# Patient Record
Sex: FEMALE | Race: WHITE | NOT HISPANIC OR LATINO | Employment: UNEMPLOYED | URBAN - NONMETROPOLITAN AREA
[De-identification: names, ages, dates, MRNs, and addresses within clinical notes are randomized per-mention and may not be internally consistent; named-entity substitution may affect disease eponyms.]

---

## 2017-10-01 ENCOUNTER — APPOINTMENT (OUTPATIENT)
Dept: GENERAL RADIOLOGY | Facility: HOSPITAL | Age: 72
End: 2017-10-01

## 2017-10-01 ENCOUNTER — HOSPITAL ENCOUNTER (INPATIENT)
Facility: HOSPITAL | Age: 72
LOS: 3 days | Discharge: HOME | End: 2017-10-04
Attending: EMERGENCY MEDICINE | Admitting: INTERNAL MEDICINE

## 2017-10-01 DIAGNOSIS — J18.9 PNEUMONIA OF LOWER LOBE DUE TO INFECTIOUS ORGANISM, UNSPECIFIED LATERALITY: Primary | ICD-10-CM

## 2017-10-01 DIAGNOSIS — I10 UNCONTROLLED HYPERTENSION: ICD-10-CM

## 2017-10-01 DIAGNOSIS — J44.1 COPD WITH EXACERBATION (HCC): ICD-10-CM

## 2017-10-01 PROBLEM — A41.9 SEPSIS (HCC): Status: ACTIVE | Noted: 2017-10-01

## 2017-10-01 PROBLEM — F17.219 CIGARETTE NICOTINE DEPENDENCE WITH NICOTINE-INDUCED DISORDER: Status: ACTIVE | Noted: 2017-10-01

## 2017-10-01 LAB
ALBUMIN SERPL-MCNC: 4 G/DL (ref 3.5–5)
ALBUMIN/GLOB SERPL: 1 G/DL (ref 1–2)
ALP SERPL-CCNC: 122 U/L (ref 38–126)
ALT SERPL W P-5'-P-CCNC: 28 U/L (ref 13–69)
ANION GAP SERPL CALCULATED.3IONS-SCNC: 18.7 MMOL/L
ARTERIAL PATENCY WRIST A: POSITIVE
AST SERPL-CCNC: 29 U/L (ref 15–46)
BACTERIA UR QL AUTO: ABNORMAL /HPF
BASE EXCESS BLDA CALC-SCNC: 4.7 MMOL/L
BASOPHILS # BLD AUTO: 0.17 10*3/MM3 (ref 0–0.2)
BASOPHILS NFR BLD AUTO: 0.8 % (ref 0–2.5)
BDY SITE: ABNORMAL
BILIRUB SERPL-MCNC: 0.5 MG/DL (ref 0.2–1.3)
BILIRUB UR QL STRIP: ABNORMAL
BUN BLD-MCNC: 10 MG/DL (ref 7–20)
BUN/CREAT SERPL: 20 (ref 7.1–23.5)
CALCIUM SPEC-SCNC: 9.8 MG/DL (ref 8.4–10.2)
CHLORIDE SERPL-SCNC: 102 MMOL/L (ref 98–107)
CLARITY UR: CLEAR
CO2 SERPL-SCNC: 25 MMOL/L (ref 26–30)
COD CRY URNS QL: ABNORMAL /HPF
COHGB MFR BLD: 0.7 %
COLOR UR: YELLOW
CREAT BLD-MCNC: 0.5 MG/DL (ref 0.6–1.3)
D-LACTATE SERPL-SCNC: 1.6 MMOL/L (ref 0.5–2)
DEPRECATED RDW RBC AUTO: 41.9 FL (ref 37–54)
EOSINOPHIL # BLD AUTO: 0.03 10*3/MM3 (ref 0–0.7)
EOSINOPHIL NFR BLD AUTO: 0.1 % (ref 0–7)
ERYTHROCYTE [DISTWIDTH] IN BLOOD BY AUTOMATED COUNT: 12.8 % (ref 11.5–14.5)
GFR SERPL CREATININE-BSD FRML MDRD: 122 ML/MIN/1.73
GLOBULIN UR ELPH-MCNC: 3.9 GM/DL
GLUCOSE BLD-MCNC: 139 MG/DL (ref 74–98)
GLUCOSE UR STRIP-MCNC: NEGATIVE MG/DL
HCO3 BLDA-SCNC: 28.9 MMOL/L (ref 22–28)
HCT VFR BLD AUTO: 44.4 % (ref 37–47)
HGB BLD-MCNC: 14.7 G/DL (ref 12–16)
HGB BLDA-MCNC: 14.7 G/DL (ref 12–18)
HGB UR QL STRIP.AUTO: ABNORMAL
HOROWITZ INDEX BLD+IHG-RTO: 28 %
HYALINE CASTS UR QL AUTO: ABNORMAL /LPF
IMM GRANULOCYTES # BLD: 0.1 10*3/MM3 (ref 0–0.06)
IMM GRANULOCYTES NFR BLD: 0.5 % (ref 0–0.6)
KETONES UR QL STRIP: ABNORMAL
LEUKOCYTE ESTERASE UR QL STRIP.AUTO: NEGATIVE
LYMPHOCYTES # BLD AUTO: 2.19 10*3/MM3 (ref 0.6–3.4)
LYMPHOCYTES NFR BLD AUTO: 10.3 % (ref 10–50)
MAGNESIUM SERPL-MCNC: 2.1 MG/DL (ref 1.6–2.3)
MCH RBC QN AUTO: 29.6 PG (ref 27–31)
MCHC RBC AUTO-ENTMCNC: 33.1 G/DL (ref 30–37)
MCV RBC AUTO: 89.3 FL (ref 81–99)
METHGB BLD QL: 0.4 %
MODALITY: ABNORMAL
MONOCYTES # BLD AUTO: 1.83 10*3/MM3 (ref 0–0.9)
MONOCYTES NFR BLD AUTO: 8.6 % (ref 0–12)
NEUTROPHILS # BLD AUTO: 16.95 10*3/MM3 (ref 2–6.9)
NEUTROPHILS NFR BLD AUTO: 79.7 % (ref 37–80)
NITRITE UR QL STRIP: NEGATIVE
NRBC BLD MANUAL-RTO: 0 /100 WBC (ref 0–0)
OXYHGB MFR BLDV: 98.2 % (ref 94–99)
PCO2 BLDA: 43.3 MM HG (ref 35–45)
PH BLDA: 7.43 PH UNITS (ref 7.3–7.5)
PH UR STRIP.AUTO: 6 [PH] (ref 5–8)
PLATELET # BLD AUTO: 278 10*3/MM3 (ref 130–400)
PMV BLD AUTO: 11.1 FL (ref 6–12)
PO2 BLDA: 136 MM HG (ref 75–100)
POTASSIUM BLD-SCNC: 3.7 MMOL/L (ref 3.5–5.1)
PROT SERPL-MCNC: 7.9 G/DL (ref 6.3–8.2)
PROT UR QL STRIP: ABNORMAL
RBC # BLD AUTO: 4.97 10*6/MM3 (ref 4.2–5.4)
RBC # UR: ABNORMAL /HPF
REF LAB TEST METHOD: ABNORMAL
SAO2 % BLDCOA: 99.3 %
SODIUM BLD-SCNC: 142 MMOL/L (ref 137–145)
SP GR UR STRIP: 1.02 (ref 1–1.03)
SQUAMOUS #/AREA URNS HPF: ABNORMAL /HPF
TROPONIN I SERPL-MCNC: <0.012 NG/ML (ref 0–0.03)
UROBILINOGEN UR QL STRIP: ABNORMAL
WBC NRBC COR # BLD: 21.27 10*3/MM3 (ref 4.8–10.8)
WBC UR QL AUTO: ABNORMAL /HPF

## 2017-10-01 PROCEDURE — 87040 BLOOD CULTURE FOR BACTERIA: CPT | Performed by: EMERGENCY MEDICINE

## 2017-10-01 PROCEDURE — 83605 ASSAY OF LACTIC ACID: CPT | Performed by: EMERGENCY MEDICINE

## 2017-10-01 PROCEDURE — 25010000002 METHYLPREDNISOLONE PER 40 MG: Performed by: INTERNAL MEDICINE

## 2017-10-01 PROCEDURE — 94640 AIRWAY INHALATION TREATMENT: CPT

## 2017-10-01 PROCEDURE — 71010 HC CHEST PA OR AP: CPT

## 2017-10-01 PROCEDURE — 82805 BLOOD GASES W/O2 SATURATION: CPT

## 2017-10-01 PROCEDURE — 85025 COMPLETE CBC W/AUTO DIFF WBC: CPT | Performed by: EMERGENCY MEDICINE

## 2017-10-01 PROCEDURE — 94799 UNLISTED PULMONARY SVC/PX: CPT

## 2017-10-01 PROCEDURE — 83735 ASSAY OF MAGNESIUM: CPT | Performed by: EMERGENCY MEDICINE

## 2017-10-01 PROCEDURE — 94644 CONT INHLJ TX 1ST HOUR: CPT

## 2017-10-01 PROCEDURE — 80053 COMPREHEN METABOLIC PANEL: CPT | Performed by: EMERGENCY MEDICINE

## 2017-10-01 PROCEDURE — 84484 ASSAY OF TROPONIN QUANT: CPT | Performed by: EMERGENCY MEDICINE

## 2017-10-01 PROCEDURE — 83050 HGB METHEMOGLOBIN QUAN: CPT

## 2017-10-01 PROCEDURE — 99223 1ST HOSP IP/OBS HIGH 75: CPT | Performed by: INTERNAL MEDICINE

## 2017-10-01 PROCEDURE — 82375 ASSAY CARBOXYHB QUANT: CPT

## 2017-10-01 PROCEDURE — 25010000002 MEROPENEM IN SODIUM CHLORIDE 0.9% 50 ML 1 GM/50ML RECONSTITUTED SOLUTION: Performed by: INTERNAL MEDICINE

## 2017-10-01 PROCEDURE — 99285 EMERGENCY DEPT VISIT HI MDM: CPT

## 2017-10-01 PROCEDURE — 25010000002 MEROPENEM IN SODIUM CHLORIDE 0.9% 50 ML 1 GM/50ML RECONSTITUTED SOLUTION: Performed by: EMERGENCY MEDICINE

## 2017-10-01 PROCEDURE — 36600 WITHDRAWAL OF ARTERIAL BLOOD: CPT

## 2017-10-01 PROCEDURE — 25010000002 METHYLPREDNISOLONE PER 125 MG: Performed by: EMERGENCY MEDICINE

## 2017-10-01 PROCEDURE — 93005 ELECTROCARDIOGRAM TRACING: CPT

## 2017-10-01 PROCEDURE — 25010000002 AZITHROMYCIN: Performed by: EMERGENCY MEDICINE

## 2017-10-01 PROCEDURE — 25010000002 ENOXAPARIN PER 10 MG: Performed by: INTERNAL MEDICINE

## 2017-10-01 PROCEDURE — 81001 URINALYSIS AUTO W/SCOPE: CPT | Performed by: EMERGENCY MEDICINE

## 2017-10-01 RX ORDER — ACETAMINOPHEN 500 MG
1000 TABLET ORAL ONCE
Status: COMPLETED | OUTPATIENT
Start: 2017-10-01 | End: 2017-10-01

## 2017-10-01 RX ORDER — ONDANSETRON 4 MG/1
4 TABLET, ORALLY DISINTEGRATING ORAL EVERY 6 HOURS PRN
Status: DISCONTINUED | OUTPATIENT
Start: 2017-10-01 | End: 2017-10-04 | Stop reason: HOSPADM

## 2017-10-01 RX ORDER — CARVEDILOL 6.25 MG/1
6.25 TABLET ORAL 2 TIMES DAILY WITH MEALS
Status: DISCONTINUED | OUTPATIENT
Start: 2017-10-01 | End: 2017-10-02

## 2017-10-01 RX ORDER — MEROPENEM AND SODIUM CHLORIDE 1 G/50ML
1 INJECTION, SOLUTION INTRAVENOUS ONCE
Status: DISCONTINUED | OUTPATIENT
Start: 2017-10-01 | End: 2017-10-01 | Stop reason: SDUPTHER

## 2017-10-01 RX ORDER — METHYLPREDNISOLONE SODIUM SUCCINATE 40 MG/ML
40 INJECTION, POWDER, LYOPHILIZED, FOR SOLUTION INTRAMUSCULAR; INTRAVENOUS EVERY 12 HOURS
Status: DISCONTINUED | OUTPATIENT
Start: 2017-10-01 | End: 2017-10-02

## 2017-10-01 RX ORDER — IPRATROPIUM BROMIDE AND ALBUTEROL SULFATE 2.5; .5 MG/3ML; MG/3ML
6 SOLUTION RESPIRATORY (INHALATION) ONCE
Status: COMPLETED | OUTPATIENT
Start: 2017-10-01 | End: 2017-10-01

## 2017-10-01 RX ORDER — IPRATROPIUM BROMIDE AND ALBUTEROL SULFATE 2.5; .5 MG/3ML; MG/3ML
3 SOLUTION RESPIRATORY (INHALATION) EVERY 4 HOURS PRN
Status: DISCONTINUED | OUTPATIENT
Start: 2017-10-01 | End: 2017-10-04 | Stop reason: HOSPADM

## 2017-10-01 RX ORDER — ONDANSETRON 2 MG/ML
4 INJECTION INTRAMUSCULAR; INTRAVENOUS EVERY 6 HOURS PRN
Status: DISCONTINUED | OUTPATIENT
Start: 2017-10-01 | End: 2017-10-04 | Stop reason: HOSPADM

## 2017-10-01 RX ORDER — IPRATROPIUM BROMIDE AND ALBUTEROL SULFATE 2.5; .5 MG/3ML; MG/3ML
3 SOLUTION RESPIRATORY (INHALATION)
Status: DISCONTINUED | OUTPATIENT
Start: 2017-10-01 | End: 2017-10-04 | Stop reason: HOSPADM

## 2017-10-01 RX ORDER — GUAIFENESIN 600 MG/1
600 TABLET, EXTENDED RELEASE ORAL 2 TIMES DAILY
Status: DISCONTINUED | OUTPATIENT
Start: 2017-10-01 | End: 2017-10-04 | Stop reason: HOSPADM

## 2017-10-01 RX ORDER — SODIUM CHLORIDE 0.9 % (FLUSH) 0.9 %
1-10 SYRINGE (ML) INJECTION AS NEEDED
Status: DISCONTINUED | OUTPATIENT
Start: 2017-10-01 | End: 2017-10-04 | Stop reason: HOSPADM

## 2017-10-01 RX ORDER — BUDESONIDE 0.5 MG/2ML
0.5 INHALANT ORAL
Status: DISCONTINUED | OUTPATIENT
Start: 2017-10-01 | End: 2017-10-04 | Stop reason: HOSPADM

## 2017-10-01 RX ORDER — MEROPENEM AND SODIUM CHLORIDE 1 G/50ML
1 INJECTION, SOLUTION INTRAVENOUS ONCE
Status: COMPLETED | OUTPATIENT
Start: 2017-10-01 | End: 2017-10-01

## 2017-10-01 RX ORDER — SODIUM CHLORIDE 9 MG/ML
125 INJECTION, SOLUTION INTRAVENOUS CONTINUOUS
Status: DISCONTINUED | OUTPATIENT
Start: 2017-10-01 | End: 2017-10-02

## 2017-10-01 RX ORDER — ACETAMINOPHEN 325 MG/1
650 TABLET ORAL EVERY 4 HOURS PRN
Status: DISCONTINUED | OUTPATIENT
Start: 2017-10-01 | End: 2017-10-04 | Stop reason: HOSPADM

## 2017-10-01 RX ORDER — ONDANSETRON 4 MG/1
4 TABLET, FILM COATED ORAL EVERY 6 HOURS PRN
Status: DISCONTINUED | OUTPATIENT
Start: 2017-10-01 | End: 2017-10-04 | Stop reason: HOSPADM

## 2017-10-01 RX ORDER — SACCHAROMYCES BOULARDII 250 MG
250 CAPSULE ORAL 2 TIMES DAILY
Status: DISCONTINUED | OUTPATIENT
Start: 2017-10-01 | End: 2017-10-04 | Stop reason: HOSPADM

## 2017-10-01 RX ORDER — MEROPENEM AND SODIUM CHLORIDE 1 G/50ML
1 INJECTION, SOLUTION INTRAVENOUS EVERY 8 HOURS
Status: DISCONTINUED | OUTPATIENT
Start: 2017-10-01 | End: 2017-10-04 | Stop reason: HOSPADM

## 2017-10-01 RX ORDER — SODIUM CHLORIDE 0.9 % (FLUSH) 0.9 %
10 SYRINGE (ML) INJECTION AS NEEDED
Status: DISCONTINUED | OUTPATIENT
Start: 2017-10-01 | End: 2017-10-01

## 2017-10-01 RX ORDER — METHYLPREDNISOLONE SODIUM SUCCINATE 125 MG/2ML
125 INJECTION, POWDER, LYOPHILIZED, FOR SOLUTION INTRAMUSCULAR; INTRAVENOUS ONCE
Status: COMPLETED | OUTPATIENT
Start: 2017-10-01 | End: 2017-10-01

## 2017-10-01 RX ADMIN — ENOXAPARIN SODIUM 40 MG: 40 INJECTION SUBCUTANEOUS at 18:29

## 2017-10-01 RX ADMIN — RDII 250 MG CAPSULE 250 MG: at 20:15

## 2017-10-01 RX ADMIN — SODIUM CHLORIDE 1000 ML: 9 INJECTION, SOLUTION INTRAVENOUS at 13:54

## 2017-10-01 RX ADMIN — AZITHROMYCIN 500 MG: 500 INJECTION, POWDER, LYOPHILIZED, FOR SOLUTION INTRAVENOUS at 15:49

## 2017-10-01 RX ADMIN — SODIUM CHLORIDE 125 ML/HR: 9 INJECTION, SOLUTION INTRAVENOUS at 18:29

## 2017-10-01 RX ADMIN — METHYLPREDNISOLONE SODIUM SUCCINATE 40 MG: 40 INJECTION, POWDER, FOR SOLUTION INTRAMUSCULAR; INTRAVENOUS at 23:11

## 2017-10-01 RX ADMIN — BUDESONIDE 0.5 MG: 0.5 INHALANT RESPIRATORY (INHALATION) at 19:09

## 2017-10-01 RX ADMIN — CARVEDILOL 6.25 MG: 6.25 TABLET, FILM COATED ORAL at 18:29

## 2017-10-01 RX ADMIN — MEROPENEM AND SODIUM CHLORIDE 1 G: 1 INJECTION, SOLUTION INTRAVENOUS at 20:16

## 2017-10-01 RX ADMIN — METHYLPREDNISOLONE SODIUM SUCCINATE 125 MG: 125 INJECTION, POWDER, FOR SOLUTION INTRAMUSCULAR; INTRAVENOUS at 11:53

## 2017-10-01 RX ADMIN — IPRATROPIUM BROMIDE AND ALBUTEROL SULFATE 3 ML: .5; 3 SOLUTION RESPIRATORY (INHALATION) at 19:09

## 2017-10-01 RX ADMIN — ACETAMINOPHEN 1000 MG: 500 TABLET, FILM COATED ORAL at 16:27

## 2017-10-01 RX ADMIN — SODIUM CHLORIDE 1000 ML: 9 INJECTION, SOLUTION INTRAVENOUS at 12:15

## 2017-10-01 RX ADMIN — IPRATROPIUM BROMIDE AND ALBUTEROL SULFATE 6 ML: .5; 3 SOLUTION RESPIRATORY (INHALATION) at 11:59

## 2017-10-01 RX ADMIN — GUAIFENESIN 600 MG: 600 TABLET, EXTENDED RELEASE ORAL at 20:15

## 2017-10-01 RX ADMIN — MEROPENEM AND SODIUM CHLORIDE 1 G: 1 INJECTION, SOLUTION INTRAVENOUS at 14:53

## 2017-10-02 ENCOUNTER — APPOINTMENT (OUTPATIENT)
Dept: CARDIOLOGY | Facility: HOSPITAL | Age: 72
End: 2017-10-02
Attending: INTERNAL MEDICINE

## 2017-10-02 LAB
ANION GAP SERPL CALCULATED.3IONS-SCNC: 14.7 MMOL/L
BASOPHILS # BLD AUTO: 0.05 10*3/MM3 (ref 0–0.2)
BASOPHILS NFR BLD AUTO: 0.3 % (ref 0–2.5)
BUN BLD-MCNC: 10 MG/DL (ref 7–20)
BUN/CREAT SERPL: 25 (ref 7.1–23.5)
CALCIUM SPEC-SCNC: 9 MG/DL (ref 8.4–10.2)
CHLORIDE SERPL-SCNC: 112 MMOL/L (ref 98–107)
CO2 SERPL-SCNC: 26 MMOL/L (ref 26–30)
CREAT BLD-MCNC: 0.4 MG/DL (ref 0.6–1.3)
DEPRECATED RDW RBC AUTO: 43.9 FL (ref 37–54)
EOSINOPHIL # BLD AUTO: 0 10*3/MM3 (ref 0–0.7)
EOSINOPHIL NFR BLD AUTO: 0 % (ref 0–7)
ERYTHROCYTE [DISTWIDTH] IN BLOOD BY AUTOMATED COUNT: 13 % (ref 11.5–14.5)
GFR SERPL CREATININE-BSD FRML MDRD: >150 ML/MIN/1.73
GLUCOSE BLD-MCNC: 204 MG/DL (ref 74–98)
HCT VFR BLD AUTO: 41.8 % (ref 37–47)
HGB BLD-MCNC: 13.6 G/DL (ref 12–16)
IMM GRANULOCYTES # BLD: 0.16 10*3/MM3 (ref 0–0.06)
IMM GRANULOCYTES NFR BLD: 0.9 % (ref 0–0.6)
LYMPHOCYTES # BLD AUTO: 1.3 10*3/MM3 (ref 0.6–3.4)
LYMPHOCYTES NFR BLD AUTO: 7.2 % (ref 10–50)
MCH RBC QN AUTO: 29.9 PG (ref 27–31)
MCHC RBC AUTO-ENTMCNC: 32.5 G/DL (ref 30–37)
MCV RBC AUTO: 91.9 FL (ref 81–99)
MONOCYTES # BLD AUTO: 0.55 10*3/MM3 (ref 0–0.9)
MONOCYTES NFR BLD AUTO: 3.1 % (ref 0–12)
NEUTROPHILS # BLD AUTO: 15.92 10*3/MM3 (ref 2–6.9)
NEUTROPHILS NFR BLD AUTO: 88.5 % (ref 37–80)
NRBC BLD MANUAL-RTO: 0 /100 WBC (ref 0–0)
NT-PROBNP SERPL-MCNC: 315 PG/ML (ref 0–125)
PLATELET # BLD AUTO: 271 10*3/MM3 (ref 130–400)
PMV BLD AUTO: 11 FL (ref 6–12)
POTASSIUM BLD-SCNC: 3.7 MMOL/L (ref 3.5–5.1)
RBC # BLD AUTO: 4.55 10*6/MM3 (ref 4.2–5.4)
SODIUM BLD-SCNC: 149 MMOL/L (ref 137–145)
WBC NRBC COR # BLD: 17.98 10*3/MM3 (ref 4.8–10.8)

## 2017-10-02 PROCEDURE — 80048 BASIC METABOLIC PNL TOTAL CA: CPT | Performed by: INTERNAL MEDICINE

## 2017-10-02 PROCEDURE — 83880 ASSAY OF NATRIURETIC PEPTIDE: CPT | Performed by: INTERNAL MEDICINE

## 2017-10-02 PROCEDURE — 25010000002 ENOXAPARIN PER 10 MG: Performed by: INTERNAL MEDICINE

## 2017-10-02 PROCEDURE — 25010000002 HYDRALAZINE PER 20 MG: Performed by: INTERNAL MEDICINE

## 2017-10-02 PROCEDURE — 25010000002 METHYLPREDNISOLONE PER 40 MG: Performed by: NURSE PRACTITIONER

## 2017-10-02 PROCEDURE — 25010000002 METHYLPREDNISOLONE PER 40 MG: Performed by: INTERNAL MEDICINE

## 2017-10-02 PROCEDURE — 87070 CULTURE OTHR SPECIMN AEROBIC: CPT | Performed by: INTERNAL MEDICINE

## 2017-10-02 PROCEDURE — 99233 SBSQ HOSP IP/OBS HIGH 50: CPT | Performed by: INTERNAL MEDICINE

## 2017-10-02 PROCEDURE — 94799 UNLISTED PULMONARY SVC/PX: CPT

## 2017-10-02 PROCEDURE — 99223 1ST HOSP IP/OBS HIGH 75: CPT | Performed by: INTERNAL MEDICINE

## 2017-10-02 PROCEDURE — 85025 COMPLETE CBC W/AUTO DIFF WBC: CPT | Performed by: INTERNAL MEDICINE

## 2017-10-02 PROCEDURE — 87205 SMEAR GRAM STAIN: CPT | Performed by: INTERNAL MEDICINE

## 2017-10-02 PROCEDURE — 25010000002 MEROPENEM IN SODIUM CHLORIDE 0.9% 50 ML 1 GM/50ML RECONSTITUTED SOLUTION: Performed by: INTERNAL MEDICINE

## 2017-10-02 PROCEDURE — 93306 TTE W/DOPPLER COMPLETE: CPT

## 2017-10-02 RX ORDER — HYDRALAZINE HYDROCHLORIDE 20 MG/ML
10 INJECTION INTRAMUSCULAR; INTRAVENOUS EVERY 6 HOURS PRN
Status: DISCONTINUED | OUTPATIENT
Start: 2017-10-02 | End: 2017-10-04 | Stop reason: HOSPADM

## 2017-10-02 RX ORDER — DIPHENHYDRAMINE HYDROCHLORIDE AND LIDOCAINE HYDROCHLORIDE AND ALUMINUM HYDROXIDE AND MAGNESIUM HYDRO
KIT EVERY 6 HOURS
Status: DISCONTINUED | OUTPATIENT
Start: 2017-10-02 | End: 2017-10-04 | Stop reason: HOSPADM

## 2017-10-02 RX ORDER — AZITHROMYCIN 250 MG/1
500 TABLET, FILM COATED ORAL
Status: COMPLETED | OUTPATIENT
Start: 2017-10-02 | End: 2017-10-04

## 2017-10-02 RX ORDER — ARFORMOTEROL TARTRATE 15 UG/2ML
15 SOLUTION RESPIRATORY (INHALATION)
Status: DISCONTINUED | OUTPATIENT
Start: 2017-10-02 | End: 2017-10-04 | Stop reason: HOSPADM

## 2017-10-02 RX ORDER — METHYLPREDNISOLONE SODIUM SUCCINATE 40 MG/ML
40 INJECTION, POWDER, LYOPHILIZED, FOR SOLUTION INTRAMUSCULAR; INTRAVENOUS EVERY 6 HOURS
Status: DISCONTINUED | OUTPATIENT
Start: 2017-10-02 | End: 2017-10-03

## 2017-10-02 RX ORDER — CARVEDILOL 12.5 MG/1
12.5 TABLET ORAL 2 TIMES DAILY WITH MEALS
Status: DISCONTINUED | OUTPATIENT
Start: 2017-10-02 | End: 2017-10-03

## 2017-10-02 RX ADMIN — HYDRALAZINE HYDROCHLORIDE 10 MG: 20 INJECTION INTRAMUSCULAR; INTRAVENOUS at 13:04

## 2017-10-02 RX ADMIN — BUDESONIDE 0.5 MG: 0.5 INHALANT RESPIRATORY (INHALATION) at 07:18

## 2017-10-02 RX ADMIN — MEROPENEM AND SODIUM CHLORIDE 1 G: 1 INJECTION, SOLUTION INTRAVENOUS at 05:57

## 2017-10-02 RX ADMIN — GUAIFENESIN 600 MG: 600 TABLET, EXTENDED RELEASE ORAL at 09:50

## 2017-10-02 RX ADMIN — RDII 250 MG CAPSULE 250 MG: at 09:50

## 2017-10-02 RX ADMIN — AZITHROMYCIN MONOHYDRATE 500 MG: 250 TABLET ORAL at 14:00

## 2017-10-02 RX ADMIN — RDII 250 MG CAPSULE 250 MG: at 21:01

## 2017-10-02 RX ADMIN — IPRATROPIUM BROMIDE AND ALBUTEROL SULFATE 3 ML: .5; 3 SOLUTION RESPIRATORY (INHALATION) at 07:18

## 2017-10-02 RX ADMIN — HYDRALAZINE HYDROCHLORIDE 10 MG: 20 INJECTION INTRAMUSCULAR; INTRAVENOUS at 21:24

## 2017-10-02 RX ADMIN — ACETAMINOPHEN 650 MG: 325 TABLET, FILM COATED ORAL at 21:11

## 2017-10-02 RX ADMIN — IPRATROPIUM BROMIDE AND ALBUTEROL SULFATE 3 ML: .5; 3 SOLUTION RESPIRATORY (INHALATION) at 12:50

## 2017-10-02 RX ADMIN — MEROPENEM AND SODIUM CHLORIDE 1 G: 1 INJECTION, SOLUTION INTRAVENOUS at 12:49

## 2017-10-02 RX ADMIN — DIPHENHYDRAMINE HYDROCHLORIDE AND LIDOCAINE HYDROCHLORIDE AND ALUMINUM HYDROXIDE AND MAGNESIUM HYDRO: KIT at 14:00

## 2017-10-02 RX ADMIN — ARFORMOTEROL TARTRATE 15 MCG: 15 SOLUTION RESPIRATORY (INHALATION) at 13:09

## 2017-10-02 RX ADMIN — HYDRALAZINE HYDROCHLORIDE 10 MG: 20 INJECTION INTRAMUSCULAR; INTRAVENOUS at 03:15

## 2017-10-02 RX ADMIN — METHYLPREDNISOLONE SODIUM SUCCINATE 40 MG: 40 INJECTION, POWDER, FOR SOLUTION INTRAMUSCULAR; INTRAVENOUS at 12:00

## 2017-10-02 RX ADMIN — DIPHENHYDRAMINE HYDROCHLORIDE AND LIDOCAINE HYDROCHLORIDE AND ALUMINUM HYDROXIDE AND MAGNESIUM HYDRO: KIT at 21:01

## 2017-10-02 RX ADMIN — CARVEDILOL 6.25 MG: 6.25 TABLET, FILM COATED ORAL at 09:00

## 2017-10-02 RX ADMIN — BUDESONIDE 0.5 MG: 0.5 INHALANT RESPIRATORY (INHALATION) at 19:22

## 2017-10-02 RX ADMIN — HYDROCODONE POLISTIREX AND CHLORPHENIRAMINE POLISTIREX 2.5 ML: 10; 8 SUSPENSION, EXTENDED RELEASE ORAL at 09:50

## 2017-10-02 RX ADMIN — ENOXAPARIN SODIUM 40 MG: 40 INJECTION SUBCUTANEOUS at 17:56

## 2017-10-02 RX ADMIN — METHYLPREDNISOLONE SODIUM SUCCINATE 40 MG: 40 INJECTION, POWDER, FOR SOLUTION INTRAMUSCULAR; INTRAVENOUS at 17:56

## 2017-10-02 RX ADMIN — GUAIFENESIN 600 MG: 600 TABLET, EXTENDED RELEASE ORAL at 21:01

## 2017-10-02 RX ADMIN — MEROPENEM AND SODIUM CHLORIDE 1 G: 1 INJECTION, SOLUTION INTRAVENOUS at 21:01

## 2017-10-02 RX ADMIN — ARFORMOTEROL TARTRATE 15 MCG: 15 SOLUTION RESPIRATORY (INHALATION) at 22:32

## 2017-10-02 RX ADMIN — IPRATROPIUM BROMIDE AND ALBUTEROL SULFATE 3 ML: .5; 3 SOLUTION RESPIRATORY (INHALATION) at 19:22

## 2017-10-02 RX ADMIN — CARVEDILOL 12.5 MG: 12.5 TABLET, FILM COATED ORAL at 17:56

## 2017-10-03 LAB — GLUCOSE BLDC GLUCOMTR-MCNC: 174 MG/DL (ref 70–130)

## 2017-10-03 PROCEDURE — 82962 GLUCOSE BLOOD TEST: CPT

## 2017-10-03 PROCEDURE — 99232 SBSQ HOSP IP/OBS MODERATE 35: CPT | Performed by: INTERNAL MEDICINE

## 2017-10-03 PROCEDURE — 25010000002 HYDRALAZINE PER 20 MG: Performed by: INTERNAL MEDICINE

## 2017-10-03 PROCEDURE — 25010000002 METHYLPREDNISOLONE PER 40 MG: Performed by: NURSE PRACTITIONER

## 2017-10-03 PROCEDURE — 94799 UNLISTED PULMONARY SVC/PX: CPT

## 2017-10-03 PROCEDURE — 25010000002 MEROPENEM IN SODIUM CHLORIDE 0.9% 50 ML 1 GM/50ML RECONSTITUTED SOLUTION: Performed by: INTERNAL MEDICINE

## 2017-10-03 PROCEDURE — 25010000002 METHYLPREDNISOLONE PER 40 MG: Performed by: INTERNAL MEDICINE

## 2017-10-03 PROCEDURE — 25010000002 ENOXAPARIN PER 10 MG: Performed by: INTERNAL MEDICINE

## 2017-10-03 RX ORDER — METHYLPREDNISOLONE SODIUM SUCCINATE 40 MG/ML
30 INJECTION, POWDER, LYOPHILIZED, FOR SOLUTION INTRAMUSCULAR; INTRAVENOUS EVERY 8 HOURS
Status: DISCONTINUED | OUTPATIENT
Start: 2017-10-03 | End: 2017-10-04

## 2017-10-03 RX ORDER — AMLODIPINE BESYLATE 5 MG/1
10 TABLET ORAL
Status: DISCONTINUED | OUTPATIENT
Start: 2017-10-04 | End: 2017-10-03

## 2017-10-03 RX ORDER — AMLODIPINE BESYLATE 5 MG/1
5 TABLET ORAL
Status: DISCONTINUED | OUTPATIENT
Start: 2017-10-03 | End: 2017-10-03

## 2017-10-03 RX ORDER — AMLODIPINE BESYLATE 5 MG/1
5 TABLET ORAL
Status: DISCONTINUED | OUTPATIENT
Start: 2017-10-04 | End: 2017-10-04 | Stop reason: HOSPADM

## 2017-10-03 RX ORDER — ATENOLOL 50 MG/1
50 TABLET ORAL
Status: DISCONTINUED | OUTPATIENT
Start: 2017-10-03 | End: 2017-10-04 | Stop reason: HOSPADM

## 2017-10-03 RX ADMIN — ACETAMINOPHEN 650 MG: 325 TABLET, FILM COATED ORAL at 18:28

## 2017-10-03 RX ADMIN — ENOXAPARIN SODIUM 40 MG: 40 INJECTION SUBCUTANEOUS at 18:28

## 2017-10-03 RX ADMIN — MEROPENEM AND SODIUM CHLORIDE 1 G: 1 INJECTION, SOLUTION INTRAVENOUS at 20:57

## 2017-10-03 RX ADMIN — HYDROCODONE POLISTIREX AND CHLORPHENIRAMINE POLISTIREX 2.5 ML: 10; 8 SUSPENSION, EXTENDED RELEASE ORAL at 18:28

## 2017-10-03 RX ADMIN — DIPHENHYDRAMINE HYDROCHLORIDE AND LIDOCAINE HYDROCHLORIDE AND ALUMINUM HYDROXIDE AND MAGNESIUM HYDRO: KIT at 19:51

## 2017-10-03 RX ADMIN — CARVEDILOL 12.5 MG: 12.5 TABLET, FILM COATED ORAL at 08:26

## 2017-10-03 RX ADMIN — METHYLPREDNISOLONE SODIUM SUCCINATE 40 MG: 40 INJECTION, POWDER, FOR SOLUTION INTRAMUSCULAR; INTRAVENOUS at 05:52

## 2017-10-03 RX ADMIN — AZITHROMYCIN MONOHYDRATE 500 MG: 250 TABLET ORAL at 09:30

## 2017-10-03 RX ADMIN — GUAIFENESIN 600 MG: 600 TABLET, EXTENDED RELEASE ORAL at 20:55

## 2017-10-03 RX ADMIN — IPRATROPIUM BROMIDE AND ALBUTEROL SULFATE 3 ML: .5; 3 SOLUTION RESPIRATORY (INHALATION) at 07:31

## 2017-10-03 RX ADMIN — DIPHENHYDRAMINE HYDROCHLORIDE AND LIDOCAINE HYDROCHLORIDE AND ALUMINUM HYDROXIDE AND MAGNESIUM HYDRO: KIT at 08:28

## 2017-10-03 RX ADMIN — BUDESONIDE 0.5 MG: 0.5 INHALANT RESPIRATORY (INHALATION) at 20:06

## 2017-10-03 RX ADMIN — MEROPENEM AND SODIUM CHLORIDE 1 G: 1 INJECTION, SOLUTION INTRAVENOUS at 13:34

## 2017-10-03 RX ADMIN — METHYLPREDNISOLONE SODIUM SUCCINATE 30 MG: 40 INJECTION, POWDER, FOR SOLUTION INTRAMUSCULAR; INTRAVENOUS at 13:34

## 2017-10-03 RX ADMIN — ARFORMOTEROL TARTRATE 15 MCG: 15 SOLUTION RESPIRATORY (INHALATION) at 07:31

## 2017-10-03 RX ADMIN — AMLODIPINE BESYLATE 5 MG: 5 TABLET ORAL at 09:32

## 2017-10-03 RX ADMIN — DIPHENHYDRAMINE HYDROCHLORIDE AND LIDOCAINE HYDROCHLORIDE AND ALUMINUM HYDROXIDE AND MAGNESIUM HYDRO: KIT at 13:34

## 2017-10-03 RX ADMIN — HYDRALAZINE HYDROCHLORIDE 10 MG: 20 INJECTION INTRAMUSCULAR; INTRAVENOUS at 05:57

## 2017-10-03 RX ADMIN — IPRATROPIUM BROMIDE AND ALBUTEROL SULFATE 3 ML: .5; 3 SOLUTION RESPIRATORY (INHALATION) at 13:23

## 2017-10-03 RX ADMIN — GUAIFENESIN 600 MG: 600 TABLET, EXTENDED RELEASE ORAL at 09:29

## 2017-10-03 RX ADMIN — ARFORMOTEROL TARTRATE 15 MCG: 15 SOLUTION RESPIRATORY (INHALATION) at 20:06

## 2017-10-03 RX ADMIN — MEROPENEM AND SODIUM CHLORIDE 1 G: 1 INJECTION, SOLUTION INTRAVENOUS at 05:53

## 2017-10-03 RX ADMIN — ATENOLOL 50 MG: 50 TABLET ORAL at 13:49

## 2017-10-03 RX ADMIN — RDII 250 MG CAPSULE 250 MG: at 09:30

## 2017-10-03 RX ADMIN — METHYLPREDNISOLONE SODIUM SUCCINATE 40 MG: 40 INJECTION, POWDER, FOR SOLUTION INTRAMUSCULAR; INTRAVENOUS at 00:07

## 2017-10-03 RX ADMIN — RDII 250 MG CAPSULE 250 MG: at 20:56

## 2017-10-03 RX ADMIN — IPRATROPIUM BROMIDE AND ALBUTEROL SULFATE 3 ML: .5; 3 SOLUTION RESPIRATORY (INHALATION) at 00:36

## 2017-10-03 RX ADMIN — METHYLPREDNISOLONE SODIUM SUCCINATE 30 MG: 40 INJECTION, POWDER, FOR SOLUTION INTRAMUSCULAR; INTRAVENOUS at 21:02

## 2017-10-03 RX ADMIN — HYDRALAZINE HYDROCHLORIDE 10 MG: 20 INJECTION INTRAMUSCULAR; INTRAVENOUS at 11:00

## 2017-10-03 RX ADMIN — BUDESONIDE 0.5 MG: 0.5 INHALANT RESPIRATORY (INHALATION) at 07:31

## 2017-10-03 RX ADMIN — DIPHENHYDRAMINE HYDROCHLORIDE AND LIDOCAINE HYDROCHLORIDE AND ALUMINUM HYDROXIDE AND MAGNESIUM HYDRO: KIT at 01:09

## 2017-10-04 VITALS
DIASTOLIC BLOOD PRESSURE: 78 MMHG | HEIGHT: 64 IN | OXYGEN SATURATION: 93 % | TEMPERATURE: 98.6 F | SYSTOLIC BLOOD PRESSURE: 171 MMHG | HEART RATE: 91 BPM | WEIGHT: 161.5 LBS | BODY MASS INDEX: 27.57 KG/M2 | RESPIRATION RATE: 18 BRPM

## 2017-10-04 PROCEDURE — 94799 UNLISTED PULMONARY SVC/PX: CPT

## 2017-10-04 PROCEDURE — 25010000002 MEROPENEM IN SODIUM CHLORIDE 0.9% 50 ML 1 GM/50ML RECONSTITUTED SOLUTION: Performed by: INTERNAL MEDICINE

## 2017-10-04 PROCEDURE — 99239 HOSP IP/OBS DSCHRG MGMT >30: CPT | Performed by: INTERNAL MEDICINE

## 2017-10-04 PROCEDURE — 99232 SBSQ HOSP IP/OBS MODERATE 35: CPT | Performed by: INTERNAL MEDICINE

## 2017-10-04 PROCEDURE — 25010000002 METHYLPREDNISOLONE PER 40 MG: Performed by: INTERNAL MEDICINE

## 2017-10-04 RX ORDER — PREDNISONE 10 MG/1
10 TABLET ORAL DAILY
Qty: 50 TABLET | Refills: 0 | Status: SHIPPED | OUTPATIENT
Start: 2017-10-04 | End: 2017-10-11

## 2017-10-04 RX ORDER — NICOTINE 21 MG/24HR
1 PATCH, TRANSDERMAL 24 HOURS TRANSDERMAL EVERY 24 HOURS
Qty: 28 PATCH | Refills: 2 | Status: SHIPPED | OUTPATIENT
Start: 2017-10-04 | End: 2017-10-11

## 2017-10-04 RX ORDER — ATENOLOL 50 MG/1
50 TABLET ORAL
Qty: 30 TABLET | Refills: 11 | Status: SHIPPED | OUTPATIENT
Start: 2017-10-05 | End: 2017-10-11 | Stop reason: SDUPTHER

## 2017-10-04 RX ORDER — PREDNISONE 20 MG/1
40 TABLET ORAL
Status: DISCONTINUED | OUTPATIENT
Start: 2017-10-10 | End: 2017-10-04 | Stop reason: HOSPADM

## 2017-10-04 RX ORDER — PREDNISONE 20 MG/1
60 TABLET ORAL
Status: DISCONTINUED | OUTPATIENT
Start: 2017-10-04 | End: 2017-10-04 | Stop reason: HOSPADM

## 2017-10-04 RX ORDER — BUDESONIDE AND FORMOTEROL FUMARATE DIHYDRATE 160; 4.5 UG/1; UG/1
2 AEROSOL RESPIRATORY (INHALATION)
Qty: 10.2 G | Refills: 5 | Status: SHIPPED | OUTPATIENT
Start: 2017-10-04 | End: 2017-12-13

## 2017-10-04 RX ORDER — PREDNISONE 20 MG/1
20 TABLET ORAL
Status: DISCONTINUED | OUTPATIENT
Start: 2017-10-16 | End: 2017-10-04 | Stop reason: HOSPADM

## 2017-10-04 RX ORDER — PREDNISONE 10 MG/1
10 TABLET ORAL
Status: DISCONTINUED | OUTPATIENT
Start: 2017-10-04 | End: 2017-10-04 | Stop reason: SDUPTHER

## 2017-10-04 RX ORDER — AMLODIPINE BESYLATE 5 MG/1
5 TABLET ORAL
Qty: 30 TABLET | Refills: 11 | Status: SHIPPED | OUTPATIENT
Start: 2017-10-05 | End: 2017-11-03 | Stop reason: SDUPTHER

## 2017-10-04 RX ORDER — PREDNISONE 10 MG/1
30 TABLET ORAL
Status: DISCONTINUED | OUTPATIENT
Start: 2017-10-13 | End: 2017-10-04 | Stop reason: HOSPADM

## 2017-10-04 RX ORDER — PREDNISONE 10 MG/1
10 TABLET ORAL
Status: DISCONTINUED | OUTPATIENT
Start: 2017-10-19 | End: 2017-10-04 | Stop reason: HOSPADM

## 2017-10-04 RX ORDER — ALBUTEROL SULFATE 90 UG/1
2 AEROSOL, METERED RESPIRATORY (INHALATION) EVERY 4 HOURS PRN
Qty: 18 G | Refills: 5 | Status: SHIPPED | OUTPATIENT
Start: 2017-10-04 | End: 2018-05-21 | Stop reason: SDUPTHER

## 2017-10-04 RX ORDER — PREDNISONE 50 MG/1
50 TABLET ORAL
Status: DISCONTINUED | OUTPATIENT
Start: 2017-10-07 | End: 2017-10-04 | Stop reason: HOSPADM

## 2017-10-04 RX ADMIN — ATENOLOL 50 MG: 50 TABLET ORAL at 08:49

## 2017-10-04 RX ADMIN — IPRATROPIUM BROMIDE AND ALBUTEROL SULFATE 3 ML: .5; 3 SOLUTION RESPIRATORY (INHALATION) at 01:21

## 2017-10-04 RX ADMIN — DIPHENHYDRAMINE HYDROCHLORIDE AND LIDOCAINE HYDROCHLORIDE AND ALUMINUM HYDROXIDE AND MAGNESIUM HYDRO: KIT at 02:36

## 2017-10-04 RX ADMIN — DIPHENHYDRAMINE HYDROCHLORIDE AND LIDOCAINE HYDROCHLORIDE AND ALUMINUM HYDROXIDE AND MAGNESIUM HYDRO: KIT at 08:00

## 2017-10-04 RX ADMIN — AMLODIPINE BESYLATE 5 MG: 5 TABLET ORAL at 08:48

## 2017-10-04 RX ADMIN — ARFORMOTEROL TARTRATE 15 MCG: 15 SOLUTION RESPIRATORY (INHALATION) at 07:12

## 2017-10-04 RX ADMIN — BUDESONIDE 0.5 MG: 0.5 INHALANT RESPIRATORY (INHALATION) at 07:12

## 2017-10-04 RX ADMIN — MEROPENEM AND SODIUM CHLORIDE 1 G: 1 INJECTION, SOLUTION INTRAVENOUS at 06:21

## 2017-10-04 RX ADMIN — AZITHROMYCIN MONOHYDRATE 500 MG: 250 TABLET ORAL at 08:47

## 2017-10-04 RX ADMIN — IPRATROPIUM BROMIDE AND ALBUTEROL SULFATE 3 ML: .5; 3 SOLUTION RESPIRATORY (INHALATION) at 07:12

## 2017-10-04 RX ADMIN — RDII 250 MG CAPSULE 250 MG: at 08:48

## 2017-10-04 RX ADMIN — GUAIFENESIN 600 MG: 600 TABLET, EXTENDED RELEASE ORAL at 08:47

## 2017-10-04 RX ADMIN — METHYLPREDNISOLONE SODIUM SUCCINATE 30 MG: 40 INJECTION, POWDER, FOR SOLUTION INTRAMUSCULAR; INTRAVENOUS at 06:24

## 2017-10-06 ENCOUNTER — TRANSITIONAL CARE MANAGEMENT TELEPHONE ENCOUNTER (OUTPATIENT)
Dept: INTERNAL MEDICINE | Facility: CLINIC | Age: 72
End: 2017-10-06

## 2017-10-06 LAB
BACTERIA SPEC AEROBE CULT: NORMAL
BACTERIA SPEC AEROBE CULT: NORMAL
BACTERIA SPEC RESP CULT: NORMAL
GRAM STN SPEC: NORMAL

## 2017-10-11 ENCOUNTER — OFFICE VISIT (OUTPATIENT)
Dept: INTERNAL MEDICINE | Facility: CLINIC | Age: 72
End: 2017-10-11

## 2017-10-11 VITALS
HEIGHT: 64 IN | DIASTOLIC BLOOD PRESSURE: 62 MMHG | BODY MASS INDEX: 26.49 KG/M2 | RESPIRATION RATE: 14 BRPM | SYSTOLIC BLOOD PRESSURE: 114 MMHG | OXYGEN SATURATION: 93 % | HEART RATE: 52 BPM | TEMPERATURE: 98.2 F | WEIGHT: 155.19 LBS

## 2017-10-11 DIAGNOSIS — J44.1 ACUTE EXACERBATION OF CHRONIC OBSTRUCTIVE PULMONARY DISEASE (COPD) (HCC): ICD-10-CM

## 2017-10-11 DIAGNOSIS — H57.9 LEFT EYE COMPLAINT: ICD-10-CM

## 2017-10-11 DIAGNOSIS — Z09 HOSPITAL DISCHARGE FOLLOW-UP: Primary | ICD-10-CM

## 2017-10-11 DIAGNOSIS — I10 ESSENTIAL HYPERTENSION: ICD-10-CM

## 2017-10-11 DIAGNOSIS — J18.9 PNEUMONIA OF LEFT LOWER LOBE DUE TO INFECTIOUS ORGANISM: ICD-10-CM

## 2017-10-11 PROBLEM — M79.10 MUSCLE ACHE: Status: ACTIVE | Noted: 2017-10-11

## 2017-10-11 PROBLEM — E78.5 DYSLIPIDEMIA: Status: ACTIVE | Noted: 2017-10-11

## 2017-10-11 PROBLEM — R73.9 BLOOD GLUCOSE ELEVATED: Status: ACTIVE | Noted: 2017-10-11

## 2017-10-11 PROCEDURE — 99495 TRANSJ CARE MGMT MOD F2F 14D: CPT | Performed by: NURSE PRACTITIONER

## 2017-10-11 RX ORDER — YOHIMBE BARK 500 MG
CAPSULE ORAL
COMMUNITY
End: 2019-02-06

## 2017-10-11 RX ORDER — ATENOLOL 50 MG/1
25 TABLET ORAL
Qty: 30 TABLET | Refills: 11 | Status: SHIPPED | OUTPATIENT
Start: 2017-10-11 | End: 2017-11-10

## 2017-10-11 NOTE — PROGRESS NOTES
Chief Complaint / Reason:      Chief Complaint   Patient presents with   • Establish [facility]     hospital f/u, has not started the steroid and inhalers yet. Declines flu vaccine.        Subjective     HPI  Patient presents today to [facility] care and for hospital follow-up.  Patient was seen at [facility] 10/1/17 for increasing cough and shortness of breath.  She was admitted with respiratory distress, pneumonia, and hypoxemia.  There she received O2 via nasal cannula, given IV steroids, neb treatments and empiric antibiotic therapy.  Her hospital course also involved 12-lead EKG, and echocardiogram which did not reveal any disease processes of significance.  Patient has a long-standing history of tobacco abuse in which patient states that she has quit smoking for 15 days September 27, 2017.  She is accompanied by her son-in-law.  Patient denies chest pain shortness of breath or heart palpitations.  She does have and frequent cough.  She does report that she has had some trouble with vision along with pain.  She does not recall when her last eye exam was.  Her past medical history includes arthritis, emphysema/COPD, gallstones, hypertension, kidney stones, migraine.  She has had scarlet fever in the past.  Patient has had a history of bladder tack and does get frequent UTIs.  She denies any urinary or gastrointestinal symptoms at this time.  Patient is scheduled to see Dr. Soriano pulmonologist 11/8/17.  Patient's vital signs are stable except her heart rate is low and oxygen saturation.  She does not recall what her oxygen saturation normally is.  She has not started her steroids or inhalers yet.  She states that she feels like she is doing a lot breath better and she only gets short of breath with exertion.  We discussed the importance of that medication/inhalers and advised her to continue smoking cessation.    History taken from: patient    PMH/FH/Social History were reviewed and updated  appropriately in the electronic medical record.     Review of Systems:   Review of Systems   Constitutional: Negative.  Negative for activity change, appetite change, fatigue and fever.   HENT: Positive for rhinorrhea. Negative for congestion, ear discharge, ear pain and trouble swallowing.    Eyes: Positive for pain, redness and visual disturbance. Negative for photophobia.   Respiratory: Positive for cough and wheezing.    Cardiovascular: Negative for chest pain and palpitations.   Gastrointestinal: Negative for abdominal distention, abdominal pain, constipation, diarrhea, nausea and vomiting.   Endocrine: Negative.    Genitourinary: Negative for dysuria, hematuria and urgency.   Musculoskeletal: Positive for arthralgias. Negative for back pain, joint swelling and myalgias.   Skin: Negative for color change and rash.   Allergic/Immunologic: Negative.    Neurological: Positive for headaches. Negative for dizziness, weakness and light-headedness.   Hematological: Negative for adenopathy. Does not bruise/bleed easily.   Psychiatric/Behavioral: Positive for sleep disturbance. Negative for agitation, confusion and dysphoric mood. The patient is not nervous/anxious.      All other systems were reviewed and are negative.  Exceptions are noted in the subjective or above.      Objective     Vital Signs  Vitals:    10/11/17 1336   BP: 114/62   Pulse: 52   Resp: 14   Temp: 98.2 °F (36.8 °C)   SpO2: 93%       Body mass index is 27.06 kg/(m^2).    Physical Exam   Constitutional: She is oriented to person, place, and time. She appears well-developed and well-nourished. No distress.   HENT:   Head: Normocephalic.   Right Ear: External ear normal. Tympanic membrane is erythematous and bulging.   Left Ear: External ear normal. Tympanic membrane is erythematous and bulging.   Nose: Nose normal.   Mouth/Throat: Oropharynx is clear and moist.   Eyes: Conjunctivae and EOM are normal. No scleral icterus.   Neck: No tracheal deviation  present. No thyromegaly present.   Cardiovascular: Regular rhythm and intact distal pulses.  Bradycardia present.  Exam reveals no friction rub.    No murmur heard.  Pulmonary/Chest: Effort normal. No respiratory distress. She has decreased breath sounds in the right lower field and the left lower field. She has wheezes. She has no rales.   Abdominal: Soft. She exhibits no distension and no mass. There is no tenderness. There is no guarding.   Musculoskeletal: Normal range of motion. She exhibits deformity.   Lymphadenopathy:     She has no cervical adenopathy.   Neurological: She is alert and oriented to person, place, and time. She has normal reflexes. No cranial nerve deficit. Coordination normal.   Skin: Skin is warm and dry. No rash noted. No erythema.   Psychiatric: She has a normal mood and affect. Her behavior is normal. Judgment and thought content normal.   Nursing note and vitals reviewed.       Results Review:    I reviewed the patient's previous clinical results and hospital records  Echo findings as listed below  1) Borderline LVH with normal LV systolic function ( EF is over 55%)  2) Normal left atrial size with mild elevation in LVedp   3) Trace MR   4) Mild TR with normal PA pressures   5) Mild AI   6) Small RV with normal RV function   Medication Review:     Current Outpatient Prescriptions:   •  albuterol (VENTOLIN HFA) 108 (90 Base) MCG/ACT inhaler, Inhale 2 puffs by mouth Every 4 (Four) Hours As Needed for Wheezing or Shortness of Air., Disp: 18 g, Rfl: 5  •  amLODIPine (NORVASC) 5 MG tablet, Take 1 tablet by mouth Daily., Disp: 30 tablet, Rfl: 11  •  Aspirin-Acetaminophen-Caffeine (EXCEDRIN PO), Take  by mouth., Disp: , Rfl:   •  atenolol (TENORMIN) 50 MG tablet, Take 0.5 tablets by mouth Daily., Disp: 30 tablet, Rfl: 11  •  budesonide-formoterol (SYMBICORT) 160-4.5 MCG/ACT inhaler, Inhale 2 puffs by mouth 2 (Two) Times a Day. Rinse mouth with water after use., Disp: 10.2 g, Rfl: 5  •   Lactobacillus (ACIDOPHILUS) 100 MG capsule, Take  by mouth., Disp: , Rfl:   •  magic mouthwash oral suspension, Swish and spit 5 mL (1 teaspoonful) by mouth every 6 hours, Disp: 90 mL, Rfl: 0    Assessment/Plan   Carolin was seen today for establish care.    Diagnoses and all orders for this visit:    Hospital discharge follow-up    Left eye complaint  -     Ambulatory Referral to Ophthalmology  Discussed with patient that her pain may be caused her headache and slight asleep worsen both.  Pneumonia of left lower lobe due to infectious organism  Recommend patient cough and deep breathe.  Use incentive spirometer.  Recommend patient use inhalers as prescribed.  Discussed pneumonia prevention.  Patient refuses flu vaccine at this time  Acute exacerbation of chronic obstructive pulmonary disease (COPD)   Advised patient to follow-up with Dr. Soriano as scheduled.  Patient may benefit from O2 per nasal cannula at nighttime    Essential hypertension  -     atenolol (TENORMIN) 50 MG tablet; Take 0.5 tablets by mouth Daily.  Advised patient to closely monitor her heart rate and gave her hold parameters for atenolol.  Discussed this with patient and family  Discussed with patient the importance of hydration and eating a well balanced diet.  No Follow-up on file.    Lian Kohler, APRN  10/11/2017

## 2017-10-13 ENCOUNTER — TELEPHONE (OUTPATIENT)
Dept: INTERNAL MEDICINE | Facility: CLINIC | Age: 72
End: 2017-10-13

## 2017-10-13 RX ORDER — FLUCONAZOLE 150 MG/1
150 TABLET ORAL ONCE
Qty: 1 TABLET | Refills: 0 | Status: SHIPPED | OUTPATIENT
Start: 2017-10-13 | End: 2017-10-13

## 2017-10-13 NOTE — TELEPHONE ENCOUNTER
Patients daughter called requesting Diflucan for the patient due to having thrush in the mouth because of the antibiotics she was on.

## 2017-10-18 ENCOUNTER — TELEPHONE (OUTPATIENT)
Dept: INTERNAL MEDICINE | Facility: CLINIC | Age: 72
End: 2017-10-18

## 2017-10-18 NOTE — TELEPHONE ENCOUNTER
States that the patient has thrush in her mouth, took Diflucan on Friday, but it is not improving. The home health nurse suggested Nystatin. Please call patient.

## 2017-10-18 NOTE — TELEPHONE ENCOUNTER
I contacted patient and told her I would call in the nystatin swish and swallow. She stated that the Diflucan did help some but did not fully relieve the symptoms.

## 2017-11-03 ENCOUNTER — TELEPHONE (OUTPATIENT)
Dept: INTERNAL MEDICINE | Facility: CLINIC | Age: 72
End: 2017-11-03

## 2017-11-03 RX ORDER — AMLODIPINE BESYLATE 5 MG/1
5 TABLET ORAL
Qty: 30 TABLET | Refills: 3 | Status: SHIPPED | OUTPATIENT
Start: 2017-11-03 | End: 2017-11-10 | Stop reason: SDUPTHER

## 2017-11-03 NOTE — TELEPHONE ENCOUNTER
I contacted patient and advise her to continue taking and also advised her to take her blood pressure for close monitoring.  I sent in Rx to [facility] pharmacy and informed her.  She states she does not take her blood pressure all the time but she will try to start taking it occasionally.

## 2017-11-03 NOTE — TELEPHONE ENCOUNTER
STATES THAT SHE KNOWS SHE NEEDS TO TAKE ATENOLOL BUT WAS PRESCRIBED AMLODIPINE DURING HER INPATIENT STAY. WASN'T SURE IF SHE NEEDED TO CONTINUE AMLODIPINE, SHE IS OUT OF THE MED. PLEASE CALL PATIENT.

## 2017-11-10 ENCOUNTER — OFFICE VISIT (OUTPATIENT)
Dept: INTERNAL MEDICINE | Facility: CLINIC | Age: 72
End: 2017-11-10

## 2017-11-10 VITALS
OXYGEN SATURATION: 97 % | RESPIRATION RATE: 16 BRPM | BODY MASS INDEX: 27.49 KG/M2 | TEMPERATURE: 98.3 F | HEIGHT: 64 IN | WEIGHT: 161 LBS | SYSTOLIC BLOOD PRESSURE: 140 MMHG | HEART RATE: 56 BPM | DIASTOLIC BLOOD PRESSURE: 80 MMHG

## 2017-11-10 DIAGNOSIS — Z09 FOLLOW UP: Primary | ICD-10-CM

## 2017-11-10 DIAGNOSIS — I10 ESSENTIAL HYPERTENSION: ICD-10-CM

## 2017-11-10 DIAGNOSIS — Z72.0 TOBACCO ABUSE: ICD-10-CM

## 2017-11-10 DIAGNOSIS — R39.9 URINARY SYMPTOM OR SIGN: ICD-10-CM

## 2017-11-10 LAB
BILIRUB BLD-MCNC: ABNORMAL MG/DL
CLARITY, POC: CLEAR
COLOR UR: ABNORMAL
GLUCOSE UR STRIP-MCNC: NEGATIVE MG/DL
KETONES UR QL: NEGATIVE
LEUKOCYTE EST, POC: NEGATIVE
NITRITE UR-MCNC: NEGATIVE MG/ML
PH UR: 5 [PH] (ref 5–8)
PROT UR STRIP-MCNC: NEGATIVE MG/DL
RBC # UR STRIP: NEGATIVE /UL
SP GR UR: 1.02 (ref 1–1.03)
UROBILINOGEN UR QL: ABNORMAL

## 2017-11-10 PROCEDURE — 99214 OFFICE O/P EST MOD 30 MIN: CPT | Performed by: NURSE PRACTITIONER

## 2017-11-10 PROCEDURE — 81003 URINALYSIS AUTO W/O SCOPE: CPT | Performed by: NURSE PRACTITIONER

## 2017-11-10 RX ORDER — FLUCONAZOLE 150 MG/1
150 TABLET ORAL ONCE
Qty: 1 TABLET | Refills: 0 | Status: SHIPPED | OUTPATIENT
Start: 2017-11-10 | End: 2017-11-10

## 2017-11-10 RX ORDER — AMLODIPINE BESYLATE 5 MG/1
10 TABLET ORAL
Qty: 30 TABLET | Refills: 3 | Status: SHIPPED | OUTPATIENT
Start: 2017-11-10 | End: 2018-01-22 | Stop reason: SDUPTHER

## 2017-11-10 RX ORDER — ATENOLOL 25 MG/1
12.5 TABLET ORAL DAILY
Qty: 30 TABLET | Refills: 1 | Status: SHIPPED | OUTPATIENT
Start: 2017-11-10 | End: 2017-12-13 | Stop reason: SDUPTHER

## 2017-11-10 NOTE — PROGRESS NOTES
Chief Complaint / Reason:      Chief Complaint   Patient presents with   • Follow-up     pt presents for medication follow up on htn, pt c/o raw mouth        Subjective     HPI  Patient presents today for follow-up on hypertension.  She also complains of her mouth being raw.  She Quit smoking 6 weeks ago.  She states that she cannot really tell a difference with her blood pressure.  She has been taking medications as prescribed.  Vital signs indicate elevated blood pressure and low heart rate.  Patient does not check heart rate before taking beta blocker.  Denies chest pain, shortness of breath or heart palpitations.  History taken from: patient    PMH/FH/Social History were reviewed and updated appropriately in the electronic medical record.     Review of Systems:   Review of Systems   Constitutional: Positive for fatigue. Negative for activity change, appetite change and fever.   HENT: Negative for congestion, ear discharge, ear pain and trouble swallowing.    Eyes: Negative for photophobia and visual disturbance.   Respiratory: Positive for wheezing.    Cardiovascular: Negative for chest pain and palpitations.   Gastrointestinal: Negative for abdominal distention, abdominal pain, constipation, diarrhea, nausea and vomiting.   Endocrine: Negative.    Genitourinary: Negative for dysuria, hematuria and urgency.   Musculoskeletal: Positive for arthralgias. Negative for back pain, joint swelling and myalgias.   Skin: Negative.  Negative for color change and rash.   Allergic/Immunologic: Negative.    Neurological: Negative for dizziness, weakness, light-headedness and headaches.   Hematological: Negative for adenopathy. Does not bruise/bleed easily.   Psychiatric/Behavioral: Negative for agitation, confusion and dysphoric mood. The patient is not nervous/anxious.      All other systems were reviewed and are negative.  Exceptions are noted in the subjective or above.      Objective     Vital Signs  Vitals:    11/10/17  1448   BP: 140/80   Pulse: 56   Resp: 16   Temp: 98.3 °F (36.8 °C)   SpO2: 97%       Body mass index is 28.07 kg/(m^2).    Physical Exam   Constitutional: She is oriented to person, place, and time. She appears well-developed and well-nourished. No distress.   HENT:   Head: Normocephalic.   Right Ear: External ear normal. Tympanic membrane is erythematous and bulging.   Left Ear: External ear normal. Tympanic membrane is erythematous and bulging.   Nose: Nose normal.   Mouth/Throat: Oropharynx is clear and moist.   Eyes: Conjunctivae and EOM are normal. No scleral icterus.   Neck: No tracheal deviation present. No thyromegaly present.   Cardiovascular: Regular rhythm, normal heart sounds and intact distal pulses.  Bradycardia present.  Exam reveals no friction rub.    No murmur heard.  Pulmonary/Chest: Effort normal. No respiratory distress. She has decreased breath sounds in the right lower field and the left lower field. She has no wheezes. She has no rales.   Abdominal: Soft. Bowel sounds are normal. She exhibits no distension and no mass. There is no tenderness. There is no guarding and no CVA tenderness.   Musculoskeletal: Normal range of motion. She exhibits deformity.   Lymphadenopathy:     She has no cervical adenopathy.   Neurological: She is alert and oriented to person, place, and time. She has normal reflexes. No cranial nerve deficit. Coordination normal.   Skin: Skin is warm and dry. No rash noted. No erythema.   Psychiatric: She has a normal mood and affect. Her behavior is normal. Judgment and thought content normal.   Nursing note and vitals reviewed.       Results Review:    I reviewed the patient's new clinical results.   Brief Urine Lab Results  (Last result in the past 365 days)      Color   Clarity   Blood   Leuk Est   Nitrite   Protein   CREAT   Urine HCG        11/10/17 1541 Orange(A) Clear Negative Negative Negative Negative               Medication Review:     Current Outpatient  Prescriptions:   •  amLODIPine (NORVASC) 5 MG tablet, Take 2 tablets by mouth Daily., Disp: 30 tablet, Rfl: 3  •  Aspirin-Acetaminophen-Caffeine (EXCEDRIN PO), Take  by mouth., Disp: , Rfl:   •  Lactobacillus (ACIDOPHILUS) 100 MG capsule, Take  by mouth., Disp: , Rfl:   •  albuterol (VENTOLIN HFA) 108 (90 Base) MCG/ACT inhaler, Inhale 2 puffs by mouth Every 4 (Four) Hours As Needed for Wheezing or Shortness of Air., Disp: 18 g, Rfl: 5  •  atenolol (TENORMIN) 25 MG tablet, Take 0.5 tablets by mouth Daily., Disp: 30 tablet, Rfl: 1  •  budesonide-formoterol (SYMBICORT) 160-4.5 MCG/ACT inhaler, Inhale 2 puffs by mouth 2 (Two) Times a Day. Rinse mouth with water after use., Disp: 10.2 g, Rfl: 5  •  magic mouthwash oral suspension, Swish and spit 5 mL (1 teaspoonful) by mouth every 6 hours, Disp: 90 mL, Rfl: 0  •  nystatin (MYCOSTATIN) 034667 UNIT/ML suspension, Swish and swallow 5 mL 4 (Four) Times a Day., Disp: 473 mL, Rfl: 0    Assessment/Plan   Carolin was seen today for follow-up.    Diagnoses and all orders for this visit:    Follow up    Urinary symptom or sign  -     fluconazole (DIFLUCAN) 150 MG tablet; Take 1 tablet by mouth 1 (One) Time for 1 dose.  -     POCT urinalysis dipstick, automated  Recommend patient increase water intake and void frequently.  Avoid constipation and use of feminine products.  Essential hypertension  -     atenolol (TENORMIN) 25 MG tablet; Take 0.5 tablets by mouth Daily.  -     amLODIPine (NORVASC) 5 MG tablet; Take 2 tablets by mouth Daily.  Recommend patient continue assessing blood pressure and adhering to medication regimen.  Recommend patient closely monitor heart rate before taking atenolol.  Tobacco abuse  Continue smoking cessation      Return in about 4 weeks (around 12/8/2017), or if symptoms worsen or fail to improve.    Lian Kohler, APRN  11/10/2017

## 2017-11-15 ENCOUNTER — TELEPHONE (OUTPATIENT)
Dept: INTERNAL MEDICINE | Facility: CLINIC | Age: 72
End: 2017-11-15

## 2017-12-13 ENCOUNTER — OFFICE VISIT (OUTPATIENT)
Dept: INTERNAL MEDICINE | Facility: CLINIC | Age: 72
End: 2017-12-13

## 2017-12-13 VITALS
HEIGHT: 64 IN | BODY MASS INDEX: 28.2 KG/M2 | OXYGEN SATURATION: 94 % | WEIGHT: 165.19 LBS | DIASTOLIC BLOOD PRESSURE: 62 MMHG | HEART RATE: 72 BPM | TEMPERATURE: 98.9 F | RESPIRATION RATE: 18 BRPM | SYSTOLIC BLOOD PRESSURE: 144 MMHG

## 2017-12-13 DIAGNOSIS — I10 ESSENTIAL HYPERTENSION: ICD-10-CM

## 2017-12-13 DIAGNOSIS — R30.0 DYSURIA: ICD-10-CM

## 2017-12-13 DIAGNOSIS — K21.9 GASTROESOPHAGEAL REFLUX DISEASE, ESOPHAGITIS PRESENCE NOT SPECIFIED: ICD-10-CM

## 2017-12-13 DIAGNOSIS — J02.9 SORE THROAT: ICD-10-CM

## 2017-12-13 DIAGNOSIS — Z09 FOLLOW UP: Primary | ICD-10-CM

## 2017-12-13 LAB
BILIRUB BLD-MCNC: NEGATIVE MG/DL
CLARITY, POC: ABNORMAL
COLOR UR: YELLOW
GLUCOSE UR STRIP-MCNC: NEGATIVE MG/DL
KETONES UR QL: NEGATIVE
LEUKOCYTE EST, POC: NEGATIVE
NITRITE UR-MCNC: NEGATIVE MG/ML
PH UR: 7 [PH] (ref 5–8)
PROT UR STRIP-MCNC: NEGATIVE MG/DL
RBC # UR STRIP: NEGATIVE /UL
SP GR UR: 1.01 (ref 1–1.03)
UROBILINOGEN UR QL: ABNORMAL

## 2017-12-13 PROCEDURE — 81003 URINALYSIS AUTO W/O SCOPE: CPT | Performed by: NURSE PRACTITIONER

## 2017-12-13 PROCEDURE — 99214 OFFICE O/P EST MOD 30 MIN: CPT | Performed by: NURSE PRACTITIONER

## 2017-12-13 RX ORDER — FAMOTIDINE 20 MG/1
20 TABLET, FILM COATED ORAL 2 TIMES DAILY
Qty: 90 TABLET | Refills: 0 | Status: SHIPPED | OUTPATIENT
Start: 2017-12-13 | End: 2018-05-21

## 2017-12-13 RX ORDER — ATENOLOL 25 MG/1
12.5 TABLET ORAL DAILY
Qty: 30 TABLET | Refills: 2 | Status: SHIPPED | OUTPATIENT
Start: 2017-12-13 | End: 2018-05-21 | Stop reason: SDUPTHER

## 2017-12-13 NOTE — PROGRESS NOTES
Chief Complaint / Reason:      Chief Complaint   Patient presents with   • Sore Throat     f/u-1 mo.    • Back Pain     some burning       Subjective     HPI  Patient presents today for one-month follow-up regarding the sore throat and complaints of back pain and burning with urination.  She denies any fever, chest pain, or shortness of breath.  She states that she has not been using her Symbicort inhaler and that she tried using the nystatin swish and swallow and it made her sick.  So she only tried one dosage.  She also stated that she has not been adhering to a cardiac diet which is why her blood pressure is elevated.  She does state that she has been taking her medication as prescribed.  She denies smoking but she does smell like smoke.  She states she is around secondhand smoke.  She appears to be in no acute distress at this time she does have a cough occasionally but does not sound wet.  She states that she has some occasional wheezing which is normal for her.  When discussing hydration patient states that she can't stand to drink water and she has been drinking pop in T she said she'll occasionally have some juice such as apple or cranberry.  History taken from: patient    PMH/FH/Social History were reviewed and updated appropriately in the electronic medical record.     Review of Systems:   Review of Systems   Constitutional: Negative for activity change, appetite change and fever.   HENT: Positive for sore throat and voice change. Negative for congestion, ear discharge, ear pain and trouble swallowing.    Eyes: Negative for photophobia and visual disturbance.   Respiratory: Positive for wheezing.    Cardiovascular: Negative.  Negative for chest pain and palpitations.   Gastrointestinal: Negative.  Negative for abdominal distention, abdominal pain, constipation, diarrhea, nausea and vomiting.   Endocrine: Negative.    Genitourinary: Positive for dysuria. Negative for hematuria and urgency.    Musculoskeletal: Positive for arthralgias. Negative for back pain, joint swelling and myalgias.   Skin: Negative.  Negative for color change and rash.   Allergic/Immunologic: Negative.    Neurological: Negative for dizziness, weakness, light-headedness and headaches.   Hematological: Negative for adenopathy. Does not bruise/bleed easily.   Psychiatric/Behavioral: Negative for agitation, confusion and dysphoric mood. The patient is not nervous/anxious.      All other systems were reviewed and are negative.  Exceptions are noted in the subjective or above.      Objective     Vital Signs  Vitals:    12/13/17 1418   BP: 144/62   Pulse: 72   Resp: 18   Temp: 98.9 °F (37.2 °C)   SpO2: 94%       Body mass index is 28.8 kg/(m^2).    Physical Exam   Constitutional: She is oriented to person, place, and time. She appears well-developed and well-nourished. No distress.   HENT:   Mouth/Throat: Mucous membranes are dry. Posterior oropharyngeal erythema present.   Cardiovascular: Normal rate, regular rhythm, normal heart sounds and intact distal pulses.    Pulmonary/Chest: Effort normal. She has decreased breath sounds in the right lower field and the left lower field. She has wheezes in the right upper field. She exhibits no tenderness.   Abdominal: There is tenderness in the suprapubic area. There is no CVA tenderness.   Musculoskeletal:        Lumbar back: She exhibits pain.   Neurological: She is alert and oriented to person, place, and time.   Skin: Skin is warm and dry. No rash noted. No erythema. No pallor.   Psychiatric: She has a normal mood and affect. Her behavior is normal. Judgment and thought content normal.   Nursing note and vitals reviewed.       Results Review:    I reviewed the patient's new clinical results.   Office Visit on 12/13/2017   Component Date Value Ref Range Status   • Color 12/13/2017 Yellow  Yellow, Straw, Dark Yellow, Nora Final   • Clarity, UA 12/13/2017 Cloudy* Clear Final   • Glucose, UA  12/13/2017 Negative  Negative, 1000 mg/dL (3+) mg/dL Final   • Bilirubin 12/13/2017 Negative  Negative Final   • Ketones, UA 12/13/2017 Negative  Negative Final   • Specific Gravity  12/13/2017 1.015  1.005 - 1.030 Final   • Blood, UA 12/13/2017 Negative  Negative Final   • pH, Urine 12/13/2017 7.0  5.0 - 8.0 Final   • Protein, POC 12/13/2017 Negative  Negative mg/dL Final   • Urobilinogen, UA 12/13/2017 1 E.U./dL * Normal Final   • Leukocytes 12/13/2017 Negative  Negative Final   • Nitrite, UA 12/13/2017 Negative  Negative Final       Medication Review:     Current Outpatient Prescriptions:   •  albuterol (VENTOLIN HFA) 108 (90 Base) MCG/ACT inhaler, Inhale 2 puffs by mouth Every 4 (Four) Hours As Needed for Wheezing or Shortness of Air., Disp: 18 g, Rfl: 5  •  amLODIPine (NORVASC) 5 MG tablet, Take 2 tablets by mouth Daily. (Patient taking differently: Take 10 mg by mouth Daily. Taking 1 tab. Daily.), Disp: 30 tablet, Rfl: 3  •  Aspirin-Acetaminophen-Caffeine (EXCEDRIN PO), Take  by mouth., Disp: , Rfl:   •  atenolol (TENORMIN) 25 MG tablet, Take 0.5 tablets by mouth Daily., Disp: 30 tablet, Rfl: 2  •  Lactobacillus (ACIDOPHILUS) 100 MG capsule, Take  by mouth., Disp: , Rfl:   •  famotidine (PEPCID) 20 MG tablet, Take 1 tablet by mouth 2 (Two) Times a Day., Disp: 90 tablet, Rfl: 0    Assessment/Plan   Carolin was seen today for sore throat and back pain.    Diagnoses and all orders for this visit:    Follow up    Sore throat   Recommend patient do salt water gargles and use Listerine mouthwash.  Recommend good oral hygiene and handwashing.  Essential hypertension  -     atenolol (TENORMIN) 25 MG tablet; Take 0.5 tablets by mouth Daily.  Initiate lifestyle modifications.   DASH Diet and exercise.   Compliance with medication regimen and discussed ways to prevent of long-term complications from high blood pressure.  Discussed when to seek medical attention.  Encouraged patient to take blood pressure daily and keep a  log.    Dysuria  -     POCT urinalysis dipstick, automated  Increase water intake and decrease caffeine.  Gastroesophageal reflux disease, esophagitis presence not specified  -     famotidine (PEPCID) 20 MG tablet; Take 1 tablet by mouth 2 (Two) Times a Day.    When discussing health maintenance patient stated that she did not want any of that stuff done.    Return in about 3 months (around 3/13/2018), or if symptoms worsen or fail to improve.    Lian Kohler, APRN  12/13/2017

## 2018-01-03 ENCOUNTER — TELEPHONE (OUTPATIENT)
Dept: INTERNAL MEDICINE | Facility: CLINIC | Age: 73
End: 2018-01-03

## 2018-01-03 RX ORDER — CLARITHROMYCIN 250 MG/1
250 TABLET, FILM COATED ORAL EVERY 12 HOURS SCHEDULED
Qty: 14 TABLET | Refills: 0 | Status: SHIPPED | OUTPATIENT
Start: 2018-01-03 | End: 2018-01-10

## 2018-01-03 RX ORDER — FLUCONAZOLE 150 MG/1
150 TABLET ORAL ONCE
Qty: 1 TABLET | Refills: 0 | Status: SHIPPED | OUTPATIENT
Start: 2018-01-03 | End: 2018-01-03

## 2018-01-03 NOTE — TELEPHONE ENCOUNTER
Spoke with Nehal and the patient on a 3-way phone call. The patient is not improving; has a cough and chest congestion. Asked if an antibiotic could be called in to [facility] in [place]. Please call Nehal/Carolin at [phone].

## 2018-01-03 NOTE — TELEPHONE ENCOUNTER
I contacted patient's daughter can and discuss patient's symptoms and advised her to treat symptomatically for a few days and symptoms do not improve then she can  the antibiotic and yeast infection medicine.

## 2018-01-22 DIAGNOSIS — I10 ESSENTIAL HYPERTENSION: ICD-10-CM

## 2018-01-22 RX ORDER — AMLODIPINE BESYLATE 5 MG/1
10 TABLET ORAL DAILY
Qty: 60 TABLET | Refills: 3 | Status: SHIPPED | OUTPATIENT
Start: 2018-01-22 | End: 2018-05-21

## 2018-05-21 ENCOUNTER — OFFICE VISIT (OUTPATIENT)
Dept: INTERNAL MEDICINE | Facility: CLINIC | Age: 73
End: 2018-05-21

## 2018-05-21 VITALS
TEMPERATURE: 96.7 F | SYSTOLIC BLOOD PRESSURE: 184 MMHG | RESPIRATION RATE: 18 BRPM | WEIGHT: 172.5 LBS | BODY MASS INDEX: 29.45 KG/M2 | DIASTOLIC BLOOD PRESSURE: 78 MMHG | HEIGHT: 64 IN | OXYGEN SATURATION: 94 % | HEART RATE: 84 BPM

## 2018-05-21 DIAGNOSIS — I10 ESSENTIAL HYPERTENSION: ICD-10-CM

## 2018-05-21 DIAGNOSIS — F17.219 CIGARETTE NICOTINE DEPENDENCE WITH NICOTINE-INDUCED DISORDER: ICD-10-CM

## 2018-05-21 DIAGNOSIS — H92.09 EAR ACHE: ICD-10-CM

## 2018-05-21 DIAGNOSIS — J44.9 CHRONIC OBSTRUCTIVE PULMONARY DISEASE, UNSPECIFIED COPD TYPE (HCC): Primary | ICD-10-CM

## 2018-05-21 DIAGNOSIS — N89.8 VAGINAL ITCHING: ICD-10-CM

## 2018-05-21 DIAGNOSIS — R39.9 URINARY SYMPTOM OR SIGN: ICD-10-CM

## 2018-05-21 DIAGNOSIS — J30.2 ACUTE SEASONAL ALLERGIC RHINITIS, UNSPECIFIED TRIGGER: ICD-10-CM

## 2018-05-21 DIAGNOSIS — R60.0 LOWER EXTREMITY EDEMA: ICD-10-CM

## 2018-05-21 LAB
BILIRUB BLD-MCNC: NEGATIVE MG/DL
CLARITY, POC: CLEAR
COLOR UR: YELLOW
GLUCOSE UR STRIP-MCNC: NEGATIVE MG/DL
KETONES UR QL: NEGATIVE
LEUKOCYTE EST, POC: ABNORMAL
NITRITE UR-MCNC: NEGATIVE MG/ML
PH UR: 5 [PH] (ref 5–8)
PROT UR STRIP-MCNC: NEGATIVE MG/DL
RBC # UR STRIP: NEGATIVE /UL
SP GR UR: 1.02 (ref 1–1.03)
UROBILINOGEN UR QL: NORMAL

## 2018-05-21 PROCEDURE — 81003 URINALYSIS AUTO W/O SCOPE: CPT | Performed by: NURSE PRACTITIONER

## 2018-05-21 PROCEDURE — 99214 OFFICE O/P EST MOD 30 MIN: CPT | Performed by: NURSE PRACTITIONER

## 2018-05-21 RX ORDER — AMLODIPINE BESYLATE 10 MG/1
10 TABLET ORAL DAILY
Qty: 30 TABLET | Refills: 1 | Status: SHIPPED | OUTPATIENT
Start: 2018-05-21 | End: 2018-07-21 | Stop reason: SDUPTHER

## 2018-05-21 RX ORDER — HYDROCHLOROTHIAZIDE 12.5 MG/1
12.5 TABLET ORAL DAILY
Qty: 30 TABLET | Refills: 1 | Status: SHIPPED | OUTPATIENT
Start: 2018-05-21 | End: 2018-06-18 | Stop reason: DRUGHIGH

## 2018-05-21 RX ORDER — FLUTICASONE PROPIONATE 50 MCG
2 SPRAY, SUSPENSION (ML) NASAL DAILY
Qty: 15.8 ML | Refills: 1 | Status: SHIPPED | OUTPATIENT
Start: 2018-05-21 | End: 2018-06-20

## 2018-05-21 RX ORDER — ATENOLOL 25 MG/1
12.5 TABLET ORAL DAILY
Qty: 15 TABLET | Refills: 2 | Status: SHIPPED | OUTPATIENT
Start: 2018-05-21 | End: 2018-09-07 | Stop reason: SDUPTHER

## 2018-05-21 RX ORDER — LEVOCETIRIZINE DIHYDROCHLORIDE 5 MG/1
5 TABLET, FILM COATED ORAL EVERY EVENING
Qty: 30 TABLET | Refills: 1 | Status: SHIPPED | OUTPATIENT
Start: 2018-05-21 | End: 2018-06-18

## 2018-05-21 RX ORDER — FLUCONAZOLE 150 MG/1
150 TABLET ORAL ONCE
Qty: 1 TABLET | Refills: 0 | Status: SHIPPED | OUTPATIENT
Start: 2018-05-21 | End: 2018-05-21

## 2018-05-21 RX ORDER — ALBUTEROL SULFATE 90 UG/1
2 AEROSOL, METERED RESPIRATORY (INHALATION) EVERY 4 HOURS PRN
Qty: 18 G | Refills: 2 | Status: SHIPPED | OUTPATIENT
Start: 2018-05-21 | End: 2018-11-12 | Stop reason: SDUPTHER

## 2018-05-21 NOTE — PROGRESS NOTES
Chief Complaint / Reason:      Chief Complaint   Patient presents with   • Hypertension     f/u, bilateral foot swelling   • Earache     right worse than the left   • Difficulty Urinating     with itching       Subjective     HPI  Presents today with complaints of elevated blood pressure bilateral lower extremity edema,earache and vaginal itching. She states that the leg swelling has been going on for a little while but the blood pressure started worsening over the past 3 day and her family member states that she started back smoking just as much as she was before. She states she is only smoking a pack per day. Denies chest pain, heart palpitations, or shortness of breath. States that she was around people smoking so she thought she would just start back. Ears have been hurting for about 3 days right worse than left. States that she has had some frequency and burning with urination but more vaginal itching. Denies fever and chills. Has not tried any medications for symptom relief.   History taken from: patient    PMH/FH/Social History were reviewed and updated appropriately in the electronic medical record.     Review of Systems:   Review of Systems   HENT: Positive for congestion, ear pain, sinus pain, sinus pressure and sore throat.    Respiratory: Positive for cough.    Cardiovascular: Positive for leg swelling.   Gastrointestinal: Positive for abdominal pain.   Genitourinary: Positive for difficulty urinating, dysuria, frequency and urgency.        Vaginal itching   Musculoskeletal: Positive for arthralgias.   Skin: Negative.    Hematological: Negative for adenopathy.   Psychiatric/Behavioral: Positive for sleep disturbance.     All other systems were reviewed and are negative.  Exceptions are noted in the subjective or above.      Objective     Vital Signs  Vitals:    05/21/18 1719   BP: (!) 184/78   Pulse: 84   Resp: 18   Temp: 96.7 °F (35.9 °C)   SpO2: 94%       Body mass index is 30.08 kg/m².    Physical  Exam   Constitutional: She is oriented to person, place, and time. She appears well-developed and well-nourished. No distress.   HENT:   Head: Normocephalic.   Right Ear: External ear normal. Tympanic membrane is erythematous and bulging.   Left Ear: External ear normal. Tympanic membrane is erythematous and bulging.   Nose: Nose normal.   Mouth/Throat: Oropharynx is clear and moist.   Eyes: Conjunctivae and EOM are normal. No scleral icterus.   Neck: No tracheal deviation present. No thyromegaly present.   Cardiovascular: Normal rate, regular rhythm and intact distal pulses.  Exam reveals no friction rub.    No murmur heard.  Pulmonary/Chest: Effort normal. No respiratory distress. She has decreased breath sounds in the right lower field and the left lower field. She has wheezes. She has no rales.   Abdominal: Soft. She exhibits no distension and no mass. There is tenderness. There is no guarding.   Musculoskeletal: Normal range of motion. She exhibits edema, tenderness and deformity.   Lymphadenopathy:     She has no cervical adenopathy.   Neurological: She is alert and oriented to person, place, and time. She has normal reflexes. No cranial nerve deficit. Coordination normal.   Skin: Skin is warm and dry. Capillary refill takes 2 to 3 seconds. No rash noted. No erythema.   Psychiatric: She has a normal mood and affect. Her behavior is normal. Judgment and thought content normal.   Nursing note and vitals reviewed.       Results Review:    I reviewed the patient's new clinical results.   Office Visit on 05/21/2018   Component Date Value Ref Range Status   • Color 05/21/2018 Yellow  Yellow, Straw, Dark Yellow, Nora Final   • Clarity, UA 05/21/2018 Clear  Clear Final   • Glucose, UA 05/21/2018 Negative  Negative, 1000 mg/dL (3+) mg/dL Final   • Bilirubin 05/21/2018 Negative  Negative Final   • Ketones, UA 05/21/2018 Negative  Negative Final   • Specific Gravity  05/21/2018 1.020  1.005 - 1.030 Final   • Blood, UA  05/21/2018 Negative  Negative Final   • pH, Urine 05/21/2018 5.0  5.0 - 8.0 Final   • Protein, POC 05/21/2018 Negative  Negative mg/dL Final   • Urobilinogen, UA 05/21/2018 Normal  Normal Final   • Leukocytes 05/21/2018 25 Ashley/ul* Negative Final   • Nitrite, UA 05/21/2018 Negative  Negative Final         Medication Review:     Current Outpatient Prescriptions:   •  albuterol (VENTOLIN HFA) 108 (90 Base) MCG/ACT inhaler, Inhale 2 puffs by mouth Every 4 (Four) Hours As Needed for Wheezing or Shortness of Air., Disp: 18 g, Rfl: 2  •  Aspirin-Acetaminophen-Caffeine (EXCEDRIN PO), Take  by mouth., Disp: , Rfl:   •  atenolol (TENORMIN) 25 MG tablet, Take 0.5 tablets by mouth Daily., Disp: 15 tablet, Rfl: 2  •  Lactobacillus (ACIDOPHILUS) 100 MG capsule, Take  by mouth., Disp: , Rfl:   •  amLODIPine (NORVASC) 10 MG tablet, Take 1 tablet by mouth Daily., Disp: 30 tablet, Rfl: 1  •  fluticasone (FLONASE) 50 MCG/ACT nasal spray, 2 sprays into each nostril Daily for 30 days. Administer 2 sprays in each nostril for each dose., Disp: 15.8 mL, Rfl: 1  •  hydrochlorothiazide (HYDRODIURIL) 12.5 MG tablet, Take 1 tablet by mouth Daily., Disp: 30 tablet, Rfl: 1  •  levocetirizine (XYZAL) 5 MG tablet, Take 1 tablet by mouth Every Evening., Disp: 30 tablet, Rfl: 1    Assessment/Plan   Carolin was seen today for hypertension, earache and difficulty urinating.    Diagnoses and all orders for this visit:    Chronic obstructive pulmonary disease, unspecified COPD type  -     albuterol (VENTOLIN HFA) 108 (90 Base) MCG/ACT inhaler; Inhale 2 puffs by mouth Every 4 (Four) Hours As Needed for Wheezing or Shortness of Air.    Essential hypertension  -     atenolol (TENORMIN) 25 MG tablet; Take 0.5 tablets by mouth Daily.  -     amLODIPine (NORVASC) 10 MG tablet; Take 1 tablet by mouth Daily.  -     hydrochlorothiazide (HYDRODIURIL) 12.5 MG tablet; Take 1 tablet by mouth Daily.    Cigarette nicotine dependence with nicotine-induced  disorder  Recommend smoking cessation  Urinary symptom or sign  -     POCT urinalysis dipstick, automated    Ear ache  Treat symptomatically at this time and recommend she quit smoking  Acute seasonal allergic rhinitis, unspecified trigger  -     fluticasone (FLONASE) 50 MCG/ACT nasal spray; 2 sprays into each nostril Daily for 30 days. Administer 2 sprays in each nostril for each dose.  -     levocetirizine (XYZAL) 5 MG tablet; Take 1 tablet by mouth Every Evening.    Vaginal itching  -     fluconazole (DIFLUCAN) 150 MG tablet; Take 1 tablet by mouth 1 (One) Time for 1 dose.    Lower extremity edema  -     hydrochlorothiazide (HYDRODIURIL) 12.5 MG tablet; Take 1 tablet by mouth Daily.  Elevate and avoid sitting and standing for long periods of time  Discussed sodium intake    Refused colonoscopy or mammogram.     Return in about 4 weeks (around 6/18/2018), or if symptoms worsen or fail to improve.    Lian Kohler, APRN  05/21/2018

## 2018-06-18 ENCOUNTER — OFFICE VISIT (OUTPATIENT)
Dept: INTERNAL MEDICINE | Facility: CLINIC | Age: 73
End: 2018-06-18

## 2018-06-18 VITALS
SYSTOLIC BLOOD PRESSURE: 154 MMHG | WEIGHT: 168.13 LBS | HEIGHT: 64 IN | OXYGEN SATURATION: 90 % | RESPIRATION RATE: 18 BRPM | DIASTOLIC BLOOD PRESSURE: 84 MMHG | BODY MASS INDEX: 28.7 KG/M2 | HEART RATE: 80 BPM | TEMPERATURE: 97.4 F

## 2018-06-18 DIAGNOSIS — J01.41 ACUTE RECURRENT PANSINUSITIS: ICD-10-CM

## 2018-06-18 DIAGNOSIS — J44.1 COPD EXACERBATION (HCC): ICD-10-CM

## 2018-06-18 DIAGNOSIS — R60.0 LOWER EXTREMITY EDEMA: ICD-10-CM

## 2018-06-18 DIAGNOSIS — I10 ESSENTIAL HYPERTENSION: ICD-10-CM

## 2018-06-18 DIAGNOSIS — R39.9 URINARY SYMPTOM OR SIGN: Primary | ICD-10-CM

## 2018-06-18 DIAGNOSIS — N89.8 VAGINAL ITCHING: ICD-10-CM

## 2018-06-18 LAB
BILIRUB BLD-MCNC: NEGATIVE MG/DL
CLARITY, POC: NORMAL
COLOR UR: YELLOW
GLUCOSE UR STRIP-MCNC: NEGATIVE MG/DL
KETONES UR QL: NEGATIVE
LEUKOCYTE EST, POC: NEGATIVE
NITRITE UR-MCNC: NEGATIVE MG/ML
PH UR: 5 [PH] (ref 5–8)
PROT UR STRIP-MCNC: NEGATIVE MG/DL
RBC # UR STRIP: NEGATIVE /UL
SP GR UR: 1.02 (ref 1–1.03)
UROBILINOGEN UR QL: NORMAL

## 2018-06-18 PROCEDURE — 81003 URINALYSIS AUTO W/O SCOPE: CPT | Performed by: NURSE PRACTITIONER

## 2018-06-18 PROCEDURE — 99214 OFFICE O/P EST MOD 30 MIN: CPT | Performed by: NURSE PRACTITIONER

## 2018-06-18 RX ORDER — NYSTATIN 100000 U/G
OINTMENT TOPICAL 2 TIMES DAILY
Qty: 30 G | Refills: 0 | Status: SHIPPED | OUTPATIENT
Start: 2018-06-18 | End: 2019-02-06

## 2018-06-18 RX ORDER — CLARITHROMYCIN 250 MG/1
250 TABLET, FILM COATED ORAL EVERY 12 HOURS SCHEDULED
Qty: 14 TABLET | Refills: 0 | Status: SHIPPED | OUTPATIENT
Start: 2018-06-18 | End: 2018-06-20

## 2018-06-18 RX ORDER — ANTIOX #8/OM3/DHA/EPA/LUT/ZEAX 250-2.5 MG
2 CAPSULE ORAL DAILY
COMMUNITY

## 2018-06-18 RX ORDER — MONTELUKAST SODIUM 10 MG/1
10 TABLET ORAL NIGHTLY
Qty: 30 TABLET | Refills: 3 | Status: SHIPPED | OUTPATIENT
Start: 2018-06-18 | End: 2019-02-06

## 2018-06-18 RX ORDER — HYDROCHLOROTHIAZIDE 12.5 MG/1
25 TABLET ORAL DAILY
Qty: 30 TABLET | Refills: 1 | Status: SHIPPED | OUTPATIENT
Start: 2018-06-18 | End: 2018-07-26 | Stop reason: SDUPTHER

## 2018-06-18 RX ORDER — FLUCONAZOLE 150 MG/1
150 TABLET ORAL ONCE
Qty: 2 TABLET | Refills: 0 | Status: SHIPPED | OUTPATIENT
Start: 2018-06-18 | End: 2018-06-18

## 2018-06-18 NOTE — PROGRESS NOTES
Chief Complaint / Reason:      Chief Complaint   Patient presents with   • Hypertension     f/u-1 mo., refill bp meds. at current dosage? change allergy med.? Ear pain-bilateral.        Subjective     HPI  Patient presents today for one-month follow-up regarding hypertension refill blood pressure medicine and wants to discuss changing allergy medication as she has ear pain bilateral.  She has sinus congestion and has been coughing frequently.  Vital signs indicate elevated blood pressure and low oxygen saturation.  She denies fever or chills.  She is accompanied by her son-in-law.  She denies chest pain or heart palpitations she states she does get short of breath at times but she is continuing to smoke.  According to the son-in-law she has increased her smoking frequency but she states she has decreased it.  She also reports vaginal itching and discharge.  History taken from: patient    PMH/FH/Social History were reviewed and updated appropriately in the electronic medical record.     Review of Systems:   Review of Systems   Constitutional: Negative.    HENT: Positive for congestion, sinus pain, sinus pressure and sore throat.    Respiratory: Positive for cough, shortness of breath and wheezing.    Cardiovascular: Negative.    Gastrointestinal: Negative.    Skin: Negative.    Neurological: Positive for headaches.   Hematological: Negative for adenopathy.     All other systems were reviewed and are negative.  Exceptions are noted in the subjective or above.      Objective     Vital Signs  Vitals:    06/18/18 1751   BP: 154/84   Pulse: 80   Resp: 18   Temp: 97.4 °F (36.3 °C)   SpO2: 90%       Body mass index is 29.31 kg/m².    Physical Exam   Constitutional: She is oriented to person, place, and time. She appears well-developed and well-nourished. No distress.   HENT:   Head: Normocephalic.   Right Ear: External ear normal. Tympanic membrane is erythematous and bulging.   Left Ear: External ear normal. Tympanic  membrane is erythematous and bulging.   Nose: Right sinus exhibits maxillary sinus tenderness and frontal sinus tenderness. Left sinus exhibits maxillary sinus tenderness and frontal sinus tenderness.   Mouth/Throat: Mucous membranes are dry. Posterior oropharyngeal erythema present.   Cardiovascular: Normal rate, regular rhythm, normal heart sounds and intact distal pulses.    Pulmonary/Chest: Effort normal. She has wheezes. She exhibits no tenderness.   Abdominal: Soft. Bowel sounds are normal. There is tenderness.   Lymphadenopathy:     She has no cervical adenopathy.   Neurological: She is alert and oriented to person, place, and time.   Skin: Skin is warm and dry. No rash noted. No erythema. No pallor.   Psychiatric: She has a normal mood and affect. Her behavior is normal. Judgment and thought content normal.   Nursing note and vitals reviewed.       Results Review:    I reviewed the patient's new clinical results.   Office Visit on 06/18/2018   Component Date Value Ref Range Status   • Color 06/18/2018 Yellow  Yellow, Straw, Dark Yellow, Nora Final   • Clarity, UA 06/18/2018 Cloudy* Clear Final   • Specific Gravity  06/18/2018 1.020  1.005 - 1.030 Final   • pH, Urine 06/18/2018 5.0  5.0 - 8.0 Final   • Leukocytes 06/18/2018 Negative  Negative Final   • Nitrite, UA 06/18/2018 Negative  Negative Final   • Protein, POC 06/18/2018 Negative  Negative mg/dL Final   • Glucose, UA 06/18/2018 Negative  Negative, 1000 mg/dL (3+) mg/dL Final   • Ketones, UA 06/18/2018 Negative  Negative Final   • Urobilinogen, UA 06/18/2018 Normal  Normal Final   • Bilirubin 06/18/2018 Negative  Negative Final   • Blood, UA 06/18/2018 Negative  Negative Final         Medication Review:     Current Outpatient Prescriptions:   •  albuterol (VENTOLIN HFA) 108 (90 Base) MCG/ACT inhaler, Inhale 2 puffs by mouth Every 4 (Four) Hours As Needed for Wheezing or Shortness of Air., Disp: 18 g, Rfl: 2  •  amLODIPine (NORVASC) 10 MG tablet, Take 1  tablet by mouth Daily., Disp: 30 tablet, Rfl: 1  •  Aspirin-Acetaminophen-Caffeine (EXCEDRIN PO), Take  by mouth., Disp: , Rfl:   •  atenolol (TENORMIN) 25 MG tablet, Take 0.5 tablets by mouth Daily., Disp: 15 tablet, Rfl: 2  •  hydrochlorothiazide (HYDRODIURIL) 12.5 MG tablet, Take 2 tablets by mouth Daily., Disp: 30 tablet, Rfl: 1  •  Lactobacillus (ACIDOPHILUS) 100 MG capsule, Take  by mouth., Disp: , Rfl:   •  multivitamins-minerals (PRESERVISION AREDS 2) capsule capsule, Take 2 capsules by mouth Daily., Disp: , Rfl:   •  azithromycin (ZITHROMAX Z-TANNA) 250 MG tablet, Take 2 tablets the first day, then 1 tablet daily for 4 days., Disp: 6 tablet, Rfl: 0  •  montelukast (SINGULAIR) 10 MG tablet, Take 1 tablet by mouth Every Night., Disp: 30 tablet, Rfl: 3  •  nystatin (MYCOSTATIN) 856797 UNIT/GM ointment, Apply  topically 2 (Two) Times a Day., Disp: 30 g, Rfl: 0    Assessment/Plan   Carolin was seen today for hypertension.    Diagnoses and all orders for this visit:    Urinary symptom or sign  -     POCT urinalysis dipstick, automated    Essential hypertension  -     hydrochlorothiazide (HYDRODIURIL) 12.5 MG tablet; Take 2 tablets by mouth Daily.    Lower extremity edema  -     hydrochlorothiazide (HYDRODIURIL) 12.5 MG tablet; Take 2 tablets by mouth Daily.    COPD exacerbation  -     montelukast (SINGULAIR) 10 MG tablet; Take 1 tablet by mouth Every Night.  azithromycin (ZITHROMAX Z-TANNA) 250 MG tablet, Take 2 tablets the first day, then 1 tablet daily for 4 days., Disp: 6 tablet, Rfl: 0  Vaginal itching  -     fluconazole (DIFLUCAN) 150 MG tablet; Take 1 tablet by mouth 1 (One) Time for 1 dose.    Acute recurrent pansinusitis  -     montelukast (SINGULAIR) 10 MG tablet; Take 1 tablet by mouth Every Night.  azithromycin (ZITHROMAX Z-TANNA) 250 MG tablet, Take 2 tablets the first day, then 1 tablet daily for 4 days., Disp: 6 tablet, Rfl: 0  Other orders  -     Discontinue: clarithromycin (BIAXIN) 250 MG tablet; Take 1  tablet by mouth Every 12 (Twelve) Hours for 7 days.  -     nystatin (MYCOSTATIN) 658150 UNIT/GM ointment; Apply  topically 2 (Two) Times a Day.        Return in about 3 months (around 9/18/2018), or if symptoms worsen or fail to improve.    Lian Kohler, APRN  06/18/2018

## 2018-06-19 ENCOUNTER — TELEPHONE (OUTPATIENT)
Dept: INTERNAL MEDICINE | Facility: CLINIC | Age: 73
End: 2018-06-19

## 2018-06-20 RX ORDER — AZITHROMYCIN 250 MG/1
TABLET, FILM COATED ORAL
Qty: 6 TABLET | Refills: 0 | Status: SHIPPED | OUTPATIENT
Start: 2018-06-20 | End: 2019-02-06

## 2018-07-18 DIAGNOSIS — J30.2 ACUTE SEASONAL ALLERGIC RHINITIS, UNSPECIFIED TRIGGER: ICD-10-CM

## 2018-07-19 RX ORDER — LEVOCETIRIZINE DIHYDROCHLORIDE 5 MG/1
TABLET, FILM COATED ORAL
Qty: 30 TABLET | Refills: 2 | Status: SHIPPED | OUTPATIENT
Start: 2018-07-19 | End: 2018-11-12 | Stop reason: SDUPTHER

## 2018-07-21 DIAGNOSIS — I10 ESSENTIAL HYPERTENSION: ICD-10-CM

## 2018-07-23 RX ORDER — AMLODIPINE BESYLATE 10 MG/1
TABLET ORAL
Qty: 30 TABLET | Refills: 1 | Status: SHIPPED | OUTPATIENT
Start: 2018-07-23 | End: 2018-07-26 | Stop reason: SDUPTHER

## 2018-07-26 DIAGNOSIS — I10 ESSENTIAL HYPERTENSION: ICD-10-CM

## 2018-07-26 DIAGNOSIS — R60.0 LOWER EXTREMITY EDEMA: ICD-10-CM

## 2018-07-26 RX ORDER — HYDROCHLOROTHIAZIDE 12.5 MG/1
25 TABLET ORAL DAILY
Qty: 90 TABLET | Refills: 1 | Status: SHIPPED | OUTPATIENT
Start: 2018-07-26 | End: 2018-07-27

## 2018-07-26 RX ORDER — AMLODIPINE BESYLATE 10 MG/1
10 TABLET ORAL DAILY
Qty: 90 TABLET | Refills: 1 | Status: SHIPPED | OUTPATIENT
Start: 2018-07-26 | End: 2019-02-06 | Stop reason: SDUPTHER

## 2018-07-27 ENCOUNTER — TELEPHONE (OUTPATIENT)
Dept: INTERNAL MEDICINE | Facility: CLINIC | Age: 73
End: 2018-07-27

## 2018-07-27 RX ORDER — HYDROCHLOROTHIAZIDE 25 MG/1
25 TABLET ORAL DAILY
Qty: 90 TABLET | Refills: 1 | Status: SHIPPED | OUTPATIENT
Start: 2018-07-27 | End: 2018-11-14 | Stop reason: SDUPTHER

## 2018-09-07 DIAGNOSIS — I10 ESSENTIAL HYPERTENSION: ICD-10-CM

## 2018-09-07 RX ORDER — ATENOLOL 25 MG/1
12.5 TABLET ORAL DAILY
Qty: 45 TABLET | Refills: 0 | Status: SHIPPED | OUTPATIENT
Start: 2018-09-07 | End: 2018-11-12 | Stop reason: SDUPTHER

## 2018-09-10 ENCOUNTER — TELEPHONE (OUTPATIENT)
Dept: INTERNAL MEDICINE | Facility: CLINIC | Age: 73
End: 2018-09-10

## 2018-09-10 NOTE — TELEPHONE ENCOUNTER
Patient thinks she has a bladder infection. She is wondering if Kyara can call her something in or if she will need to be seen.

## 2018-10-05 ENCOUNTER — APPOINTMENT (OUTPATIENT)
Dept: CT IMAGING | Facility: HOSPITAL | Age: 73
End: 2018-10-05

## 2018-10-05 ENCOUNTER — HOSPITAL ENCOUNTER (EMERGENCY)
Facility: HOSPITAL | Age: 73
Discharge: HOME | End: 2018-10-05
Attending: EMERGENCY MEDICINE | Admitting: EMERGENCY MEDICINE

## 2018-10-05 VITALS
OXYGEN SATURATION: 91 % | BODY MASS INDEX: 29.3 KG/M2 | WEIGHT: 165.4 LBS | RESPIRATION RATE: 18 BRPM | HEIGHT: 63 IN | HEART RATE: 79 BPM | SYSTOLIC BLOOD PRESSURE: 138 MMHG | DIASTOLIC BLOOD PRESSURE: 77 MMHG | TEMPERATURE: 98.4 F

## 2018-10-05 DIAGNOSIS — N20.1 RIGHT URETERAL STONE: Primary | ICD-10-CM

## 2018-10-05 LAB
ALBUMIN SERPL-MCNC: 4.3 G/DL (ref 3.5–5)
ALBUMIN/GLOB SERPL: 1.4 G/DL (ref 1–2)
ALP SERPL-CCNC: 108 U/L (ref 38–126)
ALT SERPL W P-5'-P-CCNC: 50 U/L (ref 13–69)
ANION GAP SERPL CALCULATED.3IONS-SCNC: 11.3 MMOL/L (ref 10–20)
AST SERPL-CCNC: 50 U/L (ref 15–46)
BACTERIA UR QL AUTO: ABNORMAL /HPF
BASOPHILS # BLD AUTO: 0.04 10*3/MM3 (ref 0–0.2)
BASOPHILS NFR BLD AUTO: 0.3 % (ref 0–2.5)
BILIRUB SERPL-MCNC: 0.4 MG/DL (ref 0.2–1.3)
BILIRUB UR QL STRIP: NEGATIVE
BUN BLD-MCNC: 13 MG/DL (ref 7–20)
BUN/CREAT SERPL: 21.7 (ref 7.1–23.5)
CALCIUM SPEC-SCNC: 9.6 MG/DL (ref 8.4–10.2)
CHLORIDE SERPL-SCNC: 101 MMOL/L (ref 98–107)
CLARITY UR: ABNORMAL
CO2 SERPL-SCNC: 32 MMOL/L (ref 26–30)
COLOR UR: ABNORMAL
CREAT BLD-MCNC: 0.6 MG/DL (ref 0.6–1.3)
DEPRECATED RDW RBC AUTO: 46.5 FL (ref 37–54)
EOSINOPHIL # BLD AUTO: 0.01 10*3/MM3 (ref 0–0.7)
EOSINOPHIL NFR BLD AUTO: 0.1 % (ref 0–7)
ERYTHROCYTE [DISTWIDTH] IN BLOOD BY AUTOMATED COUNT: 13.7 % (ref 11.5–14.5)
GFR SERPL CREATININE-BSD FRML MDRD: 99 ML/MIN/1.73
GLOBULIN UR ELPH-MCNC: 3.1 GM/DL
GLUCOSE BLD-MCNC: 136 MG/DL (ref 74–98)
GLUCOSE UR STRIP-MCNC: ABNORMAL MG/DL
HCT VFR BLD AUTO: 46.7 % (ref 37–47)
HGB BLD-MCNC: 15.2 G/DL (ref 12–16)
HGB UR QL STRIP.AUTO: ABNORMAL
HOLD SPECIMEN: NORMAL
HOLD SPECIMEN: NORMAL
HYALINE CASTS UR QL AUTO: ABNORMAL /LPF
IMM GRANULOCYTES # BLD: 0.06 10*3/MM3 (ref 0–0.06)
IMM GRANULOCYTES NFR BLD: 0.4 % (ref 0–0.6)
KETONES UR QL STRIP: NEGATIVE
LEUKOCYTE ESTERASE UR QL STRIP.AUTO: NEGATIVE
LIPASE SERPL-CCNC: 70 U/L (ref 23–300)
LYMPHOCYTES # BLD AUTO: 1.29 10*3/MM3 (ref 0.6–3.4)
LYMPHOCYTES NFR BLD AUTO: 8.3 % (ref 10–50)
MCH RBC QN AUTO: 30.2 PG (ref 27–31)
MCHC RBC AUTO-ENTMCNC: 32.5 G/DL (ref 30–37)
MCV RBC AUTO: 92.7 FL (ref 81–99)
MONOCYTES # BLD AUTO: 0.83 10*3/MM3 (ref 0–0.9)
MONOCYTES NFR BLD AUTO: 5.3 % (ref 0–12)
NEUTROPHILS # BLD AUTO: 13.4 10*3/MM3 (ref 2–6.9)
NEUTROPHILS NFR BLD AUTO: 85.6 % (ref 37–80)
NITRITE UR QL STRIP: POSITIVE
NRBC BLD MANUAL-RTO: 0 /100 WBC (ref 0–0)
PH UR STRIP.AUTO: 7 [PH] (ref 5–8)
PLATELET # BLD AUTO: 211 10*3/MM3 (ref 130–400)
PMV BLD AUTO: 11.6 FL (ref 6–12)
POTASSIUM BLD-SCNC: 3.3 MMOL/L (ref 3.5–5.1)
PROT SERPL-MCNC: 7.4 G/DL (ref 6.3–8.2)
PROT UR QL STRIP: ABNORMAL
RBC # BLD AUTO: 5.04 10*6/MM3 (ref 4.2–5.4)
RBC # UR: ABNORMAL /HPF
REF LAB TEST METHOD: ABNORMAL
SODIUM BLD-SCNC: 141 MMOL/L (ref 137–145)
SP GR UR STRIP: 1.02 (ref 1–1.03)
SQUAMOUS #/AREA URNS HPF: ABNORMAL /HPF
UROBILINOGEN UR QL STRIP: ABNORMAL
WBC NRBC COR # BLD: 15.63 10*3/MM3 (ref 4.8–10.8)
WBC UR QL AUTO: ABNORMAL /HPF
WHOLE BLOOD HOLD SPECIMEN: NORMAL
WHOLE BLOOD HOLD SPECIMEN: NORMAL

## 2018-10-05 PROCEDURE — 87077 CULTURE AEROBIC IDENTIFY: CPT | Performed by: PHYSICIAN ASSISTANT

## 2018-10-05 PROCEDURE — 85025 COMPLETE CBC W/AUTO DIFF WBC: CPT | Performed by: EMERGENCY MEDICINE

## 2018-10-05 PROCEDURE — 96375 TX/PRO/DX INJ NEW DRUG ADDON: CPT

## 2018-10-05 PROCEDURE — 87186 SC STD MICRODIL/AGAR DIL: CPT | Performed by: PHYSICIAN ASSISTANT

## 2018-10-05 PROCEDURE — 87086 URINE CULTURE/COLONY COUNT: CPT | Performed by: PHYSICIAN ASSISTANT

## 2018-10-05 PROCEDURE — 81001 URINALYSIS AUTO W/SCOPE: CPT | Performed by: EMERGENCY MEDICINE

## 2018-10-05 PROCEDURE — 80053 COMPREHEN METABOLIC PANEL: CPT | Performed by: EMERGENCY MEDICINE

## 2018-10-05 PROCEDURE — 25010000002 KETOROLAC TROMETHAMINE PER 15 MG: Performed by: PHYSICIAN ASSISTANT

## 2018-10-05 PROCEDURE — 83690 ASSAY OF LIPASE: CPT | Performed by: EMERGENCY MEDICINE

## 2018-10-05 PROCEDURE — 96361 HYDRATE IV INFUSION ADD-ON: CPT

## 2018-10-05 PROCEDURE — 96374 THER/PROPH/DIAG INJ IV PUSH: CPT

## 2018-10-05 PROCEDURE — 99284 EMERGENCY DEPT VISIT MOD MDM: CPT

## 2018-10-05 PROCEDURE — 74176 CT ABD & PELVIS W/O CONTRAST: CPT

## 2018-10-05 RX ORDER — ONDANSETRON 2 MG/ML
4 INJECTION INTRAMUSCULAR; INTRAVENOUS ONCE
Status: DISCONTINUED | OUTPATIENT
Start: 2018-10-05 | End: 2018-10-05 | Stop reason: HOSPADM

## 2018-10-05 RX ORDER — TRAMADOL HYDROCHLORIDE 50 MG/1
50 TABLET ORAL EVERY 6 HOURS PRN
Status: DISCONTINUED | OUTPATIENT
Start: 2018-10-05 | End: 2018-10-05

## 2018-10-05 RX ORDER — TRAMADOL HYDROCHLORIDE 50 MG/1
50 TABLET ORAL EVERY 6 HOURS PRN
Qty: 12 TABLET | Refills: 0 | Status: SHIPPED | OUTPATIENT
Start: 2018-10-05 | End: 2019-02-06

## 2018-10-05 RX ORDER — KETOROLAC TROMETHAMINE 30 MG/ML
15 INJECTION, SOLUTION INTRAMUSCULAR; INTRAVENOUS ONCE
Status: COMPLETED | OUTPATIENT
Start: 2018-10-05 | End: 2018-10-05

## 2018-10-05 RX ORDER — ONDANSETRON 4 MG/1
4 TABLET, ORALLY DISINTEGRATING ORAL EVERY 6 HOURS PRN
Qty: 8 TABLET | Refills: 0 | Status: SHIPPED | OUTPATIENT
Start: 2018-10-05 | End: 2019-02-06 | Stop reason: SDUPTHER

## 2018-10-05 RX ORDER — SODIUM CHLORIDE 0.9 % (FLUSH) 0.9 %
10 SYRINGE (ML) INJECTION AS NEEDED
Status: DISCONTINUED | OUTPATIENT
Start: 2018-10-05 | End: 2018-10-05 | Stop reason: HOSPADM

## 2018-10-05 RX ADMIN — SODIUM CHLORIDE 1000 ML: 9 INJECTION, SOLUTION INTRAVENOUS at 15:47

## 2018-10-05 RX ADMIN — KETOROLAC TROMETHAMINE 15 MG: 30 INJECTION, SOLUTION INTRAMUSCULAR at 16:35

## 2018-10-05 RX ADMIN — LIDOCAINE HYDROCHLORIDE 125 MG: 10 INJECTION, SOLUTION INFILTRATION; PERINEURAL at 15:48

## 2018-10-05 NOTE — ED PROVIDER NOTES
Patient only has one of each tablets, will need refill yet today if possible.   Subjective   Patient is a 72-year-old female history of COPD, hypertension, nephrolithiasis, and migraines that presents the ED for evaluation of right-sided flank pain and hematuria.  Patient states that this pain first began yesterday and is intermittent in nature.  She states that when it becomes severe, it is sharp and stabbing in nature with radiation to her groin.  She states this pain is similar to her kidney stones in the past.  She has had gross hematuria with wiping as well as urinary urgency and frequency, denies dysuria.  She states she took over the counter Azo yesterday witouth much relief.  States she had one episode of nonbloody nonbilious emesis yesterday as well as nausea.  Denies any fever, headache, syncope, dizziness, chest pain, dyspnea, cough, diarrhea, vaginal discharge, or any other symptoms.        History provided by:  Patient      Review of Systems   All other systems reviewed and are negative.      Past Medical History:   Diagnosis Date   • Arthritis    • COPD (chronic obstructive pulmonary disease) (CMS/HCC)    • Gall stones    • Hypertension    • Hypertension    • Kidney infection    • Kidney stone    • Migraine headache    • Scarlet fever        Allergies   Allergen Reactions   • Contrast Dye Anaphylaxis   • Ciprofloxacin Other (See Comments)     redness   • Keflex [Cephalexin] Swelling   • Penicillins Other (See Comments)     Numbness   • Sulfa Antibiotics Swelling   • Terramycin [Oxytetracycline] Other (See Comments)     redness   • Percocet [Oxycodone-Acetaminophen]    • Latex Rash       Past Surgical History:   Procedure Laterality Date   • BLADDER SURGERY     • CHOLECYSTECTOMY         Family History   Problem Relation Age of Onset   • Heart disease Mother    • Arthritis Mother    • Diabetes Mother    • Hypertension Mother    • Colon cancer Father    • Arthritis Father    • Diabetes Father    • Hypertension Father    • Migraines Sister        Social History     Social History   •  Marital status:      Social History Main Topics   • Smoking status: Current Every Day Smoker     Packs/day: 2.00     Types: Cigarettes   • Smokeless tobacco: Former User     Quit date: 9/24/2017      Comment: quit one week ago   • Alcohol use No   • Drug use: No   • Sexual activity: Yes     Partners: Male     Other Topics Concern   • Not on file           Objective   Physical Exam   Nursing note and vitals reviewed.    GEN: Patient appears uncomfortable, but is in no acute distress. She is awake, alert, speaking in full sentences. Does not appear toxic.  Head: Normocephalic, atraumatic  Eyes: Pupils equal and round, EOM intact.  ENT: Posterior pharynx normal in appearance, tongue is midline, oral mucosa dry without lesions or bleeding.  Chest: Nontender to palpation  Cardiovascular: Regular rate and rhythm.   Lungs: Clear to auscultation bilaterally without adventitious sounds.  Abdomen: Bowel sounds present. Nondistended. Soft, tender to palpation over RLQ.  Extremities: No edema, normal appearance, full ROM without deficits.  Back: Right side CVA tenderness.  Neuro: GCS 15  Psych: Mood and affect are appropriate    Procedures           ED Course  ED Course as of Oct 05 1732   Fri Oct 05, 2018   1711 Patient with a 3 mm distal ureteral stone.  We will discharge with pain management.  Patient has no WBCs in her urine with significant blood.  We'll not treat as infection at this time.  Return percussions given.  [CG]      ED Course User Index  [CG] Mark Starr, DO                  MDM  Number of Diagnoses or Management Options  Right ureteral stone:   Diagnosis management comments: Differential includes urinary tract infection, pyelonephritis, nephrolithiasis, and other concerns.  CBC revealed a mild leukocytosis.  CMP with mild hypokalemia 3.3.  Lipase unremarkable.  Patient was given IV lidocaine and Toradol and Zofran for symptom control which improved her condition greatly.  CT revealed a 3 mm right  ureteral stone with moderate hydronephrosis.  Urine does not appear to be infected- no white blood cells on urine microscopic.  Patient will be given nausea and pain control and is appropriate for discharge at this time.  She was given follow-up with urology.  She was given strict precautions to return to the ED.  Her vital signs remain within normal limits and she was discharged home in stable condition.       Amount and/or Complexity of Data Reviewed  Clinical lab tests: reviewed and ordered  Tests in the radiology section of CPT®: reviewed and ordered    Risk of Complications, Morbidity, and/or Mortality  Presenting problems: moderate  Diagnostic procedures: moderate  Management options: moderate    Patient Progress  Patient progress: stable        Final diagnoses:   None            Shraddha Snyder PA-C  10/06/18 0126

## 2018-10-05 NOTE — DISCHARGE INSTRUCTIONS
Drink plenty of water to stay hydrated.  Take Ultram as prescribed as needed for severe pain. Follow up with urologist Dr. Garcia at earliest available appointment.  Return to ED for any change, worsening of symptoms, or any additional concerns including but limited to fever >100.4, intractable vomiting, worsening pain, inability to urinate.

## 2018-10-07 LAB — BACTERIA SPEC AEROBE CULT: ABNORMAL

## 2018-11-12 DIAGNOSIS — I10 ESSENTIAL HYPERTENSION: ICD-10-CM

## 2018-11-12 DIAGNOSIS — J30.2 ACUTE SEASONAL ALLERGIC RHINITIS: ICD-10-CM

## 2018-11-12 DIAGNOSIS — J44.9 CHRONIC OBSTRUCTIVE PULMONARY DISEASE, UNSPECIFIED COPD TYPE (HCC): ICD-10-CM

## 2018-11-12 RX ORDER — ATENOLOL 25 MG/1
TABLET ORAL
Qty: 45 TABLET | Refills: 0 | Status: SHIPPED | OUTPATIENT
Start: 2018-11-12 | End: 2019-02-06 | Stop reason: SDUPTHER

## 2018-11-12 RX ORDER — LEVOCETIRIZINE DIHYDROCHLORIDE 5 MG/1
TABLET, FILM COATED ORAL
Qty: 90 TABLET | Refills: 0 | Status: SHIPPED | OUTPATIENT
Start: 2018-11-12 | End: 2019-02-06 | Stop reason: SDUPTHER

## 2018-11-12 RX ORDER — ALBUTEROL SULFATE 90 UG/1
AEROSOL, METERED RESPIRATORY (INHALATION)
Qty: 1 INHALER | Refills: 2 | Status: SHIPPED | OUTPATIENT
Start: 2018-11-12 | End: 2019-02-06 | Stop reason: SDUPTHER

## 2018-11-14 RX ORDER — HYDROCHLOROTHIAZIDE 25 MG/1
TABLET ORAL
Qty: 90 TABLET | Refills: 0 | Status: SHIPPED | OUTPATIENT
Start: 2018-11-14 | End: 2019-02-06 | Stop reason: SDUPTHER

## 2018-11-26 DIAGNOSIS — N89.8 VAGINAL ITCHING: ICD-10-CM

## 2018-11-27 RX ORDER — FLUCONAZOLE 150 MG/1
TABLET ORAL
Qty: 2 TABLET | Refills: 0 | OUTPATIENT
Start: 2018-11-27

## 2019-02-06 ENCOUNTER — OFFICE VISIT (OUTPATIENT)
Dept: INTERNAL MEDICINE | Facility: CLINIC | Age: 74
End: 2019-02-06

## 2019-02-06 ENCOUNTER — RESULTS ENCOUNTER (OUTPATIENT)
Dept: INTERNAL MEDICINE | Facility: CLINIC | Age: 74
End: 2019-02-06

## 2019-02-06 VITALS
DIASTOLIC BLOOD PRESSURE: 75 MMHG | HEIGHT: 63 IN | HEART RATE: 74 BPM | SYSTOLIC BLOOD PRESSURE: 133 MMHG | WEIGHT: 164 LBS | OXYGEN SATURATION: 94 % | TEMPERATURE: 98.5 F | BODY MASS INDEX: 29.06 KG/M2 | RESPIRATION RATE: 16 BRPM

## 2019-02-06 DIAGNOSIS — R73.9 HYPERGLYCEMIA: ICD-10-CM

## 2019-02-06 DIAGNOSIS — R30.0 DYSURIA: ICD-10-CM

## 2019-02-06 DIAGNOSIS — R60.0 BILATERAL LOWER EXTREMITY EDEMA: ICD-10-CM

## 2019-02-06 DIAGNOSIS — J44.9 CHRONIC OBSTRUCTIVE PULMONARY DISEASE, UNSPECIFIED COPD TYPE (HCC): Primary | ICD-10-CM

## 2019-02-06 DIAGNOSIS — R73.03 PREDIABETES: ICD-10-CM

## 2019-02-06 DIAGNOSIS — I10 ESSENTIAL HYPERTENSION: ICD-10-CM

## 2019-02-06 DIAGNOSIS — J30.2 ACUTE SEASONAL ALLERGIC RHINITIS: ICD-10-CM

## 2019-02-06 DIAGNOSIS — N89.8 VAGINAL ITCHING: ICD-10-CM

## 2019-02-06 DIAGNOSIS — J40 BRONCHITIS: ICD-10-CM

## 2019-02-06 LAB
BILIRUB BLD-MCNC: NEGATIVE MG/DL
CLARITY, POC: ABNORMAL
COLOR UR: YELLOW
GLUCOSE UR STRIP-MCNC: NEGATIVE MG/DL
KETONES UR QL: NEGATIVE
LEUKOCYTE EST, POC: ABNORMAL
NITRITE UR-MCNC: NEGATIVE MG/ML
PH UR: 7 [PH] (ref 5–8)
PROT UR STRIP-MCNC: NEGATIVE MG/DL
RBC # UR STRIP: NEGATIVE /UL
SP GR UR: 1 (ref 1–1.03)
UROBILINOGEN UR QL: NORMAL

## 2019-02-06 PROCEDURE — 99214 OFFICE O/P EST MOD 30 MIN: CPT | Performed by: NURSE PRACTITIONER

## 2019-02-06 PROCEDURE — 81003 URINALYSIS AUTO W/O SCOPE: CPT | Performed by: NURSE PRACTITIONER

## 2019-02-06 RX ORDER — DOXYCYCLINE 100 MG/1
100 TABLET ORAL 2 TIMES DAILY
Qty: 20 TABLET | Refills: 0 | Status: SHIPPED | OUTPATIENT
Start: 2019-02-06 | End: 2019-02-16

## 2019-02-06 RX ORDER — FLUCONAZOLE 150 MG/1
150 TABLET ORAL ONCE
Qty: 1 TABLET | Refills: 0 | Status: SHIPPED | OUTPATIENT
Start: 2019-02-06 | End: 2019-06-24 | Stop reason: SDUPTHER

## 2019-02-06 RX ORDER — ALBUTEROL SULFATE 90 UG/1
2 AEROSOL, METERED RESPIRATORY (INHALATION) EVERY 4 HOURS PRN
Qty: 1 INHALER | Refills: 3 | Status: SHIPPED | OUTPATIENT
Start: 2019-02-06 | End: 2019-02-22 | Stop reason: SDUPTHER

## 2019-02-06 RX ORDER — HYDROCHLOROTHIAZIDE 25 MG/1
25 TABLET ORAL DAILY
Qty: 90 TABLET | Refills: 1 | Status: SHIPPED | OUTPATIENT
Start: 2019-02-06 | End: 2020-05-19 | Stop reason: SDUPTHER

## 2019-02-06 RX ORDER — AMLODIPINE BESYLATE 10 MG/1
10 TABLET ORAL DAILY
Qty: 90 TABLET | Refills: 1 | Status: SHIPPED | OUTPATIENT
Start: 2019-02-06 | End: 2019-10-16 | Stop reason: SDUPTHER

## 2019-02-06 RX ORDER — ONDANSETRON 4 MG/1
4 TABLET, ORALLY DISINTEGRATING ORAL EVERY 6 HOURS PRN
Qty: 8 TABLET | Refills: 0 | Status: SHIPPED | OUTPATIENT
Start: 2019-02-06 | End: 2019-05-24

## 2019-02-06 RX ORDER — ONDANSETRON 4 MG/1
4 TABLET, ORALLY DISINTEGRATING ORAL EVERY 6 HOURS PRN
Qty: 8 TABLET | Refills: 0 | Status: SHIPPED | OUTPATIENT
Start: 2019-02-06 | End: 2019-02-06 | Stop reason: SDUPTHER

## 2019-02-06 RX ORDER — LEVOCETIRIZINE DIHYDROCHLORIDE 5 MG/1
5 TABLET, FILM COATED ORAL EVERY EVENING
Qty: 90 TABLET | Refills: 3 | Status: SHIPPED | OUTPATIENT
Start: 2019-02-06 | End: 2020-10-08 | Stop reason: SDUPTHER

## 2019-02-06 RX ORDER — ATENOLOL 25 MG/1
25 TABLET ORAL DAILY
Qty: 45 TABLET | Refills: 0 | Status: SHIPPED | OUTPATIENT
Start: 2019-02-06 | End: 2019-06-24 | Stop reason: SDUPTHER

## 2019-02-06 NOTE — PROGRESS NOTES
Chief Complaint / Reason:      Chief Complaint   Patient presents with   • COPD   • Urinary Tract Infection     and wants something for pain . spotting . finding blood on pad. and has headache       Subjective     HPI  Patient presents today with complaints of COPD and UTI symptoms.  Patient states that she has been finding blood on her pad and has a headache.  She states she has had some vaginal itching She denies fevers chills but states that she has been coughing a lot and does not feel very well.  Patient is a 2 ppd smoker and refuses to quit smoking.  She states that she has tried before but it is so difficult and she has tried getting help but she is not ready to quit at this time.  She denies chest pain, shortness of breath or heart palpitations she states she does get short of breath with exertion and when she takes coughing fits.  Vital signs are stable with exception of slightly elevated blood pressure and lower oxygenation saturation.  History taken from: patient    PMH/FH/Social History were reviewed and updated appropriately in the electronic medical record.     Review of Systems:   Review of Systems   Constitutional: Positive for fatigue.   HENT: Positive for congestion, sinus pressure, sinus pain and sore throat.    Respiratory: Positive for cough, chest tightness and wheezing.    Cardiovascular: Negative.    Gastrointestinal: Negative.    Genitourinary:        Vaginal itching    Skin: Negative.    Neurological: Positive for headaches.   Hematological: Negative for adenopathy.   Psychiatric/Behavioral: Positive for sleep disturbance. The patient is nervous/anxious.      All other systems were reviewed and are negative.  Exceptions are noted in the subjective or above.      Objective     Vital Signs  Vitals:    02/06/19 1625   BP: 133/75   Pulse: 74   Resp: 16   Temp: 98.5 °F (36.9 °C)   SpO2: 94%       Body mass index is 29.05 kg/m².    Physical Exam   Constitutional: She is oriented to person,  place, and time. She appears well-developed and well-nourished.   HENT:   Head: Normocephalic.   Right Ear: External ear normal. Tympanic membrane is erythematous and bulging.   Left Ear: External ear normal. Tympanic membrane is erythematous and bulging.   Nose: Right sinus exhibits maxillary sinus tenderness and frontal sinus tenderness. Left sinus exhibits maxillary sinus tenderness and frontal sinus tenderness.   Mouth/Throat: Mucous membranes are dry. Posterior oropharyngeal erythema present.   Cardiovascular: Normal rate, regular rhythm, normal heart sounds and intact distal pulses.   Pulmonary/Chest: Effort normal and breath sounds normal.   Abdominal: Soft. Bowel sounds are normal.   Lymphadenopathy:     She has no cervical adenopathy.   Neurological: She is alert and oriented to person, place, and time.   Skin: Skin is warm and dry.   Psychiatric: She has a normal mood and affect. Her behavior is normal. Judgment and thought content normal.   Nursing note and vitals reviewed.       Results Review:    I reviewed the patient's new clinical results.   Results Encounter on 02/06/2019   Component Date Value Ref Range Status   • Urine Culture 02/07/2019 Final report   Final   • Result 1 02/07/2019 No growth   Final   Office Visit on 02/06/2019   Component Date Value Ref Range Status   • Color 02/06/2019 Yellow  Yellow, Straw, Dark Yellow, Nora Final   • Clarity, UA 02/06/2019 Cloudy* Clear Final   • Specific Gravity  02/06/2019 1.005  1.005 - 1.030 Final   • pH, Urine 02/06/2019 7.0  5.0 - 8.0 Final   • Leukocytes 02/06/2019 25 Ashley/ul* Negative Final   • Nitrite, UA 02/06/2019 Negative  Negative Final   • Protein, POC 02/06/2019 Negative  Negative mg/dL Final   • Glucose, UA 02/06/2019 Negative  Negative, 1000 mg/dL (3+) mg/dL Final   • Ketones, UA 02/06/2019 Negative  Negative Final   • Urobilinogen, UA 02/06/2019 Normal  Normal Final   • Bilirubin 02/06/2019 Negative  Negative Final   • Blood, UA 02/06/2019  Negative  Negative Final         Medication Review:     Current Outpatient Medications:   •  albuterol sulfate HFA (VENTOLIN HFA) 108 (90 Base) MCG/ACT inhaler, Inhale 2 puffs Every 4 (Four) Hours As Needed for Wheezing or Shortness of Air., Disp: 1 inhaler, Rfl: 3  •  amLODIPine (NORVASC) 10 MG tablet, Take 1 tablet by mouth Daily., Disp: 90 tablet, Rfl: 1  •  atenolol (TENORMIN) 25 MG tablet, Take 1 tablet by mouth Daily., Disp: 45 tablet, Rfl: 0  •  hydrochlorothiazide (HYDRODIURIL) 25 MG tablet, Take 1 tablet by mouth Daily., Disp: 90 tablet, Rfl: 1  •  levocetirizine (XYZAL) 5 MG tablet, Take 1 tablet by mouth Every Evening., Disp: 90 tablet, Rfl: 3  •  multivitamins-minerals (PRESERVISION AREDS 2) capsule capsule, Take 2 capsules by mouth Daily., Disp: , Rfl:   •  ondansetron ODT (ZOFRAN-ODT) 4 MG disintegrating tablet, Take 1 tablet by mouth Every 6 (Six) Hours As Needed for Nausea or Vomiting., Disp: 8 tablet, Rfl: 0  •  Crisaborole (EUCRISA) 2 % ointment, Apply 1 application topically 2 (Two) Times a Day., Disp: 6 g, Rfl: 0  •  Elastic Bandages & Supports (MEDICAL COMPRESSION STOCKINGS) misc, 1 application Daily., Disp: 2 each, Rfl: 0    Assessment/Plan   Carolin was seen today for copd and urinary tract infection.    Diagnoses and all orders for this visit:    Chronic obstructive pulmonary disease, unspecified COPD type (CMS/[facility])  -     albuterol sulfate HFA (VENTOLIN HFA) 108 (90 Base) MCG/ACT inhaler; Inhale 2 puffs Every 4 (Four) Hours As Needed for Wheezing or Shortness of Air.  Recommend smoking cessation.  Essential hypertension  -     amLODIPine (NORVASC) 10 MG tablet; Take 1 tablet by mouth Daily.  -     atenolol (TENORMIN) 25 MG tablet; Take 1 tablet by mouth Daily.    Acute seasonal allergic rhinitis  -     levocetirizine (XYZAL) 5 MG tablet; Take 1 tablet by mouth Every Evening.    Dysuria  -     POCT urinalysis dipstick, automated  -     Urine Culture - Urine, Urine, Clean Catch; Future  recommend patient increase water intake.    Hyperglycemia  -     POCT glycated hemoglobin, total    Bilateral lower extremity edema  -     Elastic Bandages & Supports (MEDICAL COMPRESSION STOCKINGS) misc; 1 application Daily.    Prediabetes  Recommend patient adhere to diabetic diet and decrease carbohydrate intake.  Vaginal itching  -     fluconazole (DIFLUCAN) 150 MG tablet; Take 1 tablet by mouth 1 (One) Time for 1 dose.    Bronchitis  -     doxycycline (ADOXA) 100 MG tablet; Take 1 tablet by mouth 2 (Two) Times a Day for 10 days.    Other orders  -     Discontinue: ondansetron ODT (ZOFRAN-ODT) 4 MG disintegrating tablet; Take 1 tablet by mouth Every 6 (Six) Hours As Needed for Nausea or Vomiting.  -     hydrochlorothiazide (HYDRODIURIL) 25 MG tablet; Take 1 tablet by mouth Daily.  -     ondansetron ODT (ZOFRAN-ODT) 4 MG disintegrating tablet; Take 1 tablet by mouth Every 6 (Six) Hours As Needed for Nausea or Vomiting.  -     Crisaborole (EUCRISA) 2 % ointment; Apply 1 application topically 2 (Two) Times a Day.        Return if symptoms worsen or fail to improve, for Medicare Wellness.    Lian Kohler, APRN  02/06/2019

## 2019-02-09 LAB
BACTERIA UR CULT: NO GROWTH
BACTERIA UR CULT: NORMAL

## 2019-02-22 DIAGNOSIS — J44.9 CHRONIC OBSTRUCTIVE PULMONARY DISEASE, UNSPECIFIED COPD TYPE (HCC): ICD-10-CM

## 2019-02-22 RX ORDER — ALBUTEROL SULFATE 90 UG/1
2 AEROSOL, METERED RESPIRATORY (INHALATION) EVERY 4 HOURS PRN
Qty: 1 INHALER | Refills: 3 | Status: SHIPPED | OUTPATIENT
Start: 2019-02-22 | End: 2019-05-24 | Stop reason: SDUPTHER

## 2019-03-07 DIAGNOSIS — J44.9 CHRONIC OBSTRUCTIVE PULMONARY DISEASE, UNSPECIFIED COPD TYPE (HCC): ICD-10-CM

## 2019-03-15 RX ORDER — ALBUTEROL SULFATE 90 UG/1
2 AEROSOL, METERED RESPIRATORY (INHALATION) EVERY 4 HOURS PRN
Qty: 1 INHALER | Refills: 5 | Status: SHIPPED | OUTPATIENT
Start: 2019-03-15 | End: 2020-04-01

## 2019-04-21 DIAGNOSIS — J40 BRONCHITIS: ICD-10-CM

## 2019-04-22 RX ORDER — DOXYCYCLINE 100 MG/1
TABLET ORAL
Qty: 20 TABLET | Refills: 0 | OUTPATIENT
Start: 2019-04-22

## 2019-05-24 ENCOUNTER — OFFICE VISIT (OUTPATIENT)
Dept: INTERNAL MEDICINE | Facility: CLINIC | Age: 74
End: 2019-05-24

## 2019-05-24 VITALS
BODY MASS INDEX: 28.17 KG/M2 | SYSTOLIC BLOOD PRESSURE: 122 MMHG | HEART RATE: 70 BPM | OXYGEN SATURATION: 94 % | TEMPERATURE: 96.5 F | HEIGHT: 63 IN | WEIGHT: 159 LBS | DIASTOLIC BLOOD PRESSURE: 62 MMHG

## 2019-05-24 DIAGNOSIS — F17.219 CIGARETTE NICOTINE DEPENDENCE WITH NICOTINE-INDUCED DISORDER: ICD-10-CM

## 2019-05-24 DIAGNOSIS — R51.9 GENERALIZED HEADACHE: ICD-10-CM

## 2019-05-24 DIAGNOSIS — J30.2 SEASONAL ALLERGIES: ICD-10-CM

## 2019-05-24 DIAGNOSIS — R73.9 HYPERGLYCEMIA: ICD-10-CM

## 2019-05-24 DIAGNOSIS — G47.00 INSOMNIA, UNSPECIFIED TYPE: ICD-10-CM

## 2019-05-24 DIAGNOSIS — F41.9 ANXIETY: ICD-10-CM

## 2019-05-24 DIAGNOSIS — J44.9 CHRONIC OBSTRUCTIVE PULMONARY DISEASE, UNSPECIFIED COPD TYPE (HCC): Primary | ICD-10-CM

## 2019-05-24 DIAGNOSIS — K59.04 CHRONIC IDIOPATHIC CONSTIPATION: ICD-10-CM

## 2019-05-24 DIAGNOSIS — F43.9 STRESS AT HOME: ICD-10-CM

## 2019-05-24 PROCEDURE — 83036 HEMOGLOBIN GLYCOSYLATED A1C: CPT | Performed by: NURSE PRACTITIONER

## 2019-05-24 PROCEDURE — 99214 OFFICE O/P EST MOD 30 MIN: CPT | Performed by: NURSE PRACTITIONER

## 2019-05-24 RX ORDER — HYDROXYZINE PAMOATE 50 MG/1
50 CAPSULE ORAL NIGHTLY PRN
Qty: 30 CAPSULE | Refills: 1 | Status: SHIPPED | OUTPATIENT
Start: 2019-05-24 | End: 2019-08-26

## 2019-05-24 RX ORDER — THIAMINE HCL 100 MG
1 TABLET ORAL DAILY
Qty: 30 TABLET | Refills: 0 | Status: SHIPPED | OUTPATIENT
Start: 2019-05-24 | End: 2019-08-26

## 2019-05-24 NOTE — PROGRESS NOTES
Chief Complaint / Reason:      Chief Complaint   Patient presents with   • COPD     follow up       Subjective     HPI  Patient presents today for COPD follow-up.  She states that she is doing well overall but has been under a lot of stress at home and she states that she feels like people are wanting her out of the house that she is in.  She states that she packs again and she is not going to let somebody heart her or her family.  She states 1 of her daughters was out by her car and there was some man standing there.  Patient denies SI or HI.  She states that she does have trouble sleeping at night but she has always been that way and only gets a few hours of sleep.  Patient continues to smoke and she states that she has cut down from several packs a day and she is down to 1 1/2 ppd.  She has no desire to quit nor to take anything to help her quit.  She is accompanied by her son-in-law.  She states that she has had a headache that has been going on daily.  She denies any nausea vomiting diarrhea or neurological changes but states that it is very debilitating at times.  History taken from: patient    PMH/FH/Social History were reviewed and updated appropriately in the electronic medical record.   Past Medical History:   Diagnosis Date   • Arthritis    • COPD (chronic obstructive pulmonary disease) (CMS/[facility])    • Gall stones    • Hypertension    • Hypertension    • Kidney infection    • Kidney stone    • Kidney stones    • Migraine headache    • Scarlet fever      Past Surgical History:   Procedure Laterality Date   • BLADDER SURGERY     • CHOLECYSTECTOMY       Social History     Socioeconomic History   • Marital status:      Spouse name: Not on file   • Number of children: Not on file   • Years of education: Not on file   • Highest education level: Not on file   Tobacco Use   • Smoking status: Current Every Day Smoker     Packs/day: 2.00     Types: Cigarettes   • Smokeless tobacco: Former User     Quit  date: 9/24/2017   • Tobacco comment: quit one week ago   Substance and Sexual Activity   • Alcohol use: No   • Drug use: No   • Sexual activity: Yes     Partners: Male     Family History   Problem Relation Age of Onset   • Heart disease Mother    • Arthritis Mother    • Diabetes Mother    • Hypertension Mother    • Colon cancer Father    • Arthritis Father    • Diabetes Father    • Hypertension Father    • Migraines Sister        Review of Systems:   Review of Systems   Constitutional: Positive for fatigue.   Respiratory: Positive for cough and wheezing.    Cardiovascular: Positive for leg swelling.   Gastrointestinal: Negative.    Musculoskeletal: Positive for arthralgias and myalgias.   Skin: Negative.    Neurological: Positive for headache.   Psychiatric/Behavioral: Positive for sleep disturbance and stress.       All other systems were reviewed and are negative.  Exceptions are noted in the subjective or above.      Objective     Vital Signs  Vitals:    05/24/19 1552   BP: 122/62   Pulse: 70   Temp: 96.5 °F (35.8 °C)   SpO2: 94%       Body mass index is 28.17 kg/m².    Physical Exam   Constitutional: She is oriented to person, place, and time. She appears well-developed and well-nourished.   Eyes: Pupils are equal, round, and reactive to light.   Neck: Normal range of motion.   Cardiovascular: Normal rate, regular rhythm, normal heart sounds and intact distal pulses.   Pulmonary/Chest: Effort normal. She has wheezes.   Abdominal: Soft. Bowel sounds are normal.   Musculoskeletal: Normal range of motion.   Neurological: She is alert and oriented to person, place, and time. Coordination normal.   Skin: Skin is warm and dry. Capillary refill takes less than 2 seconds.   Psychiatric: Judgment and thought content normal. Her mood appears anxious. She is agitated (Towards situation at home).   Nursing note and vitals reviewed.         Results Review:    I reviewed the patient's new clinical results.   Office Visit on  05/24/2019   Component Date Value Ref Range Status   • Hemoglobin A1C 05/28/2019 6.0  % Final         Medication Review:     Current Outpatient Medications:   •  albuterol sulfate HFA (VENTOLIN HFA) 108 (90 Base) MCG/ACT inhaler, Inhale 2 puffs Every 4 (Four) Hours As Needed for Wheezing., Disp: 1 inhaler, Rfl: 5  •  amLODIPine (NORVASC) 10 MG tablet, Take 1 tablet by mouth Daily., Disp: 90 tablet, Rfl: 1  •  atenolol (TENORMIN) 25 MG tablet, Take 1 tablet by mouth Daily., Disp: 45 tablet, Rfl: 0  •  Crisaborole (EUCRISA) 2 % ointment, Apply 1 application topically 2 (Two) Times a Day., Disp: 6 g, Rfl: 0  •  Elastic Bandages & Supports (MEDICAL COMPRESSION STOCKINGS) misc, 1 application Daily., Disp: 2 each, Rfl: 0  •  hydrochlorothiazide (HYDRODIURIL) 25 MG tablet, Take 1 tablet by mouth Daily., Disp: 90 tablet, Rfl: 1  •  levocetirizine (XYZAL) 5 MG tablet, Take 1 tablet by mouth Every Evening., Disp: 90 tablet, Rfl: 3  •  multivitamins-minerals (PRESERVISION AREDS 2) capsule capsule, Take 2 capsules by mouth Daily., Disp: , Rfl:   •  hydrOXYzine pamoate (VISTARIL) 50 MG capsule, Take 1 capsule by mouth At Night As Needed for Itching, Allergies or Anxiety., Disp: 30 capsule, Rfl: 1  •  Magnesium 500 MG tablet, Take 1 tablet/day by mouth Daily., Disp: 30 tablet, Rfl: 0  •  umeclidinium-vilanterol (ANORO ELLIPTA) 62.5-25 MCG/INH aerosol powder  inhaler, Inhale 1 puff Daily., Disp: 2 each, Rfl: 0    Assessment/Plan   Carolin was seen today for copd.    Diagnoses and all orders for this visit:    Chronic obstructive pulmonary disease, unspecified COPD type (CMS/HCC)  -     umeclidinium-vilanterol (ANORO ELLIPTA) 62.5-25 MCG/INH aerosol powder  inhaler; Inhale 1 puff Daily.    Hyperglycemia  -     POC Glycosylated Hemoglobin (Hb A1C)  Discussed dietary modifications.  Cigarette nicotine dependence with nicotine-induced disorder  Recommend smoking cessation.  Patient refused.  Generalized headache  -     Magnesium 500  MG tablet; Take 1 tablet/day by mouth Daily.  Recommend patient get adequate hydration nutrition and rest.  Stress at home  Recommend stress management.  Anxiety  -     hydrOXYzine pamoate (VISTARIL) 50 MG capsule; Take 1 capsule by mouth At Night As Needed for Itching, Allergies or Anxiety.    Chronic idiopathic constipation  -     Magnesium 500 MG tablet; Take 1 tablet/day by mouth Daily.  Discussed dietary modifications with patient  Insomnia, unspecified type  -     hydrOXYzine pamoate (VISTARIL) 50 MG capsule; Take 1 capsule by mouth At Night As Needed for Itching, Allergies or Anxiety.    Seasonal allergies  -     hydrOXYzine pamoate (VISTARIL) 50 MG capsule; Take 1 capsule by mouth At Night As Needed for Itching, Allergies or Anxiety.    Patient refused vaccinations and labs.  She did want hemoglobin A1c checked.    Return in about 3 months (around 8/24/2019), or if symptoms worsen or fail to improve.    Lian Kohler, APRN  05/24/2019

## 2019-05-28 LAB — HBA1C MFR BLD: 6 %

## 2019-06-24 DIAGNOSIS — I10 ESSENTIAL HYPERTENSION: ICD-10-CM

## 2019-06-24 DIAGNOSIS — N89.8 VAGINAL ITCHING: ICD-10-CM

## 2019-06-24 RX ORDER — ATENOLOL 25 MG/1
TABLET ORAL
Qty: 45 TABLET | Refills: 5 | Status: SHIPPED | OUTPATIENT
Start: 2019-06-24 | End: 2020-04-01

## 2019-06-24 RX ORDER — FLUCONAZOLE 150 MG/1
TABLET ORAL
Qty: 1 TABLET | Refills: 0 | Status: SHIPPED | OUTPATIENT
Start: 2019-06-24 | End: 2019-08-26

## 2019-06-24 NOTE — TELEPHONE ENCOUNTER
Patient called and is requesting a refill of her atenolol, and fluconazole.  [facility] pharmacy verified.  Phone number verified.  Thank you!

## 2019-08-26 ENCOUNTER — OFFICE VISIT (OUTPATIENT)
Dept: INTERNAL MEDICINE | Facility: CLINIC | Age: 74
End: 2019-08-26

## 2019-08-26 VITALS
BODY MASS INDEX: 27.25 KG/M2 | DIASTOLIC BLOOD PRESSURE: 71 MMHG | WEIGHT: 153.8 LBS | HEART RATE: 79 BPM | TEMPERATURE: 99.1 F | OXYGEN SATURATION: 94 % | HEIGHT: 63 IN | SYSTOLIC BLOOD PRESSURE: 138 MMHG

## 2019-08-26 DIAGNOSIS — E78.5 DYSLIPIDEMIA: ICD-10-CM

## 2019-08-26 DIAGNOSIS — F17.210 CIGARETTE SMOKER: ICD-10-CM

## 2019-08-26 DIAGNOSIS — R73.9 HYPERGLYCEMIA: ICD-10-CM

## 2019-08-26 DIAGNOSIS — J41.0 SIMPLE CHRONIC BRONCHITIS (HCC): Primary | ICD-10-CM

## 2019-08-26 DIAGNOSIS — I10 ESSENTIAL HYPERTENSION: ICD-10-CM

## 2019-08-26 PROCEDURE — 96160 PT-FOCUSED HLTH RISK ASSMT: CPT | Performed by: INTERNAL MEDICINE

## 2019-08-26 PROCEDURE — 99397 PER PM REEVAL EST PAT 65+ YR: CPT | Performed by: INTERNAL MEDICINE

## 2019-08-26 PROCEDURE — G0439 PPPS, SUBSEQ VISIT: HCPCS | Performed by: INTERNAL MEDICINE

## 2019-08-26 NOTE — PATIENT INSTRUCTIONS
Medicare Wellness  Personal Prevention Plan of Service     Date of Office Visit:  2019  Encounter Provider:  Jason Nicholson MD  Place of Service:  [facility] PRIMARY [facility]  Patient Name: Carolin Toscano  :  1945    As part of the Medicare Wellness portion of your visit today, we are providing you with this personalized preventive plan of services (PPPS). This plan is based upon recommendations of the [place] Preventive Services Task Force (USPSTF) and the Advisory Committee on Immunization Practices (ACIP).    This lists the preventive care services that should be considered, and provides dates of when you are due. Items listed as completed are up-to-date and do not require any further intervention.    Health Maintenance   Topic Date Due   • HEPATITIS C SCREENING  10/01/2017   • INFLUENZA VACCINE  2019   • PNEUMOCOCCAL VACCINES (65+ LOW/MEDIUM RISK) (1 of 2 - PCV13) 2020 (Originally 2011)   • TDAP/TD VACCINES (1 - Tdap) 2020 (Originally 1965)   • ZOSTER VACCINE (1 of 2) 2029 (Originally 1996)   • MEDICARE ANNUAL WELLNESS  2020   • MAMMOGRAM  Discontinued   • COLONOSCOPY  Discontinued       Orders Placed This Encounter   Procedures   • CT Chest Low Dose Wo     Standing Status:   Future     Standing Expiration Date:   2020     Order Specific Question:   The patient is age 55-77:     Answer:   73     Order Specific Question:   The patient is a current smoker?     Answer:   Yes     Order Specific Question:   The patient has a smoking history of 30 pack-years or greater:     Answer:   Yes     Order Specific Question:   Actual pack - year smoking history (number):     Answer:   42     Order Specific Question:   Has the Patient had a Chest CT scan within the past 12 months?     Answer:   No     Order Specific Question:   Does the patient have any clinical signs/symptoms of lung cancer?     Answer:   No     Order Specific Question:    The patient was engaged in shared decision-making for this test:     Answer:   Yes       No Follow-up on file.

## 2019-08-26 NOTE — PROGRESS NOTES
The ABCs of the Annual Wellness Visit  Initial Medicare Wellness Visit    Chief Complaint   Patient presents with   • Medicare Wellness-Initial Visit       Subjective   History of Present Illness:  Carolin Toscano is a 73 y.o. female who presents for an Initial Medicare Wellness Visit.    HEALTH RISK ASSESSMENT    Recent Hospitalizations:  No hospitalization(s) within the last year.    Current Medical Providers:  Patient Care Team:  Jason Nicholson MD as PCP - General (Internal Medicine)    Smoking Status:  Social History     Tobacco Use   Smoking Status Current Every Day Smoker   • Packs/day: 2.00   • Types: Cigarettes   Smokeless Tobacco Former User   • Quit date: 9/24/2017   Tobacco Comment    quit one week ago       Alcohol Consumption:  Social History     Substance and Sexual Activity   Alcohol Use No       Depression Screen:   PHQ-2/PHQ-9 Depression Screening 8/26/2019   Little interest or pleasure in doing things 0   Feeling down, depressed, or hopeless 1   Total Score 1       Fall Risk Screen:  UMBERTO Fall Risk Assessment was completed, and patient is at LOW risk for falls.Assessment completed on:8/26/2019    Health Habits and Functional and Cognitive Screening:  Functional & Cognitive Status 8/26/2019   Do you have difficulty preparing food and eating? No   Do you have difficulty bathing yourself, getting dressed or grooming yourself? No   Do you have difficulty using the toilet? No   Do you have difficulty moving around from place to place? No   Do you have trouble with steps or getting out of a bed or a chair? No   Current Diet Well Balanced Diet   Dental Exam Not up to date   Eye Exam Up to date   Exercise (times per week) 0 times per week   Current Exercise Activities Include No Regular Exercise   Do you need help using the phone?  No   Are you deaf or do you have serious difficulty hearing?  No   Do you need help with transportation? No   Do you need help shopping? No   Do you need help preparing  meals?  No   Do you need help with housework?  No   Do you need help with laundry? No   Do you need help taking your medications? No   Do you need help managing money? No   Do you ever drive or ride in a car without wearing a seat belt? No   Have you felt unusual stress, anger or loneliness in the last month? Yes   Who do you live with? Alone   If you need help, do you have trouble finding someone available to you? No   Have you been bothered in the last four weeks by sexual problems? No   Do you have difficulty concentrating, remembering or making decisions? No         Does the patient have evidence of cognitive impairment? No    Asprin use counseling:Does not need ASA but is currently taking (advised patient that ASA is not indicated and patient chooses to stop it)    Age-appropriate Screening Schedule:  Refer to the list below for future screening recommendations based on patient's age, sex and/or medical conditions. Orders for these recommended tests are listed in the plan section. The patient has been provided with a written plan.    Health Maintenance   Topic Date Due   • INFLUENZA VACCINE  08/01/2019   • PNEUMOCOCCAL VACCINES (65+ LOW/MEDIUM RISK) (1 of 2 - PCV13) 08/26/2020 (Originally 11/11/2011)   • TDAP/TD VACCINES (1 - Tdap) 08/26/2020 (Originally 11/11/1964)   • ZOSTER VACCINE (1 of 2) 08/26/2029 (Originally 11/11/1996)   • MAMMOGRAM  Discontinued   • COLONOSCOPY  Discontinued          The following portions of the patient's history were reviewed and updated as appropriate: allergies, current medications, past family history, past medical history, past social history, past surgical history and problem list.    Outpatient Medications Prior to Visit   Medication Sig Dispense Refill   • albuterol sulfate HFA (VENTOLIN HFA) 108 (90 Base) MCG/ACT inhaler Inhale 2 puffs Every 4 (Four) Hours As Needed for Wheezing. 1 inhaler 5   • amLODIPine (NORVASC) 10 MG tablet Take 1 tablet by mouth Daily. 90 tablet 1   •  atenolol (TENORMIN) 25 MG tablet TAKE 1 TABLET BY MOUTH ONCE DAILY 45 tablet 5   • hydrochlorothiazide (HYDRODIURIL) 25 MG tablet Take 1 tablet by mouth Daily. 90 tablet 1   • levocetirizine (XYZAL) 5 MG tablet Take 1 tablet by mouth Every Evening. 90 tablet 3   • multivitamins-minerals (PRESERVISION AREDS 2) capsule capsule Take 2 capsules by mouth Daily.     • Crisaborole (EUCRISA) 2 % ointment Apply 1 application topically 2 (Two) Times a Day. 6 g 0   • Elastic Bandages & Supports (MEDICAL COMPRESSION STOCKINGS) misc 1 application Daily. 2 each 0   • fluconazole (DIFLUCAN) 150 MG tablet TAKE ONE TABLET BY MOUTH AS A ONE-TIME DOSE 1 tablet 0   • hydrOXYzine pamoate (VISTARIL) 50 MG capsule Take 1 capsule by mouth At Night As Needed for Itching, Allergies or Anxiety. 30 capsule 1   • Magnesium 500 MG tablet Take 1 tablet/day by mouth Daily. 30 tablet 0   • umeclidinium-vilanterol (ANORO ELLIPTA) 62.5-25 MCG/INH aerosol powder  inhaler Inhale 1 puff Daily. 2 each 0     No facility-administered medications prior to visit.        Patient Active Problem List   Diagnosis   • Sepsis   • Cigarette nicotine dependence with nicotine-induced disorder   • BP (high blood pressure)   • Dyslipidemia   • Blood glucose elevated   • Muscle ache   • COPD (chronic obstructive pulmonary disease) (CMS/[facility])       Advanced Care Planning:  Patient does not have an advance directive - information provided to the patient today    Review of Systems   Constitutional: Positive for fatigue. Negative for activity change, appetite change and fever.   HENT: Negative for congestion, ear discharge, ear pain and trouble swallowing.    Eyes: Negative for photophobia and visual disturbance.   Respiratory: Positive for cough and shortness of breath.    Cardiovascular: Negative for chest pain and palpitations.   Gastrointestinal: Negative for abdominal distention, abdominal pain, constipation, diarrhea, nausea and vomiting.   Endocrine: Negative.   "  Genitourinary: Negative for dysuria, hematuria and urgency.   Musculoskeletal: Positive for arthralgias. Negative for back pain, joint swelling and myalgias.   Skin: Negative for color change and rash.   Allergic/Immunologic: Negative.    Neurological: Negative for dizziness, weakness, light-headedness and headaches.   Hematological: Negative for adenopathy. Does not bruise/bleed easily.   Psychiatric/Behavioral: Positive for sleep disturbance. Negative for agitation, confusion and dysphoric mood. The patient is not nervous/anxious.        Compared to one year ago, the patient feels her physical health is the same.  Compared to one year ago, the patient feels her mental health is the same.    Reviewed chart for potential of high risk medication in the elderly: yes  Reviewed chart for potential of harmful drug interactions in the elderly:yes    Objective         Vitals:    08/26/19 1615   BP: 138/71  Comment: Automatic   Pulse: 79   Temp: 99.1 °F (37.3 °C)   TempSrc: Temporal   SpO2: 94%   Weight: 69.8 kg (153 lb 12.8 oz)   Height: 160 cm (63\")   PainSc: 0-No pain       Body mass index is 27.24 kg/m².  Discussed the patient's BMI with her. The BMI is in the acceptable range.    Physical Exam   Constitutional: She is oriented to person, place, and time. She appears well-developed and well-nourished. No distress.   HENT:   Nose: Nose normal.   Mouth/Throat: Oropharynx is clear and moist.   Eyes: Conjunctivae and EOM are normal. No scleral icterus.   Neck: No tracheal deviation present. No thyromegaly present.   Cardiovascular: Normal rate and regular rhythm. Exam reveals no friction rub.   No murmur heard.  Pulmonary/Chest: No respiratory distress. She has wheezes. She has no rales.   Abdominal: Soft. She exhibits no distension and no mass. There is no tenderness. There is no guarding.   Musculoskeletal: Normal range of motion. She exhibits deformity.   Lymphadenopathy:     She has no cervical adenopathy. "   Neurological: She is alert and oriented to person, place, and time. She has normal reflexes. No cranial nerve deficit. Coordination normal.   Skin: Skin is warm and dry. No rash noted. No erythema.   Psychiatric: She has a normal mood and affect. Her behavior is normal. Judgment and thought content normal.             Assessment/Plan   Medicare Risks and Personalized Health Plan  CMS Preventative Services Quick Reference  Advance Directive Discussion  Lung Cancer Risk  Polypharmacy  Tobacco Use/Dependance (use dotphrase .tobaccocessation for documentation)  Urinary Incontinence    The above risks/problems have been discussed with the patient.  Pertinent information has been shared with the patient in the After Visit Summary.  Follow up plans and orders are seen below in the Assessment/Plan Section.    Diagnoses and all orders for this visit:    1. Simple chronic bronchitis (CMS/[facility]) (Primary) counseled about smoking cessation at least 5 minutes spent discussing nicotine replacement.  She was to start tapering off her smoking for now will go ahead and set her up for low-dose screening CT.  Continue with albuterol on a as needed basis no recent exacerbation she will benefit from inhaled corticosteroid and long-acting beta agonist.  She wants to hold off on that for now    2. Essential hypertension stable with current meds    3. Dyslipidemia continue the dietary restrictions follow lipid profile      Follow Up:  No Follow-up on file.     An After Visit Summary and PPPS were given to the patient.

## 2019-09-19 ENCOUNTER — APPOINTMENT (OUTPATIENT)
Dept: CT IMAGING | Facility: HOSPITAL | Age: 74
End: 2019-09-19

## 2019-09-26 ENCOUNTER — APPOINTMENT (OUTPATIENT)
Dept: CT IMAGING | Facility: HOSPITAL | Age: 74
End: 2019-09-26

## 2019-10-10 ENCOUNTER — APPOINTMENT (OUTPATIENT)
Dept: CT IMAGING | Facility: HOSPITAL | Age: 74
End: 2019-10-10

## 2019-10-16 DIAGNOSIS — I10 ESSENTIAL HYPERTENSION: ICD-10-CM

## 2019-10-16 RX ORDER — AMLODIPINE BESYLATE 10 MG/1
TABLET ORAL
Qty: 90 TABLET | Refills: 1 | Status: SHIPPED | OUTPATIENT
Start: 2019-10-16 | End: 2020-04-01

## 2019-10-18 ENCOUNTER — HOSPITAL ENCOUNTER (OUTPATIENT)
Dept: CT IMAGING | Facility: HOSPITAL | Age: 74
Discharge: HOME | End: 2019-10-18
Admitting: INTERNAL MEDICINE

## 2019-10-18 ENCOUNTER — APPOINTMENT (OUTPATIENT)
Dept: LAB | Facility: HOSPITAL | Age: 74
End: 2019-10-18

## 2019-10-18 DIAGNOSIS — F17.210 CIGARETTE SMOKER: ICD-10-CM

## 2019-10-18 LAB
ALBUMIN SERPL-MCNC: 4.4 G/DL (ref 3.5–5.2)
ALBUMIN/GLOB SERPL: 1.8 G/DL
ALP SERPL-CCNC: 111 U/L (ref 39–117)
ALT SERPL W P-5'-P-CCNC: 22 U/L (ref 1–33)
ANION GAP SERPL CALCULATED.3IONS-SCNC: 14.6 MMOL/L (ref 5–15)
AST SERPL-CCNC: 19 U/L (ref 1–32)
BILIRUB SERPL-MCNC: <0.2 MG/DL (ref 0.2–1.2)
BUN BLD-MCNC: 18 MG/DL (ref 8–23)
BUN/CREAT SERPL: 35.3 (ref 7–25)
CALCIUM SPEC-SCNC: 9.8 MG/DL (ref 8.6–10.5)
CHLORIDE SERPL-SCNC: 99 MMOL/L (ref 98–107)
CO2 SERPL-SCNC: 30.4 MMOL/L (ref 22–29)
CREAT BLD-MCNC: 0.51 MG/DL (ref 0.57–1)
GFR SERPL CREATININE-BSD FRML MDRD: 119 ML/MIN/1.73
GLOBULIN UR ELPH-MCNC: 2.5 GM/DL
GLUCOSE BLD-MCNC: 132 MG/DL (ref 65–99)
HBA1C MFR BLD: 6 % (ref 4.8–5.6)
POTASSIUM BLD-SCNC: 3.6 MMOL/L (ref 3.5–5.2)
PROT SERPL-MCNC: 6.9 G/DL (ref 6–8.5)
SODIUM BLD-SCNC: 144 MMOL/L (ref 136–145)

## 2019-10-18 PROCEDURE — 80053 COMPREHEN METABOLIC PANEL: CPT | Performed by: INTERNAL MEDICINE

## 2019-10-18 PROCEDURE — 83036 HEMOGLOBIN GLYCOSYLATED A1C: CPT | Performed by: INTERNAL MEDICINE

## 2019-10-18 PROCEDURE — 36415 COLL VENOUS BLD VENIPUNCTURE: CPT | Performed by: INTERNAL MEDICINE

## 2019-10-18 PROCEDURE — G0297 LDCT FOR LUNG CA SCREEN: HCPCS

## 2019-11-20 RX ORDER — HYDROCHLOROTHIAZIDE 25 MG/1
TABLET ORAL
Qty: 90 TABLET | Refills: 1 | Status: SHIPPED | OUTPATIENT
Start: 2019-11-20 | End: 2020-04-01

## 2019-12-04 ENCOUNTER — TELEPHONE (OUTPATIENT)
Dept: INTERNAL MEDICINE | Facility: CLINIC | Age: 74
End: 2019-12-04

## 2019-12-04 NOTE — TELEPHONE ENCOUNTER
Yes it is okay to change to generic Claritin.  I was not the one that saw the patient previously but please send in Claritin.

## 2019-12-04 NOTE — TELEPHONE ENCOUNTER
Patient is vomiting on the Xyzal and she is wondering if she could be switched to the generic Claritin.  Please advise.  Thank you.  Pharmacy and phone number verified.

## 2019-12-05 RX ORDER — LORATADINE 10 MG/1
10 TABLET ORAL DAILY
Qty: 90 TABLET | Refills: 3 | Status: SHIPPED | OUTPATIENT
Start: 2019-12-05 | End: 2020-05-19 | Stop reason: SDUPTHER

## 2020-04-01 DIAGNOSIS — I10 ESSENTIAL HYPERTENSION: ICD-10-CM

## 2020-04-01 RX ORDER — AMLODIPINE BESYLATE 10 MG/1
TABLET ORAL
Qty: 90 TABLET | Refills: 0 | Status: SHIPPED | OUTPATIENT
Start: 2020-04-01 | End: 2020-05-19 | Stop reason: SDUPTHER

## 2020-04-01 RX ORDER — ATENOLOL 25 MG/1
TABLET ORAL
Qty: 90 TABLET | Refills: 0 | Status: SHIPPED | OUTPATIENT
Start: 2020-04-01 | End: 2020-05-19 | Stop reason: SDUPTHER

## 2020-04-01 RX ORDER — ALBUTEROL SULFATE 90 UG/1
AEROSOL, METERED RESPIRATORY (INHALATION)
Qty: 18 G | Refills: 0 | Status: SHIPPED | OUTPATIENT
Start: 2020-04-01 | End: 2020-05-19 | Stop reason: SDUPTHER

## 2020-04-01 RX ORDER — HYDROCHLOROTHIAZIDE 25 MG/1
TABLET ORAL
Qty: 90 TABLET | Refills: 0 | Status: SHIPPED | OUTPATIENT
Start: 2020-04-01 | End: 2020-10-08 | Stop reason: SDUPTHER

## 2020-05-19 ENCOUNTER — TELEPHONE (OUTPATIENT)
Dept: INTERNAL MEDICINE | Facility: CLINIC | Age: 75
End: 2020-05-19

## 2020-05-19 DIAGNOSIS — I10 ESSENTIAL HYPERTENSION: ICD-10-CM

## 2020-05-19 RX ORDER — ATENOLOL 25 MG/1
25 TABLET ORAL DAILY
Qty: 90 TABLET | Refills: 0 | Status: SHIPPED | OUTPATIENT
Start: 2020-05-19 | End: 2020-10-08 | Stop reason: SDUPTHER

## 2020-05-19 RX ORDER — AMLODIPINE BESYLATE 10 MG/1
10 TABLET ORAL DAILY
Qty: 90 TABLET | Refills: 0 | Status: SHIPPED | OUTPATIENT
Start: 2020-05-19 | End: 2020-10-08 | Stop reason: SDUPTHER

## 2020-05-19 RX ORDER — ALBUTEROL SULFATE 90 UG/1
2 AEROSOL, METERED RESPIRATORY (INHALATION) EVERY 4 HOURS PRN
Qty: 18 G | Refills: 0 | Status: SHIPPED | OUTPATIENT
Start: 2020-05-19 | End: 2020-07-02

## 2020-05-19 RX ORDER — FLUCONAZOLE 150 MG/1
150 TABLET ORAL DAILY
Qty: 2 TABLET | Refills: 0 | Status: SHIPPED | OUTPATIENT
Start: 2020-05-19 | End: 2020-05-21

## 2020-05-19 RX ORDER — HYDROCHLOROTHIAZIDE 25 MG/1
25 TABLET ORAL DAILY
Qty: 90 TABLET | Refills: 1 | Status: SHIPPED | OUTPATIENT
Start: 2020-05-19 | End: 2020-10-08 | Stop reason: SDUPTHER

## 2020-05-19 RX ORDER — LORATADINE 10 MG/1
10 TABLET ORAL DAILY
Qty: 90 TABLET | Refills: 3 | Status: SHIPPED | OUTPATIENT
Start: 2020-05-19 | End: 2020-10-08

## 2020-05-19 NOTE — TELEPHONE ENCOUNTER
Patient's daughter, Nehal, states that she needs a prescription for 2 Diflucan for her yeast infection.  She can be reached at [phone]    [facility], [place]

## 2020-07-02 RX ORDER — ALBUTEROL SULFATE 90 UG/1
AEROSOL, METERED RESPIRATORY (INHALATION)
Qty: 18 G | Refills: 0 | Status: SHIPPED | OUTPATIENT
Start: 2020-07-02 | End: 2020-10-15

## 2020-10-08 ENCOUNTER — OFFICE VISIT (OUTPATIENT)
Dept: INTERNAL MEDICINE | Facility: CLINIC | Age: 75
End: 2020-10-08

## 2020-10-08 VITALS
TEMPERATURE: 98.2 F | HEIGHT: 63 IN | WEIGHT: 164.4 LBS | SYSTOLIC BLOOD PRESSURE: 140 MMHG | OXYGEN SATURATION: 95 % | BODY MASS INDEX: 29.13 KG/M2 | DIASTOLIC BLOOD PRESSURE: 80 MMHG | HEART RATE: 71 BPM

## 2020-10-08 DIAGNOSIS — I10 ESSENTIAL HYPERTENSION: ICD-10-CM

## 2020-10-08 DIAGNOSIS — R73.9 HYPERGLYCEMIA: ICD-10-CM

## 2020-10-08 DIAGNOSIS — E78.5 DYSLIPIDEMIA: ICD-10-CM

## 2020-10-08 DIAGNOSIS — J30.2 ACUTE SEASONAL ALLERGIC RHINITIS: ICD-10-CM

## 2020-10-08 DIAGNOSIS — J41.0 SIMPLE CHRONIC BRONCHITIS (HCC): Primary | ICD-10-CM

## 2020-10-08 PROCEDURE — 96160 PT-FOCUSED HLTH RISK ASSMT: CPT | Performed by: INTERNAL MEDICINE

## 2020-10-08 PROCEDURE — G0439 PPPS, SUBSEQ VISIT: HCPCS | Performed by: INTERNAL MEDICINE

## 2020-10-08 PROCEDURE — 99397 PER PM REEVAL EST PAT 65+ YR: CPT | Performed by: INTERNAL MEDICINE

## 2020-10-08 RX ORDER — AMLODIPINE BESYLATE 10 MG/1
10 TABLET ORAL DAILY
Qty: 90 TABLET | Refills: 3 | Status: SHIPPED | OUTPATIENT
Start: 2020-10-08 | End: 2021-09-13

## 2020-10-08 RX ORDER — ATENOLOL 25 MG/1
25 TABLET ORAL DAILY
Qty: 90 TABLET | Refills: 3 | Status: SHIPPED | OUTPATIENT
Start: 2020-10-08 | End: 2021-09-13

## 2020-10-08 RX ORDER — LORATADINE 10 MG/1
10 TABLET ORAL DAILY
Qty: 90 TABLET | Refills: 3 | Status: CANCELLED | OUTPATIENT
Start: 2020-10-08

## 2020-10-08 RX ORDER — LEVOCETIRIZINE DIHYDROCHLORIDE 5 MG/1
5 TABLET, FILM COATED ORAL EVERY EVENING
Qty: 90 TABLET | Refills: 3 | Status: SHIPPED | OUTPATIENT
Start: 2020-10-08 | End: 2022-08-11 | Stop reason: HOSPADM

## 2020-10-08 RX ORDER — PREDNISOLONE ACETATE 10 MG/ML
SUSPENSION/ DROPS OPHTHALMIC
COMMUNITY
Start: 2020-09-29 | End: 2022-08-11 | Stop reason: HOSPADM

## 2020-10-08 RX ORDER — HYDROCHLOROTHIAZIDE 25 MG/1
25 TABLET ORAL DAILY
Qty: 90 TABLET | Refills: 3 | Status: SHIPPED | OUTPATIENT
Start: 2020-10-08 | End: 2021-12-13

## 2020-10-08 NOTE — PROGRESS NOTES
The ABCs of the Annual Wellness Visit  Subsequent Medicare Wellness Visit    Chief Complaint   Patient presents with   • Medicare Wellness-subsequent       Subjective   History of Present Illness:  Carolin Toscano is a 74 y.o. female who presents for a Subsequent Medicare Wellness Visit.    HEALTH RISK ASSESSMENT    Recent Hospitalizations:  No hospitalization(s) within the last year.    Current Medical Providers:  Patient Care Team:  Jason Nicholson MD as PCP - General (Internal Medicine)    Smoking Status:  Social History     Tobacco Use   Smoking Status Current Every Day Smoker   • Packs/day: 1.00   • Types: Cigarettes   • Start date: 1960   Smokeless Tobacco Former User   • Quit date: 9/24/2017       Alcohol Consumption:  Social History     Substance and Sexual Activity   Alcohol Use No       Depression Screen:   PHQ-2/PHQ-9 Depression Screening 10/8/2020   Little interest or pleasure in doing things 0   Feeling down, depressed, or hopeless 0   Total Score 0       Fall Risk Screen:  UMBERTO Fall Risk Assessment was completed, and patient is at MODERATE risk for falls. Assessment completed on:10/8/2020    Health Habits and Functional and Cognitive Screening:  Functional & Cognitive Status 10/8/2020   Do you have difficulty preparing food and eating? No   Do you have difficulty bathing yourself, getting dressed or grooming yourself? No   Do you have difficulty using the toilet? No   Do you have difficulty moving around from place to place? No   Do you have trouble with steps or getting out of a bed or a chair? No   Current Diet Well Balanced Diet   Dental Exam Up to date   Eye Exam Up to date        Eye Exam Comment -   Exercise (times per week) 0 times per week   Current Exercise Activities Include No Regular Exercise   Do you need help using the phone?  No   Are you deaf or do you have serious difficulty hearing?  No   Do you need help with transportation? No   Do you need help shopping? No   Do you need help  preparing meals?  No   Do you need help with housework?  No   Do you need help with laundry? No   Do you need help taking your medications? No   Do you need help managing money? No   Do you ever drive or ride in a car without wearing a seat belt? No   Have you felt unusual stress, anger or loneliness in the last month? Yes   Who do you live with? Alone   If you need help, do you have trouble finding someone available to you? No   Have you been bothered in the last four weeks by sexual problems? No   Do you have difficulty concentrating, remembering or making decisions? No         Does the patient have evidence of cognitive impairment? No    Asprin use counseling:Does not need ASA (and currently is not on it)    Age-appropriate Screening Schedule:  Refer to the list below for future screening recommendations based on patient's age, sex and/or medical conditions. Orders for these recommended tests are listed in the plan section. The patient has been provided with a written plan.    Health Maintenance   Topic Date Due   • TDAP/TD VACCINES (1 - Tdap) 10/08/2020 (Originally 11/11/1964)   • INFLUENZA VACCINE  10/08/2021 (Originally 8/1/2020)   • ZOSTER VACCINE (1 of 2) 08/26/2029 (Originally 11/11/1996)   • MAMMOGRAM  Discontinued   • COLONOSCOPY  Discontinued          The following portions of the patient's history were reviewed and updated as appropriate: allergies, current medications, past family history, past medical history, past social history, past surgical history and problem list.    Outpatient Medications Prior to Visit   Medication Sig Dispense Refill   • multivitamins-minerals (PRESERVISION AREDS 2) capsule capsule Take 2 capsules by mouth Daily.     • prednisoLONE acetate (PRED FORTE) 1 % ophthalmic suspension INSTILL 1 DROP INTO RIGHT EYE 4 TIMES DAILY     • VENTOLIN  (90 Base) MCG/ACT inhaler INHALE 2 PUFFS BY MOUTH EVERY 4 HOURS AS NEEDED FOR WHEEZING 18 g 0   • atenolol (TENORMIN) 25 MG tablet  Take 1 tablet by mouth Daily. 90 tablet 0   • levocetirizine (XYZAL) 5 MG tablet Take 1 tablet by mouth Every Evening. 90 tablet 3   • loratadine (Claritin) 10 MG tablet Take 1 tablet by mouth Daily. 90 tablet 3   • amLODIPine (NORVASC) 10 MG tablet Take 1 tablet by mouth Daily. 90 tablet 0   • hydroCHLOROthiazide (HYDRODIURIL) 25 MG tablet Take 1 tablet by mouth once daily 90 tablet 0   • hydroCHLOROthiazide (HYDRODIURIL) 25 MG tablet Take 1 tablet by mouth Daily. 90 tablet 1     No facility-administered medications prior to visit.        Patient Active Problem List   Diagnosis   • Sepsis   • Cigarette nicotine dependence with nicotine-induced disorder   • BP (high blood pressure)   • Dyslipidemia   • Blood glucose elevated   • Muscle ache   • COPD (chronic obstructive pulmonary disease) (CMS/[facility])       Advanced Care Planning:  ACP discussion was held with the patient during this visit. Patient does not have an advance directive, information provided.    Review of Systems   Constitutional: Negative.  Negative for activity change, appetite change, fatigue and fever.   HENT: Negative for congestion, ear discharge, ear pain and trouble swallowing.    Eyes: Positive for visual disturbance. Negative for photophobia.   Respiratory: Negative for cough and shortness of breath.    Cardiovascular: Negative for chest pain and palpitations.   Gastrointestinal: Negative for abdominal distention, abdominal pain, constipation, diarrhea, nausea and vomiting.   Endocrine: Negative.    Genitourinary: Negative for dysuria, hematuria and urgency.   Musculoskeletal: Positive for arthralgias and back pain. Negative for joint swelling and myalgias.   Skin: Negative for color change and rash.   Allergic/Immunologic: Negative.    Neurological: Negative for dizziness, weakness, light-headedness and headaches.   Hematological: Negative for adenopathy. Does not bruise/bleed easily.   Psychiatric/Behavioral: Negative for agitation, confusion  "and dysphoric mood. The patient is not nervous/anxious.        Compared to one year ago, the patient feels her physical health is the same.  Compared to one year ago, the patient feels her mental health is the same.    Reviewed chart for potential of high risk medication in the elderly: yes  Reviewed chart for potential of harmful drug interactions in the elderly:yes    Objective         Vitals:    10/08/20 1618   BP: 140/80   Pulse: 71   Temp: 98.2 °F (36.8 °C)   TempSrc: Temporal   SpO2: 95%   Weight: 74.6 kg (164 lb 6.4 oz)   Height: 160 cm (63\")   PainSc: 0-No pain       Body mass index is 29.12 kg/m².  Discussed the patient's BMI with her. The BMI is above average; BMI management plan is completed.    Physical Exam  Constitutional:       General: She is not in acute distress.     Appearance: She is well-developed.   HENT:      Nose: Nose normal.   Eyes:      General: No scleral icterus.     Conjunctiva/sclera: Conjunctivae normal.   Neck:      Thyroid: No thyromegaly.      Trachea: No tracheal deviation.   Cardiovascular:      Rate and Rhythm: Normal rate and regular rhythm.      Heart sounds: No murmur. No friction rub.   Pulmonary:      Effort: No respiratory distress.      Breath sounds: No wheezing or rales.   Abdominal:      General: There is no distension.      Palpations: Abdomen is soft. There is no mass.      Tenderness: There is no abdominal tenderness. There is no guarding.   Musculoskeletal: Normal range of motion.         General: Deformity present.   Lymphadenopathy:      Cervical: No cervical adenopathy.   Skin:     General: Skin is warm and dry.      Findings: No erythema or rash.   Neurological:      Mental Status: She is alert and oriented to person, place, and time.      Cranial Nerves: No cranial nerve deficit.      Coordination: Coordination normal.      Deep Tendon Reflexes: Reflexes are normal and symmetric.   Psychiatric:         Behavior: Behavior normal.         Thought Content: " Thought content normal.         Judgment: Judgment normal.               Assessment/Plan   Medicare Risks and Personalized Health Plan  CMS Preventative Services Quick Reference  Advance Directive Discussion  Obesity/Overweight   Tobacco Use/Dependance (use dotphrase .tobaccocessation for documentation)  Urinary Incontinence    The above risks/problems have been discussed with the patient.  Pertinent information has been shared with the patient in the After Visit Summary.  Follow up plans and orders are seen below in the Assessment/Plan Section.    Diagnoses and all orders for this visit:    1. Simple chronic bronchitis (CMS/[facility]) (Primary) counseled about smoking cessation no recent exacerbation    2. Acute seasonal allergic rhinitis  -     levocetirizine (XYZAL) 5 MG tablet; Take 1 tablet by mouth Every Evening.  Dispense: 90 tablet; Refill: 3    3. Essential hypertension stable with current meds and low-salt diet  -     atenolol (TENORMIN) 25 MG tablet; Take 1 tablet by mouth Daily.  Dispense: 90 tablet; Refill: 3  -     amLODIPine (NORVASC) 10 MG tablet; Take 1 tablet by mouth Daily.  Dispense: 90 tablet; Refill: 3    4. Dyslipidemia continue with the dietary restrictions check lipid profile    Other orders  -     Cancel: loratadine (Claritin) 10 MG tablet; Take 1 tablet by mouth Daily.  Dispense: 90 tablet; Refill: 3  -     hydroCHLOROthiazide (HYDRODIURIL) 25 MG tablet; Take 1 tablet by mouth Daily.  Dispense: 90 tablet; Refill: 3      Follow Up:  No follow-ups on file.     An After Visit Summary and PPPS were given to the patient.

## 2020-10-09 LAB
ALBUMIN SERPL-MCNC: 4.4 G/DL (ref 3.5–5.2)
ALBUMIN/GLOB SERPL: 2.3 G/DL
ALP SERPL-CCNC: 110 U/L (ref 39–117)
ALT SERPL-CCNC: 20 U/L (ref 1–33)
AST SERPL-CCNC: 17 U/L (ref 1–32)
BILIRUB SERPL-MCNC: 0.2 MG/DL (ref 0–1.2)
BUN SERPL-MCNC: 19 MG/DL (ref 8–23)
BUN/CREAT SERPL: 32.2 (ref 7–25)
CALCIUM SERPL-MCNC: 8.9 MG/DL (ref 8.6–10.5)
CHLORIDE SERPL-SCNC: 100 MMOL/L (ref 98–107)
CO2 SERPL-SCNC: 33.4 MMOL/L (ref 22–29)
CREAT SERPL-MCNC: 0.59 MG/DL (ref 0.57–1)
GLOBULIN SER CALC-MCNC: 1.9 GM/DL
GLUCOSE SERPL-MCNC: 103 MG/DL (ref 65–99)
HBA1C MFR BLD: 5.9 % (ref 4.8–5.6)
HCV AB S/CO SERPL IA: <0.1 S/CO RATIO (ref 0–0.9)
LDLC SERPL DIRECT ASSAY-MCNC: 70 MG/DL (ref 0–99)
POTASSIUM SERPL-SCNC: 3.5 MMOL/L (ref 3.5–5.2)
PROT SERPL-MCNC: 6.3 G/DL (ref 6–8.5)
SODIUM SERPL-SCNC: 142 MMOL/L (ref 136–145)

## 2020-10-15 RX ORDER — ALBUTEROL SULFATE 90 UG/1
AEROSOL, METERED RESPIRATORY (INHALATION)
Qty: 18 G | Refills: 5 | Status: SHIPPED | OUTPATIENT
Start: 2020-10-15 | End: 2021-10-26

## 2020-12-31 ENCOUNTER — PREP FOR SURGERY (OUTPATIENT)
Dept: OTHER | Facility: HOSPITAL | Age: 75
End: 2020-12-31

## 2020-12-31 DIAGNOSIS — Z11.59 SPECIAL SCREENING EXAMINATION FOR UNSPECIFIED VIRAL DISEASE: Primary | ICD-10-CM

## 2020-12-31 DIAGNOSIS — H25.12 NUCLEAR SCLEROTIC CATARACT OF LEFT EYE: Primary | ICD-10-CM

## 2020-12-31 RX ORDER — SODIUM CHLORIDE 0.9 % (FLUSH) 0.9 %
1-10 SYRINGE (ML) INJECTION AS NEEDED
Status: CANCELLED | OUTPATIENT
Start: 2020-12-31

## 2020-12-31 RX ORDER — SODIUM CHLORIDE 0.9 % (FLUSH) 0.9 %
3 SYRINGE (ML) INJECTION EVERY 12 HOURS SCHEDULED
Status: CANCELLED | OUTPATIENT
Start: 2020-12-31

## 2020-12-31 RX ORDER — PREDNISOLONE ACETATE 10 MG/ML
1 SUSPENSION/ DROPS OPHTHALMIC SEE ADMIN INSTRUCTIONS
Status: CANCELLED | OUTPATIENT
Start: 2020-12-31

## 2020-12-31 RX ORDER — CYCLOPENT/TROPIC/PHEN/KETR/WAT 1%-1%-2.5%
1 DROPS (EA) OPHTHALMIC (EYE)
Status: CANCELLED | OUTPATIENT
Start: 2020-12-31 | End: 2020-12-31

## 2020-12-31 RX ORDER — TETRACAINE HYDROCHLORIDE 5 MG/ML
1 SOLUTION OPHTHALMIC SEE ADMIN INSTRUCTIONS
Status: CANCELLED | OUTPATIENT
Start: 2020-12-31

## 2021-01-11 ENCOUNTER — APPOINTMENT (OUTPATIENT)
Dept: LAB | Facility: HOSPITAL | Age: 76
End: 2021-01-11

## 2021-07-13 NOTE — TELEPHONE ENCOUNTER
Caller: Nehal Reed    Relationship: Emergency Contact    Best call back number: [phone]    What medication are you requesting: LORATADINE 10MG    What are your current symptoms: ALLERGIES    Have you had these symptoms before:    [x] Yes  [] No    Have you been treated for these symptoms before:   [x] Yes  [] No    If a prescription is needed, what is your preferred pharmacy and phone number: [facility] [address] [place] [address] [phone] [facility] [phone]      Additional notes:PATIENT IS UNABLE TO TAKE XYZAL BECAUSE SHE VOMITS REALLY BAD SO SHE WOULD LIKE TO GO BACK ON TO THE LORATADINE.

## 2021-07-15 NOTE — TELEPHONE ENCOUNTER
Pt is not tolerating Xyzal and wants to go back on Loratadine    Last visit:10/08/2020  Next appointment:10/11/2021

## 2021-07-22 NOTE — TELEPHONE ENCOUNTER
Patient informed of denied medication.     She is going to check with the pharmacy and purchase over the counter

## 2021-07-23 ENCOUNTER — PREP FOR SURGERY (OUTPATIENT)
Dept: OTHER | Facility: HOSPITAL | Age: 76
End: 2021-07-23

## 2021-07-23 DIAGNOSIS — H25.11 NUCLEAR SCLEROTIC CATARACT OF RIGHT EYE: Primary | ICD-10-CM

## 2021-07-23 RX ORDER — CYCLOPENT/TROPIC/PHEN/KETR/WAT 1%-1%-2.5%
1 DROPS (EA) OPHTHALMIC (EYE)
Status: CANCELLED | OUTPATIENT
Start: 2021-07-23 | End: 2021-07-23

## 2021-07-23 RX ORDER — TETRACAINE HYDROCHLORIDE 5 MG/ML
1 SOLUTION OPHTHALMIC SEE ADMIN INSTRUCTIONS
Status: CANCELLED | OUTPATIENT
Start: 2021-07-23

## 2021-07-23 RX ORDER — SODIUM CHLORIDE 0.9 % (FLUSH) 0.9 %
10 SYRINGE (ML) INJECTION EVERY 12 HOURS SCHEDULED
Status: CANCELLED | OUTPATIENT
Start: 2021-07-23

## 2021-07-23 RX ORDER — SODIUM CHLORIDE 0.9 % (FLUSH) 0.9 %
1-10 SYRINGE (ML) INJECTION AS NEEDED
Status: CANCELLED | OUTPATIENT
Start: 2021-07-23

## 2021-07-23 RX ORDER — PREDNISOLONE ACETATE 10 MG/ML
1 SUSPENSION/ DROPS OPHTHALMIC SEE ADMIN INSTRUCTIONS
Status: CANCELLED | OUTPATIENT
Start: 2021-07-23

## 2021-08-10 ENCOUNTER — TELEPHONE (OUTPATIENT)
Dept: INTERNAL MEDICINE | Facility: CLINIC | Age: 76
End: 2021-08-10

## 2021-08-10 DIAGNOSIS — J06.9 URI, ACUTE: Primary | ICD-10-CM

## 2021-08-10 NOTE — TELEPHONE ENCOUNTER
I have talked with the daughter. Covid testing tomorrow. In view of the diarrhea stopped HCTZ. Check BP at home if systolic less than 100 stop other 2 antihypertensives.

## 2021-08-11 ENCOUNTER — LAB (OUTPATIENT)
Dept: LAB | Facility: HOSPITAL | Age: 76
End: 2021-08-11

## 2021-08-11 DIAGNOSIS — J06.9 URI, ACUTE: ICD-10-CM

## 2021-08-11 PROCEDURE — C9803 HOPD COVID-19 SPEC COLLECT: HCPCS

## 2021-08-11 PROCEDURE — U0004 COV-19 TEST NON-CDC HGH THRU: HCPCS

## 2021-08-12 ENCOUNTER — TELEPHONE (OUTPATIENT)
Dept: INTERNAL MEDICINE | Facility: CLINIC | Age: 76
End: 2021-08-12

## 2021-08-12 LAB — SARS-COV-2 RNA NOSE QL NAA+PROBE: DETECTED

## 2021-08-12 NOTE — TELEPHONE ENCOUNTER
PATIENT STATES STILL HAVING DIARRHEA WITH SOME BLOOD IN STOOL.  NOT BRIGHT RED BLOOD BUT NOT BLACK EITHER.  WANTING TO KNOW COVID RESULTS AND IF BLOOD IS NORMAL.     PLEASE CALL [phone]

## 2021-08-13 ENCOUNTER — TELEPHONE (OUTPATIENT)
Dept: INTERNAL MEDICINE | Facility: CLINIC | Age: 76
End: 2021-08-13

## 2021-08-13 NOTE — TELEPHONE ENCOUNTER
Caller: Nehal Reed    Relationship: Emergency Contact    Best call back number: [phone]    What test was performed: COVID TEST    When was the test performed: 08-12-21    Where was the test performed:  [facility]    Additional notes: PATIENT WANTED TO KNOW IF WE HAVE THESE RESULTS FROM THE HOSPITAL YET  PLEASE ADVISE

## 2021-09-12 DIAGNOSIS — I10 ESSENTIAL HYPERTENSION: ICD-10-CM

## 2021-09-13 RX ORDER — BENZOYL PEROXIDE
KIT TOPICAL
Qty: 90 TABLET | Refills: 0 | OUTPATIENT
Start: 2021-09-13

## 2021-09-13 RX ORDER — ATENOLOL 25 MG/1
TABLET ORAL
Qty: 90 TABLET | Refills: 0 | Status: SHIPPED | OUTPATIENT
Start: 2021-09-13 | End: 2022-03-17

## 2021-09-13 RX ORDER — AMLODIPINE BESYLATE 10 MG/1
TABLET ORAL
Qty: 90 TABLET | Refills: 0 | Status: SHIPPED | OUTPATIENT
Start: 2021-09-13 | End: 2021-12-13

## 2021-09-13 NOTE — TELEPHONE ENCOUNTER
Rx Refill Note  Requested Prescriptions     Pending Prescriptions Disp Refills   • atenolol (TENORMIN) 25 MG tablet [Pharmacy Med Name: Atenolol 25 MG Oral Tablet] 90 tablet 0     Sig: Take 1 tablet by mouth once daily   • amLODIPine (NORVASC) 10 MG tablet [Pharmacy Med Name: amLODIPine Besylate 10 MG Oral Tablet] 90 tablet 0     Sig: Take 1 tablet by mouth once daily   • EQ Allergy Relief 10 MG tablet [Pharmacy Med Name: EQ Allergy Relief 10 MG Oral Tablet] 90 tablet 0     Sig: Take 1 tablet by mouth once daily      Last office visit with prescribing clinician: 10/8/2020      Next office visit with prescribing clinician: 10/11/2021   {

## 2021-10-11 ENCOUNTER — OFFICE VISIT (OUTPATIENT)
Dept: INTERNAL MEDICINE | Facility: CLINIC | Age: 76
End: 2021-10-11

## 2021-10-11 VITALS
OXYGEN SATURATION: 90 % | SYSTOLIC BLOOD PRESSURE: 130 MMHG | HEART RATE: 81 BPM | BODY MASS INDEX: 30.37 KG/M2 | TEMPERATURE: 98.4 F | DIASTOLIC BLOOD PRESSURE: 70 MMHG | HEIGHT: 63 IN | WEIGHT: 171.4 LBS

## 2021-10-11 DIAGNOSIS — E78.5 DYSLIPIDEMIA: ICD-10-CM

## 2021-10-11 DIAGNOSIS — R73.9 HYPERGLYCEMIA: ICD-10-CM

## 2021-10-11 DIAGNOSIS — I10 PRIMARY HYPERTENSION: ICD-10-CM

## 2021-10-11 DIAGNOSIS — J41.0 SIMPLE CHRONIC BRONCHITIS (HCC): Primary | ICD-10-CM

## 2021-10-11 PROCEDURE — 1125F AMNT PAIN NOTED PAIN PRSNT: CPT | Performed by: INTERNAL MEDICINE

## 2021-10-11 PROCEDURE — 1170F FXNL STATUS ASSESSED: CPT | Performed by: INTERNAL MEDICINE

## 2021-10-11 PROCEDURE — 99397 PER PM REEVAL EST PAT 65+ YR: CPT | Performed by: INTERNAL MEDICINE

## 2021-10-11 PROCEDURE — 96160 PT-FOCUSED HLTH RISK ASSMT: CPT | Performed by: INTERNAL MEDICINE

## 2021-10-11 PROCEDURE — 1159F MED LIST DOCD IN RCRD: CPT | Performed by: INTERNAL MEDICINE

## 2021-10-11 PROCEDURE — G0439 PPPS, SUBSEQ VISIT: HCPCS | Performed by: INTERNAL MEDICINE

## 2021-10-11 NOTE — PROGRESS NOTES
The ABCs of the Annual Wellness Visit  Subsequent Medicare Wellness Visit    Chief Complaint   Patient presents with   • Medicare Wellness-subsequent   • Earache     right >left x 2 weeks       Subjective    History of Present Illness:  Carolin Toscano is a 75 y.o. female who presents for a Subsequent Medicare Wellness Visit.    The following portions of the patient's history were reviewed and   updated as appropriate: allergies, current medications, past family history, past medical history, past social history, past surgical history and problem list.    Compared to one year ago, the patient feels her physical   health is worse.    Compared to one year ago, the patient feels her mental   health is the same.    Recent Hospitalizations:  She was not admitted to the hospital during the last year.       Current Medical Providers:  Patient Care Team:  Jason Nicholson MD as PCP - General (Internal Medicine)    Outpatient Medications Prior to Visit   Medication Sig Dispense Refill   • amLODIPine (NORVASC) 10 MG tablet Take 1 tablet by mouth once daily 90 tablet 0   • atenolol (TENORMIN) 25 MG tablet Take 1 tablet by mouth once daily 90 tablet 0   • hydroCHLOROthiazide (HYDRODIURIL) 25 MG tablet Take 1 tablet by mouth Daily. 90 tablet 3   • levocetirizine (XYZAL) 5 MG tablet Take 1 tablet by mouth Every Evening. 90 tablet 3   • multivitamins-minerals (PRESERVISION AREDS 2) capsule capsule Take 2 capsules by mouth Daily.     • prednisoLONE acetate (PRED FORTE) 1 % ophthalmic suspension INSTILL 1 DROP INTO RIGHT EYE 4 TIMES DAILY     • Ventolin  (90 Base) MCG/ACT inhaler INHALE 2 PUFFS BY MOUTH EVERY 4 HOURS AS NEEDED FOR WHEEZING 18 g 5     No facility-administered medications prior to visit.       No opioid medication identified on active medication list. I have reviewed chart for other potential  high risk medication/s and harmful drug interactions in the elderly.          Aspirin is not on active medication list.   "Aspirin use is not indicated based on review of current medical condition/s. Risk of harm outweighs potential benefits.  .    Patient Active Problem List   Diagnosis   • Sepsis   • Cigarette nicotine dependence with nicotine-induced disorder   • BP (high blood pressure)   • Dyslipidemia   • Blood glucose elevated   • Muscle ache   • COPD (chronic obstructive pulmonary disease) (HCC)   • Nuclear sclerotic cataract of right eye     Advance Care Planning  Advance Directive is not on file.  ACP discussion was held with the patient during this visit. Patient does not have an advance directive, information provided.    Review of Systems   Constitutional: Positive for fatigue. Negative for activity change, appetite change and fever.   HENT: Negative for congestion, ear discharge, ear pain and trouble swallowing.    Eyes: Negative for photophobia and visual disturbance.   Respiratory: Positive for cough and shortness of breath.    Cardiovascular: Negative for chest pain and palpitations.   Gastrointestinal: Negative for abdominal distention, constipation, diarrhea, nausea and vomiting.   Genitourinary: Negative for dysuria, hematuria and urgency.   Musculoskeletal: Positive for arthralgias and gait problem. Negative for back pain, joint swelling and myalgias.   Skin: Negative for color change and rash.   Neurological: Negative for dizziness, weakness, light-headedness and confusion.   Hematological: Negative for adenopathy. Does not bruise/bleed easily.   Psychiatric/Behavioral: Positive for sleep disturbance. Negative for agitation and dysphoric mood. The patient is not nervous/anxious.         Objective    Vitals:    10/11/21 1608   BP: 130/70   Pulse: 81   Temp: 98.4 °F (36.9 °C)   TempSrc: Infrared   SpO2: 90%  Comment: 87% after walking to the room   Weight: 77.7 kg (171 lb 6.4 oz)   Height: 160 cm (63\")   PainSc:   7   PainLoc: Ear     BMI Readings from Last 1 Encounters:   10/11/21 30.36 kg/m²   BMI is above normal " parameters. Recommendations include: nutrition counseling    Does the patient have evidence of cognitive impairment? No    Physical Exam  Constitutional:       General: She is not in acute distress.     Appearance: She is well-developed.   HENT:      Nose: Nose normal.   Eyes:      General: No scleral icterus.     Conjunctiva/sclera: Conjunctivae normal.   Neck:      Thyroid: No thyromegaly.      Trachea: No tracheal deviation.   Cardiovascular:      Rate and Rhythm: Normal rate and regular rhythm.      Heart sounds: No murmur heard.  No friction rub.   Pulmonary:      Effort: No respiratory distress.      Breath sounds: No wheezing or rales.   Abdominal:      General: There is no distension.      Palpations: Abdomen is soft. There is no mass.      Tenderness: There is no abdominal tenderness. There is no guarding.   Musculoskeletal:         General: Deformity present. Normal range of motion.   Lymphadenopathy:      Cervical: No cervical adenopathy.   Skin:     General: Skin is warm and dry.      Findings: No erythema or rash.   Neurological:      Mental Status: She is alert and oriented to person, place, and time.      Cranial Nerves: No cranial nerve deficit.      Coordination: Coordination normal.      Deep Tendon Reflexes: Reflexes are normal and symmetric.   Psychiatric:         Behavior: Behavior normal.         Thought Content: Thought content normal.         Judgment: Judgment normal.                 HEALTH RISK ASSESSMENT    Smoking Status:  Social History     Tobacco Use   Smoking Status Current Every Day Smoker   • Packs/day: 1.00   • Types: Cigarettes   • Start date: 1960   Smokeless Tobacco Former User   • Quit date: 9/24/2017     Alcohol Consumption:  Social History     Substance and Sexual Activity   Alcohol Use No     Fall Risk Screen:    [facility] Fall Risk Assessment was completed, and patient is at LOW risk for falls.Assessment completed on:10/11/2021    Depression Screening:  PHQ-2/PHQ-9 Depression  Screening 10/11/2021   Little interest or pleasure in doing things 0   Feeling down, depressed, or hopeless 0   Total Score 0       Health Habits and Functional and Cognitive Screening:  Functional & Cognitive Status 10/11/2021   Do you have difficulty preparing food and eating? No   Do you have difficulty bathing yourself, getting dressed or grooming yourself? No   Do you have difficulty using the toilet? No   Do you have difficulty moving around from place to place? No   Do you have trouble with steps or getting out of a bed or a chair? No   Current Diet Well Balanced Diet   Dental Exam Up to date   Eye Exam Up to date        Eye Exam Comment -   Exercise (times per week) 0 times per week   Current Exercises Include No Regular Exercise   Current Exercise Activities Include -   Do you need help using the phone?  No   Are you deaf or do you have serious difficulty hearing?  No   Do you need help with transportation? No   Do you need help shopping? No   Do you need help preparing meals?  No   Do you need help with housework?  No   Do you need help with laundry? No   Do you need help taking your medications? No   Do you need help managing money? No   Do you ever drive or ride in a car without wearing a seat belt? No   Have you felt unusual stress, anger or loneliness in the last month? No   Who do you live with? Alone   If you need help, do you have trouble finding someone available to you? No   Have you been bothered in the last four weeks by sexual problems? No   Do you have difficulty concentrating, remembering or making decisions? No       Age-appropriate Screening Schedule:  Refer to the list below for future screening recommendations based on patient's age, sex and/or medical conditions. Orders for these recommended tests are listed in the plan section. The patient has been provided with a written plan.    Health Maintenance   Topic Date Due   • DXA SCAN  Never done   • TDAP/TD VACCINES (1 - Tdap) Never done   •  INFLUENZA VACCINE  03/31/2022 (Originally 8/1/2021)   • ZOSTER VACCINE (1 of 2) 08/26/2029 (Originally 11/11/1996)   • MAMMOGRAM  Discontinued              Assessment/Plan   CMS Preventative Services Quick Reference  Risk Factors Identified During Encounter  Polypharmacy  Tobacco Use/Dependance (use dotphrase .tobaccocessation for documentation)  Urinary Incontinence  The above risks/problems have been discussed with the patient.  Follow up actions/plans if indicated are seen below in the Assessment/Plan Section.  Pertinent information has been shared with the patient in the After Visit Summary.    Diagnoses and all orders for this visit:    1. Simple chronic bronchitis (HCC) (Primary) continues to smoke counseled about smoking cessation  Carolin Toscano  reports that she has been smoking cigarettes. She started smoking about 61 years ago. She has been smoking about 1.00 pack per day. She quit smokeless tobacco use about 4 years ago.. I have educated her on the risk of diseases from using tobacco products such as cancer, COPD and heart disease.     I advised her to quit and she is not willing to quit.    I spent 3  minutes counseling the patient.         2. Primary hypertension stable with current meds and low-salt diet    3. Dyslipidemia continue with the dietary restrictions  Discussed need to get a Covid vaccine.  She had Covid infection about 2 months ago has had slow improvement.      Follow Up:   No follow-ups on file.     An After Visit Summary and PPPS were made available to the patient.

## 2021-10-12 LAB
ALBUMIN SERPL-MCNC: 4.3 G/DL (ref 3.5–5.2)
ALBUMIN/GLOB SERPL: 1.7 G/DL
ALP SERPL-CCNC: 107 U/L (ref 39–117)
ALT SERPL-CCNC: 23 U/L (ref 1–33)
AST SERPL-CCNC: 23 U/L (ref 1–32)
BILIRUB SERPL-MCNC: 0.2 MG/DL (ref 0–1.2)
BUN SERPL-MCNC: 13 MG/DL (ref 8–23)
BUN/CREAT SERPL: 24.1 (ref 7–25)
CALCIUM SERPL-MCNC: 9.7 MG/DL (ref 8.6–10.5)
CHLORIDE SERPL-SCNC: 99 MMOL/L (ref 98–107)
CO2 SERPL-SCNC: 28.5 MMOL/L (ref 22–29)
CREAT SERPL-MCNC: 0.54 MG/DL (ref 0.57–1)
ERYTHROCYTE [DISTWIDTH] IN BLOOD BY AUTOMATED COUNT: 13.7 % (ref 12.3–15.4)
GLOBULIN SER CALC-MCNC: 2.5 GM/DL
GLUCOSE SERPL-MCNC: 121 MG/DL (ref 65–99)
HBA1C MFR BLD: 6.2 % (ref 4.8–5.6)
HCT VFR BLD AUTO: 49.3 % (ref 34–46.6)
HGB BLD-MCNC: 16.5 G/DL (ref 12–15.9)
LDLC SERPL DIRECT ASSAY-MCNC: 64 MG/DL (ref 0–99)
MCH RBC QN AUTO: 30.4 PG (ref 26.6–33)
MCHC RBC AUTO-ENTMCNC: 33.5 G/DL (ref 31.5–35.7)
MCV RBC AUTO: 90.8 FL (ref 79–97)
PLATELET # BLD AUTO: 224 10*3/MM3 (ref 140–450)
POTASSIUM SERPL-SCNC: 3.8 MMOL/L (ref 3.5–5.2)
PROT SERPL-MCNC: 6.8 G/DL (ref 6–8.5)
RBC # BLD AUTO: 5.43 10*6/MM3 (ref 3.77–5.28)
SODIUM SERPL-SCNC: 143 MMOL/L (ref 136–145)
WBC # BLD AUTO: 8.9 10*3/MM3 (ref 3.4–10.8)

## 2021-10-26 RX ORDER — ALBUTEROL SULFATE 90 UG/1
AEROSOL, METERED RESPIRATORY (INHALATION)
Qty: 18 G | Refills: 0 | Status: SHIPPED | OUTPATIENT
Start: 2021-10-26 | End: 2021-12-13

## 2021-12-12 DIAGNOSIS — I10 ESSENTIAL HYPERTENSION: ICD-10-CM

## 2021-12-13 RX ORDER — AMLODIPINE BESYLATE 10 MG/1
TABLET ORAL
Qty: 90 TABLET | Refills: 0 | Status: SHIPPED | OUTPATIENT
Start: 2021-12-13 | End: 2022-03-17

## 2021-12-13 RX ORDER — ALBUTEROL SULFATE 90 UG/1
AEROSOL, METERED RESPIRATORY (INHALATION)
Qty: 18 G | Refills: 0 | Status: SHIPPED | OUTPATIENT
Start: 2021-12-13 | End: 2022-02-25

## 2021-12-13 RX ORDER — HYDROCHLOROTHIAZIDE 25 MG/1
TABLET ORAL
Qty: 90 TABLET | Refills: 0 | Status: SHIPPED | OUTPATIENT
Start: 2021-12-13 | End: 2022-06-08

## 2022-01-11 ENCOUNTER — PREP FOR SURGERY (OUTPATIENT)
Dept: OTHER | Facility: HOSPITAL | Age: 77
End: 2022-01-11

## 2022-01-11 DIAGNOSIS — H25.11 NUCLEAR SCLEROTIC CATARACT OF RIGHT EYE: Primary | ICD-10-CM

## 2022-01-11 RX ORDER — SODIUM CHLORIDE 0.9 % (FLUSH) 0.9 %
10 SYRINGE (ML) INJECTION EVERY 12 HOURS SCHEDULED
Status: CANCELLED | OUTPATIENT
Start: 2022-01-11

## 2022-01-11 RX ORDER — TETRACAINE HYDROCHLORIDE 5 MG/ML
1 SOLUTION OPHTHALMIC SEE ADMIN INSTRUCTIONS
Status: CANCELLED | OUTPATIENT
Start: 2022-01-11

## 2022-01-11 RX ORDER — CYCLOPENT/TROPIC/PHEN/KETR/WAT 1%-1%-2.5%
1 DROPS (EA) OPHTHALMIC (EYE)
Status: CANCELLED | OUTPATIENT
Start: 2022-01-11 | End: 2022-01-11

## 2022-01-11 RX ORDER — SODIUM CHLORIDE 0.9 % (FLUSH) 0.9 %
1-10 SYRINGE (ML) INJECTION AS NEEDED
Status: CANCELLED | OUTPATIENT
Start: 2022-01-11

## 2022-01-11 RX ORDER — PREDNISOLONE ACETATE 10 MG/ML
1 SUSPENSION/ DROPS OPHTHALMIC SEE ADMIN INSTRUCTIONS
Status: CANCELLED | OUTPATIENT
Start: 2022-01-11

## 2022-02-25 RX ORDER — ALBUTEROL SULFATE 90 UG/1
AEROSOL, METERED RESPIRATORY (INHALATION)
Qty: 18 G | Refills: 3 | Status: SHIPPED | OUTPATIENT
Start: 2022-02-25 | End: 2022-07-05

## 2022-02-25 NOTE — TELEPHONE ENCOUNTER
Rx Refill Note  Requested Prescriptions     Pending Prescriptions Disp Refills   • Ventolin  (90 Base) MCG/ACT inhaler [Pharmacy Med Name: Ventolin  (90 Base) MCG/ACT Inhalation Aerosol Solution] 18 g 0     Sig: INHALE 2 PUFFS BY MOUTH EVERY 4 HOURS AS NEEDED FOR WHEEZING      Last office visit with prescribing clinician: 10/11/2021      Next office visit with prescribing clinician: 10/13/2022            Adri Clemente LPN  02/25/22, 14:56 EST

## 2022-03-07 ENCOUNTER — TELEPHONE (OUTPATIENT)
Dept: INTERNAL MEDICINE | Facility: CLINIC | Age: 77
End: 2022-03-07

## 2022-03-07 RX ORDER — POLYMYXIN B SULFATE AND TRIMETHOPRIM 1; 10000 MG/ML; [USP'U]/ML
1 SOLUTION OPHTHALMIC EVERY 6 HOURS
Qty: 10 ML | Refills: 2 | Status: SHIPPED | OUTPATIENT
Start: 2022-03-07 | End: 2022-08-11 | Stop reason: HOSPADM

## 2022-03-07 NOTE — TELEPHONE ENCOUNTER
Caller: DorcasCarolin    Relationship: Self    Best call back number: [phone]    What medication are you requesting: EYE DROPS    What are your current symptoms: RUNNY INFECTED RIGHT EYE, CAUSING EAR PAIN     How long have you been experiencing symptoms: 2 DAYS    Have you had these symptoms before:    [] Yes  [] No    Have you been treated for these symptoms before:   [] Yes  [] No    If a prescription is needed, what is your preferred pharmacy and phone number: [address] [phone] [facility] [phone]      Additional notes: PATIENT IS REQUESTING MEDICATION TO HELP TREAT HER SYMPTOMS. SHE IS ALSO REQUESTING A CALL BACK FROM THE OFFICE

## 2022-03-17 DIAGNOSIS — I10 ESSENTIAL HYPERTENSION: ICD-10-CM

## 2022-03-17 RX ORDER — AMLODIPINE BESYLATE 10 MG/1
TABLET ORAL
Qty: 90 TABLET | Refills: 0 | Status: SHIPPED | OUTPATIENT
Start: 2022-03-17 | End: 2022-07-05

## 2022-03-17 RX ORDER — ATENOLOL 25 MG/1
TABLET ORAL
Qty: 90 TABLET | Refills: 0 | Status: SHIPPED | OUTPATIENT
Start: 2022-03-17 | End: 2022-06-13

## 2022-06-01 ENCOUNTER — TELEPHONE (OUTPATIENT)
Dept: INTERNAL MEDICINE | Facility: CLINIC | Age: 77
End: 2022-06-01

## 2022-06-01 NOTE — TELEPHONE ENCOUNTER
LOAN WITH [facility] WOULD LIKE TO LET THE DOCTOR KNOW THAT THEY ARE RECOMMENDING THAT THE PATIENT HAVE A CORTID ULTRASOUND DONE DUE TO REDNESS IN THE PATIENTS EYE AND OCCULAR PAIN       CALL [phone]

## 2022-06-02 NOTE — TELEPHONE ENCOUNTER
Discussed with Yi.  I have not seen the patient since October of last year.  She was not referred by me to the retina specialist.  Requested their office to order that test.  We are happy to follow-up for management of significant abnormality found on the test if needed.

## 2022-06-08 RX ORDER — HYDROCHLOROTHIAZIDE 25 MG/1
TABLET ORAL
Qty: 90 TABLET | Refills: 0 | Status: SHIPPED | OUTPATIENT
Start: 2022-06-08 | End: 2022-09-15

## 2022-06-10 ENCOUNTER — TRANSCRIBE ORDERS (OUTPATIENT)
Dept: ADMINISTRATIVE | Facility: HOSPITAL | Age: 77
End: 2022-06-10

## 2022-06-10 DIAGNOSIS — H57.11 PAIN IN RIGHT EYE: Primary | ICD-10-CM

## 2022-06-12 DIAGNOSIS — I10 ESSENTIAL HYPERTENSION: ICD-10-CM

## 2022-06-13 RX ORDER — ATENOLOL 25 MG/1
TABLET ORAL
Qty: 90 TABLET | Refills: 3 | Status: SHIPPED | OUTPATIENT
Start: 2022-06-13 | End: 2022-10-20 | Stop reason: SDUPTHER

## 2022-06-13 NOTE — TELEPHONE ENCOUNTER
Rx Refill Note  Requested Prescriptions     Pending Prescriptions Disp Refills   • atenolol (TENORMIN) 25 MG tablet [Pharmacy Med Name: Atenolol 25 MG Oral Tablet] 90 tablet 0     Sig: Take 1 tablet by mouth once daily      Last office visit with prescribing clinician: 10/11/2021      Next office visit with prescribing clinician: 10/13/2022            Adri Clemente LPN  06/13/22, 11:17 EDT

## 2022-07-01 ENCOUNTER — HOSPITAL ENCOUNTER (OUTPATIENT)
Dept: CARDIOLOGY | Facility: HOSPITAL | Age: 77
Discharge: HOME | End: 2022-07-01
Admitting: OPHTHALMOLOGY

## 2022-07-01 VITALS
HEIGHT: 63 IN | BODY MASS INDEX: 28.52 KG/M2 | WEIGHT: 160.94 LBS | SYSTOLIC BLOOD PRESSURE: 155 MMHG | DIASTOLIC BLOOD PRESSURE: 65 MMHG

## 2022-07-01 DIAGNOSIS — H57.11 PAIN IN RIGHT EYE: ICD-10-CM

## 2022-07-01 LAB
BH CV XLRA MEAS LEFT DIST CCA EDV: -12.1 CM/SEC
BH CV XLRA MEAS LEFT DIST CCA PSV: -67.3 CM/SEC
BH CV XLRA MEAS LEFT DIST ICA EDV: -17.6 CM/SEC
BH CV XLRA MEAS LEFT DIST ICA PSV: -94.3 CM/SEC
BH CV XLRA MEAS LEFT ICA/CCA RATIO: 1.2
BH CV XLRA MEAS LEFT MID CCA EDV: 18.2 CM/SEC
BH CV XLRA MEAS LEFT MID CCA PSV: 81.7 CM/SEC
BH CV XLRA MEAS LEFT MID ICA EDV: -21 CM/SEC
BH CV XLRA MEAS LEFT MID ICA PSV: -78.9 CM/SEC
BH CV XLRA MEAS LEFT PROX CCA EDV: 16.8 CM/SEC
BH CV XLRA MEAS LEFT PROX CCA PSV: 85.2 CM/SEC
BH CV XLRA MEAS LEFT PROX ECA EDV: -14.1 CM/SEC
BH CV XLRA MEAS LEFT PROX ECA PSV: -167 CM/SEC
BH CV XLRA MEAS LEFT PROX ICA EDV: -17.6 CM/SEC
BH CV XLRA MEAS LEFT PROX ICA PSV: -73.9 CM/SEC
BH CV XLRA MEAS LEFT PROX SCLA EDV: 0 CM/SEC
BH CV XLRA MEAS LEFT PROX SCLA PSV: -110 CM/SEC
BH CV XLRA MEAS LEFT VERTEBRAL A EDV: 0 CM/SEC
BH CV XLRA MEAS LEFT VERTEBRAL A PSV: -33.5 CM/SEC
BH CV XLRA MEAS RIGHT DIST CCA EDV: -16.6 CM/SEC
BH CV XLRA MEAS RIGHT DIST CCA PSV: -72 CM/SEC
BH CV XLRA MEAS RIGHT DIST ICA EDV: -14.6 CM/SEC
BH CV XLRA MEAS RIGHT DIST ICA PSV: -48.9 CM/SEC
BH CV XLRA MEAS RIGHT ICA/CCA RATIO: 1.2
BH CV XLRA MEAS RIGHT MID CCA EDV: 14.8 CM/SEC
BH CV XLRA MEAS RIGHT MID CCA PSV: 87.7 CM/SEC
BH CV XLRA MEAS RIGHT MID ICA EDV: -21.4 CM/SEC
BH CV XLRA MEAS RIGHT MID ICA PSV: -86.4 CM/SEC
BH CV XLRA MEAS RIGHT PROX CCA EDV: 14.8 CM/SEC
BH CV XLRA MEAS RIGHT PROX CCA PSV: 79 CM/SEC
BH CV XLRA MEAS RIGHT PROX ECA EDV: -14.9 CM/SEC
BH CV XLRA MEAS RIGHT PROX ECA PSV: -126 CM/SEC
BH CV XLRA MEAS RIGHT PROX ICA EDV: -20.1 CM/SEC
BH CV XLRA MEAS RIGHT PROX ICA PSV: -82.1 CM/SEC
BH CV XLRA MEAS RIGHT PROX SCLA EDV: 0 CM/SEC
BH CV XLRA MEAS RIGHT PROX SCLA PSV: 197 CM/SEC
BH CV XLRA MEAS RIGHT VERTEBRAL A EDV: -8.3 CM/SEC
BH CV XLRA MEAS RIGHT VERTEBRAL A PSV: -32.7 CM/SEC
MAXIMAL PREDICTED HEART RATE: 145 BPM
STRESS TARGET HR: 123 BPM

## 2022-07-01 PROCEDURE — 93880 EXTRACRANIAL BILAT STUDY: CPT | Performed by: INTERNAL MEDICINE

## 2022-07-01 PROCEDURE — 93880 EXTRACRANIAL BILAT STUDY: CPT

## 2022-07-05 DIAGNOSIS — I10 ESSENTIAL HYPERTENSION: ICD-10-CM

## 2022-07-05 RX ORDER — ALBUTEROL SULFATE 90 UG/1
AEROSOL, METERED RESPIRATORY (INHALATION)
Qty: 18 G | Refills: 12 | Status: SHIPPED | OUTPATIENT
Start: 2022-07-05 | End: 2022-10-20 | Stop reason: SDUPTHER

## 2022-07-05 RX ORDER — AMLODIPINE BESYLATE 10 MG/1
10 TABLET ORAL DAILY
Qty: 90 TABLET | Refills: 0 | OUTPATIENT
Start: 2022-07-05

## 2022-07-05 RX ORDER — AMLODIPINE BESYLATE 10 MG/1
TABLET ORAL
Qty: 90 TABLET | Refills: 3 | Status: SHIPPED | OUTPATIENT
Start: 2022-07-05 | End: 2022-10-20 | Stop reason: SDUPTHER

## 2022-07-05 RX ORDER — ALBUTEROL SULFATE 90 UG/1
2 AEROSOL, METERED RESPIRATORY (INHALATION) EVERY 4 HOURS PRN
Qty: 18 G | Refills: 3 | OUTPATIENT
Start: 2022-07-05

## 2022-07-05 NOTE — TELEPHONE ENCOUNTER
Rx Refill Note  Requested Prescriptions     Pending Prescriptions Disp Refills   • Ventolin  (90 Base) MCG/ACT inhaler [Pharmacy Med Name: Ventolin  (90 Base) MCG/ACT Inhalation Aerosol Solution] 18 g 12     Sig: INHALE 2 PUFFS BY MOUTH EVERY 4 HOURS AS NEEDED FOR WHEEZING   • amLODIPine (NORVASC) 10 MG tablet [Pharmacy Med Name: amLODIPine Besylate 10 MG Oral Tablet] 90 tablet 3     Sig: Take 1 tablet by mouth once daily      Last office visit with prescribing clinician: 10/11/2021      Next office visit with prescribing clinician: 7/5/2022            Marga Castañeda LPN  07/05/22, 16:54 EDT

## 2022-07-27 ENCOUNTER — PREP FOR SURGERY (OUTPATIENT)
Dept: OTHER | Facility: HOSPITAL | Age: 77
End: 2022-07-27

## 2022-07-27 DIAGNOSIS — H25.12 NUCLEAR SCLEROTIC CATARACT OF LEFT EYE: Primary | ICD-10-CM

## 2022-07-27 RX ORDER — DIFLUPREDNATE OPHTHALMIC 0.5 MG/ML
1 EMULSION OPHTHALMIC SEE ADMIN INSTRUCTIONS
Status: CANCELLED | OUTPATIENT
Start: 2022-07-27

## 2022-07-27 RX ORDER — TETRACAINE HYDROCHLORIDE 5 MG/ML
1 SOLUTION OPHTHALMIC SEE ADMIN INSTRUCTIONS
Status: CANCELLED | OUTPATIENT
Start: 2022-07-27

## 2022-07-27 RX ORDER — CYCLOPENTOLATE HYDROCHLORIDE 20 MG/ML
1 SOLUTION/ DROPS OPHTHALMIC
Status: CANCELLED | OUTPATIENT
Start: 2022-07-27 | End: 2022-07-27

## 2022-07-27 RX ORDER — SODIUM CHLORIDE 0.9 % (FLUSH) 0.9 %
1-10 SYRINGE (ML) INJECTION AS NEEDED
Status: CANCELLED | OUTPATIENT
Start: 2022-07-27

## 2022-07-27 RX ORDER — SODIUM CHLORIDE 0.9 % (FLUSH) 0.9 %
10 SYRINGE (ML) INJECTION EVERY 12 HOURS SCHEDULED
Status: CANCELLED | OUTPATIENT
Start: 2022-07-27

## 2022-07-27 RX ORDER — PHENYLEPHRINE HCL 2.5 %
1 DROPS OPHTHALMIC (EYE)
Status: CANCELLED | OUTPATIENT
Start: 2022-07-27 | End: 2022-07-27

## 2022-08-09 NOTE — PRE-PROCEDURE INSTRUCTIONS

## 2022-08-11 ENCOUNTER — ANESTHESIA EVENT (OUTPATIENT)
Dept: PERIOP | Facility: HOSPITAL | Age: 77
End: 2022-08-11

## 2022-08-11 ENCOUNTER — HOSPITAL ENCOUNTER (OUTPATIENT)
Facility: HOSPITAL | Age: 77
Setting detail: HOSPITAL OUTPATIENT SURGERY
Discharge: HOME | End: 2022-08-11
Attending: OPHTHALMOLOGY | Admitting: OPHTHALMOLOGY

## 2022-08-11 ENCOUNTER — ANESTHESIA (OUTPATIENT)
Dept: PERIOP | Facility: HOSPITAL | Age: 77
End: 2022-08-11

## 2022-08-11 VITALS
TEMPERATURE: 97.7 F | WEIGHT: 150 LBS | HEIGHT: 63 IN | OXYGEN SATURATION: 92 % | RESPIRATION RATE: 16 BRPM | HEART RATE: 67 BPM | BODY MASS INDEX: 26.58 KG/M2 | DIASTOLIC BLOOD PRESSURE: 55 MMHG | SYSTOLIC BLOOD PRESSURE: 113 MMHG

## 2022-08-11 DIAGNOSIS — H25.12 NUCLEAR SCLEROTIC CATARACT OF LEFT EYE: ICD-10-CM

## 2022-08-11 PROCEDURE — V2632 POST CHMBR INTRAOCULAR LENS: HCPCS | Performed by: OPHTHALMOLOGY

## 2022-08-11 PROCEDURE — 25010000002 PROPOFOL 10 MG/ML EMULSION: Performed by: NURSE ANESTHETIST, CERTIFIED REGISTERED

## 2022-08-11 DEVICE — LENS MONOFOCAL IQ CC60WF250: Type: IMPLANTABLE DEVICE | Site: POSTERIOR CHAMBER | Status: FUNCTIONAL

## 2022-08-11 RX ORDER — DIFLUPREDNATE OPHTHALMIC 0.5 MG/ML
1 EMULSION OPHTHALMIC SEE ADMIN INSTRUCTIONS
Status: DISCONTINUED | OUTPATIENT
Start: 2022-08-11 | End: 2022-08-11 | Stop reason: HOSPADM

## 2022-08-11 RX ORDER — NEOMYCIN SULFATE, POLYMYXIN B SULFATE, AND DEXAMETHASONE 3.5; 10000; 1 MG/G; [USP'U]/G; MG/G
OINTMENT OPHTHALMIC AS NEEDED
Status: DISCONTINUED | OUTPATIENT
Start: 2022-08-11 | End: 2022-08-11 | Stop reason: HOSPADM

## 2022-08-11 RX ORDER — KETAMINE HYDROCHLORIDE 50 MG/ML
INJECTION, SOLUTION, CONCENTRATE INTRAMUSCULAR; INTRAVENOUS AS NEEDED
Status: DISCONTINUED | OUTPATIENT
Start: 2022-08-11 | End: 2022-08-11 | Stop reason: SURG

## 2022-08-11 RX ORDER — SODIUM CHLORIDE, SODIUM LACTATE, POTASSIUM CHLORIDE, CALCIUM CHLORIDE 600; 310; 30; 20 MG/100ML; MG/100ML; MG/100ML; MG/100ML
1000 INJECTION, SOLUTION INTRAVENOUS CONTINUOUS
Status: DISCONTINUED | OUTPATIENT
Start: 2022-08-11 | End: 2022-08-11 | Stop reason: HOSPADM

## 2022-08-11 RX ORDER — TETRACAINE HYDROCHLORIDE 5 MG/ML
SOLUTION OPHTHALMIC AS NEEDED
Status: DISCONTINUED | OUTPATIENT
Start: 2022-08-11 | End: 2022-08-11 | Stop reason: HOSPADM

## 2022-08-11 RX ORDER — LIDOCAINE HYDROCHLORIDE 20 MG/ML
INJECTION, SOLUTION EPIDURAL; INFILTRATION; INTRACAUDAL; PERINEURAL AS NEEDED
Status: DISCONTINUED | OUTPATIENT
Start: 2022-08-11 | End: 2022-08-11 | Stop reason: HOSPADM

## 2022-08-11 RX ORDER — DIFLUPREDNATE OPHTHALMIC 0.5 MG/ML
EMULSION OPHTHALMIC AS NEEDED
Status: DISCONTINUED | OUTPATIENT
Start: 2022-08-11 | End: 2022-08-11 | Stop reason: HOSPADM

## 2022-08-11 RX ORDER — PHENYLEPHRINE HCL 2.5 %
1 DROPS OPHTHALMIC (EYE)
Status: COMPLETED | OUTPATIENT
Start: 2022-08-11 | End: 2022-08-11

## 2022-08-11 RX ORDER — BALANCED SALT SOLUTION 6.4; .75; .48; .3; 3.9; 1.7 MG/ML; MG/ML; MG/ML; MG/ML; MG/ML; MG/ML
SOLUTION OPHTHALMIC AS NEEDED
Status: DISCONTINUED | OUTPATIENT
Start: 2022-08-11 | End: 2022-08-11 | Stop reason: HOSPADM

## 2022-08-11 RX ORDER — PROPOFOL 10 MG/ML
VIAL (ML) INTRAVENOUS AS NEEDED
Status: DISCONTINUED | OUTPATIENT
Start: 2022-08-11 | End: 2022-08-11 | Stop reason: SURG

## 2022-08-11 RX ORDER — SODIUM CHLORIDE 0.9 % (FLUSH) 0.9 %
1-10 SYRINGE (ML) INJECTION AS NEEDED
Status: DISCONTINUED | OUTPATIENT
Start: 2022-08-11 | End: 2022-08-11 | Stop reason: HOSPADM

## 2022-08-11 RX ORDER — TETRACAINE HYDROCHLORIDE 5 MG/ML
1 SOLUTION OPHTHALMIC SEE ADMIN INSTRUCTIONS
Status: COMPLETED | OUTPATIENT
Start: 2022-08-11 | End: 2022-08-11

## 2022-08-11 RX ORDER — DIFLUPREDNATE OPHTHALMIC 0.5 MG/ML
1 EMULSION OPHTHALMIC 4 TIMES DAILY
Start: 2022-08-11

## 2022-08-11 RX ORDER — LIDOCAINE HYDROCHLORIDE 20 MG/ML
INJECTION, SOLUTION INTRAVENOUS AS NEEDED
Status: DISCONTINUED | OUTPATIENT
Start: 2022-08-11 | End: 2022-08-11 | Stop reason: SURG

## 2022-08-11 RX ORDER — CYCLOPENTOLATE HYDROCHLORIDE 20 MG/ML
1 SOLUTION/ DROPS OPHTHALMIC
Status: COMPLETED | OUTPATIENT
Start: 2022-08-11 | End: 2022-08-11

## 2022-08-11 RX ORDER — SODIUM CHLORIDE 0.9 % (FLUSH) 0.9 %
10 SYRINGE (ML) INJECTION EVERY 12 HOURS SCHEDULED
Status: DISCONTINUED | OUTPATIENT
Start: 2022-08-11 | End: 2022-08-11 | Stop reason: HOSPADM

## 2022-08-11 RX ADMIN — CYCLOPENTOLATE HYDROCHLORIDE 1 DROP: 20 SOLUTION/ DROPS OPHTHALMIC at 11:55

## 2022-08-11 RX ADMIN — TETRACAINE HYDROCHLORIDE 1 DROP: 5 SOLUTION OPHTHALMIC at 11:43

## 2022-08-11 RX ADMIN — PROPOFOL 10 MG: 10 INJECTION, EMULSION INTRAVENOUS at 12:26

## 2022-08-11 RX ADMIN — LIDOCAINE HYDROCHLORIDE 60 MG: 20 INJECTION, SOLUTION INTRAVENOUS at 12:20

## 2022-08-11 RX ADMIN — PROPOFOL 20 MG: 10 INJECTION, EMULSION INTRAVENOUS at 12:24

## 2022-08-11 RX ADMIN — CYCLOPENTOLATE HYDROCHLORIDE 1 DROP: 20 SOLUTION/ DROPS OPHTHALMIC at 11:50

## 2022-08-11 RX ADMIN — PHENYLEPHRINE HYDROCHLORIDE 1 DROP: 25 SOLUTION/ DROPS OPHTHALMIC at 11:50

## 2022-08-11 RX ADMIN — CYCLOPENTOLATE HYDROCHLORIDE 1 DROP: 20 SOLUTION/ DROPS OPHTHALMIC at 11:45

## 2022-08-11 RX ADMIN — TETRACAINE HYDROCHLORIDE 1 DROP: 5 SOLUTION OPHTHALMIC at 11:44

## 2022-08-11 RX ADMIN — SODIUM CHLORIDE, POTASSIUM CHLORIDE, SODIUM LACTATE AND CALCIUM CHLORIDE 1000 ML: 600; 310; 30; 20 INJECTION, SOLUTION INTRAVENOUS at 11:50

## 2022-08-11 RX ADMIN — PHENYLEPHRINE HYDROCHLORIDE 1 DROP: 25 SOLUTION/ DROPS OPHTHALMIC at 11:55

## 2022-08-11 RX ADMIN — PROPOFOL 30 MG: 10 INJECTION, EMULSION INTRAVENOUS at 12:20

## 2022-08-11 RX ADMIN — KETAMINE HYDROCHLORIDE 12.5 MG: 50 INJECTION, SOLUTION INTRAMUSCULAR; INTRAVENOUS at 12:20

## 2022-08-11 RX ADMIN — PROPOFOL 10 MG: 10 INJECTION, EMULSION INTRAVENOUS at 12:27

## 2022-08-11 RX ADMIN — PHENYLEPHRINE HYDROCHLORIDE 1 DROP: 25 SOLUTION/ DROPS OPHTHALMIC at 11:45

## 2022-08-11 NOTE — OP NOTE
OPERATIVE NOTE    Date of Procedure: 8/11/2022  Patient Name: Carolin Toscano  Patient MRN: [id number]  YOB: 1945     Preoperative Diagnosis: Left nuclear sclerotic cataract.     Postoperative Diagnosis: Left nuclear sclerotic cataract.     Procedure Performed: Phacoemulsification with implantation of a  foldable posterior chamber intraocular lens, Left eye.     Surgeon: Sathish Lopez MD     Anesthesia:  Monitored Anesthesia Care (MAC)      Brief History and Indication: The patient presents with a history of past progressive loss of vision.  Patient was diagnosed with cataract and requests removal for increased ability to read and see.     Operation Description: The patient was taken to the OR and prepped and draped in the usual sterile ophthalmic fashion. A lid speculum was placed in the eye.  A #75 blade was then used to make a stab incision two o’clock hours from the intended temporal clear cornea groove. The anterior chamber was then inflated with a Viscoelastic. A metal microkeratome blade was then used to enter the anterior chamber at the temporal clear cornea site. A three level tunnel incision was made. A curvilinear capsulorrhexis was then performed with a bent cystotome needle and capsulorrhexis forceps.  BSS on a 30 gauge bent cannula was used to hydro-dissect, and hydro-delineate the lens. Good fluid waves and hydro-delineation were noted. Phacoemulsification was then used to remove nuclear material without complications. The residual cortical and lenticular material was then removed with irrigation and aspiration. Viscoelastics were then used to inflate the bag in a soft shell technique. A PCIOL was injected into the bag. Post-implantation, there were no rents or tears in the bag and the lens was noted to be stable and centered. The residual Viscoelastic was then removed with irrigation and aspiration.  The wound was checked and found to be without leaks. Therefore a Polydex  ointment and one drop of Durezol eye drop was placed in the eye.     Implant Information:   Implant Name Type Inv. Item Serial No.  Lot No. LRB No. Used Action   LENS MONOFOCAL IQ [id number] - [id number] - [id number] Implant LENS MONOFOCAL IQ [id number] NOELLE  Left 1 Implanted       Complications: None    Estimated Blood Loss:  Less than 1 cc.       []   Changed to complex procedure due to: []  hypermature, white cataract. Blue dye was used. []   small iris. A Malyugin Ring was used.    Discharge and Condition  The patient was transported to same day surgery in excellent condition and scheduled for follow-up tomorrow morning. The patient was given instructions on use of eye drops for the operative eye and was specifically instructed to call Dr. Lopez at his office or home for any nausea, vomiting, headache, decreased visual acuity, or pain, or if the patient had any general concerns.    Sathish Lopez MD  8/11/2022

## 2022-08-11 NOTE — DISCHARGE INSTRUCTIONS
Post Operative Cataract Instructions     Left Eye  POST OPERATIVE INSTRUCTIONS  You have received anesthesia today. DO NOT drive, drink alcohol, sign legal documents.   After surgery, your eye will not hurt. It may feel scratchy, sticky or uncomfortable. Your eye will be sensitive to light.  Most people see better 1-3 days after the procedure, but it could take 3 weeks to get the full benefits and reach your visual potential. If your vision is blurry for a few days it is normal, and means you may have swelling outside or inside the eye. For some patients, a bubble is placed and there will be blurriness.   You should receive a post-op kit with tape and an eye shield. Wear the shield for the first 3 nights after surgery to keep you from rubbing the eye.  You may wear glasses or sunglasses during the day.  You must keep eye protected at all times.  Most people are able to return to their normal routine 1-3 days after surgery, however, due to the brain adjusting to your new vision you may have trouble judging distances and want to be careful when driving and going up and downstairs.   You can shower and wash your hair the day after surgery. Keep water, shampoo, hair spray and shaving lotion out of the eye, especially for the first week.   If eyes become stuck together after surgery you may soak with warm cloth.  During the first week, you should AVOID:   Rubbing or putting pressure on your eye.  Eye make-up, face cream or lotion, hair coloring or perms  Strenuous activities, such as running or lifting weights, as to avoid sweat from getting in the eye. Avoid swimming, hot tubs, fumes or yanna conditions.   Sleeping on operative side.  Excessive sneezing or coughing.  Hanging the head down for periods of time. Keep your head above your heart.    Some discomfort and blurred vision after surgery are normal, but if you have any unusual pain, swelling, bleeding or sudden decrease in vision, contact our office immediately.      Emergency assistance is available at any time by calling:    Dr.Mark Jessica Lopez  [phone]    If unable to reach call [facility] @ [phone]    You have been prescribed an eye drop to use after surgery, please follow these instructions and bring eye drops and instructions with you to post op appointment:    Difluprednate (DUREZOL) 0.05 %. Shake well before use.    USE 1 DROP 4 (FOUR) TIMES A DAY FOR 1 WEEK THEN STARTING WEEK 2, ONLY USE 2 (TWO) TIMES A DAY UNTIL YOU RUN OUT OF DROPS.     PLACE A BHAVESH IN THE DAY COLUMN EACH TIME YOU USE TO KEEP ON SCHEDULE    WEEK 1-USE 4  (FOUR) TIMES A DAY DAY 1  []   []    []   []     DAY 2  []   []    []   []  DAY 3  []   []    []   []  DAY 4  []   []    []   []  DAY 5  []   []    []   []  DAY 6  []   []    []   []  DAY 7  []   []    []   []      WEEK 2-USE 2 (TWO)  TIMES A DAY DAY 1  []   []  DAY 2  []   []  DAY 3  []   []  DAY 4  []   []  DAY 5  []   []  DAY 6  []   []  DAY 7  []   []      WEEK 3-USE 2 (TWO) TIMES A DAY DAY 1  []   []  DAY 2  []   []  DAY 3  []   []  DAY 4  []   []  DAY 5  []   []  DAY 6  []   []  DAY 7  []   []      WEEK 4-USE 2 (TWO)TIMES A DAY DAY 1  []   []  DAY 2  []   []  DAY 3  []   []  DAY 4  []   []  DAY 5  []   []  DAY 6  []   []  DAY 7  []   []       To assist you in voiding:  Drink plenty of fluids  Listen to running water while attempting to void.    If you are unable to urinate and you have an uncomfortable urge to void or it has been   6 hours since you were discharged, return to the Emergency Room Please follow all post op instructions and follow up appointment time from your physician's office included in your discharge packet.  .  Rest today  No pushing,pulling,tugging,heavy lifting, or strenuous activity   No major decision making,driving,or drinking alcoholic beverages for 24 hours due to the sedation you received  Always use good hand hygiene/washing  technique  No driving on pain medication.

## 2022-08-11 NOTE — H&P
[facility]         History and Physical    Patient Name: Carolin Toscano  MRN: [id number]  : 1945  Gender: female     HPI: Patient complaint of PPLOV Left eye diagnosed with cataract. Patient requests PHACO PCIOL for Increase of VA/ADL.    History:    Past Medical History:   Diagnosis Date   • Arthritis    • COPD (chronic obstructive pulmonary disease) (HCC)    • Gall stones    • Hypertension    • Hypertension    • Kidney infection    • Kidney stone    • Kidney stones    • Migraine headache    • Scarlet fever        Past Surgical History:   Procedure Laterality Date   • BLADDER SURGERY     • CHOLECYSTECTOMY     • HYSTERECTOMY     • TONSILLECTOMY         Social History     Socioeconomic History   • Marital status:    Tobacco Use   • Smoking status: Current Every Day Smoker     Packs/day: 1.00     Types: Cigarettes     Start date:    • Smokeless tobacco: Former User     Quit date: 2017   Vaping Use   • Vaping Use: Never used   Substance and Sexual Activity   • Alcohol use: No   • Drug use: No   • Sexual activity: Yes     Partners: Male       Family History   Problem Relation Age of Onset   • Heart disease Mother    • Arthritis Mother    • Diabetes Mother    • Hypertension Mother    • Colon cancer Father    • Arthritis Father    • Diabetes Father    • Hypertension Father    • Migraines Sister        Prior to Admission Medications:  Medications Prior to Admission   Medication Sig Dispense Refill Last Dose   • amLODIPine (NORVASC) 10 MG tablet Take 1 tablet by mouth once daily (Patient taking differently: 10 mg.) 90 tablet 3 2022 at 0700   • atenolol (TENORMIN) 25 MG tablet Take 1 tablet by mouth once daily (Patient taking differently: 25 mg.) 90 tablet 3 2022 at 0700   • hydroCHLOROthiazide (HYDRODIURIL) 25 MG tablet Take 1 tablet by mouth once daily (Patient taking differently: 25 mg.) 90 tablet 0 Past Week at Unknown time   • multivitamins-minerals  (PRESERVISION AREDS 2) capsule capsule Take 2 capsules by mouth Daily.   Past Week at Unknown time   • Ventolin  (90 Base) MCG/ACT inhaler INHALE 2 PUFFS BY MOUTH EVERY 4 HOURS AS NEEDED FOR WHEEZING (Patient taking differently: 2 puffs Every 4 (Four) Hours As Needed.) 18 g 12 8/11/2022 at 0700   • levocetirizine (XYZAL) 5 MG tablet Take 1 tablet by mouth Every Evening. 90 tablet 3    • prednisoLONE acetate (PRED FORTE) 1 % ophthalmic suspension INSTILL 1 DROP INTO RIGHT EYE 4 TIMES DAILY      • trimethoprim-polymyxin b (Polytrim) 59580-7.1 UNIT/ML-% ophthalmic solution Apply 1 drop to eye(s) as directed by provider Every 6 (Six) Hours. 10 mL 2        Allergies:  Allergies   Allergen Reactions   • Contrast Dye Anaphylaxis   • Ciprofloxacin Other (See Comments)     redness   • Keflex [Cephalexin] Swelling   • Penicillins Other (See Comments)     Numbness   • Sulfa Antibiotics Swelling   • Terramycin [Oxytetracycline] Other (See Comments)     redness   • Percocet [Oxycodone-Acetaminophen]    • Xyzal [Levocetirizine] GI Intolerance   • Latex Rash        Vitals: Temp:  [97.6 °F (36.4 °C)] 97.6 °F (36.4 °C)  Heart Rate:  [79] 79  Resp:  [18] 18  BP: (137)/(89) 137/89    Review of Systems:   Within Normal Limits Abnormal   HEENT [x]    []     Cardiovascular [x]   []     Gastrointestinal [x]   []     Genitourinary [x]   []     Neurologic [x]   []     Pulmonary [x]   []       Physical Exam:   Within Normal Limits Abnormal   HEENT [x]    []     Heart [x]   []     Lungs [x]   []     Abdomen [x]   []     Extremities [x]   []       Impression: Left nuclear sclerotic cataract.     Plan: CATARACT PHACO EXTRACTION WITH INTRAOCULAR LENS IMPLANT LEFT (Left)     Sathish Lopez MD  8/11/2022

## 2022-08-11 NOTE — ANESTHESIA POSTPROCEDURE EVALUATION
Patient: Carolin Toscano    Procedure Summary     Date: 08/11/22 Room / Location: [facility] OR 1 /  [facility]    Anesthesia Start: 1216 Anesthesia Stop: 1234    Procedure: CATARACT PHACO EXTRACTION WITH INTRAOCULAR LENS IMPLANT LEFT (Left Eye) Diagnosis:       Nuclear sclerotic cataract of left eye      (Nuclear sclerotic cataract of left eye [H25.12])    Surgeons: Sathish Lopez MD Provider: August Gonzalez CRNA    Anesthesia Type: MAC ASA Status: 3          Anesthesia Type: MAC    Vitals  No vitals data found for the desired time range.          Post Anesthesia Care and Evaluation    Patient location during evaluation: PHASE II  Patient participation: complete - patient participated  Level of consciousness: awake and alert  Pain score: 0  Pain management: satisfactory to patient    Airway patency: patent  Anesthetic complications: No anesthetic complications  PONV Status: none  Cardiovascular status: acceptable and stable  Respiratory status: acceptable  Hydration status: acceptable    Comments: Vitals signs as noted in nursing documentation as per protocol.

## 2022-08-11 NOTE — ANESTHESIA PREPROCEDURE EVALUATION
Anesthesia Evaluation     Patient summary reviewed and Nursing notes reviewed   NPO Solid Status: > 8 hours  NPO Liquid Status: > 8 hours           Airway   Mallampati: II  TM distance: >3 FB  Neck ROM: full  Possible difficult intubation  Dental - normal exam     Pulmonary - normal exam   (+) a smoker Current Smoked day of surgery, COPD,   Cardiovascular - normal exam    (+) hypertension,       Neuro/Psych  (+) headaches,    GI/Hepatic/Renal/Endo    (+)   renal disease stones,     Musculoskeletal     Abdominal  - normal exam   Substance History - negative use  (-) alcohol use, drug use     OB/GYN          Other   arthritis,                      Anesthesia Plan    ASA 3     MAC     (Risks and benefits discussed including risk of aspiration, recall and dental damage. All patient questions answered.    Will continue with plan of care.)  intravenous induction     Anesthetic plan, risks, benefits, and alternatives have been provided, discussed and informed consent has been obtained with: patient.  Pre-procedure education provided  Plan discussed with CRNA.        CODE STATUS:       
Cellulitis of right lower extremity

## 2022-08-16 ENCOUNTER — PREP FOR SURGERY (OUTPATIENT)
Dept: OTHER | Facility: HOSPITAL | Age: 77
End: 2022-08-16

## 2022-08-16 DIAGNOSIS — H25.11 NUCLEAR SCLEROTIC CATARACT OF RIGHT EYE: Primary | ICD-10-CM

## 2022-08-16 RX ORDER — PHENYLEPHRINE HCL 2.5 %
1 DROPS OPHTHALMIC (EYE)
Status: CANCELLED | OUTPATIENT
Start: 2022-08-16 | End: 2022-08-16

## 2022-08-16 RX ORDER — SODIUM CHLORIDE 0.9 % (FLUSH) 0.9 %
10 SYRINGE (ML) INJECTION EVERY 12 HOURS SCHEDULED
Status: CANCELLED | OUTPATIENT
Start: 2022-08-16

## 2022-08-16 RX ORDER — CYCLOPENTOLATE HYDROCHLORIDE 20 MG/ML
1 SOLUTION/ DROPS OPHTHALMIC
Status: CANCELLED | OUTPATIENT
Start: 2022-08-16 | End: 2022-08-16

## 2022-08-16 RX ORDER — SODIUM CHLORIDE 0.9 % (FLUSH) 0.9 %
1-10 SYRINGE (ML) INJECTION AS NEEDED
Status: CANCELLED | OUTPATIENT
Start: 2022-08-16

## 2022-08-16 RX ORDER — TETRACAINE HYDROCHLORIDE 5 MG/ML
1 SOLUTION OPHTHALMIC SEE ADMIN INSTRUCTIONS
Status: CANCELLED | OUTPATIENT
Start: 2022-08-16

## 2022-08-16 RX ORDER — DIFLUPREDNATE OPHTHALMIC 0.5 MG/ML
1 EMULSION OPHTHALMIC SEE ADMIN INSTRUCTIONS
Status: CANCELLED | OUTPATIENT
Start: 2022-08-16

## 2022-08-24 NOTE — PRE-PROCEDURE INSTRUCTIONS
PAT phone history completed with pt for upcoming procedure on 8/25/22, with Dr. Lopez.    PAT PASS GIVEN/REVIEWED WITH PT.  VERBALIZED UNDERSTANDING OF THE FOLLOWING:  DO NOT EAT, DRINK, SMOKE, USE SMOKELESS TOBACCO OR CHEW GUM AFTER MIDNIGHT THE NIGHT BEFORE SURGERY.  THIS ALSO INCLUDES HARD CANDIES AND MINTS.    DO NOT SHAVE THE AREA TO BE OPERATED ON AT LEAST 48 HOURS PRIOR TO THE PROCEDURE.  DO NOT WEAR MAKE UP OR NAIL POLISH.  DO NOT LEAVE IN ANY PIERCING OR WEAR JEWELRY THE DAY OF SURGERY.      DO NOT USE ADHESIVES IF YOU WEAR DENTURES.    DO NOT WEAR EYE CONTACTS; BRING IN YOUR GLASSES.    ONLY TAKE MEDICATION THE MORNING OF YOUR PROCEDURE IF INSTRUCTED BY YOUR SURGEON WITH ENOUGH WATER TO SWALLOW THE MEDICATION.  IF YOUR SURGEON DID NOT SPECIFY WHICH MEDICATIONS TO TAKE, YOU WILL NEED TO CALL THEIR OFFICE FOR FURTHER INSTRUCTIONS AND DO AS THEY INSTRUCT.    LEAVE ANYTHING YOU CONSIDER VALUABLE AT HOME.    YOU WILL NEED TO ARRANGE FOR SOMEONE TO DRIVE YOU HOME AFTER SURGERY.  IT IS RECOMMENDED THAT YOU DO NOT DRIVE, WORK, DRINK ALCOHOL OR MAKE MAJOR DECISIONS FOR AT LEAST 24 HOURS AFTER YOUR PROCEDURE IS COMPLETE.      THE DAY OF YOUR PROCEDURE, BRING IN THE FOLLOWING IF APPLICABLE:   PICTURE ID AND INSURANCE/MEDICARE OR MEDICAID CARDS/ANY CO-PAY THAT MAY BE DUE   COPY OF ADVANCED DIRECTIVE/LIVING WILL/POWER OR    CPAP/BIPAP/INHALERS   SKIN PREP SHEET   YOUR PREADMISSION TESTING PASS (IF NOT A PHONE HISTORY)           COVID self-quarantine instructions reviewed with the pt.  Verbalized understanding.

## 2022-08-25 ENCOUNTER — HOSPITAL ENCOUNTER (OUTPATIENT)
Facility: HOSPITAL | Age: 77
Setting detail: HOSPITAL OUTPATIENT SURGERY
Discharge: HOME | End: 2022-08-25
Attending: OPHTHALMOLOGY | Admitting: OPHTHALMOLOGY

## 2022-08-25 ENCOUNTER — ANESTHESIA EVENT (OUTPATIENT)
Dept: PERIOP | Facility: HOSPITAL | Age: 77
End: 2022-08-25

## 2022-08-25 ENCOUNTER — ANESTHESIA (OUTPATIENT)
Dept: PERIOP | Facility: HOSPITAL | Age: 77
End: 2022-08-25

## 2022-08-25 VITALS
RESPIRATION RATE: 16 BRPM | BODY MASS INDEX: 26.58 KG/M2 | DIASTOLIC BLOOD PRESSURE: 65 MMHG | TEMPERATURE: 97.2 F | HEART RATE: 81 BPM | WEIGHT: 150 LBS | OXYGEN SATURATION: 90 % | SYSTOLIC BLOOD PRESSURE: 134 MMHG | HEIGHT: 63 IN

## 2022-08-25 DIAGNOSIS — H25.11 NUCLEAR SCLEROTIC CATARACT OF RIGHT EYE: ICD-10-CM

## 2022-08-25 PROCEDURE — V2632 POST CHMBR INTRAOCULAR LENS: HCPCS | Performed by: OPHTHALMOLOGY

## 2022-08-25 PROCEDURE — 25010000002 PROPOFOL 200 MG/20ML EMULSION: Performed by: NURSE ANESTHETIST, CERTIFIED REGISTERED

## 2022-08-25 DEVICE — LENS MONOFOCAL IQ CC60WF240: Type: IMPLANTABLE DEVICE | Site: POSTERIOR CHAMBER | Status: FUNCTIONAL

## 2022-08-25 RX ORDER — SODIUM CHLORIDE 0.9 % (FLUSH) 0.9 %
10 SYRINGE (ML) INJECTION EVERY 12 HOURS SCHEDULED
Status: DISCONTINUED | OUTPATIENT
Start: 2022-08-25 | End: 2022-08-25 | Stop reason: HOSPADM

## 2022-08-25 RX ORDER — LIDOCAINE HYDROCHLORIDE 20 MG/ML
INJECTION, SOLUTION EPIDURAL; INFILTRATION; INTRACAUDAL; PERINEURAL AS NEEDED
Status: DISCONTINUED | OUTPATIENT
Start: 2022-08-25 | End: 2022-08-25 | Stop reason: HOSPADM

## 2022-08-25 RX ORDER — CYCLOPENTOLATE HYDROCHLORIDE 20 MG/ML
1 SOLUTION/ DROPS OPHTHALMIC
Status: COMPLETED | OUTPATIENT
Start: 2022-08-25 | End: 2022-08-25

## 2022-08-25 RX ORDER — PHENYLEPHRINE HCL 2.5 %
1 DROPS OPHTHALMIC (EYE)
Status: COMPLETED | OUTPATIENT
Start: 2022-08-25 | End: 2022-08-25

## 2022-08-25 RX ORDER — DIFLUPREDNATE OPHTHALMIC 0.5 MG/ML
EMULSION OPHTHALMIC AS NEEDED
Status: DISCONTINUED | OUTPATIENT
Start: 2022-08-25 | End: 2022-08-25 | Stop reason: HOSPADM

## 2022-08-25 RX ORDER — TETRACAINE HYDROCHLORIDE 5 MG/ML
1 SOLUTION OPHTHALMIC SEE ADMIN INSTRUCTIONS
Status: COMPLETED | OUTPATIENT
Start: 2022-08-25 | End: 2022-08-25

## 2022-08-25 RX ORDER — SODIUM CHLORIDE 0.9 % (FLUSH) 0.9 %
1-10 SYRINGE (ML) INJECTION AS NEEDED
Status: DISCONTINUED | OUTPATIENT
Start: 2022-08-25 | End: 2022-08-25 | Stop reason: HOSPADM

## 2022-08-25 RX ORDER — KETAMINE HCL IN NACL, ISO-OSM 100MG/10ML
SYRINGE (ML) INJECTION AS NEEDED
Status: DISCONTINUED | OUTPATIENT
Start: 2022-08-25 | End: 2022-08-25 | Stop reason: SURG

## 2022-08-25 RX ORDER — TETRACAINE HYDROCHLORIDE 5 MG/ML
SOLUTION OPHTHALMIC AS NEEDED
Status: DISCONTINUED | OUTPATIENT
Start: 2022-08-25 | End: 2022-08-25 | Stop reason: HOSPADM

## 2022-08-25 RX ORDER — BALANCED SALT SOLUTION 6.4; .75; .48; .3; 3.9; 1.7 MG/ML; MG/ML; MG/ML; MG/ML; MG/ML; MG/ML
SOLUTION OPHTHALMIC AS NEEDED
Status: DISCONTINUED | OUTPATIENT
Start: 2022-08-25 | End: 2022-08-25 | Stop reason: HOSPADM

## 2022-08-25 RX ORDER — DIFLUPREDNATE OPHTHALMIC 0.5 MG/ML
1 EMULSION OPHTHALMIC SEE ADMIN INSTRUCTIONS
Status: DISCONTINUED | OUTPATIENT
Start: 2022-08-25 | End: 2022-08-25 | Stop reason: HOSPADM

## 2022-08-25 RX ORDER — SODIUM CHLORIDE, SODIUM LACTATE, POTASSIUM CHLORIDE, CALCIUM CHLORIDE 600; 310; 30; 20 MG/100ML; MG/100ML; MG/100ML; MG/100ML
1000 INJECTION, SOLUTION INTRAVENOUS CONTINUOUS
Status: DISCONTINUED | OUTPATIENT
Start: 2022-08-25 | End: 2022-08-25 | Stop reason: HOSPADM

## 2022-08-25 RX ORDER — DIFLUPREDNATE OPHTHALMIC 0.5 MG/ML
1 EMULSION OPHTHALMIC 4 TIMES DAILY
Start: 2022-08-25

## 2022-08-25 RX ORDER — PROPOFOL 10 MG/ML
INJECTION, EMULSION INTRAVENOUS AS NEEDED
Status: DISCONTINUED | OUTPATIENT
Start: 2022-08-25 | End: 2022-08-25 | Stop reason: SURG

## 2022-08-25 RX ORDER — NEOMYCIN SULFATE, POLYMYXIN B SULFATE, AND DEXAMETHASONE 3.5; 10000; 1 MG/G; [USP'U]/G; MG/G
OINTMENT OPHTHALMIC AS NEEDED
Status: DISCONTINUED | OUTPATIENT
Start: 2022-08-25 | End: 2022-08-25 | Stop reason: HOSPADM

## 2022-08-25 RX ADMIN — TETRACAINE HYDROCHLORIDE 1 DROP: 5 SOLUTION OPHTHALMIC at 11:44

## 2022-08-25 RX ADMIN — CYCLOPENTOLATE HYDROCHLORIDE 1 DROP: 20 SOLUTION/ DROPS OPHTHALMIC at 11:55

## 2022-08-25 RX ADMIN — CYCLOPENTOLATE HYDROCHLORIDE 1 DROP: 20 SOLUTION/ DROPS OPHTHALMIC at 11:45

## 2022-08-25 RX ADMIN — Medication 12 MG: at 12:36

## 2022-08-25 RX ADMIN — PHENYLEPHRINE HYDROCHLORIDE 1 DROP: 25 SOLUTION/ DROPS OPHTHALMIC at 11:50

## 2022-08-25 RX ADMIN — CYCLOPENTOLATE HYDROCHLORIDE 1 DROP: 20 SOLUTION/ DROPS OPHTHALMIC at 11:50

## 2022-08-25 RX ADMIN — SODIUM CHLORIDE, POTASSIUM CHLORIDE, SODIUM LACTATE AND CALCIUM CHLORIDE 1000 ML: 600; 310; 30; 20 INJECTION, SOLUTION INTRAVENOUS at 11:50

## 2022-08-25 RX ADMIN — TETRACAINE HYDROCHLORIDE 1 DROP: 5 SOLUTION OPHTHALMIC at 11:43

## 2022-08-25 RX ADMIN — PHENYLEPHRINE HYDROCHLORIDE 1 DROP: 25 SOLUTION/ DROPS OPHTHALMIC at 11:55

## 2022-08-25 RX ADMIN — PHENYLEPHRINE HYDROCHLORIDE 1 DROP: 25 SOLUTION/ DROPS OPHTHALMIC at 11:45

## 2022-08-25 RX ADMIN — PROPOFOL 50 MG: 10 INJECTION, EMULSION INTRAVENOUS at 12:36

## 2022-08-25 NOTE — H&P
"      [facility] [place]         History and Physical    Patient Name: Carolin Toscano  MRN: [id number]  : 1945  Gender: female     HPI: Patient complaint of PPLOV Right eye diagnosed with cataract. Patient requests PHACO PCIOL for Increase of VA/ADL.    History:    Past Medical History:   Diagnosis Date   • Arthritis    • COPD (chronic obstructive pulmonary disease) (HCC)    • Gall stones    • Goiter     \"inward\"   • Hypertension    • Hypertension    • Kidney infection    • Kidney stone    • Kidney stones    • Migraine headache    • Scarlet fever        Past Surgical History:   Procedure Laterality Date   • BLADDER SURGERY     • CATARACT EXTRACTION W/ INTRAOCULAR LENS IMPLANT Left 2022    Procedure: CATARACT PHACO EXTRACTION WITH INTRAOCULAR LENS IMPLANT LEFT;  Surgeon: Sathish Lopez MD;  Location: [facility];  Service: Ophthalmology;  Laterality: Left;   • CHOLECYSTECTOMY     • HYSTERECTOMY     • TONSILLECTOMY         Social History     Socioeconomic History   • Marital status:    Tobacco Use   • Smoking status: Current Every Day Smoker     Packs/day: 1.00     Types: Cigarettes     Start date:    • Smokeless tobacco: Former User     Quit date: 2017   Vaping Use   • Vaping Use: Never used   Substance and Sexual Activity   • Alcohol use: No   • Drug use: No   • Sexual activity: Defer       Family History   Problem Relation Age of Onset   • Heart disease Mother    • Arthritis Mother    • Diabetes Mother    • Hypertension Mother    • Colon cancer Father    • Arthritis Father    • Diabetes Father    • Hypertension Father    • Migraines Sister        Prior to Admission Medications:  Medications Prior to Admission   Medication Sig Dispense Refill Last Dose   • amLODIPine (NORVASC) 10 MG tablet Take 1 tablet by mouth once daily (Patient taking differently: 10 mg.) 90 tablet 3 2022 at 0900   • atenolol (TENORMIN) 25 MG tablet Take 1 tablet by mouth once daily " (Patient taking differently: 25 mg.) 90 tablet 3 8/25/2022 at 0900   • difluprednate (DUREZOL) 0.05 % ophthalmic emulsion Administer 1 drop into the left eye 4 (Four) Times a Day. See admin instructions on AVS.   8/24/2022 at Unknown time   • hydroCHLOROthiazide (HYDRODIURIL) 25 MG tablet Take 1 tablet by mouth once daily (Patient taking differently: 25 mg.) 90 tablet 0 8/24/2022 at Unknown time   • multivitamins-minerals (PRESERVISION AREDS 2) capsule capsule Take 2 capsules by mouth Daily.   8/24/2022 at Unknown time   • Ventolin  (90 Base) MCG/ACT inhaler INHALE 2 PUFFS BY MOUTH EVERY 4 HOURS AS NEEDED FOR WHEEZING (Patient taking differently: 2 puffs Every 4 (Four) Hours As Needed.) 18 g 12 8/25/2022 at Unknown time       Allergies:  Allergies   Allergen Reactions   • Contrast Dye Anaphylaxis   • Ciprofloxacin Other (See Comments)     redness   • Keflex [Cephalexin] Swelling   • Penicillins Other (See Comments)     Numbness   • Sulfa Antibiotics Swelling   • Terramycin [Oxytetracycline] Other (See Comments)     redness   • Latex Rash   • Percocet [Oxycodone-Acetaminophen] GI Intolerance   • Xyzal [Levocetirizine] GI Intolerance        Vitals: Temp:  [97.5 °F (36.4 °C)] 97.5 °F (36.4 °C)  Heart Rate:  [73] 73  Resp:  [16] 16  BP: (130)/(82) 130/82    Review of Systems:   Within Normal Limits Abnormal   HEENT [x]    []     Cardiovascular [x]   []     Gastrointestinal [x]   []     Genitourinary [x]   []     Neurologic [x]   []     Pulmonary [x]   []       Physical Exam:   Within Normal Limits Abnormal   HEENT [x]    []     Heart [x]   []     Lungs [x]   []     Abdomen [x]   []     Extremities [x]   []       Impression: Right nuclear sclerotic cataract.     Plan: CATARACT PHACO EXTRACTION WITH INTRAOCULAR LENS IMPLANT RIGHT (Right)     Sathish Lopez MD  8/25/2022

## 2022-08-25 NOTE — OP NOTE
OPERATIVE NOTE    Date of Procedure: 8/25/2022  Patient Name: Carolin Toscano  Patient MRN: [id number]  YOB: 1945     Preoperative Diagnosis: Right nuclear sclerotic cataract.     Postoperative Diagnosis: Right nuclear sclerotic cataract.     Procedure Performed: Phacoemulsification with implantation of a  foldable posterior chamber intraocular lens, Right eye.     Surgeon: Sathish Lopez MD     Anesthesia:  Monitored Anesthesia Care (MAC)      Brief History and Indication: The patient presents with a history of past progressive loss of vision.  Patient was diagnosed with cataract and requests removal for increased ability to read and see.     Operation Description: The patient was taken to the OR and prepped and draped in the usual sterile ophthalmic fashion. A lid speculum was placed in the eye.  A #75 blade was then used to make a stab incision two o’clock hours from the intended temporal clear cornea groove. The anterior chamber was then inflated with a Viscoelastic. A metal microkeratome blade was then used to enter the anterior chamber at the temporal clear cornea site. A three level tunnel incision was made. A curvilinear capsulorrhexis was then performed with a bent cystotome needle and capsulorrhexis forceps.  BSS on a 30 gauge bent cannula was used to hydro-dissect, and hydro-delineate the lens. Good fluid waves and hydro-delineation were noted. Phacoemulsification was then used to remove nuclear material without complications. The residual cortical and lenticular material was then removed with irrigation and aspiration. Viscoelastics were then used to inflate the bag in a soft shell technique. A PCIOL was injected into the bag. Post-implantation, there were no rents or tears in the bag and the lens was noted to be stable and centered. The residual Viscoelastic was then removed with irrigation and aspiration.  The wound was checked and found to be without leaks. Therefore a Polydex  ointment and one drop of Durezol eye drop was placed in the eye.     Implant Information:   Implant Name Type Inv. Item Serial No.  Lot No. LRB No. Used Action   LENS MONOFOCAL IQ [id number] - [id number] - [id number] Implant LENS MONOFOCAL IQ [id number] NOELLE  Right 1 Implanted       Complications: None    Estimated Blood Loss:  Less than 1 cc.       []   Changed to complex procedure due to: []  hypermature, white cataract. Blue dye was used. []   small iris. A Malyugin Ring was used.    Discharge and Condition  The patient was transported to same day surgery in excellent condition and scheduled for follow-up tomorrow morning. The patient was given instructions on use of eye drops for the operative eye and was specifically instructed to call Dr. Lopez at his office or home for any nausea, vomiting, headache, decreased visual acuity, or pain, or if the patient had any general concerns.    Sathish Lopez MD  8/25/2022

## 2022-08-25 NOTE — ANESTHESIA POSTPROCEDURE EVALUATION
Patient: Carolin Toscano    Procedure Summary     Date: 08/25/22 Room / Location: [facility] OR 1 /  [facility]    Anesthesia Start: 1235 Anesthesia Stop: 1251    Procedure: CATARACT PHACO EXTRACTION WITH INTRAOCULAR LENS IMPLANT RIGHT (Right Eye) Diagnosis:       Nuclear sclerotic cataract of right eye      (Nuclear sclerotic cataract of right eye [H25.11])    Surgeons: Sathish Lopez MD Provider: Neno Moscoso CRNA    Anesthesia Type: MAC ASA Status: 3          Anesthesia Type: MAC    Vitals  No vitals data found for the desired time range.          Post Anesthesia Care and Evaluation    Patient location during evaluation: bedside  Patient participation: complete - patient participated  Level of consciousness: awake  Pain score: 0  Pain management: adequate    Airway patency: patent  Anesthetic complications: No anesthetic complications  PONV Status: controlled  Cardiovascular status: acceptable and stable  Respiratory status: acceptable and room air  Hydration status: acceptable    Comments: Vital signs as noted in nursing documentation as per protocol

## 2022-08-25 NOTE — ANESTHESIA PREPROCEDURE EVALUATION
Anesthesia Evaluation     Patient summary reviewed and Nursing notes reviewed   NPO Solid Status: > 8 hours  NPO Liquid Status: > 8 hours           Airway   Mallampati: II  TM distance: >3 FB  Neck ROM: full  Possible difficult intubation  Dental - normal exam     Pulmonary - normal exam   (+) a smoker Current Smoked day of surgery, COPD,   Cardiovascular - normal exam    (+) hypertension,       Neuro/Psych  (+) headaches,    GI/Hepatic/Renal/Endo    (+)   renal disease stones,     Musculoskeletal     Abdominal  - normal exam   Substance History - negative use  (-) alcohol use, drug use     OB/GYN          Other   arthritis,                        Anesthesia Plan    ASA 3     MAC     (Risks and benefits discussed including risk of aspiration, recall and dental damage. All patient questions answered.    Will continue with plan of care.)  intravenous induction     Anesthetic plan, risks, benefits, and alternatives have been provided, discussed and informed consent has been obtained with: patient.  Pre-procedure education provided  Plan discussed with CRNA.        CODE STATUS:

## 2022-08-25 NOTE — DISCHARGE INSTRUCTIONS
Post Operative Cataract Instructions     Right Eye  POST OPERATIVE INSTRUCTIONS  You have received anesthesia today. DO NOT drive, drink alcohol, sign legal documents.   After surgery, your eye will not hurt. It may feel scratchy, sticky or uncomfortable. Your eye will be sensitive to light.  Most people see better 1-3 days after the procedure, but it could take 3 weeks to get the full benefits and reach your visual potential. If your vision is blurry for a few days it is normal, and means you may have swelling outside or inside the eye. For some patients, a bubble is placed and there will be blurriness.   You should receive a post-op kit with tape and an eye shield. Wear the shield for the first 3 nights after surgery to keep you from rubbing the eye.  You may wear glasses or sunglasses during the day.  You must keep eye protected at all times.  Most people are able to return to their normal routine 1-3 days after surgery, however, due to the brain adjusting to your new vision you may have trouble judging distances and want to be careful when driving and going up and downstairs.   You can shower and wash your hair the day after surgery. Keep water, shampoo, hair spray and shaving lotion out of the eye, especially for the first week.   If eyes become stuck together after surgery you may soak with warm cloth.  During the first week, you should AVOID:   Rubbing or putting pressure on your eye.  Eye make-up, face cream or lotion, hair coloring or perms  Strenuous activities, such as running or lifting weights, as to avoid sweat from getting in the eye. Avoid swimming, hot tubs, fumes or yanna conditions.   Sleeping on operative side.  Excessive sneezing or coughing.  Hanging the head down for periods of time. Keep your head above your heart.    Some discomfort and blurred vision after surgery are normal, but if you have any unusual pain, swelling, bleeding or sudden decrease in vision, contact our office immediately.      Emergency assistance is available at any time by calling:    Dr.Mark Jessica Lopez  [phone]    If unable to reach call [facility] @ [phone]    You have been prescribed an eye drop to use after surgery, please follow these instructions and bring eye drops and instructions with you to post op appointment:    Difluprednate (DUREZOL) 0.05 %. Shake well before use.    USE 1 DROP 4 (FOUR) TIMES A DAY FOR 1 WEEK THEN STARTING WEEK 2, ONLY USE 2 (TWO) TIMES A DAY UNTIL YOU RUN OUT OF DROPS.     PLACE A BHAVESH IN THE DAY COLUMN EACH TIME YOU USE TO KEEP ON SCHEDULE    WEEK 1-USE 4  (FOUR) TIMES A DAY DAY 1  []   []    []   []     DAY 2  []   []    []   []  DAY 3  []   []    []   []  DAY 4  []   []    []   []  DAY 5  []   []    []   []  DAY 6  []   []    []   []  DAY 7  []   []    []   []      WEEK 2-USE 2 (TWO)  TIMES A DAY DAY 1  []   []  DAY 2  []   []  DAY 3  []   []  DAY 4  []   []  DAY 5  []   []  DAY 6  []   []  DAY 7  []   []      WEEK 3-USE 2 (TWO) TIMES A DAY DAY 1  []   []  DAY 2  []   []  DAY 3  []   []  DAY 4  []   []  DAY 5  []   []  DAY 6  []   []  DAY 7  []   []      WEEK 4-USE 2 (TWO)TIMES A DAY DAY 1  []   []  DAY 2  []   []  DAY 3  []   []  DAY 4  []   []  DAY 5  []   []  DAY 6  []   []  DAY 7  []   []         Please follow all post op instructions and follow up appointment time from your physician's office included in your discharge packet.  .  No pushing, pulling, tugging,  heavy lifting, or strenuous activity.  No major decision making, driving, or drinking alcoholic beverages for 24 hours. ( due to the medications you have  received)  Always use good hand hygiene/washing techniques.  NO driving while taking pain medications.    * if you have an incision:  Check your incision area every day for signs of infection.   Check for:  * more redness, swelling, or pain  *more fluid or blood  *warmth  *pus or bad smell

## 2022-09-15 RX ORDER — HYDROCHLOROTHIAZIDE 25 MG/1
TABLET ORAL
Qty: 90 TABLET | Refills: 0 | Status: SHIPPED | OUTPATIENT
Start: 2022-09-15 | End: 2022-11-03

## 2022-09-15 NOTE — TELEPHONE ENCOUNTER
Rx Refill Note  Requested Prescriptions     Pending Prescriptions Disp Refills   • hydroCHLOROthiazide (HYDRODIURIL) 25 MG tablet [Pharmacy Med Name: hydroCHLOROthiazide 25 MG Oral Tablet] 90 tablet 0     Sig: Take 1 tablet by mouth once daily      Last office visit with prescribing clinician: 10/11/2021      Next office visit with prescribing clinician: 10/24/2022            Thania Bear MA  09/15/22, 13:54 EDT     Prescription adjusted to reflect 90 days with 0 refills until scheduled appointment and years supply will be given

## 2022-10-20 DIAGNOSIS — I10 ESSENTIAL HYPERTENSION: ICD-10-CM

## 2022-10-21 RX ORDER — AMLODIPINE BESYLATE 10 MG/1
10 TABLET ORAL DAILY
Qty: 90 TABLET | Refills: 3 | Status: SHIPPED | OUTPATIENT
Start: 2022-10-21

## 2022-10-21 RX ORDER — ATENOLOL 25 MG/1
25 TABLET ORAL DAILY
Qty: 90 TABLET | Refills: 3 | Status: SHIPPED | OUTPATIENT
Start: 2022-10-21

## 2022-10-21 RX ORDER — ALBUTEROL SULFATE 90 UG/1
2 AEROSOL, METERED RESPIRATORY (INHALATION) EVERY 4 HOURS PRN
Qty: 18 G | Refills: 12 | Status: SHIPPED | OUTPATIENT
Start: 2022-10-21

## 2022-11-03 RX ORDER — HYDROCHLOROTHIAZIDE 25 MG/1
TABLET ORAL
Qty: 90 TABLET | Refills: 3 | Status: SHIPPED | OUTPATIENT
Start: 2022-11-03

## 2022-11-03 NOTE — TELEPHONE ENCOUNTER
Rx Refill Note  Requested Prescriptions     Pending Prescriptions Disp Refills   • hydroCHLOROthiazide (HYDRODIURIL) 25 MG tablet [Pharmacy Med Name: hydroCHLOROthiazide 25 MG Oral Tablet] 90 tablet 0     Sig: Take 1 tablet by mouth once daily      Last office visit with prescribing clinician: 10/11/2021      Next office visit with prescribing clinician: 11/18/2022            Loreto Ash, JORDANA  11/03/22, 13:12 EDT

## 2023-04-13 ENCOUNTER — HOSPITAL ENCOUNTER (INPATIENT)
Facility: HOSPITAL | Age: 78
LOS: 8 days | Discharge: HOME | DRG: 193 | End: 2023-04-21
Attending: EMERGENCY MEDICINE | Admitting: FAMILY MEDICINE
Payer: MEDICARE

## 2023-04-13 ENCOUNTER — APPOINTMENT (OUTPATIENT)
Dept: CT IMAGING | Facility: HOSPITAL | Age: 78
DRG: 193 | End: 2023-04-13
Payer: MEDICARE

## 2023-04-13 ENCOUNTER — APPOINTMENT (OUTPATIENT)
Dept: GENERAL RADIOLOGY | Facility: HOSPITAL | Age: 78
DRG: 193 | End: 2023-04-13
Payer: MEDICARE

## 2023-04-13 DIAGNOSIS — J96.10 CHRONIC RESPIRATORY FAILURE: ICD-10-CM

## 2023-04-13 DIAGNOSIS — J96.01 ACUTE RESPIRATORY FAILURE WITH HYPOXIA: Primary | ICD-10-CM

## 2023-04-13 DIAGNOSIS — J18.9 COMMUNITY ACQUIRED PNEUMONIA, UNSPECIFIED LATERALITY: ICD-10-CM

## 2023-04-13 DIAGNOSIS — J44.1 COPD EXACERBATION: ICD-10-CM

## 2023-04-13 DIAGNOSIS — J44.9 CHRONIC OBSTRUCTIVE PULMONARY DISEASE (COPD): ICD-10-CM

## 2023-04-13 DIAGNOSIS — J44.1 COPD WITH ACUTE EXACERBATION: ICD-10-CM

## 2023-04-13 DIAGNOSIS — J96.02 ACUTE RESPIRATORY FAILURE WITH HYPOXIA AND HYPERCAPNIA: ICD-10-CM

## 2023-04-13 DIAGNOSIS — J96.01 ACUTE RESPIRATORY FAILURE WITH HYPOXIA AND HYPERCAPNIA: ICD-10-CM

## 2023-04-13 PROBLEM — J96.00 ACUTE RESPIRATORY FAILURE: Status: ACTIVE | Noted: 2023-04-13

## 2023-04-13 LAB
A-A DO2: 43.4 MMHG
ALBUMIN SERPL-MCNC: 3.3 G/DL (ref 3.5–5.2)
ALBUMIN/GLOB SERPL: 0.8 G/DL
ALP SERPL-CCNC: 114 U/L (ref 39–117)
ALT SERPL W P-5'-P-CCNC: 23 U/L (ref 1–33)
ANION GAP SERPL CALCULATED.3IONS-SCNC: 12.3 MMOL/L (ref 5–15)
ARTERIAL PATENCY WRIST A: POSITIVE
AST SERPL-CCNC: 19 U/L (ref 1–32)
ATMOSPHERIC PRESS: 731 MMHG
B PARAPERT DNA SPEC QL NAA+PROBE: NOT DETECTED
B PERT DNA SPEC QL NAA+PROBE: NOT DETECTED
BASE EXCESS BLDA CALC-SCNC: 9.8 MMOL/L (ref 0–2)
BASOPHILS # BLD AUTO: 0.16 10*3/MM3 (ref 0–0.2)
BASOPHILS NFR BLD AUTO: 0.8 % (ref 0–1.5)
BDY SITE: ABNORMAL
BILIRUB SERPL-MCNC: 0.4 MG/DL (ref 0–1.2)
BUN SERPL-MCNC: 12 MG/DL (ref 8–23)
BUN/CREAT SERPL: 18.5 (ref 7–25)
C PNEUM DNA NPH QL NAA+NON-PROBE: NOT DETECTED
CALCIUM SPEC-SCNC: 9.4 MG/DL (ref 8.6–10.5)
CHLORIDE SERPL-SCNC: 92 MMOL/L (ref 98–107)
CO2 SERPL-SCNC: 33.7 MMOL/L (ref 22–29)
COHGB MFR BLD: 1.5 % (ref 0–2)
CREAT SERPL-MCNC: 0.65 MG/DL (ref 0.57–1)
D-LACTATE SERPL-SCNC: 1.2 MMOL/L (ref 0.5–2)
D-LACTATE SERPL-SCNC: 2.7 MMOL/L (ref 0.5–2)
DEPRECATED RDW RBC AUTO: 44.9 FL (ref 37–54)
EGFRCR SERPLBLD CKD-EPI 2021: 91.4 ML/MIN/1.73
EOSINOPHIL # BLD AUTO: 0.21 10*3/MM3 (ref 0–0.4)
EOSINOPHIL NFR BLD AUTO: 1 % (ref 0.3–6.2)
ERYTHROCYTE [DISTWIDTH] IN BLOOD BY AUTOMATED COUNT: 13.2 % (ref 12.3–15.4)
FLUAV SUBTYP SPEC NAA+PROBE: NOT DETECTED
FLUAV SUBTYP SPEC NAA+PROBE: NOT DETECTED
FLUBV RNA ISLT QL NAA+PROBE: NOT DETECTED
FLUBV RNA ISLT QL NAA+PROBE: NOT DETECTED
GAS FLOW AIRWAY: 2 LPM
GLOBULIN UR ELPH-MCNC: 4.4 GM/DL
GLUCOSE SERPL-MCNC: 160 MG/DL (ref 65–99)
HADV DNA SPEC NAA+PROBE: NOT DETECTED
HBA1C MFR BLD: 6.1 % (ref 4.8–5.6)
HCO3 BLDA-SCNC: 36.3 MMOL/L (ref 22–28)
HCOV 229E RNA SPEC QL NAA+PROBE: NOT DETECTED
HCOV HKU1 RNA SPEC QL NAA+PROBE: NOT DETECTED
HCOV NL63 RNA SPEC QL NAA+PROBE: NOT DETECTED
HCOV OC43 RNA SPEC QL NAA+PROBE: NOT DETECTED
HCT VFR BLD AUTO: 47.4 % (ref 34–46.6)
HCT VFR BLD CALC: 44.2 %
HGB BLD-MCNC: 15.3 G/DL (ref 12–15.9)
HMPV RNA NPH QL NAA+NON-PROBE: NOT DETECTED
HOLD SPECIMEN: NORMAL
HOLD SPECIMEN: NORMAL
HPIV1 RNA ISLT QL NAA+PROBE: NOT DETECTED
HPIV2 RNA SPEC QL NAA+PROBE: NOT DETECTED
HPIV3 RNA NPH QL NAA+PROBE: NOT DETECTED
HPIV4 P GENE NPH QL NAA+PROBE: NOT DETECTED
IMM GRANULOCYTES # BLD AUTO: 0.12 10*3/MM3 (ref 0–0.05)
IMM GRANULOCYTES NFR BLD AUTO: 0.6 % (ref 0–0.5)
INHALED O2 CONCENTRATION: 28 %
LYMPHOCYTES # BLD AUTO: 0.9 10*3/MM3 (ref 0.7–3.1)
LYMPHOCYTES NFR BLD AUTO: 4.3 % (ref 19.6–45.3)
Lab: ABNORMAL
M PNEUMO IGG SER IA-ACNC: NOT DETECTED
MAGNESIUM SERPL-MCNC: 1.8 MG/DL (ref 1.6–2.4)
MCH RBC QN AUTO: 29.3 PG (ref 26.6–33)
MCHC RBC AUTO-ENTMCNC: 32.3 G/DL (ref 31.5–35.7)
MCV RBC AUTO: 90.8 FL (ref 79–97)
METHGB BLD QL: 0.2 % (ref 0–1.5)
MODALITY: ABNORMAL
MONOCYTES # BLD AUTO: 1.85 10*3/MM3 (ref 0.1–0.9)
MONOCYTES NFR BLD AUTO: 8.8 % (ref 5–12)
NEUTROPHILS NFR BLD AUTO: 17.74 10*3/MM3 (ref 1.7–7)
NEUTROPHILS NFR BLD AUTO: 84.5 % (ref 42.7–76)
NOTE: ABNORMAL
NRBC BLD AUTO-RTO: 0 /100 WBC (ref 0–0.2)
NT-PROBNP SERPL-MCNC: 272.8 PG/ML (ref 0–1800)
OXYHGB MFR BLDV: 95.7 % (ref 94–99)
PCO2 BLDA: 55.1 MM HG (ref 35–45)
PCO2 TEMP ADJ BLD: ABNORMAL MM[HG]
PH BLDA: 7.43 PH UNITS (ref 7.35–7.45)
PH, TEMP CORRECTED: ABNORMAL
PLATELET # BLD AUTO: 240 10*3/MM3 (ref 140–450)
PMV BLD AUTO: 11 FL (ref 6–12)
PO2 BLDA: 86.5 MM HG (ref 75–100)
PO2 TEMP ADJ BLD: ABNORMAL MM[HG]
POTASSIUM SERPL-SCNC: 2.9 MMOL/L (ref 3.5–5.2)
PROCALCITONIN SERPL-MCNC: 0.43 NG/ML (ref 0–0.25)
PROT SERPL-MCNC: 7.7 G/DL (ref 6–8.5)
RBC # BLD AUTO: 5.22 10*6/MM3 (ref 3.77–5.28)
RHINOVIRUS RNA SPEC NAA+PROBE: NOT DETECTED
RSV RNA NPH QL NAA+NON-PROBE: NOT DETECTED
SAO2 % BLDCOA: 97.3 % (ref 94–100)
SARS-COV-2 RNA NPH QL NAA+NON-PROBE: NOT DETECTED
SARS-COV-2 RNA RESP QL NAA+PROBE: NOT DETECTED
SODIUM SERPL-SCNC: 138 MMOL/L (ref 136–145)
TROPONIN T SERPL HS-MCNC: 15 NG/L
VENTILATOR MODE: ABNORMAL
WBC NRBC COR # BLD: 20.98 10*3/MM3 (ref 3.4–10.8)
WHOLE BLOOD HOLD COAG: NORMAL
WHOLE BLOOD HOLD SPECIMEN: NORMAL

## 2023-04-13 PROCEDURE — 83036 HEMOGLOBIN GLYCOSYLATED A1C: CPT | Performed by: FAMILY MEDICINE

## 2023-04-13 PROCEDURE — 71045 X-RAY EXAM CHEST 1 VIEW: CPT

## 2023-04-13 PROCEDURE — 83735 ASSAY OF MAGNESIUM: CPT | Performed by: EMERGENCY MEDICINE

## 2023-04-13 PROCEDURE — 85025 COMPLETE CBC W/AUTO DIFF WBC: CPT

## 2023-04-13 PROCEDURE — 63710000001 PREDNISONE PER 1 MG: Performed by: FAMILY MEDICINE

## 2023-04-13 PROCEDURE — 94664 DEMO&/EVAL PT USE INHALER: CPT

## 2023-04-13 PROCEDURE — 71250 CT THORAX DX C-: CPT

## 2023-04-13 PROCEDURE — 82805 BLOOD GASES W/O2 SATURATION: CPT

## 2023-04-13 PROCEDURE — 94799 UNLISTED PULMONARY SVC/PX: CPT

## 2023-04-13 PROCEDURE — 84145 PROCALCITONIN (PCT): CPT | Performed by: EMERGENCY MEDICINE

## 2023-04-13 PROCEDURE — 83880 ASSAY OF NATRIURETIC PEPTIDE: CPT

## 2023-04-13 PROCEDURE — 80053 COMPREHEN METABOLIC PANEL: CPT

## 2023-04-13 PROCEDURE — 25010000002 ENOXAPARIN PER 10 MG: Performed by: FAMILY MEDICINE

## 2023-04-13 PROCEDURE — 83050 HGB METHEMOGLOBIN QUAN: CPT

## 2023-04-13 PROCEDURE — 25010000002 METHYLPREDNISOLONE PER 125 MG: Performed by: EMERGENCY MEDICINE

## 2023-04-13 PROCEDURE — 25010000002 MAGNESIUM SULFATE 2 GM/50ML SOLUTION: Performed by: EMERGENCY MEDICINE

## 2023-04-13 PROCEDURE — 0202U NFCT DS 22 TRGT SARS-COV-2: CPT | Performed by: EMERGENCY MEDICINE

## 2023-04-13 PROCEDURE — 99223 1ST HOSP IP/OBS HIGH 75: CPT | Performed by: FAMILY MEDICINE

## 2023-04-13 PROCEDURE — 94761 N-INVAS EAR/PLS OXIMETRY MLT: CPT

## 2023-04-13 PROCEDURE — 82375 ASSAY CARBOXYHB QUANT: CPT

## 2023-04-13 PROCEDURE — 83605 ASSAY OF LACTIC ACID: CPT | Performed by: EMERGENCY MEDICINE

## 2023-04-13 PROCEDURE — 87636 SARSCOV2 & INF A&B AMP PRB: CPT

## 2023-04-13 PROCEDURE — 36415 COLL VENOUS BLD VENIPUNCTURE: CPT

## 2023-04-13 PROCEDURE — 84484 ASSAY OF TROPONIN QUANT: CPT

## 2023-04-13 PROCEDURE — 99285 EMERGENCY DEPT VISIT HI MDM: CPT

## 2023-04-13 PROCEDURE — 93005 ELECTROCARDIOGRAM TRACING: CPT

## 2023-04-13 PROCEDURE — 36600 WITHDRAWAL OF ARTERIAL BLOOD: CPT

## 2023-04-13 RX ORDER — DIFLUPREDNATE OPHTHALMIC 0.5 MG/ML
1 EMULSION OPHTHALMIC 4 TIMES DAILY
Status: DISCONTINUED | OUTPATIENT
Start: 2023-04-13 | End: 2023-04-14

## 2023-04-13 RX ORDER — ATENOLOL 25 MG/1
25 TABLET ORAL DAILY
Status: DISCONTINUED | OUTPATIENT
Start: 2023-04-14 | End: 2023-04-21 | Stop reason: HOSPADM

## 2023-04-13 RX ORDER — METHYLPREDNISOLONE SODIUM SUCCINATE 125 MG/2ML
125 INJECTION, POWDER, LYOPHILIZED, FOR SOLUTION INTRAMUSCULAR; INTRAVENOUS ONCE
Status: COMPLETED | OUTPATIENT
Start: 2023-04-13 | End: 2023-04-13

## 2023-04-13 RX ORDER — AMLODIPINE BESYLATE 5 MG/1
10 TABLET ORAL DAILY
Status: DISCONTINUED | OUTPATIENT
Start: 2023-04-14 | End: 2023-04-21 | Stop reason: HOSPADM

## 2023-04-13 RX ORDER — ACETAMINOPHEN 325 MG/1
650 TABLET ORAL EVERY 4 HOURS PRN
Status: DISCONTINUED | OUTPATIENT
Start: 2023-04-13 | End: 2023-04-21 | Stop reason: HOSPADM

## 2023-04-13 RX ORDER — CARBOXYMETHYLCELLULOSE SODIUM 5 MG/ML
1 SOLUTION/ DROPS OPHTHALMIC 3 TIMES DAILY PRN
Status: DISCONTINUED | OUTPATIENT
Start: 2023-04-13 | End: 2023-04-21 | Stop reason: HOSPADM

## 2023-04-13 RX ORDER — ONDANSETRON 2 MG/ML
4 INJECTION INTRAMUSCULAR; INTRAVENOUS EVERY 6 HOURS PRN
Status: DISCONTINUED | OUTPATIENT
Start: 2023-04-13 | End: 2023-04-21 | Stop reason: HOSPADM

## 2023-04-13 RX ORDER — HYDROCHLOROTHIAZIDE 12.5 MG/1
12.5 TABLET ORAL DAILY
Status: DISCONTINUED | OUTPATIENT
Start: 2023-04-14 | End: 2023-04-13

## 2023-04-13 RX ORDER — CHOLECALCIFEROL (VITAMIN D3) 125 MCG
5 CAPSULE ORAL NIGHTLY PRN
Status: DISCONTINUED | OUTPATIENT
Start: 2023-04-13 | End: 2023-04-21 | Stop reason: HOSPADM

## 2023-04-13 RX ORDER — ACETAMINOPHEN 160 MG/5ML
650 SOLUTION ORAL EVERY 4 HOURS PRN
Status: DISCONTINUED | OUTPATIENT
Start: 2023-04-13 | End: 2023-04-21 | Stop reason: HOSPADM

## 2023-04-13 RX ORDER — IPRATROPIUM BROMIDE AND ALBUTEROL SULFATE 2.5; .5 MG/3ML; MG/3ML
3 SOLUTION RESPIRATORY (INHALATION) ONCE
Status: COMPLETED | OUTPATIENT
Start: 2023-04-13 | End: 2023-04-13

## 2023-04-13 RX ORDER — PREDNISONE 20 MG/1
40 TABLET ORAL
Status: DISCONTINUED | OUTPATIENT
Start: 2023-04-13 | End: 2023-04-15

## 2023-04-13 RX ORDER — MAGNESIUM SULFATE HEPTAHYDRATE 40 MG/ML
2 INJECTION, SOLUTION INTRAVENOUS ONCE
Status: COMPLETED | OUTPATIENT
Start: 2023-04-13 | End: 2023-04-13

## 2023-04-13 RX ORDER — NICOTINE 21 MG/24HR
1 PATCH, TRANSDERMAL 24 HOURS TRANSDERMAL EVERY 24 HOURS
Status: DISCONTINUED | OUTPATIENT
Start: 2023-04-13 | End: 2023-04-21 | Stop reason: HOSPADM

## 2023-04-13 RX ORDER — ACETAMINOPHEN 650 MG/1
650 SUPPOSITORY RECTAL EVERY 4 HOURS PRN
Status: DISCONTINUED | OUTPATIENT
Start: 2023-04-13 | End: 2023-04-21 | Stop reason: HOSPADM

## 2023-04-13 RX ORDER — SODIUM CHLORIDE 0.9 % (FLUSH) 0.9 %
10 SYRINGE (ML) INJECTION AS NEEDED
Status: DISCONTINUED | OUTPATIENT
Start: 2023-04-13 | End: 2023-04-21 | Stop reason: HOSPADM

## 2023-04-13 RX ORDER — SODIUM CHLORIDE 0.9 % (FLUSH) 0.9 %
10 SYRINGE (ML) INJECTION EVERY 12 HOURS SCHEDULED
Status: DISCONTINUED | OUTPATIENT
Start: 2023-04-13 | End: 2023-04-21 | Stop reason: HOSPADM

## 2023-04-13 RX ORDER — BENZONATATE 100 MG/1
200 CAPSULE ORAL 3 TIMES DAILY PRN
Status: DISCONTINUED | OUTPATIENT
Start: 2023-04-13 | End: 2023-04-21 | Stop reason: HOSPADM

## 2023-04-13 RX ORDER — SODIUM CHLORIDE 9 MG/ML
40 INJECTION, SOLUTION INTRAVENOUS AS NEEDED
Status: DISCONTINUED | OUTPATIENT
Start: 2023-04-13 | End: 2023-04-21 | Stop reason: HOSPADM

## 2023-04-13 RX ORDER — IPRATROPIUM BROMIDE AND ALBUTEROL SULFATE 2.5; .5 MG/3ML; MG/3ML
3 SOLUTION RESPIRATORY (INHALATION)
Status: DISCONTINUED | OUTPATIENT
Start: 2023-04-14 | End: 2023-04-21 | Stop reason: HOSPADM

## 2023-04-13 RX ORDER — ONDANSETRON 4 MG/1
4 TABLET, FILM COATED ORAL EVERY 6 HOURS PRN
Status: DISCONTINUED | OUTPATIENT
Start: 2023-04-13 | End: 2023-04-21 | Stop reason: HOSPADM

## 2023-04-13 RX ORDER — ENOXAPARIN SODIUM 100 MG/ML
40 INJECTION SUBCUTANEOUS NIGHTLY
Status: DISCONTINUED | OUTPATIENT
Start: 2023-04-13 | End: 2023-04-21 | Stop reason: HOSPADM

## 2023-04-13 RX ORDER — ALUMINA, MAGNESIA, AND SIMETHICONE 2400; 2400; 240 MG/30ML; MG/30ML; MG/30ML
15 SUSPENSION ORAL EVERY 6 HOURS PRN
Status: DISCONTINUED | OUTPATIENT
Start: 2023-04-13 | End: 2023-04-21 | Stop reason: HOSPADM

## 2023-04-13 RX ADMIN — IPRATROPIUM BROMIDE AND ALBUTEROL SULFATE 3 ML: .5; 3 SOLUTION RESPIRATORY (INHALATION) at 15:33

## 2023-04-13 RX ADMIN — Medication 1 PATCH: at 22:18

## 2023-04-13 RX ADMIN — ENOXAPARIN SODIUM 40 MG: 100 INJECTION SUBCUTANEOUS at 22:18

## 2023-04-13 RX ADMIN — Medication 5 MG: at 22:18

## 2023-04-13 RX ADMIN — PREDNISONE 40 MG: 20 TABLET ORAL at 22:31

## 2023-04-13 RX ADMIN — MAGNESIUM SULFATE HEPTAHYDRATE 2 G: 40 INJECTION, SOLUTION INTRAVENOUS at 15:51

## 2023-04-13 RX ADMIN — METHYLPREDNISOLONE SODIUM SUCCINATE 125 MG: 125 INJECTION, POWDER, FOR SOLUTION INTRAMUSCULAR; INTRAVENOUS at 15:50

## 2023-04-13 RX ADMIN — Medication 10 ML: at 22:31

## 2023-04-13 RX ADMIN — SODIUM CHLORIDE 1000 ML: 9 INJECTION, SOLUTION INTRAVENOUS at 15:50

## 2023-04-13 RX ADMIN — SODIUM CHLORIDE 1 G: 900 INJECTION INTRAVENOUS at 21:11

## 2023-04-13 NOTE — ED PROVIDER NOTES
"Subjective   History of Present Illness  77-year-old female presents to ED with chief complaint of short of breath.  Short of breath for 2 to 3 days.  Increased cough.  Cough is productive of thick green-yellow sputum.  Admits to wheezing.  No prior treatments or limiting factors.  No prior valuations.  No other complaints at this time.  Presents to the ED with a pulse ox of 81% on room air.  Does not wear oxygen at home        Review of Systems   Respiratory: Positive for cough, shortness of breath and wheezing.    All other systems reviewed and are negative.      Past Medical History:   Diagnosis Date   • Arthritis    • COPD (chronic obstructive pulmonary disease)    • Gall stones    • Goiter     \"inward\"   • Hypertension    • Hypertension    • Kidney infection    • Kidney stone    • Kidney stones    • Migraine headache    • Scarlet fever        Allergies   Allergen Reactions   • Contrast Dye (Echo Or Unknown Ct/Mr) Anaphylaxis   • Ciprofloxacin Other (See Comments)     redness   • Keflex [Cephalexin] Swelling   • Penicillins Other (See Comments)     Numbness   • Sulfa Antibiotics Swelling   • Terramycin [Oxytetracycline] Other (See Comments)     redness   • Latex Rash   • Percocet [Oxycodone-Acetaminophen] GI Intolerance   • Xyzal [Levocetirizine] GI Intolerance       Past Surgical History:   Procedure Laterality Date   • BLADDER SURGERY     • CATARACT EXTRACTION W/ INTRAOCULAR LENS IMPLANT Left 8/11/2022    Procedure: CATARACT PHACO EXTRACTION WITH INTRAOCULAR LENS IMPLANT LEFT;  Surgeon: Sathish Lopez MD;  Location: [facility];  Service: Ophthalmology;  Laterality: Left;   • CATARACT EXTRACTION W/ INTRAOCULAR LENS IMPLANT Right 8/25/2022    Procedure: CATARACT PHACO EXTRACTION WITH INTRAOCULAR LENS IMPLANT RIGHT;  Surgeon: Sathish Lopez MD;  Location: [facility] OR;  Service: Ophthalmology;  Laterality: Right;   • CHOLECYSTECTOMY     • HYSTERECTOMY     • TONSILLECTOMY         Family History   Problem " Relation Age of Onset   • Heart disease Mother    • Arthritis Mother    • Diabetes Mother    • Hypertension Mother    • Colon cancer Father    • Arthritis Father    • Diabetes Father    • Hypertension Father    • Migraines Sister        Social History     Socioeconomic History   • Marital status:    Tobacco Use   • Smoking status: Every Day     Packs/day: 1.00     Types: Cigarettes     Start date: 1960   • Smokeless tobacco: Former     Quit date: 9/24/2017   Vaping Use   • Vaping Use: Never used   Substance and Sexual Activity   • Alcohol use: No   • Drug use: No   • Sexual activity: Defer           Objective   Physical Exam  Vitals and nursing note reviewed.   Constitutional:       Appearance: She is well-developed.   Cardiovascular:      Rate and Rhythm: Normal rate and regular rhythm.   Pulmonary:      Effort: Tachypnea present.      Comments: Decreased breath sounds in the bilateral lung fields.  Faint end expiratory wheezing.  Mild tachypnea.  No significant accessory muscle use  Chest:      Chest wall: No mass.   Musculoskeletal:      Right lower leg: No edema.      Left lower leg: No edema.   Neurological:      Mental Status: She is alert.         Procedures           ED Course  ED Course as of 04/14/23 0726   u Apr 13, 2023   1511 EKG interpreted by me.  Sinus rhythm.  Rate of 86.  Right ventricular conduction delay.  Nonspecific ST segment depressions.  Abnormal EKG [CG]      ED Course User Index  [CG] Mark Starr, DO                                           MDM  Chief complaint: Shortness of breath    Initial impression of presenting illness: 77-year-old female with shortness of breath    Comorbidities requiring consideration and/or management: COPD, hypertension, other    Differential diagnosis includes but not limited to: COPD exacerbation, pneumonia, Sirs, sepsis, pneumothorax, STEMI, NSTEMI, ACS,    Patient arrives hemodynamically stable, afebrile, without respiratory distress with  initial vitals interpreted by myself.  Pertinent initial vitals include pulse ox of 81% on room air, heart rate of 89    Pertinent features from physical exam:Physical Exam  Vitals and nursing note reviewed.   Constitutional:       Appearance: She is well-developed.   Cardiovascular:      Rate and Rhythm: Normal rate and regular rhythm.   Pulmonary:      Effort: Tachypnea present.      Comments: Decreased breath sounds in the bilateral lung fields.  Faint end expiratory wheezing.  Mild tachypnea.  No significant accessory muscle use  Chest:      Chest wall: No mass.   Musculoskeletal:      Right lower leg: No edema.      Left lower leg: No edema.   Neurological:      Mental Status: She is alert.         Initial diagnostic plan: CBC, CMP, procalcitonin, lactic acid, BNP, troponin, chest x-ray, EKG, other    Results from initial plan were reviewed and interpreted by myself and include: Respiratory panel negative, lactic acid elevated at 2.7, ABG is reassuring with mild hypercapnia compensated, COVID flu negative, procalcitonin slightly elevated 0.43, nonspecific minimal abnormalities on the CMP including mild hypokalemia, CBC does show a white blood cell count of 20,000.  CT chest shows possible bronchitis      Diagnostic information from other sources includes: Review of previous visits, prior labs, prior imaging, available notes from prior evaluations or visits with specialists, medication list, allergies, past medical history, past surgical history    Interventions in the ED included: IV fluid rehydration, considered antibiotics but suspect most likely viral infection.  Magnesium, DuoNebs, Solu-Medrol, supplemental    Reevaluation: Resting comfortably, still requiring 3 to 4 L nasal cannula to maintain appropriate oxygen saturation.    Results/clinical rationale were discussed with patient    Consultations and discussions of results with other physicians: Dr. Roberts, hospitalist who graciously excepts patient for  admission for further evaluation medical management.  Does not typically require oxygen at home    Discussion of results/management/plan: Please see note above    Disposition plan: Admission secondary to hypoxic respiratory failure likely related to acute bronchitis        Critical Care  Performed by: Mark Starr DO  Authorized by: Mark Starr DO     Critical care provider statement:     Critical care time (minutes): 35    Critical care time was exclusive of:  Separately billable procedures and treating other patients    Critical care was necessary to treat or prevent imminent or life-threatening deterioration of the following conditions: Acute respiratory failure, hypoxia, acute bronchitis, new oxygen requirements, other    Critical care was time spent personally by me on the following activities:  Ordering and performing treatments and interventions, development of treatment plan with patient or surrogate, discussions with consultants, evaluation of patient's response to treatment, examination of patient, ordering and review of laboratory studies, ordering and review of radiographic studies, pulse oximetry, re-evaluation of patient's condition and review of old charts      Final diagnoses:   Acute respiratory failure with hypoxia   COPD with acute exacerbation       ED Disposition  ED Disposition     ED Disposition   Decision to Admit    Condition   --    Comment   Level of Care: Telemetry [5]   Diagnosis: Acute respiratory failure [518.81.ICD-9-CM]   Admitting Physician: ELVIN VALLES [[id number]]   Attending Physician: ELVIN VALLES [[id number]]   Isolate for COVID?: No [0]   Certification: I Certify That Inpatient Hospital Services Are Medically Necessary For Greater Than 2 Midnights               No follow-up provider specified.       Medication List      No changes were made to your prescriptions during this visit.          Mark Starr DO  04/14/23 0726

## 2023-04-13 NOTE — H&P
[facility]   HISTORY AND PHYSICAL      Name:  Carolin Toscano   Age:  77 y.o.  Sex:  female  :  1945  MRN:  [id number]   Visit Number:  [id number]  Admission Date:  2023  Date Of Service:  23  Primary Care Physician:  Jason Nicholson MD    Chief Complaint:     Shortness of breath, cough, chills    History Of Presenting Illness:      Patient is a 77-year-old female with a history of ongoing tobacco abuse, COPD, hypertension, dyslipidemia, prior kidney stones, who presented to the emergency room with complaints of 3 to 4 days of increasing cough, shortness of breath and chills.  Patient states she actually quit smoking about 3 days ago.  She has been having significant productive cough with thick yellowish sputum production.  She has felt some chills but no actual fevers.  She does use some inhaler at home but cannot remember the name.  She has not followed up with PCP or pulmonology in a while.  She does not routinely wear oxygen at home.  She denies any chest pains or palpitations.    In the ER, patient was hypoxic on arrival and was requiring up to 3 L of oxygen.  She was afebrile and heart rate in the 80s with blood pressure in the 140/70 range.  She has a negative respiratory panel.  She had a lactic acid two-point 7 repeat 1.2.  She had a blood gas pH 7.47 PCO2 55.1 PO2 86.5.  Shift creatinine 0.65 potassium 2.9 CO2 33.7.  proBNP of 272 mildly elevated high-sensitivity troponin.  White count of 21 hemoglobin 15.  Negative flu COVID testing.  A1c 6.1 magnesium 1.8 and procalcitonin 0.43.  She had a chest x-ray was showing emphysematous changes but no acute abnormality.  A CT scan of the chest without contrast was showing findings concerning for bronchitis/bronchiolitis with interlobular septal thickening in the right lung base.  There is no focal airspace disease.  Patient was given empiric antibiotics, bronchodilators, steroids emergency room.  Due to acute  respiratory failure with hypoxia we are asked admit    Review Of Systems:    All systems were reviewed and negative except as mentioned in history of presenting illness, assessment and plan.    Past Medical History: Patient  has a past medical history of Arthritis, COPD (chronic obstructive pulmonary disease), Gall stones, Goiter, Hypertension, Hypertension, Kidney infection, Kidney stone, Kidney stones, Migraine headache, and Scarlet fever.    Past Surgical History: Patient  has a past surgical history that includes Cholecystectomy; Bladder surgery; Tonsillectomy; Hysterectomy; Cataract extraction w/ intraocular lens implant (Left, 8/11/2022); and Cataract extraction w/ intraocular lens implant (Right, 8/25/2022).    Social History: Patient  reports that she has been smoking cigarettes. She started smoking about 63 years ago. She has been smoking an average of 1 pack per day. She quit smokeless tobacco use about 5 years ago. She reports that she does not drink alcohol and does not use drugs.    Family History:  Patient's family history has been reviewed and found to be noncontributory.     Allergies:      Contrast dye (echo or unknown ct/mr), Ciprofloxacin, Keflex [cephalexin], Penicillins, Sulfa antibiotics, Terramycin [oxytetracycline], Latex, Percocet [oxycodone-acetaminophen], and Xyzal [levocetirizine]    Home Medications:    Prior to Admission Medications     Prescriptions Last Dose Informant Patient Reported? Taking?    amLODIPine (NORVASC) 10 MG tablet   No No    Take 1 tablet by mouth Daily.    atenolol (TENORMIN) 25 MG tablet   No No    Take 1 tablet by mouth Daily.    difluprednate (DUREZOL) 0.05 % ophthalmic emulsion  Self No No    Administer 1 drop into the left eye 4 (Four) Times a Day. See admin instructions on AVS.    difluprednate (DUREZOL) 0.05 % ophthalmic emulsion   No No    Administer 1 drop to the right eye 4 (Four) Times a Day. See admin instructions on AVS.    hydroCHLOROthiazide  "(HYDRODIURIL) 25 MG tablet   No No    Take 1 tablet by mouth once daily    multivitamins-minerals (PRESERVISION AREDS 2) capsule capsule  Self Yes No    Take 2 capsules by mouth Daily.    Ventolin  (90 Base) MCG/ACT inhaler   No No    Inhale 2 puffs Every 4 (Four) Hours As Needed for Wheezing.        ED Medications:    Medications   sodium chloride 0.9 % flush 10 mL (has no administration in time range)   ipratropium-albuterol (DUO-NEB) nebulizer solution 3 mL (3 mL Nebulization Given 4/13/23 1533)   methylPREDNISolone sodium succinate (SOLU-Medrol) injection 125 mg (125 mg Intravenous Given 4/13/23 1550)   magnesium sulfate 2g/50 mL (PREMIX) infusion (0 g Intravenous Stopped 4/13/23 1700)   sodium chloride 0.9 % bolus 1,000 mL (0 mL Intravenous Stopped 4/13/23 1700)     Vital Signs:  Temp:  [97.8 °F (36.6 °C)] 97.8 °F (36.6 °C)  Heart Rate:  [86-89] 86  Resp:  [15-16] 16  BP: (119-145)/(65-78) 145/78        04/13/23  1450   Weight: 65.8 kg (145 lb)     Body mass index is 25.69 kg/m².    Physical Exam:     Most recent vital Signs: /78   Pulse 86   Temp 97.8 °F (36.6 °C) (Oral)   Resp 16   Ht 160 cm (63\")   Wt 65.8 kg (145 lb)   SpO2 (!) 88%   BMI 25.69 kg/m²     Physical Exam    Laboratory data:    I have reviewed the labs done in the emergency room.    Results from last 7 days   Lab Units 04/13/23  1458   SODIUM mmol/L 138   POTASSIUM mmol/L 2.9*   CHLORIDE mmol/L 92*   CO2 mmol/L 33.7*   BUN mg/dL 12   CREATININE mg/dL 0.65   CALCIUM mg/dL 9.4   BILIRUBIN mg/dL 0.4   ALK PHOS U/L 114   ALT (SGPT) U/L 23   AST (SGOT) U/L 19   GLUCOSE mg/dL 160*     Results from last 7 days   Lab Units 04/13/23  1458   WBC 10*3/mm3 20.98*   HEMOGLOBIN g/dL 15.3   HEMATOCRIT % 47.4*   PLATELETS 10*3/mm3 240         Results from last 7 days   Lab Units 04/13/23  1458   HSTROP T ng/L 15*     Results from last 7 days   Lab Units 04/13/23  1458   PROBNP pg/mL 272.8             Results from last 7 days   Lab Units " 04/13/23  1539   PH, ARTERIAL pH units 7.427   PO2 ART mm Hg 86.5   PCO2, ARTERIAL mm Hg 55.1*   HCO3 ART mmol/L 36.3*           Invalid input(s): USDES,  BLOODU, NITRITITE, BACT, EP    Pain Management Panel          View : No data to display.                      EKG:      EKG appears to be a sinus rhythm with a normal rate, see no acute ST elevations or T wave abnormalities    Radiology:    XR Chest 1 View    Result Date: 4/13/2023  CLINICAL INDICATION:  SOA Triage Protocol shortness of breath.  EXAMINATION TECHNIQUE: XR CHEST 1 VW-  COMPARISON: Radiographs 10/01/2017.  FINDINGS:  Cardiomegaly Aortic atherosclerosis. Vascular engorgement and prominent interstitial markings with peripheral subpleural linear opacities. No dense consolidation. No pneumothorax or large pleural effusion. Large pulmonary arteries, suggestive of pulmonary arterial hypertension. Background emphysema.      Findings are concerning for mild cardiogenic pulmonary edema. Background emphysema.  Images personally reviewed, interpreted and dictated by SHIRA Villa.       This report was signed and finalized on 4/13/2023 3:41 PM by SHIRA Villa.      Assessment:    1. Acute respiratory failure with hypoxia  2. COPD with exacerbation  3. Likely community-acquired pneumonia, present admission, due to unknown organism  4. Hypertension  5. Chronic tobacco abuse  6. Arthritis    Plan:    Admit as inpatient    Respiratory failure/COPD/pneumonia:  Patient with multiple antibiotic allergies, reviewed with pharmacy, will give Azactam.  Treatment with bronchodilators, prednisone, oxygen therapy.  Wean as tolerated.  Send strep pneumo and Legionella antigens.  Patient does need full pulmonary function test at some point.  Tobacco cessation discussed at length.    Hypertension:  Restart home atenolol and amlodipine.    Chronic tobacco abuse:  Complicates all aspects of care    Patient otherwise meets inpatient level care with anticipate stay  greater than 2 midnights.  Further recommendations been upon clinical course.  Plan of care discussed with patient at bedside and family bedside    Risk Assessment: High  DVT Prophylaxis: Lovenox  Code Status: Full  Diet: Regular    Advance Care Planning   ACP discussion was held with the patient during this visit. Patient does not have an advance directive, declines further assistance.           Rajni Roberts,   04/13/23  19:19 EDT    Dictated utilizing Dragon dictation.

## 2023-04-14 ENCOUNTER — APPOINTMENT (OUTPATIENT)
Dept: CARDIOLOGY | Facility: HOSPITAL | Age: 78
DRG: 193 | End: 2023-04-14
Payer: MEDICARE

## 2023-04-14 LAB
BH CV ECHO MEAS - AI P1/2T: 292.9 MSEC
BH CV ECHO MEAS - AO MAX PG: 26.8 MMHG
BH CV ECHO MEAS - AO MEAN PG: 15 MMHG
BH CV ECHO MEAS - AO ROOT DIAM: 3 CM
BH CV ECHO MEAS - AO V2 MAX: 259 CM/SEC
BH CV ECHO MEAS - AO V2 VTI: 49.7 CM
BH CV ECHO MEAS - AVA(I,D): 1.52 CM2
BH CV ECHO MEAS - EDV(CUBED): 76.8 ML
BH CV ECHO MEAS - EDV(MOD-SP2): 66 ML
BH CV ECHO MEAS - EDV(MOD-SP4): 62.9 ML
BH CV ECHO MEAS - EF(MOD-BP): 74.6 %
BH CV ECHO MEAS - EF(MOD-SP2): 73 %
BH CV ECHO MEAS - EF(MOD-SP4): 77.9 %
BH CV ECHO MEAS - ESV(CUBED): 22 ML
BH CV ECHO MEAS - ESV(MOD-SP2): 17.8 ML
BH CV ECHO MEAS - ESV(MOD-SP4): 13.9 ML
BH CV ECHO MEAS - FS: 34.1 %
BH CV ECHO MEAS - IVS/LVPW: 0.94 CM
BH CV ECHO MEAS - IVSD: 0.99 CM
BH CV ECHO MEAS - LA DIMENSION: 3.1 CM
BH CV ECHO MEAS - LAT PEAK E' VEL: 7.3 CM/SEC
BH CV ECHO MEAS - LV DIASTOLIC VOL/BSA (35-75): 36.6 CM2
BH CV ECHO MEAS - LV MASS(C)D: 143.8 GRAMS
BH CV ECHO MEAS - LV MAX PG: 17.1 MMHG
BH CV ECHO MEAS - LV MEAN PG: 7 MMHG
BH CV ECHO MEAS - LV SYSTOLIC VOL/BSA (12-30): 8.1 CM2
BH CV ECHO MEAS - LV V1 MAX: 207 CM/SEC
BH CV ECHO MEAS - LV V1 VTI: 39 CM
BH CV ECHO MEAS - LVIDD: 4.3 CM
BH CV ECHO MEAS - LVIDS: 2.8 CM
BH CV ECHO MEAS - LVOT AREA: 1.94 CM2
BH CV ECHO MEAS - LVOT DIAM: 1.57 CM
BH CV ECHO MEAS - LVPWD: 1.05 CM
BH CV ECHO MEAS - MED PEAK E' VEL: 6.4 CM/SEC
BH CV ECHO MEAS - MV A MAX VEL: 103 CM/SEC
BH CV ECHO MEAS - MV DEC TIME: 0.13 MSEC
BH CV ECHO MEAS - MV E MAX VEL: 94.7 CM/SEC
BH CV ECHO MEAS - MV E/A: 0.92
BH CV ECHO MEAS - MV MAX PG: 6.6 MMHG
BH CV ECHO MEAS - MV MEAN PG: 4 MMHG
BH CV ECHO MEAS - MV V2 VTI: 24 CM
BH CV ECHO MEAS - MVA(VTI): 3.1 CM2
BH CV ECHO MEAS - PA ACC TIME: 0.13 SEC
BH CV ECHO MEAS - PA PR(ACCEL): 20.5 MMHG
BH CV ECHO MEAS - PA V2 MAX: 132 CM/SEC
BH CV ECHO MEAS - RAP SYSTOLE: 3 MMHG
BH CV ECHO MEAS - RVSP: 45.5 MMHG
BH CV ECHO MEAS - SI(MOD-SP2): 28 ML/M2
BH CV ECHO MEAS - SI(MOD-SP4): 28.5 ML/M2
BH CV ECHO MEAS - SV(LVOT): 75.5 ML
BH CV ECHO MEAS - SV(MOD-SP2): 48.2 ML
BH CV ECHO MEAS - SV(MOD-SP4): 49 ML
BH CV ECHO MEAS - TAPSE (>1.6): 3 CM
BH CV ECHO MEAS - TR MAX PG: 42.5 MMHG
BH CV ECHO MEAS - TR MAX VEL: 326 CM/SEC
BH CV ECHO MEASUREMENTS AVERAGE E/E' RATIO: 13.82
BH CV XLRA - RV BASE: 2.5 CM
BH CV XLRA - RV LENGTH: 7.3 CM
BH CV XLRA - RV MID: 1.72 CM
LEFT ATRIUM VOLUME INDEX: 20.5 ML/M2
LV EF 2D ECHO EST: 80 %
MAXIMAL PREDICTED HEART RATE: 144 BPM
STRESS TARGET HR: 122 BPM

## 2023-04-14 PROCEDURE — 25010000002 ENOXAPARIN PER 10 MG: Performed by: FAMILY MEDICINE

## 2023-04-14 PROCEDURE — 99223 1ST HOSP IP/OBS HIGH 75: CPT | Performed by: INTERNAL MEDICINE

## 2023-04-14 PROCEDURE — 63710000001 PREDNISONE PER 1 MG: Performed by: FAMILY MEDICINE

## 2023-04-14 PROCEDURE — 94799 UNLISTED PULMONARY SVC/PX: CPT

## 2023-04-14 PROCEDURE — 94664 DEMO&/EVAL PT USE INHALER: CPT

## 2023-04-14 PROCEDURE — 93306 TTE W/DOPPLER COMPLETE: CPT | Performed by: INTERNAL MEDICINE

## 2023-04-14 PROCEDURE — 93306 TTE W/DOPPLER COMPLETE: CPT

## 2023-04-14 PROCEDURE — 99232 SBSQ HOSP IP/OBS MODERATE 35: CPT | Performed by: NURSE PRACTITIONER

## 2023-04-14 PROCEDURE — 25010000002 METHYLPREDNISOLONE PER 80 MG: Performed by: INTERNAL MEDICINE

## 2023-04-14 PROCEDURE — 94761 N-INVAS EAR/PLS OXIMETRY MLT: CPT

## 2023-04-14 RX ORDER — METHYLPREDNISOLONE ACETATE 80 MG/ML
80 INJECTION, SUSPENSION INTRA-ARTICULAR; INTRALESIONAL; INTRAMUSCULAR; SOFT TISSUE ONCE
Status: COMPLETED | OUTPATIENT
Start: 2023-04-14 | End: 2023-04-14

## 2023-04-14 RX ORDER — ARFORMOTEROL TARTRATE 15 UG/2ML
15 SOLUTION RESPIRATORY (INHALATION)
Status: DISCONTINUED | OUTPATIENT
Start: 2023-04-14 | End: 2023-04-21 | Stop reason: HOSPADM

## 2023-04-14 RX ORDER — BUDESONIDE 0.5 MG/2ML
1 INHALANT ORAL
Status: DISCONTINUED | OUTPATIENT
Start: 2023-04-14 | End: 2023-04-21 | Stop reason: HOSPADM

## 2023-04-14 RX ADMIN — Medication 10 ML: at 21:34

## 2023-04-14 RX ADMIN — IPRATROPIUM BROMIDE 1 MG: 0.5 SOLUTION RESPIRATORY (INHALATION) at 14:54

## 2023-04-14 RX ADMIN — PREDNISONE 40 MG: 20 TABLET ORAL at 09:08

## 2023-04-14 RX ADMIN — ATENOLOL 25 MG: 25 TABLET ORAL at 09:08

## 2023-04-14 RX ADMIN — BUDESONIDE 1 MG: 0.5 INHALANT RESPIRATORY (INHALATION) at 19:29

## 2023-04-14 RX ADMIN — Medication 10 ML: at 09:08

## 2023-04-14 RX ADMIN — ARFORMOTEROL TARTRATE 15 MCG: 15 SOLUTION RESPIRATORY (INHALATION) at 19:29

## 2023-04-14 RX ADMIN — Medication 1 PATCH: at 21:35

## 2023-04-14 RX ADMIN — ENOXAPARIN SODIUM 40 MG: 100 INJECTION SUBCUTANEOUS at 21:34

## 2023-04-14 RX ADMIN — SODIUM CHLORIDE 1 G: 900 INJECTION INTRAVENOUS at 21:44

## 2023-04-14 RX ADMIN — IPRATROPIUM BROMIDE AND ALBUTEROL SULFATE 3 ML: 2.5; .5 SOLUTION RESPIRATORY (INHALATION) at 12:16

## 2023-04-14 RX ADMIN — SODIUM CHLORIDE 1 G: 900 INJECTION INTRAVENOUS at 04:24

## 2023-04-14 RX ADMIN — IPRATROPIUM BROMIDE AND ALBUTEROL SULFATE 3 ML: 2.5; .5 SOLUTION RESPIRATORY (INHALATION) at 07:03

## 2023-04-14 RX ADMIN — METHYLPREDNISOLONE ACETATE 80 MG: 80 INJECTION, SUSPENSION INTRA-ARTICULAR; INTRALESIONAL; INTRAMUSCULAR; SOFT TISSUE at 15:05

## 2023-04-14 RX ADMIN — IPRATROPIUM BROMIDE AND ALBUTEROL SULFATE 3 ML: 2.5; .5 SOLUTION RESPIRATORY (INHALATION) at 19:29

## 2023-04-14 RX ADMIN — SODIUM CHLORIDE 1 G: 900 INJECTION INTRAVENOUS at 11:44

## 2023-04-14 RX ADMIN — AMLODIPINE BESYLATE 10 MG: 5 TABLET ORAL at 09:08

## 2023-04-14 NOTE — PLAN OF CARE
Goal Outcome Evaluation:  Plan of Care Reviewed With: patient        Progress: no change  Continues to require high flow oxygen and still has notable SOA even with talking.  No reports of pain.

## 2023-04-14 NOTE — PAYOR COMM NOTE
"To:  [employer]  From: Lucia Burgos RN  Phone: [phone]  Fax: [phone]  NPI: [id number]  TIN: [id number]  Member ID: [id number]  MRN: [id number]    Carolin Toscano (77 y.o. Female)     Date of Birth   1945    Social Security Number       Address   [address] [facility] [address]    Home Phone   [phone]    MRN   [id number]       Restorationist   [place]    Marital Status                               Admission Date   4/13/23    Admission Type   Emergency    Admitting Provider   Rajni Roberts DO    Attending Provider   Rajni Roberts DO    Department, Room/Bed   [facility] 3, 327/1       Discharge Date       Discharge Disposition       Discharge Destination                               Attending Provider: Rajni Roberts DO    Allergies: Contrast Dye (Echo Or Unknown Ct/mr), Ciprofloxacin, Keflex [Cephalexin], Penicillins, Sulfa Antibiotics, Terramycin [Oxytetracycline], Latex, Percocet [Oxycodone-acetaminophen], Xyzal [Levocetirizine]    Isolation: None   Infection: COVID Screen (preop/placement) (12/31/20)   Code Status: CPR    Ht: 160 cm (62.99\")   Wt: 69.3 kg (152 lb 12.5 oz)    Admission Cmt: None   Principal Problem: Acute respiratory failure [J96.00]                 Active Insurance as of 4/13/2023     Primary Coverage     Payor Plan Insurance Group Employer/Plan Group    HUMANA MEDICARE REPLACEMENT HU[employer]NA MEDICARE REPLACEMENT [id number]     Payor Plan Address Payor Plan Phone Number Payor Plan Fax Number Effective Dates    [address] [address] [phone]  1/1/2018 - None Entered    [facility] [address]       Subscriber Name Subscriber Birth Date Member ID       CAROLIN TOSCANO 1945 [id number]           Secondary Coverage     Payor Plan Insurance Group Employer/Plan Group    [place]      Payor Plan Address Payor Plan Phone Number Payor Plan Fax Number Effective Dates    [address] [phone]  10/11/2021 - None " Entered    [facility] [id number]       Subscriber Name Subscriber Birth Date Member ID       DAMIAN DYE 1945 [id number]                 Emergency Contacts      (Rel.) Home Phone Work Phone Mobile Phone    Nehal Hoffmann (Daughter) [phone] -- --               History & Physical      Rajni Roberts, DO at 23            [facility]   HISTORY AND PHYSICAL      Name:  Damian Dye   Age:  77 y.o.  Sex:  female  :  1945  MRN:  [id number]   Visit Number:  [id number]  Admission Date:  2023  Date Of Service:  23  Primary Care Physician:  Jason Nicholson MD    Chief Complaint:     Shortness of breath, cough, chills    History Of Presenting Illness:      Patient is a 77-year-old female with a history of ongoing tobacco abuse, COPD, hypertension, dyslipidemia, prior kidney stones, who presented to the emergency room with complaints of 3 to 4 days of increasing cough, shortness of breath and chills.  Patient states she actually quit smoking about 3 days ago.  She has been having significant productive cough with thick yellowish sputum production.  She has felt some chills but no actual fevers.  She does use some inhaler at home but cannot remember the name.  She has not followed up with PCP or pulmonology in a while.  She does not routinely wear oxygen at home.  She denies any chest pains or palpitations.    In the ER, patient was hypoxic on arrival and was requiring up to 3 L of oxygen.  She was afebrile and heart rate in the 80s with blood pressure in the 140/70 range.  She has a negative respiratory panel.  She had a lactic acid two-point 7 repeat 1.2.  She had a blood gas pH 7.47 PCO2 55.1 PO2 86.5.  Shift creatinine 0.65 potassium 2.9 CO2 33.7.  proBNP of 272 mildly elevated high-sensitivity troponin.  White count of 21 hemoglobin 15.  Negative flu COVID testing.  A1c 6.1 magnesium 1.8 and procalcitonin 0.43.  She had a chest x-ray  was showing emphysematous changes but no acute abnormality.  A CT scan of the chest without contrast was showing findings concerning for bronchitis/bronchiolitis with interlobular septal thickening in the right lung base.  There is no focal airspace disease.  Patient was given empiric antibiotics, bronchodilators, steroids emergency room.  Due to acute respiratory failure with hypoxia we are asked admit    Review Of Systems:    All systems were reviewed and negative except as mentioned in history of presenting illness, assessment and plan.    Past Medical History: Patient  has a past medical history of Arthritis, COPD (chronic obstructive pulmonary disease), Gall stones, Goiter, Hypertension, Hypertension, Kidney infection, Kidney stone, Kidney stones, Migraine headache, and Scarlet fever.    Past Surgical History: Patient  has a past surgical history that includes Cholecystectomy; Bladder surgery; Tonsillectomy; Hysterectomy; Cataract extraction w/ intraocular lens implant (Left, 8/11/2022); and Cataract extraction w/ intraocular lens implant (Right, 8/25/2022).    Social History: Patient  reports that she has been smoking cigarettes. She started smoking about 63 years ago. She has been smoking an average of 1 pack per day. She quit smokeless tobacco use about 5 years ago. She reports that she does not drink alcohol and does not use drugs.    Family History:  Patient's family history has been reviewed and found to be noncontributory.     Allergies:      Contrast dye (echo or unknown ct/mr), Ciprofloxacin, Keflex [cephalexin], Penicillins, Sulfa antibiotics, Terramycin [oxytetracycline], Latex, Percocet [oxycodone-acetaminophen], and Xyzal [levocetirizine]    Home Medications:    Prior to Admission Medications     Prescriptions Last Dose Informant Patient Reported? Taking?    amLODIPine (NORVASC) 10 MG tablet   No No    Take 1 tablet by mouth Daily.    atenolol (TENORMIN) 25 MG tablet   No No    Take 1 tablet by  "mouth Daily.    difluprednate (DUREZOL) 0.05 % ophthalmic emulsion  Self No No    Administer 1 drop into the left eye 4 (Four) Times a Day. See admin instructions on AVS.    difluprednate (DUREZOL) 0.05 % ophthalmic emulsion   No No    Administer 1 drop to the right eye 4 (Four) Times a Day. See admin instructions on AVS.    hydroCHLOROthiazide (HYDRODIURIL) 25 MG tablet   No No    Take 1 tablet by mouth once daily    multivitamins-minerals (PRESERVISION AREDS 2) capsule capsule  Self Yes No    Take 2 capsules by mouth Daily.    Ventolin  (90 Base) MCG/ACT inhaler   No No    Inhale 2 puffs Every 4 (Four) Hours As Needed for Wheezing.        ED Medications:    Medications   sodium chloride 0.9 % flush 10 mL (has no administration in time range)   ipratropium-albuterol (DUO-NEB) nebulizer solution 3 mL (3 mL Nebulization Given 4/13/23 1533)   methylPREDNISolone sodium succinate (SOLU-Medrol) injection 125 mg (125 mg Intravenous Given 4/13/23 1550)   magnesium sulfate 2g/50 mL (PREMIX) infusion (0 g Intravenous Stopped 4/13/23 1700)   sodium chloride 0.9 % bolus 1,000 mL (0 mL Intravenous Stopped 4/13/23 1700)     Vital Signs:  Temp:  [97.8 °F (36.6 °C)] 97.8 °F (36.6 °C)  Heart Rate:  [86-89] 86  Resp:  [15-16] 16  BP: (119-145)/(65-78) 145/78        04/13/23  1450   Weight: 65.8 kg (145 lb)     Body mass index is 25.69 kg/m².    Physical Exam:     Most recent vital Signs: /78   Pulse 86   Temp 97.8 °F (36.6 °C) (Oral)   Resp 16   Ht 160 cm (63\")   Wt 65.8 kg (145 lb)   SpO2 (!) 88%   BMI 25.69 kg/m²     Physical Exam    Laboratory data:    I have reviewed the labs done in the emergency room.    Results from last 7 days   Lab Units 04/13/23  1458   SODIUM mmol/L 138   POTASSIUM mmol/L 2.9*   CHLORIDE mmol/L 92*   CO2 mmol/L 33.7*   BUN mg/dL 12   CREATININE mg/dL 0.65   CALCIUM mg/dL 9.4   BILIRUBIN mg/dL 0.4   ALK PHOS U/L 114   ALT (SGPT) U/L 23   AST (SGOT) U/L 19   GLUCOSE mg/dL 160* "     Results from last 7 days   Lab Units 04/13/23  1458   WBC 10*3/mm3 20.98*   HEMOGLOBIN g/dL 15.3   HEMATOCRIT % 47.4*   PLATELETS 10*3/mm3 240         Results from last 7 days   Lab Units 04/13/23  1458   HSTROP T ng/L 15*     Results from last 7 days   Lab Units 04/13/23  1458   PROBNP pg/mL 272.8             Results from last 7 days   Lab Units 04/13/23  1539   PH, ARTERIAL pH units 7.427   PO2 ART mm Hg 86.5   PCO2, ARTERIAL mm Hg 55.1*   HCO3 ART mmol/L 36.3*           Invalid input(s): USDES,  BLOODU, NITRITITE, BACT, EP    Pain Management Panel          View : No data to display.                      EKG:      EKG appears to be a sinus rhythm with a normal rate, see no acute ST elevations or T wave abnormalities    Radiology:    XR Chest 1 View    Result Date: 4/13/2023  CLINICAL INDICATION:  SOA Triage Protocol shortness of breath.  EXAMINATION TECHNIQUE: XR CHEST 1 VW-  COMPARISON: Radiographs 10/01/2017.  FINDINGS:  Cardiomegaly Aortic atherosclerosis. Vascular engorgement and prominent interstitial markings with peripheral subpleural linear opacities. No dense consolidation. No pneumothorax or large pleural effusion. Large pulmonary arteries, suggestive of pulmonary arterial hypertension. Background emphysema.      Findings are concerning for mild cardiogenic pulmonary edema. Background emphysema.  Images personally reviewed, interpreted and dictated by SHIRA Villa.       This report was signed and finalized on 4/13/2023 3:41 PM by SHIRA Villa.      Assessment:    1. Acute respiratory failure with hypoxia  2. COPD with exacerbation  3. Likely community-acquired pneumonia, present admission, due to unknown organism  4. Hypertension  5. Chronic tobacco abuse  6. Arthritis    Plan:    Admit as inpatient    Respiratory failure/COPD/pneumonia:  Patient with multiple antibiotic allergies, reviewed with pharmacy, will give Azactam.  Treatment with bronchodilators, prednisone, oxygen  therapy.  Wean as tolerated.  Send strep pneumo and Legionella antigens.  Patient does need full pulmonary function test at some point.  Tobacco cessation discussed at length.    Hypertension:  Restart home atenolol and amlodipine.    Chronic tobacco abuse:  Complicates all aspects of care    Patient otherwise meets inpatient level care with anticipate stay greater than 2 midnights.  Further recommendations been upon clinical course.  Plan of care discussed with patient at bedside and family bedside    Risk Assessment: High  DVT Prophylaxis: Lovenox  Code Status: Full  Diet: Regular    Advance Care Planning   ACP discussion was held with the patient during this visit. Patient does not have an advance directive, declines further assistance.          Rajni Roberts DO  04/13/23  19:19 EDT    Dictated utilizing Dragon dictation.    Electronically signed by Rajni Roberts DO at 04/13/23 2225       Vital Signs (last day)     Date/Time Temp Temp src Pulse Resp BP Patient Position SpO2    04/14/23 0703 -- -- -- 18 -- -- --    04/14/23 0437 -- -- -- -- -- -- 90    04/14/23 0431 -- -- -- -- -- -- 89    04/14/23 0427 97.8 (36.6) Oral 96 22 145/62 Lying 89    04/14/23 0415 -- -- -- -- -- -- 83    04/13/23 2320 97.8 (36.6) Oral -- 18 -- Sitting --    04/13/23 1953 98.8 (37.1) Oral -- 16 142/65 Lying --    04/13/23 1800 -- -- 86 -- 145/78 -- 88    04/13/23 1730 -- -- 86 -- 139/65 -- 90    04/13/23 1533 -- -- -- 16 -- -- --    04/13/23 1450 97.8 (36.6) Oral 89 15 119/70 Sitting 81            Current Facility-Administered Medications   Medication Dose Route Frequency Provider Last Rate Last Admin   • acetaminophen (TYLENOL) tablet 650 mg  650 mg Oral Q4H PRN Rjani Roberts DO        Or   • acetaminophen (TYLENOL) 160 MG/5ML solution 650 mg  650 mg Oral Q4H PRN Karrick, Rajni Gautam, DO        Or   • acetaminophen (TYLENOL) suppository 650 mg  650 mg Rectal Q4H PRN Rajni Roberts,        •  aluminum-magnesium hydroxide-simethicone (MAALOX MAX) 400-400-40 MG/5ML suspension 15 mL  15 mL Oral Q6H PRN Rajni Roberts DO       • amLODIPine (NORVASC) tablet 10 mg  10 mg Oral Daily Rajni Roberts DO       • atenolol (TENORMIN) tablet 25 mg  25 mg Oral Daily Rajni Roberts DO       • aztreonam (AZACTAM) 1 g/100 mL 0.9% NS IVPB (mbp)  1 g Intravenous Q8H Rajni Roberts DO   1 g at 04/14/23 0424   • benzonatate (TESSALON) capsule 200 mg  200 mg Oral TID PRN Rajni Roberts DO       • Calcium Replacement - Follow Nurse / BPA Driven Protocol   Does not apply PRN Rajni Roberts DO       • carboxymethylcellulose (REFRESH PLUS) 0.5 % ophthalmic solution 1 drop  1 drop Right Eye TID PRN Rajni Roberts DO       • difluprednate (DUREZOL) 0.05 % ophthalmic emulsion 1 drop  1 drop Right Eye 4x Daily Rajni Roberts DO       • Enoxaparin Sodium (LOVENOX) syringe 40 mg  40 mg Subcutaneous Nightly Rajni Roberts DO   40 mg at 04/13/23 2218   • ipratropium-albuterol (DUO-NEB) nebulizer solution 3 mL  3 mL Nebulization Q6H While Awake - RT Rajni Roberts DO   3 mL at 04/14/23 0703   • Magnesium Standard Dose Replacement - Follow Nurse / BPA Driven Protocol   Does not apply PRN Rajni Roberts DO       • melatonin tablet 5 mg  5 mg Oral Nightly PRN Rajni Roberts DO   5 mg at 04/13/23 2218   • nicotine (NICODERM CQ) 21 MG/24HR patch 1 patch  1 patch Transdermal Q24H Rajni Roberts DO   1 patch at 04/13/23 2218   • ondansetron (ZOFRAN) tablet 4 mg  4 mg Oral Q6H PRN Rajni Roberts DO        Or   • ondansetron (ZOFRAN) injection 4 mg  4 mg Intravenous Q6H PRN Rajni Roberts,        • Pharmacy to Dose enoxaparin (LOVENOX)   Does not apply Continuous Rajni Sandhu DO       • Phosphorus Replacement - Follow Nurse / BPA Driven Protocol   Does not apply Rajni Sandhu,        •  Potassium Replacement - Follow Nurse / BPA Driven Protocol   Does not apply PRN Rajni Roberts, DO       • predniSONE (DELTASONE) tablet 40 mg  40 mg Oral Daily With Breakfast Rajni Roberts DO   40 mg at 04/13/23 2231   • sodium chloride 0.9 % flush 10 mL  10 mL Intravenous PRN Rajni Roberts, DO       • sodium chloride 0.9 % flush 10 mL  10 mL Intravenous Q12H Rajni Roberts, DO   10 mL at 04/13/23 2231   • sodium chloride 0.9 % flush 10 mL  10 mL Intravenous PRN Rajni Roberts, DO       • sodium chloride 0.9 % infusion 40 mL  40 mL Intravenous PRN Rajni Roberts, DO           Lab Results (last 24 hours)     Procedure Component Value Units Date/Time    Hemoglobin A1c [[id number]]  (Abnormal) Collected: 04/13/23 1458    Specimen: Blood Updated: 04/13/23 2139     Hemoglobin A1C 6.10 %     Narrative:      Hemoglobin A1C Ranges:    Increased Risk for Diabetes  5.7% to 6.4%  Diabetes                     >= 6.5%  Diabetic Goal                < 7.0%    STAT Lactic Acid, Reflex [[id number]]  (Normal) Collected: 04/13/23 1856    Specimen: Blood Updated: 04/13/23 1934     Lactate 1.2 mmol/L     Respiratory Panel PCR w/COVID-19(SARS-CoV-2) SUZY/HORTENSIA/JULIO/PAD/COR/MAD/PETER In-House, NP Swab in UTM/VTM, 3-4 HR TAT - Swab, Nasopharynx [[id number]]  (Normal) Collected: 04/13/23 1555    Specimen: Swab from Nasopharynx Updated: 04/13/23 1703     ADENOVIRUS, PCR Not Detected     Coronavirus 229E Not Detected     Coronavirus HKU1 Not Detected     Coronavirus NL63 Not Detected     Coronavirus OC43 Not Detected     COVID19 Not Detected     Human Metapneumovirus Not Detected     Human Rhinovirus/Enterovirus Not Detected     Influenza A PCR Not Detected     Influenza B PCR Not Detected     Parainfluenza Virus 1 Not Detected     Parainfluenza Virus 2 Not Detected     Parainfluenza Virus 3 Not Detected     Parainfluenza Virus 4 Not Detected     RSV, PCR Not Detected     Bordetella pertussis pcr Not  Detected     Bordetella parapertussis PCR Not Detected     Chlamydophila pneumoniae PCR Not Detected     Mycoplasma pneumo by PCR Not Detected    Narrative:      In the setting of a positive respiratory panel with a viral infection PLUS a negative procalcitonin without other underlying concern for bacterial infection, consider observing off antibiotics or discontinuation of antibiotics and continue supportive care. If the respiratory panel is positive for atypical bacterial infection (Bordetella pertussis, Chlamydophila pneumoniae, or Mycoplasma pneumoniae), consider antibiotic de-escalation to target atypical bacterial infection.    COVID-19 and FLU A/B PCR - Swab, Nasopharynx [[id number]]  (Normal) Collected: 04/13/23 1458    Specimen: Swab from Nasopharynx Updated: 04/13/23 1632     COVID19 Not Detected     Influenza A PCR Not Detected     Influenza B PCR Not Detected    Narrative:      Fact sheet for providers: https://www.fda.gov/media/941024/download    Fact sheet for patients: https://www.fda.gov/media/676796/download    Test performed by PCR.    Lactic Acid, Plasma [[id number]]  (Abnormal) Collected: 04/13/23 1547    Specimen: Blood Updated: 04/13/23 1609     Lactate 2.7 mmol/L     Procalcitonin [[id number]]  (Abnormal) Collected: 04/13/23 1458    Specimen: Blood Updated: 04/13/23 1603     Procalcitonin 0.43 ng/mL     Narrative:      As a Marker for Sepsis (Non-Neonates):    1. <0.5 ng/mL represents a low risk of severe sepsis and/or septic shock.  2. >2 ng/mL represents a high risk of severe sepsis and/or septic shock.    As a Marker for Lower Respiratory Tract Infections that require antibiotic therapy:    PCT on Admission    Antibiotic Therapy       6-12 Hrs later    >0.5                Strongly Recommended  >0.25 - <0.5        Recommended   0.1 - 0.25          Discouraged              Remeasure/reassess PCT  <0.1                Strongly Discouraged     Remeasure/reassess PCT    As 28 day mortality risk  "marker: \"Change in Procalcitonin Result\" (>80% or <=80%) if Day 0 (or Day 1) and Day 4 values are available. Refer to http://www.[facility]-pct-calculator.com    Change in PCT <=80%  A decrease of PCT levels below or equal to 80% defines a positive change in PCT test result representing a higher risk for 28-day all-cause mortality of patients diagnosed with severe sepsis for septic shock.    Change in PCT >80%  A decrease of PCT levels of more than 80% defines a negative change in PCT result representing a lower risk for 28-day all-cause mortality of patients diagnosed with severe sepsis or septic shock.       Magnesium [[id number]]  (Normal) Collected: 04/13/23 1458    Specimen: Blood Updated: 04/13/23 1602     Magnesium 1.8 mg/dL     [place] Draw [[id number]] Collected: 04/13/23 1458    Specimen: Blood Updated: 04/13/23 1602    Narrative:      The following orders were created for panel order [place] Draw.  Procedure                               Abnormality         Status                     ---------                               -----------         ------                     Green Top (Gel)[[id number]]                                  Final result               Lavender Top[[id number]]                                     Final result               Gold Top - SST[[id number]]                                   Final result               Light Blue Top[[id number]]                                   Final result                 Please view results for these tests on the individual orders.    Green Top (Gel) [[id number]] Collected: 04/13/23 1458    Specimen: Blood Updated: 04/13/23 1602     Extra Tube Hold for add-ons.     Comment: Auto resulted.       Gold Top - SST [[id number]] Collected: 04/13/23 1458    Specimen: Blood Updated: 04/13/23 1602     Extra Tube Hold for add-ons.     Comment: Auto resulted.       Light Blue Top [[id number]] Collected: 04/13/23 1458    Specimen: Blood Updated: 04/13/23 1602     Extra Tube Hold for " add-ons.     Comment: Auto resulted       Lavender Top [[id number]] Collected: 04/13/23 1458    Specimen: Blood Updated: 04/13/23 1602     Extra Tube hold for add-on     Comment: Auto resulted       Blood Gas, Arterial With Co-Ox [[id number]]  (Abnormal) Collected: 04/13/23 1539    Specimen: Arterial Blood Updated: 04/13/23 1539     Site Left Radial     Partha's Test Positive     pH, Arterial 7.427 pH units      pCO2, Arterial 55.1 mm Hg      Comment: 83 Value above reference range        pO2, Arterial 86.5 mm Hg      HCO3, Arterial 36.3 mmol/L      Comment: 83 Value above reference range        Base Excess, Arterial 9.8 mmol/L      Comment: 83 Value above reference range        O2 Saturation, Arterial 97.3 %      Hematocrit, Blood Gas 44.2 %      Oxyhemoglobin 95.7 %      Methemoglobin 0.20 %      Carboxyhemoglobin 1.5 %      A-a DO2 43.4 mmHg      Barometric Pressure for Blood Gas 731 mmHg      Modality Nasal Cannula     FIO2 28 %      Flow Rate 2.0 lpm      Ventilator Mode NA     Note --     Collected by cb     Comment: Meter: [id number]     :  [id number]        pH, Temp Corrected --     pCO2, Temperature Corrected --     pO2, Temperature Corrected --    Comprehensive Metabolic Panel [[id number]]  (Abnormal) Collected: 04/13/23 1458    Specimen: Blood Updated: 04/13/23 1539     Glucose 160 mg/dL      BUN 12 mg/dL      Creatinine 0.65 mg/dL      Sodium 138 mmol/L      Potassium 2.9 mmol/L      Chloride 92 mmol/L      CO2 33.7 mmol/L      Calcium 9.4 mg/dL      Total Protein 7.7 g/dL      Albumin 3.3 g/dL      ALT (SGPT) 23 U/L      AST (SGOT) 19 U/L      Alkaline Phosphatase 114 U/L      Total Bilirubin 0.4 mg/dL      Globulin 4.4 gm/dL      A/G Ratio 0.8 g/dL      BUN/Creatinine Ratio 18.5     Anion Gap 12.3 mmol/L      eGFR 91.4 mL/min/1.73     Narrative:      GFR Normal >60  Chronic Kidney Disease <60  Kidney Failure <15    The GFR formula is only valid for adults with stable renal function between ages  18 and 70.    Single High Sensitivity Troponin T [[id number]]  (Abnormal) Collected: 04/13/23 1458    Specimen: Blood Updated: 04/13/23 1539     HS Troponin T 15 ng/L     Narrative:      High Sensitive Troponin T Reference Range:  <10.0 ng/L- Negative Female for AMI  <15.0 ng/L- Negative Male for AMI  >=10 - Abnormal Female indicating possible myocardial injury.  >=15 - Abnormal Male indicating possible myocardial injury.   Clinicians would have to utilize clinical acumen, EKG, Troponin, and serial changes to determine if it is an Acute Myocardial Infarction or myocardial injury due to an underlying chronic condition.         BNP [[id number]]  (Normal) Collected: 04/13/23 1458    Specimen: Blood Updated: 04/13/23 1536     proBNP 272.8 pg/mL     Narrative:      Among patients with dyspnea, NT-proBNP is highly sensitive for the detection of acute congestive heart failure. In addition NT-proBNP of <300 pg/ml effectively rules out acute congestive heart failure with 99% negative predictive value.      CBC & Differential [[id number]]  (Abnormal) Collected: 04/13/23 1458    Specimen: Blood Updated: 04/13/23 1519    Narrative:      The following orders were created for panel order CBC & Differential.  Procedure                               Abnormality         Status                     ---------                               -----------         ------                     CBC Auto Differential[[id number]]        Abnormal            Final result                 Please view results for these tests on the individual orders.    CBC Auto Differential [[id number]]  (Abnormal) Collected: 04/13/23 1458    Specimen: Blood Updated: 04/13/23 1519     WBC 20.98 10*3/mm3      RBC 5.22 10*6/mm3      Hemoglobin 15.3 g/dL      Hematocrit 47.4 %      MCV 90.8 fL      MCH 29.3 pg      MCHC 32.3 g/dL      RDW 13.2 %      RDW-SD 44.9 fl      MPV 11.0 fL      Platelets 240 10*3/mm3      Neutrophil % 84.5 %      Lymphocyte % 4.3 %      Monocyte %  8.8 %      Eosinophil % 1.0 %      Basophil % 0.8 %      Immature Grans % 0.6 %      Neutrophils, Absolute 17.74 10*3/mm3      Lymphocytes, Absolute 0.90 10*3/mm3      Monocytes, Absolute 1.85 10*3/mm3      Eosinophils, Absolute 0.21 10*3/mm3      Basophils, Absolute 0.16 10*3/mm3      Immature Grans, Absolute 0.12 10*3/mm3      nRBC 0.0 /100 WBC         Imaging Results (Last 24 Hours)     Procedure Component Value Units Date/Time    CT Chest Without Contrast Diagnostic [[id number]] Collected: 04/13/23 2010     Updated: 04/13/23 2011    Narrative:      FINAL REPORT    TECHNIQUE:  Routine axial images were obtained from the lung apices to below  the diaphragm without contrast.    CLINICAL HISTORY:  dsypnea    FINDINGS:  There is mild upper lobe emphysema.  Tree-in-bud type opacities  in the lungs bilaterally are consistent with  bronchitis/bronchiolitis.  Interlobular septal thickening in the  right lung base may be due to interstitial pulmonary edema.  There is no confluent airspace consolidation.  The heart and  vasculature are unremarkable. There is no pleural disease,  adenopathy, or significant osseous abnormality.      Impression:      Mild emphysema.  Bronchitis/bronchiolitis.  Possible  interstitial pulmonary edema.    Authenticated and Electronically Signed by Alexis Babin M.D. on  04/13/2023 08:10:51 PM    XR Chest 1 View [[id number]] Collected: 04/13/23 1539     Updated: 04/13/23 1543    Narrative:      CLINICAL INDICATION:    SOA Triage Protocol shortness of breath.      EXAMINATION TECHNIQUE:   XR CHEST 1 VW-     COMPARISON:  Radiographs 10/01/2017.     FINDINGS:     Cardiomegaly Aortic atherosclerosis. Vascular engorgement and prominent  interstitial markings with peripheral subpleural linear opacities. No  dense consolidation. No pneumothorax or large pleural effusion. Large  pulmonary arteries, suggestive of pulmonary arterial hypertension.  Background emphysema.       Impression:      Findings are  concerning for mild cardiogenic pulmonary edema. Background  emphysema.     Images personally reviewed, interpreted and dictated by SHIRA Villa.                This report was signed and finalized on 4/13/2023 3:41 PM by SHIRA Villa.        Orders (last 24 hrs)      Start     Ordered    04/15/23 0600  Document Pulse Oximetry - On Room Air / Home O2 Level  Daily      Comments: Room Air Oxygen Levels Should Only Be Measured if Patient Oxygen Needs are <4L  Home O2 Oxygen Levels Should Only Be Measured Once Patient Within 2L of Home O2 Baseline  Reapply Oxygen if O2 Sat Drops Below 88%    04/13/23 2122 04/14/23 0900  amLODIPine (NORVASC) tablet 10 mg  Daily         04/13/23 2122 04/14/23 0900  atenolol (TENORMIN) tablet 25 mg  Daily         04/13/23 2122 04/14/23 0900  hydroCHLOROthiazide (HYDRODIURIL) oral 12.5 mg  Daily,   Status:  Discontinued         04/13/23 2122 04/14/23 0800  Oral Care  2 Times Daily       04/13/23 2122 04/14/23 0700  ipratropium-albuterol (DUO-NEB) nebulizer solution 3 mL  Every 6 Hours While Awake - RT         04/13/23 2122 04/14/23 0600  Tobacco Cessation Education  Daily      Comments: Document in Education Activity    04/13/23 2122 04/14/23 0600  Respiratory Treatment Education (MDI / Spacer / Nebulizer)  Daily      Comments: Document in Education Activity    04/13/23 2122 04/14/23 0600  COPD Education  Daily      Comments: Document in Education Activity    04/13/23 2122 04/14/23 0430  aztreonam (AZACTAM) 1 g/100 mL 0.9% NS IVPB (mbp)  Every 8 Hours         04/13/23 1929 04/14/23 0000  Vital Signs  Every 4 Hours       04/13/23 2122 04/13/23 2233  OT Consult: Eval & Treat  Once        Comments: Reason Why ot Needed: INCREASED WEAKNESS    04/13/23 2232 04/13/23 2233  PT Consult: Eval & Treat Functional Mobility Below Baseline  Once        Comments: Reason Why PT Needed: INCREASED WEAKNESS    04/13/23 2232 04/13/23 2231   carboxymethylcellulose (REFRESH PLUS) 0.5 % ophthalmic solution 1 drop  3 Times Daily PRN         04/13/23 2231 04/13/23 2215  difluprednate (DUREZOL) 0.05 % ophthalmic emulsion 1 drop  4 Times Daily         04/13/23 2122 04/13/23 2215  sodium chloride 0.9 % flush 10 mL  Every 12 Hours Scheduled         04/13/23 2122 04/13/23 2215  predniSONE (DELTASONE) tablet 40 mg  Daily With Breakfast         04/13/23 2122 04/13/23 2215  nicotine (NICODERM CQ) 21 MG/24HR patch 1 patch  Every 24 Hours         04/13/23 2122 04/13/23 2215  Enoxaparin Sodium (LOVENOX) syringe 40 mg  Nightly         04/13/23 2126 04/13/23 2200  Incentive Spirometry  Every 4 Hours While Awake       04/13/23 2122 04/13/23 2125  Adult Transthoracic Echo Complete W/ Cont if Necessary Per Protocol  Once         04/13/23 2124 04/13/23 2123  Hemoglobin A1c  Once         04/13/23 2123 04/13/23 2122  benzonatate (TESSALON) capsule 200 mg  3 Times Daily PRN         04/13/23 2122 04/13/23 2122  aluminum-magnesium hydroxide-simethicone (MAALOX MAX) 400-400-40 MG/5ML suspension 15 mL  Every 6 Hours PRN         04/13/23 2122 04/13/23 2122  melatonin tablet 5 mg  Nightly PRN         04/13/23 2122 04/13/23 2122  ondansetron (ZOFRAN) tablet 4 mg  Every 6 Hours PRN        See Hyperspace for full Linked Orders Report.    04/13/23 2122 04/13/23 2122  ondansetron (ZOFRAN) injection 4 mg  Every 6 Hours PRN        See Hyperspace for full Linked Orders Report.    04/13/23 2122 04/13/23 2122  Potassium Replacement - Follow Nurse / BPA Driven Protocol  As Needed         04/13/23 2122 04/13/23 2122  Magnesium Standard Dose Replacement - Follow Nurse / BPA Driven Protocol  As Needed         04/13/23 2122 04/13/23 2122  Phosphorus Replacement - Follow Nurse / BPA Driven Protocol  As Needed         04/13/23 2122 04/13/23 2122  Calcium Replacement - Follow Nurse / BPA Driven Protocol  As Needed         04/13/23 2122 04/13/23  2122  acetaminophen (TYLENOL) tablet 650 mg  Every 4 Hours PRN        See Hyperspace for full Linked Orders Report.    04/13/23 2122 04/13/23 2122  acetaminophen (TYLENOL) 160 MG/5ML solution 650 mg  Every 4 Hours PRN        See Hyperspace for full Linked Orders Report.    04/13/23 2122 04/13/23 2122  acetaminophen (TYLENOL) suppository 650 mg  Every 4 Hours PRN        See Hyperspace for full Linked Orders Report.    04/13/23 2122 04/13/23 2122  Reason for COPD Admission: New Oxygen Requirements, Ongoing Smoking  Once         04/13/23 2122 04/13/23 2122  Intake & Output  Every Shift       04/13/23 2122 04/13/23 2122  Weigh Patient  Once         04/13/23 2122 04/13/23 2122  Insert Peripheral IV  Once         04/13/23 2122 04/13/23 2122  Saline Lock & Maintain IV Access  Continuous         04/13/23 2122 04/13/23 2122  Code Status and Medical Interventions:  Continuous         04/13/23 2122 04/13/23 2122  Cardiac Monitoring  Continuous        Comments: Follow Standing Orders As Outlined in Process Instructions (Open Order Report to View Full Instructions)    04/13/23 2122 04/13/23 2122  Diet: Regular/House Diet; Texture: Regular Texture (IDDSI 7); Fluid Consistency: Thin (IDDSI 0)  Diet Effective Now         04/13/23 2122 04/13/23 2121  Pharmacy to Dose enoxaparin (LOVENOX)  Continuous PRN         04/13/23 2122 04/13/23 2121  sodium chloride 0.9 % flush 10 mL  As Needed         04/13/23 2122 04/13/23 2121  sodium chloride 0.9 % infusion 40 mL  As Needed         04/13/23 2122 04/13/23 2030  aztreonam (AZACTAM) 1 g/100 mL 0.9% NS IVPB (mbp)  Once         04/13/23 1929 04/13/23 2000  Inpatient Case Management  Consult  Once        Provider:  (Not yet assigned)    04/13/23 1959    04/13/23 1847  STAT Lactic Acid, Reflex  PROCEDURE ONCE         04/13/23 1608    04/13/23 1827  azithromycin (ZITHROMAX) 500 mg 0.9% NaCl (Add-vantage) 250 mL  Every 24 Hours,   Status:   Discontinued         04/13/23 1825    04/13/23 1827  Inpatient Admission  Once         04/13/23 1828    04/13/23 1551  CT Chest Without Contrast Diagnostic  1 Time Imaging         04/13/23 1550    04/13/23 1551  Magnesium  Once         04/13/23 1550    04/13/23 1540  Blood Gas, Arterial With Co-Ox  PROCEDURE ONCE         04/13/23 1539    04/13/23 1527  ipratropium-albuterol (DUO-NEB) nebulizer solution 3 mL  Once         04/13/23 1525    04/13/23 1527  methylPREDNISolone sodium succinate (SOLU-Medrol) injection 125 mg  Once         04/13/23 1525    04/13/23 1527  magnesium sulfate 2g/50 mL (PREMIX) infusion  Once         04/13/23 1525    04/13/23 1527  sodium chloride 0.9 % bolus 1,000 mL  Once         04/13/23 1525    04/13/23 1526  Lactic Acid, Plasma  STAT         04/13/23 1525    04/13/23 1526  Procalcitonin  STAT         04/13/23 1525    04/13/23 1526  Blood Gas, Arterial -With Co-Ox Panel: Yes  STAT         04/13/23 1525    04/13/23 1526  Respiratory Panel PCR w/COVID-19(SARS-CoV-2) SUZY/HORTENSIA/JULIO/PAD/[facility]/MAD/PETER In-House, NP Swab in UTM/VTM, 3-4 HR TAT - Swab, Nasopharynx  Once         04/13/23 1525    04/13/23 1501  XR Chest 1 View  1 Time Imaging         04/13/23 1457    04/13/23 1458  NPO Diet NPO Type: Strict NPO  Diet Effective Now,   Status:  Canceled         04/13/23 1457    04/13/23 1458  Undress & Gown  Once         04/13/23 1457    04/13/23 1458  Cardiac Monitoring  Continuous,   Status:  Canceled        Comments: Follow Standing Orders As Outlined in Process Instructions (Open Order Report to View Full Instructions)    04/13/23 1457    04/13/23 1458  Continuous Pulse Oximetry  Continuous         04/13/23 1457    04/13/23 1458  Vital Signs  Per Hospital Policy         04/13/23 1457    04/13/23 1458  ECG 12 Lead ED Triage Standing Order; SOA  Once         04/13/23 1457    04/13/23 1458  XR Chest 2 View  1 Time Imaging,   Status:  Canceled         04/13/23 1457    04/13/23 1458  Insert Peripheral IV   Once         04/13/23 1457    04/13/23 1458  [facility]on Draw  Once         04/13/23 1457    04/13/23 1458  CBC & Differential  Once         04/13/23 1457    04/13/23 1458  Comprehensive Metabolic Panel  Once         04/13/23 1457    04/13/23 1458  BNP  Once         04/13/23 1457    04/13/23 1458  Single High Sensitivity Troponin T  Once         04/13/23 1457    04/13/23 1458  COVID-19 and FLU A/B PCR - Swab, Nasopharynx  Once         04/13/23 1457    04/13/23 1458  Green Top (Gel)  PROCEDURE ONCE         04/13/23 1457    04/13/23 1458  Lavender Top  PROCEDURE ONCE         04/13/23 1457    04/13/23 1458  Gold Top - SST  PROCEDURE ONCE         04/13/23 1457    04/13/23 1458  Light Blue Top  PROCEDURE ONCE         04/13/23 1457    04/13/23 1458  CBC Auto Differential  PROCEDURE ONCE         04/13/23 1457    04/13/23 1457  sodium chloride 0.9 % flush 10 mL  As Needed         04/13/23 1457    Unscheduled  Oxygen Therapy- Nasal Cannula; 2 LPM; Titrate for SPO2: equal to or greater than, 92%  Continuous PRN       04/13/23 1457    --  SCANNED - TELEMETRY           04/13/23 0000    --  SCANNED - TELEMETRY           04/13/23 0000                Physician Progress Notes (last 24 hours)  Notes from 04/13/23 0845 through 04/14/23 0845   No notes of this type exist for this encounter.         Consult Notes (last 24 hours)  Notes from 04/13/23 0845 through 04/14/23 0845   No notes of this type exist for this encounter.

## 2023-04-14 NOTE — PLAN OF CARE
Goal Outcome Evaluation:  Plan of Care Reviewed With: patient        Progress: declining  Outcome Evaluation: Pt SAT dropping overnight had to place pt on 12L high flow O2 to maintain SAT 92%; continue nebs and antibiotics

## 2023-04-14 NOTE — CONSULTS
"Date of admission:  4/13/2023    Date of consultation:   April 14, 2023    Requested by:   Hospitalist Service.     PCP: Jason Nicholson MD    Reason:  Acute hypoxic respiratory failure.    History of Present Illness:  77 y.o. female with past medical history significant for hypertension, heavy smoking history and COPD who started complaining of shortness of breath, cough and phlegm production for the past few days. Patient was not complaining of subjective chills.  The patient says that her symptoms started somewhat slowly over the past 2-3 days. she says that the shortness of breath is worse than baseline.  Shortness of breath seems to be worse even at rest.    she denies any sick contacts, although she does mention grandchildren at home who may have had \"runny nose\".     she was using her medications regularly at home.  The patient says that she also started using rescue inhaler more frequently without any significant improvement.    The patient says that she smokes 1 pack/day and has been doing so for the past 55 years or more.    The patient's symptoms continued to worsen and she was admitted to the hospital and pulmonary Consultation was requested for further recommendations.       Review of System: All other review of systems negative except indicated in HPI     Past Medical History: Pertinent history reviewed, as appropriate. Negative, except noted below or in HPI.  If this history could not be obtained due to a reason, the reason is listed in the HPI.  Past Medical History:   Diagnosis Date   • Arthritis    • COPD (chronic obstructive pulmonary disease)    • Gall stones    • Goiter     \"inward\"   • Hypertension    • Hypertension    • Kidney infection    • Kidney stone    • Kidney stones    • Migraine headache    • Scarlet fever          Past Surgical History: Pertinent history reviewed, as appropriate. Negative, except noted below or in HPI. If this history could not be obtained due to a reason, the reason " "is listed in the HPI.  Past Surgical History:   Procedure Laterality Date   • BLADDER SURGERY     • CATARACT EXTRACTION W/ INTRAOCULAR LENS IMPLANT Left 8/11/2022    Procedure: CATARACT PHACO EXTRACTION WITH INTRAOCULAR LENS IMPLANT LEFT;  Surgeon: Sathish Lopez MD;  Location: [facility] [place];  Service: Ophthalmology;  Laterality: Left;   • CATARACT EXTRACTION W/ INTRAOCULAR LENS IMPLANT Right 8/25/2022    Procedure: CATARACT PHACO EXTRACTION WITH INTRAOCULAR LENS IMPLANT RIGHT;  Surgeon: Sathish Lopez MD;  Location: [facility] [place];  Service: Ophthalmology;  Laterality: Right;   • CHOLECYSTECTOMY     • HYSTERECTOMY     • TONSILLECTOMY           Family History: Pertinent history reviewed, as appropriate. Negative, except noted below or in HPI. If this history could not be obtained due to a reason, the reason is listed in the HPI.  Family History   Problem Relation Age of Onset   • Heart disease Mother    • Arthritis Mother    • Diabetes Mother    • Hypertension Mother    • Colon cancer Father    • Arthritis Father    • Diabetes Father    • Hypertension Father    • Migraines Sister          Social History: Pertinent history reviewed, as appropriate. Negative, except noted below or in HPI. If this history could not be obtained due to a reason, the reason is listed in the HPI.  Social History     Socioeconomic History   • Marital status:    Tobacco Use   • Smoking status: Every Day     Packs/day: 1.00     Types: Cigarettes     Start date: 1960   • Smokeless tobacco: Former     Quit date: 9/24/2017   Vaping Use   • Vaping Use: Never used   Substance and Sexual Activity   • Alcohol use: No   • Drug use: No   • Sexual activity: Defer             Physical Exam:  /70 (BP Location: Left arm, Patient Position: Lying)   Pulse 81   Temp 97.8 °F (36.6 °C) (Oral)   Resp 18   Ht 160 cm (62.99\")   Wt 69.3 kg (152 lb 12.5 oz)   SpO2 93%   BMI 27.07 kg/m²     Constitutional:            Vital signs reviewed      "                General: Moderate respiratory distress noted.    Eyes:            Extraocular movement was intact.            Pupils appeared equal            Conjunctiva: Pink    ENT:             Hearing was intact              No nasal erythema noted.              Oropharynx was dry.  No lesions noted.     Neck:             Supple. No JVD noted.              Thyroid gland did not seem to be enlarged    Cardiovascular:              S1 + S2. Regular     Lungs/Respiratory:            Respiratory effort was labored            Hyperresonance to percussion.            Decreased Air Entry Bilaterally with mild to moderate wheezing.    Abdomen/GI:            Soft.  Bowel sounds were positive.  No obvious organomegaly.    Musculoskeletal/Extremities:            No edema noted.            Gait could not be assessed at this time.    Neurologic:             Awake, alert and oriented x 3.             Able to follow simple commands.    Psychiatric:             Affect appeared fair.             Awake, alert and oriented x 3.    Skin:             Warm and dry      Labs: Reviewed. Pertinent labs were noted.     Results from last 7 days   Lab Units 04/13/23  1458   WBC 10*3/mm3 20.98*   HEMOGLOBIN g/dL 15.3   HEMATOCRIT % 47.4*   PLATELETS 10*3/mm3 240   NEUTROPHIL % % 84.5*   NEUTROS ABS 10*3/mm3 17.74*   EOSINOPHIL % % 1.0   EOS ABS 10*3/mm3 0.21   LYMPHOCYTE % % 4.3*   LYMPHS ABS 10*3/mm3 0.90       Lab Results   Component Value Date    PROCALCITO 0.43 (H) 04/13/2023       No results found for: CRP    No results found for: SEDRATE    Lab Results   Component Value Date    PROBNP 272.8 04/13/2023    PROBNP 315.0 (C) 10/02/2017       Results from last 7 days   Lab Units 04/13/23  1458   SODIUM mmol/L 138   POTASSIUM mmol/L 2.9*   CHLORIDE mmol/L 92*   CO2 mmol/L 33.7*   BUN mg/dL 12   CREATININE mg/dL 0.65   CALCIUM mg/dL 9.4   ANION GAP mmol/L 12.3   BILIRUBIN mg/dL 0.4   ALK PHOS U/L 114   ALT (SGPT) U/L 23   AST (SGOT) U/L 19    GLUCOSE mg/dL 160*   TOTAL PROTEIN g/dL 7.7   ALBUMIN g/dL 3.3*       Results from last 7 days   Lab Units 04/13/23  1458   MAGNESIUM mg/dL 1.8       No results found for: TSH    No results found for: FREET4    No results found for: INR    No results found for: CKTOTAL    No components found for: HSTROPT    Lab Results   Component Value Date    TROPONINT 15 (H) 04/13/2023       No results found for: DDIMER    Brief Urine Lab Results     None            Micro: As of April 14, 2023   Lab Results   Component Value Date    RESPCX Light growth (2+) Normal Respiratory Ana 10/02/2017     No results found for: BCIDPCR  Lab Results   Component Value Date    BLOODCX No growth at 5 days 10/01/2017    BLOODCX No growth at 5 days 10/01/2017     Lab Results   Component Value Date    URINECX Final report 02/07/2019    URINECX 20,000-30,000 CFU/mL Escherichia coli (A) 10/05/2018     No results found for: MRSACX  No results found for: MRSAPCR  No results found for: URCX  No components found for: LOWRESPCF  No results found for: THROATCX  No results found for: CULTURES  No components found for: STREPBCX  No results found for: STREPPNEUAG  No results found for: LEGIONELLA  No results found for: MYCOPLASCX  No results found for: GCCX  No results found for: WOUNDCX  No results found for: BODYFLDCX    No results found for: FLU    Lab Results   Component Value Date    ADENOVIRUS Not Detected 04/13/2023     Lab Results   Component Value Date    QP515V Not Detected 04/13/2023     Lab Results   Component Value Date    CVHKU1 Not Detected 04/13/2023     Lab Results   Component Value Date    CVNL63 Not Detected 04/13/2023     Lab Results   Component Value Date    CVOC43 Not Detected 04/13/2023     Lab Results   Component Value Date    HUMETPNEVS Not Detected 04/13/2023     Lab Results   Component Value Date    HURVEV Not Detected 04/13/2023     Lab Results   Component Value Date    FLUBPCR Not Detected 04/13/2023     Lab Results    Component Value Date    PARAINFLUE Not Detected 04/13/2023     Lab Results   Component Value Date    PARAFLUV2 Not Detected 04/13/2023     Lab Results   Component Value Date    PARAFLUV3 Not Detected 04/13/2023     Lab Results   Component Value Date    PARAFLUV4 Not Detected 04/13/2023     Lab Results   Component Value Date    BPERTPCR Not Detected 04/13/2023     No results found for: [id number]  Lab Results   Component Value Date    CPNEUPCR Not Detected 04/13/2023     Lab Results   Component Value Date    MPNEUMO Not Detected 04/13/2023     Lab Results   Component Value Date    FLUAPCR Not Detected 04/13/2023     No results found for: FLUAH3  No results found for: FLUAH1  Lab Results   Component Value Date    RSV Not Detected 04/13/2023     Lab Results   Component Value Date    BPARAPCR Not Detected 04/13/2023       COVID 19:  Lab Results   Component Value Date    COVID19 Not Detected 04/13/2023           No results found for: THCURSCR  No results found for: PCPUR  No results found for: COCAINEUR  No results found for: METAMPSCNUR  No results found for: LABOPIASCN  No results found for: AMPHETSCREEN  No results found for: LABBENZSCN  No results found for: TRICYCLICSCN  No results found for: LABMETHSCN  No results found for: BARBITSCNUR  No results found for: OXYCODONESCN  No results found for: PROPOXSCN  No results found for: BUPRENORSCNU  No results found for: ETHANOLMGDL  No results found for: ETOHPCT      ABG: Reviewed  Recent Labs     04/13/23  1539   PHART 7.427   PUM5KFC 55.1*   PO2ART 86.5   ZOA1OYO 36.3*   BASEEXCESS 9.8*       Lab Results   Component Value Date    LACTATE 1.2 04/13/2023    LACTATE 2.7 (C) 04/13/2023    LACTATE 1.6 10/01/2017         Imaging Study: Latest imaging studies was reviewed personally.   Imaging Results (Last 72 Hours)     Procedure Component Value Units Date/Time    CT Chest Without Contrast Diagnostic [[id number]] Collected: 04/13/23 2010     Updated: 04/13/23 2011     Narrative:      FINAL REPORT    TECHNIQUE:  Routine axial images were obtained from the lung apices to below  the diaphragm without contrast.    CLINICAL HISTORY:  dsypnea    FINDINGS:  There is mild upper lobe emphysema.  Tree-in-bud type opacities  in the lungs bilaterally are consistent with  bronchitis/bronchiolitis.  Interlobular septal thickening in the  right lung base may be due to interstitial pulmonary edema.  There is no confluent airspace consolidation.  The heart and  vasculature are unremarkable. There is no pleural disease,  adenopathy, or significant osseous abnormality.      Impression:      Mild emphysema.  Bronchitis/bronchiolitis.  Possible  interstitial pulmonary edema.    Authenticated and Electronically Signed by Alexis Babin M.D. on  04/13/2023 08:10:51 PM    XR Chest 1 View [[id number]] Collected: 04/13/23 1539     Updated: 04/13/23 1543    Narrative:      CLINICAL INDICATION:    SOA Triage Protocol shortness of breath.      EXAMINATION TECHNIQUE:   XR CHEST 1 VW-     COMPARISON:  Radiographs 10/01/2017.     FINDINGS:     Cardiomegaly Aortic atherosclerosis. Vascular engorgement and prominent  interstitial markings with peripheral subpleural linear opacities. No  dense consolidation. No pneumothorax or large pleural effusion. Large  pulmonary arteries, suggestive of pulmonary arterial hypertension.  Background emphysema.       Impression:      Findings are concerning for mild cardiogenic pulmonary edema. Background  emphysema.     Images personally reviewed, interpreted and dictated by SHIRA Villa.                This report was signed and finalized on 4/13/2023 3:41 PM by SHIRA Villa.            ECHO:  Results for orders placed during the hospital encounter of 04/13/23    Adult Transthoracic Echo Complete W/ Cont if Necessary Per Protocol    Interpretation Summary  •  Left ventricular diastolic function is consistent with (grade I) impaired relaxation.  •  Mild aortic  valve stenosis is present.  •  Estimated right ventricular systolic pressure from tricuspid regurgitation is moderately elevated (45-55 mmHg).  •  Mild to moderate pulmonary hypertension is present.      Results for orders placed during the hospital encounter of 10/01/17    Adult Transthoracic Echo Complete W/ Cont if Necessary Per Protocol    Interpretation Summary  TEchnically difficult study  1) Borderline LVH with normal LV systolic function ( EF is over 55%)  2) Normal left atrial size with mild elevation in LVedp  3) Trace MR  4) Mild TR with normal PA pressures  5) Mild AI  6) Small RV with normal RV function        Pharmacy to Dose enoxaparin (LOVENOX),           Assessment:  1.  Acute respiratory failure   2.  Acute exacerbation of COPD   3.  Chronic respiratory acidosis  4.  Smoking  5.  Hypokalemia  6.  Pulmonary hypertension    Discussion/Recommendations:   The patient acute respiratory failure is likely secondary to exacerbation of underlying, what appears to be severe, COPD.    I have asked the respiratory therapist to decrease FiO2 as tolerated.    The patient appears to be in respiratory distress therefore I have asked the respiratory therapist to provide Atrovent neb treatments x1    I was adjusted the nebulized treatments as appropriate.     I have also adjusted the dose of steroids, as appropriate.    ABG will be repeated, as clinically indicated.    Chest x-ray will be repeated, if clinically indicated.    The patient will definitely need to quit smoking and this was discussed with the patient in great detail.    Her pulmonary hypertension is likely secondary to significant COPD.    We will consider further work-up if necessary, on an outpatient basis.    Patient will need outpatient work-up including PFTs.  This will be discussed before discharge.    The plan was discussed with the patient.  I have also discussed the case with the nursing staff.    Recommendations were also discussed with the  referring provider.     I would like to thank you for the opportunity to participate in the care of this patient.  We will communicate changes and recommendations, if and when necessary.      This document was electronically signed by Arnaldo Soriano MD on 04/14/23 at 13:16 EDT      Dictated utilizing Dragon dictation.

## 2023-04-14 NOTE — CASE MANAGEMENT/SOCIAL WORK
Discharge Planning Assessment   David     Patient Name: Carolin Toscano  MRN: [id number]  Today's Date: 2023    Admit Date: 2023    Plan: Pt plans to discharge home with family transporting. May need oxygen at discharge.   Discharge Needs Assessment     Row Name 23 1136       Living Environment    People in Home alone    Unique Family Situation States she has children and other family member who take turns staying with her when needed    Current Living Arrangements home    Potentially Unsafe Housing Conditions unable to assess    Primary Care Provided by self    Provides Primary Care For no one    Family Caregiver if Needed child(sheldon), adult;other relative(s)    Quality of Family Relationships helpful;involved    Able to Return to Prior Arrangements yes       Food Insecurity    Within the past 12 months, you worried that your food would run out before you got the money to buy more. Never true    Within the past 12 months, the food you bought just didn't last and you didn't have money to get more. Never true       Transition Planning    Patient/Family Anticipates Transition to home    Patient/Family Anticipated Services at Transition none    Transportation Anticipated family or friend will provide       Discharge Needs Assessment    Readmission Within the Last 30 Days no previous admission in last 30 days    Equipment Currently Used at Home none    Concerns to be Addressed no discharge needs identified    Equipment Needed After Discharge oxygen    Provided Post Acute Provider List? N/A    Provided Post Acute Provider Quality & Resource List? N/A               Discharge Plan     Row Name 23 1137       Plan    Plan Pt plans to discharge home with family transporting. May need oxygen at discharge.    Patient/Family in Agreement with Plan yes    Plan Comments Sw met with pt at bedside to initiate discharge planning.  Pt confirms name/address//pcp. States she has not seen her pcp in over a  year. Does not have any DME at home. Will be a new oxygen set up if oxygen is ordered at dc. No preference in DME companies.  Family will transport home at dc. Indpendent with ADL's at baseline.  No barriers to discharge identified during DCP.    Final Discharge Disposition Code 30 - still a patient              Continued Care and Services - Admitted Since 4/13/2023    Coordination has not been started for this encounter.          Demographic Summary     Row Name 04/14/23 1133       General Information    Admission Type inpatient    Arrived From home    Required Notices Provided Important Message from Medicare    Referral Source admission list    Reason for Consult discharge planning    Preferred Language English       Contact Information    Permission Granted to Share Info With family/designee    Contact Information Obtained for                Functional Status     Row Name 04/14/23 1134       Functional Status    Usual Activity Tolerance excellent    Current Activity Tolerance moderate       Functional Status, IADL    Medications independent    Meal Preparation independent    Housekeeping independent    Laundry independent    Shopping independent       Mental Status    General Appearance WDL WDL       Mental Status Summary    Recent Changes in Mental Status/Cognitive Functioning no changes               Psychosocial     Row Name 04/14/23 1134       Values/Beliefs    Spiritual, Cultural Beliefs, Caodaism Practices, Values that Affect Care no       Behavior WDL    Behavior WDL WDL       Emotion Mood WDL    Emotion/Mood/Affect WDL WDL       Speech WDL    Speech WDL WDL       Perceptual State WDL    Perceptual State WDL WDL       Thought Process WDL    Thought Process WDL WDL       Intellectual Performance WDL    Intellectual Performance WDL WDL       Coping/Stress    Major Change/Loss/Stressor none    Patient Personal Strengths positive attitude;strong support system    Sources of Support adult  child(sheldon)    Techniques to [place] with Loss/Stress/Change not applicable    Reaction to Health Status realistic    Understanding of Condition and Treatment adequate understanding of medical condition       Developmental Stage (Eriksson's)    Developmental Stage Stage 8 (66 years-death/Late Adulthood) Integrity vs. Despair       C-SSRS (Recent)    Q1 Wished to be Dead (Past Month) no    Q2 Suicidal Thoughts (Past Month) no    Q6 Suicide Behavior (Lifetime) no       Violence Risk    Feels Like Hurting Others no    Previous Attempt to Harm Others no               Abuse/Neglect    No documentation.                Legal    No documentation.                Substance Abuse    No documentation.                Patient Forms    No documentation.                   Merle Torres

## 2023-04-14 NOTE — PROGRESS NOTES
[facility]    Name:  Carolin Toscano   Age:  77 y.o.  Sex:  female  :  1945  MRN:  [id number]   Visit Number:  [id number]  Admission Date:  2023  Date Of Service:  23  Primary Care Physician:  Jason Nicholson MD     LOS: 1 day :    Chief Complaint:      Shortness of breath    Subjective:    Patient seen and examined at bedside this morning.  There is no family present on exam.  Patient states she does not wear oxygen at home and is currently requiring 12L to maintain saturations in low 90s.  States she is a smoker for 50+ years, and quit on .  States at one point she smoked 4PPD.  States she lives alone and has family close.  States she is feeling worse today.    Hospital Course:    Patient is a 77-year-old female with a history of ongoing tobacco abuse, COPD, hypertension, dyslipidemia, prior kidney stones, who presented to the emergency room with complaints of 3 to 4 days of increasing cough, shortness of breath and chills.  Patient states she actually quit smoking about 3 days ago.  She has been having significant productive cough with thick yellowish sputum production.  She has felt some chills but no actual fevers.  She does use some inhaler at home but cannot remember the name.  She has not followed up with PCP or pulmonology in a while.  She does not routinely wear oxygen at home.  She denies any chest pains or palpitations.     In the ER, patient was hypoxic on arrival and was requiring up to 3 L of oxygen.  She was afebrile and heart rate in the 80s with blood pressure in the 140/70 range.  She has a negative respiratory panel.  She had a lactic acid two-point 7 repeat 1.2.  She had a blood gas pH 7.47 PCO2 55.1 PO2 86.5.  Shift creatinine 0.65 potassium 2.9 CO2 33.7.  proBNP of 272 mildly elevated high-sensitivity troponin.  White count of 21 hemoglobin 15.  Negative flu COVID testing.  A1c 6.1 magnesium 1.8 and procalcitonin 0.43.   She had a chest x-ray was showing emphysematous changes but no acute abnormality.  A CT scan of the chest without contrast was showing findings concerning for bronchitis/bronchiolitis with interlobular septal thickening in the right lung base.  There is no focal airspace disease.  Patient was given empiric antibiotics, bronchodilators, steroids emergency room.  Due to acute respiratory failure with hypoxia we are asked admit    Review of Systems:     All systems were reviewed and negative except as mentioned in subjective, assessment and plan.    Vital Signs:    Temp:  [97.8 °F (36.6 °C)-98.8 °F (37.1 °C)] 97.8 °F (36.6 °C)  Heart Rate:  [86-96] 96  Resp:  [15-22] 18  BP: (119-146)/(62-78) 146/78    Intake and output:    I/O last 3 completed shifts:  In: 1050 [I.V.:50; IV Piggyback:1000]  Out: 50 [Urine:50]  I/O this shift:  In: 120 [P.O.:120]  Out: -     Physical Examination:    General Appearance:  Alert and cooperative, pleasant elderly female, appears thin, frail, and chronically ill   Head:  Atraumatic and normocephalic.   Eyes: Conjunctivae and sclerae normal, no icterus. No pallor.   Throat: No oral lesions, no thrush, oral mucosa moist, hoarse voice   Neck: Supple, trachea midline   Lungs:   Breath sounds heard bilaterally equally.  No wheezing or crackles. Mildly labored on conversation--requiring 12L with saturations around 92%   Heart:  Normal S1 and S2, no murmur, no gallop, no rub. No JVD.   Abdomen:   Normal bowel sounds, no masses, no organomegaly. Soft, nontender, nondistended, no rebound tenderness.   Extremities: Supple, no edema, no cyanosis, no clubbing.   Skin: No bleeding or rash.   Neurologic: Alert and oriented x 3. No facial asymmetry. Moves all four limbs. No tremors.      Laboratory results:    Results from last 7 days   Lab Units 04/13/23  1458   SODIUM mmol/L 138   POTASSIUM mmol/L 2.9*   CHLORIDE mmol/L 92*   CO2 mmol/L 33.7*   BUN mg/dL 12   CREATININE mg/dL 0.65   CALCIUM mg/dL 9.4    BILIRUBIN mg/dL 0.4   ALK PHOS U/L 114   ALT (SGPT) U/L 23   AST (SGOT) U/L 19   GLUCOSE mg/dL 160*     Results from last 7 days   Lab Units 04/13/23  1458   WBC 10*3/mm3 20.98*   HEMOGLOBIN g/dL 15.3   HEMATOCRIT % 47.4*   PLATELETS 10*3/mm3 240         Results from last 7 days   Lab Units 04/13/23  1458   HSTROP T ng/L 15*         Recent Labs     04/13/23  1539   PHART 7.427   TDN4LPE 55.1*   PO2ART 86.5   QYM7ULT 36.3*   BASEEXCESS 9.8*      I have reviewed the patient's laboratory results.    Radiology results:    Adult Transthoracic Echo Complete W/ Cont if Necessary Per Protocol    Result Date: 4/14/2023  •  Left ventricular diastolic function is consistent with (grade I) impaired relaxation. •  Mild aortic valve stenosis is present. •  Estimated right ventricular systolic pressure from tricuspid regurgitation is moderately elevated (45-55 mmHg). •  Mild to moderate pulmonary hypertension is present.     CT Chest Without Contrast Diagnostic    Result Date: 4/13/2023  FINAL REPORT TECHNIQUE: Routine axial images were obtained from the lung apices to below the diaphragm without contrast. CLINICAL HISTORY: dsypnea FINDINGS: There is mild upper lobe emphysema.  Tree-in-bud type opacities in the lungs bilaterally are consistent with bronchitis/bronchiolitis.  Interlobular septal thickening in the right lung base may be due to interstitial pulmonary edema. There is no confluent airspace consolidation.  The heart and vasculature are unremarkable. There is no pleural disease, adenopathy, or significant osseous abnormality.     Impression: Mild emphysema.  Bronchitis/bronchiolitis.  Possible interstitial pulmonary edema. Authenticated and Electronically Signed by Alexis Babin M.D. on 04/13/2023 08:10:51 PM    XR Chest 1 View    Result Date: 4/13/2023  CLINICAL INDICATION:  SOA Triage Protocol shortness of breath.  EXAMINATION TECHNIQUE: XR CHEST 1 VW-  COMPARISON: Radiographs 10/01/2017.  FINDINGS:  Cardiomegaly Aortic  atherosclerosis. Vascular engorgement and prominent interstitial markings with peripheral subpleural linear opacities. No dense consolidation. No pneumothorax or large pleural effusion. Large pulmonary arteries, suggestive of pulmonary arterial hypertension. Background emphysema.      Impression: Findings are concerning for mild cardiogenic pulmonary edema. Background emphysema.  Images personally reviewed, interpreted and dictated by SHIRA Villa.       This report was signed and finalized on 4/13/2023 3:41 PM by SHIRA Villa.    I have reviewed the patient's radiology reports.    Medication Review:     I have reviewed the patient's active and prn medications.     Problem List:      Acute respiratory failure    CAP (community acquired pneumonia)    Essential hypertension    COPD exacerbation      Assessment:    1. Acute respiratory failure with hypoxia  2. COPD with exacerbation  3. Likely community-acquired pneumonia, present admission, due to unknown organism  4. Hypertension  5. Chronic tobacco abuse  6. Arthritis    Plan:    Respiratory failure/COPD/pneumonia:  -Patient with multiple antibiotic allergies, reviewed with pharmacy on admission, will continue with Azactam.    -Continue with bronchodilators, prednisone, oxygen therapy.  Wean as tolerated.    -Strep pneumo and Legionella antigens ordered    -Patient does need full pulmonary function test at some point.  -Tobacco cessation discussed at length on admission and patient states she has quit  -Oxygen demands increased overnight to 12L with no baseline use.  Will consult Pulmonology for recommendations     Hypertension:  -Restart home atenolol and amlodipine.     Chronic tobacco abuse:  -Complicates all aspects of care    DVT Prophylaxis: Lovenox  Code Status: Full Code  Diet: Regular  Discharge Plan: 2-3 days    Mamta Tinajero, CLAUDIA  04/14/23  12:09 EDT    Dictated utilizing Dragon dictation.

## 2023-04-15 LAB
ANION GAP SERPL CALCULATED.3IONS-SCNC: 4 MMOL/L (ref 5–15)
BASOPHILS # BLD AUTO: 0.08 10*3/MM3 (ref 0–0.2)
BASOPHILS NFR BLD AUTO: 0.4 % (ref 0–1.5)
BUN SERPL-MCNC: 18 MG/DL (ref 8–23)
BUN/CREAT SERPL: 34.6 (ref 7–25)
CALCIUM SPEC-SCNC: 9.3 MG/DL (ref 8.6–10.5)
CHLORIDE SERPL-SCNC: 103 MMOL/L (ref 98–107)
CO2 SERPL-SCNC: 38 MMOL/L (ref 22–29)
CREAT SERPL-MCNC: 0.52 MG/DL (ref 0.57–1)
DEPRECATED RDW RBC AUTO: 49.5 FL (ref 37–54)
EGFRCR SERPLBLD CKD-EPI 2021: 96.4 ML/MIN/1.73
EOSINOPHIL # BLD AUTO: 0 10*3/MM3 (ref 0–0.4)
EOSINOPHIL NFR BLD AUTO: 0 % (ref 0.3–6.2)
ERYTHROCYTE [DISTWIDTH] IN BLOOD BY AUTOMATED COUNT: 13.8 % (ref 12.3–15.4)
GLUCOSE SERPL-MCNC: 143 MG/DL (ref 65–99)
HCT VFR BLD AUTO: 47.4 % (ref 34–46.6)
HGB BLD-MCNC: 14.6 G/DL (ref 12–15.9)
IMM GRANULOCYTES # BLD AUTO: 0.44 10*3/MM3 (ref 0–0.05)
IMM GRANULOCYTES NFR BLD AUTO: 2.2 % (ref 0–0.5)
LYMPHOCYTES # BLD AUTO: 1.4 10*3/MM3 (ref 0.7–3.1)
LYMPHOCYTES NFR BLD AUTO: 7.1 % (ref 19.6–45.3)
MCH RBC QN AUTO: 29.9 PG (ref 26.6–33)
MCHC RBC AUTO-ENTMCNC: 30.8 G/DL (ref 31.5–35.7)
MCV RBC AUTO: 96.9 FL (ref 79–97)
MONOCYTES # BLD AUTO: 1.27 10*3/MM3 (ref 0.1–0.9)
MONOCYTES NFR BLD AUTO: 6.5 % (ref 5–12)
MRSA DNA SPEC QL NAA+PROBE: NORMAL
NEUTROPHILS NFR BLD AUTO: 16.4 10*3/MM3 (ref 1.7–7)
NEUTROPHILS NFR BLD AUTO: 83.8 % (ref 42.7–76)
NRBC BLD AUTO-RTO: 0 /100 WBC (ref 0–0.2)
PLATELET # BLD AUTO: 274 10*3/MM3 (ref 140–450)
PMV BLD AUTO: 10.6 FL (ref 6–12)
POTASSIUM SERPL-SCNC: 3.7 MMOL/L (ref 3.5–5.2)
RBC # BLD AUTO: 4.89 10*6/MM3 (ref 3.77–5.28)
SODIUM SERPL-SCNC: 145 MMOL/L (ref 136–145)
WBC NRBC COR # BLD: 19.59 10*3/MM3 (ref 3.4–10.8)

## 2023-04-15 PROCEDURE — 94664 DEMO&/EVAL PT USE INHALER: CPT

## 2023-04-15 PROCEDURE — 94640 AIRWAY INHALATION TREATMENT: CPT

## 2023-04-15 PROCEDURE — 25010000002 METHYLPREDNISOLONE PER 40 MG: Performed by: INTERNAL MEDICINE

## 2023-04-15 PROCEDURE — 87641 MR-STAPH DNA AMP PROBE: CPT | Performed by: NURSE PRACTITIONER

## 2023-04-15 PROCEDURE — 63710000001 PREDNISONE PER 1 MG: Performed by: FAMILY MEDICINE

## 2023-04-15 PROCEDURE — 97162 PT EVAL MOD COMPLEX 30 MIN: CPT

## 2023-04-15 PROCEDURE — 85025 COMPLETE CBC W/AUTO DIFF WBC: CPT | Performed by: NURSE PRACTITIONER

## 2023-04-15 PROCEDURE — 80048 BASIC METABOLIC PNL TOTAL CA: CPT | Performed by: NURSE PRACTITIONER

## 2023-04-15 PROCEDURE — 94761 N-INVAS EAR/PLS OXIMETRY MLT: CPT

## 2023-04-15 PROCEDURE — 94799 UNLISTED PULMONARY SVC/PX: CPT

## 2023-04-15 PROCEDURE — 99233 SBSQ HOSP IP/OBS HIGH 50: CPT | Performed by: INTERNAL MEDICINE

## 2023-04-15 PROCEDURE — 99232 SBSQ HOSP IP/OBS MODERATE 35: CPT | Performed by: NURSE PRACTITIONER

## 2023-04-15 PROCEDURE — 25010000002 ENOXAPARIN PER 10 MG: Performed by: FAMILY MEDICINE

## 2023-04-15 RX ORDER — METHYLPREDNISOLONE SODIUM SUCCINATE 40 MG/ML
40 INJECTION, POWDER, LYOPHILIZED, FOR SOLUTION INTRAMUSCULAR; INTRAVENOUS EVERY 6 HOURS
Status: DISCONTINUED | OUTPATIENT
Start: 2023-04-15 | End: 2023-04-18

## 2023-04-15 RX ADMIN — BUDESONIDE 1 MG: 0.5 INHALANT RESPIRATORY (INHALATION) at 20:03

## 2023-04-15 RX ADMIN — PREDNISONE 40 MG: 20 TABLET ORAL at 08:51

## 2023-04-15 RX ADMIN — TIOTROPIUM BROMIDE INHALATION SPRAY 2 PUFF: 3.12 SPRAY, METERED RESPIRATORY (INHALATION) at 07:59

## 2023-04-15 RX ADMIN — METHYLPREDNISOLONE SODIUM SUCCINATE 40 MG: 40 INJECTION, POWDER, FOR SOLUTION INTRAMUSCULAR; INTRAVENOUS at 20:35

## 2023-04-15 RX ADMIN — IPRATROPIUM BROMIDE AND ALBUTEROL SULFATE 3 ML: 2.5; .5 SOLUTION RESPIRATORY (INHALATION) at 13:22

## 2023-04-15 RX ADMIN — Medication 10 ML: at 08:51

## 2023-04-15 RX ADMIN — METHYLPREDNISOLONE SODIUM SUCCINATE 40 MG: 40 INJECTION, POWDER, FOR SOLUTION INTRAMUSCULAR; INTRAVENOUS at 13:19

## 2023-04-15 RX ADMIN — IPRATROPIUM BROMIDE AND ALBUTEROL SULFATE 3 ML: 2.5; .5 SOLUTION RESPIRATORY (INHALATION) at 20:03

## 2023-04-15 RX ADMIN — BUDESONIDE 1 MG: 0.5 INHALANT RESPIRATORY (INHALATION) at 07:08

## 2023-04-15 RX ADMIN — Medication 1 PATCH: at 22:11

## 2023-04-15 RX ADMIN — AZTREONAM 2 G: 2 INJECTION, POWDER, LYOPHILIZED, FOR SOLUTION INTRAMUSCULAR; INTRAVENOUS at 20:50

## 2023-04-15 RX ADMIN — ARFORMOTEROL TARTRATE 15 MCG: 15 SOLUTION RESPIRATORY (INHALATION) at 20:03

## 2023-04-15 RX ADMIN — ENOXAPARIN SODIUM 40 MG: 100 INJECTION SUBCUTANEOUS at 20:35

## 2023-04-15 RX ADMIN — ATENOLOL 25 MG: 25 TABLET ORAL at 08:50

## 2023-04-15 RX ADMIN — ARFORMOTEROL TARTRATE 15 MCG: 15 SOLUTION RESPIRATORY (INHALATION) at 07:08

## 2023-04-15 RX ADMIN — SODIUM CHLORIDE 1 G: 900 INJECTION INTRAVENOUS at 04:51

## 2023-04-15 RX ADMIN — IPRATROPIUM BROMIDE AND ALBUTEROL SULFATE 3 ML: 2.5; .5 SOLUTION RESPIRATORY (INHALATION) at 07:08

## 2023-04-15 RX ADMIN — SODIUM CHLORIDE 1 G: 900 INJECTION INTRAVENOUS at 13:19

## 2023-04-15 RX ADMIN — AMLODIPINE BESYLATE 10 MG: 5 TABLET ORAL at 08:49

## 2023-04-15 NOTE — THERAPY EVALUATION
"Patient Name: Carolin Toscano  : 1945    MRN: [id number]                              Today's Date: 4/15/2023       Admit Date: 2023    Visit Dx:     ICD-10-CM ICD-9-CM   1. Acute respiratory failure with hypoxia  J96.01 518.81   2. COPD with acute exacerbation  J44.1 491.21     Patient Active Problem List   Diagnosis   • CAP (community acquired pneumonia)   • Sepsis   • Cigarette nicotine dependence with nicotine-induced disorder   • Essential hypertension   • Dyslipidemia   • Blood glucose elevated   • Muscle ache   • COPD (chronic obstructive pulmonary disease)   • Nuclear sclerotic cataract of right eye   • Acute respiratory failure   • COPD exacerbation     Past Medical History:   Diagnosis Date   • Arthritis    • COPD (chronic obstructive pulmonary disease)    • Gall stones    • Goiter     \"inward\"   • Hypertension    • Hypertension    • Kidney infection    • Kidney stone    • Kidney stones    • Migraine headache    • Scarlet fever      Past Surgical History:   Procedure Laterality Date   • BLADDER SURGERY     • CATARACT EXTRACTION W/ INTRAOCULAR LENS IMPLANT Left 2022    Procedure: CATARACT PHACO EXTRACTION WITH INTRAOCULAR LENS IMPLANT LEFT;  Surgeon: Sathish Lopez MD;  Location: [facility];  Service: Ophthalmology;  Laterality: Left;   • CATARACT EXTRACTION W/ INTRAOCULAR LENS IMPLANT Right 2022    Procedure: CATARACT PHACO EXTRACTION WITH INTRAOCULAR LENS IMPLANT RIGHT;  Surgeon: Sathish Lopez MD;  Location: [facility];  Service: Ophthalmology;  Laterality: Right;   • CHOLECYSTECTOMY     • HYSTERECTOMY     • TONSILLECTOMY        General Information     Row Name 04/15/23 1514          Physical Therapy Time and Intention    Document Type evaluation  -TW     Mode of Treatment physical therapy  -TW     Row Name 04/15/23 1514          General Information    Patient Profile Reviewed yes  -TW     Prior Level of Function independent:;all household mobility  per pt she doesn't use " any assistive device for ambulation at home and states she stands for her shower (tub/shower combo).  -TW     Existing Precautions/Restrictions fall;oxygen therapy device and L/min  -TW     Barriers to Rehab cognitive status;medically complex  -TW     Row Name 04/15/23 1514          Living Environment    People in Home alone  -     Row Name 04/15/23 1514          Home Main Entrance    Number of Stairs, Main Entrance three  -TW     Stair Railings, Main Entrance railing on left side (ascending)  -     Row Name 04/15/23 1514          Stairs Within Home, Primary    Number of Stairs, Within Home, Primary none  -TW     Row Name 04/15/23 1514          Cognition    Orientation Status (Cognition) oriented to;person;place;verbal cues/prompts needed for orientation;other (see comments)  pt did know month but note date or day of the week. Pt did not really remember what brought her to the hospital.  -     Row Name 04/15/23 1514          Safety Issues, Functional Mobility    Safety Issues Affecting Function (Mobility) awareness of need for assistance;insight into deficits/self-awareness;safety precaution awareness;safety precautions follow-through/compliance;ability to follow commands;sequencing abilities  -TW     Impairments Affecting Function (Mobility) balance;cognition;endurance/activity tolerance;strength;motor planning  -TW     Cognitive Impairments, Mobility Safety/Performance attention;awareness, need for assistance;insight into deficits/self-awareness;problem-solving/reasoning;safety precaution awareness;safety precaution follow-through;sequencing abilities  -TW           User Key  (r) = Recorded By, (t) = Taken By, (c) = Cosigned By    Initials Name Provider Type    TW Deysi Goodwin, ROB Physical Therapist               Mobility     Row Name 04/15/23 1514          Bed Mobility    Bed Mobility supine-sit;sit-supine  -TW     Supine-Sit [place] (Bed Mobility) nonverbal cues (demo/gesture);verbal cues;minimum  "assist (75% patient effort)  -TW     Sit-Supine [place] (Bed Mobility) verbal cues;nonverbal cues (demo/gesture);contact guard  -TW     Assistive Device (Bed Mobility) head of bed elevated  -TW     Comment, (Bed Mobility) Pt presented with one leg hanging over EOB and her trunk sideways in the bed. When asked if she was uncomfortable pt replied, \"Maybe.\"  -     Row Name 04/15/23 1514          Transfers    Comment, (Transfers) pt's knees did give slightly when transferring to [facility] with min assist provided to assist pt with regaining her balance.  -     Row Name 04/15/23 1514          Bed-Chair Transfer    Bed-Chair [place] (Transfers) minimum assist (75% patient effort);verbal cues;nonverbal cues (demo/gesture)  -     Assistive Device (Bed-Chair Transfers) walker, front-wheeled  -     Row Name 04/15/23 1514          Sit-Stand Transfer    Sit-Stand [place] (Transfers) verbal cues;contact guard  -     Assistive Device (Sit-Stand Transfers) walker, front-wheeled  -     Row Name 04/15/23 1514          Gait/Stairs (Locomotion)    [place] Level (Gait) not tested  -           User Key  (r) = Recorded By, (t) = Taken By, (c) = Cosigned By    Initials Name Provider Type    TW Deysi Goodwin, PT Physical Therapist               Obj/Interventions     Row Name 04/15/23 1514          Range of Motion Comprehensive    Comment, General Range of Motion BLE grossly WFLs  -[facility] [place] Name 04/15/23 1514          Strength Comprehensive (MMT)    Comment, General Manual Muscle Testing (MMT) Assessment BLE grossly 3/5 to 4/5  -[facility] Name 04/15/23 1514          Balance    Balance Assessment sitting static balance;sitting dynamic balance;sit to stand dynamic balance;standing static balance;standing dynamic balance  -TW     Static Sitting Balance contact guard;standby assist  -TW     Dynamic Sitting Balance contact guard  -TW     Position, Sitting Balance supported;unsupported  -TW     Sit to Stand Dynamic " Balance contact guard  -TW     Static Standing Balance contact guard;minimal assist  -TW     Dynamic Standing Balance minimal assist  -TW     Position/Device Used, Standing Balance walker, front-wheeled  -TW     Comment, Balance In sitting pt had increased postural sway when starting to move for transfers that needed CGA to maintain balance.  -TW           User Key  (r) = Recorded By, (t) = Taken By, (c) = Cosigned By    Initials Name Provider Type    TW Buddy, Deysi, PT Physical Therapist               Goals/Plan     Row Name 04/15/23 1514          Bed Mobility Goal 1 (PT)    Activity/Assistive Device (Bed Mobility Goal 1, PT) bed mobility activities, all  -TW     [place] Level/Cues Needed (Bed Mobility Goal 1, PT) supervision required  -TW     Time Frame (Bed Mobility Goal 1, PT) short term goal (STG);5 days  -TW     Strategies/Barriers (Bed Mobility Goal 1, PT) without loss of balance and pt demonstrating good safety awareness  -TW     Progress/Outcomes (Bed Mobility Goal 1, PT) goal ongoing  -TW     Row Name 04/15/23 1514          Transfer Goal 1 (PT)    Activity/Assistive Device (Transfer Goal 1, PT) sit-to-stand/stand-to-sit;bed-to-chair/chair-to-bed;toilet;walker, rolling  -TW     [place] Level/Cues Needed (Transfer Goal 1, PT) standby assist  -TW     Time Frame (Transfer Goal 1, PT) 2 weeks  -TW     Strategies/Barriers (Transfers Goal 1, PT) with O2 sats above 90% throughout  -TW     Progress/Outcome (Transfer Goal 1, PT) goal ongoing  -TW     Row Name 04/15/23 1514          Gait Training Goal 1 (PT)    Activity/Assistive Device (Gait Training Goal 1, PT) gait (walking locomotion);assistive device use;increase energy conservation;increase endurance/gait distance;walker, rolling  -TW     [place] Level (Gait Training Goal 1, PT) standby assist  -TW     Distance (Gait Training Goal 1, PT) 200 ft  -TW     Time Frame (Gait Training Goal 1, PT) 2 weeks  -TW     Strategies/Barriers (Gait Training  Goal 1, PT) with O2 sat above 90% throughout  -TW     Progress/Outcome (Gait Training Goal 1, PT) goal ongoing  -TW     Row Name 04/15/23 1514          Patient Education Goal (PT)    Activity (Patient Education Goal, PT) BLE ther ex 1 x 10  -TW     [place]/Cues/Accuracy (Memory Goal 2, PT) demonstrates adequately  -TW     Time Frame (Patient Education Goal, PT) 2 weeks  -TW     Progress/Outcome (Patient Education Goal, PT) goal ongoing  -TW     Row Name 04/15/23 1514          Therapy Assessment/Plan (PT)    Planned Therapy Interventions (PT) balance training;bed mobility training;patient/family education;transfer training;strengthening;gait training  -TW           User Key  (r) = Recorded By, (t) = Taken By, (c) = Cosigned By    Initials Name Provider Type    TW Deysi Goodwin, PT Physical Therapist               Clinical Impression     Row Name 04/15/23 1514          Pain    Pretreatment Pain Rating 0/10 - no pain  -TW     Posttreatment Pain Rating 0/10 - no pain  -TW     Pre/Posttreatment Pain Comment pt had no c/o pain but did appear uncomfortable with her position in the bed. Once pt assisted to BSC and clothing changed pt expressed increased comfort.  -TW     Row Name 04/15/23 1514          Plan of Care Review    Plan of Care Reviewed With patient  -TW     Outcome Evaluation PT evaluation completed this pm with pt presenting supine in bed with one leg over EOB and trunk sideways. Pt did appear uncomfortable but stated she was not in any pain. Pt was oriented to herself and place and with cues situation. Pt's was raspy and soft (at times difficult to understand). Pt on hiflow O2 throughout session and her O2 sats remained above 90% throughout. Pt needed min assist for coming to sit on EOB and close SBA to CGA for sitting balance. CGA and cues for standing with FWW and min assist to pivot to BSC with pt's knees giving way x 1 and pt needing min assist to keep balance. When pivoting back to bed pt did not have  have any buckling of her knees. Pt needed CGA for getting back to bed and expressed increased comfort once in bed. Pt presents with deficits in strength, balance, and endurance. She is expected to improve her functional mobility with continued PT services prior to d/c.  -TW     Row Name 04/15/23 1514          Therapy Assessment/Plan (PT)    Patient/Family Therapy Goals Statement (PT) none stated  -TW     Rehab Potential (PT) good, to achieve stated therapy goals  -TW     Criteria for Skilled Interventions Met (PT) yes;meets criteria  -TW     Therapy Frequency (PT) 5 times/wk  -TW     Predicted Duration of Therapy Intervention (PT) 2 wks  -TW     Row Name 04/15/23 1514          Vital Signs    Pre SpO2 (%) 93  -TW     O2 Delivery Pre Treatment hi-flow  -TW     Intra SpO2 (%) 90  -TW     O2 Delivery Intra Treatment hi-flow  -TW     Post SpO2 (%) 94  -TW     O2 Delivery Post Treatment hi-flow  -TW     Pre Patient Position Supine  -TW     Intra Patient Position Standing  -TW     Post Patient Position Supine  -TW     Row Name 04/15/23 1514          Positioning and Restraints    Pre-Treatment Position in bed  -TW     Post Treatment Position bed  -TW     In Bed supine;call light within reach;side rails up x3;encouraged to call for assist;exit alarm on;notified nsg  -TW           User Key  (r) = Recorded By, (t) = Taken By, (c) = Cosigned By    Initials Name Provider Type    Deysi Addison, PT Physical Therapist               Outcome Measures     Row Name 04/15/23 1514 04/15/23 0800       How much help from another person do you currently need...    Turning from your back to your side while in flat bed without using bedrails? 4  -TW 4  -MM (r) VS (t) MM (c)    Moving from lying on back to sitting on the side of a flat bed without bedrails? 3  -TW 4  -MM (r) VS (t) MM (c)    Moving to and from a bed to a chair (including a wheelchair)? 3  -TW 3  -MM (r) VS (t) MM (c)    Standing up from a chair using your arms (e.g.,  wheelchair, bedside chair)? 3  -TW 3  -MM (r) VS (t) MM (c)    Climbing 3-5 steps with a railing? 1  -TW 2  -MM (r) VS (t) MM (c)    To walk in hospital room? 2  -TW 2  -MM (r) VS (t) MM (c)    AM-PAC 6 Clicks Score (PT) 16  -TW 18  -MM (r) VS (t)    Highest level of mobility 5 --> Static standing  -TW 6 --> Walked 10 steps or more  -MM (r) VS (t)    Row Name 04/15/23 1514          Functional Assessment    Outcome Measure Options AM-PAC 6 Clicks Basic Mobility (PT)  -TW           User Key  (r) = Recorded By, (t) = Taken By, (c) = Cosigned By    Initials Name Provider Type    MM Shabnam Rodriguez, RN Registered Nurse    TW Deysi Goodwin, PT Physical Therapist    VS Gloria Mckee Nursing Student Nursing Student                             Physical Therapy Education     Title: PT OT SLP Therapies (In Progress)     Topic: Physical Therapy (In Progress)     Point: Mobility training (Done)     Learning Progress Summary           Patient Acceptance, E, VU,NR by TW at 4/15/2023 1514    Comment: pt education on calling for assist prior to trying to get up OOB and for her current PT POC/goals                   Point: Home exercise program (Not Started)     Learner Progress:  Not documented in this visit.          Point: Body mechanics (Not Started)     Learner Progress:  Not documented in this visit.          Point: Precautions (Done)     Learning Progress Summary           Patient Acceptance, E, VU,NR by TW at 4/15/2023 1514    Comment: pt education on calling for assist prior to trying to get up OOB and for her current PT POC/goals                               User Key     Initials Effective Dates Name Provider Type Discipline    TW 06/16/21 -  Deysi Goodwin, PT Physical Therapist PT              PT Recommendation and Plan  Planned Therapy Interventions (PT): balance training, bed mobility training, patient/family education, transfer training, strengthening, gait training  Plan of Care Reviewed With: patient  Outcome  Evaluation: PT evaluation completed this pm with pt presenting supine in bed with one leg over EOB and trunk sideways. Pt did appear uncomfortable but stated she was not in any pain. Pt was oriented to herself and place and with cues situation. Pt's was raspy and soft (at times difficult to understand). Pt on hiflow O2 throughout session and her O2 sats remained above 90% throughout. Pt needed min assist for coming to sit on EOB and close SBA to CGA for sitting balance. CGA and cues for standing with FWW and min assist to pivot to BSC with pt's knees giving way x 1 and pt needing min assist to keep balance. When pivoting back to bed pt did not have have any buckling of her knees. Pt needed CGA for getting back to bed and expressed increased comfort once in bed. Pt presents with deficits in strength, balance, and endurance. She is expected to improve her functional mobility with continued PT services prior to d/c.     Time Calculation:    PT Charges     Row Name 04/15/23 1514             Time Calculation    Stop Time 1514  -TW      PT Received On 04/15/23  -TW      PT Goal Re-Cert Due Date 04/25/23 -TW            User Key  (r) = Recorded By, (t) = Taken By, (c) = Cosigned By    Initials Name Provider Type    Deysi Addison PT Physical Therapist              Therapy Charges for Today     Code Description Service Date Service Provider Modifiers Qty    [id number] HC PT EVAL MOD COMPLEXITY 2 4/15/2023 Deysi Goodwin PT GP 1          PT G-Codes  Outcome Measure Options: AM-PAC 6 Clicks Basic Mobility (PT)  AM-PAC 6 Clicks Score (PT): 16  PT Discharge Summary  Anticipated Discharge Disposition (PT): inpatient rehabilitation facility    Deysi Goodwin PT  4/15/2023

## 2023-04-15 NOTE — PLAN OF CARE
Goal Outcome Evaluation:  Plan of Care Reviewed With: patient           Outcome Evaluation: PT evaluation completed this pm with pt presenting supine in bed with one leg over EOB and trunk sideways. Pt did appear uncomfortable but stated she was not in any pain. Pt was oriented to herself and place and with cues situation. Pt's was raspy and soft (at times difficult to understand). Pt on hiflow O2 throughout session and her O2 sats remained above 90% throughout. Pt needed min assist for coming to sit on EOB and close SBA to CGA for sitting balance. CGA and cues for standing with FWW and min assist to pivot to BSC with pt's knees giving way x 1 and pt needing min assist to keep balance. When pivoting back to bed pt did not have have any buckling of her knees. Pt needed CGA for getting back to bed and expressed increased comfort once in bed. Pt presents with deficits in strength, balance, and endurance. She is expected to improve her functional mobility with continued PT services prior to d/c.

## 2023-04-15 NOTE — PROGRESS NOTES
"  CC: Acute respiratory failure.  COPD exacerbation.    S: Continues to have shortness of breath with any exertion.  Says that she still has trouble speaking in full sentences as she gets \"winded\".  Does have occasional cough although it remains dry.    ROS: Positive for cough, shortness of breath. Denies chest pain, diarrhea or fever.    O:Vital signs reviewed.   /57   Pulse 103   Temp 98 °F (36.7 °C) (Oral)   Resp 20   Ht 160 cm (62.99\")   Wt 69.4 kg (153 lb)   SpO2 90%   BMI 27.11 kg/m²     Temp (24hrs), Av.1 °F (36.7 °C), Min:97.9 °F (36.6 °C), Max:98.4 °F (36.9 °C)        I & Os reviewed.   Intake/Output       23 0700 - 04/15/23 0659 04/15/23 0700 - 23 0659    Intake (ml) 820 240    Output (ml) 850 --    Net (ml) -30 240    Last Weight 69.4 kg (153 lb) --          Net IO Since Admission: 1,210 mL [04/15/23 1116]    General/Constitutional: Mild acute distress noted.  Eyes: PERRL.   Neck: Supple without JVD. No obvious masses noted.   Cardiovascular: S1 + S2.  Tachycardic  Lungs/Respiratory: Mild respiratory distress noted.  Somewhat diffuse wheezing heard.  No obvious crackles noted  GI/Abdomen: Soft. Bowel sounds positive.  No obvious organomegaly  Musculoskeletal/Extremities: No edema noted. Gait could not be assessed at this time, as the patient was laying in bed.   Neurologic:  Was able to follow commands.    Psych: AAOx3.   Skin: Appeared somewhat dry       Labs: Reviewed.   Results from last 7 days   Lab Units 04/15/23  0811 23  1458   WBC 10*3/mm3 19.59* 20.98*   HEMOGLOBIN g/dL 14.6 15.3   HEMATOCRIT % 47.4* 47.4*   PLATELETS 10*3/mm3 274 240   NEUTROPHIL % % 83.8* 84.5*   NEUTROS ABS 10*3/mm3 16.40* 17.74*   EOSINOPHIL % % 0.0* 1.0   EOS ABS 10*3/mm3 0.00 0.21   LYMPHOCYTE % % 7.1* 4.3*   LYMPHS ABS 10*3/mm3 1.40 0.90       Lab Results   Component Value Date    PROCALCITO 0.43 (H) 2023       No results found for: CRP    No results found for: SEDRATE    Lab " Results   Component Value Date    PROBNP 272.8 04/13/2023    PROBNP 315.0 (C) 10/02/2017       Results from last 7 days   Lab Units 04/15/23  0811 04/13/23  1458   SODIUM mmol/L 145 138   POTASSIUM mmol/L 3.7 2.9*   CHLORIDE mmol/L 103 92*   CO2 mmol/L 38.0* 33.7*   BUN mg/dL 18 12   CREATININE mg/dL 0.52* 0.65   CALCIUM mg/dL 9.3 9.4   ANION GAP mmol/L 4.0* 12.3   BILIRUBIN mg/dL  --  0.4   ALK PHOS U/L  --  114   ALT (SGPT) U/L  --  23   AST (SGOT) U/L  --  19   GLUCOSE mg/dL 143* 160*   TOTAL PROTEIN g/dL  --  7.7   ALBUMIN g/dL  --  3.3*       Results from last 7 days   Lab Units 04/13/23  1458   MAGNESIUM mg/dL 1.8             No results found for: CKTOTAL    No components found for: HSTROPT    Lab Results   Component Value Date    TROPONINT 15 (H) 04/13/2023       No results found for: DDIMER    Brief Urine Lab Results     None          No results found for: TSH    No results found for: FREET4      Micro: As of April 15, 2023   Lab Results   Component Value Date    RESPCX Light growth (2+) Normal Respiratory Ana 10/02/2017     No results found for: BCIDPCR  Lab Results   Component Value Date    BLOODCX No growth at 5 days 10/01/2017    BLOODCX No growth at 5 days 10/01/2017     Lab Results   Component Value Date    URINECX Final report 02/07/2019    URINECX 20,000-30,000 CFU/mL Escherichia coli (A) 10/05/2018     No results found for: MRSACX  No results found for: MRSAPCR  No results found for: URCX  No components found for: LOWRESPCF  No results found for: THROATCX  No results found for: CULTURES  No components found for: STREPBCX  No results found for: STREPPNEUAG  No results found for: LEGIONELLA  No results found for: MYCOPLASCX  No results found for: GCCX  No results found for: WOUNDCX  No results found for: BODYFLDCX    No results found for: FLU    Lab Results   Component Value Date    ADENOVIRUS Not Detected 04/13/2023     Lab Results   Component Value Date    NW665W Not Detected 04/13/2023     Lab  Results   Component Value Date    CVHKU1 Not Detected 04/13/2023     Lab Results   Component Value Date    CVNL63 Not Detected 04/13/2023     Lab Results   Component Value Date    CVOC43 Not Detected 04/13/2023     Lab Results   Component Value Date    HUMETPNEVS Not Detected 04/13/2023     Lab Results   Component Value Date    HURVEV Not Detected 04/13/2023     Lab Results   Component Value Date    FLUBPCR Not Detected 04/13/2023     Lab Results   Component Value Date    PARAINFLUE Not Detected 04/13/2023     Lab Results   Component Value Date    PARAFLUV2 Not Detected 04/13/2023     Lab Results   Component Value Date    PARAFLUV3 Not Detected 04/13/2023     Lab Results   Component Value Date    PARAFLUV4 Not Detected 04/13/2023     Lab Results   Component Value Date    BPERTPCR Not Detected 04/13/2023     No results found for: [id number]  Lab Results   Component Value Date    CPNEUPCR Not Detected 04/13/2023     Lab Results   Component Value Date    MPNEUMO Not Detected 04/13/2023     Lab Results   Component Value Date    FLUAPCR Not Detected 04/13/2023     No results found for: FLUAH3  No results found for: FLUAH1  Lab Results   Component Value Date    RSV Not Detected 04/13/2023     Lab Results   Component Value Date    BPARAPCR Not Detected 04/13/2023       COVID 19:  Lab Results   Component Value Date    COVID19 Not Detected 04/13/2023           ABG: Reviewed  Recent Labs     04/13/23  1539   PHART 7.427   DSE7CZE 55.1*   PO2ART 86.5   SMT8AUN 36.3*   BASEEXCESS 9.8*       Lab Results   Component Value Date    LACTATE 1.2 04/13/2023    LACTATE 2.7 (C) 04/13/2023    LACTATE 1.6 10/01/2017         Echo: Results for orders placed during the hospital encounter of 04/13/23    Adult Transthoracic Echo Complete W/ Cont if Necessary Per Protocol    Interpretation Summary  •  Left ventricular diastolic function is consistent with (grade I) impaired relaxation.  •  Mild aortic valve stenosis is present.  •  Estimated  right ventricular systolic pressure from tricuspid regurgitation is moderately elevated (45-55 mmHg).  •  Mild to moderate pulmonary hypertension is present.      Results for orders placed during the hospital encounter of 10/01/17    Adult Transthoracic Echo Complete W/ Cont if Necessary Per Protocol    Interpretation Summary  TEchnically difficult study  1) Borderline LVH with normal LV systolic function ( EF is over 55%)  2) Normal left atrial size with mild elevation in LVedp  3) Trace MR  4) Mild TR with normal PA pressures  5) Mild AI  6) Small RV with normal RV function        Pharmacy to Dose enoxaparin (LOVENOX),         Imaging: Latest imaging study was reviewed personally.   Imaging Results (Last 48 Hours)     Procedure Component Value Units Date/Time    CT Chest Without Contrast Diagnostic [[id number]] Collected: 04/13/23 2010     Updated: 04/13/23 2011    Narrative:      FINAL REPORT    TECHNIQUE:  Routine axial images were obtained from the lung apices to below  the diaphragm without contrast.    CLINICAL HISTORY:  dsypnea    FINDINGS:  There is mild upper lobe emphysema.  Tree-in-bud type opacities  in the lungs bilaterally are consistent with  bronchitis/bronchiolitis.  Interlobular septal thickening in the  right lung base may be due to interstitial pulmonary edema.  There is no confluent airspace consolidation.  The heart and  vasculature are unremarkable. There is no pleural disease,  adenopathy, or significant osseous abnormality.      Impression:      Mild emphysema.  Bronchitis/bronchiolitis.  Possible  interstitial pulmonary edema.    Authenticated and Electronically Signed by Alexis Babin M.D. on  04/13/2023 08:10:51 PM    XR Chest 1 View [[id number]] Collected: 04/13/23 1539     Updated: 04/13/23 1543    Narrative:      CLINICAL INDICATION:    SOA Triage Protocol shortness of breath.      EXAMINATION TECHNIQUE:   XR CHEST 1 VW-     COMPARISON:  Radiographs 10/01/2017.     FINDINGS:      Cardiomegaly Aortic atherosclerosis. Vascular engorgement and prominent  interstitial markings with peripheral subpleural linear opacities. No  dense consolidation. No pneumothorax or large pleural effusion. Large  pulmonary arteries, suggestive of pulmonary arterial hypertension.  Background emphysema.       Impression:      Findings are concerning for mild cardiogenic pulmonary edema. Background  emphysema.     Images personally reviewed, interpreted and dictated by SHIRA Villa.                This report was signed and finalized on 4/13/2023 3:41 PM by SHIRA Villa.            Assessment & Recommendations/Plan:   1.  Acute respiratory failure   Continues require 8-12 L/min high flow nasal cannula.  We will repeat chest x-ray in the morning.  We will also repeat ABG in the morning, given continued respiratory distress and high requirements of oxygen    2.  Acute exacerbation of COPD   Likely the reason for acute respiratory failure.  Due to significant wheezing, respiratory distress and significant hypoxemia, I will discontinue oral prednisone and start the patient on IV Solu-Medrol.    3.  Chronic respiratory acidosis  We will start the patient on NIV device given continued significant respiratory failure    4.  Smoking  Was advised to quit smoking    5.  Hypokalemia  Resolved    6.  Pulmonary hypertension  Most likely secondary to likely severe underlying COPD  We will consider outpatient work-up    7.  Fluid status  Net IO Since Admission: 1,210 mL [04/15/23 1116]  Lab Results   Component Value Date    PROBNP 272.8 04/13/2023    PROBNP 315.0 (C) 10/02/2017     8.  Miscellaneous  She remains at high risk for further deterioration given continued need for significant oxygen supplementation.  We will need to be observed very closely over the next 48 to 72 hours.  If she worsens any further, she may need to be transferred to ICU.    We have reviewed patient's current orders and changes, if  any, have been suggested to primary care team. Plan was also discussed with nursing staff, as necessary.     This document was electronically signed by Arnaldo Soriano MD on 04/15/23 at 11:16 EDT      Dictated utilizing Dragon dictation.

## 2023-04-15 NOTE — PLAN OF CARE
Goal Outcome Evaluation:  Plan of Care Reviewed With: patient        Progress: no change  Outcome Evaluation: VSS; pt titrated down to 8L high flow-pt removed O2 cannula multiple times during night and quickly SAT dropped into 70's-had to increase O2 to 15L to increase SAT but able to titrate back to 8L; pt gets very confused during times of low SAT; continue monitoring O2 level

## 2023-04-15 NOTE — PROGRESS NOTES
[facility] HOSPITALIST    PROGRESS NOTE    Name:  Carolin Toscano   Age:  77 y.o.  Sex:  female  :  1945  MRN:  [id number]   Visit Number:  [id number]  Admission Date:  2023  Date Of Service:  04/15/23  Primary Care Physician:  Jason Nicholson MD     LOS: 2 days :    Chief Complaint:      Shortness of air    Subjective:    Patient seen and examined with no family at bedside.  Respiratory distress noted.  Continues to require 8 L nasal cannula.  Patient does not appear to feel well.  States no longer smoking.  Lives with family.  Requesting some ice.    Hospital Course:    Patient is a 77-year-old female with a history of ongoing tobacco abuse, COPD, hypertension, dyslipidemia, prior kidney stones, who presented to the emergency room with complaints of 3 to 4 days of increasing cough, shortness of breath and chills.  Patient states she actually quit smoking about 3 days ago.  Has noted change in sputum.  She has not followed up with PCP or pulmonology in a while.  She does not routinely wear oxygen at home.       In the ER, patient was hypoxic on arrival and was requiring up to 3 L of oxygen.  Otherwise hemodynamically stable.  She has a negative respiratory panel.  She had a lactic acid of 2.7 repeat 1.2.  She had a blood gas pH 7.47 PCO2 55.1 PO2 86.5, creatinine 0.65 potassium 2.9 CO2 33.7.  proBNP of 272 mildly elevated high-sensitivity troponin.  White count of 21 hemoglobin 15.  Negative flu COVID testing.  A1c 6.1 magnesium 1.8 and procalcitonin 0.43.  She had a chest x-ray was showing emphysematous changes but no acute abnormality.  A CT scan of the chest without contrast was showing findings concerning for bronchitis/bronchiolitis with interlobular septal thickening in the right lung base.  There is no focal airspace disease.  Patient was given empiric antibiotics, bronchodilators, steroids emergency room.  Due to acute respiratory failure with hypoxia, hospitalist consulted  for further medical management.  Pulmonology also consulted.  Patient continued to require higher levels of oxygen.    Review of Systems:     All systems were reviewed and negative except as mentioned in subjective, assessment and plan.    Vital Signs:    Temp:  [97.9 °F (36.6 °C)-99.9 °F (37.7 °C)] 99.9 °F (37.7 °C)  Heart Rate:  [] 89  Resp:  [18-20] 20  BP: (122-141)/(57-64) 141/62    Intake and output:    I/O last 3 completed shifts:  In: 820 [P.O.:720; IV Piggyback:100]  Out: 900 [Urine:900]  I/O this shift:  In: 480 [P.O.:480]  Out: 300 [Urine:300]    Physical Examination:    General Appearance:  Alert and cooperative.  Chronically ill/frail appearing elderly female.  Appears ill.   Head:  Atraumatic and normocephalic.   Eyes: Conjunctivae and sclerae normal, no icterus. No pallor.   Throat: No oral lesions, no thrush, dry mucous membranes.   Neck: Supple, trachea midline, no thyromegaly.   Lungs:   Breath sounds heard bilaterally equally.  Significant wheezing and crackles throughout with mild respiratory distress on 8 L nasal cannula.   Heart:  Normal S1 and S2, no murmur, no gallop, no rub. No JVD.   Abdomen:   Normal bowel sounds, no masses, no organomegaly. Soft, nontender, nondistended, no rebound tenderness.   Extremities: Supple, no edema, no cyanosis, no clubbing.   Skin: No bleeding or rash.   Neurologic: Alert and oriented x 2. No facial asymmetry. Moves all four limbs. No tremors.  Generalized weakness.     Laboratory results:    Results from last 7 days   Lab Units 04/15/23  0811 04/13/23  1458   SODIUM mmol/L 145 138   POTASSIUM mmol/L 3.7 2.9*   CHLORIDE mmol/L 103 92*   CO2 mmol/L 38.0* 33.7*   BUN mg/dL 18 12   CREATININE mg/dL 0.52* 0.65   CALCIUM mg/dL 9.3 9.4   BILIRUBIN mg/dL  --  0.4   ALK PHOS U/L  --  114   ALT (SGPT) U/L  --  23   AST (SGOT) U/L  --  19   GLUCOSE mg/dL 143* 160*     Results from last 7 days   Lab Units 04/15/23  0811 04/13/23  1458   WBC 10*3/mm3 19.59* 20.98*    HEMOGLOBIN g/dL 14.6 15.3   HEMATOCRIT % 47.4* 47.4*   PLATELETS 10*3/mm3 274 240         Results from last 7 days   Lab Units 04/13/23  1458   HSTROP T ng/L 15*         Recent Labs     04/13/23  1539   PHART 7.427   TRB8EWI 55.1*   PO2ART 86.5   BFS6KAK 36.3*   BASEEXCESS 9.8*      I have reviewed the patient's laboratory results.    Radiology results:    Adult Transthoracic Echo Complete W/ Cont if Necessary Per Protocol    Result Date: 4/14/2023  •  Left ventricular diastolic function is consistent with (grade I) impaired relaxation. •  Mild aortic valve stenosis is present. •  Estimated right ventricular systolic pressure from tricuspid regurgitation is moderately elevated (45-55 mmHg). •  Mild to moderate pulmonary hypertension is present.     CT Chest Without Contrast Diagnostic    Result Date: 4/13/2023  FINAL REPORT TECHNIQUE: Routine axial images were obtained from the lung apices to below the diaphragm without contrast. CLINICAL HISTORY: dsypnea FINDINGS: There is mild upper lobe emphysema.  Tree-in-bud type opacities in the lungs bilaterally are consistent with bronchitis/bronchiolitis.  Interlobular septal thickening in the right lung base may be due to interstitial pulmonary edema. There is no confluent airspace consolidation.  The heart and vasculature are unremarkable. There is no pleural disease, adenopathy, or significant osseous abnormality.     Impression: Mild emphysema.  Bronchitis/bronchiolitis.  Possible interstitial pulmonary edema. Authenticated and Electronically Signed by Alexis Babin M.D. on 04/13/2023 08:10:51 PM    XR Chest 1 View    Result Date: 4/13/2023  CLINICAL INDICATION:  SOA Triage Protocol shortness of breath.  EXAMINATION TECHNIQUE: XR CHEST 1 VW-  COMPARISON: Radiographs 10/01/2017.  FINDINGS:  Cardiomegaly Aortic atherosclerosis. Vascular engorgement and prominent interstitial markings with peripheral subpleural linear opacities. No dense consolidation. No pneumothorax or  large pleural effusion. Large pulmonary arteries, suggestive of pulmonary arterial hypertension. Background emphysema.      Impression: Findings are concerning for mild cardiogenic pulmonary edema. Background emphysema.  Images personally reviewed, interpreted and dictated by SHIRA Villa.       This report was signed and finalized on 4/13/2023 3:41 PM by SHIRA Villa.    I have reviewed the patient's radiology reports.    Medication Review:     I have reviewed the patient's active and prn medications.     Problem List:      Acute respiratory failure    CAP (community acquired pneumonia)    Essential hypertension    COPD exacerbation      Assessment:    1. Acute respiratory failure with hypoxia, POA  2. COPD with exacerbation, POA  3. Suspect underlying community-acquired pneumonia, present admission, due to unknown organism  4. Pulmonary hypertension  5. Hypertension  6. Chronic tobacco abuse  7. Arthritis     Plan:     Respiratory failure/COPD/pneumonia:  -Continue Azactam given multiple allergies.  -Repeat chest x-ray/ABG/procalcitonin in AM.  -Continue with bronchodilators, Solu-Medrol, oxygen therapy.    -Patient does need full pulmonary function test at some point.  -Tobacco cessation discussed at length on admission and patient states she has quit  -Oxygen demands currently at 8 L nasal cannula.  Appreciate pulmonology consulting with recommendations.  -Given increased oxygen demands and current respiratory distress patient high risk for  deterioration.  Will monitor closely and consider transferring to ICU if any worsening.  -NIV initiated per pulmonology.     Hypertension:  -Restart home atenolol and amlodipine.     Chronic tobacco abuse:  -Complicates all aspects of care     DVT Prophylaxis: Lovenox  Code Status: Full Code  Diet: Regular  Discharge Plan: 2-3 days    Marga Monroy, APRN  04/15/23  14:12 EDT    Dictated utilizing Dragon dictation.

## 2023-04-16 ENCOUNTER — APPOINTMENT (OUTPATIENT)
Dept: GENERAL RADIOLOGY | Facility: HOSPITAL | Age: 78
DRG: 193 | End: 2023-04-16
Payer: MEDICARE

## 2023-04-16 LAB
A-A DO2: 321.9 MMHG
A-A DO2: 535.9 MMHG
ANION GAP SERPL CALCULATED.3IONS-SCNC: 4 MMOL/L (ref 5–15)
ARTERIAL PATENCY WRIST A: POSITIVE
ARTERIAL PATENCY WRIST A: POSITIVE
ATMOSPHERIC PRESS: 727 MMHG
ATMOSPHERIC PRESS: 727 MMHG
BASE EXCESS BLDA CALC-SCNC: 12.5 MMOL/L (ref 0–2)
BASE EXCESS BLDA CALC-SCNC: 13.1 MMOL/L (ref 0–2)
BASOPHILS # BLD AUTO: 0.1 10*3/MM3 (ref 0–0.2)
BASOPHILS NFR BLD AUTO: 0.8 % (ref 0–1.5)
BDY SITE: ABNORMAL
BDY SITE: ABNORMAL
BUN SERPL-MCNC: 18 MG/DL (ref 8–23)
BUN/CREAT SERPL: 47.4 (ref 7–25)
CALCIUM SPEC-SCNC: 9.3 MG/DL (ref 8.6–10.5)
CHLORIDE SERPL-SCNC: 103 MMOL/L (ref 98–107)
CO2 SERPL-SCNC: 38 MMOL/L (ref 22–29)
COHGB MFR BLD: 0.8 % (ref 0–2)
COHGB MFR BLD: <0.7 % (ref 0–2)
CREAT SERPL-MCNC: 0.38 MG/DL (ref 0.57–1)
DEPRECATED RDW RBC AUTO: 49.6 FL (ref 37–54)
EGFRCR SERPLBLD CKD-EPI 2021: 104 ML/MIN/1.73
EOSINOPHIL # BLD AUTO: 0 10*3/MM3 (ref 0–0.4)
EOSINOPHIL NFR BLD AUTO: 0 % (ref 0.3–6.2)
ERYTHROCYTE [DISTWIDTH] IN BLOOD BY AUTOMATED COUNT: 13.9 % (ref 12.3–15.4)
GLUCOSE SERPL-MCNC: 173 MG/DL (ref 65–99)
HCO3 BLDA-SCNC: 39.7 MMOL/L (ref 22–28)
HCO3 BLDA-SCNC: 41.1 MMOL/L (ref 22–28)
HCT VFR BLD AUTO: 46.1 % (ref 34–46.6)
HCT VFR BLD CALC: 42.7 %
HCT VFR BLD CALC: 43.4 %
HGB BLD-MCNC: 14 G/DL (ref 12–15.9)
IMM GRANULOCYTES # BLD AUTO: 0.35 10*3/MM3 (ref 0–0.05)
IMM GRANULOCYTES NFR BLD AUTO: 2.9 % (ref 0–0.5)
INHALED O2 CONCENTRATION: 100 %
INHALED O2 CONCENTRATION: 70 %
LYMPHOCYTES # BLD AUTO: 1.02 10*3/MM3 (ref 0.7–3.1)
LYMPHOCYTES NFR BLD AUTO: 8.4 % (ref 19.6–45.3)
Lab: ABNORMAL
MCH RBC QN AUTO: 29.1 PG (ref 26.6–33)
MCHC RBC AUTO-ENTMCNC: 30.4 G/DL (ref 31.5–35.7)
MCV RBC AUTO: 95.8 FL (ref 79–97)
METHGB BLD QL: 0.4 % (ref 0–1.5)
METHGB BLD QL: 0.5 % (ref 0–1.5)
MODALITY: ABNORMAL
MODALITY: ABNORMAL
MONOCYTES # BLD AUTO: 0.33 10*3/MM3 (ref 0.1–0.9)
MONOCYTES NFR BLD AUTO: 2.7 % (ref 5–12)
NEUTROPHILS NFR BLD AUTO: 10.29 10*3/MM3 (ref 1.7–7)
NEUTROPHILS NFR BLD AUTO: 85.2 % (ref 42.7–76)
NOTE: ABNORMAL
NOTE: ABNORMAL
NOTIFIED BY: ABNORMAL
NOTIFIED WHO: ABNORMAL
NRBC BLD AUTO-RTO: 0 /100 WBC (ref 0–0.2)
OXYHGB MFR BLDV: 95 % (ref 94–99)
OXYHGB MFR BLDV: 98 % (ref 94–99)
PCO2 BLDA: 57.3 MM HG (ref 35–45)
PCO2 BLDA: 70.5 MM HG (ref 35–45)
PCO2 TEMP ADJ BLD: ABNORMAL MM[HG]
PCO2 TEMP ADJ BLD: ABNORMAL MM[HG]
PH BLDA: 7.37 PH UNITS (ref 7.35–7.45)
PH BLDA: 7.45 PH UNITS (ref 7.35–7.45)
PH, TEMP CORRECTED: ABNORMAL
PH, TEMP CORRECTED: ABNORMAL
PLATELET # BLD AUTO: 282 10*3/MM3 (ref 140–450)
PMV BLD AUTO: 10.5 FL (ref 6–12)
PO2 BLDA: 73.8 MM HG (ref 75–100)
PO2 BLDA: 93.7 MM HG (ref 75–100)
PO2 TEMP ADJ BLD: ABNORMAL MM[HG]
PO2 TEMP ADJ BLD: ABNORMAL MM[HG]
POTASSIUM SERPL-SCNC: 4.2 MMOL/L (ref 3.5–5.2)
PROCALCITONIN SERPL-MCNC: 1.1 NG/ML (ref 0–0.25)
RBC # BLD AUTO: 4.81 10*6/MM3 (ref 3.77–5.28)
SAO2 % BLDCOA: 96.3 % (ref 94–100)
SAO2 % BLDCOA: 99 % (ref 94–100)
SODIUM SERPL-SCNC: 145 MMOL/L (ref 136–145)
VENTILATOR MODE: ABNORMAL
VENTILATOR MODE: ABNORMAL
WBC NRBC COR # BLD: 12.09 10*3/MM3 (ref 3.4–10.8)

## 2023-04-16 PROCEDURE — 36600 WITHDRAWAL OF ARTERIAL BLOOD: CPT

## 2023-04-16 PROCEDURE — 94660 CPAP INITIATION&MGMT: CPT

## 2023-04-16 PROCEDURE — 97110 THERAPEUTIC EXERCISES: CPT

## 2023-04-16 PROCEDURE — 82805 BLOOD GASES W/O2 SATURATION: CPT

## 2023-04-16 PROCEDURE — 85025 COMPLETE CBC W/AUTO DIFF WBC: CPT | Performed by: NURSE PRACTITIONER

## 2023-04-16 PROCEDURE — 94761 N-INVAS EAR/PLS OXIMETRY MLT: CPT

## 2023-04-16 PROCEDURE — 94799 UNLISTED PULMONARY SVC/PX: CPT

## 2023-04-16 PROCEDURE — 99233 SBSQ HOSP IP/OBS HIGH 50: CPT | Performed by: INTERNAL MEDICINE

## 2023-04-16 PROCEDURE — 83050 HGB METHEMOGLOBIN QUAN: CPT

## 2023-04-16 PROCEDURE — 25010000002 CHLOROTHIAZIDE PER 500 MG: Performed by: INTERNAL MEDICINE

## 2023-04-16 PROCEDURE — 25010000002 METHYLPREDNISOLONE PER 40 MG: Performed by: INTERNAL MEDICINE

## 2023-04-16 PROCEDURE — 97530 THERAPEUTIC ACTIVITIES: CPT

## 2023-04-16 PROCEDURE — 84145 PROCALCITONIN (PCT): CPT | Performed by: NURSE PRACTITIONER

## 2023-04-16 PROCEDURE — 71045 X-RAY EXAM CHEST 1 VIEW: CPT

## 2023-04-16 PROCEDURE — 87040 BLOOD CULTURE FOR BACTERIA: CPT | Performed by: NURSE PRACTITIONER

## 2023-04-16 PROCEDURE — 25010000002 ENOXAPARIN PER 10 MG: Performed by: FAMILY MEDICINE

## 2023-04-16 PROCEDURE — 82375 ASSAY CARBOXYHB QUANT: CPT

## 2023-04-16 PROCEDURE — 80048 BASIC METABOLIC PNL TOTAL CA: CPT | Performed by: NURSE PRACTITIONER

## 2023-04-16 PROCEDURE — 94762 N-INVAS EAR/PLS OXIMTRY CONT: CPT

## 2023-04-16 PROCEDURE — 97116 GAIT TRAINING THERAPY: CPT

## 2023-04-16 PROCEDURE — 94760 N-INVAS EAR/PLS OXIMETRY 1: CPT

## 2023-04-16 PROCEDURE — 99232 SBSQ HOSP IP/OBS MODERATE 35: CPT | Performed by: NURSE PRACTITIONER

## 2023-04-16 PROCEDURE — 94664 DEMO&/EVAL PT USE INHALER: CPT

## 2023-04-16 RX ORDER — CHLOROTHIAZIDE SODIUM 500 MG/1
250 INJECTION INTRAVENOUS ONCE
Status: COMPLETED | OUTPATIENT
Start: 2023-04-16 | End: 2023-04-16

## 2023-04-16 RX ADMIN — IPRATROPIUM BROMIDE AND ALBUTEROL SULFATE 3 ML: 2.5; .5 SOLUTION RESPIRATORY (INHALATION) at 19:54

## 2023-04-16 RX ADMIN — METHYLPREDNISOLONE SODIUM SUCCINATE 40 MG: 40 INJECTION, POWDER, FOR SOLUTION INTRAMUSCULAR; INTRAVENOUS at 06:16

## 2023-04-16 RX ADMIN — IPRATROPIUM BROMIDE AND ALBUTEROL SULFATE 3 ML: 2.5; .5 SOLUTION RESPIRATORY (INHALATION) at 07:00

## 2023-04-16 RX ADMIN — IPRATROPIUM BROMIDE AND ALBUTEROL SULFATE 3 ML: 2.5; .5 SOLUTION RESPIRATORY (INHALATION) at 14:00

## 2023-04-16 RX ADMIN — AZTREONAM 2 G: 2 INJECTION, POWDER, LYOPHILIZED, FOR SOLUTION INTRAMUSCULAR; INTRAVENOUS at 04:58

## 2023-04-16 RX ADMIN — BUDESONIDE 1 MG: 0.5 INHALANT RESPIRATORY (INHALATION) at 07:00

## 2023-04-16 RX ADMIN — Medication 10 ML: at 22:21

## 2023-04-16 RX ADMIN — CHLOROTHIAZIDE SODIUM 250 MG: 500 INJECTION, POWDER, LYOPHILIZED, FOR SOLUTION INTRAVENOUS at 13:08

## 2023-04-16 RX ADMIN — BENZONATATE 200 MG: 100 CAPSULE ORAL at 18:40

## 2023-04-16 RX ADMIN — AMLODIPINE BESYLATE 10 MG: 5 TABLET ORAL at 09:36

## 2023-04-16 RX ADMIN — BUDESONIDE 1 MG: 0.5 INHALANT RESPIRATORY (INHALATION) at 19:54

## 2023-04-16 RX ADMIN — TIOTROPIUM BROMIDE INHALATION SPRAY 2 PUFF: 3.12 SPRAY, METERED RESPIRATORY (INHALATION) at 07:04

## 2023-04-16 RX ADMIN — AZTREONAM 2 G: 2 INJECTION, POWDER, LYOPHILIZED, FOR SOLUTION INTRAMUSCULAR; INTRAVENOUS at 13:07

## 2023-04-16 RX ADMIN — ARFORMOTEROL TARTRATE 15 MCG: 15 SOLUTION RESPIRATORY (INHALATION) at 19:54

## 2023-04-16 RX ADMIN — ENOXAPARIN SODIUM 40 MG: 100 INJECTION SUBCUTANEOUS at 22:21

## 2023-04-16 RX ADMIN — ACETAMINOPHEN 650 MG: 325 TABLET, FILM COATED ORAL at 18:40

## 2023-04-16 RX ADMIN — ATENOLOL 25 MG: 25 TABLET ORAL at 09:36

## 2023-04-16 RX ADMIN — METHYLPREDNISOLONE SODIUM SUCCINATE 40 MG: 40 INJECTION, POWDER, FOR SOLUTION INTRAMUSCULAR; INTRAVENOUS at 13:08

## 2023-04-16 RX ADMIN — METHYLPREDNISOLONE SODIUM SUCCINATE 40 MG: 40 INJECTION, POWDER, FOR SOLUTION INTRAMUSCULAR; INTRAVENOUS at 00:15

## 2023-04-16 RX ADMIN — Medication 1 PATCH: at 22:21

## 2023-04-16 RX ADMIN — METHYLPREDNISOLONE SODIUM SUCCINATE 40 MG: 40 INJECTION, POWDER, FOR SOLUTION INTRAMUSCULAR; INTRAVENOUS at 18:41

## 2023-04-16 RX ADMIN — ARFORMOTEROL TARTRATE 15 MCG: 15 SOLUTION RESPIRATORY (INHALATION) at 07:00

## 2023-04-16 NOTE — THERAPY TREATMENT NOTE
"Patient Name: Carolin Toscano  : 1945    MRN: [id number]                              Today's Date: 2023       Admit Date: 2023    Visit Dx:     ICD-10-CM ICD-9-CM   1. Acute respiratory failure with hypoxia  J96.01 518.81   2. COPD with acute exacerbation  J44.1 491.21     Patient Active Problem List   Diagnosis   • CAP (community acquired pneumonia)   • Sepsis   • Cigarette nicotine dependence with nicotine-induced disorder   • Essential hypertension   • Dyslipidemia   • Blood glucose elevated   • Muscle ache   • COPD (chronic obstructive pulmonary disease)   • Nuclear sclerotic cataract of right eye   • Acute respiratory failure   • COPD exacerbation     Past Medical History:   Diagnosis Date   • Arthritis    • COPD (chronic obstructive pulmonary disease)    • Gall stones    • Goiter     \"inward\"   • Hypertension    • Hypertension    • Kidney infection    • Kidney stone    • Kidney stones    • Migraine headache    • Scarlet fever      Past Surgical History:   Procedure Laterality Date   • BLADDER SURGERY     • CATARACT EXTRACTION W/ INTRAOCULAR LENS IMPLANT Left 2022    Procedure: CATARACT PHACO EXTRACTION WITH INTRAOCULAR LENS IMPLANT LEFT;  Surgeon: Sathish Lopez MD;  Location: [facility];  Service: Ophthalmology;  Laterality: Left;   • CATARACT EXTRACTION W/ INTRAOCULAR LENS IMPLANT Right 2022    Procedure: CATARACT PHACO EXTRACTION WITH INTRAOCULAR LENS IMPLANT RIGHT;  Surgeon: Sathish Lopez MD;  Location: [facility];  Service: Ophthalmology;  Laterality: Right;   • CHOLECYSTECTOMY     • HYSTERECTOMY     • TONSILLECTOMY        General Information     Row Name 23          Physical Therapy Time and Intention    Document Type therapy note (daily note)  -CC     Mode of Treatment physical therapy  -CC     Row Name 23 684          General Information    Patient Profile Reviewed yes  -CC     Existing Precautions/Restrictions fall;oxygen therapy device and " L/min  -     Row Name 04/16/23 1657          Safety Issues, Functional Mobility    Safety Issues Affecting Function (Mobility) awareness of need for assistance;insight into deficits/self-awareness;safety precautions follow-through/compliance;sequencing abilities  -     Impairments Affecting Function (Mobility) balance;cognition;endurance/activity tolerance;strength;motor planning  -           User Key  (r) = Recorded By, (t) = Taken By, (c) = Cosigned By    Initials Name Provider Type    Nasra Calixto PTA Physical Therapist Assistant               Mobility     Row Name 04/16/23 1659          Bed Mobility    Bed Mobility supine-sit  -CC     Supine-Sit [place] (Bed Mobility) verbal cues;contact guard  -     Assistive Device (Bed Mobility) head of bed elevated  -     Row Name 04/16/23 1659          Sit-Stand Transfer    Sit-Stand [place] (Transfers) verbal cues;contact guard  -     Assistive Device (Sit-Stand Transfers) walker, front-wheeled  -CC     Row Name 04/16/23 1659          Gait/Stairs (Locomotion)    [place] Level (Gait) contact guard;verbal cues  -     Assistive Device (Gait) walker, front-wheeled  -     Distance in Feet (Gait) 12  -CC     Deviations/Abnormal Patterns (Gait) gait speed decreased;stride length decreased  -           User Key  (r) = Recorded By, (t) = Taken By, (c) = Cosigned By    Initials Name Provider Type    Nasra Calixto PTA Physical Therapist Assistant               Obj/Interventions    No documentation.                Goals/Plan    No documentation.                Clinical Impression     Row Name 04/16/23 1700          Pain    Pretreatment Pain Rating 0/10 - no pain  -CC     Posttreatment Pain Rating 0/10 - no pain  -CC     Additional Documentation Pain Scale: Numbers Pre/Post-Treatment (Group)  -     Row Name 04/16/23 1700          Plan of Care Review    Plan of Care Reviewed With patient  -CC     Progress improving  -     Outcome  Evaluation Pt agreeable to therapy. Performed B LE ex in supine AP, QS, heelslides, hip abd, SAQ, SLR and heelslides 1x10 reps. Performed supine to sit CGA, sit <->stand with CGA and VC for hand placement and increased time to perfom task, amb with RW 12 feet with CGA. Con't with PT POC and progress as tolerated  -CC     Row Name 04/16/23 1700          Positioning and Restraints    Pre-Treatment Position in bed  -CC     Post Treatment Position chair  -CC     In Chair reclined;call light within reach;exit alarm on;encouraged to call for assist;notified nsg  -CC           User Key  (r) = Recorded By, (t) = Taken By, (c) = Cosigned By    Initials Name Provider Type    Nasra Calixto PTA Physical Therapist Assistant               Outcome Measures     Row Name 04/16/23 1705          How much help from another person do you currently need...    Turning from your back to your side while in flat bed without using bedrails? 4  -CC     Moving from lying on back to sitting on the side of a flat bed without bedrails? 3  -CC     Moving to and from a bed to a chair (including a wheelchair)? 3  -CC     Standing up from a chair using your arms (e.g., wheelchair, bedside chair)? 3  -CC     Climbing 3-5 steps with a railing? 2  -CC     To walk in hospital room? 3  -CC     AM-PAC 6 Clicks Score (PT) 18  -CC     Highest level of mobility 6 --> Walked 10 steps or more  -     Row Name 04/16/23 1701          Functional Assessment    Outcome Measure Options AM-PAC 6 Clicks Basic Mobility (PT)  -CC           User Key  (r) = Recorded By, (t) = Taken By, (c) = Cosigned By    Initials Name Provider Type    Nasra Calixto PTA Physical Therapist Assistant                             Physical Therapy Education     Title: PT OT SLP Therapies (In Progress)     Topic: Physical Therapy (In Progress)     Point: Mobility training (Done)     Learning Progress Summary           Patient Acceptance, EHARIKA,NR by TW at 4/15/2023 1316     Comment: pt education on calling for assist prior to trying to get up OOB and for her current PT POC/goals                   Point: Home exercise program (Done)     Learning Progress Summary           Patient Acceptance, E,TB, VU,NR by  at 4/16/2023 1706    Comment: Perfom ex daily and inbtw therapy sessions                   Point: Body mechanics (Not Started)     Learner Progress:  Not documented in this visit.          Point: Precautions (Done)     Learning Progress Summary           Patient Acceptance, E, VU,NR by  at 4/15/2023 1514    Comment: pt education on calling for assist prior to trying to get up OOB and for her current PT POC/goals                               User Key     Initials Effective Dates Name Provider Type Discipline     06/16/21 -  Nasra Quiñones PTA Physical Therapist Assistant PT    TW 06/16/21 -  Deysi Goodwin, ROB Physical Therapist PT              PT Recommendation and Plan     Plan of Care Reviewed With: patient  Progress: improving  Outcome Evaluation: Pt agreeable to therapy. Performed B LE ex in supine AP, QS, heelslides, hip abd, SAQ, SLR and heelslides 1x10 reps. Performed supine to sit CGA, sit <->stand with CGA and VC for hand placement and increased time to perfom task, amb with RW 12 feet with CGA. Con't with PT POC and progress as tolerated     Time Calculation:    PT Charges     Row Name 04/16/23 1706             Time Calculation    Start Time 1426  -CC      PT Received On 04/16/23  -CC      PT Goal Re-Cert Due Date 04/25/23  -CC         Time Calculation- PT    Total Timed Code Minutes- PT 40 minute(s)  -CC         Timed Charges    09209 - PT Therapeutic Exercise Minutes 15  -CC      36790 - Gait Training Minutes  12  -CC      39125 - PT Therapeutic Activity Minutes 13  -CC         Total Minutes    Timed Charges Total Minutes 40  -CC       Total Minutes 40  -CC            User Key  (r) = Recorded By, (t) = Taken By, (c) = Cosigned By    Initials Name Provider Type     CC Nasra Quiñones, ELVIRA Physical Therapist Assistant              Therapy Charges for Today     Code Description Service Date Service Provider Modifiers Qty    87850063150 HC PT THER PROC EA 15 MIN 4/16/2023 Nasra Quiñones, ELVIRA GP 1    64409243763 HC GAIT TRAINING EA 15 MIN 4/16/2023 Nasra Quiñones, ELVIRA GP 1    62036346971 HC PT THERAPEUTIC ACT EA 15 MIN 4/16/2023 Nasra Quiñones, ELVIRA GP 1          PT G-Codes  Outcome Measure Options: AM-PAC 6 Clicks Basic Mobility (PT)  AM-PAC 6 Clicks Score (PT): 18       Nasra Quiñones PTA  4/16/2023

## 2023-04-16 NOTE — PLAN OF CARE
Goal Outcome Evaluation:        Interventions implemented as appropriate.        Outcome Evaluation: (P) Patient educated about plan of care and new interventions with RT

## 2023-04-16 NOTE — PROGRESS NOTES
"  CC: Acute Respiratory Failure.     S: Currently on PAP therapy.  Open eyes to loud verbal stimulus and appeared to follow some commands.    ROS: Could not be reliably obtained as the patient is on PAP therapy.     O:Vital signs reviewed. FiO2: 70%.    /64 (BP Location: Left arm, Patient Position: Lying)   Pulse 77   Temp 97.8 °F (36.6 °C) (Oral)   Resp 20   Ht 160 cm (62.99\")   Wt 72 kg (158 lb 11.7 oz)   SpO2 90%   BMI 28.13 kg/m²     Temp (24hrs), Av.8 °F (37.1 °C), Min:97.6 °F (36.4 °C), Max:99.9 °F (37.7 °C)      I & Os reviewed.   Intake/Output       04/15/23 0700 - 23 0659    Intake (ml) 720    Output (ml) 400    Net (ml) 320    Last Weight 72 kg (158 lb 11.7 oz)          Net IO Since Admission: 1,290 mL [23 1134]    General/Constitutional: On PAP therapy. Appears to be in no distress.  Eyes: PERRL.  Eyes were closed initially.  Neck: Supple without JVD. No obvious masses noted.   Cardiovascular: S1 + S2.  Regular  Lungs/Respiratory: Transmitted Breath sounds noted.  Bilateral wheezing heard.  No significant crackles noted  GI/Abdomen: Soft. Bowel sounds positive   Musculoskeletal/Extremities: No edema noted. Gait could not be assessed at this time, as the patient was laying in bed.   Neurologic: Was able to follow some commands. Detailed exam couldn't be performed due to her being on PAP therapy.   Psych: Could not reliably obtain as the patient was on PAP therapy  Skin: Appeared somewhat dry       Labs: Reviewed.     Results from last 7 days   Lab Units 23  0600 04/15/23  0811 23  1458   WBC 10*3/mm3 12.09* 19.59* 20.98*   HEMOGLOBIN g/dL 14.0 14.6 15.3   HEMATOCRIT % 46.1 47.4* 47.4*   PLATELETS 10*3/mm3 282 274 240   NEUTROPHIL % % 85.2* 83.8* 84.5*   NEUTROS ABS 10*3/mm3 10.29* 16.40* 17.74*   EOSINOPHIL % % 0.0* 0.0* 1.0   EOS ABS 10*3/mm3 0.00 0.00 0.21   LYMPHOCYTE % % 8.4* 7.1* 4.3*   LYMPHS ABS 10*3/mm3 1.02 1.40 0.90       Lab Results   Component Value Date "    PROCALCITO 1.10 (H) 04/16/2023    PROCALCITO 0.43 (H) 04/13/2023       No results found for: CRP    No results found for: SEDRATE    Lab Results   Component Value Date    PROBNP 272.8 04/13/2023    PROBNP 315.0 (C) 10/02/2017       Results from last 7 days   Lab Units 04/16/23  0600 04/15/23  0811 04/13/23  1458   SODIUM mmol/L 145 145 138   POTASSIUM mmol/L 4.2 3.7 2.9*   CHLORIDE mmol/L 103 103 92*   CO2 mmol/L 38.0* 38.0* 33.7*   BUN mg/dL 18 18 12   CREATININE mg/dL 0.38* 0.52* 0.65   CALCIUM mg/dL 9.3 9.3 9.4   ANION GAP mmol/L 4.0* 4.0* 12.3   BILIRUBIN mg/dL  --   --  0.4   ALK PHOS U/L  --   --  114   ALT (SGPT) U/L  --   --  23   AST (SGOT) U/L  --   --  19   GLUCOSE mg/dL 173* 143* 160*   TOTAL PROTEIN g/dL  --   --  7.7   ALBUMIN g/dL  --   --  3.3*       Results from last 7 days   Lab Units 04/13/23  1458   MAGNESIUM mg/dL 1.8             No results found for: CKTOTAL    No components found for: HSTROPT    Lab Results   Component Value Date    TROPONINT 15 (H) 04/13/2023       No results found for: DDIMER    Brief Urine Lab Results     None          No results found for: TSH    No results found for: FREET4      Micro: As of April 16, 2023   Lab Results   Component Value Date    RESPCX Light growth (2+) Normal Respiratory Ana 10/02/2017     No results found for: BCIDPCR  Lab Results   Component Value Date    BLOODCX No growth at 5 days 10/01/2017    BLOODCX No growth at 5 days 10/01/2017     Lab Results   Component Value Date    URINECX Final report 02/07/2019    URINECX 20,000-30,000 CFU/mL Escherichia coli (A) 10/05/2018     No results found for: MRSACX  Lab Results   Component Value Date    MRSAPCR No MRSA Detected 04/15/2023     No results found for: URCX  No components found for: LOWRESPCF  No results found for: THROATCX  No results found for: CULTURES  No components found for: STREPBCX  No results found for: STREPPNEUAG  No results found for: LEGIONELLA  No results found for: MYCOPLASCX  No  results found for: GCCX  No results found for: WOUNDCX  No results found for: BODYFLDCX    No results found for: FLU    Lab Results   Component Value Date    ADENOVIRUS Not Detected 04/13/2023     Lab Results   Component Value Date    TA018Y Not Detected 04/13/2023     Lab Results   Component Value Date    CVHKU1 Not Detected 04/13/2023     Lab Results   Component Value Date    CVNL63 Not Detected 04/13/2023     Lab Results   Component Value Date    CVOC43 Not Detected 04/13/2023     Lab Results   Component Value Date    HUMETPNEVS Not Detected 04/13/2023     Lab Results   Component Value Date    HURVEV Not Detected 04/13/2023     Lab Results   Component Value Date    FLUBPCR Not Detected 04/13/2023     Lab Results   Component Value Date    PARAINFLUE Not Detected 04/13/2023     Lab Results   Component Value Date    PARAFLUV2 Not Detected 04/13/2023     Lab Results   Component Value Date    PARAFLUV3 Not Detected 04/13/2023     Lab Results   Component Value Date    PARAFLUV4 Not Detected 04/13/2023     Lab Results   Component Value Date    BPERTPCR Not Detected 04/13/2023     No results found for: [id number]  Lab Results   Component Value Date    CPNEUPCR Not Detected 04/13/2023     Lab Results   Component Value Date    MPNEUMO Not Detected 04/13/2023     Lab Results   Component Value Date    FLUAPCR Not Detected 04/13/2023     No results found for: FLUAH3  No results found for: FLUAH1  Lab Results   Component Value Date    RSV Not Detected 04/13/2023     Lab Results   Component Value Date    BPARAPCR Not Detected 04/13/2023       COVID 19:  Lab Results   Component Value Date    COVID19 Not Detected 04/13/2023           ABG: Reviewed  Recent Labs     04/13/23  1539 04/16/23  0652 04/16/23  1118   PHART 7.427 7.374 7.449   MQU3UDH 55.1* 70.5* 57.3*   PO2ART 86.5 73.8* 93.7   YEB0AUH 36.3* 41.1* 39.7*   BASEEXCESS 9.8* 12.5* 13.1*       Lab Results   Component Value Date    LACTATE 1.2 04/13/2023    LACTATE 2.7 (C)  04/13/2023    LACTATE 1.6 10/01/2017         Echo: Results for orders placed during the hospital encounter of 04/13/23    Adult Transthoracic Echo Complete W/ Cont if Necessary Per Protocol    Interpretation Summary  •  Left ventricular diastolic function is consistent with (grade I) impaired relaxation.  •  Mild aortic valve stenosis is present.  •  Estimated right ventricular systolic pressure from tricuspid regurgitation is moderately elevated (45-55 mmHg).  •  Mild to moderate pulmonary hypertension is present.      Results for orders placed during the hospital encounter of 10/01/17    Adult Transthoracic Echo Complete W/ Cont if Necessary Per Protocol    Interpretation Summary  TEchnically difficult study  1) Borderline LVH with normal LV systolic function ( EF is over 55%)  2) Normal left atrial size with mild elevation in LVedp  3) Trace MR  4) Mild TR with normal PA pressures  5) Mild AI  6) Small RV with normal RV function        Pharmacy to Dose enoxaparin (LOVENOX),        6  CXRay: Latest imaging study was reviewed personally.   Imaging Results (Last 24 Hours)     Procedure Component Value Units Date/Time    XR Chest 1 View [[id number]] Collected: 04/16/23 0801     Updated: 04/16/23 0905    Narrative:      X-RAY CHEST ONE VIEW     INDICATION:  Respiratory failure.; J96.01-Acute respiratory failure with  hypoxia; J44.1-Chronic obstructive pulmonary disease with (acute)  exacerbation.     FINDINGS:  A portable view of the chest was obtained.  Comparison is  made to a prior exam dated 04/13/2023.   Heart size is normal.  Interstitial opacities are slightly improved. More focal left basilar  opacity is likely atelectasis. There may be a small left pleural  effusion. No pneumothorax.       Impression:      Improved interstitial opacities. Favor improved pulmonary  edema. Left basilar opacity, favor atelectasis.     This report was signed and finalized on 4/16/2023 9:02 AM by Jody Byrd MD.             Assessment & Recommendations/Plan:   1.  Acute respiratory failure   Currently on NIV requiring 70% FiO2  Will ask respiratory therapist to decrease FiO2 as tolerated.  We will try the patient on high flow nasal cannula, when off the NIV device.  Today's chest x-ray shows at least some improvement in pulmonary edema  We will repeat ABG in the morning, given worsened respiratory acidosis on the blood gas in the morning.     2.  Acute exacerbation of COPD   Likely the reason for acute respiratory failure.  Patient was started on IV Solu-Medrol due to continued significant wheezing and respiratory distress.  We will continue the same dose for now.     3.  Chronic respiratory acidosis  Will use NIV device     4.  Smoking  Was advised to quit smoking     5.  Hypokalemia  Resolved     6.  Pulmonary hypertension  Most likely secondary to likely severe underlying COPD  We will consider outpatient work-up    7.  Fluid status  With administer 1 dose of Diuril today  Net IO Since Admission: 1,290 mL [04/16/23 1135]  Lab Results   Component Value Date    PROBNP 272.8 04/13/2023    PROBNP 315.0 (C) 10/02/2017     8.  Miscellaneous  Patient remains somewhat critical given continued hypoxemia requiring up to 70% FiO2 on the NIV device.  She also developed worsening hypercarbia in the morning which also puts her at high risk for further deterioration.  Will need to be monitored very closely and may need to be considered for transfer to ICU if there is any further worsening clinical status.    We have reviewed patient's current orders and changes, if any, have been suggested to primary care team. Plan was also discussed with nursing staff, as necessary.     This document was electronically signed by Arnaldo Soriano MD on 04/16/23 at 11:34 EDT      Dictated utilizing Dragon dictation.

## 2023-04-16 NOTE — PROGRESS NOTES
[facility]    [facility]    Name:  Carolin Toscano   Age:  77 y.o.  Sex:  female  :  1945  MRN:  [id number]   Visit Number:  [id number]  Admission Date:  2023  Date Of Service:  23  Primary Care Physician:  Jsaon Nicholson MD     LOS: 3 days :    Chief Complaint:      Shortness of air    Subjective:    Patient seen and examined with no family at bedside.  Respiratory distress noted.  Currently on 5 liters. Wore Bipap throughout the night with worsening acidosis noted. Unable to answer most of my questions this morning. RN at bedside. Will place patient back on Bipap.    Hospital Course:    Patient is a 77-year-old female with a history of ongoing tobacco abuse, COPD, hypertension, dyslipidemia, prior kidney stones, who presented to the emergency room with complaints of 3 to 4 days of increasing cough, shortness of breath and chills.  Patient states she actually quit smoking about 3 days ago.  Has noted change in sputum.  She has not followed up with PCP or pulmonology in a while.  She does not routinely wear oxygen at home.       In the ER, patient was hypoxic on arrival and was requiring up to 3 L of oxygen.  Otherwise hemodynamically stable.  She has a negative respiratory panel.  She had a lactic acid of 2.7 repeat 1.2.  She had a blood gas pH 7.47 PCO2 55.1 PO2 86.5, creatinine 0.65 potassium 2.9 CO2 33.7.  proBNP of 272 mildly elevated high-sensitivity troponin.  White count of 21 hemoglobin 15.  Negative flu COVID testing.  A1c 6.1 magnesium 1.8 and procalcitonin 0.43.  She had a chest x-ray was showing emphysematous changes but no acute abnormality.  A CT scan of the chest without contrast was showing findings concerning for bronchitis/bronchiolitis with interlobular septal thickening in the right lung base.  There is no focal airspace disease.  Patient was given empiric antibiotics, bronchodilators, steroids emergency room.  Due to acute respiratory  failure with hypoxia, hospitalist consulted for further medical management.  Pulmonology also consulted.  Patient continued to require higher levels of oxygen.  Patient developed worsening respiratory acidosis requiring BiPAP.    Review of Systems:     All systems were reviewed and negative except as mentioned in subjective, assessment and plan.    Vital Signs:    Temp:  [97.6 °F (36.4 °C)-99.9 °F (37.7 °C)] 97.8 °F (36.6 °C)  Heart Rate:  [77-89] 77  Resp:  [18-28] 20  BP: (115-131)/(53-77) 129/64    Intake and output:    I/O last 3 completed shifts:  In: 720 [P.O.:720]  Out: 950 [Urine:950]  No intake/output data recorded.    Physical Examination:    General Appearance:  Alert and cooperative.  Chronically ill/frail appearing elderly female.  Appears ill.   Head:  Atraumatic and normocephalic.   Eyes: Conjunctivae and sclerae normal, no icterus. No pallor.   Throat: No oral lesions, no thrush, dry mucous membranes.   Neck: Supple, trachea midline, no thyromegaly.   Lungs:   Breath sounds heard bilaterally equally.  Significant wheezing  throughout with mild respiratory distress on 5 L nasal cannula.   Heart:  Normal S1 and S2, no murmur, no gallop, no rub. No JVD.   Abdomen:   Normal bowel sounds, no masses, no organomegaly. Soft, nontender, nondistended, no rebound tenderness.   Extremities: Supple, no edema, no cyanosis, no clubbing.   Skin: No bleeding or rash.   Neurologic: Alert and oriented x 1. No facial asymmetry. Moves all four limbs. No tremors.  Generalized weakness.     Laboratory results:    Results from last 7 days   Lab Units 04/16/23  0600 04/15/23  0811 04/13/23  1458   SODIUM mmol/L 145 145 138   POTASSIUM mmol/L 4.2 3.7 2.9*   CHLORIDE mmol/L 103 103 92*   CO2 mmol/L 38.0* 38.0* 33.7*   BUN mg/dL 18 18 12   CREATININE mg/dL 0.38* 0.52* 0.65   CALCIUM mg/dL 9.3 9.3 9.4   BILIRUBIN mg/dL  --   --  0.4   ALK PHOS U/L  --   --  114   ALT (SGPT) U/L  --   --  23   AST (SGOT) U/L  --   --  19    GLUCOSE mg/dL 173* 143* 160*     Results from last 7 days   Lab Units 04/16/23  0600 04/15/23  0811 04/13/23  1458   WBC 10*3/mm3 12.09* 19.59* 20.98*   HEMOGLOBIN g/dL 14.0 14.6 15.3   HEMATOCRIT % 46.1 47.4* 47.4*   PLATELETS 10*3/mm3 282 274 240         Results from last 7 days   Lab Units 04/13/23  1458   HSTROP T ng/L 15*         Recent Labs     04/13/23  1539 04/16/23  0652 04/16/23  1118   PHART 7.427 7.374 7.449   RNT6UEQ 55.1* 70.5* 57.3*   PO2ART 86.5 73.8* 93.7   NKM2OSR 36.3* 41.1* 39.7*   BASEEXCESS 9.8* 12.5* 13.1*      I have reviewed the patient's laboratory results.    Radiology results:    XR Chest 1 View    Result Date: 4/16/2023  X-RAY CHEST ONE VIEW  INDICATION:  Respiratory failure.; J96.01-Acute respiratory failure with hypoxia; J44.1-Chronic obstructive pulmonary disease with (acute) exacerbation.  FINDINGS:  A portable view of the chest was obtained.  Comparison is made to a prior exam dated 04/13/2023.   Heart size is normal. Interstitial opacities are slightly improved. More focal left basilar opacity is likely atelectasis. There may be a small left pleural effusion. No pneumothorax.      Impression: Improved interstitial opacities. Favor improved pulmonary edema. Left basilar opacity, favor atelectasis.  This report was signed and finalized on 4/16/2023 9:02 AM by Jody Byrd MD.    I have reviewed the patient's radiology reports.    Medication Review:     I have reviewed the patient's active and prn medications.     Problem List:      Acute respiratory failure    CAP (community acquired pneumonia)    Essential hypertension    COPD exacerbation      Assessment:    1. Acute respiratory failure with hypoxia and hypercapnia, POA  2. COPD with exacerbation, POA  3. Suspect underlying community-acquired pneumonia, present admission, due to unknown organism  4. Pulmonary hypertension  5. Hypertension  6. Chronic tobacco abuse  7. Arthritis     Plan:     Respiratory  failure/COPD/pneumonia:  -Continue Azactam given multiple allergies.  Procalcitonin increasing with a.m. labs.  -Repeat chest x-ray/ABG  -Continue with bronchodilators, Solu-Medrol, oxygen therapy.    -Oxygen demands currently at 5 L nasal cannula.  Appreciate pulmonology consulting with recommendations.  -Given increased oxygen demands and current respiratory distress patient high risk for  deterioration.  Will monitor closely and consider transferring to ICU if any worsening.  -Given worsening respiratory acidosis patient placed on BiPAP.  ABG improved.  Will monitor closely.     Hypertension:  -Restart home atenolol and amlodipine.     Chronic tobacco abuse:  -Complicates all aspects of care    -Called patient daughter Nehal.  Updated that patient is currently requiring BiPAP.  Nehal stated that mother had always stated that she would want CPR but not intubation.  Currently patient is full code which was ordered upon admission.  Advised to discuss with her other 2 sisters as well.  Patient unable to currently make decisions.  We will continue to monitor closely.     DVT Prophylaxis: Lovenox  Code Status: Full Code  Diet: Regular  Discharge Plan: 2-3 days    Marga Monroy, APRN  04/16/23  12:16 EDT    Dictated utilizing Dragon dictation.

## 2023-04-16 NOTE — PAYOR COMM NOTE
"To:  [employer]  From: Lucia Burgos RN  Phone: [phone]  Fax: [phone]  NPI: [id number]  TIN: [id number]  Member ID: [id number]  MRN: [id number]    Carolin Toscano (77 y.o. Female)     Date of Birth   1945    Social Security Number       Address   [address] [facility] [address]    Home Phone   [phone]    MRN   [id number]       Mu-ism   Nazarene    Marital Status                               Admission Date   4/13/23    Admission Type   Emergency    Admitting Provider   Rajni Roberts DO    Attending Provider   Rajni Roberts DO    Department, Room/Bed   [facility] 3, 327/1       Discharge Date       Discharge Disposition       Discharge Destination                               Attending Provider: Rajni Roberts DO    Allergies: Contrast Dye (Echo Or Unknown Ct/mr), Ciprofloxacin, Keflex [Cephalexin], Penicillins, Sulfa Antibiotics, Terramycin [Oxytetracycline], Latex, Percocet [Oxycodone-acetaminophen], Xyzal [Levocetirizine]    Isolation: None   Infection: COVID Screen (preop/placement) (12/31/20)   Code Status: CPR    Ht: 160 cm (62.99\")   Wt: 72 kg (158 lb 11.7 oz)    Admission Cmt: None   Principal Problem: Acute respiratory failure [J96.00]                 Active Insurance as of 4/13/2023     Primary Coverage     Payor Plan Insurance Group Employer/Plan Group    HUMANA MEDICARE REPLACEMENT HU[employer]NA MEDICARE REPLACEMENT [id number]     Payor Plan Address Payor Plan Phone Number Payor Plan Fax Number Effective Dates    [address] [address] [phone]  1/1/2018 - None Entered    [facility] [id number]       Subscriber Name Subscriber Birth Date Member ID       CAROLIN TOSCANO 1945 [id number]           Secondary Coverage     Payor Plan Insurance Group Employer/Plan Group    [place]      Payor Plan Address Payor Plan Phone Number Payor Plan Fax Number Effective Dates    [address] [phone]  10/11/2021 - None " Entered    [facility] [address]       Subscriber Name Subscriber Birth Date Member ID       DAMIAN DYE 1945 [id number]                 Emergency Contacts      (Rel.) Home Phone Work Phone Mobile Phone    Nehal Hoffmann (Daughter) [phone] -- --            Vital Signs (last day)     Date/Time Temp Temp src Pulse Resp BP Patient Position SpO2    04/16/23 1130 97.8 (36.6) Oral 77 20 129/64 Lying 90    04/16/23 0740 97.6 (36.4) Oral 79 22 127/62 Lying 93    04/16/23 0704 -- -- 89 22 -- -- 96    04/16/23 0400 -- -- -- -- -- -- 93    04/16/23 0335 99.9 (37.7) Axillary 79 28 115/53 Lying 89    04/16/23 0029 99 (37.2) Axillary 82 20 128/57 Lying 89    04/15/23 2032 98.7 (37.1) Oral 87 18 131/58 Lying 88    04/15/23 2003 -- -- 86 18 -- -- 92    04/15/23 1450 98.9 (37.2) Oral 77 20 130/77 Lying 92    04/15/23 1322 -- -- -- 20 -- Lying --    04/15/23 1155 99.9 (37.7) Oral 89 20 141/62 Lying 92    04/15/23 0849 -- -- 103 -- 126/57 -- --    04/15/23 0801 -- -- -- 20 -- -- 90    04/15/23 0708 -- -- -- 20 -- Lying --    04/15/23 0705 98 (36.7) Oral 90 20 134/61 Lying 95    04/15/23 0353 98.1 (36.7) Oral 83 20 127/61 Lying 91          Current Facility-Administered Medications   Medication Dose Route Frequency Provider Last Rate Last Admin   • acetaminophen (TYLENOL) tablet 650 mg  650 mg Oral Q4H PRN Rajni Roberts, DO        Or   • acetaminophen (TYLENOL) 160 MG/5ML solution 650 mg  650 mg Oral Q4H PRN Rajni Roberts, DO        Or   • acetaminophen (TYLENOL) suppository 650 mg  650 mg Rectal Q4H PRN Rajni Roberts DO       • aluminum-magnesium hydroxide-simethicone (MAALOX MAX) 400-400-40 MG/5ML suspension 15 mL  15 mL Oral Q6H PRN Rajni Roberts DO       • amLODIPine (NORVASC) tablet 10 mg  10 mg Oral Daily Rajni Roberts DO   10 mg at 04/16/23 0936   • arformoterol (BROVANA) nebulizer solution 15 mcg  15 mcg Nebulization BID - RT Arnaldo Soriano MD   15 mcg at  04/16/23 0700   • atenolol (TENORMIN) tablet 25 mg  25 mg Oral Daily Rajni Roberts DO   25 mg at 04/16/23 0936   • aztreonam (AZACTAM) 2 g/100 mL 0.9% NS (mbp)  2 g Intravenous Q8H Marga MonroyCLAUDIA   2 g at 04/16/23 0458   • benzonatate (TESSALON) capsule 200 mg  200 mg Oral TID PRN Rajni Roberts DO       • budesonide (PULMICORT) nebulizer solution 1 mg  1 mg Nebulization BID - RT Arnaldo Soriano MD   1 mg at 04/16/23 0700   • Calcium Replacement - Follow Nurse / BPA Driven Protocol   Does not apply PRN Rajni Roberts DO       • carboxymethylcellulose (REFRESH PLUS) 0.5 % ophthalmic solution 1 drop  1 drop Right Eye TID PRN Rajni Roberts DO       • Enoxaparin Sodium (LOVENOX) syringe 40 mg  40 mg Subcutaneous Nightly Rajni Roberts DO   40 mg at 04/15/23 2035   • ipratropium-albuterol (DUO-NEB) nebulizer solution 3 mL  3 mL Nebulization Q6H While Awake - RT Rajni Roberts DO   3 mL at 04/16/23 0700   • Magnesium Standard Dose Replacement - Follow Nurse / BPA Driven Protocol   Does not apply PRN Rajni Roberts DO       • melatonin tablet 5 mg  5 mg Oral Nightly PRN Rajni Roberts DO   5 mg at 04/13/23 2218   • methylPREDNISolone sodium succinate (SOLU-Medrol) injection 40 mg  40 mg Intravenous Q6H Arnaldo Soriano MD   40 mg at 04/16/23 0616   • nicotine (NICODERM CQ) 21 MG/24HR patch 1 patch  1 patch Transdermal Q24H Rajni Roberts DO   1 patch at 04/15/23 2211   • ondansetron (ZOFRAN) tablet 4 mg  4 mg Oral Q6H PRN Rajni Roberts DO        Or   • ondansetron (ZOFRAN) injection 4 mg  4 mg Intravenous Q6H PRN Rajni Roberts DO       • Pharmacy to Dose enoxaparin (LOVENOX)   Does not apply Continuous PRN Rajni Roberts DO       • Phosphorus Replacement - Follow Nurse / BPA Driven Protocol   Does not apply PRRajni Olivera DO       • Potassium Replacement - Follow Nurse / BPA Driven  Protocol   Does not apply PRN Alejandra, Rajni Fuus, DO       • sodium chloride 0.9 % flush 10 mL  10 mL Intravenous PRN Rajni Robertsus, DO       • sodium chloride 0.9 % flush 10 mL  10 mL Intravenous Q12H Rajni Robertsus, DO   10 mL at 04/15/23 0851   • sodium chloride 0.9 % flush 10 mL  10 mL Intravenous PRN Rajni Robertsus, DO       • sodium chloride 0.9 % infusion 40 mL  40 mL Intravenous PRN Rajni Robertsus, DO       • tiotropium (SPIRIVA RESPIMAT) 2.5 mcg/act aerosol solution inhaler  2 puff Inhalation Daily - RT Arnaldo Soriano MD   2 puff at 04/16/23 0704     Lab Results (last 24 hours)     Procedure Component Value Units Date/Time    Blood Gas, Arterial With Co-Ox [[id number]]  (Abnormal) Collected: 04/16/23 1118    Specimen: Arterial Blood Updated: 04/16/23 1118     Site Right Radial     Partha's Test Positive     pH, Arterial 7.449 pH units      pCO2, Arterial 57.3 mm Hg      Comment: 83 Value above reference range        pO2, Arterial 93.7 mm Hg      HCO3, Arterial 39.7 mmol/L      Comment: 83 Value above reference range        Base Excess, Arterial 13.1 mmol/L      Comment: 83 Value above reference range        O2 Saturation, Arterial 99.0 %      Hematocrit, Blood Gas 42.7 %      Oxyhemoglobin 98.0 %      Methemoglobin 0.40 %      Carboxyhemoglobin <0.7 %      Comment: 94 Value below reportable range < 0.7        A-a DO2 321.9 mmHg      Barometric Pressure for Blood Gas 727 mmHg      Modality BiPap     FIO2 70 %      Ventilator Mode NA     Note --     Collected by [id number]     Comment: Meter: [id number]     :  [id number]        pH, Temp Corrected --     pCO2, Temperature Corrected --     pO2, Temperature Corrected --    Blood Culture With BERNARD - Blood, Arm, Right [[id number]] Collected: 04/16/23 1006    Specimen: Blood from Arm, Right Updated: 04/16/23 1011    Blood Culture With BERNARD - Blood, Arm, Left [[id number]] Collected: 04/16/23 1006    Specimen: Blood from  "Arm, Left Updated: 04/16/23 1010    Blood Gas, Arterial With Co-Ox [[id number]]  (Abnormal) Collected: 04/16/23 0652    Specimen: Arterial Blood Updated: 04/16/23 0653     Site Right Radial     Partha's Test Positive     pH, Arterial 7.374 pH units      pCO2, Arterial 70.5 mm Hg      Comment: 86 Value above critical limit        pO2, Arterial 73.8 mm Hg      Comment: 84 Value below reference range        HCO3, Arterial 41.1 mmol/L      Comment: 83 Value above reference range        Base Excess, Arterial 12.5 mmol/L      Comment: 83 Value above reference range        O2 Saturation, Arterial 96.3 %      Hematocrit, Blood Gas 43.4 %      Oxyhemoglobin 95.0 %      Methemoglobin 0.50 %      Carboxyhemoglobin 0.8 %      A-a DO2 535.9 mmHg      Barometric Pressure for Blood Gas 727 mmHg      Modality BiPap     FIO2 100 %      Ventilator Mode NA     Note --     Notified Who NURSE     Notified By [id number]     Notified Time 04/16/2023 06:51     Collected by [id number]     Comment: Meter: [id number]     :  [id number]        pH, Temp Corrected --     pCO2, Temperature Corrected --     pO2, Temperature Corrected --    Procalcitonin [[id number]]  (Abnormal) Collected: 04/16/23 0600    Specimen: Blood Updated: 04/16/23 0649     Procalcitonin 1.10 ng/mL     Narrative:      As a Marker for Sepsis (Non-Neonates):    1. <0.5 ng/mL represents a low risk of severe sepsis and/or septic shock.  2. >2 ng/mL represents a high risk of severe sepsis and/or septic shock.    As a Marker for Lower Respiratory Tract Infections that require antibiotic therapy:    PCT on Admission    Antibiotic Therapy       6-12 Hrs later    >0.5                Strongly Recommended  >0.25 - <0.5        Recommended   0.1 - 0.25          Discouraged              Remeasure/reassess PCT  <0.1                Strongly Discouraged     Remeasure/reassess PCT    As 28 day mortality risk marker: \"Change in Procalcitonin Result\" (>80% or <=80%) if Day 0 (or Day 1) and Day " 4 values are available. Refer to http://[email or web address]-pct-[email or web address]    Change in PCT <=80%  A decrease of PCT levels below or equal to 80% defines a positive change in PCT test result representing a higher risk for 28-day all-cause mortality of patients diagnosed with severe sepsis for septic shock.    Change in PCT >80%  A decrease of PCT levels of more than 80% defines a negative change in PCT result representing a lower risk for 28-day all-cause mortality of patients diagnosed with severe sepsis or septic shock.       CBC & Differential [[id number]]  (Abnormal) Collected: 04/16/23 0600    Specimen: Blood Updated: 04/16/23 0648    Narrative:      The following orders were created for panel order CBC & Differential.  Procedure                               Abnormality         Status                     ---------                               -----------         ------                     CBC Auto Differential[[id number]]        Abnormal            Final result                 Please view results for these tests on the individual orders.    CBC Auto Differential [[id number]]  (Abnormal) Collected: 04/16/23 0600    Specimen: Blood Updated: 04/16/23 0648     WBC 12.09 10*3/mm3      RBC 4.81 10*6/mm3      Hemoglobin 14.0 g/dL      Hematocrit 46.1 %      MCV 95.8 fL      MCH 29.1 pg      MCHC 30.4 g/dL      RDW 13.9 %      RDW-SD 49.6 fl      MPV 10.5 fL      Platelets 282 10*3/mm3      Neutrophil % 85.2 %      Lymphocyte % 8.4 %      Monocyte % 2.7 %      Eosinophil % 0.0 %      Basophil % 0.8 %      Immature Grans % 2.9 %      Neutrophils, Absolute 10.29 10*3/mm3      Lymphocytes, Absolute 1.02 10*3/mm3      Monocytes, Absolute 0.33 10*3/mm3      Eosinophils, Absolute 0.00 10*3/mm3      Basophils, Absolute 0.10 10*3/mm3      Immature Grans, Absolute 0.35 10*3/mm3      nRBC 0.0 /100 WBC     Basic Metabolic Panel [[id number]]  (Abnormal) Collected: 04/16/23 0600    Specimen: Blood Updated: 04/16/23 0641     Glucose 173  mg/dL      BUN 18 mg/dL      Creatinine 0.38 mg/dL      Sodium 145 mmol/L      Potassium 4.2 mmol/L      Chloride 103 mmol/L      CO2 38.0 mmol/L      Calcium 9.3 mg/dL      BUN/Creatinine Ratio 47.4     Anion Gap 4.0 mmol/L      eGFR 104.0 mL/min/1.73     Narrative:      GFR Normal >60  Chronic Kidney Disease <60  Kidney Failure <15    The GFR formula is only valid for adults with stable renal function between ages 18 and 70.    MRSA Screen, PCR (Inpatient) - Swab, Nares [[id number]]  (Normal) Collected: 04/15/23 1548    Specimen: Swab from Nares Updated: 04/15/23 2157     MRSA PCR No MRSA Detected    Narrative:      The negative predictive value of this diagnostic test is high and should only be used to consider de-escalating anti-MRSA therapy. A positive result may indicate colonization with MRSA and must be correlated clinically.        Imaging Results (Last 24 Hours)     Procedure Component Value Units Date/Time    XR Chest 1 View [[id number]] Collected: 04/16/23 0801     Updated: 04/16/23 0905    Narrative:      X-RAY CHEST ONE VIEW     INDICATION:  Respiratory failure.; J96.01-Acute respiratory failure with  hypoxia; J44.1-Chronic obstructive pulmonary disease with (acute)  exacerbation.     FINDINGS:  A portable view of the chest was obtained.  Comparison is  made to a prior exam dated 04/13/2023.   Heart size is normal.  Interstitial opacities are slightly improved. More focal left basilar  opacity is likely atelectasis. There may be a small left pleural  effusion. No pneumothorax.       Impression:      Improved interstitial opacities. Favor improved pulmonary  edema. Left basilar opacity, favor atelectasis.     This report was signed and finalized on 4/16/2023 9:02 AM by Jody Byrd MD.        Orders (last 24 hrs)      Start     Ordered    04/17/23 0730  Blood Gas, Arterial -Obtain on: Current Settings; With Co-Ox Panel: Yes  Morning Draw         04/16/23 1138    04/16/23 1119  Blood Gas, Arterial With  Co-Ox  PROCEDURE ONCE         04/16/23 1118    04/16/23 1100  Blood Gas, Arterial -With Co-Ox Panel: Yes  Once         04/16/23 0950    04/16/23 1034  NIPPV-IPPV / BIPAP / CPAP  At Bedtime & As Needed-RT        Comments: Use 1 hour ON and 3 hours OFF during the daytime and all night please    04/16/23 1034    04/16/23 0951  Blood Culture With BERNARD - Blood, Arm, Right  Once         04/16/23 0950    04/16/23 0951  Blood Culture With BERNARD - Blood, Arm, Left  Once         04/16/23 0950    04/16/23 0730  Blood Gas, Arterial -Obtain on: Current Settings; With Co-Ox Panel: Yes  Morning Draw         04/15/23 1231    04/16/23 0654  Blood Gas, Arterial With Co-Ox  PROCEDURE ONCE         04/16/23 0652    04/16/23 0600  CBC & Differential  Morning Draw,   Status:  Canceled         04/15/23 0752    04/16/23 0600  Basic Metabolic Panel  Morning Draw,   Status:  Canceled         04/15/23 0752    04/16/23 0600  XR Chest 1 View  1 Time Imaging         04/15/23 1231    04/16/23 0600  CBC & Differential  Morning Draw         04/15/23 1429    04/16/23 0600  Basic Metabolic Panel  Morning Draw         04/15/23 1429    04/16/23 0600  Procalcitonin  Morning Draw         04/15/23 1429    04/16/23 0600  CBC Auto Differential  PROCEDURE ONCE         04/15/23 2203    04/15/23 2100  aztreonam (AZACTAM) 2 g/100 mL 0.9% NS (mbp)  Every 8 Hours         04/15/23 1442    04/15/23 1429  MRSA Screen, PCR (Inpatient) - Swab, Nares  Once         04/15/23 1429    04/15/23 1315  methylPREDNISolone sodium succinate (SOLU-Medrol) injection 40 mg  Every 6 Hours         04/15/23 1229    04/15/23 1231  NIPPV-IPPV / BIPAP / CPAP  At Bedtime & As Needed-RT,   Status:  Canceled        Comments: Use 1 hour ON and 3 hours OFF during the daytime and all night please    04/15/23 1230    04/15/23 0600  Document Pulse Oximetry - On Room Air / Home O2 Level  Daily      Comments: Room Air Oxygen Levels Should Only Be Measured if Patient Oxygen Needs are <4L  Home O2  Oxygen Levels Should Only Be Measured Once Patient Within 2L of Home O2 Baseline  Reapply Oxygen if O2 Sat Drops Below 88%    04/13/23 2122 04/14/23 1415  budesonide (PULMICORT) nebulizer solution 1 mg  2 Times Daily - RT         04/14/23 1323    04/14/23 1415  arformoterol (BROVANA) nebulizer solution 15 mcg  2 Times Daily - RT         04/14/23 1324 04/14/23 1415  tiotropium (SPIRIVA RESPIMAT) 2.5 mcg/act aerosol solution inhaler  Daily - RT         04/14/23 1324 04/14/23 0900  amLODIPine (NORVASC) tablet 10 mg  Daily         04/13/23 2122 04/14/23 0900  atenolol (TENORMIN) tablet 25 mg  Daily         04/13/23 2122 04/14/23 0800  Oral Care  2 Times Daily       04/13/23 2122 04/14/23 0700  ipratropium-albuterol (DUO-NEB) nebulizer solution 3 mL  Every 6 Hours While Awake - RT         04/13/23 2122 04/14/23 0600  Tobacco Cessation Education  Daily      Comments: Document in Education Activity    04/13/23 2122 04/14/23 0600  Respiratory Treatment Education (MDI / Spacer / Nebulizer)  Daily      Comments: Document in Education Activity    04/13/23 2122 04/14/23 0600  COPD Education  Daily      Comments: Document in Education Activity    04/13/23 2122 04/14/23 0430  aztreonam (AZACTAM) 1 g/100 mL 0.9% NS IVPB (mbp)  Every 8 Hours         04/13/23 1929 04/14/23 0000  Vital Signs  Every 4 Hours       04/13/23 2122 04/13/23 2231  carboxymethylcellulose (REFRESH PLUS) 0.5 % ophthalmic solution 1 drop  3 Times Daily PRN         04/13/23 2231 04/13/23 2215  sodium chloride 0.9 % flush 10 mL  Every 12 Hours Scheduled         04/13/23 2122 04/13/23 2215  predniSONE (DELTASONE) tablet 40 mg  Daily With Breakfast,   Status:  Discontinued         04/13/23 2122 04/13/23 2215  nicotine (NICODERM CQ) 21 MG/24HR patch 1 patch  Every 24 Hours         04/13/23 2122 04/13/23 2215  Enoxaparin Sodium (LOVENOX) syringe 40 mg  Nightly         04/13/23 2126 04/13/23 2200  Incentive Spirometry   Every 4 Hours While Awake       04/13/23 2122 04/13/23 2122  benzonatate (TESSALON) capsule 200 mg  3 Times Daily PRN         04/13/23 2122 04/13/23 2122  aluminum-magnesium hydroxide-simethicone (MAALOX MAX) 400-400-40 MG/5ML suspension 15 mL  Every 6 Hours PRN         04/13/23 2122 04/13/23 2122  melatonin tablet 5 mg  Nightly PRN         04/13/23 2122 04/13/23 2122  ondansetron (ZOFRAN) tablet 4 mg  Every 6 Hours PRN        See Hyperspace for full Linked Orders Report.    04/13/23 2122 04/13/23 2122  ondansetron (ZOFRAN) injection 4 mg  Every 6 Hours PRN        See Hyperspace for full Linked Orders Report.    04/13/23 2122 04/13/23 2122  Potassium Replacement - Follow Nurse / BPA Driven Protocol  As Needed         04/13/23 2122 04/13/23 2122  Magnesium Standard Dose Replacement - Follow Nurse / BPA Driven Protocol  As Needed         04/13/23 2122 04/13/23 2122  Phosphorus Replacement - Follow Nurse / BPA Driven Protocol  As Needed         04/13/23 2122 04/13/23 2122  Calcium Replacement - Follow Nurse / BPA Driven Protocol  As Needed         04/13/23 2122 04/13/23 2122  acetaminophen (TYLENOL) tablet 650 mg  Every 4 Hours PRN        See Hyperspace for full Linked Orders Report.    04/13/23 2122 04/13/23 2122  acetaminophen (TYLENOL) 160 MG/5ML solution 650 mg  Every 4 Hours PRN        See Hyperspace for full Linked Orders Report.    04/13/23 2122 04/13/23 2122  acetaminophen (TYLENOL) suppository 650 mg  Every 4 Hours PRN        See Hyperspace for full Linked Orders Report.    04/13/23 2122 04/13/23 2122  Intake & Output  Every Shift       04/13/23 2122 04/13/23 2121  Pharmacy to Dose enoxaparin (LOVENOX)  Continuous PRN         04/13/23 2122 04/13/23 2121  sodium chloride 0.9 % flush 10 mL  As Needed         04/13/23 2122 04/13/23 2121  sodium chloride 0.9 % infusion 40 mL  As Needed         04/13/23 2122    04/13/23 1457  sodium chloride 0.9 % flush 10 mL  As  "Needed         23    Unscheduled  Oxygen Therapy- Nasal Cannula; 2 LPM; Titrate for SPO2: equal to or greater than, 92%  Continuous PRN       23 1457    --  SCANNED - TELEMETRY           23 0000    --  SCANNED - TELEMETRY           23 0000    --  SCANNED - TELEMETRY           23 0000    --  SCANNED - TELEMETRY           23 0000                   Physician Progress Notes (last 48 hours)      Arnaldo Soriano MD at 04/15/23 1116            CC: Acute respiratory failure.  COPD exacerbation.    S: Continues to have shortness of breath with any exertion.  Says that she still has trouble speaking in full sentences as she gets \"winded\".  Does have occasional cough although it remains dry.    ROS: Positive for cough, shortness of breath. Denies chest pain, diarrhea or fever.    O:Vital signs reviewed.   /57   Pulse 103   Temp 98 °F (36.7 °C) (Oral)   Resp 20   Ht 160 cm (62.99\")   Wt 69.4 kg (153 lb)   SpO2 90%   BMI 27.11 kg/m²     Temp (24hrs), Av.1 °F (36.7 °C), Min:97.9 °F (36.6 °C), Max:98.4 °F (36.9 °C)        I & Os reviewed.   Intake/Output       23 0700 - 04/15/23 0659 04/15/23 0700 - 23 0659    Intake (ml) 820 240    Output (ml) 850 --    Net (ml) -30 240    Last Weight 69.4 kg (153 lb) --          Net IO Since Admission: 1,210 mL [04/15/23 1116]    General/Constitutional: Mild acute distress noted.  Eyes: PERRL.   Neck: Supple without JVD. No obvious masses noted.   Cardiovascular: S1 + S2.  Tachycardic  Lungs/Respiratory: Mild respiratory distress noted.  Somewhat diffuse wheezing heard.  No obvious crackles noted  GI/Abdomen: Soft. Bowel sounds positive.  No obvious organomegaly  Musculoskeletal/Extremities: No edema noted. Gait could not be assessed at this time, as the patient was laying in bed.   Neurologic:  Was able to follow commands.    Psych: AAOx3.   Skin: Appeared somewhat dry       Labs: Reviewed.   Results from last 7 days   Lab " Units 04/15/23  0811 04/13/23  1458   WBC 10*3/mm3 19.59* 20.98*   HEMOGLOBIN g/dL 14.6 15.3   HEMATOCRIT % 47.4* 47.4*   PLATELETS 10*3/mm3 274 240   NEUTROPHIL % % 83.8* 84.5*   NEUTROS ABS 10*3/mm3 16.40* 17.74*   EOSINOPHIL % % 0.0* 1.0   EOS ABS 10*3/mm3 0.00 0.21   LYMPHOCYTE % % 7.1* 4.3*   LYMPHS ABS 10*3/mm3 1.40 0.90       Lab Results   Component Value Date    PROCALCITO 0.43 (H) 04/13/2023       No results found for: CRP    No results found for: SEDRATE    Lab Results   Component Value Date    PROBNP 272.8 04/13/2023    PROBNP 315.0 (C) 10/02/2017       Results from last 7 days   Lab Units 04/15/23  0811 04/13/23  1458   SODIUM mmol/L 145 138   POTASSIUM mmol/L 3.7 2.9*   CHLORIDE mmol/L 103 92*   CO2 mmol/L 38.0* 33.7*   BUN mg/dL 18 12   CREATININE mg/dL 0.52* 0.65   CALCIUM mg/dL 9.3 9.4   ANION GAP mmol/L 4.0* 12.3   BILIRUBIN mg/dL  --  0.4   ALK PHOS U/L  --  114   ALT (SGPT) U/L  --  23   AST (SGOT) U/L  --  19   GLUCOSE mg/dL 143* 160*   TOTAL PROTEIN g/dL  --  7.7   ALBUMIN g/dL  --  3.3*       Results from last 7 days   Lab Units 04/13/23  1458   MAGNESIUM mg/dL 1.8             No results found for: CKTOTAL    No components found for: HSTROPT    Lab Results   Component Value Date    TROPONINT 15 (H) 04/13/2023       No results found for: DDIMER    Brief Urine Lab Results     None          No results found for: TSH    No results found for: FREET4      Micro: As of April 15, 2023   Lab Results   Component Value Date    RESPCX Light growth (2+) Normal Respiratory Ana 10/02/2017     No results found for: BCIDPCR  Lab Results   Component Value Date    BLOODCX No growth at 5 days 10/01/2017    BLOODCX No growth at 5 days 10/01/2017     Lab Results   Component Value Date    URINECX Final report 02/07/2019    URINECX 20,000-30,000 CFU/mL Escherichia coli (A) 10/05/2018     No results found for: MRSACX  No results found for: MRSAPCR  No results found for: URCX  No components found for: LOWRESPCF  No  results found for: THROATCX  No results found for: CULTURES  No components found for: STREPBCX  No results found for: STREPPNEUAG  No results found for: LEGIONELLA  No results found for: MYCOPLASCX  No results found for: GCCX  No results found for: WOUNDCX  No results found for: BODYFLDCX    No results found for: FLU    Lab Results   Component Value Date    ADENOVIRUS Not Detected 04/13/2023     Lab Results   Component Value Date    OJ823U Not Detected 04/13/2023     Lab Results   Component Value Date    CVHKU1 Not Detected 04/13/2023     Lab Results   Component Value Date    CVNL63 Not Detected 04/13/2023     Lab Results   Component Value Date    CVOC43 Not Detected 04/13/2023     Lab Results   Component Value Date    HUMETPNEVS Not Detected 04/13/2023     Lab Results   Component Value Date    HURVEV Not Detected 04/13/2023     Lab Results   Component Value Date    FLUBPCR Not Detected 04/13/2023     Lab Results   Component Value Date    PARAINFLUE Not Detected 04/13/2023     Lab Results   Component Value Date    PARAFLUV2 Not Detected 04/13/2023     Lab Results   Component Value Date    PARAFLUV3 Not Detected 04/13/2023     Lab Results   Component Value Date    PARAFLUV4 Not Detected 04/13/2023     Lab Results   Component Value Date    BPERTPCR Not Detected 04/13/2023     No results found for: [id number]  Lab Results   Component Value Date    CPNEUPCR Not Detected 04/13/2023     Lab Results   Component Value Date    MPNEUMO Not Detected 04/13/2023     Lab Results   Component Value Date    FLUAPCR Not Detected 04/13/2023     No results found for: FLUAH3  No results found for: FLUAH1  Lab Results   Component Value Date    RSV Not Detected 04/13/2023     Lab Results   Component Value Date    BPARAPCR Not Detected 04/13/2023       COVID 19:  Lab Results   Component Value Date    COVID19 Not Detected 04/13/2023           ABG: Reviewed  Recent Labs     04/13/23  1539   PHART 7.427   BDC5CVP 55.1*   PO2ART 86.5   HFZ0XYN  36.3*   BASEEXCESS 9.8*       Lab Results   Component Value Date    LACTATE 1.2 04/13/2023    LACTATE 2.7 (C) 04/13/2023    LACTATE 1.6 10/01/2017         Echo: Results for orders placed during the hospital encounter of 04/13/23    Adult Transthoracic Echo Complete W/ Cont if Necessary Per Protocol    Interpretation Summary  •  Left ventricular diastolic function is consistent with (grade I) impaired relaxation.  •  Mild aortic valve stenosis is present.  •  Estimated right ventricular systolic pressure from tricuspid regurgitation is moderately elevated (45-55 mmHg).  •  Mild to moderate pulmonary hypertension is present.      Results for orders placed during the hospital encounter of 10/01/17    Adult Transthoracic Echo Complete W/ Cont if Necessary Per Protocol    Interpretation Summary  TEchnically difficult study  1) Borderline LVH with normal LV systolic function ( EF is over 55%)  2) Normal left atrial size with mild elevation in LVedp  3) Trace MR  4) Mild TR with normal PA pressures  5) Mild AI  6) Small RV with normal RV function        Pharmacy to Dose enoxaparin (LOVENOX),         Imaging: Latest imaging study was reviewed personally.   Imaging Results (Last 48 Hours)     Procedure Component Value Units Date/Time    CT Chest Without Contrast Diagnostic [[id number]] Collected: 04/13/23 2010     Updated: 04/13/23 2011    Narrative:      FINAL REPORT    TECHNIQUE:  Routine axial images were obtained from the lung apices to below  the diaphragm without contrast.    CLINICAL HISTORY:  dsypnea    FINDINGS:  There is mild upper lobe emphysema.  Tree-in-bud type opacities  in the lungs bilaterally are consistent with  bronchitis/bronchiolitis.  Interlobular septal thickening in the  right lung base may be due to interstitial pulmonary edema.  There is no confluent airspace consolidation.  The heart and  vasculature are unremarkable. There is no pleural disease,  adenopathy, or significant osseous abnormality.       Impression:      Mild emphysema.  Bronchitis/bronchiolitis.  Possible  interstitial pulmonary edema.    Authenticated and Electronically Signed by Alexis Babin M.D. on  04/13/2023 08:10:51 PM    XR Chest 1 View [[id number]] Collected: 04/13/23 1539     Updated: 04/13/23 1543    Narrative:      CLINICAL INDICATION:    SOA Triage Protocol shortness of breath.      EXAMINATION TECHNIQUE:   XR CHEST 1 VW-     COMPARISON:  Radiographs 10/01/2017.     FINDINGS:     Cardiomegaly Aortic atherosclerosis. Vascular engorgement and prominent  interstitial markings with peripheral subpleural linear opacities. No  dense consolidation. No pneumothorax or large pleural effusion. Large  pulmonary arteries, suggestive of pulmonary arterial hypertension.  Background emphysema.       Impression:      Findings are concerning for mild cardiogenic pulmonary edema. Background  emphysema.     Images personally reviewed, interpreted and dictated by SHIRA Villa.                This report was signed and finalized on 4/13/2023 3:41 PM by SHIRA Villa.            Assessment & Recommendations/Plan:   1.  Acute respiratory failure   Continues require 8-12 L/min high flow nasal cannula.  We will repeat chest x-ray in the morning.  We will also repeat ABG in the morning, given continued respiratory distress and high requirements of oxygen    2.  Acute exacerbation of COPD   Likely the reason for acute respiratory failure.  Due to significant wheezing, respiratory distress and significant hypoxemia, I will discontinue oral prednisone and start the patient on IV Solu-Medrol.    3.  Chronic respiratory acidosis  We will start the patient on NIV device given continued significant respiratory failure    4.  Smoking  Was advised to quit smoking    5.  Hypokalemia  Resolved    6.  Pulmonary hypertension  Most likely secondary to likely severe underlying COPD  We will consider outpatient work-up    7.  Fluid status  Net IO Since  Admission: 1,210 mL [04/15/23 1116]  Lab Results   Component Value Date    PROBNP 272.8 2023    PROBNP 315.0 (C) 10/02/2017     8.  Miscellaneous  She remains at high risk for further deterioration given continued need for significant oxygen supplementation.  We will need to be observed very closely over the next 48 to 72 hours.  If she worsens any further, she may need to be transferred to ICU.    We have reviewed patient's current orders and changes, if any, have been suggested to primary care team. Plan was also discussed with nursing staff, as necessary.     This document was electronically signed by Arnaldo Soriano MD on 04/15/23 at 11:16 EDT      Dictated utilizing Dragon dictation.      Electronically signed by Arnaldo Soriano MD at 04/15/23 1231     ElianaMarga dockery APRN at 04/15/23 1100              [facility]    PROGRESS NOTE    Name:  Carolin Toscano   Age:  77 y.o.  Sex:  female  :  1945  MRN:  [id number]   Visit Number:  [id number]  Admission Date:  2023  Date Of Service:  04/15/23  Primary Care Physician:  Jason Nicholson MD     LOS: 2 days :    Chief Complaint:      Shortness of air    Subjective:    Patient seen and examined with no family at bedside.  Respiratory distress noted.  Continues to require 8 L nasal cannula.  Patient does not appear to feel well.  States no longer smoking.  Lives with family.  Requesting some ice.    Hospital Course:    Patient is a 77-year-old female with a history of ongoing tobacco abuse, COPD, hypertension, dyslipidemia, prior kidney stones, who presented to the emergency room with complaints of 3 to 4 days of increasing cough, shortness of breath and chills.  Patient states she actually quit smoking about 3 days ago.  Has noted change in sputum.  She has not followed up with PCP or pulmonology in a while.  She does not routinely wear oxygen at home.       In the ER, patient was hypoxic on arrival and was requiring  up to 3 L of oxygen.  Otherwise hemodynamically stable.  She has a negative respiratory panel.  She had a lactic acid of 2.7 repeat 1.2.  She had a blood gas pH 7.47 PCO2 55.1 PO2 86.5, creatinine 0.65 potassium 2.9 CO2 33.7.  proBNP of 272 mildly elevated high-sensitivity troponin.  White count of 21 hemoglobin 15.  Negative flu COVID testing.  A1c 6.1 magnesium 1.8 and procalcitonin 0.43.  She had a chest x-ray was showing emphysematous changes but no acute abnormality.  A CT scan of the chest without contrast was showing findings concerning for bronchitis/bronchiolitis with interlobular septal thickening in the right lung base.  There is no focal airspace disease.  Patient was given empiric antibiotics, bronchodilators, steroids emergency room.  Due to acute respiratory failure with hypoxia, hospitalist consulted for further medical management.  Pulmonology also consulted.  Patient continued to require higher levels of oxygen.    Review of Systems:     All systems were reviewed and negative except as mentioned in subjective, assessment and plan.    Vital Signs:    Temp:  [97.9 °F (36.6 °C)-99.9 °F (37.7 °C)] 99.9 °F (37.7 °C)  Heart Rate:  [] 89  Resp:  [18-20] 20  BP: (122-141)/(57-64) 141/62    Intake and output:    I/O last 3 completed shifts:  In: 820 [P.O.:720; IV Piggyback:100]  Out: 900 [Urine:900]  I/O this shift:  In: 480 [P.O.:480]  Out: 300 [Urine:300]    Physical Examination:    General Appearance:  Alert and cooperative.  Chronically ill/frail appearing elderly female.  Appears ill.   Head:  Atraumatic and normocephalic.   Eyes: Conjunctivae and sclerae normal, no icterus. No pallor.   Throat: No oral lesions, no thrush, dry mucous membranes.   Neck: Supple, trachea midline, no thyromegaly.   Lungs:   Breath sounds heard bilaterally equally.  Significant wheezing and crackles throughout with mild respiratory distress on 8 L nasal cannula.   Heart:  Normal S1 and S2, no murmur, no gallop, no  rub. No JVD.   Abdomen:   Normal bowel sounds, no masses, no organomegaly. Soft, nontender, nondistended, no rebound tenderness.   Extremities: Supple, no edema, no cyanosis, no clubbing.   Skin: No bleeding or rash.   Neurologic: Alert and oriented x 2. No facial asymmetry. Moves all four limbs. No tremors.  Generalized weakness.     Laboratory results:    Results from last 7 days   Lab Units 04/15/23  0811 04/13/23  1458   SODIUM mmol/L 145 138   POTASSIUM mmol/L 3.7 2.9*   CHLORIDE mmol/L 103 92*   CO2 mmol/L 38.0* 33.7*   BUN mg/dL 18 12   CREATININE mg/dL 0.52* 0.65   CALCIUM mg/dL 9.3 9.4   BILIRUBIN mg/dL  --  0.4   ALK PHOS U/L  --  114   ALT (SGPT) U/L  --  23   AST (SGOT) U/L  --  19   GLUCOSE mg/dL 143* 160*     Results from last 7 days   Lab Units 04/15/23  0811 04/13/23  1458   WBC 10*3/mm3 19.59* 20.98*   HEMOGLOBIN g/dL 14.6 15.3   HEMATOCRIT % 47.4* 47.4*   PLATELETS 10*3/mm3 274 240         Results from last 7 days   Lab Units 04/13/23  1458   HSTROP T ng/L 15*         Recent Labs     04/13/23  1539   PHART 7.427   TDA0OEQ 55.1*   PO2ART 86.5   ZIL4XGO 36.3*   BASEEXCESS 9.8*      I have reviewed the patient's laboratory results.    Radiology results:    Adult Transthoracic Echo Complete W/ Cont if Necessary Per Protocol    Result Date: 4/14/2023  •  Left ventricular diastolic function is consistent with (grade I) impaired relaxation. •  Mild aortic valve stenosis is present. •  Estimated right ventricular systolic pressure from tricuspid regurgitation is moderately elevated (45-55 mmHg). •  Mild to moderate pulmonary hypertension is present.     CT Chest Without Contrast Diagnostic    Result Date: 4/13/2023  FINAL REPORT TECHNIQUE: Routine axial images were obtained from the lung apices to below the diaphragm without contrast. CLINICAL HISTORY: dsypnea FINDINGS: There is mild upper lobe emphysema.  Tree-in-bud type opacities in the lungs bilaterally are consistent with bronchitis/bronchiolitis.   Interlobular septal thickening in the right lung base may be due to interstitial pulmonary edema. There is no confluent airspace consolidation.  The heart and vasculature are unremarkable. There is no pleural disease, adenopathy, or significant osseous abnormality.     Impression: Mild emphysema.  Bronchitis/bronchiolitis.  Possible interstitial pulmonary edema. Authenticated and Electronically Signed by Alexis Babin M.D. on 04/13/2023 08:10:51 PM    XR Chest 1 View    Result Date: 4/13/2023  CLINICAL INDICATION:  SOA Triage Protocol shortness of breath.  EXAMINATION TECHNIQUE: XR CHEST 1 VW-  COMPARISON: Radiographs 10/01/2017.  FINDINGS:  Cardiomegaly Aortic atherosclerosis. Vascular engorgement and prominent interstitial markings with peripheral subpleural linear opacities. No dense consolidation. No pneumothorax or large pleural effusion. Large pulmonary arteries, suggestive of pulmonary arterial hypertension. Background emphysema.      Impression: Findings are concerning for mild cardiogenic pulmonary edema. Background emphysema.  Images personally reviewed, interpreted and dictated by SHIRA Villa.       This report was signed and finalized on 4/13/2023 3:41 PM by SHIRA Villa.    I have reviewed the patient's radiology reports.    Medication Review:     I have reviewed the patient's active and prn medications.     Problem List:      Acute respiratory failure    CAP (community acquired pneumonia)    Essential hypertension    COPD exacerbation      Assessment:    1. Acute respiratory failure with hypoxia, POA  2. COPD with exacerbation, POA  3. Suspect underlying community-acquired pneumonia, present admission, due to unknown organism  4. Pulmonary hypertension  5. Hypertension  6. Chronic tobacco abuse  7. Arthritis     Plan:     Respiratory failure/COPD/pneumonia:  -Continue Azactam given multiple allergies.  -Repeat chest x-ray/ABG/procalcitonin in AM.  -Continue with bronchodilators,  Solu-Medrol, oxygen therapy.    -Patient does need full pulmonary function test at some point.  -Tobacco cessation discussed at length on admission and patient states she has quit  -Oxygen demands currently at 8 L nasal cannula.  Appreciate pulmonology consulting with recommendations.  -Given increased oxygen demands and current respiratory distress patient high risk for  deterioration.  Will monitor closely and consider transferring to ICU if any worsening.  -NIV initiated per pulmonology.     Hypertension:  -Restart home atenolol and amlodipine.     Chronic tobacco abuse:  -Complicates all aspects of care     DVT Prophylaxis: Lovenox  Code Status: Full Code  Diet: Regular  Discharge Plan: 2-3 days    CLAUDIA Edmonds  04/15/23  14:12 EDT    Dictated utilizing Dragon dictation.      Electronically signed by Marga Monroy APRN at 04/15/23 6106

## 2023-04-16 NOTE — PLAN OF CARE
Goal Outcome Evaluation:  Plan of Care Reviewed With: patient        Progress: improving  Outcome Evaluation: Pt agreeable to therapy. Performed B LE ex in supine AP, QS, heelslides, hip abd, SAQ, SLR and heelslides 1x10 reps. Performed supine to sit CGA, sit <->stand with CGA and VC for hand placement and increased time to perfom task, amb with RW 12 feet with CGA. Con't with PT POC and progress as tolerated

## 2023-04-16 NOTE — PLAN OF CARE
Goal Outcome Evaluation:  Plan of Care Reviewed With: patient        Progress: no change  Outcome Evaluation: VSS; pt wore bipap most of night with SAT 93%; continue steroids, antibiotics and nebs

## 2023-04-17 LAB
A-A DO2: 176.8 MMHG
A-A DO2: 196.5 MMHG
ANION GAP SERPL CALCULATED.3IONS-SCNC: 4.4 MMOL/L (ref 5–15)
ARTERIAL PATENCY WRIST A: ABNORMAL
ARTERIAL PATENCY WRIST A: POSITIVE
ATMOSPHERIC PRESS: 728 MMHG
ATMOSPHERIC PRESS: 728 MMHG
BASE EXCESS BLDA CALC-SCNC: 15.1 MMOL/L (ref 0–2)
BASE EXCESS BLDA CALC-SCNC: 15.5 MMOL/L (ref 0–2)
BASOPHILS # BLD AUTO: 0.09 10*3/MM3 (ref 0–0.2)
BASOPHILS NFR BLD AUTO: 0.6 % (ref 0–1.5)
BDY SITE: ABNORMAL
BDY SITE: ABNORMAL
BUN SERPL-MCNC: 20 MG/DL (ref 8–23)
BUN/CREAT SERPL: 50 (ref 7–25)
CALCIUM SPEC-SCNC: 9.1 MG/DL (ref 8.6–10.5)
CHLORIDE SERPL-SCNC: 100 MMOL/L (ref 98–107)
CO2 SERPL-SCNC: 39.6 MMOL/L (ref 22–29)
COHGB MFR BLD: 0.7 % (ref 0–2)
COHGB MFR BLD: 0.8 % (ref 0–2)
CREAT SERPL-MCNC: 0.4 MG/DL (ref 0.57–1)
DEPRECATED RDW RBC AUTO: 49.6 FL (ref 37–54)
EGFRCR SERPLBLD CKD-EPI 2021: 102.7 ML/MIN/1.73
EOSINOPHIL # BLD AUTO: 0 10*3/MM3 (ref 0–0.4)
EOSINOPHIL NFR BLD AUTO: 0 % (ref 0.3–6.2)
EPAP: 6
EPAP: 6
ERYTHROCYTE [DISTWIDTH] IN BLOOD BY AUTOMATED COUNT: 13.7 % (ref 12.3–15.4)
GLUCOSE SERPL-MCNC: 169 MG/DL (ref 65–99)
HCO3 BLDA-SCNC: 42.9 MMOL/L (ref 22–28)
HCO3 BLDA-SCNC: 42.9 MMOL/L (ref 22–28)
HCT VFR BLD AUTO: 46.2 % (ref 34–46.6)
HCT VFR BLD CALC: 45.4 %
HCT VFR BLD CALC: 46.7 %
HGB BLD-MCNC: 14.4 G/DL (ref 12–15.9)
IMM GRANULOCYTES # BLD AUTO: 0.41 10*3/MM3 (ref 0–0.05)
IMM GRANULOCYTES NFR BLD AUTO: 2.7 % (ref 0–0.5)
INHALED O2 CONCENTRATION: 45 %
INHALED O2 CONCENTRATION: 45 %
LYMPHOCYTES # BLD AUTO: 1.25 10*3/MM3 (ref 0.7–3.1)
LYMPHOCYTES NFR BLD AUTO: 8.3 % (ref 19.6–45.3)
Lab: ABNORMAL
MCH RBC QN AUTO: 30.1 PG (ref 26.6–33)
MCHC RBC AUTO-ENTMCNC: 31.2 G/DL (ref 31.5–35.7)
MCV RBC AUTO: 96.7 FL (ref 79–97)
METHGB BLD QL: 0.1 % (ref 0–1.5)
METHGB BLD QL: 0.5 % (ref 0–1.5)
MODALITY: ABNORMAL
MODALITY: ABNORMAL
MONOCYTES # BLD AUTO: 0.42 10*3/MM3 (ref 0.1–0.9)
MONOCYTES NFR BLD AUTO: 2.8 % (ref 5–12)
NEUTROPHILS NFR BLD AUTO: 12.94 10*3/MM3 (ref 1.7–7)
NEUTROPHILS NFR BLD AUTO: 85.6 % (ref 42.7–76)
NOTE: ABNORMAL
NOTE: ABNORMAL
NOTIFIED BY: ABNORMAL
NOTIFIED WHO: ABNORMAL
NRBC BLD AUTO-RTO: 0 /100 WBC (ref 0–0.2)
OXYHGB MFR BLDV: 82.9 % (ref 94–99)
OXYHGB MFR BLDV: 92.2 % (ref 94–99)
PCO2 BLDA: 61.3 MM HG (ref 35–45)
PCO2 BLDA: 63.8 MM HG (ref 35–45)
PCO2 TEMP ADJ BLD: ABNORMAL MM[HG]
PCO2 TEMP ADJ BLD: ABNORMAL MM[HG]
PEEP RESPIRATORY: 6 CM[H2O]
PH BLDA: 7.43 PH UNITS (ref 7.35–7.45)
PH BLDA: 7.45 PH UNITS (ref 7.35–7.45)
PH, TEMP CORRECTED: ABNORMAL
PH, TEMP CORRECTED: ABNORMAL
PLATELET # BLD AUTO: 303 10*3/MM3 (ref 140–450)
PMV BLD AUTO: 10.3 FL (ref 6–12)
PO2 BLDA: 43.1 MM HG (ref 75–100)
PO2 BLDA: 60.1 MM HG (ref 75–100)
PO2 TEMP ADJ BLD: ABNORMAL MM[HG]
PO2 TEMP ADJ BLD: ABNORMAL MM[HG]
POTASSIUM SERPL-SCNC: 4.5 MMOL/L (ref 3.5–5.2)
RBC # BLD AUTO: 4.78 10*6/MM3 (ref 3.77–5.28)
SAO2 % BLDCOA: 83.7 % (ref 94–100)
SAO2 % BLDCOA: 93.3 % (ref 94–100)
SET MECH RESP RATE: 18
SET MECH RESP RATE: 18
SODIUM SERPL-SCNC: 144 MMOL/L (ref 136–145)
VENTILATOR MODE: ABNORMAL
VENTILATOR MODE: ABNORMAL
VT ON VENT VENT: 450 ML
VT ON VENT VENT: 450 ML
WBC NRBC COR # BLD: 15.11 10*3/MM3 (ref 3.4–10.8)

## 2023-04-17 PROCEDURE — 25010000002 METHYLPREDNISOLONE PER 40 MG: Performed by: INTERNAL MEDICINE

## 2023-04-17 PROCEDURE — 82805 BLOOD GASES W/O2 SATURATION: CPT

## 2023-04-17 PROCEDURE — 94664 DEMO&/EVAL PT USE INHALER: CPT

## 2023-04-17 PROCEDURE — 25010000002 ENOXAPARIN PER 10 MG: Performed by: FAMILY MEDICINE

## 2023-04-17 PROCEDURE — 82375 ASSAY CARBOXYHB QUANT: CPT

## 2023-04-17 PROCEDURE — 99233 SBSQ HOSP IP/OBS HIGH 50: CPT | Performed by: INTERNAL MEDICINE

## 2023-04-17 PROCEDURE — 94799 UNLISTED PULMONARY SVC/PX: CPT

## 2023-04-17 PROCEDURE — 85025 COMPLETE CBC W/AUTO DIFF WBC: CPT | Performed by: NURSE PRACTITIONER

## 2023-04-17 PROCEDURE — 99232 SBSQ HOSP IP/OBS MODERATE 35: CPT | Performed by: NURSE PRACTITIONER

## 2023-04-17 PROCEDURE — 94761 N-INVAS EAR/PLS OXIMETRY MLT: CPT

## 2023-04-17 PROCEDURE — 94660 CPAP INITIATION&MGMT: CPT

## 2023-04-17 PROCEDURE — 83050 HGB METHEMOGLOBIN QUAN: CPT

## 2023-04-17 PROCEDURE — 36600 WITHDRAWAL OF ARTERIAL BLOOD: CPT

## 2023-04-17 PROCEDURE — 80048 BASIC METABOLIC PNL TOTAL CA: CPT | Performed by: NURSE PRACTITIONER

## 2023-04-17 RX ADMIN — METHYLPREDNISOLONE SODIUM SUCCINATE 40 MG: 40 INJECTION, POWDER, FOR SOLUTION INTRAMUSCULAR; INTRAVENOUS at 13:48

## 2023-04-17 RX ADMIN — ARFORMOTEROL TARTRATE 15 MCG: 15 SOLUTION RESPIRATORY (INHALATION) at 07:35

## 2023-04-17 RX ADMIN — METHYLPREDNISOLONE SODIUM SUCCINATE 40 MG: 40 INJECTION, POWDER, FOR SOLUTION INTRAMUSCULAR; INTRAVENOUS at 02:23

## 2023-04-17 RX ADMIN — ARFORMOTEROL TARTRATE 15 MCG: 15 SOLUTION RESPIRATORY (INHALATION) at 19:02

## 2023-04-17 RX ADMIN — Medication 5 MG: at 20:39

## 2023-04-17 RX ADMIN — BUDESONIDE 1 MG: 0.5 INHALANT RESPIRATORY (INHALATION) at 07:35

## 2023-04-17 RX ADMIN — AMLODIPINE BESYLATE 10 MG: 5 TABLET ORAL at 10:14

## 2023-04-17 RX ADMIN — ATENOLOL 25 MG: 25 TABLET ORAL at 10:13

## 2023-04-17 RX ADMIN — BENZONATATE 200 MG: 100 CAPSULE ORAL at 20:39

## 2023-04-17 RX ADMIN — IPRATROPIUM BROMIDE AND ALBUTEROL SULFATE 3 ML: 2.5; .5 SOLUTION RESPIRATORY (INHALATION) at 12:54

## 2023-04-17 RX ADMIN — Medication 10 ML: at 13:49

## 2023-04-17 RX ADMIN — ENOXAPARIN SODIUM 40 MG: 100 INJECTION SUBCUTANEOUS at 20:39

## 2023-04-17 RX ADMIN — TIOTROPIUM BROMIDE INHALATION SPRAY 2 PUFF: 3.12 SPRAY, METERED RESPIRATORY (INHALATION) at 07:36

## 2023-04-17 RX ADMIN — Medication 1 PATCH: at 20:40

## 2023-04-17 RX ADMIN — METHYLPREDNISOLONE SODIUM SUCCINATE 40 MG: 40 INJECTION, POWDER, FOR SOLUTION INTRAMUSCULAR; INTRAVENOUS at 06:26

## 2023-04-17 RX ADMIN — ACETAMINOPHEN 650 MG: 650 SOLUTION ORAL at 20:39

## 2023-04-17 RX ADMIN — Medication 10 ML: at 20:40

## 2023-04-17 RX ADMIN — Medication 10 ML: at 18:30

## 2023-04-17 RX ADMIN — BUDESONIDE 1 MG: 0.5 INHALANT RESPIRATORY (INHALATION) at 19:02

## 2023-04-17 RX ADMIN — Medication 10 ML: at 10:14

## 2023-04-17 RX ADMIN — METHYLPREDNISOLONE SODIUM SUCCINATE 40 MG: 40 INJECTION, POWDER, FOR SOLUTION INTRAMUSCULAR; INTRAVENOUS at 18:30

## 2023-04-17 RX ADMIN — IPRATROPIUM BROMIDE AND ALBUTEROL SULFATE 3 ML: 2.5; .5 SOLUTION RESPIRATORY (INHALATION) at 07:35

## 2023-04-17 RX ADMIN — IPRATROPIUM BROMIDE AND ALBUTEROL SULFATE 3 ML: 2.5; .5 SOLUTION RESPIRATORY (INHALATION) at 19:02

## 2023-04-17 NOTE — THERAPY TREATMENT NOTE
Pt  sleeping soundly and not able to wake her up to participate with therapy. PT to f/u with pt at later date

## 2023-04-17 NOTE — PROGRESS NOTES
"  CC: Acute Respiratory Failure.     S: Currently off PAP therapy.  Appears somewhat confused although was able to tell me that she was in the hospital but felt that she was in \"[place]\".  Was aware of her name and age, however.    ROS: Could not be reliably obtained as the patient is on PAP therapy.     O:Vital signs reviewed. FiO2: 45-50 %.    /86 (BP Location: Left arm, Patient Position: Lying)   Pulse 75   Temp 98.3 °F (36.8 °C) (Oral)   Resp (!) 30   Ht 160 cm (62.99\")   Wt 73 kg (160 lb 15 oz)   SpO2 (!) 88%   BMI 28.52 kg/m²     Temp (24hrs), Av.5 °F (36.9 °C), Min:97.7 °F (36.5 °C), Max:99 °F (37.2 °C)      I & Os reviewed.   Intake/Output       23 0700 - 23 0659    Intake (ml) 480    Output (ml) 1300    Net (ml) -820    Last Weight 73 kg (160 lb 15 oz)          Net IO Since Admission: 470 mL [23 1133]    General/Constitutional: On high flow nasal cannula.  Appears to be in mild distress.  Eyes: PERRL.  Eyes were closed initially.  Neck: Supple without JVD. No obvious masses noted.   Cardiovascular: S1 + S2.  Regular  Lungs/Respiratory: Decreased bilateral air entry noted.  Bilateral, diffuse, wheezing heard.  No significant crackles noted  GI/Abdomen: Soft. Bowel sounds positive.  No obvious organomegaly  Musculoskeletal/Extremities: No edema noted. Gait could not be assessed at this time, as the patient was laying in bed.   Neurologic: Was able to follow some commands.  Did appear to be somewhat confused although answers some questions appropriately?  Psych: Could not reliably obtain as the patient is somewhat confused.  Skin: Appeared somewhat dry       Labs: Reviewed.     Results from last 7 days   Lab Units 23  0642 23  0600 04/15/23  0811 23  1458   WBC 10*3/mm3 15.11* 12.09* 19.59* 20.98*   HEMOGLOBIN g/dL 14.4 14.0 14.6 15.3   HEMATOCRIT % 46.2 46.1 47.4* 47.4*   PLATELETS 10*3/mm3 303 282 274 240   NEUTROPHIL % % 85.6* 85.2* 83.8* 84.5*   NEUTROS " ABS 10*3/mm3 12.94* 10.29* 16.40* 17.74*   EOSINOPHIL % % 0.0* 0.0* 0.0* 1.0   EOS ABS 10*3/mm3 0.00 0.00 0.00 0.21   LYMPHOCYTE % % 8.3* 8.4* 7.1* 4.3*   LYMPHS ABS 10*3/mm3 1.25 1.02 1.40 0.90       Lab Results   Component Value Date    PROCALCITO 1.10 (H) 04/16/2023    PROCALCITO 0.43 (H) 04/13/2023       No results found for: CRP    No results found for: SEDRATE    Lab Results   Component Value Date    PROBNP 272.8 04/13/2023    PROBNP 315.0 (C) 10/02/2017       Results from last 7 days   Lab Units 04/17/23  0642 04/16/23  0600 04/15/23  0811 04/13/23  1458   SODIUM mmol/L 144 145 145 138   POTASSIUM mmol/L 4.5 4.2 3.7 2.9*   CHLORIDE mmol/L 100 103 103 92*   CO2 mmol/L 39.6* 38.0* 38.0* 33.7*   BUN mg/dL 20 18 18 12   CREATININE mg/dL 0.40* 0.38* 0.52* 0.65   CALCIUM mg/dL 9.1 9.3 9.3 9.4   ANION GAP mmol/L 4.4* 4.0* 4.0* 12.3   BILIRUBIN mg/dL  --   --   --  0.4   ALK PHOS U/L  --   --   --  114   ALT (SGPT) U/L  --   --   --  23   AST (SGOT) U/L  --   --   --  19   GLUCOSE mg/dL 169* 173* 143* 160*   TOTAL PROTEIN g/dL  --   --   --  7.7   ALBUMIN g/dL  --   --   --  3.3*       Results from last 7 days   Lab Units 04/13/23  1458   MAGNESIUM mg/dL 1.8             No results found for: CKTOTAL    No components found for: HSTROPT    Lab Results   Component Value Date    TROPONINT 15 (H) 04/13/2023       No results found for: DDIMER    Brief Urine Lab Results     None          No results found for: TSH    No results found for: FREET4      Micro: As of April 17, 2023   Lab Results   Component Value Date    RESPCX Light growth (2+) Normal Respiratory Ana 10/02/2017     No results found for: BCIDPCR  Lab Results   Component Value Date    BLOODCX No growth at 24 hours 04/16/2023    BLOODCX No growth at 24 hours 04/16/2023    BLOODCX No growth at 5 days 10/01/2017    BLOODCX No growth at 5 days 10/01/2017     Lab Results   Component Value Date    URINECX Final report 02/07/2019    URINECX 20,000-30,000 CFU/mL  Escherichia coli (A) 10/05/2018     No results found for: MRSACX  Lab Results   Component Value Date    MRSAPCR No MRSA Detected 04/15/2023     No results found for: URCX  No components found for: LOWRESPCF  No results found for: THROATCX  No results found for: CULTURES  No components found for: STREPBCX  No results found for: STREPPNEUAG  No results found for: LEGIONELLA  No results found for: MYCOPLASCX  No results found for: GCCX  No results found for: WOUNDCX  No results found for: BODYFLDCX    No results found for: FLU    Lab Results   Component Value Date    ADENOVIRUS Not Detected 04/13/2023     Lab Results   Component Value Date    KF767H Not Detected 04/13/2023     Lab Results   Component Value Date    CVHKU1 Not Detected 04/13/2023     Lab Results   Component Value Date    CVNL63 Not Detected 04/13/2023     Lab Results   Component Value Date    CVOC43 Not Detected 04/13/2023     Lab Results   Component Value Date    HUMETPNEVS Not Detected 04/13/2023     Lab Results   Component Value Date    HURVEV Not Detected 04/13/2023     Lab Results   Component Value Date    FLUBPCR Not Detected 04/13/2023     Lab Results   Component Value Date    PARAINFLUE Not Detected 04/13/2023     Lab Results   Component Value Date    PARAFLUV2 Not Detected 04/13/2023     Lab Results   Component Value Date    PARAFLUV3 Not Detected 04/13/2023     Lab Results   Component Value Date    PARAFLUV4 Not Detected 04/13/2023     Lab Results   Component Value Date    BPERTPCR Not Detected 04/13/2023     No results found for: [id number]  Lab Results   Component Value Date    CPNEUPCR Not Detected 04/13/2023     Lab Results   Component Value Date    MPNEUMO Not Detected 04/13/2023     Lab Results   Component Value Date    FLUAPCR Not Detected 04/13/2023     No results found for: FLUAH3  No results found for: FLUAH1  Lab Results   Component Value Date    RSV Not Detected 04/13/2023     Lab Results   Component Value Date    BPARAPCR Not  Detected 04/13/2023       COVID 19:  Lab Results   Component Value Date    COVID19 Not Detected 04/13/2023           ABG: Reviewed  Recent Labs     04/16/23  1118 04/17/23  0844 04/17/23  0851   PHART 7.449 7.453* 7.435   YZS7LAS 57.3* 61.3* 63.8*   PO2ART 93.7 43.1* 60.1*   ETD5XMN 39.7* 42.9* 42.9*   BASEEXCESS 13.1* 15.5* 15.1*       Lab Results   Component Value Date    LACTATE 1.2 04/13/2023    LACTATE 2.7 (C) 04/13/2023    LACTATE 1.6 10/01/2017         Echo: Results for orders placed during the hospital encounter of 04/13/23    Adult Transthoracic Echo Complete W/ Cont if Necessary Per Protocol    Interpretation Summary  •  Left ventricular diastolic function is consistent with (grade I) impaired relaxation.  •  Mild aortic valve stenosis is present.  •  Estimated right ventricular systolic pressure from tricuspid regurgitation is moderately elevated (45-55 mmHg).  •  Mild to moderate pulmonary hypertension is present.      Results for orders placed during the hospital encounter of 10/01/17    Adult Transthoracic Echo Complete W/ Cont if Necessary Per Protocol    Interpretation Summary  TEchnically difficult study  1) Borderline LVH with normal LV systolic function ( EF is over 55%)  2) Normal left atrial size with mild elevation in LVedp  3) Trace MR  4) Mild TR with normal PA pressures  5) Mild AI  6) Small RV with normal RV function        Pharmacy to Dose enoxaparin (LOVENOX),         CXRay: Latest imaging study was reviewed personally.   Imaging Results (Last 24 Hours)     ** No results found for the last 24 hours. **            Assessment & Recommendations/Plan:   1.  Acute respiratory failure   Continues to require high flow nasal cannula at up to 10-12 L/min.  Will ask respiratory therapist to decrease FiO2 as tolerated.  I would recommend using NIV device 2 hours on and 2 hours off.  We will repeat chest x-ray in the morning.  Latest ABG shows compensated, albeit significant, respiratory  acidosis.     2.  Acute exacerbation of COPD   Likely the reason for acute respiratory failure.  Continue IV Solu-Medrol at the current dose for now.  Nebulized treatments have been adjusted.     3.  Chronic respiratory acidosis  Will use NIV device for now.     4.  Smoking  Was advised to quit smoking     5.  Pulmonary hypertension  Most likely secondary to likely severe underlying COPD  We will consider outpatient work-up    6.?  Confusion/encephalopathy  Given compensated respiratory acidosis, CO2 retention does not appear to be the cause.  We will defer management to hospitalist.    7.  Fluid status  With administer 1 dose of Diuril today  Net IO Since Admission: 470 mL [04/17/23 1133]  Lab Results   Component Value Date    PROBNP 272.8 04/13/2023    PROBNP 315.0 (C) 10/02/2017     8.  Miscellaneous  Patient remains somewhat critical given continued hypoxemia requiring up to 10-15 L/min high flow nasal cannula.  Will need to be monitored very closely and may need to be considered for transfer to ICU if there is any further worsening clinical status.      We have reviewed patient's current orders and changes, if any, have been suggested to primary care team. Plan was also discussed with nursing staff, as necessary.     This document was electronically signed by Arnaldo Soriano MD on 04/17/23 at 11:33 EDT      Dictated utilizing Dragon dictation.

## 2023-04-17 NOTE — PROGRESS NOTES
[facility]    Name:  Carolin Toscano   Age:  77 y.o.  Sex:  female  :  1945  MRN:  [id number]   Visit Number:  [id number]  Admission Date:  2023  Date Of Service:  23  Primary Care Physician:  Jason Nicholson MD     LOS: 4 days :    Chief Complaint:      Shortness of air    Subjective:    Patient seen and examined with no family at bedside.  Patient appears more alert and able to answer most of my questions this morning.  Updated that she was very sleepy and confused yesterday.  Confirmed full CODE STATUS.  Currently on 5 L nasal cannula.  Remains slightly dyspneic and labored.    Hospital Course:    Patient is a 77-year-old female with a history of ongoing tobacco abuse, COPD, hypertension, dyslipidemia, prior kidney stones, who presented to the emergency room with complaints of 3 to 4 days of increasing cough, shortness of breath and chills.  Patient states she actually quit smoking about 3 days ago.  Has noted change in sputum.  She has not followed up with PCP or pulmonology in a while.  She does not routinely wear oxygen at home.       In the ER, patient was hypoxic on arrival and was requiring up to 3 L of oxygen.  Otherwise hemodynamically stable.  She has a negative respiratory panel.  She had a lactic acid of 2.7 repeat 1.2.  She had a blood gas pH 7.47 PCO2 55.1 PO2 86.5, creatinine 0.65 potassium 2.9 CO2 33.7.  proBNP of 272 mildly elevated high-sensitivity troponin.  White count of 21 hemoglobin 15.  Negative flu COVID testing.  A1c 6.1 magnesium 1.8 and procalcitonin 0.43.  She had a chest x-ray was showing emphysematous changes but no acute abnormality.  A CT scan of the chest without contrast was showing findings concerning for bronchitis/bronchiolitis with interlobular septal thickening in the right lung base.  There is no focal airspace disease.  Patient was given empiric antibiotics, bronchodilators, steroids emergency room.  Due to  acute respiratory failure with hypoxia, hospitalist consulted for further medical management.  Pulmonology also consulted.  Patient continued to require higher levels of oxygen.  Patient developed worsening respiratory acidosis requiring BiPAP.  CODE STATUS confirmed with daughter for which patient to remain full code.  Patient fortunately did improve and currently maintaining appropriate saturations on 5 L nasal cannula.    Review of Systems:     All systems were reviewed and negative except as mentioned in subjective, assessment and plan.    Vital Signs:    Temp:  [97.7 °F (36.5 °C)-99 °F (37.2 °C)] 98.3 °F (36.8 °C)  Heart Rate:  [67-83] 75  Resp:  [18-35] 30  BP: (100-138)/(56-86) 100/86    Intake and output:    I/O last 3 completed shifts:  In: 480 [P.O.:480]  Out: 1400 [Urine:1400]  No intake/output data recorded.    Physical Examination:    General Appearance:  Alert and cooperative.  Chronically ill/frail appearing elderly female.  Appears ill.   Head:  Atraumatic and normocephalic.   Eyes: Conjunctivae and sclerae normal, no icterus. No pallor.   Throat: No oral lesions, no thrush, dry mucous membranes.   Neck: Supple, trachea midline, no thyromegaly.   Lungs:   Breath sounds heard bilaterally equally.  Scattered wheezing throughout and slightly labored on 5 L nasal cannula.   Heart:  Normal S1 and S2, no murmur, no gallop, no rub. No JVD.   Abdomen:   Normal bowel sounds, no masses, no organomegaly. Soft, nontender, nondistended, no rebound tenderness.   Extremities: Supple, no edema, no cyanosis, no clubbing.   Skin: No bleeding or rash.   Neurologic: Alert and oriented x 2. No facial asymmetry. Moves all four limbs. No tremors.  Generalized weakness.     Laboratory results:    Results from last 7 days   Lab Units 04/17/23  0642 04/16/23  0600 04/15/23  0811 04/13/23  1458   SODIUM mmol/L 144 145 145 138   POTASSIUM mmol/L 4.5 4.2 3.7 2.9*   CHLORIDE mmol/L 100 103 103 92*   CO2 mmol/L 39.6* 38.0* 38.0*  33.7*   BUN mg/dL 20 18 18 12   CREATININE mg/dL 0.40* 0.38* 0.52* 0.65   CALCIUM mg/dL 9.1 9.3 9.3 9.4   BILIRUBIN mg/dL  --   --   --  0.4   ALK PHOS U/L  --   --   --  114   ALT (SGPT) U/L  --   --   --  23   AST (SGOT) U/L  --   --   --  19   GLUCOSE mg/dL 169* 173* 143* 160*     Results from last 7 days   Lab Units 04/17/23  0642 04/16/23  0600 04/15/23  0811   WBC 10*3/mm3 15.11* 12.09* 19.59*   HEMOGLOBIN g/dL 14.4 14.0 14.6   HEMATOCRIT % 46.2 46.1 47.4*   PLATELETS 10*3/mm3 303 282 274         Results from last 7 days   Lab Units 04/13/23  1458   HSTROP T ng/L 15*     Results from last 7 days   Lab Units 04/16/23  1006   BLOODCX  No growth at 24 hours  No growth at 24 hours     Recent Labs     04/16/23  1118 04/17/23  0844 04/17/23  0851   PHART 7.449 7.453* 7.435   CGJ2LXF 57.3* 61.3* 63.8*   PO2ART 93.7 43.1* 60.1*   LWP1FAZ 39.7* 42.9* 42.9*   BASEEXCESS 13.1* 15.5* 15.1*      I have reviewed the patient's laboratory results.    Radiology results:    XR Chest 1 View    Result Date: 4/16/2023  X-RAY CHEST ONE VIEW  INDICATION:  Respiratory failure.; J96.01-Acute respiratory failure with hypoxia; J44.1-Chronic obstructive pulmonary disease with (acute) exacerbation.  FINDINGS:  A portable view of the chest was obtained.  Comparison is made to a prior exam dated 04/13/2023.   Heart size is normal. Interstitial opacities are slightly improved. More focal left basilar opacity is likely atelectasis. There may be a small left pleural effusion. No pneumothorax.      Impression: Improved interstitial opacities. Favor improved pulmonary edema. Left basilar opacity, favor atelectasis.  This report was signed and finalized on 4/16/2023 9:02 AM by Jody Byrd MD.    I have reviewed the patient's radiology reports.    Medication Review:     I have reviewed the patient's active and prn medications.     Problem List:      Acute respiratory failure    CAP (community acquired pneumonia)    Essential hypertension     COPD exacerbation      Assessment:    1. Acute respiratory failure with hypoxia and hypercapnia, POA  2. COPD with exacerbation, POA  3. Suspect underlying community-acquired pneumonia, present admission, due to unknown organism  4. Pulmonary hypertension  5. Hypertension  6. Chronic tobacco abuse  7. Arthritis     Plan:     Respiratory failure/COPD/pneumonia:  -Continue Azactam given multiple allergies.  Patient received 3 days.   -Blood cultures x2 pending without growth to date.  -Continue with bronchodilators, Solu-Medrol, oxygen therapy.    -Oxygen demands currently at 5 L nasal cannula.  Appreciate pulmonology consulting with recommendations.  -Given increased oxygen demands and current respiratory distress patient high risk for  deterioration.  Will monitor closely and consider transferring to ICU if any worsening.  -Advised out of chair to recliner and frequent incentive spirometry use.  -Tobacco cessation.     Hypertension:  -Restart home atenolol and amlodipine.     Chronic tobacco abuse:  -Complicates all aspects of care     DVT Prophylaxis: Lovenox  Code Status: Full Code  Diet: Regular  Discharge Plan: 2-3 days    Marga Monroy, APRN  04/17/23  11:39 EDT    Dictated utilizing Dragon dictation.

## 2023-04-17 NOTE — THERAPY EVALUATION
Attempted OT evaluation today, pt sleeping soundly, unable to wake up enough to actively work with therapy.  Will follow up with pt tomorrow.

## 2023-04-18 ENCOUNTER — APPOINTMENT (OUTPATIENT)
Dept: GENERAL RADIOLOGY | Facility: HOSPITAL | Age: 78
DRG: 193 | End: 2023-04-18
Payer: MEDICARE

## 2023-04-18 LAB
AMMONIA BLD-SCNC: 48 UMOL/L (ref 11–51)
ANION GAP SERPL CALCULATED.3IONS-SCNC: 7.9 MMOL/L (ref 5–15)
BILIRUB UR QL STRIP: NEGATIVE
BUN SERPL-MCNC: 24 MG/DL (ref 8–23)
BUN/CREAT SERPL: 53.3 (ref 7–25)
CALCIUM SPEC-SCNC: 8.9 MG/DL (ref 8.6–10.5)
CHLORIDE SERPL-SCNC: 100 MMOL/L (ref 98–107)
CLARITY UR: CLEAR
CO2 SERPL-SCNC: 35.1 MMOL/L (ref 22–29)
COLOR UR: YELLOW
CREAT SERPL-MCNC: 0.45 MG/DL (ref 0.57–1)
EGFRCR SERPLBLD CKD-EPI 2021: 99.8 ML/MIN/1.73
GLUCOSE SERPL-MCNC: 164 MG/DL (ref 65–99)
GLUCOSE UR STRIP-MCNC: NEGATIVE MG/DL
HGB UR QL STRIP.AUTO: NEGATIVE
KETONES UR QL STRIP: NEGATIVE
LEUKOCYTE ESTERASE UR QL STRIP.AUTO: NEGATIVE
NITRITE UR QL STRIP: NEGATIVE
PH UR STRIP.AUTO: 6.5 [PH] (ref 5–8)
POTASSIUM SERPL-SCNC: 4.3 MMOL/L (ref 3.5–5.2)
PROCALCITONIN SERPL-MCNC: 0.24 NG/ML (ref 0–0.25)
PROT UR QL STRIP: NEGATIVE
SODIUM SERPL-SCNC: 143 MMOL/L (ref 136–145)
SP GR UR STRIP: 1.02 (ref 1–1.03)
UROBILINOGEN UR QL STRIP: NORMAL

## 2023-04-18 PROCEDURE — 94799 UNLISTED PULMONARY SVC/PX: CPT

## 2023-04-18 PROCEDURE — 80048 BASIC METABOLIC PNL TOTAL CA: CPT | Performed by: NURSE PRACTITIONER

## 2023-04-18 PROCEDURE — 71045 X-RAY EXAM CHEST 1 VIEW: CPT

## 2023-04-18 PROCEDURE — 25010000002 METHYLPREDNISOLONE PER 40 MG: Performed by: INTERNAL MEDICINE

## 2023-04-18 PROCEDURE — 81003 URINALYSIS AUTO W/O SCOPE: CPT | Performed by: NURSE PRACTITIONER

## 2023-04-18 PROCEDURE — 82140 ASSAY OF AMMONIA: CPT | Performed by: NURSE PRACTITIONER

## 2023-04-18 PROCEDURE — 94761 N-INVAS EAR/PLS OXIMETRY MLT: CPT

## 2023-04-18 PROCEDURE — 99232 SBSQ HOSP IP/OBS MODERATE 35: CPT | Performed by: NURSE PRACTITIONER

## 2023-04-18 PROCEDURE — 25010000002 ENOXAPARIN PER 10 MG: Performed by: FAMILY MEDICINE

## 2023-04-18 PROCEDURE — 63710000001 ONDANSETRON PER 8 MG: Performed by: FAMILY MEDICINE

## 2023-04-18 PROCEDURE — 25010000002 LORAZEPAM PER 2 MG: Performed by: STUDENT IN AN ORGANIZED HEALTH CARE EDUCATION/TRAINING PROGRAM

## 2023-04-18 PROCEDURE — 99233 SBSQ HOSP IP/OBS HIGH 50: CPT | Performed by: INTERNAL MEDICINE

## 2023-04-18 PROCEDURE — 94664 DEMO&/EVAL PT USE INHALER: CPT

## 2023-04-18 PROCEDURE — 94760 N-INVAS EAR/PLS OXIMETRY 1: CPT

## 2023-04-18 PROCEDURE — 84145 PROCALCITONIN (PCT): CPT | Performed by: NURSE PRACTITIONER

## 2023-04-18 PROCEDURE — 94660 CPAP INITIATION&MGMT: CPT

## 2023-04-18 RX ORDER — METHYLPREDNISOLONE SODIUM SUCCINATE 40 MG/ML
40 INJECTION, POWDER, LYOPHILIZED, FOR SOLUTION INTRAMUSCULAR; INTRAVENOUS EVERY 8 HOURS
Status: DISCONTINUED | OUTPATIENT
Start: 2023-04-18 | End: 2023-04-19

## 2023-04-18 RX ORDER — LORAZEPAM 2 MG/ML
1 INJECTION INTRAMUSCULAR ONCE
Status: COMPLETED | OUTPATIENT
Start: 2023-04-18 | End: 2023-04-18

## 2023-04-18 RX ADMIN — METHYLPREDNISOLONE SODIUM SUCCINATE 40 MG: 40 INJECTION, POWDER, FOR SOLUTION INTRAMUSCULAR; INTRAVENOUS at 12:51

## 2023-04-18 RX ADMIN — ENOXAPARIN SODIUM 40 MG: 100 INJECTION SUBCUTANEOUS at 21:19

## 2023-04-18 RX ADMIN — METHYLPREDNISOLONE SODIUM SUCCINATE 40 MG: 40 INJECTION, POWDER, FOR SOLUTION INTRAMUSCULAR; INTRAVENOUS at 06:37

## 2023-04-18 RX ADMIN — Medication 1 PATCH: at 21:19

## 2023-04-18 RX ADMIN — LORAZEPAM 1 MG: 2 INJECTION INTRAMUSCULAR; INTRAVENOUS at 21:00

## 2023-04-18 RX ADMIN — IPRATROPIUM BROMIDE AND ALBUTEROL SULFATE 3 ML: 2.5; .5 SOLUTION RESPIRATORY (INHALATION) at 13:12

## 2023-04-18 RX ADMIN — BUDESONIDE 1 MG: 0.5 INHALANT RESPIRATORY (INHALATION) at 20:20

## 2023-04-18 RX ADMIN — TIOTROPIUM BROMIDE INHALATION SPRAY 2 PUFF: 3.12 SPRAY, METERED RESPIRATORY (INHALATION) at 06:54

## 2023-04-18 RX ADMIN — AMLODIPINE BESYLATE 10 MG: 5 TABLET ORAL at 09:00

## 2023-04-18 RX ADMIN — Medication 10 ML: at 09:00

## 2023-04-18 RX ADMIN — METHYLPREDNISOLONE SODIUM SUCCINATE 40 MG: 40 INJECTION, POWDER, FOR SOLUTION INTRAMUSCULAR; INTRAVENOUS at 03:40

## 2023-04-18 RX ADMIN — ATENOLOL 25 MG: 25 TABLET ORAL at 09:00

## 2023-04-18 RX ADMIN — ARFORMOTEROL TARTRATE 15 MCG: 15 SOLUTION RESPIRATORY (INHALATION) at 06:53

## 2023-04-18 RX ADMIN — IPRATROPIUM BROMIDE AND ALBUTEROL SULFATE 3 ML: 2.5; .5 SOLUTION RESPIRATORY (INHALATION) at 20:21

## 2023-04-18 RX ADMIN — ONDANSETRON 4 MG: 4 TABLET ORAL at 21:19

## 2023-04-18 RX ADMIN — Medication 10 ML: at 21:20

## 2023-04-18 RX ADMIN — BUDESONIDE 1 MG: 0.5 INHALANT RESPIRATORY (INHALATION) at 06:53

## 2023-04-18 RX ADMIN — ARFORMOTEROL TARTRATE 15 MCG: 15 SOLUTION RESPIRATORY (INHALATION) at 20:21

## 2023-04-18 RX ADMIN — Medication 5 MG: at 21:19

## 2023-04-18 RX ADMIN — METHYLPREDNISOLONE SODIUM SUCCINATE 40 MG: 40 INJECTION, POWDER, FOR SOLUTION INTRAMUSCULAR; INTRAVENOUS at 21:19

## 2023-04-18 RX ADMIN — Medication 10 ML: at 12:52

## 2023-04-18 RX ADMIN — IPRATROPIUM BROMIDE AND ALBUTEROL SULFATE 3 ML: 2.5; .5 SOLUTION RESPIRATORY (INHALATION) at 06:53

## 2023-04-18 NOTE — PROGRESS NOTES
"  CC: Acute Respiratory Failure.     S: Currently off PAP therapy.  Appears more awake and alert today.  Appeared to be oriented to person and place.    ROS: Could not be reliably obtained as the patient is on PAP therapy.     O:Vital signs reviewed.   /57 (BP Location: Left arm, Patient Position: Lying)   Pulse 82   Temp 98.1 °F (36.7 °C) (Oral)   Resp 18   Ht 160 cm (62.99\")   Wt 70.9 kg (156 lb 4.9 oz)   SpO2 (!) 89%   BMI 27.70 kg/m²     Temp (24hrs), Av.1 °F (36.7 °C), Min:98 °F (36.7 °C), Max:98.3 °F (36.8 °C)      I & Os reviewed.   Intake/Output       23 0700 - 23 0659    Intake (ml) 170    Output (ml) 700    Net (ml) -530    Last Weight 70.9 kg (156 lb 4.9 oz)          Net IO Since Admission: -60 mL [23 1147]    General/Constitutional: On high flow nasal cannula.  Appears to be in mild distress, when trying to talk in full sentences.  Eyes: PERRL.  Eyes were closed initially.  Neck: Supple without JVD. No obvious masses noted.   Cardiovascular: S1 + S2.  Regular  Lungs/Respiratory: Decreased bilateral air entry noted.  Bilateral wheezing heard.  Bibasilar crackles noted  GI/Abdomen: Soft. Bowel sounds positive.  No obvious organomegaly  Musculoskeletal/Extremities: No edema noted. Gait could not be assessed at this time, as the patient was laying in bed.   Neurologic: Was able to follow some commands.  Did appear to be somewhat confused although answers some questions appropriately?  Psych: Could not reliably obtain as the patient is somewhat confused.  Skin: Appeared somewhat dry       Labs: Reviewed.     Results from last 7 days   Lab Units 23  0642 23  0600 04/15/23  0811 23  1458   WBC 10*3/mm3 15.11* 12.09* 19.59* 20.98*   HEMOGLOBIN g/dL 14.4 14.0 14.6 15.3   HEMATOCRIT % 46.2 46.1 47.4* 47.4*   PLATELETS 10*3/mm3 303 282 274 240   NEUTROPHIL % % 85.6* 85.2* 83.8* 84.5*   NEUTROS ABS 10*3/mm3 12.94* 10.29* 16.40* 17.74*   EOSINOPHIL % % 0.0* 0.0* " 0.0* 1.0   EOS ABS 10*3/mm3 0.00 0.00 0.00 0.21   LYMPHOCYTE % % 8.3* 8.4* 7.1* 4.3*   LYMPHS ABS 10*3/mm3 1.25 1.02 1.40 0.90       Lab Results   Component Value Date    PROCALCITO 0.24 04/18/2023    PROCALCITO 1.10 (H) 04/16/2023    PROCALCITO 0.43 (H) 04/13/2023       No results found for: CRP    No results found for: SEDRATE    Lab Results   Component Value Date    PROBNP 272.8 04/13/2023    PROBNP 315.0 (C) 10/02/2017       Results from last 7 days   Lab Units 04/18/23  0707 04/17/23  0642 04/16/23  0600 04/15/23  0811 04/13/23  1458   SODIUM mmol/L 143 144 145   < > 138   POTASSIUM mmol/L 4.3 4.5 4.2   < > 2.9*   CHLORIDE mmol/L 100 100 103   < > 92*   CO2 mmol/L 35.1* 39.6* 38.0*   < > 33.7*   BUN mg/dL 24* 20 18   < > 12   CREATININE mg/dL 0.45* 0.40* 0.38*   < > 0.65   CALCIUM mg/dL 8.9 9.1 9.3   < > 9.4   ANION GAP mmol/L 7.9 4.4* 4.0*   < > 12.3   BILIRUBIN mg/dL  --   --   --   --  0.4   ALK PHOS U/L  --   --   --   --  114   ALT (SGPT) U/L  --   --   --   --  23   AST (SGOT) U/L  --   --   --   --  19   GLUCOSE mg/dL 164* 169* 173*   < > 160*   TOTAL PROTEIN g/dL  --   --   --   --  7.7   ALBUMIN g/dL  --   --   --   --  3.3*    < > = values in this interval not displayed.       Results from last 7 days   Lab Units 04/13/23  1458   MAGNESIUM mg/dL 1.8             No results found for: CKTOTAL    No components found for: HSTROPT    Lab Results   Component Value Date    TROPONINT 15 (H) 04/13/2023       No results found for: DDIMER    Brief Urine Lab Results  (Last result in the past 365 days)      Color   Clarity   Blood   Leuk Est   Nitrite   Protein   CREAT   Urine HCG        04/18/23 0344 Yellow   Clear   Negative   Negative   Negative   Negative                 No results found for: TSH    No results found for: FREET4      Micro: As of April 18, 2023   Lab Results   Component Value Date    RESPCX Light growth (2+) Normal Respiratory Ana 10/02/2017     No results found for: BCIDPCR  Lab Results    Component Value Date    BLOODCX No growth at 2 days 04/16/2023    BLOODCX No growth at 2 days 04/16/2023    BLOODCX No growth at 5 days 10/01/2017    BLOODCX No growth at 5 days 10/01/2017     Lab Results   Component Value Date    URINECX Final report 02/07/2019    URINECX 20,000-30,000 CFU/mL Escherichia coli (A) 10/05/2018     No results found for: MRSACX  Lab Results   Component Value Date    MRSAPCR No MRSA Detected 04/15/2023     No results found for: URCX  No components found for: LOWRESPCF  No results found for: THROATCX  No results found for: CULTURES  No components found for: STREPBCX  No results found for: STREPPNEUAG  No results found for: LEGIONELLA  No results found for: MYCOPLASCX  No results found for: GCCX  No results found for: WOUNDCX  No results found for: BODYFLDCX    No results found for: FLU    Lab Results   Component Value Date    ADENOVIRUS Not Detected 04/13/2023     Lab Results   Component Value Date    BY947K Not Detected 04/13/2023     Lab Results   Component Value Date    CVHKU1 Not Detected 04/13/2023     Lab Results   Component Value Date    CVNL63 Not Detected 04/13/2023     Lab Results   Component Value Date    CVOC43 Not Detected 04/13/2023     Lab Results   Component Value Date    HUMETPNEVS Not Detected 04/13/2023     Lab Results   Component Value Date    HURVEV Not Detected 04/13/2023     Lab Results   Component Value Date    FLUBPCR Not Detected 04/13/2023     Lab Results   Component Value Date    PARAINFLUE Not Detected 04/13/2023     Lab Results   Component Value Date    PARAFLUV2 Not Detected 04/13/2023     Lab Results   Component Value Date    PARAFLUV3 Not Detected 04/13/2023     Lab Results   Component Value Date    PARAFLUV4 Not Detected 04/13/2023     Lab Results   Component Value Date    BPERTPCR Not Detected 04/13/2023     No results found for: [id number]  Lab Results   Component Value Date    CPNEUPCR Not Detected 04/13/2023     Lab Results   Component Value Date     MPNEUMO Not Detected 04/13/2023     Lab Results   Component Value Date    FLUAPCR Not Detected 04/13/2023     No results found for: FLUAH3  No results found for: FLUAH1  Lab Results   Component Value Date    RSV Not Detected 04/13/2023     Lab Results   Component Value Date    BPARAPCR Not Detected 04/13/2023       COVID 19:  Lab Results   Component Value Date    COVID19 Not Detected 04/13/2023           ABG: Reviewed  Recent Labs     04/16/23  1118 04/17/23  0844 04/17/23  0851   PHART 7.449 7.453* 7.435   MWQ1TTL 57.3* 61.3* 63.8*   PO2ART 93.7 43.1* 60.1*   YGQ6LIT 39.7* 42.9* 42.9*   BASEEXCESS 13.1* 15.5* 15.1*       Lab Results   Component Value Date    LACTATE 1.2 04/13/2023    LACTATE 2.7 (C) 04/13/2023    LACTATE 1.6 10/01/2017         Echo: Results for orders placed during the hospital encounter of 04/13/23    Adult Transthoracic Echo Complete W/ Cont if Necessary Per Protocol    Interpretation Summary  •  Left ventricular diastolic function is consistent with (grade I) impaired relaxation.  •  Mild aortic valve stenosis is present.  •  Estimated right ventricular systolic pressure from tricuspid regurgitation is moderately elevated (45-55 mmHg).  •  Mild to moderate pulmonary hypertension is present.      Results for orders placed during the hospital encounter of 10/01/17    Adult Transthoracic Echo Complete W/ Cont if Necessary Per Protocol    Interpretation Summary  TEchnically difficult study  1) Borderline LVH with normal LV systolic function ( EF is over 55%)  2) Normal left atrial size with mild elevation in LVedp  3) Trace MR  4) Mild TR with normal PA pressures  5) Mild AI  6) Small RV with normal RV function        Pharmacy to Dose enoxaparin (LOVENOX),         CXRay: Latest imaging study was reviewed personally.   Imaging Results (Last 24 Hours)     Procedure Component Value Units Date/Time    XR Chest 1 View [[id number]] Collected: 04/18/23 0812     Updated: 04/18/23 0815    Narrative:       PROCEDURE: XR CHEST 1 VW-     HISTORY: Shortness of breath.     COMPARISON: 04/16/2023..     FINDINGS: The heart is normal in size. Minimal interstitial disease  present bilaterally, similar to prior.. The mediastinum is unremarkable.  There is no pneumothorax.  There are no acute osseous abnormalities.  Apical lordotic positioning noted. Slight blunting of the costophrenic  angles is stable.       Impression:      Stable chest..           This report was signed and finalized on 4/18/2023 8:13 AM by Shraddha Wharton MD.            Assessment & Recommendations/Plan:   1.  Acute respiratory failure   Continues to require high flow nasal cannula.  Will ask respiratory therapist to decrease FiO2 as tolerated.  I would once again recommend using NIV device 2 hours on and 2 hours off.  Chest x-ray suggest the possibility of interstitial changes.  Latest ABG shows compensated, albeit significant, respiratory acidosis.  We will repeat ABG in the morning.     2.  Acute exacerbation of COPD   Continue Pulmicort, Brovana and Spiriva.  Likely the reason for acute respiratory failure.  We will change IV Solu-Medrol to 40 mg every 8 hourly.  Nebulized treatments have been adjusted, as clinically indicated.     3.  Chronic respiratory acidosis  Will use NIV device for now.     4.  Smoking  Was advised to quit smoking     5.  Pulmonary hypertension  Most likely secondary to likely severe underlying COPD  We will consider outpatient work-up.    6.?  Confusion/encephalopathy  Given compensated respiratory acidosis, CO2 retention does not appear to be the cause.  Appears to be somewhat better today.    7.  Fluid status  With administer 1 dose of Diuril today  Net IO Since Admission: -60 mL [04/18/23 1147]  Lab Results   Component Value Date    PROBNP 272.8 04/13/2023    PROBNP 315.0 (C) 10/02/2017     8.  Miscellaneous  Patient remains somewhat critical given continued hypoxemia requiring up to 6 -15 L/min high flow nasal cannula.  Will  need to be monitored very closely and may need to be considered for transfer to ICU if there is any further worsening clinical status.    The patient will need to use the noninvasive ventilator for nocturnal and daytime use. Due to severity of the condition, BiPAP alone would be insufficient to maintain adequate tidal volumes.   Severe COPD seems to be the primary cause of chronic respiratory failure in this patient requiring noninvasive ventilator. There maybe serious detriment to the patient's health, including the possibility of recurrent exacerbations, worsening symptoms/respiratory status etc., if noninvasive ventilator is not used.    ============================  ============================  I would suggest the following noninvasive ventilation settings for now.    Vt: 450 ml; Rate: 14/min; PS min: 6; PS Max: 25; EPAP min: 4; EPAP Max: 14.  AVAPS rate: Auto  Max Pressure: 30.  3LPM oxygen bled in.  Heated humidifier.  Use during sleep.    Please send me compliance in 4-5 weeks  Adjustment can be made for patient comfort.  Minimum target tidal volume is 400 ml.    Further changes may need to be made, based on clinical situation and laboratory data.  ============================  ============================    We have reviewed patient's current orders and changes, if any, have been suggested to primary care team. Plan was also discussed with nursing staff, as necessary.     This document was electronically signed by Arnaldo Soriano MD on 04/18/23 at 11:47 EDT      Dictated utilizing Dragon dictation.

## 2023-04-18 NOTE — THERAPY TREATMENT NOTE
Attempted to see pt x 2 this afternoon. Entered room on both occasions and pt did not acknowledge and continued talking on phone. Will attempt to see pt tomorrow.

## 2023-04-18 NOTE — CASE MANAGEMENT/SOCIAL WORK
Case Management/Social Work    Patient Name:  Carolin Toscano  YOB: 1945  MRN: [id number]  Admit Date:  4/13/2023      Sw continuing to follow pt for discharge needs. Pt talking on phone when Sw went to see her. Will attempt to follow up at a later time.     Electronically signed by:  Merle Torres  04/18/23 14:50 EDT

## 2023-04-18 NOTE — CASE MANAGEMENT/SOCIAL WORK
8269 Cm received consult requesting pt be set up with [facility] at [facility]. Dr Juan's note (dated 4/18/23) routed to [facility]. SW/SAM to call and f/u with Azeb tomorrow to further discuss.

## 2023-04-18 NOTE — THERAPY EVALUATION
Attempted x2 to see pt for OT evaluation today.  Pt on the phone both times and wanted to continue her conversation.  Will follow up with pt tomorrow.

## 2023-04-18 NOTE — PROGRESS NOTES
[facility]    Name:  Carolin Toscano   Age:  77 y.o.  Sex:  female  :  1945  MRN:  [id number]   Visit Number:  [id number]  Admission Date:  2023  Date Of Service:  23  Primary Care Physician:  Jason Nicholson MD     LOS: 5 days :    Chief Complaint:      Shortness of air    Subjective:    Patient seen and examined with no family at bedside.  Patient appears more alert and able to answer most of my questions this morning.  Does not know current year.  Was noted to refuse BiPAP throughout the night.  Overall reassuring labs and vitals noted.  Patient requesting breakfast this morning.  Advised to get up in chair today.    Hospital Course:    Patient is a 77-year-old female with a history of ongoing tobacco abuse, COPD, hypertension, dyslipidemia, prior kidney stones, who presented to the emergency room with complaints of 3 to 4 days of increasing cough, shortness of breath and chills.  Patient states she actually quit smoking about 3 days ago.  Has noted change in sputum.  She has not followed up with PCP or pulmonology in a while.  She does not routinely wear oxygen at home.       In the ER, patient was hypoxic on arrival and was requiring up to 3 L of oxygen.  Otherwise hemodynamically stable.  She has a negative respiratory panel.  She had a lactic acid of 2.7 repeat 1.2.  She had a blood gas pH 7.47 PCO2 55.1 PO2 86.5, creatinine 0.65 potassium 2.9 CO2 33.7.  proBNP of 272 mildly elevated high-sensitivity troponin.  White count of 21 hemoglobin 15.  Negative flu COVID testing.  A1c 6.1 magnesium 1.8 and procalcitonin 0.43.  She had a chest x-ray was showing emphysematous changes but no acute abnormality.  A CT scan of the chest without contrast was showing findings concerning for bronchitis/bronchiolitis with interlobular septal thickening in the right lung base.  There is no focal airspace disease.  Patient was given empiric antibiotics,  bronchodilators, steroids emergency room.  Due to acute respiratory failure with hypoxia, hospitalist consulted for further medical management.  Pulmonology also consulted.  Patient continued to require higher levels of oxygen.  Patient developed worsening respiratory acidosis requiring BiPAP.  CODE STATUS confirmed with daughter for which patient to remain full code.  Patient fortunately did improve and currently maintaining appropriate saturations on 5 L nasal cannula.  Solu-Medrol slowly weaned.  Procalcitonin and chest x-ray with improvement.    Review of Systems:     All systems were reviewed and negative except as mentioned in subjective, assessment and plan.    Vital Signs:    Temp:  [98 °F (36.7 °C)-98.3 °F (36.8 °C)] 98.1 °F (36.7 °C)  Heart Rate:  [67-86] 82  Resp:  [18-30] 18  BP: (100-132)/(50-86) 120/57    Intake and output:    I/O last 3 completed shifts:  In: 170 [P.O.:170]  Out: 1300 [Urine:1300]  No intake/output data recorded.    Physical Examination:    General Appearance:  Alert and cooperative.  Chronically ill/frail appearing elderly female.  Appears ill. Hoarseness noted.   Head:  Atraumatic and normocephalic.   Eyes: Conjunctivae and sclerae normal, no icterus. No pallor.   Throat: No oral lesions, no thrush, dry mucous membranes.   Neck: Supple, trachea midline, no thyromegaly.   Lungs:   Breath sounds heard bilaterally equally.  Scattered wheezing throughout and slightly labored on 5 L nasal cannula.   Heart:  Normal S1 and S2, no murmur, no gallop, no rub. No JVD.   Abdomen:   Normal bowel sounds, no masses, no organomegaly. Soft, nontender, nondistended, no rebound tenderness.   Extremities: Supple, no edema, no cyanosis, no clubbing.   Skin: No bleeding or rash.   Neurologic: Alert and oriented x 2. No facial asymmetry. Moves all four limbs. No tremors.  Generalized weakness.     Laboratory results:    Results from last 7 days   Lab Units 04/18/23  0707 04/17/23  0642 04/16/23  0600  04/15/23  0811 04/13/23  1458   SODIUM mmol/L 143 144 145   < > 138   POTASSIUM mmol/L 4.3 4.5 4.2   < > 2.9*   CHLORIDE mmol/L 100 100 103   < > 92*   CO2 mmol/L 35.1* 39.6* 38.0*   < > 33.7*   BUN mg/dL 24* 20 18   < > 12   CREATININE mg/dL 0.45* 0.40* 0.38*   < > 0.65   CALCIUM mg/dL 8.9 9.1 9.3   < > 9.4   BILIRUBIN mg/dL  --   --   --   --  0.4   ALK PHOS U/L  --   --   --   --  114   ALT (SGPT) U/L  --   --   --   --  23   AST (SGOT) U/L  --   --   --   --  19   GLUCOSE mg/dL 164* 169* 173*   < > 160*    < > = values in this interval not displayed.     Results from last 7 days   Lab Units 04/17/23  0642 04/16/23  0600 04/15/23  0811   WBC 10*3/mm3 15.11* 12.09* 19.59*   HEMOGLOBIN g/dL 14.4 14.0 14.6   HEMATOCRIT % 46.2 46.1 47.4*   PLATELETS 10*3/mm3 303 282 274         Results from last 7 days   Lab Units 04/13/23  1458   HSTROP T ng/L 15*     Results from last 7 days   Lab Units 04/16/23  1006   BLOODCX  No growth at 2 days  No growth at 2 days     Recent Labs     04/16/23  1118 04/17/23  0844 04/17/23  0851   PHART 7.449 7.453* 7.435   APB0VOH 57.3* 61.3* 63.8*   PO2ART 93.7 43.1* 60.1*   HWI5QBQ 39.7* 42.9* 42.9*   BASEEXCESS 13.1* 15.5* 15.1*      I have reviewed the patient's laboratory results.    Radiology results:    XR Chest 1 View    Result Date: 4/18/2023  PROCEDURE: XR CHEST 1 VW-  HISTORY: Shortness of breath.  COMPARISON: 04/16/2023..  FINDINGS: The heart is normal in size. Minimal interstitial disease present bilaterally, similar to prior.. The mediastinum is unremarkable. There is no pneumothorax.  There are no acute osseous abnormalities. Apical lordotic positioning noted. Slight blunting of the costophrenic angles is stable.      Impression: Stable chest..    This report was signed and finalized on 4/18/2023 8:13 AM by Shraddha Wharton MD.    I have reviewed the patient's radiology reports.    Medication Review:     I have reviewed the patient's active and prn medications.     Problem  List:      Acute respiratory failure    CAP (community acquired pneumonia)    Essential hypertension    COPD exacerbation      Assessment:    1. Acute respiratory failure with hypoxia and hypercapnia, POA  2. COPD with exacerbation, POA  3. Suspect underlying community-acquired pneumonia, present admission, due to unknown organism  4. Encephalopathy  5. Pulmonary hypertension  6. Hypertension  7. Chronic tobacco abuse  8. Arthritis     Plan:     Respiratory failure/COPD/pneumonia:  -Continue Azactam given multiple allergies.  Patient received 3 days.  Procalcitonin trending downward.  -Blood cultures x2 pending without growth to date.  -Continue with bronchodilators, Solu-Medrol, oxygen therapy.  Wean Solu-Medrol as patient tolerates.  Continues to require 5 L nasal cannula with intermittent BiPAP use.  Appreciate pulmonology consulting with recommendations.  -Given increased oxygen demands and current respiratory distress patient high risk for  deterioration.  Will monitor closely and consider transferring to ICU if any worsening.  -Advised out of chair to recliner and frequent incentive spirometry use.  -Tobacco cessation.  -Encephalopathy likely multifactorial.  Likely overall related to COPD/pneumonia with worsening respiratory failure.  Urinalysis/ammonia negative.  No focal deficits noted.  Currently alert and oriented x2 which appears to be baseline and much improved compared to 2 days prior.  Continue to monitor closely.     Hypertension:  -Restart home atenolol and amlodipine.     Chronic tobacco abuse:  -Complicates all aspects of care     DVT Prophylaxis: Lovenox  Code Status: Full Code  Diet: Regular  Discharge Plan: 2-3 days    Marga Monory, APRN  04/18/23  10:54 EDT    Dictated utilizing Dragon dictation.

## 2023-04-19 LAB
A-A DO2: 261.3 MMHG
ARTERIAL PATENCY WRIST A: POSITIVE
ATMOSPHERIC PRESS: 734 MMHG
BASE EXCESS BLDA CALC-SCNC: 13.6 MMOL/L (ref 0–2)
BDY SITE: ABNORMAL
COHGB MFR BLD: <0.7 % (ref 0–2)
HCO3 BLDA-SCNC: 41.4 MMOL/L (ref 22–28)
HCT VFR BLD CALC: 44.9 %
INHALED O2 CONCENTRATION: 80 %
Lab: ABNORMAL
METHGB BLD QL: 0.5 % (ref 0–1.5)
MODALITY: ABNORMAL
NOTE: ABNORMAL
OXYHGB MFR BLDV: 99.2 % (ref 94–99)
PCO2 BLDA: 64 MM HG (ref 35–45)
PCO2 TEMP ADJ BLD: ABNORMAL MM[HG]
PH BLDA: 7.42 PH UNITS (ref 7.35–7.45)
PH, TEMP CORRECTED: ABNORMAL
PO2 BLDA: 223 MM HG (ref 75–100)
PO2 TEMP ADJ BLD: ABNORMAL MM[HG]
SAO2 % BLDCOA: 100 % (ref 94–100)
VENTILATOR MODE: ABNORMAL

## 2023-04-19 PROCEDURE — 94761 N-INVAS EAR/PLS OXIMETRY MLT: CPT

## 2023-04-19 PROCEDURE — 97165 OT EVAL LOW COMPLEX 30 MIN: CPT

## 2023-04-19 PROCEDURE — 94799 UNLISTED PULMONARY SVC/PX: CPT

## 2023-04-19 PROCEDURE — 99233 SBSQ HOSP IP/OBS HIGH 50: CPT | Performed by: INTERNAL MEDICINE

## 2023-04-19 PROCEDURE — 25010000002 ENOXAPARIN PER 10 MG: Performed by: FAMILY MEDICINE

## 2023-04-19 PROCEDURE — 97110 THERAPEUTIC EXERCISES: CPT

## 2023-04-19 PROCEDURE — 94664 DEMO&/EVAL PT USE INHALER: CPT

## 2023-04-19 PROCEDURE — 97116 GAIT TRAINING THERAPY: CPT

## 2023-04-19 PROCEDURE — 99232 SBSQ HOSP IP/OBS MODERATE 35: CPT | Performed by: NURSE PRACTITIONER

## 2023-04-19 PROCEDURE — 25010000002 METHYLPREDNISOLONE PER 40 MG: Performed by: INTERNAL MEDICINE

## 2023-04-19 PROCEDURE — 25010000002 FUROSEMIDE PER 20 MG: Performed by: INTERNAL MEDICINE

## 2023-04-19 PROCEDURE — 83050 HGB METHEMOGLOBIN QUAN: CPT

## 2023-04-19 PROCEDURE — 94660 CPAP INITIATION&MGMT: CPT

## 2023-04-19 PROCEDURE — 82805 BLOOD GASES W/O2 SATURATION: CPT

## 2023-04-19 PROCEDURE — 82375 ASSAY CARBOXYHB QUANT: CPT

## 2023-04-19 PROCEDURE — 36600 WITHDRAWAL OF ARTERIAL BLOOD: CPT

## 2023-04-19 RX ORDER — POLYETHYLENE GLYCOL 3350 17 G/17G
17 POWDER, FOR SOLUTION ORAL DAILY PRN
Status: DISCONTINUED | OUTPATIENT
Start: 2023-04-19 | End: 2023-04-21 | Stop reason: HOSPADM

## 2023-04-19 RX ORDER — METHYLPREDNISOLONE SODIUM SUCCINATE 40 MG/ML
40 INJECTION, POWDER, LYOPHILIZED, FOR SOLUTION INTRAMUSCULAR; INTRAVENOUS EVERY 12 HOURS
Status: DISCONTINUED | OUTPATIENT
Start: 2023-04-20 | End: 2023-04-20

## 2023-04-19 RX ORDER — BISACODYL 5 MG/1
5 TABLET, DELAYED RELEASE ORAL DAILY PRN
Status: DISCONTINUED | OUTPATIENT
Start: 2023-04-19 | End: 2023-04-21 | Stop reason: HOSPADM

## 2023-04-19 RX ORDER — FUROSEMIDE 10 MG/ML
40 INJECTION INTRAMUSCULAR; INTRAVENOUS ONCE
Status: COMPLETED | OUTPATIENT
Start: 2023-04-19 | End: 2023-04-19

## 2023-04-19 RX ORDER — BISACODYL 10 MG
10 SUPPOSITORY, RECTAL RECTAL DAILY PRN
Status: DISCONTINUED | OUTPATIENT
Start: 2023-04-19 | End: 2023-04-21 | Stop reason: HOSPADM

## 2023-04-19 RX ORDER — AMOXICILLIN 250 MG
2 CAPSULE ORAL 2 TIMES DAILY
Status: DISCONTINUED | OUTPATIENT
Start: 2023-04-19 | End: 2023-04-21 | Stop reason: HOSPADM

## 2023-04-19 RX ADMIN — SENNOSIDES AND DOCUSATE SODIUM 2 TABLET: 50; 8.6 TABLET ORAL at 21:26

## 2023-04-19 RX ADMIN — IPRATROPIUM BROMIDE AND ALBUTEROL SULFATE 3 ML: 2.5; .5 SOLUTION RESPIRATORY (INHALATION) at 19:34

## 2023-04-19 RX ADMIN — BUDESONIDE 1 MG: 0.5 INHALANT RESPIRATORY (INHALATION) at 19:34

## 2023-04-19 RX ADMIN — AMLODIPINE BESYLATE 10 MG: 5 TABLET ORAL at 08:17

## 2023-04-19 RX ADMIN — TIOTROPIUM BROMIDE INHALATION SPRAY 2 PUFF: 3.12 SPRAY, METERED RESPIRATORY (INHALATION) at 07:00

## 2023-04-19 RX ADMIN — Medication 10 ML: at 08:18

## 2023-04-19 RX ADMIN — ARFORMOTEROL TARTRATE 15 MCG: 15 SOLUTION RESPIRATORY (INHALATION) at 19:34

## 2023-04-19 RX ADMIN — METHYLPREDNISOLONE SODIUM SUCCINATE 40 MG: 40 INJECTION, POWDER, FOR SOLUTION INTRAMUSCULAR; INTRAVENOUS at 05:51

## 2023-04-19 RX ADMIN — METHYLPREDNISOLONE SODIUM SUCCINATE 40 MG: 40 INJECTION, POWDER, FOR SOLUTION INTRAMUSCULAR; INTRAVENOUS at 15:03

## 2023-04-19 RX ADMIN — SENNOSIDES AND DOCUSATE SODIUM 2 TABLET: 50; 8.6 TABLET ORAL at 15:03

## 2023-04-19 RX ADMIN — IPRATROPIUM BROMIDE AND ALBUTEROL SULFATE 3 ML: 2.5; .5 SOLUTION RESPIRATORY (INHALATION) at 12:30

## 2023-04-19 RX ADMIN — BUDESONIDE 1 MG: 0.5 INHALANT RESPIRATORY (INHALATION) at 07:00

## 2023-04-19 RX ADMIN — ARFORMOTEROL TARTRATE 15 MCG: 15 SOLUTION RESPIRATORY (INHALATION) at 07:00

## 2023-04-19 RX ADMIN — FUROSEMIDE 40 MG: 10 INJECTION, SOLUTION INTRAMUSCULAR; INTRAVENOUS at 16:22

## 2023-04-19 RX ADMIN — ATENOLOL 25 MG: 25 TABLET ORAL at 08:17

## 2023-04-19 RX ADMIN — Medication 1 PATCH: at 21:27

## 2023-04-19 RX ADMIN — IPRATROPIUM BROMIDE AND ALBUTEROL SULFATE 3 ML: 2.5; .5 SOLUTION RESPIRATORY (INHALATION) at 07:00

## 2023-04-19 NOTE — PLAN OF CARE
Goal Outcome Evaluation:           Progress: improving           Problem: Adult Inpatient Plan of Care  Goal: Plan of Care Review  Outcome: Ongoing, Progressing  Flowsheets  Taken 4/19/2023 1855 by Lian Reyes RN  Progress: improving  Taken 4/19/2023 1328 by Ronak Cruz PTA  Plan of Care Reviewed With: patient  Goal: Patient-Specific Goal (Individualized)  Outcome: Ongoing, Progressing  Goal: Absence of Hospital-Acquired Illness or Injury  Outcome: Ongoing, Progressing  Intervention: Identify and Manage Fall Risk  Recent Flowsheet Documentation  Taken 4/19/2023 1600 by Lian Reyes RN  Safety Promotion/Fall Prevention:   safety round/check completed   patient off unit   fall prevention program maintained   clutter free environment maintained   assistive device/personal items within reach   activity supervised  Taken 4/19/2023 1400 by Lian Reyes RN  Safety Promotion/Fall Prevention:   safety round/check completed   room organization consistent   fall prevention program maintained   clutter free environment maintained   assistive device/personal items within reach   activity supervised  Taken 4/19/2023 1200 by Lian Reyes RN  Safety Promotion/Fall Prevention:   safety round/check completed   room organization consistent   fall prevention program maintained   clutter free environment maintained   assistive device/personal items within reach   activity supervised  Taken 4/19/2023 1000 by Lian Reyes RN  Safety Promotion/Fall Prevention:   safety round/check completed   room organization consistent   fall prevention program maintained   clutter free environment maintained   assistive device/personal items within reach   activity supervised  Intervention: Prevent Skin Injury  Recent Flowsheet Documentation  Taken 4/19/2023 1600 by Lian Reyes, RN  Body Position: supine, legs elevated  Taken 4/19/2023 1200 by Lian Reyes RN  Body Position: supine, legs  elevated  Taken 4/19/2023 1000 by Lian Reyes RN  Body Position:   tilted   right  Intervention: Prevent and Manage VTE (Venous Thromboembolism) Risk  Recent Flowsheet Documentation  Taken 4/19/2023 1600 by Lian Reyes RN  Activity Management: activity encouraged  Taken 4/19/2023 1400 by Lian Reyes RN  Activity Management: up in chair  Taken 4/19/2023 1200 by Lian Reyes RN  Activity Management: activity encouraged  Taken 4/19/2023 1000 by Lian Reyes RN  Activity Management: activity encouraged  Intervention: Prevent Infection  Recent Flowsheet Documentation  Taken 4/19/2023 1600 by Lian Reyes RN  Infection Prevention:   environmental surveillance performed   equipment surfaces disinfected   hand hygiene promoted   personal protective equipment utilized   rest/sleep promoted   visitors restricted/screened   single patient room provided  Taken 4/19/2023 1400 by Lian Reyes RN  Infection Prevention:   environmental surveillance performed   equipment surfaces disinfected   hand hygiene promoted   personal protective equipment utilized   rest/sleep promoted   single patient room provided   visitors restricted/screened  Taken 4/19/2023 1200 by Lian Reyes RN  Infection Prevention:   equipment surfaces disinfected   environmental surveillance performed   hand hygiene promoted   personal protective equipment utilized   rest/sleep promoted   single patient room provided   visitors restricted/screened  Taken 4/19/2023 1000 by Lian Reyes RN  Infection Prevention:   environmental surveillance performed   equipment surfaces disinfected   hand hygiene promoted   personal protective equipment utilized   rest/sleep promoted   single patient room provided   visitors restricted/screened  Goal: Optimal Comfort and Wellbeing  Outcome: Ongoing, Progressing  Goal: Readiness for Transition of Care  Outcome: Ongoing, Progressing     Problem: Asthma  Comorbidity  Goal: Maintenance of Asthma Control  Outcome: Ongoing, Progressing  Intervention: Maintain Asthma Symptom Control  Recent Flowsheet Documentation  Taken 4/19/2023 1600 by Lian Reyes RN  Medication Review/Management: medications reviewed  Taken 4/19/2023 1400 by Lian Reyes RN  Medication Review/Management: medications reviewed  Taken 4/19/2023 1000 by Lian Reyes RN  Medication Review/Management: medications reviewed     Problem: COPD (Chronic Obstructive Pulmonary Disease) Comorbidity  Goal: Maintenance of COPD Symptom Control  Outcome: Ongoing, Progressing  Intervention: Maintain COPD-Symptom Control  Recent Flowsheet Documentation  Taken 4/19/2023 1600 by Lian Reyes RN  Medication Review/Management: medications reviewed  Taken 4/19/2023 1400 by Lian Reyes RN  Medication Review/Management: medications reviewed  Taken 4/19/2023 1000 by Lian Reyes RN  Medication Review/Management: medications reviewed     Problem: Hypertension Comorbidity  Goal: Blood Pressure in Desired Range  Outcome: Ongoing, Progressing  Intervention: Maintain Blood Pressure Management  Recent Flowsheet Documentation  Taken 4/19/2023 1600 by Lian Reyes RN  Medication Review/Management: medications reviewed  Taken 4/19/2023 1400 by Lian Reyes RN  Medication Review/Management: medications reviewed  Taken 4/19/2023 1000 by Lian Reyes RN  Medication Review/Management: medications reviewed     Problem: Skin Injury Risk Increased  Goal: Skin Health and Integrity  Outcome: Ongoing, Progressing  Intervention: Optimize Skin Protection  Recent Flowsheet Documentation  Taken 4/19/2023 1600 by Lian eRyes RN  Head of Bed (HOB) Positioning: HOB at 30-45 degrees  Taken 4/19/2023 1000 by Lian Reyes RN  Head of Bed (HOB) Positioning: HOB at 20-30 degrees     Problem: Fall Injury Risk  Goal: Absence of Fall and Fall-Related Injury  Outcome:  Ongoing, Progressing  Intervention: Identify and Manage Contributors  Recent Flowsheet Documentation  Taken 4/19/2023 1600 by Lian Reyes RN  Medication Review/Management: medications reviewed  Taken 4/19/2023 1400 by Lian Reyes RN  Medication Review/Management: medications reviewed  Taken 4/19/2023 1000 by Lian Reyes RN  Medication Review/Management: medications reviewed  Intervention: Promote Injury-Free Environment  Recent Flowsheet Documentation  Taken 4/19/2023 1600 by Lian Reyes RN  Safety Promotion/Fall Prevention:   safety round/check completed   patient off unit   fall prevention program maintained   clutter free environment maintained   assistive device/personal items within reach   activity supervised  Taken 4/19/2023 1400 by Lian Reyes RN  Safety Promotion/Fall Prevention:   safety round/check completed   room organization consistent   fall prevention program maintained   clutter free environment maintained   assistive device/personal items within reach   activity supervised  Taken 4/19/2023 1200 by Lian Reyes RN  Safety Promotion/Fall Prevention:   safety round/check completed   room organization consistent   fall prevention program maintained   clutter free environment maintained   assistive device/personal items within reach   activity supervised  Taken 4/19/2023 1000 by Lian Reyes RN  Safety Promotion/Fall Prevention:   safety round/check completed   room organization consistent   fall prevention program maintained   clutter free environment maintained   assistive device/personal items within reach   activity supervised     Problem: Noninvasive Ventilation Acute  Goal: Effective Unassisted Ventilation and Oxygenation  Outcome: Ongoing, Progressing

## 2023-04-19 NOTE — THERAPY EVALUATION
"Patient Name: Carolin Toscano  : 1945    MRN: [id number]                              Today's Date: 2023       Admit Date: 2023    Visit Dx:     ICD-10-CM ICD-9-CM   1. Acute respiratory failure with hypoxia  J96.01 518.81   2. COPD with acute exacerbation  J44.1 491.21     Patient Active Problem List   Diagnosis   • CAP (community acquired pneumonia)   • Sepsis   • Cigarette nicotine dependence with nicotine-induced disorder   • Essential hypertension   • Dyslipidemia   • Blood glucose elevated   • Muscle ache   • COPD (chronic obstructive pulmonary disease)   • Nuclear sclerotic cataract of right eye   • Acute respiratory failure   • COPD exacerbation     Past Medical History:   Diagnosis Date   • Arthritis    • COPD (chronic obstructive pulmonary disease)    • Gall stones    • Goiter     \"inward\"   • Hypertension    • Hypertension    • Kidney infection    • Kidney stone    • Kidney stones    • Migraine headache    • Scarlet fever      Past Surgical History:   Procedure Laterality Date   • BLADDER SURGERY     • CATARACT EXTRACTION W/ INTRAOCULAR LENS IMPLANT Left 2022    Procedure: CATARACT PHACO EXTRACTION WITH INTRAOCULAR LENS IMPLANT LEFT;  Surgeon: Sathish Lopez MD;  Location: [facility];  Service: Ophthalmology;  Laterality: Left;   • CATARACT EXTRACTION W/ INTRAOCULAR LENS IMPLANT Right 2022    Procedure: CATARACT PHACO EXTRACTION WITH INTRAOCULAR LENS IMPLANT RIGHT;  Surgeon: Sathish Lopez MD;  Location: [facility];  Service: Ophthalmology;  Laterality: Right;   • CHOLECYSTECTOMY     • HYSTERECTOMY     • TONSILLECTOMY        General Information     Row Name 23 1208          OT Time and Intention    Document Type evaluation  -AH     Mode of Treatment occupational therapy  -     Row Name 23 1204          General Information    Patient Profile Reviewed yes  -AH     Prior Level of Function independent:;ADL's;all household mobility  -AH     Existing " Precautions/Restrictions fall;oxygen therapy device and L/min  -     Barriers to Rehab medically complex  -[facility] Name 04/19/23 1208          Occupational Profile    Reason for Services/Referral (Occupational Profile) ADL decline  -[facility] Name 04/19/23 1208          Living Environment    People in Home alone  -[facility] Name 04/19/23 1208          Home Main Entrance    Number of Stairs, Main Entrance three  -     Stair Railings, Main Entrance railing on left side (ascending)  -[facility] Name 04/19/23 1208          Stairs Within Home, Primary    Number of Stairs, Within Home, Primary none  -[facility] Name 04/19/23 1208          Cognition    Orientation Status (Cognition) oriented x 4  -[facility] Name 04/19/23 1208          Safety Issues, Functional Mobility    Safety Issues Affecting Function (Mobility) insight into deficits/self-awareness;awareness of need for assistance;safety precaution awareness;safety precautions follow-through/compliance  -     Impairments Affecting Function (Mobility) endurance/activity tolerance;shortness of breath  -           User Key  (r) = Recorded By, (t) = Taken By, (c) = Cosigned By    Initials Name Provider Type     Roselyn Donaldson Occupational Therapist                 Mobility/ADL's     [facility] Name 04/19/23 1209          Bed Mobility    Bed Mobility supine-sit  -     Supine-Sit Piatt (Bed Mobility) standby assist  -     Assistive Device (Bed Mobility) head of bed elevated  -[facility] Name 04/19/23 1209          Transfers    Transfers sit-stand transfer  -AH     Row Name 04/19/23 1209          Sit-Stand Transfer    Sit-Stand Piatt (Transfers) contact guard  -     Assistive Device (Sit-Stand Transfers) walker, front-wheeled  -[facility] Name 04/19/23 1209          Functional Mobility    Functional Mobility- Ind. Level contact guard assist  -     Functional Mobility- Device walker, front-wheeled  -     Functional Mobility-Distance (Feet) 40   -     Functional Mobility- Safety Issues supplemental O2  -[facility] 04/19/23 1209          Activities of Daily Living    BADL Assessment/Intervention bathing;upper body dressing;lower body dressing;grooming;feeding;toileting  -[facility] Name 04/19/23 1209          Bathing Assessment/Intervention    [place] Level (Bathing) minimum assist (75% patient effort)  -[facility] Name 04/19/23 1209          Upper Body Dressing Assessment/Training    [place] Level (Upper Body Dressing) set up  -[facility] 04/19/23 1209          Lower Body Dressing Assessment/Training    [place] Level (Lower Body Dressing) minimum assist (75% patient effort)  [facility] 04/19/23 1209          Grooming Assessment/Training    [place] Level (Grooming) set up  -[facility] 04/19/23 1209          Self-Feeding Assessment/Training    [place] Level (Feeding) set up  -[facility] 04/19/23 1209          Toileting Assessment/Training    [place] Level (Toileting) minimum assist (75% patient effort)  -           User Key  (r) = Recorded By, (t) = Taken By, (c) = Cosigned By    Initials Name Provider Type    Roselyn Pena Occupational Therapist               Obj/Interventions     [facility] Name 04/19/23 1210          Sensory Assessment (Somatosensory)    Sensory Assessment (Somatosensory) sensation intact  -AH     Row Name 04/19/23 1210          Vision Assessment/Intervention    Visual Impairment/Limitations WFL  -[facility] Name 04/19/23 1210          Range of Motion Comprehensive    General Range of Motion bilateral upper extremity ROM L  -AH     Row Name 04/19/23 1210          Strength Comprehensive (MMT)    Comment, General Manual Muscle Testing (MMT) Assessment BUE 4/5  -           User Key  (r) = Recorded By, (t) = Taken By, (c) = Cosigned By    Initials Name Provider Type    Roselyn Pena Occupational Therapist               Goals/Plan     [facility] Name 04/19/23 1215          Transfer Goal  1 (OT)    Activity/Assistive Device (Transfer Goal 1, OT) sit-to-stand/stand-to-sit;walker, rolling  -     [employer] Level/Cues Needed (Transfer Goal 1, OT) modified independence  -     Time Frame (Transfer Goal 1, OT) by discharge  -     Progress/Outcome (Transfer Goal 1, OT) goal ongoing  -     Row Name 04/19/23 1215          Bathing Goal 1 (OT)    Activity/Device (Bathing Goal 1, OT) bathing skills, all  -     [employer] Level/Cues Needed (Bathing Goal 1, OT) set-up required  -     Time Frame (Bathing Goal 1, OT) by discharge  -     Progress/Outcomes (Bathing Goal 1, OT) goal ongoing  -     Row Name 04/19/23 1215          Dressing Goal 1 (OT)    Activity/Device (Dressing Goal 1, OT) lower body dressing  -     [employer]/Cues Needed (Dressing Goal 1, OT) set-up required  -     Time Frame (Dressing Goal 1, OT) long term goal (LTG);5 days  -     Progress/Outcome (Dressing Goal 1, OT) goal ongoing  -     Row Name 04/19/23 1215          Toileting Goal 1 (OT)    Activity/Device (Toileting Goal 1, OT) adjust/manage clothing;perform perineal hygiene;commode  -     [employer] Level/Cues Needed (Toileting Goal 1, OT) supervision required  -     Time Frame (Toileting Goal 1, OT) by discharge  -     Progress/Outcome (Toileting Goal 1, OT) goal ongoing  -     Row Name 04/19/23 1215          Strength Goal 1 (OT)    Strength Goal 1 (OT) Pt will perform UB strengthening ex using theraband for resistance.  -     Time Frame (Strength Goal 1, OT) by discharge  -     Progress/Outcome (Strength Goal 1, OT) goal ongoing  -     Row Name 04/19/23 1215          Therapy Assessment/Plan (OT)    Planned Therapy Interventions (OT) activity tolerance training;BADL retraining;patient/caregiver education/training;transfer/mobility retraining;strengthening exercise  -           User Key  (r) = Recorded By, (t) = Taken By, (c) = Cosigned By    Initials Name Provider Type    Roselyn Pena  Occupational Therapist               Clinical Impression     [facility] Name 04/19/23 1210          Pain Assessment    Pretreatment Pain Rating 0/10 - no pain  -     Posttreatment Pain Rating 0/10 - no pain  -     Pain Intervention(s) Repositioned;Ambulation/increased activity  -[facility] Name 04/19/23 1210          Plan of Care Review    Plan of Care Reviewed With patient  -     Progress no change  -     Outcome Evaluation Pt seen for OT evaluation today.  Pt presents with weakness, decreased endurance and decreased independence with self care and functional mobility tasks.  Pt sba to sit eob, cga to stand and was able to walk 40' with cga using RW.  Pt on 7L initially, increased to 10L for activity sats dropped to 89%, rebounded to 91% once sitting in the chair.  Pt was left on 7L O2 sats at 93%.  Pt min assist for LB dressing tasks.  Pt is expected to benefit from skilled OT to improve her strength, activity tolerance and independence with ADL tasks.  -[facility] Name 04/19/23 1210          Therapy Assessment/Plan (OT)    Patient/Family Therapy Goal Statement (OT) d/c home  -     Rehab Potential (OT) good, to achieve stated therapy goals  -     Criteria for Skilled Therapeutic Interventions Met (OT) yes;skilled treatment is necessary  -     Therapy Frequency (OT) 3 times/wk  -[facility] Name 04/19/23 1210          Therapy Plan Review/Discharge Plan (OT)    Anticipated Discharge Disposition (OT) home with home health;inpatient rehabilitation facility  -[facility] Name 04/19/23 1210          Vital Signs    Pre SpO2 (%) 93  -AH     O2 Delivery Pre Treatment hi-flow  7L  -AH     Intra SpO2 (%) 89  -AH     O2 Delivery Intra Treatment hi-flow  10L  -AH     Post SpO2 (%) 93  -AH     O2 Delivery Post Treatment hi-flow  7L  -AH     [facility] Name 04/19/23 1210          Positioning and Restraints    Pre-Treatment Position in bed  -     Post Treatment Position chair  -     In Chair reclined;call light within  reach;encouraged to call for assist;exit alarm on  -           User Key  (r) = Recorded By, (t) = Taken By, (c) = Cosigned By    Initials Name Provider Type    Roselyn Pena Occupational Therapist               Outcome Measures     Row Name 04/19/23 1216          How much help from another is currently needed...    Putting on and taking off regular lower body clothing? 3  -AH     Bathing (including washing, rinsing, and drying) 3  -AH     Toileting (which includes using toilet bed pan or urinal) 3  -AH     Putting on and taking off regular upper body clothing 3  -AH     Taking care of personal grooming (such as brushing teeth) 3  -AH     Eating meals 3  -AH     AM-PAC 6 Clicks Score (OT) 18  -AH     Row Name 04/19/23 0800          How much help from another person do you currently need...    Turning from your back to your side while in flat bed without using bedrails? 4  -LA     Moving from lying on back to sitting on the side of a flat bed without bedrails? 3  -LA     Moving to and from a bed to a chair (including a wheelchair)? 3  -LA     Standing up from a chair using your arms (e.g., wheelchair, bedside chair)? 3  -LA     Climbing 3-5 steps with a railing? 2  -LA     To walk in hospital room? 2  -LA     AM-PAC 6 Clicks Score (PT) 17  -LA     Highest level of mobility 5 --> Static standing  -LA     Row Name 04/19/23 1216          Functional Assessment    Outcome Measure Options AM-PAC 6 Clicks Daily Activity (OT)  -           User Key  (r) = Recorded By, (t) = Taken By, (c) = Cosigned By    Initials Name Provider Type    Roselyn Pena Occupational Therapist    Marisela Cervantes, RN Registered Nurse                Occupational Therapy Education     Title: PT OT SLP Therapies (In Progress)     Topic: Occupational Therapy (In Progress)     Point: ADL training (Done)     Description:   Instruct learner(s) on proper safety adaptation and remediation techniques during self care or transfers.   Instruct in  proper use of assistive devices.              Learning Progress Summary           Patient Acceptance, E,TB, VU by  at 4/19/2023 2786    Comment: Role of OT/POC                   Point: Home exercise program (Not Started)     Description:   Instruct learner(s) on appropriate technique for monitoring, assisting and/or progressing therapeutic exercises/activities.              Learner Progress:  Not documented in this visit.          Point: Precautions (Not Started)     Description:   Instruct learner(s) on prescribed precautions during self-care and functional transfers.              Learner Progress:  Not documented in this visit.          Point: Body mechanics (Not Started)     Description:   Instruct learner(s) on proper positioning and spine alignment during self-care, functional mobility activities and/or exercises.              Learner Progress:  Not documented in this visit.                      User Key     Initials Effective Dates Name Provider Type Discipline     06/16/21 -  Roselyn Donaldson Occupational Therapist OT              OT Recommendation and Plan  Planned Therapy Interventions (OT): activity tolerance training, BADL retraining, patient/caregiver education/training, transfer/mobility retraining, strengthening exercise  Therapy Frequency (OT): 3 times/wk  Plan of Care Review  Plan of Care Reviewed With: patient  Progress: no change  Outcome Evaluation: Pt seen for OT evaluation today.  Pt presents with weakness, decreased endurance and decreased independence with self care and functional mobility tasks.  Pt sba to sit eob, cga to stand and was able to walk 40' with cga using RW.  Pt on 7L initially, increased to 10L for activity sats dropped to 89%, rebounded to 91% once sitting in the chair.  Pt was left on 7L O2 sats at 93%.  Pt min assist for LB dressing tasks.  Pt is expected to benefit from skilled OT to improve her strength, activity tolerance and independence with ADL tasks.     Time  Calculation:    Time Calculation- OT     Row Name 04/19/23 1217             Time Calculation- OT    OT Start Time 0959  -      OT Received On 04/19/23  -      OT Goal Re-Cert Due Date 04/29/23  -         Untimed Charges    OT Eval/Re-eval Minutes 40  -         Total Minutes    Untimed Charges Total Minutes 40  -       Total Minutes 40  -AH            User Key  (r) = Recorded By, (t) = Taken By, (c) = Cosigned By    Initials Name Provider Type    Roselyn Pena Occupational Therapist              Therapy Charges for Today     Code Description Service Date Service Provider Modifiers Qty    [id number]  OT EVAL LOW COMPLEXITY 3 4/19/2023 Roselyn Donaldson GO 1               Roselyn Donaldson  4/19/2023

## 2023-04-19 NOTE — PROGRESS NOTES
"  CC: Acute Respiratory Failure.     S: Currently off PAP therapy.  Is definitely more responsive and able to answer questions appropriately.  Also inquired about \"when can I go home\".    ROS: Could not be reliably obtained as the patient is on PAP therapy.     O:Vital signs reviewed.   /64 (BP Location: Left arm, Patient Position: Lying)   Pulse 76   Temp 97.5 °F (36.4 °C) (Oral)   Resp 18   Ht 160 cm (62.99\")   Wt 70.5 kg (155 lb 6.8 oz)   SpO2 97%   BMI 27.54 kg/m²     Temp (24hrs), Av °F (36.7 °C), Min:97.2 °F (36.2 °C), Max:98.6 °F (37 °C)      I & Os reviewed.   Intake/Output       23 0700 - 23 0659 23 0700 - 23 0659    Intake (ml) 480 120    Output (ml) 200 --    Net (ml) 280 120    Last Weight 70.5 kg (155 lb 6.8 oz) --          Net IO Since Admission: 340 mL [23 0956]    General/Constitutional: On high flow nasal cannula.  Appears to be in some distress, when trying to talk in full sentences.  Eyes: PERRL.  Eyes were closed initially.  Neck: Supple without JVD. No obvious masses noted.   Cardiovascular: S1 + S2.  Regular  Lungs/Respiratory: Diminished bilateral air entry noted.  Bilateral wheezing heard.  Bibasilar crackles noted  GI/Abdomen: Soft. Bowel sounds positive.  No obvious organomegaly  Musculoskeletal/Extremities: No edema noted. Gait could not be assessed at this time, as the patient was laying in bed.   Neurologic: Was able to follow some commands.  Was able to answer questions more appropriately today.  Psych: Appears to be mildly depressed.  Skin: Appeared somewhat dry.       Labs: Reviewed.  Results from last 7 days   Lab Units 23  0642 23  0600 04/15/23  0811 23  1458   WBC 10*3/mm3 15.11* 12.09* 19.59* 20.98*   HEMOGLOBIN g/dL 14.4 14.0 14.6 15.3   HEMATOCRIT % 46.2 46.1 47.4* 47.4*   PLATELETS 10*3/mm3 303 282 274 240   NEUTROPHIL % % 85.6* 85.2* 83.8* 84.5*   NEUTROS ABS 10*3/mm3 12.94* 10.29* 16.40* 17.74*   EOSINOPHIL % % " 0.0* 0.0* 0.0* 1.0   EOS ABS 10*3/mm3 0.00 0.00 0.00 0.21   LYMPHOCYTE % % 8.3* 8.4* 7.1* 4.3*   LYMPHS ABS 10*3/mm3 1.25 1.02 1.40 0.90       Lab Results   Component Value Date    PROCALCITO 0.24 04/18/2023    PROCALCITO 1.10 (H) 04/16/2023    PROCALCITO 0.43 (H) 04/13/2023       No results found for: CRP    No results found for: SEDRATE    Lab Results   Component Value Date    PROBNP 272.8 04/13/2023    PROBNP 315.0 (C) 10/02/2017       Results from last 7 days   Lab Units 04/18/23  0707 04/17/23  0642 04/16/23  0600 04/15/23  0811 04/13/23  1458   SODIUM mmol/L 143 144 145   < > 138   POTASSIUM mmol/L 4.3 4.5 4.2   < > 2.9*   CHLORIDE mmol/L 100 100 103   < > 92*   CO2 mmol/L 35.1* 39.6* 38.0*   < > 33.7*   BUN mg/dL 24* 20 18   < > 12   CREATININE mg/dL 0.45* 0.40* 0.38*   < > 0.65   CALCIUM mg/dL 8.9 9.1 9.3   < > 9.4   ANION GAP mmol/L 7.9 4.4* 4.0*   < > 12.3   BILIRUBIN mg/dL  --   --   --   --  0.4   ALK PHOS U/L  --   --   --   --  114   ALT (SGPT) U/L  --   --   --   --  23   AST (SGOT) U/L  --   --   --   --  19   GLUCOSE mg/dL 164* 169* 173*   < > 160*   TOTAL PROTEIN g/dL  --   --   --   --  7.7   ALBUMIN g/dL  --   --   --   --  3.3*    < > = values in this interval not displayed.       Results from last 7 days   Lab Units 04/13/23  1458   MAGNESIUM mg/dL 1.8             No results found for: CKTOTAL    No components found for: HSTROPT    Lab Results   Component Value Date    TROPONINT 15 (H) 04/13/2023       No results found for: DDIMER    Brief Urine Lab Results  (Last result in the past 365 days)      Color   Clarity   Blood   Leuk Est   Nitrite   Protein   CREAT   Urine HCG        04/18/23 0344 Yellow   Clear   Negative   Negative   Negative   Negative                 No results found for: TSH    No results found for: FREET4      Micro: As of April 19, 2023   Lab Results   Component Value Date    RESPCX Light growth (2+) Normal Respiratory Ana 10/02/2017     No results found for: BCIDPCR  Lab  Results   Component Value Date    BLOODCX No growth at 2 days 04/16/2023    BLOODCX No growth at 2 days 04/16/2023    BLOODCX No growth at 5 days 10/01/2017    BLOODCX No growth at 5 days 10/01/2017     Lab Results   Component Value Date    URINECX Final report 02/07/2019    URINECX 20,000-30,000 CFU/mL Escherichia coli (A) 10/05/2018     No results found for: MRSACX  Lab Results   Component Value Date    MRSAPCR No MRSA Detected 04/15/2023     No results found for: URCX  No components found for: LOWRESPCF  No results found for: THROATCX  No results found for: CULTURES  No components found for: STREPBCX  No results found for: STREPPNEUAG  No results found for: LEGIONELLA  No results found for: MYCOPLASCX  No results found for: GCCX  No results found for: WOUNDCX  No results found for: BODYFLDCX    No results found for: FLU    Lab Results   Component Value Date    ADENOVIRUS Not Detected 04/13/2023     Lab Results   Component Value Date    XE001K Not Detected 04/13/2023     Lab Results   Component Value Date    CVHKU1 Not Detected 04/13/2023     Lab Results   Component Value Date    CVNL63 Not Detected 04/13/2023     Lab Results   Component Value Date    CVOC43 Not Detected 04/13/2023     Lab Results   Component Value Date    HUMETPNEVS Not Detected 04/13/2023     Lab Results   Component Value Date    HURVEV Not Detected 04/13/2023     Lab Results   Component Value Date    FLUBPCR Not Detected 04/13/2023     Lab Results   Component Value Date    PARAINFLUE Not Detected 04/13/2023     Lab Results   Component Value Date    PARAFLUV2 Not Detected 04/13/2023     Lab Results   Component Value Date    PARAFLUV3 Not Detected 04/13/2023     Lab Results   Component Value Date    PARAFLUV4 Not Detected 04/13/2023     Lab Results   Component Value Date    BPERTPCR Not Detected 04/13/2023     No results found for: [id number]  Lab Results   Component Value Date    CPNEUPCR Not Detected 04/13/2023     Lab Results   Component Value  Date    MPNEUMO Not Detected 04/13/2023     Lab Results   Component Value Date    FLUAPCR Not Detected 04/13/2023     No results found for: FLUAH3  No results found for: FLUAH1  Lab Results   Component Value Date    RSV Not Detected 04/13/2023     Lab Results   Component Value Date    BPARAPCR Not Detected 04/13/2023       COVID 19:  Lab Results   Component Value Date    COVID19 Not Detected 04/13/2023           ABG: Reviewed  Recent Labs     04/17/23  0844 04/17/23  0851 04/19/23  0745   PHART 7.453* 7.435 7.419   LEH8PST 61.3* 63.8* 64.0*   PO2ART 43.1* 60.1* 223.0*   XZE2VTB 42.9* 42.9* 41.4*   BASEEXCESS 15.5* 15.1* 13.6*       Lab Results   Component Value Date    LACTATE 1.2 04/13/2023    LACTATE 2.7 (C) 04/13/2023    LACTATE 1.6 10/01/2017         Echo: Results for orders placed during the hospital encounter of 04/13/23    Adult Transthoracic Echo Complete W/ Cont if Necessary Per Protocol    Interpretation Summary  •  Left ventricular diastolic function is consistent with (grade I) impaired relaxation.  •  Mild aortic valve stenosis is present.  •  Estimated right ventricular systolic pressure from tricuspid regurgitation is moderately elevated (45-55 mmHg).  •  Mild to moderate pulmonary hypertension is present.      Results for orders placed during the hospital encounter of 10/01/17    Adult Transthoracic Echo Complete W/ Cont if Necessary Per Protocol    Interpretation Summary  TEchnically difficult study  1) Borderline LVH with normal LV systolic function ( EF is over 55%)  2) Normal left atrial size with mild elevation in LVedp  3) Trace MR  4) Mild TR with normal PA pressures  5) Mild AI  6) Small RV with normal RV function        Pharmacy to Dose enoxaparin (LOVENOX),         CXRay: Latest imaging study was reviewed personally.   Imaging Results (Last 48 Hours)     Procedure Component Value Units Date/Time    XR Chest 1 View [[id number]] Collected: 04/18/23 0812     Updated: 04/18/23 0815    Narrative:       PROCEDURE: XR CHEST 1 VW-     HISTORY: Shortness of breath.     COMPARISON: 04/16/2023..     FINDINGS: The heart is normal in size. Minimal interstitial disease  present bilaterally, similar to prior.. The mediastinum is unremarkable.  There is no pneumothorax.  There are no acute osseous abnormalities.  Apical lordotic positioning noted. Slight blunting of the costophrenic  angles is stable.       Impression:      Stable chest..           This report was signed and finalized on 4/18/2023 8:13 AM by Shraddha Wharton MD.              Assessment & Recommendations/Plan:   1.  Acute respiratory failure   Continues to require high flow nasal cannula.  I have asked the nursing staff to decrease FiO2 as tolerated.  She is clearly tolerating her NIV device   She will need to be on AVAPS 1 hour on and 3 hours off.  Chest x-ray suggest the possibility of interstitial changes.  Latest ABG shows compensated, albeit significant, respiratory acidosis.  Given continued respiratory failure requiring high flow nasal cannula and chest x-ray from 4/18/2023 suggesting mild interstitial changes, I will administer Lasix 40 mg IV x1.     2.  Acute exacerbation of COPD   Continue Pulmicort, Brovana and Spiriva.  Likely the reason for acute respiratory failure.  We will recommend continuation of IV Solu-Medrol 40 mg, but will change the dose to every 12 hourly for now.  She may be transition to oral prednisone on 4/20/2023, when she has clinically improved.  Nebulized treatments have been adjusted, as clinically indicated.     3.  Chronic respiratory acidosis  Says that she will use NIV device at home.  This has been requested via case management and [facility] is working on getting her NIV device for home.     4.  Smoking  Was advised to quit smoking     5.  Pulmonary hypertension  Most likely secondary to likely severe underlying COPD  We will consider outpatient work-up.    6.?  Confusion/encephalopathy  Was likely due to  significant hypercarbia.  Seems to have slowly improved over the past 2 days.    7.  Fluid status  Net IO Since Admission: 340 mL [04/19/23 0956]  Lab Results   Component Value Date    PROBNP 272.8 04/13/2023    PROBNP 315.0 (C) 10/02/2017     8.  Miscellaneous.   She continues to require 6-10 L/min high flow nasal cannula which remains somewhat concerning.  Will need to be monitored closely    We have reviewed patient's current orders and changes, if any, have been suggested to primary care team. Plan was also discussed with nursing staff, as necessary.     We have updated the admitting attending and nursing staff, as appropriate, on the patient's current status and plan. I will be going off shift tonight and will be unavailable. Please contact oncall pulmonologist, if available.      This document was electronically signed by Arnaldo Soriano MD on 04/19/23 at 09:56 EDT      Dictated utilizing Dragon dictation.

## 2023-04-19 NOTE — PROGRESS NOTES
[facility]    Name:  Carolin Toscano   Age:  77 y.o.  Sex:  female  :  1945  MRN:  [id number]   Visit Number:  [id number]  Admission Date:  2023  Date Of Service:  23  Primary Care Physician:  Jason Nicholson MD     LOS: 6 days :    Chief Complaint:      Shortness of air    Subjective:    Patient seen and examined with no family at bedside.  Patient appears more alert and able to answer most of my questions this morning.  Does not know current year.  Was noted to pull off her oxygen throughout the night with desaturations noted.  Did require increased amount of oxygen at that time in order to recover.  Upon my exam patient was wearing nonrebreather.  Respiratory therapist had attempted to place on high flow nasal cannula with desaturations noted up to 15 L.  During my exam nonrebreather removed and placed high flow 5 L.  Patient appeared to maintain saturations.  Updated that she would need to wear her BiPAP is much as possible between meals.    Hospital Course:    Patient is a 77-year-old female with a history of ongoing tobacco abuse, COPD, hypertension, dyslipidemia, prior kidney stones, who presented to the emergency room with complaints of 3 to 4 days of increasing cough, shortness of breath and chills.  Patient states she actually quit smoking about 3 days ago.  Has noted change in sputum.  She has not followed up with PCP or pulmonology in a while.  She does not routinely wear oxygen at home.       In the ER, patient was hypoxic on arrival and was requiring up to 3 L of oxygen.  Otherwise hemodynamically stable.  She has a negative respiratory panel.  She had a lactic acid of 2.7 repeat 1.2.  She had a blood gas pH 7.47 PCO2 55.1 PO2 86.5, creatinine 0.65 potassium 2.9 CO2 33.7.  proBNP of 272 mildly elevated high-sensitivity troponin.  White count of 21 hemoglobin 15.  Negative flu COVID testing.  A1c 6.1 magnesium 1.8 and procalcitonin 0.43.   She had a chest x-ray was showing emphysematous changes but no acute abnormality.  A CT scan of the chest without contrast was showing findings concerning for bronchitis/bronchiolitis with interlobular septal thickening in the right lung base.  There is no focal airspace disease.  Patient was given empiric antibiotics, bronchodilators, steroids emergency room.  Due to acute respiratory failure with hypoxia, hospitalist consulted for further medical management.  Pulmonology also consulted.  Patient continued to require higher levels of oxygen.  Patient developed worsening respiratory acidosis requiring BiPAP.  CODE STATUS confirmed with daughter for which patient to remain full code.  Patient fortunately did improve and currently maintaining appropriate saturations on 5 L nasal cannula.  Solu-Medrol slowly weaned.  Procalcitonin and chest x-ray with improvement.     Review of Systems:     All systems were reviewed and negative except as mentioned in subjective, assessment and plan.    Vital Signs:    Temp:  [97.2 °F (36.2 °C)-98.6 °F (37 °C)] 97.5 °F (36.4 °C)  Heart Rate:  [60-76] 76  Resp:  [18-31] 18  BP: (116-130)/(50-64) 129/64    Intake and output:    I/O last 3 completed shifts:  In: 530 [P.O.:530]  Out: 900 [Urine:900]  I/O this shift:  In: 360 [P.O.:360]  Out: -     Physical Examination:    General Appearance:  Alert and cooperative.  Chronically ill/frail appearing elderly female.  Appears ill. Hoarseness noted.   Head:  Atraumatic and normocephalic.   Eyes: Conjunctivae and sclerae normal, no icterus. No pallor.   Throat: No oral lesions, no thrush, dry mucous membranes.   Neck: Supple, trachea midline, no thyromegaly.   Lungs:   Breath sounds heard bilaterally equally.  Scattered wheezing throughout and slightly labored on 5 L nasal cannula.  Appears improved today.   Heart:  Normal S1 and S2, no murmur, no gallop, no rub. No JVD.   Abdomen:   Normal bowel sounds, no masses, no organomegaly. Soft,  nontender, nondistended, no rebound tenderness.   Extremities: Supple, no edema, no cyanosis, no clubbing.   Skin: No bleeding or rash.   Neurologic: Alert and oriented x 2. No facial asymmetry. Moves all four limbs. No tremors.  Generalized weakness.     Laboratory results:    Results from last 7 days   Lab Units 04/18/23  0707 04/17/23  0642 04/16/23  0600 04/15/23  0811 04/13/23  1458   SODIUM mmol/L 143 144 145   < > 138   POTASSIUM mmol/L 4.3 4.5 4.2   < > 2.9*   CHLORIDE mmol/L 100 100 103   < > 92*   CO2 mmol/L 35.1* 39.6* 38.0*   < > 33.7*   BUN mg/dL 24* 20 18   < > 12   CREATININE mg/dL 0.45* 0.40* 0.38*   < > 0.65   CALCIUM mg/dL 8.9 9.1 9.3   < > 9.4   BILIRUBIN mg/dL  --   --   --   --  0.4   ALK PHOS U/L  --   --   --   --  114   ALT (SGPT) U/L  --   --   --   --  23   AST (SGOT) U/L  --   --   --   --  19   GLUCOSE mg/dL 164* 169* 173*   < > 160*    < > = values in this interval not displayed.     Results from last 7 days   Lab Units 04/17/23  0642 04/16/23  0600 04/15/23  0811   WBC 10*3/mm3 15.11* 12.09* 19.59*   HEMOGLOBIN g/dL 14.4 14.0 14.6   HEMATOCRIT % 46.2 46.1 47.4*   PLATELETS 10*3/mm3 303 282 274         Results from last 7 days   Lab Units 04/13/23  1458   HSTROP T ng/L 15*     Results from last 7 days   Lab Units 04/16/23  1006   BLOODCX  No growth at 3 days  No growth at 3 days     Recent Labs     04/17/23  0844 04/17/23  0851 04/19/23  0745   PHART 7.453* 7.435 7.419   INN5QKZ 61.3* 63.8* 64.0*   PO2ART 43.1* 60.1* 223.0*   IJD1XOD 42.9* 42.9* 41.4*   BASEEXCESS 15.5* 15.1* 13.6*      I have reviewed the patient's laboratory results.    Radiology results:    XR Chest 1 View    Result Date: 4/18/2023  PROCEDURE: XR CHEST 1 VW-  HISTORY: Shortness of breath.  COMPARISON: 04/16/2023..  FINDINGS: The heart is normal in size. Minimal interstitial disease present bilaterally, similar to prior.. The mediastinum is unremarkable. There is no pneumothorax.  There are no acute osseous  abnormalities. Apical lordotic positioning noted. Slight blunting of the costophrenic angles is stable.      Impression: Stable chest..    This report was signed and finalized on 4/18/2023 8:13 AM by Shraddha Wharton MD.    I have reviewed the patient's radiology reports.    Medication Review:     I have reviewed the patient's active and prn medications.     Problem List:      Acute respiratory failure    CAP (community acquired pneumonia)    Essential hypertension    COPD exacerbation      Assessment:    1. Acute respiratory failure with hypoxia and hypercapnia, POA  2. COPD with exacerbation, POA  3. Suspect underlying community-acquired pneumonia, present admission, due to unknown organism  4. Encephalopathy  5. Pulmonary hypertension  6. Hypertension  7. Chronic tobacco abuse  8. Arthritis     Plan:     Respiratory failure/COPD/pneumonia:  -Azactam x3 days.  Procalcitonin trending downward.  -Blood cultures x2 pending without growth to date.  -Continue with bronchodilators, Solu-Medrol, oxygen therapy.  Wean Solu-Medrol as patient tolerates.  Continues to require 5 L nasal cannula with intermittent BiPAP use.  Appreciate pulmonology consulting with recommendations.  -Given increased oxygen demands and current respiratory distress patient high risk for  deterioration.  Will monitor closely and consider transferring to ICU if any worsening.  -Advised out of chair to recliner and frequent incentive spirometry use.  -Tobacco cessation.  -Encephalopathy likely multifactorial.  Likely overall related to COPD/pneumonia with worsening respiratory failure.  Urinalysis/ammonia negative.  No focal deficits noted.  Currently alert and oriented x2 which appears to be baseline and much improved compared to days prior.  Continue to monitor closely.     Hypertension:  -Restart home atenolol and amlodipine.     Chronic tobacco abuse:  -Complicates all aspects of care     DVT Prophylaxis: Lovenox  Code Status: Full Code  Diet:  Regular  Discharge Plan: 2-3 days    Marga Monroy, APRN  04/19/23  10:39 EDT    Dictated utilizing Dragon dictation.

## 2023-04-19 NOTE — PLAN OF CARE
Goal Outcome Evaluation:  Plan of Care Reviewed With: patient        Progress: improving  Outcome Evaluation: Pt supine in bed nd willing to work with therapy.  Pt was able to transfer to EOB sba.  Pt perofmrmed sit to stand with cga and was able to ambulate 40' cga with rw.  Pt also performed N LE seated exercises.  See flowsheet for details

## 2023-04-19 NOTE — PLAN OF CARE
Goal Outcome Evaluation:  Plan of Care Reviewed With: patient        Progress: no change  Outcome Evaluation: Pt seen for OT evaluation today.  Pt presents with weakness, decreased endurance and decreased independence with self care and functional mobility tasks.  Pt sba to sit eob, cga to stand and was able to walk 40' with cga using RW.  Pt on 7L initially, increased to 10L for activity sats dropped to 89%, rebounded to 91% once sitting in the chair.  Pt was left on 7L O2 sats at 93%.  Pt min assist for LB dressing tasks.  Pt is expected to benefit from skilled OT to improve her strength, activity tolerance and independence with ADL tasks.

## 2023-04-19 NOTE — THERAPY TREATMENT NOTE
"Patient Name: Carolin Toscano  : 1945    MRN: [id number]                              Today's Date: 2023       Admit Date: 2023    Visit Dx:     ICD-10-CM ICD-9-CM   1. Acute respiratory failure with hypoxia  J96.01 518.81   2. COPD with acute exacerbation  J44.1 491.21     Patient Active Problem List   Diagnosis   • CAP (community acquired pneumonia)   • Sepsis   • Cigarette nicotine dependence with nicotine-induced disorder   • Essential hypertension   • Dyslipidemia   • Blood glucose elevated   • Muscle ache   • COPD (chronic obstructive pulmonary disease)   • Nuclear sclerotic cataract of right eye   • Acute respiratory failure   • COPD exacerbation     Past Medical History:   Diagnosis Date   • Arthritis    • COPD (chronic obstructive pulmonary disease)    • Gall stones    • Goiter     \"inward\"   • Hypertension    • Hypertension    • Kidney infection    • Kidney stone    • Kidney stones    • Migraine headache    • Scarlet fever      Past Surgical History:   Procedure Laterality Date   • BLADDER SURGERY     • CATARACT EXTRACTION W/ INTRAOCULAR LENS IMPLANT Left 2022    Procedure: CATARACT PHACO EXTRACTION WITH INTRAOCULAR LENS IMPLANT LEFT;  Surgeon: Sathish Lopez MD;  Location: [facility];  Service: Ophthalmology;  Laterality: Left;   • CATARACT EXTRACTION W/ INTRAOCULAR LENS IMPLANT Right 2022    Procedure: CATARACT PHACO EXTRACTION WITH INTRAOCULAR LENS IMPLANT RIGHT;  Surgeon: Sathish Lopez MD;  Location: [facility];  Service: Ophthalmology;  Laterality: Right;   • CHOLECYSTECTOMY     • HYSTERECTOMY     • TONSILLECTOMY        General Information     Row Name 23 1321          Physical Therapy Time and Intention    Document Type therapy note (daily note)  -RM     Mode of Treatment physical therapy  -RM     Row Name 23 1321          General Information    Patient Profile Reviewed yes  -RM     Existing Precautions/Restrictions fall;oxygen therapy device and " L/min  7-10L  -RM     Row Name 04/19/23 1321          Cognition    Orientation Status (Cognition) oriented x 4  -RM     Row Name 04/19/23 1321          Safety Issues, Functional Mobility    Safety Issues Affecting Function (Mobility) insight into deficits/self-awareness;awareness of need for assistance;positioning of assistive device;safety precaution awareness;safety precautions follow-through/compliance  -RM     Impairments Affecting Function (Mobility) endurance/activity tolerance;shortness of breath  -RM           User Key  (r) = Recorded By, (t) = Taken By, (c) = Cosigned By    Initials Name Provider Type    Ronak Jimenez, ELVIRA Physical Therapist Assistant               Mobility     Row Name 04/19/23 1326          Bed Mobility    Supine-Sit [place] (Bed Mobility) standby assist  -RM     Sit-Supine [place] (Bed Mobility) verbal cues;nonverbal cues (demo/gesture);contact guard  -RM     Assistive Device (Bed Mobility) head of bed elevated  -RM     Row Name 04/19/23 1326          Sit-Stand Transfer    Sit-Stand [place] (Transfers) contact guard  -RM     Assistive Device (Sit-Stand Transfers) walker, front-wheeled  -RM     Row Name 04/19/23 1326          Gait/Stairs (Locomotion)    [place] Level (Gait) contact guard;verbal cues  -RM     Assistive Device (Gait) walker, front-wheeled  -RM     Distance in Feet (Gait) 40'  -RM     Deviations/Abnormal Patterns (Gait) gait speed decreased;stride length decreased;base of support, narrow  -RM           User Key  (r) = Recorded By, (t) = Taken By, (c) = Cosigned By    Initials Name Provider Type    Ronak Jimenez PTA Physical Therapist Assistant               Obj/Interventions     Row Name 04/19/23 1327          Motor Skills    Therapeutic Exercise hip;knee;ankle  -RM     Row Name 04/19/23 1327          Hip (Therapeutic Exercise)    Hip (Therapeutic Exercise) isometric exercises;strengthening exercise  -RM     Hip Isometrics (Therapeutic  Exercise) gluteal sets;10 repetitions;bilateral  -RM     Hip Strengthening (Therapeutic Exercise) bilateral;marching while seated;10 repetitions  -RM     Row Name 04/19/23 1327          Knee (Therapeutic Exercise)    Knee (Therapeutic Exercise) strengthening exercise  -RM     Knee Strengthening (Therapeutic Exercise) bilateral;LAQ (long arc quad);10 repetitions  -RM     Row Name 04/19/23 1327          Ankle (Therapeutic Exercise)    Ankle (Therapeutic Exercise) AROM (active range of motion)  -RM     Ankle AROM (Therapeutic Exercise) bilateral;dorsiflexion;plantarflexion;10 repetitions  -RM           User Key  (r) = Recorded By, (t) = Taken By, (c) = Cosigned By    Initials Name Provider Type    RM Ronak Cruz, PTA Physical Therapist Assistant               Goals/Plan    No documentation.                Clinical Impression     Row Name 04/19/23 1328          Pain    Pretreatment Pain Rating 0/10 - no pain  -RM     Posttreatment Pain Rating 0/10 - no pain  -RM     Row Name 04/19/23 1328          Plan of Care Review    Plan of Care Reviewed With patient  -RM     Progress improving  -RM     Outcome Evaluation Pt supine in bed nd willing to work with therapy.  Pt was able to transfer to [facility].  Pt perofmrmed sit to stand with cga and was able to ambulate 40' cga with rw.  Pt also performed N LE seated exercises.  See flowsheet for details  -RM     Row Name 04/19/23 1328          Vital Signs    Pre SpO2 (%) 93  -RM     O2 Delivery Pre Treatment supplemental O2  7L  -RM     Intra SpO2 (%) 89  -RM     O2 Delivery Intra Treatment supplemental O2  10  -RM     Post SpO2 (%) 93  -RM     O2 Delivery Post Treatment supplemental O2  7  -RM     Pre Patient Position Supine  -RM     Intra Patient Position Standing  -RM     Post Patient Position Sitting  -RM     Row Name 04/19/23 1328          Positioning and Restraints    Pre-Treatment Position in bed  -RM     Post Treatment Position chair  -RM     In Chair reclined;call  light within reach;encouraged to call for assist;exit alarm on;notified nsg  -           User Key  (r) = Recorded By, (t) = Taken By, (c) = Cosigned By    Initials Name Provider Type    Ronak Jimenez, ELVIRA Physical Therapist Assistant               Outcome Measures     Row Name 04/19/23 1330 04/19/23 0800       How much help from another person do you currently need...    Turning from your back to your side while in flat bed without using bedrails? 4  -RM 4  -LA    Moving from lying on back to sitting on the side of a flat bed without bedrails? 4  -RM 3  -LA    Moving to and from a bed to a chair (including a wheelchair)? 3  -RM 3  -LA    Standing up from a chair using your arms (e.g., wheelchair, bedside chair)? 3  -RM 3  -LA    Climbing 3-5 steps with a railing? 2  -RM 2  -LA    To walk in hospital room? 3  -RM 2  -LA    AM-PAC 6 Clicks Score (PT) 19  -RM 17  -LA    Highest level of mobility 6 --> Walked 10 steps or more  -RM 5 --> Static standing  -LA    Row Name 04/19/23 1330 04/19/23 1216       Functional Assessment    Outcome Measure Options AM-PAC 6 Clicks Basic Mobility (PT)  - AM-PAC 6 Clicks Daily Activity (OT)  -          User Key  (r) = Recorded By, (t) = Taken By, (c) = Cosigned By    Initials Name Provider Type     Roselyn Donaldson Occupational Therapist    Ronak Jimenez, ELVIRA Physical Therapist Assistant    Marisela Cervantes RN Registered Nurse                             Physical Therapy Education     Title: PT OT SLP Therapies (In Progress)     Topic: Physical Therapy (In Progress)     Point: Mobility training (Done)     Learning Progress Summary           Patient Acceptance, E,TB,D, VU,NR by  at 4/19/2023 1331    Comment: exercise techniques and safety with mobility.    Acceptance, E, VU,NR by  at 4/15/2023 1514    Comment: pt education on calling for assist prior to trying to get up OOB and for her current PT POC/goals                   Point: Home exercise program (Done)      Learning Progress Summary           Patient Acceptance, E,TB,D, VU,NR by  at 4/19/2023 1331    Comment: exercise techniques and safety with mobility.    Acceptance, E,TB, VU,NR by  at 4/16/2023 1706    Comment: Perfom ex daily and inbtw therapy sessions                   Point: Body mechanics (Not Started)     Learner Progress:  Not documented in this visit.          Point: Precautions (Done)     Learning Progress Summary           Patient Acceptance, E, VU,NR by  at 4/15/2023 1514    Comment: pt education on calling for assist prior to trying to get up OOB and for her current PT POC/goals                               User Key     Initials Effective Dates Name Provider Type Discipline     06/16/21 -  Nasra Quiñones, PTA Physical Therapist Assistant PT     06/16/21 -  Ronak Cruz, PTA Physical Therapist Assistant PT     06/16/21 -  Deysi Goodwin, PT Physical Therapist PT              PT Recommendation and Plan     Plan of Care Reviewed With: patient  Progress: improving  Outcome Evaluation: Pt supine in bed nd willing to work with therapy.  Pt was able to transfer to [facility].  Pt perofmrmed sit to stand with cga and was able to ambulate 40' cga with rw.  Pt also performed N LE seated exercises.  See flowsheet for details     Time Calculation:    PT Charges     Row Name 04/19/23 1332             Time Calculation    Start Time 0957  -RM      Stop Time 1023  -RM      Time Calculation (min) 26 min  -RM      PT Received On 04/19/23  -RM      PT Goal Re-Cert Due Date 04/25/23  -RM         Time Calculation- PT    Total Timed Code Minutes- PT 26 minute(s)  -RM         Timed Charges    86529 - PT Therapeutic Exercise Minutes 10  -RM      48587 - Gait Training Minutes  16  -RM         Total Minutes    Timed Charges Total Minutes 26  -RM       Total Minutes 26  -RM            User Key  (r) = Recorded By, (t) = Taken By, (c) = Cosigned By    Initials Name Provider Type     Ronak Cruz, PTA Physical  Therapist Assistant              Therapy Charges for Today     Code Description Service Date Service Provider Modifiers Qty    60637445493 HC PT THER PROC EA 15 MIN 4/19/2023 Ronak Cruz, PTA GP 1    16917977515 HC GAIT TRAINING EA 15 MIN 4/19/2023 Ronak Cruz, PTA GP 1          PT G-Codes  Outcome Measure Options: AM-PAC 6 Clicks Basic Mobility (PT)  AM-PAC 6 Clicks Score (PT): 19  AM-PAC 6 Clicks Score (OT): 18       Ronak Cruz PTA  4/19/2023

## 2023-04-20 LAB
ANION GAP SERPL CALCULATED.3IONS-SCNC: 1.6 MMOL/L (ref 5–15)
BUN SERPL-MCNC: 24 MG/DL (ref 8–23)
BUN/CREAT SERPL: 54.5 (ref 7–25)
CALCIUM SPEC-SCNC: 8.9 MG/DL (ref 8.6–10.5)
CHLORIDE SERPL-SCNC: 100 MMOL/L (ref 98–107)
CO2 SERPL-SCNC: 40.4 MMOL/L (ref 22–29)
CREAT SERPL-MCNC: 0.44 MG/DL (ref 0.57–1)
DEPRECATED RDW RBC AUTO: 47.6 FL (ref 37–54)
EGFRCR SERPLBLD CKD-EPI 2021: 100.4 ML/MIN/1.73
ERYTHROCYTE [DISTWIDTH] IN BLOOD BY AUTOMATED COUNT: 14.1 % (ref 12.3–15.4)
GLUCOSE SERPL-MCNC: 182 MG/DL (ref 65–99)
HCT VFR BLD AUTO: 45.8 % (ref 34–46.6)
HGB BLD-MCNC: 14.4 G/DL (ref 12–15.9)
MCH RBC QN AUTO: 28.9 PG (ref 26.6–33)
MCHC RBC AUTO-ENTMCNC: 31.4 G/DL (ref 31.5–35.7)
MCV RBC AUTO: 91.8 FL (ref 79–97)
PLATELET # BLD AUTO: 264 10*3/MM3 (ref 140–450)
PMV BLD AUTO: 10.5 FL (ref 6–12)
POTASSIUM SERPL-SCNC: 5.1 MMOL/L (ref 3.5–5.2)
RBC # BLD AUTO: 4.99 10*6/MM3 (ref 3.77–5.28)
SODIUM SERPL-SCNC: 142 MMOL/L (ref 136–145)
WBC NRBC COR # BLD: 20.15 10*3/MM3 (ref 3.4–10.8)

## 2023-04-20 PROCEDURE — 97530 THERAPEUTIC ACTIVITIES: CPT

## 2023-04-20 PROCEDURE — 94799 UNLISTED PULMONARY SVC/PX: CPT

## 2023-04-20 PROCEDURE — 99232 SBSQ HOSP IP/OBS MODERATE 35: CPT | Performed by: INTERNAL MEDICINE

## 2023-04-20 PROCEDURE — 63710000001 PREDNISONE PER 1 MG: Performed by: NURSE PRACTITIONER

## 2023-04-20 PROCEDURE — 97110 THERAPEUTIC EXERCISES: CPT

## 2023-04-20 PROCEDURE — 80048 BASIC METABOLIC PNL TOTAL CA: CPT | Performed by: NURSE PRACTITIONER

## 2023-04-20 PROCEDURE — 99232 SBSQ HOSP IP/OBS MODERATE 35: CPT | Performed by: NURSE PRACTITIONER

## 2023-04-20 PROCEDURE — 94664 DEMO&/EVAL PT USE INHALER: CPT

## 2023-04-20 PROCEDURE — 85027 COMPLETE CBC AUTOMATED: CPT | Performed by: NURSE PRACTITIONER

## 2023-04-20 PROCEDURE — 63710000001 PREDNISONE PER 5 MG: Performed by: NURSE PRACTITIONER

## 2023-04-20 PROCEDURE — 94761 N-INVAS EAR/PLS OXIMETRY MLT: CPT

## 2023-04-20 PROCEDURE — 94660 CPAP INITIATION&MGMT: CPT

## 2023-04-20 PROCEDURE — 25010000002 METHYLPREDNISOLONE PER 40 MG: Performed by: INTERNAL MEDICINE

## 2023-04-20 PROCEDURE — 97116 GAIT TRAINING THERAPY: CPT

## 2023-04-20 RX ADMIN — SENNOSIDES AND DOCUSATE SODIUM 2 TABLET: 50; 8.6 TABLET ORAL at 09:10

## 2023-04-20 RX ADMIN — IPRATROPIUM BROMIDE AND ALBUTEROL SULFATE 3 ML: 2.5; .5 SOLUTION RESPIRATORY (INHALATION) at 08:30

## 2023-04-20 RX ADMIN — BUDESONIDE 1 MG: 0.5 INHALANT RESPIRATORY (INHALATION) at 20:36

## 2023-04-20 RX ADMIN — SENNOSIDES AND DOCUSATE SODIUM 2 TABLET: 50; 8.6 TABLET ORAL at 21:32

## 2023-04-20 RX ADMIN — ATENOLOL 25 MG: 25 TABLET ORAL at 09:10

## 2023-04-20 RX ADMIN — IPRATROPIUM BROMIDE AND ALBUTEROL SULFATE 3 ML: 2.5; .5 SOLUTION RESPIRATORY (INHALATION) at 13:51

## 2023-04-20 RX ADMIN — PREDNISONE 50 MG: 20 TABLET ORAL at 13:15

## 2023-04-20 RX ADMIN — BUDESONIDE 1 MG: 0.5 INHALANT RESPIRATORY (INHALATION) at 08:29

## 2023-04-20 RX ADMIN — TIOTROPIUM BROMIDE INHALATION SPRAY 2 PUFF: 3.12 SPRAY, METERED RESPIRATORY (INHALATION) at 08:30

## 2023-04-20 RX ADMIN — AMLODIPINE BESYLATE 10 MG: 5 TABLET ORAL at 09:10

## 2023-04-20 RX ADMIN — IPRATROPIUM BROMIDE AND ALBUTEROL SULFATE 3 ML: 2.5; .5 SOLUTION RESPIRATORY (INHALATION) at 20:35

## 2023-04-20 RX ADMIN — ARFORMOTEROL TARTRATE 15 MCG: 15 SOLUTION RESPIRATORY (INHALATION) at 20:35

## 2023-04-20 RX ADMIN — Medication 10 ML: at 21:32

## 2023-04-20 RX ADMIN — Medication 10 ML: at 09:10

## 2023-04-20 RX ADMIN — ARFORMOTEROL TARTRATE 15 MCG: 15 SOLUTION RESPIRATORY (INHALATION) at 08:30

## 2023-04-20 RX ADMIN — Medication 1 PATCH: at 21:30

## 2023-04-20 RX ADMIN — METHYLPREDNISOLONE SODIUM SUCCINATE 40 MG: 40 INJECTION, POWDER, FOR SOLUTION INTRAMUSCULAR; INTRAVENOUS at 03:58

## 2023-04-20 NOTE — PROGRESS NOTES
"  CC: Acute Respiratory Failure.     S: Currently off PAP therapy.  Weaned down to 5L NC it is asking when she can go home.  States she is feeling better overall.  Still has some cough, but mostly dry.  Denies fevers or chills.    ROS: Denies fevers, chest pain or diarrhea    O:Vital signs reviewed.   /65 (BP Location: Left arm, Patient Position: Lying)   Pulse 69   Temp 98.7 °F (37.1 °C) (Oral)   Resp 18   Ht 160 cm (62.99\")   Wt 70.5 kg (155 lb 6.8 oz)   SpO2 97%   BMI 27.54 kg/m²     Temp (24hrs), Av.5 °F (36.9 °C), Min:98.3 °F (36.8 °C), Max:98.7 °F (37.1 °C)      I & Os reviewed.   Intake/Output       23 0700 - 23 0659    Intake (ml) 600    Output (ml) 2000    Net (ml) -1400    Last Weight 70.5 kg (155 lb 6.8 oz)          Net IO Since Admission: -1,180 mL [23 0846]    General/Constitutional: On nasal cannula.  Appears chronically ill, but no significant respiratory distress when speaking in complete sentences  Eyes: Pupils equal, no eye discharge   neck: Supple without JVD. No obvious masses noted.   Cardiovascular: S1 + S2.  Regular rate  Lungs/Respiratory: Creased breath sounds throughout with scattered wheezing  GI/Abdomen: Soft. Bowel sounds positive.  No obvious tenderness to palpation   musculoskeletal/Extremities: No edema noted.  No cyanosis.    Neurologic: Follows commands, answers questions appropriately, spontaneously moving all 4 extremities   psych: Does not appear anxious on exam   skin: Appeared somewhat dry.       Labs: Reviewed.  Results from last 7 days   Lab Units 23  0640 23  0642 23  0600 04/15/23  0811 23  1458   WBC 10*3/mm3 20.15* 15.11* 12.09* 19.59* 20.98*   HEMOGLOBIN g/dL 14.4 14.4 14.0 14.6 15.3   HEMATOCRIT % 45.8 46.2 46.1 47.4* 47.4*   PLATELETS 10*3/mm3 264 303 282 274 240   NEUTROPHIL % %  --  85.6* 85.2* 83.8* 84.5*   NEUTROS ABS 10*3/mm3  --  12.94* 10.29* 16.40* 17.74*   EOSINOPHIL % %  --  0.0* 0.0* 0.0* 1.0   EOS " ABS 10*3/mm3  --  0.00 0.00 0.00 0.21   LYMPHOCYTE % %  --  8.3* 8.4* 7.1* 4.3*   LYMPHS ABS 10*3/mm3  --  1.25 1.02 1.40 0.90       Lab Results   Component Value Date    PROCALCITO 0.24 04/18/2023    PROCALCITO 1.10 (H) 04/16/2023    PROCALCITO 0.43 (H) 04/13/2023       No results found for: CRP    No results found for: SEDRATE    Lab Results   Component Value Date    PROBNP 272.8 04/13/2023    PROBNP 315.0 (C) 10/02/2017       Results from last 7 days   Lab Units 04/20/23  0640 04/18/23  0707 04/17/23  0642 04/15/23  0811 04/13/23  1458   SODIUM mmol/L 142 143 144   < > 138   POTASSIUM mmol/L 5.1 4.3 4.5   < > 2.9*   CHLORIDE mmol/L 100 100 100   < > 92*   CO2 mmol/L 40.4* 35.1* 39.6*   < > 33.7*   BUN mg/dL 24* 24* 20   < > 12   CREATININE mg/dL 0.44* 0.45* 0.40*   < > 0.65   CALCIUM mg/dL 8.9 8.9 9.1   < > 9.4   ANION GAP mmol/L 1.6* 7.9 4.4*   < > 12.3   BILIRUBIN mg/dL  --   --   --   --  0.4   ALK PHOS U/L  --   --   --   --  114   ALT (SGPT) U/L  --   --   --   --  23   AST (SGOT) U/L  --   --   --   --  19   GLUCOSE mg/dL 182* 164* 169*   < > 160*   TOTAL PROTEIN g/dL  --   --   --   --  7.7   ALBUMIN g/dL  --   --   --   --  3.3*    < > = values in this interval not displayed.       Results from last 7 days   Lab Units 04/13/23  1458   MAGNESIUM mg/dL 1.8             No results found for: CKTOTAL    No components found for: HSTROPT    Lab Results   Component Value Date    TROPONINT 15 (H) 04/13/2023       No results found for: DDIMER    Brief Urine Lab Results  (Last result in the past 365 days)      Color   Clarity   Blood   Leuk Est   Nitrite   Protein   CREAT   Urine HCG        04/18/23 0344 Yellow   Clear   Negative   Negative   Negative   Negative                 No results found for: TSH    No results found for: FREET4      Micro: As of April 20, 2023   Lab Results   Component Value Date    RESPCX Light growth (2+) Normal Respiratory Ana 10/02/2017     No results found for: BCIDPCR  Lab Results    Component Value Date    BLOODCX No growth at 3 days 04/16/2023    BLOODCX No growth at 3 days 04/16/2023    BLOODCX No growth at 5 days 10/01/2017    BLOODCX No growth at 5 days 10/01/2017     Lab Results   Component Value Date    URINECX Final report 02/07/2019    URINECX 20,000-30,000 CFU/mL Escherichia coli (A) 10/05/2018     No results found for: MRSACX  Lab Results   Component Value Date    MRSAPCR No MRSA Detected 04/15/2023     No results found for: URCX  No components found for: LOWRESPCF  No results found for: THROATCX  No results found for: CULTURES  No components found for: STREPBCX  No results found for: STREPPNEUAG  No results found for: LEGIONELLA  No results found for: MYCOPLASCX  No results found for: GCCX  No results found for: WOUNDCX  No results found for: BODYFLDCX    No results found for: FLU    Lab Results   Component Value Date    ADENOVIRUS Not Detected 04/13/2023     Lab Results   Component Value Date    MF379Q Not Detected 04/13/2023     Lab Results   Component Value Date    CVHKU1 Not Detected 04/13/2023     Lab Results   Component Value Date    CVNL63 Not Detected 04/13/2023     Lab Results   Component Value Date    CVOC43 Not Detected 04/13/2023     Lab Results   Component Value Date    HUMETPNEVS Not Detected 04/13/2023     Lab Results   Component Value Date    HURVEV Not Detected 04/13/2023     Lab Results   Component Value Date    FLUBPCR Not Detected 04/13/2023     Lab Results   Component Value Date    PARAINFLUE Not Detected 04/13/2023     Lab Results   Component Value Date    PARAFLUV2 Not Detected 04/13/2023     Lab Results   Component Value Date    PARAFLUV3 Not Detected 04/13/2023     Lab Results   Component Value Date    PARAFLUV4 Not Detected 04/13/2023     Lab Results   Component Value Date    BPERTPCR Not Detected 04/13/2023     No results found for: [id number]  Lab Results   Component Value Date    CPNEUPCR Not Detected 04/13/2023     Lab Results   Component Value Date     MPNEUMO Not Detected 04/13/2023     Lab Results   Component Value Date    FLUAPCR Not Detected 04/13/2023     No results found for: FLUAH3  No results found for: FLUAH1  Lab Results   Component Value Date    RSV Not Detected 04/13/2023     Lab Results   Component Value Date    BPARAPCR Not Detected 04/13/2023       COVID 19:  Lab Results   Component Value Date    COVID19 Not Detected 04/13/2023           ABG: Reviewed  Recent Labs     04/17/23  0844 04/17/23  0851 04/19/23  0745   PHART 7.453* 7.435 7.419   IHC3PIP 61.3* 63.8* 64.0*   PO2ART 43.1* 60.1* 223.0*   IQQ8FEF 42.9* 42.9* 41.4*   BASEEXCESS 15.5* 15.1* 13.6*       Lab Results   Component Value Date    LACTATE 1.2 04/13/2023    LACTATE 2.7 (C) 04/13/2023    LACTATE 1.6 10/01/2017         Echo: Results for orders placed during the hospital encounter of 04/13/23    Adult Transthoracic Echo Complete W/ Cont if Necessary Per Protocol    Interpretation Summary  •  Left ventricular diastolic function is consistent with (grade I) impaired relaxation.  •  Mild aortic valve stenosis is present.  •  Estimated right ventricular systolic pressure from tricuspid regurgitation is moderately elevated (45-55 mmHg).  •  Mild to moderate pulmonary hypertension is present.      Results for orders placed during the hospital encounter of 10/01/17    Adult Transthoracic Echo Complete W/ Cont if Necessary Per Protocol    Interpretation Summary  TEchnically difficult study  1) Borderline LVH with normal LV systolic function ( EF is over 55%)  2) Normal left atrial size with mild elevation in LVedp  3) Trace MR  4) Mild TR with normal PA pressures  5) Mild AI  6) Small RV with normal RV function        Pharmacy to Dose enoxaparin (LOVENOX),         CXRay: Latest imaging study was reviewed personally.   Imaging Results (Last 48 Hours)     ** No results found for the last 48 hours. **          Assessment & Recommendations/Plan:   1.  Acute respiratory failure   Continues to require  high flow nasal cannula and will continue to wean oxygen as tolerated.  She is clearly tolerating her NIV device and will require this at hospital discharge given her chronic hypercapnic respiratory failure.  Patient tried and failed BiPAP and will need NIV at hospital discharge to decrease exacerbations and frequent hospitalizations secondary to her end-stage COPD.    2.  Acute exacerbation of COPD   Continue Pulmicort, Brovana and Spiriva.  Trelegy has been ordered for after hospital discharge.    Transition from IV to oral steroids today    3.  Chronic respiratory acidosis  Says that she will use NIV device at home.  DME order for after discharge of NIV placed today     4.  Smoking  Was advised to quit smoking     5.  Pulmonary hypertension  Most likely secondary to likely severe underlying COPD  Will need outpatient work-up    6.?  Confusion/encephalopathy  Much improved    7.  Fluid status  Net IO Since Admission: -1,180 mL [04/20/23 0846]  Lab Results   Component Value Date    PROBNP 272.8 04/13/2023    PROBNP 315.0 (C) 10/02/2017       We have reviewed patient's current orders and changes, if any, have been suggested to primary care team. Plan was also discussed with nursing staff, as necessary.     We have updated the admitting attending and nursing staff, as appropriate, on the patient's current status and plan. I will be going off shift tonight and will be unavailable. Please contact oncall pulmonologist, if available.      This document was electronically signed by Miranda Cline MD on 04/20/23 at 08:46 EDT      Dictated utilizing Dragon dictation.

## 2023-04-20 NOTE — PLAN OF CARE
Goal Outcome Evaluation:  Plan of Care Reviewed With: patient        Progress: improving  Outcome Evaluation: OT tx completed. Patient sitting in chair, on 5L O2 satting 93%. Patient able to tatum/doff socks in supported sitting with SBA. Patient performed grooming tasks with S/U followed by UB ther ex using resistance band. Patient maintained O2 89% with activity. Continue OT POC

## 2023-04-20 NOTE — THERAPY TREATMENT NOTE
"Patient Name: Carolin Toscano  : 1945    MRN: [id number]                              Today's Date: 2023       Admit Date: 2023    Visit Dx:     ICD-10-CM ICD-9-CM   1. Acute respiratory failure with hypoxia  J96.01 518.81   2. COPD with acute exacerbation  J44.1 491.21   3. COPD exacerbation  J44.1 491.21   4. Acute respiratory failure with hypoxia and hypercapnia  J96.01 518.81    J96.02      Patient Active Problem List   Diagnosis   • CAP (community acquired pneumonia)   • Sepsis   • Cigarette nicotine dependence with nicotine-induced disorder   • Essential hypertension   • Dyslipidemia   • Blood glucose elevated   • Muscle ache   • COPD (chronic obstructive pulmonary disease)   • Nuclear sclerotic cataract of right eye   • Acute respiratory failure   • COPD exacerbation     Past Medical History:   Diagnosis Date   • Arthritis    • COPD (chronic obstructive pulmonary disease)    • Gall stones    • Goiter     \"inward\"   • Hypertension    • Hypertension    • Kidney infection    • Kidney stone    • Kidney stones    • Migraine headache    • Scarlet fever      Past Surgical History:   Procedure Laterality Date   • BLADDER SURGERY     • CATARACT EXTRACTION W/ INTRAOCULAR LENS IMPLANT Left 2022    Procedure: CATARACT PHACO EXTRACTION WITH INTRAOCULAR LENS IMPLANT LEFT;  Surgeon: Sathish Lopez MD;  Location: [facility];  Service: Ophthalmology;  Laterality: Left;   • CATARACT EXTRACTION W/ INTRAOCULAR LENS IMPLANT Right 2022    Procedure: CATARACT PHACO EXTRACTION WITH INTRAOCULAR LENS IMPLANT RIGHT;  Surgeon: Sathish Lopez MD;  Location: [facility];  Service: Ophthalmology;  Laterality: Right;   • CHOLECYSTECTOMY     • HYSTERECTOMY     • TONSILLECTOMY        General Information     Row Name 23 1251          Physical Therapy Time and Intention    Document Type therapy note (daily note)  -RM     Mode of Treatment physical therapy  -RM     Row Name 23 1251          " General Information    Patient Profile Reviewed yes  -RM     Existing Precautions/Restrictions fall;oxygen therapy device and L/min  6L  -RM     Row Name 04/20/23 1251          Cognition    Orientation Status (Cognition) oriented x 4  -RM     Row Name 04/20/23 1251          Safety Issues, Functional Mobility    Safety Issues Affecting Function (Mobility) insight into deficits/self-awareness;positioning of assistive device;safety precaution awareness;safety precautions follow-through/compliance  -RM     Impairments Affecting Function (Mobility) endurance/activity tolerance;shortness of breath  -RM     Comment, Safety Issues/Impairments (Mobility) Hand placment with transfers and positioning for safe stand to sit.  -RM           User Key  (r) = Recorded By, (t) = Taken By, (c) = Cosigned By    Initials Name Provider Type    Ronak Jimenez, ELVIRA Physical Therapist Assistant               Mobility     Row Name 04/20/23 1253          Bed Mobility    Supine-Sit Ola (Bed Mobility) contact guard;minimum assist (75% patient effort);verbal cues  -RM     Assistive Device (Bed Mobility) head of bed elevated  -RM     Row Name 04/20/23 1253          Sit-Stand Transfer    Sit-Stand Ola (Transfers) contact guard  -RM     Assistive Device (Sit-Stand Transfers) walker, front-wheeled  -RM     Row Name 04/20/23 1253          Gait/Stairs (Locomotion)    Ola Level (Gait) contact guard;verbal cues  -RM     Assistive Device (Gait) walker, front-wheeled  -RM     Distance in Feet (Gait) 34' x 2 with one standing rest  -RM     Deviations/Abnormal Patterns (Gait) gait speed decreased;stride length decreased;base of support, narrow  -RM     Bilateral Gait Deviations forward flexed posture  -RM           User Key  (r) = Recorded By, (t) = Taken By, (c) = Cosigned By    Initials Name Provider Type    Ronak Jimenez, ELVIRA Physical Therapist Assistant               Obj/Interventions    No documentation.                 Goals/Plan    No documentation.                Clinical Impression     Row Name 04/20/23 1255          Pain    Pain Intervention(s) Repositioned;Ambulation/increased activity  -RM     Row Name 04/20/23 1255          Vital Signs    Pre Patient Position Supine  -RM     Intra Patient Position Standing  -RM     Post Patient Position Sitting  -RM           User Key  (r) = Recorded By, (t) = Taken By, (c) = Cosigned By    Initials Name Provider Type     Ronak Cruz, ELVIRA Physical Therapist Assistant               Outcome Measures     Row Name 04/20/23 1300          How much help from another person do you currently need...    Turning from your back to your side while in flat bed without using bedrails? 4  -RM     Moving from lying on back to sitting on the side of a flat bed without bedrails? 3  -RM     Moving to and from a bed to a chair (including a wheelchair)? 3  -RM     Standing up from a chair using your arms (e.g., wheelchair, bedside chair)? 3  -RM     Climbing 3-5 steps with a railing? 2  -RM     To walk in hospital room? 3  -RM     AM-PAC 6 Clicks Score (PT) 18  -RM     Highest level of mobility 6 --> Walked 10 steps or more  -RM     Row Name 04/20/23 1300 04/20/23 1257       Functional Assessment    Outcome Measure Options AM-PAC 6 Clicks Basic Mobility (PT)  -RM AM-PAC 6 Clicks Daily Activity (OT)  -SD          User Key  (r) = Recorded By, (t) = Taken By, (c) = Cosigned By    Initials Name Provider Type    Ronak Jimenez, PTA Physical Therapist Assistant    Cecelia Johnson OT Occupational Therapist                             Physical Therapy Education     Title: PT OT SLP Therapies (In Progress)     Topic: Physical Therapy (In Progress)     Point: Mobility training (Done)     Learning Progress Summary           Patient Acceptance, E,TB,D, VU,NR by  at 4/20/2023 1300    Comment: safety with mobility    Acceptance, E,TB,D, VU,NR by  at 4/19/2023 1331    Comment: exercise techniques  and safety with mobility.    Acceptance, E, VU,NR by  at 4/15/2023 1514    Comment: pt education on calling for assist prior to trying to get up OOB and for her current PT POC/goals                   Point: Home exercise program (Done)     Learning Progress Summary           Patient Acceptance, E,TB,D, VU,NR by  at 4/19/2023 1331    Comment: exercise techniques and safety with mobility.    Acceptance, E,TB, VU,NR by  at 4/16/2023 1706    Comment: Perfom ex daily and inbtw therapy sessions                   Point: Body mechanics (Not Started)     Learner Progress:  Not documented in this visit.          Point: Precautions (Done)     Learning Progress Summary           Patient Acceptance, E, VU,NR by  at 4/15/2023 1514    Comment: pt education on calling for assist prior to trying to get up OOB and for her current PT POC/goals                               User Key     Initials Effective Dates Name Provider Type Discipline    CC 06/16/21 -  Nasra Quiñones, PTA Physical Therapist Assistant PT     06/16/21 -  Ronak Cruz, PTA Physical Therapist Assistant PT     06/16/21 -  Deysi Goodwin, PT Physical Therapist PT              PT Recommendation and Plan     Plan of Care Reviewed With: patient  Progress: improving  Outcome Evaluation: Pt supine in bed nd willing to work with therapy.  Pt was able to transfer to B sba.  Pt perofmrmed sit to stand with cga and was able to ambulate 40' cga with rw.  Pt also performed N LE seated exercises.  See flowsheet for details     Time Calculation:    PT Charges     Row Name 04/20/23 1301             Time Calculation    Start Time 1033  -RM      Stop Time 1115  -RM      Time Calculation (min) 42 min  -RM      PT Received On 04/20/23  -RM      PT Goal Re-Cert Due Date 04/25/23  -RM         Time Calculation- PT    Total Timed Code Minutes- PT 42 minute(s)  -RM         Timed Charges    81706 - Gait Training Minutes  25  -RM      43881 - PT Therapeutic Activity  Minutes 17  -RM         Total Minutes    Timed Charges Total Minutes 42  -RM       Total Minutes 42  -RM            User Key  (r) = Recorded By, (t) = Taken By, (c) = Cosigned By    Initials Name Provider Type    Ronak Jimenez, ELVIRA Physical Therapist Assistant              Therapy Charges for Today     Code Description Service Date Service Provider Modifiers Qty    96944964262 HC PT THER PROC EA 15 MIN 4/19/2023 Ronak Cruz, PTA GP 1    67949889687 HC GAIT TRAINING EA 15 MIN 4/19/2023 Ronak Cruz, PTA GP 1    44975552828 HC GAIT TRAINING EA 15 MIN 4/20/2023 Ronak Cruz, PTA GP 2    76809819978 HC PT THERAPEUTIC ACT EA 15 MIN 4/20/2023 Ronak Cruz, PTA GP 1          PT G-Codes  Outcome Measure Options: AM-PAC 6 Clicks Basic Mobility (PT)  AM-PAC 6 Clicks Score (PT): 18  AM-PAC 6 Clicks Score (OT): 18       Ronak Cruz PTA  4/20/2023

## 2023-04-20 NOTE — CASE MANAGEMENT/SOCIAL WORK
Case Management/Social Work    Patient Name:  Carolin Toscano  YOB: 1945  MRN: [id number]  Admit Date:  4/13/2023      Sw met with pt at bedside. Provided the number for [employer] at pt's request.  Asked pt about HH services and she declined. States she has no needs at discharge. Eager to go home.       Electronically signed by:  Merle Torres  04/20/23 15:21 EDT

## 2023-04-20 NOTE — PLAN OF CARE
Goal Outcome Evaluation:  Plan of Care Reviewed With: patient        Progress: no change  Outcome Evaluation: no acute events during shift. pt not compliant with BIPAP. 5L SFM. no other changes in pt condition. will continue to monitor.

## 2023-04-20 NOTE — PROGRESS NOTES
"Adult Nutrition  Assessment/PES    Patient Name:  Carolin Toscano  YOB: 1945  MRN: [id number]  Admit Date:  4/13/2023    Assessment Date:  4/20/2023    Comments:    LOS x 7: Pt with PMHx significant for COPD, HTN, dyslipidemia presented to [facility] 4/13 with c/o SOB, cough, chills. Pt admitted with pneumonia, respiratory failure.  lbs, normal weight for age per BMI 27.54. No recent weight changes indicated per nutrition screen. Currently receiving a regular diet with PO intakes averaging 75% x 2 days, 4 meals. RD to monitor for PO adequacy, weight, meds, labs and overall nutrition status and will F/U. Available PRN.      Reason for Assessment     Row Name 04/20/23 0942          Reason for Assessment    Reason For Assessment per organizational policy  LOS                  Labs/Tests/Procedures/Meds     Row Name 04/20/23 0943          Labs/Procedures/Meds    Lab Results Reviewed reviewed, pertinent     Lab Results Comments Low: Cr; High: BUN, Glu        Medications    Pertinent Medications Reviewed reviewed, pertinent     Pertinent Medications Comments lovenox, docusate, NaCl                Physical Findings     Row Name 04/20/23 0944          Physical Findings    Overall Physical Appearance normal weight for age, Jose Miguel score 17                Estimated/Assessed Needs - Anthropometrics     Row Name 04/20/23 0944 04/20/23 0411       Anthropometrics    Weight -- 70.5 kg (155 lb 6.8 oz)    Height for Calculation 0.63 m (2' 0.8\") --    Weight for Calculation 71 kg (156 lb 8.4 oz)  CBW --       Estimated/Assessed Needs    Additional Documentation Fluid Requirements (Group);KCAL/KG (Group);Estimated Calorie Needs (Group);Protein Requirements (Group) --       Estimated Calorie Needs    Estimated Calorie Requirement (kcal/day) 1,775-2,130 --    Estimated Calorie Need Method kcal/kg --       KCAL/KG    KCAL/KG 25 Kcal/Kg (kcal);30 Kcal/Kg (kcal) --    25 Kcal/Kg (kcal) 1775 --    30 Kcal/Kg (kcal) 2130 --    "    Protein Requirements    Weight Used For Protein Calculations 71 kg (156 lb 8.4 oz)  CBW --    Est Protein Requirement Amount (gms/kg) 1.2 gm protein --    Estimated Protein Requirements (gms/day) 85.2 --       Fluid Requirements    Fluid Requirements (mL/day) 1775  1 mL/kcal --    RDA Method (mL) 1775 --               Nutrition Prescription Ordered     Row Name 04/20/23 0946          Nutrition Prescription PO    Current PO Diet Regular     Fluid Consistency Thin                Evaluation of Received Nutrient/Fluid Intake     Row Name 04/20/23 0946          Intake Assessment    Energy/Calorie Requirement Assessment meeting needs     Protein Requirement Assessment meeting needs     Fluid Requirement Assessment meeting needs        PO Evaluation    Number of Days PO Intake Evaluated 2 days     Number of Meals 4     % PO Intake 75                   Problem/Interventions:   Problem 1     Row Name 04/20/23 0947          Nutrition Diagnoses Problem 1    Problem 1 Increased Nutrient Needs     Macronutrient Protein     Etiology (related to) Medical Diagnosis     Signs/Symptoms (evidenced by) Other (comment)  COPD dx                      Intervention Goal     Row Name 04/20/23 0947          Intervention Goal    General Maintain nutrition;Meet nutritional needs for age/condition;Disease management/therapy;Improved nutrition related lab(s)     PO Maintain intake;Tolerate PO;Meet estimated needs     Weight Maintain weight                Nutrition Intervention     Row Name 04/20/23 0947          Nutrition Intervention    RD/Tech Action Follow Tx progress;Encourage intake;Care plan reviewd                Nutrition Prescription     Row Name 04/20/23 0947          Other Orders    Obtain Weight Daily     Obtain Weight Ordered? No, recommended     Supplement Vitamin mineral supplement     Supplement Ordered? No, recommended     Labs K+;Phos;Mg++;Na+     Labs Ordered? No, recommended                Education/Evaluation     Row Name  04/20/23 0947          Education    Education Will Instruct as appropriate        Monitor/Evaluation    Monitor Per protocol;PO intake;Weight;Skin status;Pertinent labs;Symptoms                 Electronically signed by:  Niecy Dupont RD  04/20/23 09:48 EDT

## 2023-04-20 NOTE — PROGRESS NOTES
[facility]    Name:  Carolin Toscano   Age:  77 y.o.  Sex:  female  :  1945  MRN:  [id number]   Visit Number:  [id number]  Admission Date:  2023  Date Of Service:  23  Primary Care Physician:  Jason Nicholson MD     LOS: 7 days :    Chief Complaint:      Shortness of air    Subjective:    Patient seen and examined with no family at bedside.  Patient appears more alert and able to answer  All questions appropriately.  Requesting to go home. Was noted to wear NIV device most of the night per RN. Questioned if she would want to go to rehab upon discharge. Denied at this time. Seemed agreeable to home health. Noted to be sating 90% on 5 liters NC.     Hospital Course:    Patient is a 77-year-old female with a history of ongoing tobacco abuse, COPD, hypertension, dyslipidemia, prior kidney stones, who presented to the emergency room with complaints of 3 to 4 days of increasing cough, shortness of breath and chills.  Patient states she actually quit smoking about 3 days ago.  Has noted change in sputum.  She has not followed up with PCP or pulmonology in a while.  She does not routinely wear oxygen at home.       In the ER, patient was hypoxic on arrival and was requiring up to 3 L of oxygen.  Otherwise hemodynamically stable.  She has a negative respiratory panel.  She had a lactic acid of 2.7 repeat 1.2.  She had a blood gas pH 7.47 PCO2 55.1 PO2 86.5, creatinine 0.65 potassium 2.9 CO2 33.7.  proBNP of 272 mildly elevated high-sensitivity troponin.  White count of 21 hemoglobin 15.  Negative flu COVID testing.  A1c 6.1 magnesium 1.8 and procalcitonin 0.43.  She had a chest x-ray was showing emphysematous changes but no acute abnormality.  A CT scan of the chest without contrast was showing findings concerning for bronchitis/bronchiolitis with interlobular septal thickening in the right lung base.  There is no focal airspace disease.  Patient was given  empiric antibiotics, bronchodilators, steroids emergency room.  Due to acute respiratory failure with hypoxia, hospitalist consulted for further medical management.  Pulmonology also consulted.  Patient continued to require higher levels of oxygen.  Patient developed worsening respiratory acidosis requiring BiPAP.  CODE STATUS confirmed with daughter for which patient to remain full code.  Patient fortunately did improve and currently maintaining appropriate saturations on 5 L nasal cannula.  Solu-Medrol slowly weaned.  Procalcitonin and chest x-ray with improvement. Given significant hypercarbia on ABG 4/16/23  (PC02 70.5) and improvement after bipap placed (PCO2 57.3) patient would benefit from NIV device for home.     Review of Systems:     All systems were reviewed and negative except as mentioned in subjective, assessment and plan.    Vital Signs:    Temp:  [97.7 °F (36.5 °C)-98.7 °F (37.1 °C)] 97.7 °F (36.5 °C)  Heart Rate:  [53-79] 59  Resp:  [14-22] 14  BP: (109-136)/(52-65) 122/54    Intake and output:    I/O last 3 completed shifts:  In: 600 [P.O.:600]  Out: 2200 [Urine:2200]  I/O this shift:  In: 620 [P.O.:620]  Out: -     Physical Examination:    General Appearance:  Alert and cooperative.  Chronically ill/frail appearing elderly female.  Hoarseness noted.   Head:  Atraumatic and normocephalic.   Eyes: Conjunctivae and sclerae normal, no icterus. No pallor.   Throat: No oral lesions, no thrush, dry mucous membranes.   Neck: Supple, trachea midline, no thyromegaly.   Lungs:   Breath sounds heard bilaterally equally.  Mostly clear throughout on 5 L nasal cannula.  Appears improved today. Unlabored.   Heart:  Normal S1 and S2, no murmur, no gallop, no rub. No JVD.   Abdomen:   Normal bowel sounds, no masses, no organomegaly. Soft, nontender, nondistended, no rebound tenderness.   Extremities: Supple, no edema, no cyanosis, no clubbing.   Skin: No bleeding or rash.   Neurologic: Alert and oriented x 2. No  facial asymmetry. Moves all four limbs. No tremors.  Generalized weakness.     Laboratory results:    Results from last 7 days   Lab Units 04/20/23  0640 04/18/23  0707 04/17/23  0642 04/15/23  0811 04/13/23  1458   SODIUM mmol/L 142 143 144   < > 138   POTASSIUM mmol/L 5.1 4.3 4.5   < > 2.9*   CHLORIDE mmol/L 100 100 100   < > 92*   CO2 mmol/L 40.4* 35.1* 39.6*   < > 33.7*   BUN mg/dL 24* 24* 20   < > 12   CREATININE mg/dL 0.44* 0.45* 0.40*   < > 0.65   CALCIUM mg/dL 8.9 8.9 9.1   < > 9.4   BILIRUBIN mg/dL  --   --   --   --  0.4   ALK PHOS U/L  --   --   --   --  114   ALT (SGPT) U/L  --   --   --   --  23   AST (SGOT) U/L  --   --   --   --  19   GLUCOSE mg/dL 182* 164* 169*   < > 160*    < > = values in this interval not displayed.     Results from last 7 days   Lab Units 04/20/23  0640 04/17/23  0642 04/16/23  0600   WBC 10*3/mm3 20.15* 15.11* 12.09*   HEMOGLOBIN g/dL 14.4 14.4 14.0   HEMATOCRIT % 45.8 46.2 46.1   PLATELETS 10*3/mm3 264 303 282         Results from last 7 days   Lab Units 04/13/23  1458   HSTROP T ng/L 15*     Results from last 7 days   Lab Units 04/16/23  1006   BLOODCX  No growth at 4 days  No growth at 4 days     Recent Labs     04/17/23  0844 04/17/23  0851 04/19/23  0745   PHART 7.453* 7.435 7.419   XHP6ERR 61.3* 63.8* 64.0*   PO2ART 43.1* 60.1* 223.0*   ATS3OLN 42.9* 42.9* 41.4*   BASEEXCESS 15.5* 15.1* 13.6*      I have reviewed the patient's laboratory results.    Radiology results:    No radiology results from the last 24 hrs  I have reviewed the patient's radiology reports.    Medication Review:     I have reviewed the patient's active and prn medications.     Problem List:      Acute respiratory failure    CAP (community acquired pneumonia)    Essential hypertension    COPD exacerbation      Assessment:    1. Acute respiratory failure with hypoxia and hypercapnia, POA  2. COPD with exacerbation, POA  3. Suspect underlying community-acquired pneumonia, present admission, due to  unknown organism  4. Encephalopathy likely 2/2 above  5. Pulmonary hypertension  6. Hypertension  7. Chronic tobacco abuse  8. Arthritis     Plan:     Respiratory failure/COPD/pneumonia:  -Azactam x3 days.  Procalcitonin trending downward.  -Blood cultures x2 pending without growth to date.  -Continue with bronchodilators, Solu-Medrol, oxygen therapy.  Appreciate pulmonology consulting with recommendations.  - Given clinical improvement will initiate prednisone PO today.  -Given significant hypercarbia- Would benefit from NIV device. Order placed awaiting delivery. On 1 hour- off three hours.  -Tobacco cessation.  -Encephalopathy likely multifactorial- Resolved.     Hypertension:  -Restart home atenolol and amlodipine.     Chronic tobacco abuse:  -Complicates all aspects of care     DVT Prophylaxis: Lovenox  Code Status: Full Code  Diet: Regular  Discharge Plan: 1-2 days- home with home health    Marga Monroy, APRN  04/20/23  12:05 EDT    Dictated utilizing Dragon dictation.

## 2023-04-21 ENCOUNTER — READMISSION MANAGEMENT (OUTPATIENT)
Dept: CALL CENTER | Facility: HOSPITAL | Age: 78
End: 2023-04-21
Payer: MEDICARE

## 2023-04-21 VITALS
WEIGHT: 156.53 LBS | RESPIRATION RATE: 18 BRPM | HEIGHT: 63 IN | BODY MASS INDEX: 27.73 KG/M2 | OXYGEN SATURATION: 91 % | HEART RATE: 72 BPM | DIASTOLIC BLOOD PRESSURE: 60 MMHG | TEMPERATURE: 98.6 F | SYSTOLIC BLOOD PRESSURE: 118 MMHG

## 2023-04-21 LAB
ANION GAP SERPL CALCULATED.3IONS-SCNC: 4 MMOL/L (ref 5–15)
BACTERIA SPEC AEROBE CULT: NORMAL
BACTERIA SPEC AEROBE CULT: NORMAL
BUN SERPL-MCNC: 22 MG/DL (ref 8–23)
BUN/CREAT SERPL: 52.4 (ref 7–25)
CALCIUM SPEC-SCNC: 8.5 MG/DL (ref 8.6–10.5)
CHLORIDE SERPL-SCNC: 100 MMOL/L (ref 98–107)
CO2 SERPL-SCNC: 36 MMOL/L (ref 22–29)
CREAT SERPL-MCNC: 0.42 MG/DL (ref 0.57–1)
EGFRCR SERPLBLD CKD-EPI 2021: 101.5 ML/MIN/1.73
GLUCOSE SERPL-MCNC: 143 MG/DL (ref 65–99)
POTASSIUM SERPL-SCNC: 4.6 MMOL/L (ref 3.5–5.2)
SODIUM SERPL-SCNC: 140 MMOL/L (ref 136–145)

## 2023-04-21 PROCEDURE — 63710000001 PREDNISONE PER 5 MG: Performed by: NURSE PRACTITIONER

## 2023-04-21 PROCEDURE — 94664 DEMO&/EVAL PT USE INHALER: CPT

## 2023-04-21 PROCEDURE — 94761 N-INVAS EAR/PLS OXIMETRY MLT: CPT

## 2023-04-21 PROCEDURE — 80048 BASIC METABOLIC PNL TOTAL CA: CPT | Performed by: NURSE PRACTITIONER

## 2023-04-21 PROCEDURE — 94799 UNLISTED PULMONARY SVC/PX: CPT

## 2023-04-21 PROCEDURE — 63710000001 PREDNISONE PER 1 MG: Performed by: NURSE PRACTITIONER

## 2023-04-21 PROCEDURE — 94660 CPAP INITIATION&MGMT: CPT

## 2023-04-21 PROCEDURE — 94618 PULMONARY STRESS TESTING: CPT

## 2023-04-21 PROCEDURE — 99239 HOSP IP/OBS DSCHRG MGMT >30: CPT | Performed by: NURSE PRACTITIONER

## 2023-04-21 RX ORDER — ALBUTEROL SULFATE 2.5 MG/3ML
SOLUTION RESPIRATORY (INHALATION) EVERY 6 HOURS PRN
Qty: 270 ML | Refills: 0 | Status: SHIPPED | OUTPATIENT
Start: 2023-04-21 | End: 2023-05-21

## 2023-04-21 RX ORDER — PREDNISONE 20 MG/1
40 TABLET ORAL DAILY
Status: DISCONTINUED | OUTPATIENT
Start: 2023-04-22 | End: 2023-04-21 | Stop reason: HOSPADM

## 2023-04-21 RX ORDER — PREDNISONE 10 MG/1
30 TABLET ORAL DAILY
Qty: 20 TABLET | Refills: 0 | Status: SHIPPED | OUTPATIENT
Start: 2023-04-24 | End: 2023-04-29

## 2023-04-21 RX ORDER — NICOTINE 21 MG/24HR
1 PATCH, TRANSDERMAL 24 HOURS TRANSDERMAL EVERY 24 HOURS
Qty: 28 PATCH | Refills: 0 | Status: SHIPPED | OUTPATIENT
Start: 2023-04-21 | End: 2023-05-01

## 2023-04-21 RX ORDER — PREDNISONE 20 MG/1
20 TABLET ORAL DAILY
Qty: 2 TABLET | Refills: 0 | Status: SHIPPED | OUTPATIENT
Start: 2023-04-26 | End: 2023-04-21

## 2023-04-21 RX ORDER — PREDNISONE 10 MG/1
10 TABLET ORAL DAILY
Qty: 2 TABLET | Refills: 0 | Status: SHIPPED | OUTPATIENT
Start: 2023-04-28 | End: 2023-04-21

## 2023-04-21 RX ORDER — PREDNISONE 20 MG/1
20 TABLET ORAL DAILY
Status: DISCONTINUED | OUTPATIENT
Start: 2023-04-26 | End: 2023-04-21 | Stop reason: HOSPADM

## 2023-04-21 RX ORDER — PREDNISONE 20 MG/1
40 TABLET ORAL DAILY
Qty: 4 TABLET | Refills: 0 | Status: SHIPPED | OUTPATIENT
Start: 2023-04-22 | End: 2023-04-21

## 2023-04-21 RX ORDER — PREDNISONE 10 MG/1
10 TABLET ORAL DAILY
Status: DISCONTINUED | OUTPATIENT
Start: 2023-04-28 | End: 2023-04-21 | Stop reason: HOSPADM

## 2023-04-21 RX ORDER — BENZONATATE 200 MG/1
200 CAPSULE ORAL 3 TIMES DAILY PRN
Qty: 21 CAPSULE | Refills: 0 | Status: SHIPPED | OUTPATIENT
Start: 2023-04-21 | End: 2023-04-28

## 2023-04-21 RX ADMIN — IPRATROPIUM BROMIDE AND ALBUTEROL SULFATE 3 ML: 2.5; .5 SOLUTION RESPIRATORY (INHALATION) at 07:27

## 2023-04-21 RX ADMIN — BUDESONIDE 1 MG: 0.5 INHALANT RESPIRATORY (INHALATION) at 07:27

## 2023-04-21 RX ADMIN — SENNOSIDES AND DOCUSATE SODIUM 2 TABLET: 50; 8.6 TABLET ORAL at 09:14

## 2023-04-21 RX ADMIN — PREDNISONE 50 MG: 20 TABLET ORAL at 09:13

## 2023-04-21 RX ADMIN — ARFORMOTEROL TARTRATE 15 MCG: 15 SOLUTION RESPIRATORY (INHALATION) at 07:27

## 2023-04-21 RX ADMIN — IPRATROPIUM BROMIDE AND ALBUTEROL SULFATE 3 ML: 2.5; .5 SOLUTION RESPIRATORY (INHALATION) at 13:04

## 2023-04-21 RX ADMIN — ATENOLOL 25 MG: 25 TABLET ORAL at 09:14

## 2023-04-21 RX ADMIN — TIOTROPIUM BROMIDE INHALATION SPRAY 2 PUFF: 3.12 SPRAY, METERED RESPIRATORY (INHALATION) at 07:28

## 2023-04-21 RX ADMIN — Medication 10 ML: at 09:14

## 2023-04-21 RX ADMIN — AMLODIPINE BESYLATE 10 MG: 5 TABLET ORAL at 09:14

## 2023-04-21 NOTE — PROGRESS NOTES
"Enter Query Response Below      Query Response:     Encephalopathy likely due to respiratory failure    Electronically signed by CLAUDIA Lebron, 23, 2:12 PM EDT.               If applicable, please update the problem list.     Patient: Carolin Toscano        : 1945  Account: [id number]           Admit Date:         How to Respond to this query:       a. Click New Note     b. Answer query within the yellow box.                c. Update the Problem List, if applicable.      If you have any questions about this query contact me at: [phone]    Dear Mamta Tinajero:     77-year-old female admitted with acute on chronic respiratory failure, chronic obstructive pulmonary disease exacerbation, and pneumonia.   The Pulmonary progress notes include the diagnosis of encephalopathy.  On 23 the Hospitalist progress note physical exam includes that he is alert and oriented x 1.  On 23 the Pulmonary progress note states, \"Could not reliably obtain as the patient is somewhat confused\".  On 23, per Neuro Cognitive Flowsheet his Jackson Coma Scale score was 12 with incomprehensible words noted.  On 23 her Jackson Coma Scale score was 15 and noted to be oriented.  The discharge summary includes the diagnosis of encephalopathy.      After study, can the patient's condition be further clarified as:     Metabolic encephalopathy  Encephalopathy of other known etiology, please specify_____________________________  Other-specify__________________________  Unable to determine     By submitting this query, we are merely seeking further clarification of documentation to accurately reflect all conditions that you are monitoring, evaluating, treating or that extend the hospitalization or utilize additional resources of care. Please utilize your independent clinical judgment when addressing the question(s) above.     This query and your response, once completed, will be entered into the legal " medical record.    Sincerely,  Tabitha Davies RN,BSN  [email or web address]  Clinical Documentation Integrity Program

## 2023-04-21 NOTE — CASE MANAGEMENT/SOCIAL WORK
Case Management/Social Work    Patient Name:  Carolin Toscano  YOB: 1945  MRN: [id number]  Admit Date:  4/13/2023      Sw called [facility] to inform her pt will dc to home today and to finalize delivery of pts Trilogy.  No answer. Sw left a vm requesting a phone call back as soon as possible.      08:39 EDT  Sw received call back from [facility]. States the NIV will be delivered to pt's home this afternoon. Plans to call pt and schedule a set up time.  States she has all necessary documents to deliver NIV device.      12:29 EDT  Sw received oxygen and nebulizer orders. Sent to [employer] for delivery. Eveline/Spike called sw back and states the walk test documentation would not qualify pt for 6l of oxygen.  States pt's insurance is super picky about documentation and will not pay if there is not supporting documentation. Stated the walk test needed to show what pt was sating on at 4L and then show improvement when moved to 6L.  States pt need to desat to at least 88% upon exertion at 4L.  This sw sent a secure chat to CLAUDIA Yeung and Respiratory Therapist, Familia Echevarria notifying them of the needed changes. Eveline states that even if pt's oxygen gets delivered to hospital today, if documentation is not correct, it will be picked up due to insurance denial.Melia will continue to follow.       Electronically signed by:  Merle Torres  04/21/23 08:28 EDT

## 2023-04-21 NOTE — PAYOR COMM NOTE
"Carolin Toscano (77 y.o. Female)     Date of Birth   1945    Social Security Number       Address   [address] [place] [address]    Home Phone   [phone]    MRN   [id number]       Oriental orthodox   Nazarene    Marital Status                               Admission Date   4/13/23    Admission Type   Emergency    Admitting Provider   Rajni Roberts DO    Attending Provider   Rajni Roberts DO    [facility]       Discharge Date       Discharge Disposition       Discharge Destination                               Attending Provider: Rajni Roberts DO    Allergies: Contrast Dye (Echo Or Unknown Ct/mr), Ciprofloxacin, Keflex [Cephalexin], Penicillins, Sulfa Antibiotics, Terramycin [Oxytetracycline], Latex, Percocet [Oxycodone-acetaminophen], Xyzal [Levocetirizine]    Isolation: None   Infection: COVID Screen (preop/placement) (12/31/20)   Code Status: CPR    Ht: 160 cm (62.99\")   Wt: 71 kg (156 lb 8.4 oz)    Admission Cmt: None   Principal Problem: Acute respiratory failure [J96.00]                 Active Insurance as of 4/13/2023     Primary Coverage     Payor Plan Insurance Group Employer/Plan Group    HUMANA MEDICARE REPLACEMENT HUMANA MEDICARE REPLACEMENT [id number]     Payor Plan Address Payor Plan Phone Number Payor Plan Fax Number Effective Dates    [address] [address] [phone]  1/1/2018 - None Entered    formerly [facility] [phone]       Subscriber Name Subscriber Birth Date Member ID       CAROLIN TOSCANO 1945 [id number]           Secondary Coverage     Payor Plan Insurance Group Employer/Plan Group    [place]      Payor Plan Address Payor Plan Phone Number Payor Plan Fax Number Effective Dates    [address] [phone]  10/11/2021 - None Entered    [facility] [id number]       Subscriber Name Subscriber Birth Date Member ID       CAROLIN TOSCANO 1945 [id number]                 Emergency " Contacts      (Rel.) Home Phone Work Phone Mobile Phone    Nehal Hoffmann (Daughter) [phone] -- --            Lab Results (last 48 hours)     Procedure Component Value Units Date/Time    Blood Culture With BERNARD - Blood, Arm, Right [[id number]]  (Normal) Collected: 04/16/23 1006    Specimen: Blood from Arm, Right Updated: 04/21/23 1015     Blood Culture No growth at 5 days    Blood Culture With BERNARD - Blood, Arm, Left [[id number]]  (Normal) Collected: 04/16/23 1006    Specimen: Blood from Arm, Left Updated: 04/21/23 1015     Blood Culture No growth at 5 days    Basic Metabolic Panel [[id number]]  (Abnormal) Collected: 04/21/23 0526    Specimen: Blood Updated: 04/21/23 0609     Glucose 143 mg/dL      BUN 22 mg/dL      Creatinine 0.42 mg/dL      Sodium 140 mmol/L      Potassium 4.6 mmol/L      Chloride 100 mmol/L      CO2 36.0 mmol/L      Calcium 8.5 mg/dL      BUN/Creatinine Ratio 52.4     Anion Gap 4.0 mmol/L      eGFR 101.5 mL/min/1.73     Narrative:      GFR Normal >60  Chronic Kidney Disease <60  Kidney Failure <15    The GFR formula is only valid for adults with stable renal function between ages 18 and 70.    Basic Metabolic Panel [[id number]]  (Abnormal) Collected: 04/20/23 0640    Specimen: Blood Updated: 04/20/23 0731     Glucose 182 mg/dL      BUN 24 mg/dL      Creatinine 0.44 mg/dL      Sodium 142 mmol/L      Potassium 5.1 mmol/L      Chloride 100 mmol/L      CO2 40.4 mmol/L      Calcium 8.9 mg/dL      BUN/Creatinine Ratio 54.5     Anion Gap 1.6 mmol/L      eGFR 100.4 mL/min/1.73     Narrative:      GFR Normal >60  Chronic Kidney Disease <60  Kidney Failure <15    The GFR formula is only valid for adults with stable renal function between ages 18 and 70.    CBC (No Diff) [[id number]]  (Abnormal) Collected: 04/20/23 0640    Specimen: Blood Updated: 04/20/23 0711     WBC 20.15 10*3/mm3      RBC 4.99 10*6/mm3      Hemoglobin 14.4 g/dL      Hematocrit 45.8 %      MCV 91.8 fL      MCH 28.9 pg      MCHC  31.4 g/dL      RDW 14.1 %      RDW-SD 47.6 fl      MPV 10.5 fL      Platelets 264 10*3/mm3           Imaging Results (Last 48 Hours)     ** No results found for the last 48 hours. **           Physician Progress Notes (last 48 hours)      Marga MonroyCLAUDIA at 23 1205              [facility]    [facility]    Name:  Carolin Toscano   Age:  77 y.o.  Sex:  female  :  1945  MRN:  [id number]   Visit Number:  [id number]  Admission Date:  2023  Date Of Service:  23  Primary Care Physician:  Jason Nicholson MD     LOS: 7 days :    Chief Complaint:      Shortness of air    Subjective:    Patient seen and examined with no family at bedside.  Patient appears more alert and able to answer  All questions appropriately.  Requesting to go home. Was noted to wear NIV device most of the night per RN. Questioned if she would want to go to rehab upon discharge. Denied at this time. Seemed agreeable to home health. Noted to be sating 90% on 5 liters NC.     Hospital Course:    Patient is a 77-year-old female with a history of ongoing tobacco abuse, COPD, hypertension, dyslipidemia, prior kidney stones, who presented to the emergency room with complaints of 3 to 4 days of increasing cough, shortness of breath and chills.  Patient states she actually quit smoking about 3 days ago.  Has noted change in sputum.  She has not followed up with PCP or pulmonology in a while.  She does not routinely wear oxygen at home.       In the ER, patient was hypoxic on arrival and was requiring up to 3 L of oxygen.  Otherwise hemodynamically stable.  She has a negative respiratory panel.  She had a lactic acid of 2.7 repeat 1.2.  She had a blood gas pH 7.47 PCO2 55.1 PO2 86.5, creatinine 0.65 potassium 2.9 CO2 33.7.  proBNP of 272 mildly elevated high-sensitivity troponin.  White count of 21 hemoglobin 15.  Negative flu COVID testing.  A1c 6.1 magnesium 1.8 and procalcitonin 0.43.  She had a  chest x-ray was showing emphysematous changes but no acute abnormality.  A CT scan of the chest without contrast was showing findings concerning for bronchitis/bronchiolitis with interlobular septal thickening in the right lung base.  There is no focal airspace disease.  Patient was given empiric antibiotics, bronchodilators, steroids emergency room.  Due to acute respiratory failure with hypoxia, hospitalist consulted for further medical management.  Pulmonology also consulted.  Patient continued to require higher levels of oxygen.  Patient developed worsening respiratory acidosis requiring BiPAP.  CODE STATUS confirmed with daughter for which patient to remain full code.  Patient fortunately did improve and currently maintaining appropriate saturations on 5 L nasal cannula.  Solu-Medrol slowly weaned.  Procalcitonin and chest x-ray with improvement. Given significant hypercarbia on ABG 4/16/23  (PC02 70.5) and improvement after bipap placed (PCO2 57.3) patient would benefit from NIV device for home.     Review of Systems:     All systems were reviewed and negative except as mentioned in subjective, assessment and plan.    Vital Signs:    Temp:  [97.7 °F (36.5 °C)-98.7 °F (37.1 °C)] 97.7 °F (36.5 °C)  Heart Rate:  [53-79] 59  Resp:  [14-22] 14  BP: (109-136)/(52-65) 122/54    Intake and output:    I/O last 3 completed shifts:  In: 600 [P.O.:600]  Out: 2200 [Urine:2200]  I/O this shift:  In: 620 [P.O.:620]  Out: -     Physical Examination:    General Appearance:  Alert and cooperative.  Chronically ill/frail appearing elderly female.  Hoarseness noted.   Head:  Atraumatic and normocephalic.   Eyes: Conjunctivae and sclerae normal, no icterus. No pallor.   Throat: No oral lesions, no thrush, dry mucous membranes.   Neck: Supple, trachea midline, no thyromegaly.   Lungs:   Breath sounds heard bilaterally equally.  Mostly clear throughout on 5 L nasal cannula.  Appears improved today. Unlabored.   Heart:  Normal S1 and  S2, no murmur, no gallop, no rub. No JVD.   Abdomen:   Normal bowel sounds, no masses, no organomegaly. Soft, nontender, nondistended, no rebound tenderness.   Extremities: Supple, no edema, no cyanosis, no clubbing.   Skin: No bleeding or rash.   Neurologic: Alert and oriented x 2. No facial asymmetry. Moves all four limbs. No tremors.  Generalized weakness.     Laboratory results:    Results from last 7 days   Lab Units 04/20/23  0640 04/18/23  0707 04/17/23  0642 04/15/23  0811 04/13/23  1458   SODIUM mmol/L 142 143 144   < > 138   POTASSIUM mmol/L 5.1 4.3 4.5   < > 2.9*   CHLORIDE mmol/L 100 100 100   < > 92*   CO2 mmol/L 40.4* 35.1* 39.6*   < > 33.7*   BUN mg/dL 24* 24* 20   < > 12   CREATININE mg/dL 0.44* 0.45* 0.40*   < > 0.65   CALCIUM mg/dL 8.9 8.9 9.1   < > 9.4   BILIRUBIN mg/dL  --   --   --   --  0.4   ALK PHOS U/L  --   --   --   --  114   ALT (SGPT) U/L  --   --   --   --  23   AST (SGOT) U/L  --   --   --   --  19   GLUCOSE mg/dL 182* 164* 169*   < > 160*    < > = values in this interval not displayed.     Results from last 7 days   Lab Units 04/20/23  0640 04/17/23  0642 04/16/23  0600   WBC 10*3/mm3 20.15* 15.11* 12.09*   HEMOGLOBIN g/dL 14.4 14.4 14.0   HEMATOCRIT % 45.8 46.2 46.1   PLATELETS 10*3/mm3 264 303 282         Results from last 7 days   Lab Units 04/13/23  1458   HSTROP T ng/L 15*     Results from last 7 days   Lab Units 04/16/23  1006   BLOODCX  No growth at 4 days  No growth at 4 days     Recent Labs     04/17/23  0844 04/17/23  0851 04/19/23  0745   PHART 7.453* 7.435 7.419   IZX1DDU 61.3* 63.8* 64.0*   PO2ART 43.1* 60.1* 223.0*   JWA3SLI 42.9* 42.9* 41.4*   BASEEXCESS 15.5* 15.1* 13.6*      I have reviewed the patient's laboratory results.    Radiology results:    No radiology results from the last 24 hrs  I have reviewed the patient's radiology reports.    Medication Review:     I have reviewed the patient's active and prn medications.     Problem List:      Acute respiratory  "failure    CAP (community acquired pneumonia)    Essential hypertension    COPD exacerbation      Assessment:    1. Acute respiratory failure with hypoxia and hypercapnia, POA  2. COPD with exacerbation, POA  3. Suspect underlying community-acquired pneumonia, present admission, due to unknown organism  4. Encephalopathy likely 2/2 above  5. Pulmonary hypertension  6. Hypertension  7. Chronic tobacco abuse  8. Arthritis     Plan:     Respiratory failure/COPD/pneumonia:  -Azactam x3 days.  Procalcitonin trending downward.  -Blood cultures x2 pending without growth to date.  -Continue with bronchodilators, Solu-Medrol, oxygen therapy.  Appreciate pulmonology consulting with recommendations.  - Given clinical improvement will initiate prednisone PO today.  -Given significant hypercarbia- Would benefit from NIV device. Order placed awaiting delivery. On 1 hour- off three hours.  -Tobacco cessation.  -Encephalopathy likely multifactorial- Resolved.     Hypertension:  -Restart home atenolol and amlodipine.     Chronic tobacco abuse:  -Complicates all aspects of care     DVT Prophylaxis: Lovenox  Code Status: Full Code  Diet: Regular  Discharge Plan: 1-2 days- home with home health    CLAUDIA Edmonds  23  12:05 EDT    Dictated utilizing Dragon dictation.      Electronically signed by Marga Monroy APRN at 23 1215     Miranda Cline MD at 23 0813            CC: Acute Respiratory Failure.     S: Currently off PAP therapy.  Weaned down to 5L NC it is asking when she can go home.  States she is feeling better overall.  Still has some cough, but mostly dry.  Denies fevers or chills.    ROS: Denies fevers, chest pain or diarrhea    O:Vital signs reviewed.   /65 (BP Location: Left arm, Patient Position: Lying)   Pulse 69   Temp 98.7 °F (37.1 °C) (Oral)   Resp 18   Ht 160 cm (62.99\")   Wt 70.5 kg (155 lb 6.8 oz)   SpO2 97%   BMI 27.54 kg/m²     Temp (24hrs), Av.5 °F (36.9 °C), " Min:98.3 °F (36.8 °C), Max:98.7 °F (37.1 °C)      I & Os reviewed.   Intake/Output       04/19/23 0700 - 04/20/23 0659    Intake (ml) 600    Output (ml) 2000    Net (ml) -1400    Last Weight 70.5 kg (155 lb 6.8 oz)          Net IO Since Admission: -1,180 mL [04/20/23 0846]    General/Constitutional: On nasal cannula.  Appears chronically ill, but no significant respiratory distress when speaking in complete sentences  Eyes: Pupils equal, no eye discharge   neck: Supple without JVD. No obvious masses noted.   Cardiovascular: S1 + S2.  Regular rate  Lungs/Respiratory: Creased breath sounds throughout with scattered wheezing  GI/Abdomen: Soft. Bowel sounds positive.  No obvious tenderness to palpation   musculoskeletal/Extremities: No edema noted.  No cyanosis.    Neurologic: Follows commands, answers questions appropriately, spontaneously moving all 4 extremities   psych: Does not appear anxious on exam   skin: Appeared somewhat dry.       Labs: Reviewed.  Results from last 7 days   Lab Units 04/20/23  0640 04/17/23  0642 04/16/23  0600 04/15/23  0811 04/13/23  1458   WBC 10*3/mm3 20.15* 15.11* 12.09* 19.59* 20.98*   HEMOGLOBIN g/dL 14.4 14.4 14.0 14.6 15.3   HEMATOCRIT % 45.8 46.2 46.1 47.4* 47.4*   PLATELETS 10*3/mm3 264 303 282 274 240   NEUTROPHIL % %  --  85.6* 85.2* 83.8* 84.5*   NEUTROS ABS 10*3/mm3  --  12.94* 10.29* 16.40* 17.74*   EOSINOPHIL % %  --  0.0* 0.0* 0.0* 1.0   EOS ABS 10*3/mm3  --  0.00 0.00 0.00 0.21   LYMPHOCYTE % %  --  8.3* 8.4* 7.1* 4.3*   LYMPHS ABS 10*3/mm3  --  1.25 1.02 1.40 0.90       Lab Results   Component Value Date    PROCALCITO 0.24 04/18/2023    PROCALCITO 1.10 (H) 04/16/2023    PROCALCITO 0.43 (H) 04/13/2023       No results found for: CRP    No results found for: SEDRATE    Lab Results   Component Value Date    PROBNP 272.8 04/13/2023    PROBNP 315.0 (C) 10/02/2017       Results from last 7 days   Lab Units 04/20/23  0640 04/18/23  0707 04/17/23  0642 04/15/23  0811  04/13/23  1458   SODIUM mmol/L 142 143 144   < > 138   POTASSIUM mmol/L 5.1 4.3 4.5   < > 2.9*   CHLORIDE mmol/L 100 100 100   < > 92*   CO2 mmol/L 40.4* 35.1* 39.6*   < > 33.7*   BUN mg/dL 24* 24* 20   < > 12   CREATININE mg/dL 0.44* 0.45* 0.40*   < > 0.65   CALCIUM mg/dL 8.9 8.9 9.1   < > 9.4   ANION GAP mmol/L 1.6* 7.9 4.4*   < > 12.3   BILIRUBIN mg/dL  --   --   --   --  0.4   ALK PHOS U/L  --   --   --   --  114   ALT (SGPT) U/L  --   --   --   --  23   AST (SGOT) U/L  --   --   --   --  19   GLUCOSE mg/dL 182* 164* 169*   < > 160*   TOTAL PROTEIN g/dL  --   --   --   --  7.7   ALBUMIN g/dL  --   --   --   --  3.3*    < > = values in this interval not displayed.       Results from last 7 days   Lab Units 04/13/23  1458   MAGNESIUM mg/dL 1.8             No results found for: CKTOTAL    No components found for: HSTROPT    Lab Results   Component Value Date    TROPONINT 15 (H) 04/13/2023       No results found for: DDIMER    Brief Urine Lab Results  (Last result in the past 365 days)      Color   Clarity   Blood   Leuk Est   Nitrite   Protein   CREAT   Urine HCG        04/18/23 0344 Yellow   Clear   Negative   Negative   Negative   Negative                 No results found for: TSH    No results found for: FREET4      Micro: As of April 20, 2023   Lab Results   Component Value Date    RESPCX Light growth (2+) Normal Respiratory Ana 10/02/2017     No results found for: BCIDPCR  Lab Results   Component Value Date    BLOODCX No growth at 3 days 04/16/2023    BLOODCX No growth at 3 days 04/16/2023    BLOODCX No growth at 5 days 10/01/2017    BLOODCX No growth at 5 days 10/01/2017     Lab Results   Component Value Date    URINECX Final report 02/07/2019    URINECX 20,000-30,000 CFU/mL Escherichia coli (A) 10/05/2018     No results found for: MRSACX  Lab Results   Component Value Date    MRSAPCR No MRSA Detected 04/15/2023     No results found for: URCX  No components found for: LOWRESPCF  No results found for:  THROATCX  No results found for: CULTURES  No components found for: STREPBCX  No results found for: STREPPNEUAG  No results found for: LEGIONELLA  No results found for: MYCOPLASCX  No results found for: GCCX  No results found for: WOUNDCX  No results found for: BODYFLDCX    No results found for: FLU    Lab Results   Component Value Date    ADENOVIRUS Not Detected 04/13/2023     Lab Results   Component Value Date    EE647Y Not Detected 04/13/2023     Lab Results   Component Value Date    CVHKU1 Not Detected 04/13/2023     Lab Results   Component Value Date    CVNL63 Not Detected 04/13/2023     Lab Results   Component Value Date    CVOC43 Not Detected 04/13/2023     Lab Results   Component Value Date    HUMETPNEVS Not Detected 04/13/2023     Lab Results   Component Value Date    HURVEV Not Detected 04/13/2023     Lab Results   Component Value Date    FLUBPCR Not Detected 04/13/2023     Lab Results   Component Value Date    PARAINFLUE Not Detected 04/13/2023     Lab Results   Component Value Date    PARAFLUV2 Not Detected 04/13/2023     Lab Results   Component Value Date    PARAFLUV3 Not Detected 04/13/2023     Lab Results   Component Value Date    PARAFLUV4 Not Detected 04/13/2023     Lab Results   Component Value Date    BPERTPCR Not Detected 04/13/2023     No results found for: [id number]  Lab Results   Component Value Date    CPNEUPCR Not Detected 04/13/2023     Lab Results   Component Value Date    MPNEUMO Not Detected 04/13/2023     Lab Results   Component Value Date    FLUAPCR Not Detected 04/13/2023     No results found for: FLUAH3  No results found for: FLUAH1  Lab Results   Component Value Date    RSV Not Detected 04/13/2023     Lab Results   Component Value Date    BPARAPCR Not Detected 04/13/2023       COVID 19:  Lab Results   Component Value Date    COVID19 Not Detected 04/13/2023           ABG: Reviewed  Recent Labs     04/17/23  0844 04/17/23  0851 04/19/23  0745   PHART 7.453* 7.435 7.419   BZV4TFA 61.3*  63.8* 64.0*   PO2ART 43.1* 60.1* 223.0*   VEQ7LPB 42.9* 42.9* 41.4*   BASEEXCESS 15.5* 15.1* 13.6*       Lab Results   Component Value Date    LACTATE 1.2 04/13/2023    LACTATE 2.7 (C) 04/13/2023    LACTATE 1.6 10/01/2017         Echo: Results for orders placed during the hospital encounter of 04/13/23    Adult Transthoracic Echo Complete W/ Cont if Necessary Per Protocol    Interpretation Summary  •  Left ventricular diastolic function is consistent with (grade I) impaired relaxation.  •  Mild aortic valve stenosis is present.  •  Estimated right ventricular systolic pressure from tricuspid regurgitation is moderately elevated (45-55 mmHg).  •  Mild to moderate pulmonary hypertension is present.      Results for orders placed during the hospital encounter of 10/01/17    Adult Transthoracic Echo Complete W/ Cont if Necessary Per Protocol    Interpretation Summary  TEchnically difficult study  1) Borderline LVH with normal LV systolic function ( EF is over 55%)  2) Normal left atrial size with mild elevation in LVedp  3) Trace MR  4) Mild TR with normal PA pressures  5) Mild AI  6) Small RV with normal RV function        Pharmacy to Dose enoxaparin (LOVENOX),         CXRay: Latest imaging study was reviewed personally.   Imaging Results (Last 48 Hours)     ** No results found for the last 48 hours. **          Assessment & Recommendations/Plan:   1.  Acute respiratory failure   Continues to require high flow nasal cannula and will continue to wean oxygen as tolerated.  She is clearly tolerating her NIV device and will require this at hospital discharge given her chronic hypercapnic respiratory failure.  Patient tried and failed BiPAP and will need NIV at hospital discharge to decrease exacerbations and frequent hospitalizations secondary to her end-stage COPD.    2.  Acute exacerbation of COPD   Continue Pulmicort, Brovana and Spiriva.  Trelegy has been ordered for after hospital discharge.    Transition from IV to  oral steroids today    3.  Chronic respiratory acidosis  Says that she will use NIV device at home.  DME order for after discharge of NIV placed today     4.  Smoking  Was advised to quit smoking     5.  Pulmonary hypertension  Most likely secondary to likely severe underlying COPD  Will need outpatient work-up    6.?  Confusion/encephalopathy  Much improved    7.  Fluid status  Net IO Since Admission: -1,180 mL [04/20/23 0846]  Lab Results   Component Value Date    PROBNP 272.8 04/13/2023    PROBNP 315.0 (C) 10/02/2017       We have reviewed patient's current orders and changes, if any, have been suggested to primary care team. Plan was also discussed with nursing staff, as necessary.     We have updated the admitting attending and nursing staff, as appropriate, on the patient's current status and plan. I will be going off shift tonight and will be unavailable. Please contact oncall pulmonologist, if available.      This document was electronically signed by Miranda Cline MD on 04/20/23 at 08:46 EDT      Dictated utilizing Dragon dictation.          Electronically signed by Miranda Cline MD at 04/20/23 1703       Consult Notes (last 48 hours)  Notes from 04/19/23 1125 through 04/21/23 1125   No notes of this type exist for this encounter.

## 2023-04-21 NOTE — PROGRESS NOTES
Exercise Oximetry    Patient Name:Carolin Toscano   MRN: [id number]   Date: 04/21/23             ROOM AIR BASELINE   SpO2% 86   Heart Rate 75   Blood Pressure 123/64     EXERCISE ON ROOM AIR SpO2% EXERCISE ON O2 @ 6 LPM SpO2%   1 MINUTE      N/A 1 MINUTE    94   2 MINUTES  2 MINUTES    94   3 MINUTES  3 MINUTES   93   4 MINUTES  4 MINUTES   92   5 MINUTES  5 MINUTES    91   6 MINUTES  6 MINUTES    90              Distance Walked  N/A Distance Walked   250ft   Dyspnea (Uriel Scale)   Dyspnea (Uriel Scale)  1   Fatigue (Uriel Scale)   Fatigue (Uriel Scale)  1   SpO2% Post Exercise   SpO2% Post Exercise  90   HR Post Exercise   HR Post Exercise  89   Time to Recovery   Time to Recovery   5 mins     Comments: I began walking patient at 4L of oxygen via NC; however, Patient's oxygen level dropped to 85% three minutes into the walk, so I proceeded to turn oxygen to 6L where she was able to finish the 6 minute walk with a 90% oxygen level.

## 2023-04-21 NOTE — DISCHARGE SUMMARY
[facility]   DISCHARGE SUMMARY      Name:  Carolin Toscano   Age:  77 y.o.  Sex:  female  :  1945  MRN:  [id number]   Visit Number:  [id number]    Admission Date:  2023  Date of Discharge:  2023  Primary Care Physician:  Jason Nicholson MD    Important issues to note:    1.  Admitted to the hospital with pneumonia and likely COPD exacerbation given extensive smoking history.  After admission was noted to have increasing oxygen demands with pulmonology consulting.      2.  Given Azactam, solumedrol, bronchodilators and supplemental oxygen with some improvement in supplemental oxygen use.  ABG noted patient to have significant hypercarbia and NIV device was ordered for home per pulmonology recommending one hour on, 3 hours off.  Solumedrol transitioned to oral Prednisone taper.    3.  Stable for discharge home today.  Walking oximetry on day of discharge noted that patient would need 6L of oxygen at discharge.   Home nebulizer ordered.  NIV machine has been ordered for home use with confirmation that it will be delivered today to the patient's home.  Home health has been ordered for the patient.    4.   Home medications as reconciled, will continue to hold HCTZ at discharge.  Trelegy inhaler ordered per pulmonology.  Prednisone taper for 8 days.  Patient has referral in for COPD clinic.  Patient needs to follow up with PCP early next week for recheck.  Strict return to hospital instructions with any increased shortness of breath or oxygen usage, chest pain, or palpitations.  Smoking cessation discussed and patient requests Nicotine patches at discharge.      Discharge Diagnoses:     1. Acute respiratory failure with hypoxia and hypercapnia, POA  2. COPD with exacerbation, POA  3. Suspect underlying community-acquired pneumonia, present admission, due to unknown organism  4. Encephalopathy likely 2/2 above, resolved  5. Pulmonary hypertension  6. Hypertension  7. Chronic  tobacco abuse  8. Arthritis    Problem List:     Active Hospital Problems    Diagnosis  POA   • **Acute respiratory failure [J96.00]  Yes   • COPD exacerbation [J44.1]  Yes   • CAP (community acquired pneumonia) [J18.9]  Yes   • Essential hypertension [I10]  Yes   • Cigarette nicotine dependence with nicotine-induced disorder [F17.219]  Yes      Resolved Hospital Problems   No resolved problems to display.     Presenting Problem:    Chief Complaint   Patient presents with   • Shortness of Breath      Consults:     Consulting Physician(s)     Provider Relationship Specialty    Arnaldo Soriano MD Consulting Physician Pulmonary Disease          History of presenting illness/Hospital Course:    Patient is a 77-year-old female with a history of ongoing tobacco abuse, COPD, hypertension, dyslipidemia, prior kidney stones, who presented to the emergency room with complaints of 3 to 4 days of increasing cough, shortness of breath and chills.  Patient states she actually quit smoking about 3 days ago.  Has noted change in sputum.  She has not followed up with PCP or pulmonology in a while.  She does not routinely wear oxygen at home.       In the ER, patient was hypoxic on arrival and was requiring up to 3 L of oxygen.  Otherwise hemodynamically stable.  She has a negative respiratory panel.  She had a lactic acid of 2.7 repeat 1.2.  She had a blood gas pH 7.47 PCO2 55.1 PO2 86.5, creatinine 0.65 potassium 2.9 CO2 33.7.  proBNP of 272 mildly elevated high-sensitivity troponin.  White count of 21 hemoglobin 15.  Negative flu COVID testing.  A1c 6.1 magnesium 1.8 and procalcitonin 0.43.  She had a chest x-ray was showing emphysematous changes but no acute abnormality.  A CT scan of the chest without contrast was showing findings concerning for bronchitis/bronchiolitis with interlobular septal thickening in the right lung base.  There is no focal airspace disease.  Patient was given empiric antibiotics, bronchodilators,  steroids emergency room.  Due to acute respiratory failure with hypoxia, hospitalist consulted for further medical management.      Patient admitted to the hospital with increasing oxygen demands with Pulmonology consulted.  Patient continued to require higher levels of oxygen.  Patient developed worsening respiratory acidosis requiring BiPAP.  CODE STATUS confirmed with daughter for which patient to remain full code.  Patient fortunately did improve and currently maintaining appropriate saturations on 5-6 L nasal cannula. Solu-Medrol slowly weaned and transitioned to Prednisone. Procalcitonin and chest x-ray with improvement. Given significant hypercarbia on ABG 4/16/23  (PC02 70.5) and improvement after bipap placed (PCO2 57.3) patient would benefit from NIV device for home. ABG was obtained on bipap.  This was ordered for home use by the pulmonologist--one hour on 3 hours off.  Blood cultures remained without growth and completed Azactam with improved procal levels.      On day of discharge patient is found resting in bed on 5L with saturations at 90%.  Walking oximetry test was done with supplemental oxygen ordered.  Patient needs 6L all the time.  Patient with no respiratory distress on exam today.  States she is ready to go home.  Home nebulizer ordered.  NIV machine has been ordered for home use with confirmation that it will be delivered today to the patient's home.  Home health has been ordered for the patient.  Patient stable for discharge home today.  Home medications as reconciled, will continue to hold HCTZ at discharge.  Trelegy inhaler ordered per pulmonology.  Prednisone taper for 8 days.  Patient has referral in for COPD clinic.  Patient needs to follow up with PCP early next week for recheck.  Strict return to hospital instructions with any increased shortness of breath or oxygen usage, chest pain, or palpitations.  Smoking cessation discussed and patient requests Nicotine patches at discharge.       Vital Signs:    Temp:  [98.3 °F (36.8 °C)-99.1 °F (37.3 °C)] 98.9 °F (37.2 °C)  Heart Rate:  [55-81] 63  Resp:  [17-18] 18  BP: (122-141)/(50-65) 125/59    Physical Exam:    General Appearance:  Alert and cooperative, pleasant elderly female, no acute distress on exam, appears chronically ill an frail   Head:  Atraumatic and normocephalic.   Eyes: Conjunctivae and sclerae normal, no icterus. No pallor.   Ears:  Ears with no abnormalities noted.   Throat: No oral lesions, no thrush, oral mucosa moist.   Neck: Supple, trachea midline, no thyromegaly.   Back:   No kyphoscoliosis present. No tenderness to palpation.   Lungs:   Breath sounds heard bilaterally equally but diminished throughout.  No crackles with scattered wheezing. Unlabored at rest on 5L with stable saturations   Heart:  Normal S1 and S2, no murmur, no gallop, no rub. No JVD.   Abdomen:   Normal bowel sounds, no masses, no organomegaly. Soft, nontender, nondistended, no rebound tenderness.   Extremities: Supple, no edema, no cyanosis, no clubbing.   Pulses: Pulses palpable bilaterally.   Skin: No bleeding or rash.   Neurologic: Alert and oriented x 3. No facial asymmetry. Moves all four limbs. No tremors.  Generalized weakness is present     Pertinent Lab Results:     Results from last 7 days   Lab Units 04/21/23  0526 04/20/23  0640 04/18/23  0707   SODIUM mmol/L 140 142 143   POTASSIUM mmol/L 4.6 5.1 4.3   CHLORIDE mmol/L 100 100 100   CO2 mmol/L 36.0* 40.4* 35.1*   BUN mg/dL 22 24* 24*   CREATININE mg/dL 0.42* 0.44* 0.45*   CALCIUM mg/dL 8.5* 8.9 8.9   GLUCOSE mg/dL 143* 182* 164*     Results from last 7 days   Lab Units 04/20/23  0640 04/17/23  0642 04/16/23  0600   WBC 10*3/mm3 20.15* 15.11* 12.09*   HEMOGLOBIN g/dL 14.4 14.4 14.0   HEMATOCRIT % 45.8 46.2 46.1   PLATELETS 10*3/mm3 264 303 282                         Results from last 7 days   Lab Units 04/19/23  0745   PH, ARTERIAL pH units 7.419   PO2 ART mm Hg 223.0*   PCO2, ARTERIAL mm Hg  64.0*   HCO3 ART mmol/L 41.4*     Results from last 7 days   Lab Units 04/16/23  1006   BLOODCX  No growth at 5 days  No growth at 5 days       Pertinent Radiology Results:    Imaging Results (All)     Procedure Component Value Units Date/Time    XR Chest 1 View [[id number]] Collected: 04/18/23 0812     Updated: 04/18/23 0815    Narrative:      PROCEDURE: XR CHEST 1 VW-     HISTORY: Shortness of breath.     COMPARISON: 04/16/2023..     FINDINGS: The heart is normal in size. Minimal interstitial disease  present bilaterally, similar to prior.. The mediastinum is unremarkable.  There is no pneumothorax.  There are no acute osseous abnormalities.  Apical lordotic positioning noted. Slight blunting of the costophrenic  angles is stable.       Impression:      Stable chest..           This report was signed and finalized on 4/18/2023 8:13 AM by Shraddha Wharton MD.    XR Chest 1 View [[id number]] Collected: 04/16/23 0801     Updated: 04/16/23 0905    Narrative:      X-RAY CHEST ONE VIEW     INDICATION:  Respiratory failure.; J96.01-Acute respiratory failure with  hypoxia; J44.1-Chronic obstructive pulmonary disease with (acute)  exacerbation.     FINDINGS:  A portable view of the chest was obtained.  Comparison is  made to a prior exam dated 04/13/2023.   Heart size is normal.  Interstitial opacities are slightly improved. More focal left basilar  opacity is likely atelectasis. There may be a small left pleural  effusion. No pneumothorax.       Impression:      Improved interstitial opacities. Favor improved pulmonary  edema. Left basilar opacity, favor atelectasis.     This report was signed and finalized on 4/16/2023 9:02 AM by Jody Byrd MD.    CT Chest Without Contrast Diagnostic [[id number]] Collected: 04/13/23 2010     Updated: 04/13/23 2011    Narrative:      FINAL REPORT    TECHNIQUE:  Routine axial images were obtained from the lung apices to below  the diaphragm without contrast.    CLINICAL  HISTORY:  dsypnea    FINDINGS:  There is mild upper lobe emphysema.  Tree-in-bud type opacities  in the lungs bilaterally are consistent with  bronchitis/bronchiolitis.  Interlobular septal thickening in the  right lung base may be due to interstitial pulmonary edema.  There is no confluent airspace consolidation.  The heart and  vasculature are unremarkable. There is no pleural disease,  adenopathy, or significant osseous abnormality.      Impression:      Mild emphysema.  Bronchitis/bronchiolitis.  Possible  interstitial pulmonary edema.    Authenticated and Electronically Signed by Alexis Babin M.D. on  04/13/2023 08:10:51 PM    XR Chest 1 View [[id number]] Collected: 04/13/23 1539     Updated: 04/13/23 1543    Narrative:      CLINICAL INDICATION:    SOA Triage Protocol shortness of breath.      EXAMINATION TECHNIQUE:   XR CHEST 1 VW-     COMPARISON:  Radiographs 10/01/2017.     FINDINGS:     Cardiomegaly Aortic atherosclerosis. Vascular engorgement and prominent  interstitial markings with peripheral subpleural linear opacities. No  dense consolidation. No pneumothorax or large pleural effusion. Large  pulmonary arteries, suggestive of pulmonary arterial hypertension.  Background emphysema.       Impression:      Findings are concerning for mild cardiogenic pulmonary edema. Background  emphysema.     Images personally reviewed, interpreted and dictated by SHIRA Villa.                This report was signed and finalized on 4/13/2023 3:41 PM by SHIRA Villa.          Echo:    Results for orders placed during the hospital encounter of 04/13/23    Adult Transthoracic Echo Complete W/ Cont if Necessary Per Protocol    Interpretation Summary  •  Left ventricular diastolic function is consistent with (grade I) impaired relaxation.  •  Mild aortic valve stenosis is present.  •  Estimated right ventricular systolic pressure from tricuspid regurgitation is moderately elevated (45-55 mmHg).  •  Mild to  moderate pulmonary hypertension is present.    Condition on Discharge:      Stable.    Code status during the hospital stay:    Code Status and Medical Interventions:   Ordered at: 04/13/23 2122     Code Status (Patient has no pulse and is not breathing):    CPR (Attempt to Resuscitate)     Medical Interventions (Patient has pulse or is breathing):    Full Support     Discharge Disposition:    Home with home health    Discharge Medications:       Discharge Medications      New Medications      Instructions Start Date   albuterol (2.5 MG/3ML) 0.083% nebulizer solution  Commonly known as: PROVENTIL  Replaces: Ventolin  (90 Base) MCG/ACT inhaler   Inhale contents of 1 vial (3 mL) by nebulization Every 6 (Six) Hours As Needed for Wheezing for up to 30 days.      benzonatate 200 MG capsule  Commonly known as: TESSALON   200 mg, Oral, 3 Times Daily PRN      Fluticasone-Umeclidin-Vilant 100-62.5-25 MCG/ACT inhaler  Commonly known as: TRELEGY   Inhale 1 puff  by mouth Daily      nicotine 21 MG/24HR patch  Commonly known as: NICODERM CQ   Place 1 patch on the skin as directed by provider Daily      predniSONE 20 MG tablet  Commonly known as: DELTASONE   40 mg, Oral, Daily   Start Date: April 22, 2023     predniSONE 10 MG tablet  Commonly known as: DELTASONE   30 mg, Oral, Daily   Start Date: April 24, 2023     predniSONE 20 MG tablet  Commonly known as: DELTASONE   20 mg, Oral, Daily   Start Date: April 26, 2023     predniSONE 10 MG tablet  Commonly known as: DELTASONE   10 mg, Oral, Daily   Start Date: April 28, 2023        Continue These Medications      Instructions Start Date   amLODIPine 10 MG tablet  Commonly known as: NORVASC   10 mg, Oral, Daily      atenolol 25 MG tablet  Commonly known as: TENORMIN   25 mg, Oral, Daily      multivitamins-minerals capsule capsule   1 capsule, Oral, Daily         Stop These Medications    difluprednate 0.05 % ophthalmic emulsion  Commonly known as: DUREZOL      hydroCHLOROthiazide 25 MG tablet  Commonly known as: HYDRODIURIL     Ventolin  (90 Base) MCG/ACT inhaler  Generic drug: albuterol sulfate HFA  Replaced by: albuterol (2.5 MG/3ML) 0.083% nebulizer solution          Discharge Diet:     Diet Instructions     Diet: Regular/House Diet; Regular Texture (IDDSI 7); Thin (IDDSI 0)      Discharge Diet: Regular/House Diet    Texture: Regular Texture (IDDSI 7)    Fluid Consistency: Thin (IDDSI 0)        Activity at Discharge:     Activity Instructions     Activity as Tolerated      Gradually Increase Activity Until at Pre-Hospitalization Level          Follow-up Appointments:    Additional Instructions for the Follow-ups that You Need to Schedule     Ambulatory Referral to Disease State Management   As directed      Follow-up in 14-21 days after discharge    Order Comments: Follow-up in 14-21 days after discharge     To dept: [facility] [[id number]]    What program(s) are you referring for?: COPD    Follow-up needed: No         Ambulatory Referral to Home Health (Hospital)   As directed      Face to Face Visit Date: 4/20/2023    Follow-up provider for Plan of Care?: I treated the patient in an acute care facility and will not continue treatment after discharge.    Follow-up provider: SHRUTHI COKER [[id number]]    Reason/Clinical Findings: debility    Describe mobility limitations that make leaving home difficult: debility    Nursing/Therapeutic Services Requested: Skilled Nursing Physical Therapy Occupational Therapy    Skilled nursing orders: COPD management    PT orders: Therapeutic exercise Gait Training Transfer training Strengthening    Weight Bearing Status: As Tolerated    Occupational orders: Activities of daily living Energy conservation Strengthening Home safety assessment         Discharge Follow-up with Specified Provider: Dr. Soriano OR Diamond Kaplan; 4 Months   As directed      To: Dr. Soriano OR Diamond Kaplan    Follow Up: 4 Months    Follow Up  Details: If being discharged on a weekend, please call our office on the next working day to schedule this appointment.            Follow-up Information     Jason Nicholson MD .    Specialty: Internal Medicine  Why: Please schedule hospital follow up for early next week prior to d/c  Contact information:  [address]  [facility] [address] [phone]                       No future appointments.     Mamta Tinajero, APRN  04/21/23  12:09 EDT    Time: I spent 35 minutes on this discharge activity which included: face-to-face encounter with the patient, reviewing the data in the system, coordination of the care with the nursing staff as well as consultants, documentation, and entering orders.     Dictated utilizing Dragon dictation.

## 2023-04-21 NOTE — CASE MANAGEMENT/SOCIAL WORK
Case Management Discharge Note           Provided Post Acute Provider List?: N/A  Provided Post Acute Provider Quality & Resource List?: N/A    Selected Continued Care - Admitted Since 4/13/2023     Destination    No services have been selected for the patient.              Durable Medical Equipment Coordination complete.    Service Provider Selected Services Address Phone Fax Patient Preferred    [facility] Durable Medical Equipment [facility] [address] [address] [phone] --       Internal Comment last updated by Merle Torres 4/21/2023 1530    Nebulizer/Home o2               [facility] Durable Medical Equipment -- [phone] --       Internal Comment last updated by Merle Torres 4/21/2023 1530    [employer]                     Dialysis/Infusion    No services have been selected for the patient.              Home Medical Care Coordination complete.    Service Provider Selected Services Address Phone Fax Patient Preferred    [facility] [address], [address], [facility] [address] [phone] --          Therapy    No services have been selected for the patient.              Community Resources    No services have been selected for the patient.              Community & DME    No services have been selected for the patient.                  Transportation Services  Private: Car    Final Discharge Disposition Code: 06 - home with home health care

## 2023-04-21 NOTE — PLAN OF CARE
Goal Outcome Evaluation:  Plan of Care Reviewed With: patient        Progress: improving  Outcome Evaluation: no acute events during shift. VSS, SR on tele. 5L HFNC. no other changes in pt condition. will continue to monitor.

## 2023-04-22 NOTE — OUTREACH NOTE
Prep Survey    Flowsheet Row Responses   [facility] [place] patient discharged from? [facility]   Is LACE score < 7 ? No   Eligibility [facility]   Date of Admission 04/13/23   Date of Discharge 04/21/23   Discharge Disposition Home-Health Care [facility]   Discharge diagnosis COPD exacerbation   Does the patient have one of the following disease processes/diagnoses(primary or secondary)? COPD   Does the patient have Home health ordered? Yes   What is the Home health agency?  [facility]    Is there a DME ordered? Yes   What DME was ordered? [facility] - Nebulizer/Home o2 /// [employer]- [employer]    Prep survey completed? Yes          Beckie MAHONEY - Registered Nurse

## 2023-04-24 ENCOUNTER — TRANSITIONAL CARE MANAGEMENT TELEPHONE ENCOUNTER (OUTPATIENT)
Dept: CALL CENTER | Facility: HOSPITAL | Age: 78
End: 2023-04-24
Payer: MEDICARE

## 2023-04-24 NOTE — OUTREACH NOTE
Call Center TCM Note    Flowsheet Row Responses   [facility] patient discharged from? [facility]   Does the patient have one of the following disease processes/diagnoses(primary or secondary)? COPD   TCM attempt successful? Yes   Call start time 1112   Call end time 1115   Discharge diagnosis COPD exacerbation   Meds reviewed with patient/caregiver? Yes   Is the patient having any side effects they believe may be caused by any medication additions or changes? No   Does the patient have all medications ordered at discharge? Yes   Is the patient taking all medications as directed (includes completed medication regime)? Yes   Comments Hosp dc fu apt on 5/1/23 with PCP    Does the patient have an appointment with their PCP within 7 days of discharge? Yes   What is the Home health agency?  [facility]    Has all DME been delivered? Yes   DME comments Pt states she wears oxygen prn    Pulse Ox monitoring None  [encouraged pt to purchase a pulse ox ]   Psychosocial issues? No   Did the patient receive a copy of their discharge instructions? Yes   Nursing interventions Reviewed instructions with patient   What is the patient's perception of their health status since discharge? Improving   If the patient is a current smoker, are they able to teach back resources for cessation? Not a smoker   Is the patient/caregiver able to teach back the hierarchy of who to call/visit for symptoms/problems? PCP, Specialist, Home health nurse, Urgent Care, ED, 911 Yes   Is the patient able to teach back COPD zones? Yes   Nursing interventions Education provided on various zones   Patient reports what zone on this call? Green Zone   Green Zone Reports doing well, Breathing without shortness of breath, Usual activity and exercise level, Slept well last night, Appetite is good   Green Zone interventions: Take daily medications, Use oxygen as prescribed, At all times avoid cigarette smoking, vaping and inhaled irritants    TCM call completed? Yes   Call end time [id number]          Nora Tinajero, RN    4/24/2023, 11:15 EDT

## 2023-04-24 NOTE — CASE MANAGEMENT/SOCIAL WORK
Case Management/Social Work    Patient Name:  Carolin Toscano  YOB: 1945  MRN: [id number]  Admit Date:  4/13/2023      Received call from [facility] stating pt had declined hh services.       Electronically signed by:  Merle Torres  04/24/23 10:00 EDT

## 2023-05-01 ENCOUNTER — OFFICE VISIT (OUTPATIENT)
Dept: INTERNAL MEDICINE | Facility: CLINIC | Age: 78
End: 2023-05-01
Payer: MEDICARE

## 2023-05-01 VITALS
BODY MASS INDEX: 27.64 KG/M2 | WEIGHT: 156 LBS | SYSTOLIC BLOOD PRESSURE: 140 MMHG | HEART RATE: 60 BPM | OXYGEN SATURATION: 98 % | DIASTOLIC BLOOD PRESSURE: 60 MMHG | HEIGHT: 63 IN | TEMPERATURE: 97.5 F

## 2023-05-01 DIAGNOSIS — J44.1 COPD EXACERBATION: Primary | ICD-10-CM

## 2023-05-01 DIAGNOSIS — R73.9 HYPERGLYCEMIA: ICD-10-CM

## 2023-05-01 DIAGNOSIS — I10 ESSENTIAL HYPERTENSION: ICD-10-CM

## 2023-05-01 PROBLEM — A41.9 SEPSIS: Status: RESOLVED | Noted: 2017-10-01 | Resolved: 2023-05-01

## 2023-05-01 NOTE — PROGRESS NOTES
Transitional Care Follow Up Visit  Subjective     Carolin Toscano is a 77 y.o. female who presents for a transitional care management visit.    Within 48 business hours after discharge our office contacted her via telephone to coordinate her care and needs.      I reviewed and discussed the details of that call along with the discharge summary, hospital problems, inpatient lab results, inpatient diagnostic studies, and consultation reports with Carolin.     Current outpatient and discharge medications have been reconciled for the patient.  Reviewed by: Jason Nicholson MD          4/21/2023     8:30 PM   Date of TCM Phone Call   [facility]   Date of Admission 4/13/2023   Date of Discharge 4/21/2023   [place] [facility] [place] [facility] [place]     Risk for Readmission (LACE) Score: 10 (4/21/2023  6:00 AM)      History of Present Illness   Course During Hospital Stay: 77-year-old female with a history of hypertension Long history of tobacco use history of hyperglycemia presented to the emergency room with significant shortness of breath noted to be hypoxemic and hypercapnic requiring increased amount of O2 supplement  Along with BiPAP.  She was given IV antibiotics along with Solu-Medrol subsequently transition to oral prednisone .  She was sent home on O2 via nasal cannula up to 6 L/min.  Home health started.  She has not been tobacco free for about 10 days  The following portions of the patient's history were reviewed and updated as appropriate: allergies, current medications, past family history, past medical history, past social history, past surgical history and problem list.    Review of Systems   Constitutional: Negative.  Negative for activity change, appetite change, fatigue and fever.   HENT: Negative for congestion, ear discharge, ear pain and trouble swallowing.    Eyes: Negative for photophobia and visual disturbance.   Respiratory: Positive for cough and shortness of breath.     Cardiovascular: Negative for chest pain and palpitations.   Gastrointestinal: Negative for abdominal distention, abdominal pain, constipation, diarrhea, nausea and vomiting.   Endocrine: Negative.    Genitourinary: Negative for dysuria, hematuria and urgency.   Musculoskeletal: Positive for arthralgias. Negative for back pain, joint swelling and myalgias.   Skin: Negative for color change and rash.   Allergic/Immunologic: Negative.    Neurological: Negative for dizziness, weakness, light-headedness and headaches.   Hematological: Negative for adenopathy. Does not bruise/bleed easily.   Psychiatric/Behavioral: Positive for sleep disturbance. Negative for agitation, confusion and dysphoric mood. The patient is not nervous/anxious.        Objective   Physical Exam  Constitutional:       General: She is not in acute distress.     Appearance: She is well-developed.   HENT:      Nose: Nose normal.   Eyes:      General: No scleral icterus.     Conjunctiva/sclera: Conjunctivae normal.   Neck:      Thyroid: No thyromegaly.      Trachea: No tracheal deviation.   Cardiovascular:      Rate and Rhythm: Normal rate and regular rhythm.      Heart sounds: No murmur heard.    No friction rub.   Pulmonary:      Effort: No respiratory distress.      Breath sounds: No wheezing or rales.   Abdominal:      General: There is no distension.      Palpations: Abdomen is soft. There is no mass.      Tenderness: There is no abdominal tenderness. There is no guarding.   Musculoskeletal:         General: Deformity present. Normal range of motion.   Lymphadenopathy:      Cervical: No cervical adenopathy.   Skin:     General: Skin is warm and dry.      Findings: No erythema or rash.   Neurological:      Mental Status: She is alert and oriented to person, place, and time.      Cranial Nerves: No cranial nerve deficit.      Coordination: Coordination normal.      Deep Tendon Reflexes: Reflexes are normal and symmetric.   Psychiatric:          Behavior: Behavior normal.         Thought Content: Thought content normal.         Judgment: Judgment normal.         Assessment & Plan   Diagnoses and all orders for this visit:    1. COPD exacerbation (Primary) counseled to stay off tobacco.  She is doing reasonably well discussed hospital findings with patient and her son who is accompanying her.  She has stopped using Trelegy would prefer Spiriva instead.  Prescription called in.  Albuterol via neb machine every 6 hours as needed    2. Essential hypertension discussed low-salt diet continue with amlodipine and atenolol    3. Hyperglycemia discussed elevated A1c noted dietary counseling given check A1c with next visit

## 2023-05-04 ENCOUNTER — HOSPITAL ENCOUNTER (INPATIENT)
Facility: HOSPITAL | Age: 78
LOS: 28 days | Discharge: ANOTHER HOSPITAL | DRG: 190 | End: 2023-06-01
Attending: EMERGENCY MEDICINE
Payer: MEDICARE

## 2023-05-04 ENCOUNTER — APPOINTMENT (OUTPATIENT)
Dept: GENERAL RADIOLOGY | Facility: HOSPITAL | Age: 78
DRG: 190 | End: 2023-05-04
Payer: MEDICARE

## 2023-05-04 DIAGNOSIS — J41.0 SIMPLE CHRONIC BRONCHITIS: ICD-10-CM

## 2023-05-04 DIAGNOSIS — J44.1 COPD EXACERBATION: ICD-10-CM

## 2023-05-04 DIAGNOSIS — J96.22 ACUTE ON CHRONIC RESPIRATORY FAILURE WITH HYPOXIA AND HYPERCAPNIA: Primary | ICD-10-CM

## 2023-05-04 DIAGNOSIS — B34.8 PARAINFLUENZA VIRUS INFECTION: ICD-10-CM

## 2023-05-04 DIAGNOSIS — J96.21 ACUTE ON CHRONIC RESPIRATORY FAILURE WITH HYPOXIA AND HYPERCAPNIA: Primary | ICD-10-CM

## 2023-05-04 LAB
A-A DO2: 84.8 MMHG
ALBUMIN SERPL-MCNC: 3.8 G/DL (ref 3.5–5.2)
ALBUMIN/GLOB SERPL: 1.2 G/DL
ALP SERPL-CCNC: 87 U/L (ref 39–117)
ALT SERPL W P-5'-P-CCNC: 30 U/L (ref 1–33)
ANION GAP SERPL CALCULATED.3IONS-SCNC: 4.8 MMOL/L (ref 5–15)
ARTERIAL PATENCY WRIST A: POSITIVE
AST SERPL-CCNC: 23 U/L (ref 1–32)
ATMOSPHERIC PRESS: 735 MMHG
B PARAPERT DNA SPEC QL NAA+PROBE: NOT DETECTED
B PERT DNA SPEC QL NAA+PROBE: NOT DETECTED
BACTERIA UR QL AUTO: ABNORMAL /HPF
BASE EXCESS BLDA CALC-SCNC: 12.1 MMOL/L (ref 0–2)
BASOPHILS # BLD AUTO: 0.02 10*3/MM3 (ref 0–0.2)
BASOPHILS NFR BLD AUTO: 0.2 % (ref 0–1.5)
BDY SITE: ABNORMAL
BILIRUB SERPL-MCNC: 0.4 MG/DL (ref 0–1.2)
BILIRUB UR QL STRIP: NEGATIVE
BUN SERPL-MCNC: 10 MG/DL (ref 8–23)
BUN/CREAT SERPL: 26.3 (ref 7–25)
C PNEUM DNA NPH QL NAA+NON-PROBE: NOT DETECTED
CALCIUM SPEC-SCNC: 9.1 MG/DL (ref 8.6–10.5)
CHLORIDE SERPL-SCNC: 95 MMOL/L (ref 98–107)
CLARITY UR: CLEAR
CO2 SERPL-SCNC: 40.2 MMOL/L (ref 22–29)
COHGB MFR BLD: 0.9 % (ref 0–2)
COLOR UR: YELLOW
CREAT SERPL-MCNC: 0.38 MG/DL (ref 0.57–1)
D-LACTATE SERPL-SCNC: 1.3 MMOL/L (ref 0.5–2)
DEPRECATED RDW RBC AUTO: 52.8 FL (ref 37–54)
EGFRCR SERPLBLD CKD-EPI 2021: 104 ML/MIN/1.73
EOSINOPHIL # BLD AUTO: 0 10*3/MM3 (ref 0–0.4)
EOSINOPHIL NFR BLD AUTO: 0 % (ref 0.3–6.2)
ERYTHROCYTE [DISTWIDTH] IN BLOOD BY AUTOMATED COUNT: 14.8 % (ref 12.3–15.4)
FLUAV SUBTYP SPEC NAA+PROBE: NOT DETECTED
FLUBV RNA ISLT QL NAA+PROBE: NOT DETECTED
GAS FLOW AIRWAY: 5 LPM
GLOBULIN UR ELPH-MCNC: 3.1 GM/DL
GLUCOSE BLDC GLUCOMTR-MCNC: 148 MG/DL (ref 70–130)
GLUCOSE BLDC GLUCOMTR-MCNC: 149 MG/DL (ref 70–130)
GLUCOSE SERPL-MCNC: 192 MG/DL (ref 65–99)
GLUCOSE UR STRIP-MCNC: NEGATIVE MG/DL
HADV DNA SPEC NAA+PROBE: NOT DETECTED
HCO3 BLDA-SCNC: 42.6 MMOL/L (ref 22–28)
HCOV 229E RNA SPEC QL NAA+PROBE: NOT DETECTED
HCOV HKU1 RNA SPEC QL NAA+PROBE: NOT DETECTED
HCOV NL63 RNA SPEC QL NAA+PROBE: NOT DETECTED
HCOV OC43 RNA SPEC QL NAA+PROBE: NOT DETECTED
HCT VFR BLD AUTO: 44.5 % (ref 34–46.6)
HCT VFR BLD CALC: 42.1 %
HGB BLD-MCNC: 13.3 G/DL (ref 12–15.9)
HGB UR QL STRIP.AUTO: ABNORMAL
HMPV RNA NPH QL NAA+NON-PROBE: NOT DETECTED
HOLD SPECIMEN: NORMAL
HOLD SPECIMEN: NORMAL
HPIV1 RNA ISLT QL NAA+PROBE: NOT DETECTED
HPIV2 RNA SPEC QL NAA+PROBE: NOT DETECTED
HPIV3 RNA NPH QL NAA+PROBE: DETECTED
HPIV4 P GENE NPH QL NAA+PROBE: NOT DETECTED
HYALINE CASTS UR QL AUTO: ABNORMAL /LPF
HYPOCHROMIA BLD QL: NORMAL
IMM GRANULOCYTES # BLD AUTO: 0.04 10*3/MM3 (ref 0–0.05)
IMM GRANULOCYTES NFR BLD AUTO: 0.5 % (ref 0–0.5)
INHALED O2 CONCENTRATION: 40 %
KETONES UR QL STRIP: NEGATIVE
LEUKOCYTE ESTERASE UR QL STRIP.AUTO: NEGATIVE
LYMPHOCYTES # BLD AUTO: 0.29 10*3/MM3 (ref 0.7–3.1)
LYMPHOCYTES NFR BLD AUTO: 3.6 % (ref 19.6–45.3)
Lab: ABNORMAL
Lab: ABNORMAL
M PNEUMO IGG SER IA-ACNC: NOT DETECTED
MCH RBC QN AUTO: 28.9 PG (ref 26.6–33)
MCHC RBC AUTO-ENTMCNC: 29.9 G/DL (ref 31.5–35.7)
MCV RBC AUTO: 96.7 FL (ref 79–97)
METHGB BLD QL: 0.6 % (ref 0–1.5)
MODALITY: ABNORMAL
MONOCYTES # BLD AUTO: 0.33 10*3/MM3 (ref 0.1–0.9)
MONOCYTES NFR BLD AUTO: 4.1 % (ref 5–12)
MUCOUS THREADS URNS QL MICRO: ABNORMAL /HPF
NEUTROPHILS NFR BLD AUTO: 7.36 10*3/MM3 (ref 1.7–7)
NEUTROPHILS NFR BLD AUTO: 91.6 % (ref 42.7–76)
NITRITE UR QL STRIP: NEGATIVE
NOTE: ABNORMAL
NOTIFIED BY: ABNORMAL
NOTIFIED WHO: ABNORMAL
NRBC BLD AUTO-RTO: 0 /100 WBC (ref 0–0.2)
NT-PROBNP SERPL-MCNC: 234.4 PG/ML (ref 0–1800)
OXYHGB MFR BLDV: 95.8 % (ref 94–99)
PCO2 BLDA: 87.4 MM HG (ref 35–45)
PCO2 TEMP ADJ BLD: ABNORMAL MM[HG]
PH BLDA: 7.3 PH UNITS (ref 7.35–7.45)
PH UR STRIP.AUTO: 5.5 [PH] (ref 5–8)
PH, TEMP CORRECTED: ABNORMAL
PLATELET # BLD AUTO: 149 10*3/MM3 (ref 140–450)
PMV BLD AUTO: 10.5 FL (ref 6–12)
PO2 BLDA: 94.2 MM HG (ref 75–100)
PO2 TEMP ADJ BLD: ABNORMAL MM[HG]
POTASSIUM SERPL-SCNC: 4.4 MMOL/L (ref 3.5–5.2)
PROCALCITONIN SERPL-MCNC: 0.14 NG/ML (ref 0–0.25)
PROT SERPL-MCNC: 6.9 G/DL (ref 6–8.5)
PROT UR QL STRIP: ABNORMAL
RBC # BLD AUTO: 4.6 10*6/MM3 (ref 3.77–5.28)
RBC # UR STRIP: ABNORMAL /HPF
REF LAB TEST METHOD: ABNORMAL
RHINOVIRUS RNA SPEC NAA+PROBE: NOT DETECTED
RSV RNA NPH QL NAA+NON-PROBE: NOT DETECTED
SAO2 % BLDCOA: 97.3 % (ref 94–100)
SARS-COV-2 RNA NPH QL NAA+NON-PROBE: NOT DETECTED
SMALL PLATELETS BLD QL SMEAR: ADEQUATE
SODIUM SERPL-SCNC: 140 MMOL/L (ref 136–145)
SP GR UR STRIP: 1.02 (ref 1–1.03)
SQUAMOUS #/AREA URNS HPF: ABNORMAL /HPF
TROPONIN T SERPL HS-MCNC: 44 NG/L
UROBILINOGEN UR QL STRIP: ABNORMAL
VENTILATOR MODE: ABNORMAL
WBC # UR STRIP: ABNORMAL /HPF
WBC MORPH BLD: NORMAL
WBC NRBC COR # BLD: 8.04 10*3/MM3 (ref 3.4–10.8)
WHOLE BLOOD HOLD COAG: NORMAL
WHOLE BLOOD HOLD SPECIMEN: NORMAL

## 2023-05-04 PROCEDURE — 25010000002 METHYLPREDNISOLONE PER 125 MG: Performed by: FAMILY MEDICINE

## 2023-05-04 PROCEDURE — 85007 BL SMEAR W/DIFF WBC COUNT: CPT | Performed by: EMERGENCY MEDICINE

## 2023-05-04 PROCEDURE — 99285 EMERGENCY DEPT VISIT HI MDM: CPT

## 2023-05-04 PROCEDURE — 94799 UNLISTED PULMONARY SVC/PX: CPT

## 2023-05-04 PROCEDURE — 25010000002 METHYLPREDNISOLONE PER 125 MG: Performed by: PHYSICIAN ASSISTANT

## 2023-05-04 PROCEDURE — 81001 URINALYSIS AUTO W/SCOPE: CPT | Performed by: PHYSICIAN ASSISTANT

## 2023-05-04 PROCEDURE — 83880 ASSAY OF NATRIURETIC PEPTIDE: CPT | Performed by: EMERGENCY MEDICINE

## 2023-05-04 PROCEDURE — 80053 COMPREHEN METABOLIC PANEL: CPT | Performed by: EMERGENCY MEDICINE

## 2023-05-04 PROCEDURE — 82375 ASSAY CARBOXYHB QUANT: CPT

## 2023-05-04 PROCEDURE — 83050 HGB METHEMOGLOBIN QUAN: CPT

## 2023-05-04 PROCEDURE — 94640 AIRWAY INHALATION TREATMENT: CPT

## 2023-05-04 PROCEDURE — 99223 1ST HOSP IP/OBS HIGH 75: CPT | Performed by: FAMILY MEDICINE

## 2023-05-04 PROCEDURE — 94660 CPAP INITIATION&MGMT: CPT

## 2023-05-04 PROCEDURE — 82805 BLOOD GASES W/O2 SATURATION: CPT

## 2023-05-04 PROCEDURE — 71045 X-RAY EXAM CHEST 1 VIEW: CPT

## 2023-05-04 PROCEDURE — 84145 PROCALCITONIN (PCT): CPT | Performed by: PHYSICIAN ASSISTANT

## 2023-05-04 PROCEDURE — 82948 REAGENT STRIP/BLOOD GLUCOSE: CPT

## 2023-05-04 PROCEDURE — 25010000002 ENOXAPARIN PER 10 MG: Performed by: FAMILY MEDICINE

## 2023-05-04 PROCEDURE — 36415 COLL VENOUS BLD VENIPUNCTURE: CPT

## 2023-05-04 PROCEDURE — 83605 ASSAY OF LACTIC ACID: CPT | Performed by: PHYSICIAN ASSISTANT

## 2023-05-04 PROCEDURE — 93005 ELECTROCARDIOGRAM TRACING: CPT | Performed by: EMERGENCY MEDICINE

## 2023-05-04 PROCEDURE — 51702 INSERT TEMP BLADDER CATH: CPT

## 2023-05-04 PROCEDURE — 85025 COMPLETE CBC W/AUTO DIFF WBC: CPT | Performed by: EMERGENCY MEDICINE

## 2023-05-04 PROCEDURE — 0202U NFCT DS 22 TRGT SARS-COV-2: CPT | Performed by: PHYSICIAN ASSISTANT

## 2023-05-04 PROCEDURE — 87040 BLOOD CULTURE FOR BACTERIA: CPT | Performed by: EMERGENCY MEDICINE

## 2023-05-04 PROCEDURE — 94761 N-INVAS EAR/PLS OXIMETRY MLT: CPT

## 2023-05-04 PROCEDURE — 25010000002 MAGNESIUM SULFATE 2 GM/50ML SOLUTION: Performed by: PHYSICIAN ASSISTANT

## 2023-05-04 PROCEDURE — 36600 WITHDRAWAL OF ARTERIAL BLOOD: CPT

## 2023-05-04 PROCEDURE — 84484 ASSAY OF TROPONIN QUANT: CPT | Performed by: EMERGENCY MEDICINE

## 2023-05-04 RX ORDER — ONDANSETRON 2 MG/ML
4 INJECTION INTRAMUSCULAR; INTRAVENOUS EVERY 6 HOURS PRN
Status: DISCONTINUED | OUTPATIENT
Start: 2023-05-04 | End: 2023-06-01 | Stop reason: HOSPADM

## 2023-05-04 RX ORDER — SODIUM CHLORIDE 0.9 % (FLUSH) 0.9 %
3-10 SYRINGE (ML) INJECTION AS NEEDED
Status: DISCONTINUED | OUTPATIENT
Start: 2023-05-04 | End: 2023-06-01 | Stop reason: HOSPADM

## 2023-05-04 RX ORDER — AMLODIPINE BESYLATE 5 MG/1
10 TABLET ORAL DAILY
Status: DISCONTINUED | OUTPATIENT
Start: 2023-05-04 | End: 2023-06-01 | Stop reason: HOSPADM

## 2023-05-04 RX ORDER — AMOXICILLIN 250 MG
2 CAPSULE ORAL 2 TIMES DAILY
Status: DISCONTINUED | OUTPATIENT
Start: 2023-05-04 | End: 2023-06-01 | Stop reason: HOSPADM

## 2023-05-04 RX ORDER — MAGNESIUM SULFATE HEPTAHYDRATE 40 MG/ML
2 INJECTION, SOLUTION INTRAVENOUS ONCE
Status: COMPLETED | OUTPATIENT
Start: 2023-05-04 | End: 2023-05-04

## 2023-05-04 RX ORDER — NICOTINE 21 MG/24HR
1 PATCH, TRANSDERMAL 24 HOURS TRANSDERMAL EVERY 24 HOURS
Status: DISCONTINUED | OUTPATIENT
Start: 2023-05-04 | End: 2023-05-06

## 2023-05-04 RX ORDER — MULTIPLE VITAMINS W/ MINERALS TAB 9MG-400MCG
1 TAB ORAL DAILY
Status: DISCONTINUED | OUTPATIENT
Start: 2023-05-04 | End: 2023-06-01 | Stop reason: HOSPADM

## 2023-05-04 RX ORDER — ACETAMINOPHEN 160 MG/5ML
650 SOLUTION ORAL EVERY 4 HOURS PRN
Status: DISCONTINUED | OUTPATIENT
Start: 2023-05-04 | End: 2023-06-01 | Stop reason: HOSPADM

## 2023-05-04 RX ORDER — ENOXAPARIN SODIUM 100 MG/ML
40 INJECTION SUBCUTANEOUS EVERY 24 HOURS
Status: DISCONTINUED | OUTPATIENT
Start: 2023-05-04 | End: 2023-06-01 | Stop reason: HOSPADM

## 2023-05-04 RX ORDER — NICOTINE POLACRILEX 4 MG
15 LOZENGE BUCCAL
Status: DISCONTINUED | OUTPATIENT
Start: 2023-05-04 | End: 2023-06-01 | Stop reason: HOSPADM

## 2023-05-04 RX ORDER — ALBUTEROL SULFATE 2.5 MG/3ML
2.5 SOLUTION RESPIRATORY (INHALATION) EVERY 6 HOURS PRN
Status: DISCONTINUED | OUTPATIENT
Start: 2023-05-04 | End: 2023-05-21

## 2023-05-04 RX ORDER — ACETAMINOPHEN 325 MG/1
650 TABLET ORAL EVERY 4 HOURS PRN
Status: DISCONTINUED | OUTPATIENT
Start: 2023-05-04 | End: 2023-06-01 | Stop reason: HOSPADM

## 2023-05-04 RX ORDER — GUAIFENESIN/DEXTROMETHORPHAN 100-10MG/5
10 SYRUP ORAL EVERY 4 HOURS PRN
Status: DISCONTINUED | OUTPATIENT
Start: 2023-05-04 | End: 2023-06-01 | Stop reason: HOSPADM

## 2023-05-04 RX ORDER — SODIUM CHLORIDE 9 MG/ML
40 INJECTION, SOLUTION INTRAVENOUS AS NEEDED
Status: DISCONTINUED | OUTPATIENT
Start: 2023-05-04 | End: 2023-06-01 | Stop reason: HOSPADM

## 2023-05-04 RX ORDER — IPRATROPIUM BROMIDE AND ALBUTEROL SULFATE 2.5; .5 MG/3ML; MG/3ML
3 SOLUTION RESPIRATORY (INHALATION)
Status: DISCONTINUED | OUTPATIENT
Start: 2023-05-04 | End: 2023-05-05

## 2023-05-04 RX ORDER — CHOLECALCIFEROL (VITAMIN D3) 125 MCG
5 CAPSULE ORAL NIGHTLY PRN
Status: DISCONTINUED | OUTPATIENT
Start: 2023-05-04 | End: 2023-06-01 | Stop reason: HOSPADM

## 2023-05-04 RX ORDER — ATENOLOL 25 MG/1
25 TABLET ORAL DAILY
Status: DISCONTINUED | OUTPATIENT
Start: 2023-05-04 | End: 2023-06-01 | Stop reason: HOSPADM

## 2023-05-04 RX ORDER — METHYLPREDNISOLONE SODIUM SUCCINATE 125 MG/2ML
60 INJECTION, POWDER, LYOPHILIZED, FOR SOLUTION INTRAMUSCULAR; INTRAVENOUS EVERY 8 HOURS
Status: DISCONTINUED | OUTPATIENT
Start: 2023-05-04 | End: 2023-05-06

## 2023-05-04 RX ORDER — METHYLPREDNISOLONE SODIUM SUCCINATE 125 MG/2ML
125 INJECTION, POWDER, LYOPHILIZED, FOR SOLUTION INTRAMUSCULAR; INTRAVENOUS ONCE
Status: COMPLETED | OUTPATIENT
Start: 2023-05-04 | End: 2023-05-04

## 2023-05-04 RX ORDER — ACETAMINOPHEN 650 MG/1
650 SUPPOSITORY RECTAL EVERY 4 HOURS PRN
Status: DISCONTINUED | OUTPATIENT
Start: 2023-05-04 | End: 2023-06-01 | Stop reason: HOSPADM

## 2023-05-04 RX ORDER — GUAIFENESIN 600 MG/1
600 TABLET, EXTENDED RELEASE ORAL EVERY 12 HOURS SCHEDULED
Status: DISCONTINUED | OUTPATIENT
Start: 2023-05-04 | End: 2023-05-19

## 2023-05-04 RX ORDER — POLYETHYLENE GLYCOL 3350 17 G/17G
17 POWDER, FOR SOLUTION ORAL DAILY PRN
Status: DISCONTINUED | OUTPATIENT
Start: 2023-05-04 | End: 2023-06-01 | Stop reason: HOSPADM

## 2023-05-04 RX ORDER — DEXTROSE MONOHYDRATE 25 G/50ML
25 INJECTION, SOLUTION INTRAVENOUS
Status: DISCONTINUED | OUTPATIENT
Start: 2023-05-04 | End: 2023-06-01 | Stop reason: HOSPADM

## 2023-05-04 RX ORDER — BUDESONIDE AND FORMOTEROL FUMARATE DIHYDRATE 160; 4.5 UG/1; UG/1
2 AEROSOL RESPIRATORY (INHALATION)
Status: DISCONTINUED | OUTPATIENT
Start: 2023-05-04 | End: 2023-06-01 | Stop reason: HOSPADM

## 2023-05-04 RX ORDER — INSULIN ASPART 100 [IU]/ML
0-9 INJECTION, SOLUTION INTRAVENOUS; SUBCUTANEOUS
Status: DISCONTINUED | OUTPATIENT
Start: 2023-05-04 | End: 2023-06-01 | Stop reason: HOSPADM

## 2023-05-04 RX ORDER — SODIUM CHLORIDE 0.9 % (FLUSH) 0.9 %
3 SYRINGE (ML) INJECTION EVERY 12 HOURS SCHEDULED
Status: DISCONTINUED | OUTPATIENT
Start: 2023-05-04 | End: 2023-06-01 | Stop reason: HOSPADM

## 2023-05-04 RX ORDER — SODIUM CHLORIDE 0.9 % (FLUSH) 0.9 %
10 SYRINGE (ML) INJECTION AS NEEDED
Status: DISCONTINUED | OUTPATIENT
Start: 2023-05-04 | End: 2023-05-25 | Stop reason: SDUPTHER

## 2023-05-04 RX ORDER — BISACODYL 5 MG/1
5 TABLET, DELAYED RELEASE ORAL DAILY PRN
Status: DISCONTINUED | OUTPATIENT
Start: 2023-05-04 | End: 2023-06-01 | Stop reason: HOSPADM

## 2023-05-04 RX ORDER — BISACODYL 10 MG
10 SUPPOSITORY, RECTAL RECTAL DAILY PRN
Status: DISCONTINUED | OUTPATIENT
Start: 2023-05-04 | End: 2023-06-01 | Stop reason: HOSPADM

## 2023-05-04 RX ADMIN — METHYLPREDNISOLONE SODIUM SUCCINATE 125 MG: 125 INJECTION, POWDER, FOR SOLUTION INTRAMUSCULAR; INTRAVENOUS at 15:16

## 2023-05-04 RX ADMIN — Medication 1 PATCH: at 20:05

## 2023-05-04 RX ADMIN — IPRATROPIUM BROMIDE AND ALBUTEROL SULFATE 3 ML: .5; 3 SOLUTION RESPIRATORY (INHALATION) at 19:57

## 2023-05-04 RX ADMIN — BUDESONIDE AND FORMOTEROL FUMARATE DIHYDRATE 2 PUFF: 160; 4.5 AEROSOL RESPIRATORY (INHALATION) at 19:57

## 2023-05-04 RX ADMIN — MAGNESIUM SULFATE HEPTAHYDRATE 2 G: 40 INJECTION, SOLUTION INTRAVENOUS at 15:16

## 2023-05-04 RX ADMIN — METHYLPREDNISOLONE SODIUM SUCCINATE 60 MG: 125 INJECTION, POWDER, FOR SOLUTION INTRAMUSCULAR; INTRAVENOUS at 23:30

## 2023-05-04 RX ADMIN — SODIUM CHLORIDE 2124 ML: 9 INJECTION, SOLUTION INTRAVENOUS at 15:17

## 2023-05-04 RX ADMIN — AZTREONAM 2 G: 2 INJECTION, POWDER, LYOPHILIZED, FOR SOLUTION INTRAMUSCULAR; INTRAVENOUS at 15:16

## 2023-05-04 RX ADMIN — ENOXAPARIN SODIUM 40 MG: 100 INJECTION SUBCUTANEOUS at 20:05

## 2023-05-04 RX ADMIN — ACETAMINOPHEN 650 MG: 650 SUPPOSITORY RECTAL at 20:05

## 2023-05-04 NOTE — PROGRESS NOTES
"Pharmacy Consult - Enoxaparin Dosing    Carolin Toscano is a 77 y.o. female who has been consulted to dose enoxaparin for VTE prophylaxis.     Allergies    Contrast dye (echo or unknown ct/mr), Ciprofloxacin, Keflex [cephalexin], Penicillins, Sulfa antibiotics, Terramycin [oxytetracycline], Prednisone, Latex, Percocet [oxycodone-acetaminophen], and Xyzal [levocetirizine]    Relevant clinical data and objective history reviewed:     [Ht: 160 cm (63\"); Wt: 70.5 kg (155 lb 6.8 oz)]  Body mass index is 27.53 kg/m².    Estimated Creatinine Clearance: 118.5 mL/min (A) (by C-G formula based on SCr of 0.38 mg/dL (L)).    Results from last 7 days   Lab Units 05/04/23  1447   HEMOGLOBIN g/dL 13.3   HEMATOCRIT % 44.5   PLATELETS 10*3/mm3 149   CREATININE mg/dL 0.38*       Asessment/Plan    Initiate  Enoxaparin 40 mg SQ every 24 hours  Pharmacy will monitor Ms. Toscano's renal function and clinical status and adjust the enoxaparin dose and/or frequency as needed.    Thank you,    Layne Vazquez RPH,PharmD  5/4/2023  18:58 EDT      "

## 2023-05-04 NOTE — PLAN OF CARE
Goal Outcome Evaluation:  Plan of Care Reviewed With: patient        Progress: no change  Outcome Evaluation: Admitted from the ER for respiratory failure, on bipap.

## 2023-05-04 NOTE — ED PROVIDER NOTES
Med card is giving an alert they are no longer available please have her call her insurance or pharmacy  to see which test strips they are covering for her   Subjective  History of Present Illness:    Chief Complaint:   Chief Complaint   Patient presents with   • Respiratory Distress      History of Present Illness: Carolin Toscano is a 77 y.o. female who presents to the emergency department complaining of cough.  Patient was discharged from this facility a couple weeks ago for COPD exacerbation and pneumonia on 6 L of oxygen and it does not look like she was on oxygen prior to being admitted.  Reportedly she has done okay since discharge until last night or this morning and started to have increased shortness of breath and cough again.  She denies chest pain.  EMS was called for transport from her home and she was found to be 65% on her normal 6 L, she was administered a breathing treatment and brought into the ED for further evaluation.  Upon arrival patient is notably tachycardic and tachypneic, noted to be febrile 100.2 with elevated blood pressure.  Once transitioned into the hospital bed she was placed on 5 L nasal cannula with oxygenation reading of 97%.  She states she is not currently on any antibiotics or steroids.  Did see her PCP earlier this month and appeared to be doing okay per his note.  Onset: Last night and into this morning  Duration: Ongoing  Exacerbating / Alleviating factors: Improved with DuoNeb and supplemental oxygen  Associated symptoms: Fever      Nurses Notes reviewed and agree, including vitals, allergies, social history and prior medical history.     Review of Systems   Constitutional: Positive for fever.   HENT: Negative.    Eyes: Negative.    Respiratory: Positive for cough and shortness of breath.    Cardiovascular: Negative.  Negative for chest pain and leg swelling.   Gastrointestinal: Negative.    Genitourinary: Negative.    Musculoskeletal: Negative.    Skin: Negative.    Neurological: Negative.    Psychiatric/Behavioral: Negative.        Past Medical History:   Diagnosis Date   • Arthritis    • COPD (chronic obstructive pulmonary  "disease)    • Gall stones    • Goiter     \"inward\"   • Hypertension    • Hypertension    • Kidney infection    • Kidney stone    • Kidney stones    • Migraine headache    • Scarlet fever        Allergies:    Contrast dye (echo or unknown ct/mr), Ciprofloxacin, Keflex [cephalexin], Penicillins, Sulfa antibiotics, Terramycin [oxytetracycline], Prednisone, Latex, Percocet [oxycodone-acetaminophen], and Xyzal [levocetirizine]      Past Surgical History:   Procedure Laterality Date   • BLADDER SURGERY     • CATARACT EXTRACTION W/ INTRAOCULAR LENS IMPLANT Left 8/11/2022    Procedure: CATARACT PHACO EXTRACTION WITH INTRAOCULAR LENS IMPLANT LEFT;  Surgeon: Sathish Lopez MD;  Location: [facility];  Service: Ophthalmology;  Laterality: Left;   • CATARACT EXTRACTION W/ INTRAOCULAR LENS IMPLANT Right 8/25/2022    Procedure: CATARACT PHACO EXTRACTION WITH INTRAOCULAR LENS IMPLANT RIGHT;  Surgeon: Sathish Lopez MD;  Location: [facility];  Service: Ophthalmology;  Laterality: Right;   • CHOLECYSTECTOMY     • HYSTERECTOMY     • TONSILLECTOMY           Social History     Socioeconomic History   • Marital status:    Tobacco Use   • Smoking status: Every Day     Packs/day: 1.00     Types: Cigarettes     Start date: 1960   • Smokeless tobacco: Former     Quit date: 9/24/2017   Vaping Use   • Vaping Use: Never used   Substance and Sexual Activity   • Alcohol use: No   • Drug use: No   • Sexual activity: Defer         Family History   Problem Relation Age of Onset   • Heart disease Mother    • Arthritis Mother    • Diabetes Mother    • Hypertension Mother    • Colon cancer Father    • Arthritis Father    • Diabetes Father    • Hypertension Father    • Migraines Sister        Objective  Physical Exam:  BP (!) 185/72 (Patient Position: Sitting)   Pulse (!) 133   Temp 100.2 °F (37.9 °C) (Oral)   Resp (!) 29   Ht 160 cm (63\")   Wt 70.8 kg (156 lb)   SpO2 96%   BMI 27.63 kg/m²      Physical Exam  Vitals and nursing " note reviewed.   Constitutional:       General: She is in acute distress.      Appearance: Normal appearance. She is normal weight. She is ill-appearing.   HENT:      Head: Normocephalic and atraumatic.      Nose: Nose normal.   Eyes:      Extraocular Movements: Extraocular movements intact.   Cardiovascular:      Rate and Rhythm: Regular rhythm. Tachycardia present.      Heart sounds: Normal heart sounds.   Pulmonary:      Effort: Respiratory distress present.      Breath sounds: Wheezing and rhonchi present.   Abdominal:      General: Abdomen is flat.      Palpations: Abdomen is soft.   Musculoskeletal:         General: Normal range of motion.      Cervical back: Normal range of motion.      Right lower leg: No edema.      Left lower leg: No edema.   Skin:     General: Skin is warm and dry.   Neurological:      General: No focal deficit present.      Mental Status: She is alert.   Psychiatric:         Mood and Affect: Mood normal.         Behavior: Behavior normal.           Procedures    ED Course:    ED Course as of 05/04/23 1546   Thu May 04, 2023   1448 EKG interpreted by me, sinus tachycardia with no concerning ST changes noted, rate of 135, abnormal EKG [JE]   1459 pH, Arterial(!!): 7.296 [TM]   1459 pCO2, Arterial(!!): 87.4 [TM]   1543 Parainfluenza Virus 3(!): Detected [TM]      ED Course User Index  [JE] Dewey Jordan MD  [TM] Aurelio Shields PA-C       Lab Results (last 24 hours)     Procedure Component Value Units Date/Time    CBC & Differential [[id number]]  (Abnormal) Collected: 05/04/23 1447    Specimen: Blood Updated: 05/04/23 1515    Narrative:      The following orders were created for panel order CBC & Differential.  Procedure                               Abnormality         Status                     ---------                               -----------         ------                     CBC Auto Differential[[id number]]        Abnormal            Final result               Scan  Slide[[id number]]                                       Final result                 Please view results for these tests on the individual orders.    Comprehensive Metabolic Panel [[id number]]  (Abnormal) Collected: 05/04/23 1447    Specimen: Blood Updated: 05/04/23 1518     Glucose 192 mg/dL      Comment: Glucose >180, Hemoglobin A1C recommended.        BUN 10 mg/dL      Creatinine 0.38 mg/dL      Sodium 140 mmol/L      Potassium 4.4 mmol/L      Chloride 95 mmol/L      CO2 40.2 mmol/L      Calcium 9.1 mg/dL      Total Protein 6.9 g/dL      Albumin 3.8 g/dL      ALT (SGPT) 30 U/L      AST (SGOT) 23 U/L      Alkaline Phosphatase 87 U/L      Total Bilirubin 0.4 mg/dL      Globulin 3.1 gm/dL      A/G Ratio 1.2 g/dL      BUN/Creatinine Ratio 26.3     Anion Gap 4.8 mmol/L      eGFR 104.0 mL/min/1.73     Narrative:      GFR Normal >60  Chronic Kidney Disease <60  Kidney Failure <15    The GFR formula is only valid for adults with stable renal function between ages 18 and 70.    BNP [[id number]]  (Normal) Collected: 05/04/23 1447    Specimen: Blood Updated: 05/04/23 1516     proBNP 234.4 pg/mL     Narrative:      Among patients with dyspnea, NT-proBNP is highly sensitive for the detection of acute congestive heart failure. In addition NT-proBNP of <300 pg/ml effectively rules out acute congestive heart failure with 99% negative predictive value.      Single High Sensitivity Troponin T [[id number]]  (Abnormal) Collected: 05/04/23 1447    Specimen: Blood Updated: 05/04/23 1518     HS Troponin T 44 ng/L     Narrative:      High Sensitive Troponin T Reference Range:  <10.0 ng/L- Negative Female for AMI  <15.0 ng/L- Negative Male for AMI  >=10 - Abnormal Female indicating possible myocardial injury.  >=15 - Abnormal Male indicating possible myocardial injury.   Clinicians would have to utilize clinical acumen, EKG, Troponin, and serial changes to determine if it is an Acute Myocardial Infarction or myocardial injury due to an  underlying chronic condition.         CBC Auto Differential [[id number]]  (Abnormal) Collected: 05/04/23 1447    Specimen: Blood Updated: 05/04/23 1515     WBC 8.04 10*3/mm3      RBC 4.60 10*6/mm3      Hemoglobin 13.3 g/dL      Hematocrit 44.5 %      MCV 96.7 fL      MCH 28.9 pg      MCHC 29.9 g/dL      RDW 14.8 %      RDW-SD 52.8 fl      MPV 10.5 fL      Platelets 149 10*3/mm3      Neutrophil % 91.6 %      Lymphocyte % 3.6 %      Monocyte % 4.1 %      Eosinophil % 0.0 %      Basophil % 0.2 %      Immature Grans % 0.5 %      Neutrophils, Absolute 7.36 10*3/mm3      Lymphocytes, Absolute 0.29 10*3/mm3      Monocytes, Absolute 0.33 10*3/mm3      Eosinophils, Absolute 0.00 10*3/mm3      Basophils, Absolute 0.02 10*3/mm3      Immature Grans, Absolute 0.04 10*3/mm3      nRBC 0.0 /100 WBC     Scan Slide [[id number]] Collected: 05/04/23 1447    Specimen: Blood Updated: 05/04/23 1515     Hypochromia Mod/2+     WBC Morphology Normal     Platelet Estimate Adequate    Blood Gas, Arterial With Co-Ox [[id number]]  (Abnormal) Collected: 05/04/23 1448    Specimen: Arterial Blood Updated: 05/04/23 1448     Site Left Radial     Partha's Test Positive     pH, Arterial 7.296 pH units      Comment: 84 Value below reference range        pCO2, Arterial 87.4 mm Hg      Comment: 86 Value above critical limit        pO2, Arterial 94.2 mm Hg      HCO3, Arterial 42.6 mmol/L      Comment: 83 Value above reference range        Base Excess, Arterial 12.1 mmol/L      Comment: 83 Value above reference range        O2 Saturation, Arterial 97.3 %      Hematocrit, Blood Gas 42.1 %      Oxyhemoglobin 95.8 %      Methemoglobin 0.60 %      Carboxyhemoglobin 0.9 %      A-a DO2 84.8 mmHg      Barometric Pressure for Blood Gas 735 mmHg      Modality Nasal Cannula     FIO2 40 %      Flow Rate 5.0 lpm      Ventilator Mode NA     Note --     Notified Who Dr Jordan     Notified By [id number]     Notified Time 05/04/2023 14:49     Collected by sb     Comment: Meter:  [id number]     :  [id number]        pH, Temp Corrected --     pCO2, Temperature Corrected --     pO2, Temperature Corrected --    Respiratory Panel PCR w/COVID-19(SARS-CoV-2) SUZY/HORTENSIA/JULIO/PAD/COR/MAD/PETER In-House, NP Swab in UTM/VTM, 3-4 HR TAT - Swab, Nasopharynx [[id number]]  (Abnormal) Collected: 05/04/23 1449    Specimen: Swab from Nasopharynx Updated: 05/04/23 1542     ADENOVIRUS, PCR Not Detected     Coronavirus 229E Not Detected     Coronavirus HKU1 Not Detected     Coronavirus NL63 Not Detected     Coronavirus OC43 Not Detected     COVID19 Not Detected     Human Metapneumovirus Not Detected     Human Rhinovirus/Enterovirus Not Detected     Influenza A PCR Not Detected     Influenza B PCR Not Detected     Parainfluenza Virus 1 Not Detected     Parainfluenza Virus 2 Not Detected     Parainfluenza Virus 3 Detected     Parainfluenza Virus 4 Not Detected     RSV, PCR Not Detected     Bordetella pertussis pcr Not Detected     Bordetella parapertussis PCR Not Detected     Chlamydophila pneumoniae PCR Not Detected     Mycoplasma pneumo by PCR Not Detected    Narrative:      In the setting of a positive respiratory panel with a viral infection PLUS a negative procalcitonin without other underlying concern for bacterial infection, consider observing off antibiotics or discontinuation of antibiotics and continue supportive care. If the respiratory panel is positive for atypical bacterial infection (Bordetella pertussis, Chlamydophila pneumoniae, or Mycoplasma pneumoniae), consider antibiotic de-escalation to target atypical bacterial infection.    Lactic Acid, Plasma [[id number]]  (Normal) Collected: 05/04/23 1449    Specimen: Blood Updated: 05/04/23 1510     Lactate 1.3 mmol/L     Blood Culture With BERNARD - Blood, Hand, Left [[id number]] Collected: 05/04/23 1500    Specimen: Blood from Hand, Left Updated: 05/04/23 1504    Blood Culture With BERNARD - Blood, Hand, Right [[id number]] Collected: 05/04/23 1500     Specimen: Blood from Hand, Right Updated: 05/04/23 1504           XR Chest 1 View    Result Date: 5/4/2023  PROCEDURE: XR CHEST 1 VW-  HISTORY: [facility] Triage Protocol  COMPARISON: 04/18/2023.  FINDINGS: The heart is normal in size. Mild interstitial disease bilaterally is similar to prior. No area of consolidation is seen. Aortic mural calcifications noted.. The mediastinum is unremarkable. There is no pneumothorax.  There are no acute osseous abnormalities.      Impression: Stable chest..    This report was signed and finalized on 5/4/2023 3:31 PM by Shraddha Wharotn MD.         MDM    Carolin Toscano is a 77 y.o. female who presents to the emergency department for evaluation of shortness of breath, cough    Differential diagnosis includes pneumonia, sepsis, COPD exacerbation, CHF, ACS among other etiologies.    CBC, CMP, lactic acid, procalcitonin, ABG, chest x-ray, troponin, BNP, urinalysis, blood cultures ordered for further evaluation of the patient's presentation.    Chart review if available included outside testing, previous visits, prior labs, prior imaging, available notes from prior evaluations or visits with specialists, medication list, allergies, past medical history, past surgical history when applicable.    Patient was treated with magnesium, Solu-Medrol, aztreonam, IV fluids sepsis bolus    Patient is hypercapnic with a PCO2 of 87.4 with baseline around 65, she is also acidotic with a pH of 7.296.  BiPAP was administered and she is tolerating well at this time 1505 hrs.  Pulmonary read of chest x-ray reveals stable chest.  Will continue to monitor and evaluate with disposition likely to the hospital.  Discussed case with Dr. Vital who graciously excepted the patient for admission.    Plan for disposition is admission to the hospital.  Patient/family comfortable with and understanding of the plan.    30 minutes of critical care provided. This time excludes other billable procedures. Time does include  preparation of documents, medical consultations, review of old records, and direct bedside care. Patient is at high risk for life-threatening deterioration due to hypoxic/hypercapnic respiratory failure requiring NIPPV.     Final diagnoses:   Acute on chronic respiratory failure with hypoxia and hypercapnia   COPD exacerbation   Parainfluenza virus infection        Aurelio Shields PA-C  05/04/23 1545

## 2023-05-04 NOTE — ED NOTES
Called and requested a Tele bed per order from Sarah, House Supervisor at this time. She stated there was room being cleaned on the 3rd floor and they would call back when room is ready.

## 2023-05-04 NOTE — H&P
[facility]   HISTORY AND PHYSICAL      Name:  Carolin Toscano   Age:  77 y.o.  Sex:  female  :  1945  MRN:  [id number]   Visit Number:  [id number]  Admission Date:  2023  Date Of Service:  23  Primary Care Physician:  Jason Nicholson MD    Chief Complaint:     Shortness of breath    History Of Presenting Illness:      Patient is a 77 years old female with a past medical history of COPD, chronic respiratory failure on 5 to 6 L per her previous discharge, on NIV machine at home and trelegy, hypertension, and ongoing tobacco use who presented to the ER from home by EMS with a chief complaint of shortness of breath.  Patient reporting that she started having shortness of breath associated with productive cough since last night and has persisted and became worse that promoted her to call EMS.  EMS has found the patient to be at 65% saturation on her normal 6 L of oxygen.  She received a breathing treatment in route.  Patient was recently admitted to the hospital on  and discharged on 2023 with similar clinical picture of Respiratory failure, COPD exacerbation and pneumonia.  Patient was discharged on 6 L previously.  Patient reports compliance with her NIV machine at home.  Denies any sick contacts that she is aware of.  Patient denies any chest pain or abdominal pain.  Denies any other complaints.    On ER evaluation, Patient was found to be tachycardic with a heart rate of 130s, temperature of 100.2 and respirations of 30, /72.  Patient was placed on BiPAP with FiO2 of 40%.  Her ABG came back and showed pH of 7.296/PCO2 87/PO2 94/HCO3 42/saturation 97% on 5 L nasal cannula.  HS Troponin 44, proBNP WNL, glucose 192, CO2 of 40, otherwise CBC and CMP within acceptable range.  Lactate and Pro-Adi WNL.  Respiratory panel positive for parainfluenza virus.  Chest x-ray Mild interstitial disease  bilaterally is similar to prior. No area of consolidation is seen.   Urinalysis negative for nitrates, leukocytes, WBC or bacteria with squamous epithelial cells.  Patient received Solu-Medrol, normal saline bolus of 2124 mL, magnesium and Aztreonam while in the ER.  Hospitalist consulted for admission, further evaluation and treatment.    Review Of Systems:    All systems were reviewed and negative except as mentioned in history of presenting illness, assessment and plan.    Past Medical History: Patient  has a past medical history of Arthritis, COPD (chronic obstructive pulmonary disease), Gall stones, Goiter, Hypertension, Hypertension, Kidney infection, Kidney stone, Kidney stones, Migraine headache, and Scarlet fever.    Past Surgical History: Patient  has a past surgical history that includes Cholecystectomy; Bladder surgery; Tonsillectomy; Hysterectomy; Cataract extraction w/ intraocular lens implant (Left, 8/11/2022); and Cataract extraction w/ intraocular lens implant (Right, 8/25/2022).    Social History: Patient  reports that she has been smoking cigarettes. She started smoking about 63 years ago. She has been smoking an average of 1 pack per day. She quit smokeless tobacco use about 5 years ago. She reports that she does not drink alcohol and does not use drugs.    Family History:  Patient's family history has been reviewed and found to be noncontributory.     Allergies:      Contrast dye (echo or unknown ct/mr), Ciprofloxacin, Keflex [cephalexin], Penicillins, Sulfa antibiotics, Terramycin [oxytetracycline], Prednisone, Latex, Percocet [oxycodone-acetaminophen], and Xyzal [levocetirizine]    Home Medications:    Prior to Admission Medications     Prescriptions Last Dose Informant Patient Reported? Taking?    albuterol (PROVENTIL) (2.5 MG/3ML) 0.083% nebulizer solution   No No    Inhale contents of 1 vial (3 mL) by nebulization Every 6 (Six) Hours As Needed for Wheezing for up to 30 days.    amLODIPine (NORVASC) 10 MG tablet   No No    Take 1 tablet by mouth Daily.     "atenolol (TENORMIN) 25 MG tablet   No No    Take 1 tablet by mouth Daily.    Fluticasone-Umeclidin-Vilant (TRELEGY) 100-62.5-25 MCG/ACT inhaler   No No    Inhale 1 puff  by mouth Daily    Patient not taking:  Reported on 5/1/2023    multivitamins-minerals (PRESERVISION AREDS 2) capsule capsule  Self Yes No    Take 1 capsule by mouth Daily.    tiotropium (Spiriva HandiHaler) 18 MCG per inhalation capsule   No No    Place 1 capsule into inhaler and inhale Daily.        ED Medications:    Medications   sodium chloride 0.9 % flush 10 mL (has no administration in time range)   sodium chloride 0.9 % bolus 2,124 mL (2,124 mL Intravenous New Bag 5/4/23 1517)   methylPREDNISolone sodium succinate (SOLU-Medrol) injection 125 mg (125 mg Intravenous Given 5/4/23 1516)   magnesium sulfate 2g/50 mL (PREMIX) infusion (0 g Intravenous Stopped 5/4/23 1543)   aztreonam (AZACTAM) 2 g/100 mL 0.9% NS (mbp) (2 g Intravenous New Bag 5/4/23 1516)     Vital Signs:  Temp:  [100.2 °F (37.9 °C)] 100.2 °F (37.9 °C)  Heart Rate:  [133] 133  Resp:  [29-30] 29  BP: (185)/(72) 185/72        05/04/23  1444   Weight: 70.8 kg (156 lb)     Body mass index is 27.63 kg/m².    Physical Exam:     Most recent vital Signs: BP (!) 185/72 (Patient Position: Sitting)   Pulse (!) 133   Temp 100.2 °F (37.9 °C) (Oral)   Resp (!) 29   Ht 160 cm (63\")   Wt 70.8 kg (156 lb)   SpO2 96%   BMI 27.63 kg/m²     Physical Exam  Vitals and nursing note reviewed.   Constitutional:       General: She is in acute distress.      Appearance: She is ill-appearing.      Comments: Chronically ill-appearing.   HENT:      Head: Normocephalic and atraumatic.      Right Ear: External ear normal.      Left Ear: External ear normal.      Nose: Nose normal.      Mouth/Throat:      Mouth: Mucous membranes are dry.   Eyes:      Extraocular Movements: Extraocular movements intact.      Conjunctiva/sclera: Conjunctivae normal.      Pupils: Pupils are equal, round, and reactive to light. "   Cardiovascular:      Rate and Rhythm: Regular rhythm. Tachycardia present.      Pulses: Normal pulses.      Heart sounds: Normal heart sounds.   Pulmonary:      Effort: Tachypnea, accessory muscle usage, prolonged expiration and respiratory distress present.      Breath sounds: Decreased air movement present. Wheezing present. No rhonchi.      Comments: Diffuse bilateral expiratory and inspiratory wheezing heard.  Abdominal:      General: Bowel sounds are normal. There is no distension.      Palpations: Abdomen is soft.      Tenderness: There is no abdominal tenderness.   Musculoskeletal:         General: No tenderness. Normal range of motion.      Cervical back: Normal range of motion and neck supple.      Right lower leg: No edema.      Left lower leg: No edema.   Skin:     General: Skin is warm and dry.      Findings: No rash.   Neurological:      General: No focal deficit present.      Mental Status: She is alert and oriented to person, place, and time. Mental status is at baseline.      Motor: No weakness.   Psychiatric:         Mood and Affect: Mood normal.         Behavior: Behavior normal.         Thought Content: Thought content normal.         Laboratory data:    I have reviewed the labs done in the emergency room.    Results from last 7 days   Lab Units 05/04/23  1447   SODIUM mmol/L 140   POTASSIUM mmol/L 4.4   CHLORIDE mmol/L 95*   CO2 mmol/L 40.2*   BUN mg/dL 10   CREATININE mg/dL 0.38*   CALCIUM mg/dL 9.1   BILIRUBIN mg/dL 0.4   ALK PHOS U/L 87   ALT (SGPT) U/L 30   AST (SGOT) U/L 23   GLUCOSE mg/dL 192*     Results from last 7 days   Lab Units 05/04/23  1447   WBC 10*3/mm3 8.04   HEMOGLOBIN g/dL 13.3   HEMATOCRIT % 44.5   PLATELETS 10*3/mm3 149         Results from last 7 days   Lab Units 05/04/23  1447   HSTROP T ng/L 44*     Results from last 7 days   Lab Units 05/04/23  1447   PROBNP pg/mL 234.4             Results from last 7 days   Lab Units 05/04/23  1448   PH, ARTERIAL pH units 7.296*   PO2  ART mm Hg 94.2   PCO2, ARTERIAL mm Hg 87.4*   HCO3 ART mmol/L 42.6*           Invalid input(s): USDES,  BLOODU, NITRITITE, BACT, EP    Pain Management Panel          View : No data to display.                      EKG:      Sinus tachycardia, heart rate 135, nonspecific ST/T wave changes.    Radiology:    XR Chest 1 View    Result Date: 5/4/2023  PROCEDURE: XR CHEST 1 VW-  HISTORY: SOA Triage Protocol  COMPARISON: 04/18/2023.  FINDINGS: The heart is normal in size. Mild interstitial disease bilaterally is similar to prior. No area of consolidation is seen. Aortic mural calcifications noted.. The mediastinum is unremarkable. There is no pneumothorax.  There are no acute osseous abnormalities.      Stable chest..    This report was signed and finalized on 5/4/2023 3:31 PM by Shraddha Wharton MD.      Assessment:    Acute on chronic respiratory failure with hypoxia and hypercapnia, likely secondary to #2 and 3, POA  Acute COPD exacerbation, POA  Parainfluenza infection, POA  Elevated troponin, likely secondary to demand ischemia, POA  Hypertension  Chronic tobacco abuse  Pulmonary hypertension  Arthritis    Plan:    Patient is admitted to telemetry for further evaluation and treatment.    Respiratory failure with hypoxia/hypercapnia, COPD exacerbation, parainfluenza  -Continue supplemental oxygen to keep saturation above 90%, patient on 5 to 6 L at baseline, continue to titrate down as able to.  Continue BiPAP therapy.  -Patient met SIRS criteria on arrival, likely secondary to influenza infection and respiratory failure. Received Aztreonam and sepsis protocol IV fluids boluses.  -Procalcitonin WNL, WBC WNL, bacterial infection less likely, will hold off on antibiotics for now.  -On Trelegy at home, Spiriva and budesonide while here.  -We will continue patient on Solu-Medrol 60 mg every 8 hours  -Bronchodilators, Mucinex and supportive care.  -Will continue Macias catheter for now while on BiPAP  -Patient was seen by  pulmonology on previous admission, will need close follow-up with pulmonology on discharge.    Elevated troponin  -Likely secondary to demand ischemia in settings of respiratory failure  -Patient denies any chest pain, low suspicion for ACS  -Trending troponins, recheck in a.m.    Hyperglycemia  -Will cover with sliding scale insulin while on steroids  -Will check hemoglobin A1c    Further orders as indicated per clinical course.    Risk Assessment: High  DVT Prophylaxis: Lovenox prophylaxis (benefit> risk)  Code Status: Full  Diet: Cardiac    Advance Care Planning   ACP discussion was held with the patient during this visit. Patient does not have an advance directive, information provided.       Lolis Vital MD  05/04/23  15:50 EDT    Dictated utilizing Dragon dictation.

## 2023-05-05 LAB
A-A DO2: 114.3 MMHG
A-A DO2: ABNORMAL
ANION GAP SERPL CALCULATED.3IONS-SCNC: 8.4 MMOL/L (ref 5–15)
ARTERIAL PATENCY WRIST A: ABNORMAL
ARTERIAL PATENCY WRIST A: POSITIVE
ATMOSPHERIC PRESS: 735 MMHG
ATMOSPHERIC PRESS: 737 MMHG
BASE EXCESS BLDA CALC-SCNC: 10.5 MMOL/L (ref 0–2)
BASE EXCESS BLDA CALC-SCNC: 14.8 MMOL/L (ref 0–2)
BDY SITE: ABNORMAL
BDY SITE: ABNORMAL
BUN SERPL-MCNC: 10 MG/DL (ref 8–23)
BUN/CREAT SERPL: 34.5 (ref 7–25)
CALCIUM SPEC-SCNC: 8.4 MG/DL (ref 8.6–10.5)
CHLORIDE SERPL-SCNC: 99 MMOL/L (ref 98–107)
CO2 SERPL-SCNC: 34.6 MMOL/L (ref 22–29)
COHGB MFR BLD: 0.9 % (ref 0–2)
COHGB MFR BLD: 1 % (ref 0–2)
CREAT SERPL-MCNC: 0.29 MG/DL (ref 0.57–1)
DEPRECATED RDW RBC AUTO: 54.3 FL (ref 37–54)
EGFRCR SERPLBLD CKD-EPI 2021: 111 ML/MIN/1.73
EPAP: 6
ERYTHROCYTE [DISTWIDTH] IN BLOOD BY AUTOMATED COUNT: 14.9 % (ref 12.3–15.4)
GAS FLOW AIRWAY: 6 LPM
GEN 5 2HR TROPONIN T REFLEX: 22 NG/L
GLUCOSE BLDC GLUCOMTR-MCNC: 137 MG/DL (ref 70–130)
GLUCOSE BLDC GLUCOMTR-MCNC: 150 MG/DL (ref 70–130)
GLUCOSE BLDC GLUCOMTR-MCNC: 188 MG/DL (ref 70–130)
GLUCOSE BLDC GLUCOMTR-MCNC: 216 MG/DL (ref 70–130)
GLUCOSE SERPL-MCNC: 155 MG/DL (ref 65–99)
HBA1C MFR BLD: 6.7 % (ref 4.8–5.6)
HCO3 BLDA-SCNC: 40.6 MMOL/L (ref 22–28)
HCO3 BLDA-SCNC: 43.6 MMOL/L (ref 22–28)
HCT VFR BLD AUTO: 40.7 % (ref 34–46.6)
HCT VFR BLD CALC: 38.4 %
HCT VFR BLD CALC: 39.9 %
HGB BLD-MCNC: 12.1 G/DL (ref 12–15.9)
INHALED O2 CONCENTRATION: 40 %
IPAP: 18
Lab: ABNORMAL
MCH RBC QN AUTO: 29.2 PG (ref 26.6–33)
MCHC RBC AUTO-ENTMCNC: 29.7 G/DL (ref 31.5–35.7)
MCV RBC AUTO: 98.1 FL (ref 79–97)
METHGB BLD QL: 0.3 % (ref 0–1.5)
METHGB BLD QL: 0.5 % (ref 0–1.5)
MODALITY: ABNORMAL
MODALITY: ABNORMAL
NOTE: ABNORMAL
NOTE: ABNORMAL
NOTIFIED BY: ABNORMAL
NOTIFIED BY: ABNORMAL
NOTIFIED WHO: ABNORMAL
NOTIFIED WHO: ABNORMAL
OXYHGB MFR BLDV: 95.1 % (ref 94–99)
OXYHGB MFR BLDV: 95.2 % (ref 94–99)
PCO2 BLDA: 75.6 MM HG (ref 35–45)
PCO2 BLDA: 85.2 MM HG (ref 35–45)
PCO2 TEMP ADJ BLD: ABNORMAL MM[HG]
PCO2 TEMP ADJ BLD: ABNORMAL MM[HG]
PH BLDA: 7.29 PH UNITS (ref 7.35–7.45)
PH BLDA: 7.37 PH UNITS (ref 7.35–7.45)
PH, TEMP CORRECTED: ABNORMAL
PH, TEMP CORRECTED: ABNORMAL
PLATELET # BLD AUTO: 115 10*3/MM3 (ref 140–450)
PMV BLD AUTO: 10.6 FL (ref 6–12)
PO2 BLDA: 77.8 MM HG (ref 75–100)
PO2 BLDA: 85.4 MM HG (ref 75–100)
PO2 TEMP ADJ BLD: ABNORMAL MM[HG]
PO2 TEMP ADJ BLD: ABNORMAL MM[HG]
POTASSIUM SERPL-SCNC: 4.6 MMOL/L (ref 3.5–5.2)
RBC # BLD AUTO: 4.15 10*6/MM3 (ref 3.77–5.28)
SAO2 % BLDCOA: 96.5 % (ref 94–100)
SAO2 % BLDCOA: 96.5 % (ref 94–100)
SET MECH RESP RATE: 18
SODIUM SERPL-SCNC: 142 MMOL/L (ref 136–145)
TROPONIN T DELTA: -3 NG/L
TROPONIN T SERPL HS-MCNC: 25 NG/L
VENTILATOR MODE: ABNORMAL
VENTILATOR MODE: ABNORMAL
WBC NRBC COR # BLD: 4.57 10*3/MM3 (ref 3.4–10.8)

## 2023-05-05 PROCEDURE — 94799 UNLISTED PULMONARY SVC/PX: CPT

## 2023-05-05 PROCEDURE — 25010000002 METHYLPREDNISOLONE PER 125 MG: Performed by: FAMILY MEDICINE

## 2023-05-05 PROCEDURE — 82805 BLOOD GASES W/O2 SATURATION: CPT

## 2023-05-05 PROCEDURE — 25010000002 ENOXAPARIN PER 10 MG: Performed by: FAMILY MEDICINE

## 2023-05-05 PROCEDURE — 82948 REAGENT STRIP/BLOOD GLUCOSE: CPT

## 2023-05-05 PROCEDURE — 97161 PT EVAL LOW COMPLEX 20 MIN: CPT

## 2023-05-05 PROCEDURE — 94664 DEMO&/EVAL PT USE INHALER: CPT

## 2023-05-05 PROCEDURE — 80048 BASIC METABOLIC PNL TOTAL CA: CPT | Performed by: FAMILY MEDICINE

## 2023-05-05 PROCEDURE — 97165 OT EVAL LOW COMPLEX 30 MIN: CPT

## 2023-05-05 PROCEDURE — 83050 HGB METHEMOGLOBIN QUAN: CPT

## 2023-05-05 PROCEDURE — 82375 ASSAY CARBOXYHB QUANT: CPT

## 2023-05-05 PROCEDURE — 85027 COMPLETE CBC AUTOMATED: CPT | Performed by: FAMILY MEDICINE

## 2023-05-05 PROCEDURE — 94660 CPAP INITIATION&MGMT: CPT

## 2023-05-05 PROCEDURE — 84484 ASSAY OF TROPONIN QUANT: CPT | Performed by: FAMILY MEDICINE

## 2023-05-05 PROCEDURE — 83036 HEMOGLOBIN GLYCOSYLATED A1C: CPT | Performed by: FAMILY MEDICINE

## 2023-05-05 PROCEDURE — 94761 N-INVAS EAR/PLS OXIMETRY MLT: CPT

## 2023-05-05 PROCEDURE — 36600 WITHDRAWAL OF ARTERIAL BLOOD: CPT

## 2023-05-05 PROCEDURE — 99232 SBSQ HOSP IP/OBS MODERATE 35: CPT | Performed by: FAMILY MEDICINE

## 2023-05-05 PROCEDURE — 63710000001 INSULIN ASPART PER 5 UNITS: Performed by: FAMILY MEDICINE

## 2023-05-05 PROCEDURE — 92610 EVALUATE SWALLOWING FUNCTION: CPT

## 2023-05-05 RX ORDER — MULTIPLE VITAMINS W/ MINERALS TAB 9MG-400MCG
1 TAB ORAL EVERY OTHER DAY
COMMUNITY
End: 2023-06-01 | Stop reason: HOSPADM

## 2023-05-05 RX ORDER — ACETAMINOPHEN 325 MG/1
650 TABLET ORAL EVERY 6 HOURS PRN
COMMUNITY

## 2023-05-05 RX ORDER — SACCHAROMYCES BOULARDII 250 MG
250 CAPSULE ORAL EVERY OTHER DAY
COMMUNITY

## 2023-05-05 RX ORDER — IPRATROPIUM BROMIDE AND ALBUTEROL SULFATE 2.5; .5 MG/3ML; MG/3ML
3 SOLUTION RESPIRATORY (INHALATION)
Status: DISCONTINUED | OUTPATIENT
Start: 2023-05-05 | End: 2023-06-01 | Stop reason: HOSPADM

## 2023-05-05 RX ADMIN — SENNOSIDES AND DOCUSATE SODIUM 2 TABLET: 50; 8.6 TABLET ORAL at 08:13

## 2023-05-05 RX ADMIN — METHYLPREDNISOLONE SODIUM SUCCINATE 60 MG: 125 INJECTION, POWDER, FOR SOLUTION INTRAMUSCULAR; INTRAVENOUS at 23:40

## 2023-05-05 RX ADMIN — IPRATROPIUM BROMIDE AND ALBUTEROL SULFATE 3 ML: 2.5; .5 SOLUTION RESPIRATORY (INHALATION) at 13:05

## 2023-05-05 RX ADMIN — Medication 3 ML: at 08:16

## 2023-05-05 RX ADMIN — ATENOLOL 25 MG: 25 TABLET ORAL at 08:13

## 2023-05-05 RX ADMIN — AMLODIPINE BESYLATE 10 MG: 5 TABLET ORAL at 08:13

## 2023-05-05 RX ADMIN — ENOXAPARIN SODIUM 40 MG: 100 INJECTION SUBCUTANEOUS at 20:24

## 2023-05-05 RX ADMIN — GUAIFENESIN 600 MG: 600 TABLET, EXTENDED RELEASE ORAL at 08:13

## 2023-05-05 RX ADMIN — MULTIPLE VITAMINS W/ MINERALS TAB 1 TABLET: TAB at 08:13

## 2023-05-05 RX ADMIN — METHYLPREDNISOLONE SODIUM SUCCINATE 60 MG: 125 INJECTION, POWDER, FOR SOLUTION INTRAMUSCULAR; INTRAVENOUS at 15:38

## 2023-05-05 RX ADMIN — BUDESONIDE AND FORMOTEROL FUMARATE DIHYDRATE 2 PUFF: 160; 4.5 AEROSOL RESPIRATORY (INHALATION) at 20:02

## 2023-05-05 RX ADMIN — Medication 1 PATCH: at 20:25

## 2023-05-05 RX ADMIN — IPRATROPIUM BROMIDE AND ALBUTEROL SULFATE 3 ML: .5; 3 SOLUTION RESPIRATORY (INHALATION) at 06:58

## 2023-05-05 RX ADMIN — INSULIN ASPART 4 UNITS: 100 INJECTION, SOLUTION INTRAVENOUS; SUBCUTANEOUS at 11:37

## 2023-05-05 RX ADMIN — METHYLPREDNISOLONE SODIUM SUCCINATE 60 MG: 125 INJECTION, POWDER, FOR SOLUTION INTRAMUSCULAR; INTRAVENOUS at 06:39

## 2023-05-05 RX ADMIN — TIOTROPIUM BROMIDE INHALATION SPRAY 2 PUFF: 3.12 SPRAY, METERED RESPIRATORY (INHALATION) at 06:58

## 2023-05-05 RX ADMIN — BUDESONIDE AND FORMOTEROL FUMARATE DIHYDRATE 2 PUFF: 160; 4.5 AEROSOL RESPIRATORY (INHALATION) at 06:59

## 2023-05-05 RX ADMIN — IPRATROPIUM BROMIDE AND ALBUTEROL SULFATE 3 ML: 2.5; .5 SOLUTION RESPIRATORY (INHALATION) at 20:02

## 2023-05-05 NOTE — CASE MANAGEMENT/SOCIAL WORK
Discharge Planning Assessment  [facility]     Patient Name: Carolin Toscano  MRN: [id number]  Today's Date: 5/5/2023    Admit Date: 5/4/2023    Plan: DCP   Discharge Needs Assessment     Row Name 05/05/23 1101       Living Environment    People in Home alone    Current Living Arrangements home    Potentially Unsafe Housing Conditions unable to assess    Primary Care Provided by self    Provides Primary Care For no one    Family Caregiver if Needed child(sheldon), adult    Quality of Family Relationships unable to assess    Able to Return to Prior Arrangements yes       Resource/Environmental Concerns    Resource/Environmental Concerns none    Transportation Concerns none       Transition Planning    Patient/Family Anticipates Transition to home with help/services    Transportation Anticipated family or friend will provide       Discharge Needs Assessment    Readmission Within the Last 30 Days unable to assess    Equipment Currently Used at Home oxygen;nebulizer;other (see comments)  trilogy    Concerns to be Addressed discharge planning    Anticipated Changes Related to Illness inability to care for self    Equipment Needed After Discharge other (see comments)  TBD    Discharge Facility/Level of Care Needs other (see comments)  TBD    Current Discharge Risk lives alone               Discharge Plan     Row Name 05/05/23 0325       Plan    Plan DCP    Patient/Family in Agreement with Plan yes    Plan Comments SW attempted to meet with pt at bedside. Pt was sleeping at time of visit. Pt was recently d/c'd from this facility on 4/21/23 back home. Pt had denied HH Services provided to her. Pt was provided with home O2 and nebulizer through [facility]. Pt also recently received a trilogy device at last admission from [facility]. Pt lives alone. Pts family provides transportation. Pt saw her PCP on 5/1/23. DC plan at this time undetermined. SW/CM will continue to follow for dc needs.    Final Discharge Disposition Code  30 - still a patient              Continued Care and Services - Admitted Since 5/4/2023    Coordination has not been started for this encounter.     Selected Continued Care - Prior Encounters Includes continued care and service providers with selected services from prior encounters from 2/3/2023 to 5/5/2023    Discharged on 4/21/2023 Admission date: 4/13/2023 - Discharge disposition: Home or Self Care    Durable Medical Equipment     Service Provider Selected Services Address Phone Fax Patient Preferred    [facility] [place] [facility] [address] [address] [place] [address] [phone] --       Internal Comment last updated by Merle Torres 4/21/2023 1530    Nebulizer/Home o2               [facility] -- [phone] --       Internal Comment last updated by Merle Torres 4/21/2023 1530    [facility]                     Home Medical Care     Service Provider Selected Services Address Phone Fax Patient Preferred    [facility] [address], [address], [place] [id number] [phone] --                       Demographic Summary     Row Name 05/05/23 1100       General Information    Admission Type inpatient    Arrived From home    Required Notices Provided Important Message from Medicare  unable to obtain at this time    Referral Source admission list    Reason for Consult discharge planning    Preferred Language English               Functional Status     Row Name 05/05/23 1100       Functional Status, IADL    Medications independent    Meal Preparation independent    Housekeeping independent    Laundry independent    Shopping independent       Mental Status Summary    Recent Changes in Mental Status/Cognitive Functioning unable to assess       Employment/    Employment Status unemployed               Psychosocial     Row Name 05/05/23 1101       Developmental Stage  (Sujatha's)    Developmental Stage Stage 8 (66 years-death/Late Adulthood) Integrity vs. Despair               Abuse/Neglect    No documentation.                Legal    No documentation.                Substance Abuse    No documentation.                Patient Forms    No documentation.                   NAOMI Fairbanks

## 2023-05-05 NOTE — PLAN OF CARE
Goal Outcome Evaluation:              Outcome Evaluation: Bedside eval of swallow completed with pt. seated upright in chair for po trials. She denied dysphagia. Oral mech was remarkable for edentulous, but otherwise WFL.  Pt. was given trials of soft solid, puree, and thin liquid.  Oral phase was WFL with exception of extended prep time due to edentulous, but otherwise adequate.  No overt s/s aspiration or other pharyngeal phase dysphagia exhibited with any consistency or texture.  RN reported no observed difficulty with po or meds.   No identified needs or concerns at this time.  Recommend:  1. continue regular or soft to chew diet with thin liq as madisyn, 2. meds whole with thin liq as madisyn, 3. aspiration precautions.

## 2023-05-05 NOTE — CONSULTS
"Adult Nutrition  Assessment/PES    Patient Name:  Carolin Toscano  YOB: 1945  MRN: [id number]  Admit Date:  5/4/2023    Assessment Date:  5/5/2023    Comments:      1. Continue current diet order as medically appropriate.   2. Encourage PO intake to meet 50% of estimated needs.   3. Order Boost Plus BID.   4. Encourage intake of supplements ordered.     RD to follow-up and available PRN.      Reason for Assessment     Row Name 05/05/23 0905          Reason for Assessment    Reason For Assessment nurse/nurse practitioner consult;identified at risk by screening criteria;per organizational policy     Diagnosis pulmonary disease                  Labs/Tests/Procedures/Meds     Row Name 05/05/23 0906          Labs/Procedures/Meds    Lab Results Reviewed reviewed, pertinent     Lab Results Comments Low: Cr        Medications    Pertinent Medications Reviewed reviewed, pertinent     Pertinent Medications Comments docusate, lovenox, insulin, methylprednisolone, MVI w/ minerals                Physical Findings     Row Name 05/05/23 0914          Physical Findings    Overall Physical Appearance normal weight for age                Estimated/Assessed Needs - Anthropometrics     Row Name 05/05/23 0915 05/05/23 0520       Anthropometrics    Weight -- 71.4 kg (157 lb 6.5 oz)    Height for Calculation 1.6 m (5' 3\") --    Weight for Calculation 71.2 kg (157 lb) --       Estimated/Assessed Needs    Additional Documentation Estimated Calorie Needs (Group);KCAL/KG (Group);Protein Requirements (Group);Fluid Requirements (Group) --       Estimated Calorie Needs    Estimated Calorie Requirement (kcal/day) 1780 --       KCAL/KG    KCAL/KG 25 Kcal/Kg (kcal);30 Kcal/Kg (kcal) --    25 Kcal/Kg (kcal) 1780.375 --    30 Kcal/Kg (kcal) 2136.45 --       Protein Requirements    Weight Used For Protein Calculations 71.2 kg (157 lb) --    Est Protein Requirement Amount (gms/kg) 1.0 gm protein --    Estimated Protein Requirements " (gms/day) 71.22 --       Fluid Requirements    Fluid Requirements (mL/day) 1780  1ml/kcal --    RDA Method (mL) 1780 --               Nutrition Prescription Ordered     Row Name 05/05/23 0916          Nutrition Prescription PO    Current PO Diet Regular     Fluid Consistency Thin     Common Modifiers Cardiac                Evaluation of Received Nutrient/Fluid Intake     Row Name 05/05/23 0916          Intake Assessment    Energy/Calorie Requirement Assessment not meeting needs     Protein Requirement Assessment not meeting needs        PO Evaluation    Number of Days PO Intake Evaluated 1 day     Number of Meals 1     % PO Intake 25                   Problem/Interventions:   Problem 1     Row Name 05/05/23 0919          Nutrition Diagnoses Problem 1    Problem 1 Increased Nutrient Needs     Macronutrient Kcal;Protein     Etiology (related to) Medical Diagnosis     Pulmonary/Critical Care A/C respiratory failure     Signs/Symptoms (evidenced by) Other (comment)  need for oral nutrition supplement to meet increased estimated needs                      Intervention Goal     Row Name 05/05/23 0921          Intervention Goal    General Maintain nutrition;Improved nutrition related lab(s);Reduce/improve symptoms;Meet nutritional needs for age/condition;Disease management/therapy     PO Establish PO;Tolerate PO;Increase intake;Maintain intake;Continue positive trend;Meet estimated needs     Weight Maintain weight                Nutrition Intervention     Row Name 05/05/23 0923          Nutrition Intervention    RD/Tech Action Follow Tx progress;Care plan reviewd;Await begin PO;Encourage intake;Recommend/ordered     Recommended/Ordered Supplement                Nutrition Prescription     Row Name 05/05/23 0923          Nutrition Prescription PO    PO Prescription Begin/change supplement     Supplement Boost Plus     Supplement Frequency 2 times a day     New PO Prescription Ordered? Yes        Other Orders    Obtain Weight  Daily     Obtain Weight Ordered? No, recommended     Supplement Vitamin mineral supplement     Supplement Ordered? No, recommended     Labs Mg++;Na+;K+     Labs Ordered? No, recommended                Education/Evaluation     Row Name 05/05/23 0923          Education    Education Will Instruct as appropriate        Monitor/Evaluation    Monitor PO intake;Supplement intake;Pertinent labs;Weight;Skin status;Symptoms;Per protocol                 Electronically signed by:  Cassidy Tello RD  05/05/23 09:24 EDT

## 2023-05-05 NOTE — PROGRESS NOTES
[facility]    Name:  Carolin Toscano   Age:  77 y.o.  Sex:  female  :  1945  MRN:  [id number]   Visit Number:  [id number]  Admission Date:  2023  Date Of Service:  23  Primary Care Physician:  Jason Nicholson MD     LOS: 1 day :    Chief Complaint:      Shortness of breath    Subjective:    Patient was seen and examined at bedside.  Patient laying in bed with moderate respiratory distress, on BiPAP therapy.  Patient was awake and alert and answering questions.  Patient denies any pain.  She denies any acute complaints today. Patient was on 6 L through nasal cannula in between BiPAP and 40% FiO2 BiPAP currently.  Otherwise hemodynamically stable and afebrile.  Her ABG repeated with PCO2 still at 85, pH 7.286, HCO3 40.    Hospital Course:    Patient is a 77 years old female with a past medical history of COPD, chronic respiratory failure on 5 to 6 L per her previous discharge, on NIV machine at home and trelegy, hypertension, and ongoing tobacco use who presented to the ER from home by EMS with a chief complaint of shortness of breath.  Patient reporting that she started having shortness of breath associated with productive cough since last night and has persisted and became worse that promoted her to call EMS.  EMS has found the patient to be at 65% saturation on her normal 6 L of oxygen.  She received a breathing treatment in route.  Patient was recently admitted to the hospital on  and discharged on 2023 with similar clinical picture of Respiratory failure, COPD exacerbation and pneumonia.  Patient was discharged on 6 L previously.  Patient reports compliance with her NIV machine at home.  Denies any sick contacts that she is aware of.  Patient denies any chest pain or abdominal pain.  Denies any other complaints.     On ER evaluation, Patient was found to be tachycardic with a heart rate of 130s, temperature of 100.2 and respirations of 30,  /72.  Patient was placed on BiPAP with FiO2 of 40%.  Her ABG came back and showed pH of 7.296/PCO2 87/PO2 94/HCO3 42/saturation 97% on 5 L nasal cannula.  HS Troponin 44, proBNP WNL, glucose 192, CO2 of 40, otherwise CBC and CMP within acceptable range.  Lactate and Pro-Adi WNL.  Respiratory panel positive for parainfluenza virus.  Chest x-ray Mild interstitial disease  bilaterally is similar to prior. No area of consolidation is seen.  Urinalysis negative for nitrates, leukocytes, WBC or bacteria with squamous epithelial cells.  Patient received Solu-Medrol, normal saline bolus of 2124 mL, magnesium and Aztreonam while in the ER.  Hospitalist consulted for admission, further evaluation and treatment.    Review of Systems:     All systems were reviewed and negative except as mentioned in subjective, assessment and plan.    Vital Signs:    Temp:  [97.6 °F (36.4 °C)-102.5 °F (39.2 °C)] 98 °F (36.7 °C)  Heart Rate:  [] 84  Resp:  [20-30] 27  BP: (121-187)/(41-79) 131/57    Intake and output:    I/O last 3 completed shifts:  In: 2274 [I.V.:50; IV Piggyback:2224]  Out: 775 [Urine:775]  I/O this shift:  In: 360 [P.O.:360]  Out: -     Physical Examination:    General Appearance:  Alert and cooperative.  Chronically ill-appearing.   Head:  Atraumatic and normocephalic.   Eyes: Conjunctivae and sclerae normal, no icterus. No pallor.   Throat: No oral lesions, no thrush, oral mucosa moist.   Neck: Supple, trachea midline, no thyromegaly.   Lungs:   Breath sounds heard bilaterally equally.  Mildly labored, on BiPAP.  Diffuse bilateral wheezing heard.  Decreased air movement bilaterally.    Heart:  Normal S1 and S2, no murmur, no gallop, no rub. No JVD.   Abdomen:   Normal bowel sounds, no masses, no organomegaly. Soft, nontender, nondistended, no rebound tenderness.   Extremities: Supple, no edema, no cyanosis, no clubbing.   Skin: No bleeding or rash.   Neurologic: Alert and oriented x 3. No facial asymmetry.  Moves all four limbs. No tremors.      Laboratory results:    Results from last 7 days   Lab Units 05/05/23  0524 05/04/23  1447   SODIUM mmol/L 142 140   POTASSIUM mmol/L 4.6 4.4   CHLORIDE mmol/L 99 95*   CO2 mmol/L 34.6* 40.2*   BUN mg/dL 10 10   CREATININE mg/dL 0.29* 0.38*   CALCIUM mg/dL 8.4* 9.1   BILIRUBIN mg/dL  --  0.4   ALK PHOS U/L  --  87   ALT (SGPT) U/L  --  30   AST (SGOT) U/L  --  23   GLUCOSE mg/dL 155* 192*     Results from last 7 days   Lab Units 05/05/23  0525 05/04/23  1447   WBC 10*3/mm3 4.57 8.04   HEMOGLOBIN g/dL 12.1 13.3   HEMATOCRIT % 40.7 44.5   PLATELETS 10*3/mm3 115* 149         Results from last 7 days   Lab Units 05/05/23  0725 05/05/23  0524 05/04/23  1447   HSTROP T ng/L 22* 25* 44*     Results from last 7 days   Lab Units 05/04/23  1500   BLOODCX  No growth at less than 24 hours  No growth at less than 24 hours     Recent Labs     04/17/23  0851 04/19/23  0745 05/04/23  1448   PHART 7.435 7.419 7.296*   BPW1YQF 63.8* 64.0* 87.4*   PO2ART 60.1* 223.0* 94.2   RHA1TQO 42.9* 41.4* 42.6*   BASEEXCESS 15.1* 13.6* 12.1*      I have reviewed the patient's laboratory results.    Radiology results:    XR Chest 1 View    Result Date: 5/4/2023  PROCEDURE: XR CHEST 1 VW-  HISTORY: SOA Triage Protocol  COMPARISON: 04/18/2023.  FINDINGS: The heart is normal in size. Mild interstitial disease bilaterally is similar to prior. No area of consolidation is seen. Aortic mural calcifications noted.. The mediastinum is unremarkable. There is no pneumothorax.  There are no acute osseous abnormalities.      Impression: Stable chest..    This report was signed and finalized on 5/4/2023 3:31 PM by Shraddha Wharton MD.    I have reviewed the patient's radiology reports.    Medication Review:     I have reviewed the patient's active and prn medications.     Problem List:      Acute on chronic respiratory failure with hypoxia and hypercapnia      Assessment:    Acute on chronic respiratory failure with hypoxia  and hypercapnia, likely secondary to #2 and 3, POA  Acute COPD exacerbation, POA  Parainfluenza infection, POA  Elevated troponin, likely secondary to demand ischemia, POA  Hypertension  Chronic tobacco abuse  Pulmonary hypertension  Arthritis    Plan:    Respiratory failure with hypoxia/hypercapnia, COPD exacerbation, parainfluenza  -Continue supplemental oxygen to keep saturation above 90%, patient on 5 to 6 L at baseline, continue to titrate down as able to.  Continue BiPAP therapy.  -Patient met SIRS criteria on arrival, likely secondary to influenza infection and respiratory failure. Received Aztreonam and sepsis protocol IV fluids boluses.  -Procalcitonin WNL, WBC WNL, bacterial infection less likely, will hold off on antibiotics for now.  -On Trelegy at home, Spiriva and budesonide while here.  -Will continue patient on Solu-Medrol 60 mg every 8 hours, will titrate down as she improves.  -Bronchodilators, Mucinex and supportive care.  -Will continue Macias catheter for now while on BiPAP.  -Patient was seen by pulmonology on previous admission, will need close follow-up with pulmonology on discharge.  -Repeat ABG and chest x-ray as indicated.     Elevated troponin  -Likely secondary to demand ischemia in settings of respiratory failure  -Patient denies any chest pain, low suspicion for ACS  -Troponin trended down and plateaued.     Hyperglycemia  -Will cover with sliding scale insulin while on steroids  -Hemoglobin A1c 6.7     Further orders as indicated per clinical course.    Patient is high risk for clinical deterioration and needing mechanical ventilation.  I called and discussed with her daughter over the phone (Nehal), she is understanding that patient is critically sick and may require intubation.  Discussed management plan with her. All questions were answered to her satisfaction.    DVT Prophylaxis: Lovenox prophylaxis (benefit> risk)  Code Status: Full  Diet: Cardiac  Discharge Plan: To be determined,  likely needs 2 to 3 days in the hospital.    Lolis Vital MD  05/05/23  09:18 EDT    Dictated utilizing Dragon dictation.

## 2023-05-05 NOTE — THERAPY EVALUATION
"Patient Name: Carolin Toscano  : 1945    MRN: [id number]                              Today's Date: 2023       Admit Date: 2023    Visit Dx:     ICD-10-CM ICD-9-CM   1. Acute on chronic respiratory failure with hypoxia and hypercapnia  J96.21 518.84    J96.22 786.09     799.02   2. COPD exacerbation  J44.1 491.21   3. Parainfluenza virus infection  B34.8 078.89     Patient Active Problem List   Diagnosis   • CAP (community acquired pneumonia)   • Cigarette nicotine dependence with nicotine-induced disorder   • Essential hypertension   • Dyslipidemia   • Blood glucose elevated   • Muscle ache   • COPD (chronic obstructive pulmonary disease)   • Nuclear sclerotic cataract of right eye   • Acute respiratory failure   • COPD exacerbation   • Acute on chronic respiratory failure with hypoxia and hypercapnia     Past Medical History:   Diagnosis Date   • Arthritis    • COPD (chronic obstructive pulmonary disease)    • Gall stones    • Goiter     \"inward\"   • Hypertension    • Hypertension    • Kidney infection    • Kidney stone    • Kidney stones    • Migraine headache    • Scarlet fever      Past Surgical History:   Procedure Laterality Date   • BLADDER SURGERY     • CATARACT EXTRACTION W/ INTRAOCULAR LENS IMPLANT Left 2022    Procedure: CATARACT PHACO EXTRACTION WITH INTRAOCULAR LENS IMPLANT LEFT;  Surgeon: Sathish Lopez MD;  Location: [facility];  Service: Ophthalmology;  Laterality: Left;   • CATARACT EXTRACTION W/ INTRAOCULAR LENS IMPLANT Right 2022    Procedure: CATARACT PHACO EXTRACTION WITH INTRAOCULAR LENS IMPLANT RIGHT;  Surgeon: Sathish Lopez MD;  Location: [facility];  Service: Ophthalmology;  Laterality: Right;   • CHOLECYSTECTOMY     • HYSTERECTOMY     • TONSILLECTOMY        General Information     Row Name 23 1120          Physical Therapy Time and Intention    Document Type evaluation  -MS     Mode of Treatment physical therapy  -MS     Row Name 23 1120    "       General Information    Patient Profile Reviewed yes  -MS     Prior Level of Function independent:;all household mobility  -MS     Barriers to Rehab previous functional deficit  -MS     Row Name 05/05/23 1120          Living Environment    People in Home sibling(s)   -MS     Row Name 05/05/23 1120          Home Main Entrance    Number of Stairs, Main Entrance none  -MS     Row Name 05/05/23 1120          Stairs Within Home, Primary    Number of Stairs, Within Home, Primary none  -MS     Row Name 05/05/23 1120          Cognition    Orientation Status (Cognition) oriented x 4  -MS     Row Name 05/05/23 1120          Safety Issues, Functional Mobility    Impairments Affecting Function (Mobility) balance;endurance/activity tolerance;strength;shortness of breath  -MS           User Key  (r) = Recorded By, (t) = Taken By, (c) = Cosigned By    Initials Name Provider Type    Martín Carreno, PT Physical Therapist               Mobility     Row Name 05/05/23 1121          Bed Mobility    Bed Mobility supine-sit  -MS     Supine-Sit [place] (Bed Mobility) contact guard  -MS     Assistive Device (Bed Mobility) bed rails;head of bed elevated  -MS     Row Name 05/05/23 1121          Sit-Stand Transfer    Sit-Stand [place] (Transfers) contact guard  -MS     Assistive Device (Sit-Stand Transfers) other (see comments)  gait belt  -MS     Row Name 05/05/23 1121          Gait/Stairs (Locomotion)    [place] Level (Gait) minimum assist (75% patient effort)  -MS     Assistive Device (Gait) other (see comments)  HHA Jaden x1  -MS     Distance in Feet (Gait) 3  -MS     Deviations/Abnormal Patterns (Gait) weight shifting decreased;festinating/shuffling;gilberto decreased  -MS           User Key  (r) = Recorded By, (t) = Taken By, (c) = Cosigned By    Initials Name Provider Type    Martín Carreno, PT Physical Therapist               Obj/Interventions     Row Name 05/05/23 1122          Range of Motion Comprehensive     General Range of Motion bilateral lower extremity ROM WFL  -MS     Row Name 05/05/23 1122          Strength Comprehensive (MMT)    General Manual Muscle Testing (MMT) Assessment lower extremity strength deficits identified  -MS     Comment, General Manual Muscle Testing (MMT) Assessment R LE 4-/5, L LE 4/5  -MS     Row Name 05/05/23 1122          Sensory Assessment (Somatosensory)    Sensory Assessment (Somatosensory) sensation intact  -MS           User Key  (r) = Recorded By, (t) = Taken By, (c) = Cosigned By    Initials Name Provider Type    MS Martín Ortiz, PT Physical Therapist               Goals/Plan     Row Name 05/05/23 1128          Bed Mobility Goal 1 (PT)    Activity/Assistive Device (Bed Mobility Goal 1, PT) bed mobility activities, all  -MS     [place] Level/Cues Needed (Bed Mobility Goal 1, PT) modified independence  -MS     Time Frame (Bed Mobility Goal 1, PT) long term goal (LTG);2 weeks  -MS     Row Name 05/05/23 1128          Transfer Goal 1 (PT)    Activity/Assistive Device (Transfer Goal 1, PT) transfers, all;sit-to-stand/stand-to-sit  -MS     [place] Level/Cues Needed (Transfer Goal 1, PT) modified independence  -MS     Time Frame (Transfer Goal 1, PT) long term goal (LTG);2 weeks  -MS     Row Name 05/05/23 1128          Gait Training Goal 1 (PT)    Activity/Assistive Device (Gait Training Goal 1, PT) gait (walking locomotion);assistive device use  -MS     [place] Level (Gait Training Goal 1, PT) standby assist  -MS     Distance (Gait Training Goal 1, PT) 150  -MS     Time Frame (Gait Training Goal 1, PT) long term goal (LTG);2 weeks  -MS           User Key  (r) = Recorded By, (t) = Taken By, (c) = Cosigned By    Initials Name Provider Type    MS Martín Ortiz, PT Physical Therapist               Clinical Impression     Row Name 05/05/23 1122          Pain    Pretreatment Pain Rating 0/10 - no pain  -MS     Posttreatment Pain Rating 0/10 - no pain  -MS     Row Name  05/05/23 1122          Plan of Care Review    Plan of Care Reviewed With patient;daughter  -MS     Progress no change  -MS     Outcome Evaluation Pt participated in PT eval this date. Pt on 5.5L of O2 at rest 94%. pt transferred to EOB with CGA. Pt stood, t/f'd to the chair 3 ft with CGA with no AD. Pt's O2 sats dropped to 90%. Pt up in chair 94% on 5.5L . Pt is expected  to benefit from skilled PTx during this inpatient stay to address deficits in strength, transfers and mobility.  -MS     Row Name 05/05/23 1122          Therapy Assessment/Plan (PT)    Rehab Potential (PT) good, to achieve stated therapy goals  -MS     Criteria for Skilled Interventions Met (PT) yes;meets criteria  -MS     Therapy Frequency (PT) 5 times/wk  -MS     Row Name 05/05/23 1122          Vital Signs    Pre SpO2 (%) 94  -MS     O2 Delivery Pre Treatment supplemental O2  -MS     Intra SpO2 (%) 90  -MS     O2 Delivery Intra Treatment supplemental O2  -MS     Post SpO2 (%) 94  -MS     O2 Delivery Post Treatment supplemental O2  -MS     Pre Patient Position Supine  -MS     Intra Patient Position Standing  -MS     Post Patient Position Sitting  -MS     Row Name 05/05/23 1122          Positioning and Restraints    Pre-Treatment Position in bed  -MS     Post Treatment Position chair  -MS     In Chair call light within reach;exit alarm on;encouraged to call for assist;sitting  -MS           User Key  (r) = Recorded By, (t) = Taken By, (c) = Cosigned By    Initials Name Provider Type    Martín Carreno, PT Physical Therapist               Outcome Measures     Row Name 05/05/23 1135 05/05/23 0800       How much help from another person do you currently need...    Turning from your back to your side while in flat bed without using bedrails? 3  -MS 3  -KE    Moving from lying on back to sitting on the side of a flat bed without bedrails? 3  -MS 3  -KE    Moving to and from a bed to a chair (including a wheelchair)? 3  -MS 3  -KE    Standing up from  a chair using your arms (e.g., wheelchair, bedside chair)? 3  -MS 3  -KE    Climbing 3-5 steps with a railing? 3  -MS 2  -KE    To walk in hospital room? 3  -MS 2  -KE    AM-PAC 6 Clicks Score (PT) 18  -MS 16  -KE    Highest level of mobility 6 --> Walked 10 steps or more  -MS 5 --> Static standing  -KE          User Key  (r) = Recorded By, (t) = Taken By, (c) = Cosigned By    Initials Name Provider Type    MS Martín Ortiz, PT Physical Therapist    Oneyda Johnson, RN Registered Nurse                             Physical Therapy Education     Title: PT OT SLP Therapies (In Progress)     Topic: Physical Therapy (In Progress)     Point: Mobility training (Done)     Learning Progress Summary           Patient Acceptance, E, VU by MS at 5/5/2023 1136    Comment: importance of mobility                   Point: Home exercise program (Done)     Learning Progress Summary           Patient Acceptance, E, VU by MS at 5/5/2023 1136    Comment: importance of mobility                   Point: Body mechanics (Not Started)     Learner Progress:  Not documented in this visit.          Point: Precautions (Not Started)     Learner Progress:  Not documented in this visit.                      User Key     Initials Effective Dates Name Provider Type Discipline    MS 07/01/22 -  Martín Ortiz, ROB Physical Therapist PT              PT Recommendation and Plan     Plan of Care Reviewed With: patient, daughter  Progress: no change  Outcome Evaluation: Pt participated in PT eval this date. Pt on 5.5L of O2 at rest 94%. pt transferred to EOB with CGA. Pt stood, t/f'd to the chair 3 ft with CGA with no AD. Pt's O2 sats dropped to 90%. Pt up in chair 94% on 5.5L . Pt is expected  to benefit from skilled PTx during this inpatient stay to address deficits in strength, transfers and mobility.     Time Calculation:    PT Charges     Row Name 05/05/23 1136             Time Calculation    Start Time 0908  -MS      PT Received On 05/05/23   -MS      PT Goal Re-Cert Due Date 05/15/23  -MS         Untimed Charges    PT Eval/Re-eval Minutes 38  -MS         Total Minutes    Untimed Charges Total Minutes 38  -MS       Total Minutes 38  -MS            User Key  (r) = Recorded By, (t) = Taken By, (c) = Cosigned By    Initials Name Provider Type    Martín Carreno, PT Physical Therapist              Therapy Charges for Today     Code Description Service Date Service Provider Modifiers Qty    69975912581 HC PT EVAL LOW COMPLEXITY 3 5/5/2023 Martín Ortiz, PT GP 1          PT G-Codes  AM-PAC 6 Clicks Score (PT): 18  PT Discharge Summary  Anticipated Discharge Disposition (PT): home with assist, home with home health    Martín Ortiz, PT  5/5/2023

## 2023-05-05 NOTE — PLAN OF CARE
Goal Outcome Evaluation:  Plan of Care Reviewed With: patient        Progress: no change  Outcome Evaluation: Pt participated in OT eval. Received on 5.5L nc with 02 sats at 94% at rest. Pt required CGA for supine to sit eob. She stood and transferred to the chair with CGA. Pt sats dropped to 90%. Once repositioned in chair sats rebounded to 94%. Pt presents with endurance and strength defecits. She will benefit from skilled OT services to address deficits and promote return to PLOF.

## 2023-05-05 NOTE — THERAPY EVALUATION
"Acute Care - Speech Language Pathology   Swallow Initial Evaluation  [facility]     Patient Name: Carolin Toscano  : 1945  MRN: [id number]  Today's Date: 2023               Admit Date: 2023    Visit Dx:     ICD-10-CM ICD-9-CM   1. Acute on chronic respiratory failure with hypoxia and hypercapnia  J96.21 518.84    J96.22 786.09     799.02   2. COPD exacerbation  J44.1 491.21   3. Parainfluenza virus infection  B34.8 078.89     Patient Active Problem List   Diagnosis   • CAP (community acquired pneumonia)   • Cigarette nicotine dependence with nicotine-induced disorder   • Essential hypertension   • Dyslipidemia   • Blood glucose elevated   • Muscle ache   • COPD (chronic obstructive pulmonary disease)   • Nuclear sclerotic cataract of right eye   • Acute respiratory failure   • COPD exacerbation   • Acute on chronic respiratory failure with hypoxia and hypercapnia     Past Medical History:   Diagnosis Date   • Arthritis    • COPD (chronic obstructive pulmonary disease)    • Gall stones    • Goiter     \"inward\"   • Hypertension    • Hypertension    • Kidney infection    • Kidney stone    • Kidney stones    • Migraine headache    • Scarlet fever      Past Surgical History:   Procedure Laterality Date   • BLADDER SURGERY     • CATARACT EXTRACTION W/ INTRAOCULAR LENS IMPLANT Left 2022    Procedure: CATARACT PHACO EXTRACTION WITH INTRAOCULAR LENS IMPLANT LEFT;  Surgeon: Sathish Lopez MD;  Location: [facility];  Service: Ophthalmology;  Laterality: Left;   • CATARACT EXTRACTION W/ INTRAOCULAR LENS IMPLANT Right 2022    Procedure: CATARACT PHACO EXTRACTION WITH INTRAOCULAR LENS IMPLANT RIGHT;  Surgeon: Sathish Lopez MD;  Location: [facility];  Service: Ophthalmology;  Laterality: Right;   • CHOLECYSTECTOMY     • HYSTERECTOMY     • TONSILLECTOMY         SLP Recommendation and Plan  SLP Swallowing Diagnosis: functional oral phase, functional pharyngeal phase (23 0943)  SLP Diet " Recommendation: regular textures, soft to chew textures, thin liquids (05/05/23 0943)  Recommended Precautions and Strategies: upright posture during/after eating, general aspiration precautions (05/05/23 0943)  SLP Rec. for Method of Medication Administration: meds whole, with thin liquids, as tolerated (05/05/23 0943)     Monitor for Signs of Aspiration: notify SLP if any concerns (05/05/23 0943)  Recommended Diagnostics: No further SLP services recommended (05/05/23 0943)     Anticipated Discharge Disposition (SLP): unknown (05/05/23 0943)     Therapy Frequency (Swallow): evaluation only (05/05/23 0943)                                           Outcome Evaluation: Bedside eval of swallow completed with pt. seated upright in chair for po trials. She denied dysphagia. Oral mech was remarkable for edentulous, but otherwise WFL.  Pt. was given trials of soft solid, puree, and thin liquid.  Oral phase was WFL with exception of extended prep time due to edentulous, but otherwise adequate.  No overt s/s aspiration or other pharyngeal phase dysphagia exhibited with any consistency or texture.  RN reported no observed difficulty with po or meds.   No identified needs or concerns at this time.  Recommend:  1. continue regular or soft to chew diet with thin liq as madisyn, 2. meds whole with thin liq as madisyn, 3. aspiration precautions.      SWALLOW EVALUATION (last 72 hours)     SLP Adult Swallow Evaluation     Row Name 05/05/23 0943                   Rehab Evaluation    Document Type evaluation  -TM        Subjective Information no complaints  -TM        Patient Observations alert;cooperative  -TM        Patient/Family/Caregiver Comments/Observations no family present  -TM        Patient Effort good  -TM           General Information    Patient Profile Reviewed yes  -TM        Pertinent History Of Current Problem DM, COPD, renal, cardiac  -TM        Current Method of Nutrition regular textures;thin liquids  -TM         Precautions/Limitations, Vision WFL;for purposes of eval  -TM        Precautions/Limitations, Hearing WFL;for purposes of eval  -TM        Prior Level of Function-Communication WFL  -TM        Prior Level of Function-Swallowing no diet consistency restrictions  -TM        Plans/Goals Discussed with patient;other (see comments)  RN  -TM        Barriers to Rehab none identified  -TM        Patient's Goals for Discharge patient did not state  -TM           Pain    Additional Documentation Pain Scale: Numbers Pre/Post-Treatment (Group)  -TM           Pain Scale: Numbers Pre/Post-Treatment    Pretreatment Pain Rating 0/10 - no pain  -TM        Posttreatment Pain Rating 0/10 - no pain  -TM           Oral Motor Structure and Function    Oral Lesions or Structural Abnormalities and/or variants none identified  -TM        Dentition Assessment edentulous  -TM        Secretion Management WNL/WFL  -TM           Oral Musculature and Cranial Nerve Assessment    Oral Motor General Assessment WFL  -TM           General Eating/Swallowing Observations    Respiratory Support Currently in Use room air;high-flow nasal cannula  -TM        O2 Liters 6L  -TM        Eating/Swallowing Skills fed by SLP  -TM        Positioning During Eating upright in chair  -TM        Utensils Used spoon;straw  -TM        Consistencies Trialed soft to chew textures;pureed;thin liquids  -TM        Pre SpO2 (%) 96  -TM        Post SpO2 (%) 96  -TM           Respiratory    Respiratory Status WFL;during swallowing/eating  -TM           Clinical Swallow Eval    Oral Prep Phase WFL  adequate with extended prep due to edentulous  -TM        Oral Transit WFL  -TM        Oral Residue WFL  -TM        Pharyngeal Phase no overt signs/symptoms of pharyngeal impairment  -TM        Clinical Swallow Evaluation Summary Bedside eval of swallow completed with pt. seated upright in chair for po trials. She denied dysphagia. Oral mech was remarkable for edentulous, but otherwise  WFL.  Pt. was given trials of soft solid, puree, and thin liquid.  Oral phase was WFL with exception of extended prep time due to edentulous, but otherwise adequate.  No overt s/s aspiration or other pharyngeal phase dysphagia exhibited with any consistency or texture.  RN reported no observed difficulty with po or meds.   No identified needs or concerns at this time.  Recommend:  1. continue regular or soft to chew diet with thin liq as madisyn, 2. meds whole with thin liq as madisyn, 3. aspiration precautions.  -           SLP Evaluation Clinical Impression    SLP Swallowing Diagnosis functional oral phase;functional pharyngeal phase  -        Functional Impact no impact on function  -           Recommendations    Therapy Frequency (Swallow) evaluation only  -        SLP Diet Recommendation regular textures;soft to chew textures;thin liquids  -        Recommended Diagnostics No further SLP services recommended  -        Recommended Precautions and Strategies upright posture during/after eating;general aspiration precautions  -        Oral Care Recommendations Oral Care BID/PRN  -        SLP Rec. for Method of Medication Administration meds whole;with thin liquids;as tolerated  -        Monitor for Signs of Aspiration notify SLP if any concerns  -        Anticipated Discharge Disposition (SLP) unknown  -              User Key  (r) = Recorded By, (t) = Taken By, (c) = Cosigned By    Initials Name Effective Dates     Eveline Osuna 06/16/21 -                 EDUCATION  The patient has been educated in the following areas:   Dysphagia (Swallowing Impairment) Oral Care/Hydration.              Time Calculation:    Time Calculation- SLP     Row Name 05/05/23 1233             Time Calculation- SLP    SLP Start Time 0943  -      SLP Received On 05/05/23  -         Untimed Charges    SLP Eval/Re-eval  ST Eval Oral Pharyng Swallow - 27304  -            User Key  (r) = Recorded By, (t) = Taken By, (c)  = Cosigned By    Initials Name Provider Type     Eveline Osuna Speech and Language Pathologist                Therapy Charges for Today     Code Description Service Date Service Provider Modifiers Qty    72496951231 HC ST EVAL ORAL PHARYNG SWALLOW 4 5/5/2023 Eveline Osuna GN 1               Eveline Osuna  5/5/2023

## 2023-05-05 NOTE — PLAN OF CARE
Goal Outcome Evaluation:  Plan of Care Reviewed With: patient        Progress: improving  Outcome Evaluation: ABGs without much change this morning. Pt off bipap for meals and able to tolerate 6L HF NC. ABGs to be repeated at 2000 and in am. Pt worked with PT and sat in the chair. Will continue to monitor.

## 2023-05-05 NOTE — THERAPY EVALUATION
"Patient Name: Carolin Toscano  : 1945    MRN: [id number]                              Today's Date: 2023       Admit Date: 2023    Visit Dx:     ICD-10-CM ICD-9-CM   1. Acute on chronic respiratory failure with hypoxia and hypercapnia  J96.21 518.84    J96.22 786.09     799.02   2. COPD exacerbation  J44.1 491.21   3. Parainfluenza virus infection  B34.8 078.89     Patient Active Problem List   Diagnosis   • CAP (community acquired pneumonia)   • Cigarette nicotine dependence with nicotine-induced disorder   • Essential hypertension   • Dyslipidemia   • Blood glucose elevated   • Muscle ache   • COPD (chronic obstructive pulmonary disease)   • Nuclear sclerotic cataract of right eye   • Acute respiratory failure   • COPD exacerbation   • Acute on chronic respiratory failure with hypoxia and hypercapnia     Past Medical History:   Diagnosis Date   • Arthritis    • COPD (chronic obstructive pulmonary disease)    • Gall stones    • Goiter     \"inward\"   • Hypertension    • Hypertension    • Kidney infection    • Kidney stone    • Kidney stones    • Migraine headache    • Scarlet fever      Past Surgical History:   Procedure Laterality Date   • BLADDER SURGERY     • CATARACT EXTRACTION W/ INTRAOCULAR LENS IMPLANT Left 2022    Procedure: CATARACT PHACO EXTRACTION WITH INTRAOCULAR LENS IMPLANT LEFT;  Surgeon: Sathish Lopez MD;  Location: [facility];  Service: Ophthalmology;  Laterality: Left;   • CATARACT EXTRACTION W/ INTRAOCULAR LENS IMPLANT Right 2022    Procedure: CATARACT PHACO EXTRACTION WITH INTRAOCULAR LENS IMPLANT RIGHT;  Surgeon: Sathish Lopez MD;  Location: [facility];  Service: Ophthalmology;  Laterality: Right;   • CHOLECYSTECTOMY     • HYSTERECTOMY     • TONSILLECTOMY        General Information     Row Name 23 1133          OT Time and Intention    Document Type evaluation  -SP     Mode of Treatment concurrent therapy  -SP     Row Name 23 1133          " General Information    Patient Profile Reviewed yes  -SP     Prior Level of Function independent:;ADL's;all household mobility  -SP     Barriers to Rehab previous functional deficit  -SP     Row Name 05/05/23 1133          Living Environment    People in Home sibling(s)  -SP     Row Name 05/05/23 1133          Home Main Entrance    Number of Stairs, Main Entrance none  -SP     Row Name 05/05/23 1133          Stairs Within Home, Primary    Number of Stairs, Within Home, Primary none  -SP     Row Name 05/05/23 1133          Cognition    Orientation Status (Cognition) oriented x 4  -SP     Row Name 05/05/23 1133          Safety Issues, Functional Mobility    Impairments Affecting Function (Mobility) balance;endurance/activity tolerance;strength;shortness of breath  -SP           User Key  (r) = Recorded By, (t) = Taken By, (c) = Cosigned By    Initials Name Provider Type    January Hussein OT Occupational Therapist                 Mobility/ADL's     Row Name 05/05/23 1134          Bed Mobility    Bed Mobility supine-sit  -SP     Supine-Sit Lillian (Bed Mobility) contact guard  -SP     Assistive Device (Bed Mobility) bed rails;head of bed elevated  -SP     Row Name 05/05/23 1134          Transfers    Transfers bed-chair transfer  -SP     Row Name 05/05/23 1134          Bed-Chair Transfer    Bed-Chair Lillian (Transfers) contact guard  -SP     Assistive Device (Bed-Chair Transfers) other (see comments)  HHA  -SP     Row Name 05/05/23 1134          Sit-Stand Transfer    Sit-Stand Lillian (Transfers) contact guard  -SP     Assistive Device (Sit-Stand Transfers) other (see comments)  gait belt  -SP     Row Name 05/05/23 1134          Activities of Daily Living    BADL Assessment/Intervention bathing;upper body dressing;lower body dressing;grooming;feeding;toileting  -SP     Row Name 05/05/23 1134          Bathing Assessment/Intervention    Lillian Level (Bathing) bathing skills;minimum assist (75%  patient effort)  -SP     Row Name 05/05/23 1134          Upper Body Dressing Assessment/Training    Cook Level (Upper Body Dressing) upper body dressing skills;standby assist  -SP     Row Name 05/05/23 1134          Lower Body Dressing Assessment/Training    Cook Level (Lower Body Dressing) lower body dressing skills;minimum assist (75% patient effort)  -SP     Row Name 05/05/23 1134          Grooming Assessment/Training    Cook Level (Grooming) grooming skills;set up  -SP     Row Name 05/05/23 1134          Self-Feeding Assessment/Training    Cook Level (Feeding) feeding skills;set up  -SP     Row Name 05/05/23 1134          Toileting Assessment/Training    Cook Level (Toileting) toileting skills;minimum assist (75% patient effort)  -SP           User Key  (r) = Recorded By, (t) = Taken By, (c) = Cosigned By    Initials Name Provider Type    SP January Chapa OT Occupational Therapist               Obj/Interventions     Row Name 05/05/23 1137          Sensory Assessment (Somatosensory)    Sensory Assessment (Somatosensory) sensation intact  -SP     Row Name 05/05/23 1137          Vision Assessment/Intervention    Visual Impairment/Limitations WFL  -SP     Row Name 05/05/23 1137          Range of Motion Comprehensive    General Range of Motion bilateral upper extremity ROM WFL  -SP     Row Name 05/05/23 1137          Strength Comprehensive (MMT)    General Manual Muscle Testing (MMT) Assessment upper extremity strength deficits identified  -SP     Comment, General Manual Muscle Testing (MMT) Assessment B shoulders 3+/5 otherwise grossly 4-/5  -SP     Row Name 05/05/23 1137          Balance    Balance Assessment standing dynamic balance  -SP     Dynamic Standing Balance minimal assist  -SP           User Key  (r) = Recorded By, (t) = Taken By, (c) = Cosigned By    Initials Name Provider Type    January Hussein OT Occupational Therapist               Goals/Plan     Row Name  05/05/23 1143          Bed Mobility Goal 1 (OT)    Activity/Assistive Device (Bed Mobility Goal 1, OT) bed mobility activities, all  -SP     Coos Level/Cues Needed (Bed Mobility Goal 1, OT) modified independence  -SP     Time Frame (Bed Mobility Goal 1, OT) by discharge  -SP     Progress/Outcomes (Bed Mobility Goal 1, OT) goal ongoing  -SP     Row Name 05/05/23 1143          Transfer Goal 1 (OT)    Activity/Assistive Device (Transfer Goal 1, OT) transfers, all  -SP     Coos Level/Cues Needed (Transfer Goal 1, OT) modified independence  -SP     Time Frame (Transfer Goal 1, OT) by discharge  -SP     Progress/Outcome (Transfer Goal 1, OT) goal ongoing  -SP     Row Name 05/05/23 1143          Bathing Goal 1 (OT)    Activity/Device (Bathing Goal 1, OT) bathing skills, all  -SP     Coos Level/Cues Needed (Bathing Goal 1, OT) modified independence  -SP     Time Frame (Bathing Goal 1, OT) by discharge  -SP     Progress/Outcomes (Bathing Goal 1, OT) goal ongoing  -SP     Row Name 05/05/23 1143          Dressing Goal 1 (OT)    Activity/Device (Dressing Goal 1, OT) dressing skills, all  -SP     Coos/Cues Needed (Dressing Goal 1, OT) standby assist  -SP     Time Frame (Dressing Goal 1, OT) short term goal (STG)  -SP     Progress/Outcome (Dressing Goal 1, OT) goal ongoing  -SP     Row Name 05/05/23 1143          Toileting Goal 1 (OT)    Activity/Device (Toileting Goal 1, OT) toileting skills, all  -SP     Coos Level/Cues Needed (Toileting Goal 1, OT) modified independence  -SP     Time Frame (Toileting Goal 1, OT) by discharge  -SP     Progress/Outcome (Toileting Goal 1, OT) goal ongoing  -SP     Row Name 05/05/23 1143          Strength Goal 1 (OT)    Strength Goal 1 (OT) Pt will complete B UE exercise as tolerated for muscular strengthening and endurance to promote safety and independence with ADLs and IADLs  -SP     Time Frame (Strength Goal 1, OT) by discharge  -SP     Progress/Outcome  (Strength Goal 1, OT) goal ongoing  -SP     Row Name 05/05/23 1143          Problem Specific Goal 1 (OT)    Problem Specific Goal 1 (OT) Pt will complete standing ADLs >5 minutes to improve age appropriate endurance for ADL and IADL participation  -SP     Time Frame (Problem Specific Goal 1, OT) by discharge  -SP     Progress/Outcome (Problem Specific Goal 1, OT) goal ongoing  -SP     Row Name 05/05/23 1143          Therapy Assessment/Plan (OT)    Planned Therapy Interventions (OT) activity tolerance training;BADL retraining;functional balance retraining;IADL retraining;occupation/activity based interventions;transfer/mobility retraining;strengthening exercise;patient/caregiver education/training  -SP           User Key  (r) = Recorded By, (t) = Taken By, (c) = Cosigned By    Initials Name Provider Type    January Hussein, JAMILA Occupational Therapist               Clinical Impression     Row Name 05/05/23 1133          Pain Assessment    Pretreatment Pain Rating 0/10 - no pain  -SP     Posttreatment Pain Rating 0/10 - no pain  -SP     Row Name 05/05/23 1133          Plan of Care Review    Plan of Care Reviewed With patient  -SP     Progress no change  -SP     Outcome Evaluation Pt participated in OT eval. Received on 5.5L nc with 02 sats at 94% at rest. Pt required CGA for supine to sit eob. She stood and transferred to the chair with CGA. Pt sats dropped to 90%. Once repositioend in chair sats rebounded to 94%. Pt presents with endurance and strength defecits. She will benefit from skilled OT services to address deficits and promote return to PLOF.  -SP     Row Name 05/05/23 1466          Therapy Assessment/Plan (OT)    Rehab Potential (OT) good, to achieve stated therapy goals  -SP     Criteria for Skilled Therapeutic Interventions Met (OT) yes  -SP     Therapy Frequency (OT) 3 times/wk  -SP     Row Name 05/05/23 1135          Therapy Plan Review/Discharge Plan (OT)    Equipment Needs Upon Discharge (OT) walker,  rolling  -SP     Anticipated Discharge Disposition (OT) home with home health  -SP     Row Name 05/05/23 1139          Vital Signs    Pre SpO2 (%) 94  -SP     O2 Delivery Pre Treatment supplemental O2  5.5L  -SP     Intra SpO2 (%) 90  -SP     O2 Delivery Intra Treatment supplemental O2  5.5L  -SP     Post SpO2 (%) 94  -SP     O2 Delivery Post Treatment supplemental O2  5.5L  -SP     Pre Patient Position Supine  -SP     Intra Patient Position Standing  -SP     Post Patient Position Sitting  -SP     Row Name 05/05/23 1139          Positioning and Restraints    Pre-Treatment Position in bed  -SP     Post Treatment Position chair  -SP     In Chair sitting;call light within reach;encouraged to call for assist;exit alarm on  -SP           User Key  (r) = Recorded By, (t) = Taken By, (c) = Cosigned By    Initials Name Provider Type    January Hussein, OT Occupational Therapist               Outcome Measures     Row Name 05/05/23 1145          How much help from another is currently needed...    Putting on and taking off regular lower body clothing? 3  -SP     Bathing (including washing, rinsing, and drying) 3  -SP     Toileting (which includes using toilet bed pan or urinal) 3  -SP     Putting on and taking off regular upper body clothing 3  -SP     Taking care of personal grooming (such as brushing teeth) 4  -SP     Eating meals 4  -SP     AM-PAC 6 Clicks Score (OT) 20  -SP     Row Name 05/05/23 1135 05/05/23 0800       How much help from another person do you currently need...    Turning from your back to your side while in flat bed without using bedrails? 3  -MS 3  -KE    Moving from lying on back to sitting on the side of a flat bed without bedrails? 3  -MS 3  -KE    Moving to and from a bed to a chair (including a wheelchair)? 3  -MS 3  -KE    Standing up from a chair using your arms (e.g., wheelchair, bedside chair)? 3  -MS 3  -KE    Climbing 3-5 steps with a railing? 3  -MS 2  -KE    To walk in hospital room? 3  -MS  2  -KE    AM-PAC 6 Clicks Score (PT) 18  -MS 16  -KE    Highest level of mobility 6 --> Walked 10 steps or more  -MS 5 --> Static standing  -KE    Row Name 05/05/23 1145          Functional Assessment    Outcome Measure Options AM-PAC 6 Clicks Daily Activity (OT)  -SP           User Key  (r) = Recorded By, (t) = Taken By, (c) = Cosigned By    Initials Name Provider Type    Martín Carreno, PT Physical Therapist    January Hussein, OT Occupational Therapist    Oneyda Johnson, RN Registered Nurse                Occupational Therapy Education     Title: PT OT SLP Therapies (In Progress)     Topic: Occupational Therapy (In Progress)     Point: ADL training (Done)     Description:   Instruct learner(s) on proper safety adaptation and remediation techniques during self care or transfers.   Instruct in proper use of assistive devices.              Learning Progress Summary           Patient Acceptance, E, VU by SP at 5/5/2023 1146    Comment: OT edcuated pt on purpose of IE and POC. Pt is agreeable.                   Point: Home exercise program (Not Started)     Description:   Instruct learner(s) on appropriate technique for monitoring, assisting and/or progressing therapeutic exercises/activities.              Learner Progress:  Not documented in this visit.          Point: Precautions (Not Started)     Description:   Instruct learner(s) on prescribed precautions during self-care and functional transfers.              Learner Progress:  Not documented in this visit.          Point: Body mechanics (Not Started)     Description:   Instruct learner(s) on proper positioning and spine alignment during self-care, functional mobility activities and/or exercises.              Learner Progress:  Not documented in this visit.                      User Key     Initials Effective Dates Name Provider Type Discipline    SP 09/08/22 -  January Chapa, OT Occupational Therapist OT              OT Recommendation and  Plan  Planned Therapy Interventions (OT): activity tolerance training, BADL retraining, functional balance retraining, IADL retraining, occupation/activity based interventions, transfer/mobility retraining, strengthening exercise, patient/caregiver education/training  Therapy Frequency (OT): 3 times/wk  Plan of Care Review  Plan of Care Reviewed With: patient  Progress: no change  Outcome Evaluation: Pt participated in OT eval. Received on 5.5L nc with 02 sats at 94% at rest. Pt required CGA for supine to sit eob. She stood and transferred to the chair with CGA. Pt sats dropped to 90%. Once repositioend in chair sats rebounded to 94%. Pt presents with endurance and strength defecits. She will benefit from skilled OT services to address deficits and promote return to PLOF.     Time Calculation:    Time Calculation- OT     Row Name 05/05/23 1147             Time Calculation- OT    OT Start Time 0904  -SP         Untimed Charges    OT Eval/Re-eval Minutes 40  -SP         Total Minutes    Untimed Charges Total Minutes 40  -SP       Total Minutes 40  -SP            User Key  (r) = Recorded By, (t) = Taken By, (c) = Cosigned By    Initials Name Provider Type    SP January Chapa OT Occupational Therapist              Therapy Charges for Today     Code Description Service Date Service Provider Modifiers Qty    [id number] HC OT EVAL LOW COMPLEXITY 3 5/5/2023 January Chapa OT GO 1               January Chapa OT  5/5/2023

## 2023-05-05 NOTE — PLAN OF CARE
Goal Outcome Evaluation:  Plan of Care Reviewed With: patient, daughter        Progress: no change  Outcome Evaluation: Pt participated in PT eval this date. Pt on 5.5L of O2 at rest 94%. pt transferred to EOB with CGA. Pt stood, t/f'd to the chair 3 ft with CGA with no AD. Pt's O2 sats dropped to 90%. Pt up in chair 94% on 5.5L . Pt is expected  to benefit from skilled PTx during this inpatient stay to address deficits in strength, transfers and mobility.

## 2023-05-05 NOTE — PAYOR COMM NOTE
"Carolin Toscano (77 y.o. Female)     Date of Birth   1945    Social Security Number       Address   [address] [facility] [address]    Home Phone   [phone]    MRN   [id number]       Quaker   Ana    Marital Status                               Admission Date   5/4/23    Admission Type   Emergency    Admitting Provider   Lolis Vital MD    Attending Provider   Lolis Vital MD    Department, Room/Bed   [facility] TELEMETRY 3, 304/1       Discharge Date       Discharge Disposition       Discharge Destination                               Attending Provider: Lolis Vital MD    Allergies: Contrast Dye (Echo Or Unknown Ct/mr), Ciprofloxacin, Keflex [Cephalexin], Penicillins, Sulfa Antibiotics, Terramycin [Oxytetracycline], Prednisone, Latex, Percocet [Oxycodone-acetaminophen], Xyzal [Levocetirizine]    Isolation: Droplet   Infection: None   Code Status: CPR    Ht: 160 cm (63\")   Wt: 71.4 kg (157 lb 6.5 oz)    Admission Cmt: None   Principal Problem: Acute on chronic respiratory failure with hypoxia and hypercapnia [J96.21,J96.22]                 Active Insurance as of 5/4/2023     Primary Coverage     Payor Plan Insurance Group Employer/Plan Group    HUMANA MEDICARE REPLACEMENT HUMANA MEDICARE REPLACEMENT [id number]     Payor Plan Address Payor Plan Phone Number Payor Plan Fax Number Effective Dates    [address] [address] [phone]  1/1/2018 - None Entered    [facility] [id number]       Subscriber Name Subscriber Birth Date Member ID       CAROLIN TOSCANO 1945 [id number]           Secondary Coverage     Payor Plan Insurance Group Employer/Plan Group    [place]      Payor Plan Address Payor Plan Phone Number Payor Plan Fax Number Effective Dates    [address] [phone]  10/11/2021 - None Entered    [facility] [id number]       Subscriber Name Subscriber Birth Date Member ID       CAROLIN TOSCANO 1945 [id number]                 Emergency " Contacts      (Rel.) Home Phone Work Phone Mobile Phone    Nehal Hoffmann (Daughter) [phone] -- --               History & Physical      Lolis Vital MD at 23 1550            [facility]   HISTORY AND PHYSICAL      Name:  Carolin Toscano   Age:  77 y.o.  Sex:  female  :  1945  MRN:  [id number]   Visit Number:  [id number]  Admission Date:  2023  Date Of Service:  23  Primary Care Physician:  Jason Nicholson MD    Chief Complaint:     Shortness of breath    History Of Presenting Illness:      Patient is a 77 years old female with a past medical history of COPD, chronic respiratory failure on 5 to 6 L per her previous discharge, on NIV machine at home and trelegy, hypertension, and ongoing tobacco use who presented to the ER from home by EMS with a chief complaint of shortness of breath.  Patient reporting that she started having shortness of breath associated with productive cough since last night and has persisted and became worse that promoted her to call EMS.  EMS has found the patient to be at 65% saturation on her normal 6 L of oxygen.  She received a breathing treatment in route.  Patient was recently admitted to the hospital on  and discharged on 2023 with similar clinical picture of Respiratory failure, COPD exacerbation and pneumonia.  Patient was discharged on 6 L previously.  Patient reports compliance with her NIV machine at home.  Denies any sick contacts that she is aware of.  Patient denies any chest pain or abdominal pain.  Denies any other complaints.    On ER evaluation, Patient was found to be tachycardic with a heart rate of 130s, temperature of 100.2 and respirations of 30, /72.  Patient was placed on BiPAP with FiO2 of 40%.  Her ABG came back and showed pH of 7.296/PCO2 87/PO2 94/HCO3 42/saturation 97% on 5 L nasal cannula.  HS Troponin 44, proBNP WNL, glucose 192, CO2 of 40, otherwise CBC and CMP within  acceptable range.  Lactate and Pro-Adi WNL.  Respiratory panel positive for parainfluenza virus.  Chest x-ray Mild interstitial disease  bilaterally is similar to prior. No area of consolidation is seen.  Urinalysis negative for nitrates, leukocytes, WBC or bacteria with squamous epithelial cells.  Patient received Solu-Medrol, normal saline bolus of 2124 mL, magnesium and Aztreonam while in the ER.  Hospitalist consulted for admission, further evaluation and treatment.    Review Of Systems:    All systems were reviewed and negative except as mentioned in history of presenting illness, assessment and plan.    Past Medical History: Patient  has a past medical history of Arthritis, COPD (chronic obstructive pulmonary disease), Gall stones, Goiter, Hypertension, Hypertension, Kidney infection, Kidney stone, Kidney stones, Migraine headache, and Scarlet fever.    Past Surgical History: Patient  has a past surgical history that includes Cholecystectomy; Bladder surgery; Tonsillectomy; Hysterectomy; Cataract extraction w/ intraocular lens implant (Left, 8/11/2022); and Cataract extraction w/ intraocular lens implant (Right, 8/25/2022).    Social History: Patient  reports that she has been smoking cigarettes. She started smoking about 63 years ago. She has been smoking an average of 1 pack per day. She quit smokeless tobacco use about 5 years ago. She reports that she does not drink alcohol and does not use drugs.    Family History:  Patient's family history has been reviewed and found to be noncontributory.     Allergies:      Contrast dye (echo or unknown ct/mr), Ciprofloxacin, Keflex [cephalexin], Penicillins, Sulfa antibiotics, Terramycin [oxytetracycline], Prednisone, Latex, Percocet [oxycodone-acetaminophen], and Xyzal [levocetirizine]    Home Medications:    Prior to Admission Medications     Prescriptions Last Dose Informant Patient Reported? Taking?    albuterol (PROVENTIL) (2.5 MG/3ML) 0.083% nebulizer solution    "No No    Inhale contents of 1 vial (3 mL) by nebulization Every 6 (Six) Hours As Needed for Wheezing for up to 30 days.    amLODIPine (NORVASC) 10 MG tablet   No No    Take 1 tablet by mouth Daily.    atenolol (TENORMIN) 25 MG tablet   No No    Take 1 tablet by mouth Daily.    Fluticasone-Umeclidin-Vilant (TRELEGY) 100-62.5-25 MCG/ACT inhaler   No No    Inhale 1 puff  by mouth Daily    Patient not taking:  Reported on 5/1/2023    multivitamins-minerals (PRESERVISION AREDS 2) capsule capsule  Self Yes No    Take 1 capsule by mouth Daily.    tiotropium (Spiriva HandiHaler) 18 MCG per inhalation capsule   No No    Place 1 capsule into inhaler and inhale Daily.        ED Medications:    Medications   sodium chloride 0.9 % flush 10 mL (has no administration in time range)   sodium chloride 0.9 % bolus 2,124 mL (2,124 mL Intravenous New Bag 5/4/23 1517)   methylPREDNISolone sodium succinate (SOLU-Medrol) injection 125 mg (125 mg Intravenous Given 5/4/23 1516)   magnesium sulfate 2g/50 mL (PREMIX) infusion (0 g Intravenous Stopped 5/4/23 1543)   aztreonam (AZACTAM) 2 g/100 mL 0.9% NS (mbp) (2 g Intravenous New Bag 5/4/23 1516)     Vital Signs:  Temp:  [100.2 °F (37.9 °C)] 100.2 °F (37.9 °C)  Heart Rate:  [133] 133  Resp:  [29-30] 29  BP: (185)/(72) 185/72        05/04/23  1444   Weight: 70.8 kg (156 lb)     Body mass index is 27.63 kg/m².    Physical Exam:     Most recent vital Signs: BP (!) 185/72 (Patient Position: Sitting)   Pulse (!) 133   Temp 100.2 °F (37.9 °C) (Oral)   Resp (!) 29   Ht 160 cm (63\")   Wt 70.8 kg (156 lb)   SpO2 96%   BMI 27.63 kg/m²     Physical Exam  Vitals and nursing note reviewed.   Constitutional:       General: She is in acute distress.      Appearance: She is ill-appearing.      Comments: Chronically ill-appearing.   LUCI:      Head: Normocephalic and atraumatic.      Right Ear: External ear normal.      Left Ear: External ear normal.      Nose: Nose normal.      Mouth/Throat:      " Mouth: Mucous membranes are dry.   Eyes:      Extraocular Movements: Extraocular movements intact.      Conjunctiva/sclera: Conjunctivae normal.      Pupils: Pupils are equal, round, and reactive to light.   Cardiovascular:      Rate and Rhythm: Regular rhythm. Tachycardia present.      Pulses: Normal pulses.      Heart sounds: Normal heart sounds.   Pulmonary:      Effort: Tachypnea, accessory muscle usage, prolonged expiration and respiratory distress present.      Breath sounds: Decreased air movement present. Wheezing present. No rhonchi.      Comments: Diffuse bilateral expiratory and inspiratory wheezing heard.  Abdominal:      General: Bowel sounds are normal. There is no distension.      Palpations: Abdomen is soft.      Tenderness: There is no abdominal tenderness.   Musculoskeletal:         General: No tenderness. Normal range of motion.      Cervical back: Normal range of motion and neck supple.      Right lower leg: No edema.      Left lower leg: No edema.   Skin:     General: Skin is warm and dry.      Findings: No rash.   Neurological:      General: No focal deficit present.      Mental Status: She is alert and oriented to person, place, and time. Mental status is at baseline.      Motor: No weakness.   Psychiatric:         Mood and Affect: Mood normal.         Behavior: Behavior normal.         Thought Content: Thought content normal.         Laboratory data:    I have reviewed the labs done in the emergency room.    Results from last 7 days   Lab Units 05/04/23  1447   SODIUM mmol/L 140   POTASSIUM mmol/L 4.4   CHLORIDE mmol/L 95*   CO2 mmol/L 40.2*   BUN mg/dL 10   CREATININE mg/dL 0.38*   CALCIUM mg/dL 9.1   BILIRUBIN mg/dL 0.4   ALK PHOS U/L 87   ALT (SGPT) U/L 30   AST (SGOT) U/L 23   GLUCOSE mg/dL 192*     Results from last 7 days   Lab Units 05/04/23  1447   WBC 10*3/mm3 8.04   HEMOGLOBIN g/dL 13.3   HEMATOCRIT % 44.5   PLATELETS 10*3/mm3 149         Results from last 7 days   Lab Units  05/04/23  1447   HSTROP T ng/L 44*     Results from last 7 days   Lab Units 05/04/23  1447   PROBNP pg/mL 234.4             Results from last 7 days   Lab Units 05/04/23  1448   PH, ARTERIAL pH units 7.296*   PO2 ART mm Hg 94.2   PCO2, ARTERIAL mm Hg 87.4*   HCO3 ART mmol/L 42.6*           Invalid input(s): USDES,  BLOODU, NITRITITE, BACT, EP    Pain Management Panel          View : No data to display.                      EKG:      Sinus tachycardia, heart rate 135, nonspecific ST/T wave changes.    Radiology:    XR Chest 1 View    Result Date: 5/4/2023  PROCEDURE: XR CHEST 1 VW-  HISTORY: SOA Triage Protocol  COMPARISON: 04/18/2023.  FINDINGS: The heart is normal in size. Mild interstitial disease bilaterally is similar to prior. No area of consolidation is seen. Aortic mural calcifications noted.. The mediastinum is unremarkable. There is no pneumothorax.  There are no acute osseous abnormalities.      Stable chest..    This report was signed and finalized on 5/4/2023 3:31 PM by Shraddha Wharton MD.      Assessment:    Acute on chronic respiratory failure with hypoxia and hypercapnia, likely secondary to #2 and 3, POA  Acute COPD exacerbation, POA  Parainfluenza infection, POA  Elevated troponin, likely secondary to demand ischemia, POA  Hypertension  Chronic tobacco abuse  Pulmonary hypertension  Arthritis    Plan:    Patient is admitted to telemetry for further evaluation and treatment.    Respiratory failure with hypoxia/hypercapnia, COPD exacerbation, parainfluenza  -Continue supplemental oxygen to keep saturation above 90%, patient on 5 to 6 L at baseline, continue to titrate down as able to.  Continue BiPAP therapy.  -Patient met SIRS criteria on arrival, likely secondary to influenza infection and respiratory failure. Received Aztreonam and sepsis protocol IV fluids boluses.  -Procalcitonin WNL, WBC WNL, bacterial infection less likely, will hold off on antibiotics for now.  -On Trelegy at home, Spiriva and  budesonide while here.  -We will continue patient on Solu-Medrol 60 mg every 8 hours  -Bronchodilators, Mucinex and supportive care.  -Will continue Macias catheter for now while on BiPAP  -Patient was seen by pulmonology on previous admission, will need close follow-up with pulmonology on discharge.    Elevated troponin  -Likely secondary to demand ischemia in settings of respiratory failure  -Patient denies any chest pain, low suspicion for ACS  -Trending troponins, recheck in a.m.    Hyperglycemia  -Will cover with sliding scale insulin while on steroids  -Will check hemoglobin A1c    Further orders as indicated per clinical course.    Risk Assessment: High  DVT Prophylaxis: Lovenox prophylaxis (benefit> risk)  Code Status: Full  Diet: Cardiac    Advance Care Planning   ACP discussion was held with the patient during this visit. Patient does not have an advance directive, information provided.      Lolis Vital MD  05/04/23  15:50 EDT    Dictated utilizing Dragon dictation.    Electronically signed by Lolis Vital MD at 05/04/23 1656          Emergency Department Notes      Aurelio Shields PA-C at 05/04/23 1453     Attestation signed by Mark Starr DO at 05/04/23 1642        NON FACE TO FACE: This visit was performed by BOTH a physician and an APC. I performed all aspects of the MDM as documented.  Mark Starr DO 5/4/2023 16:42 EDT                         Subjective  History of Present Illness:    Chief Complaint:   Chief Complaint   Patient presents with   • Respiratory Distress      History of Present Illness: Carolin Toscano is a 77 y.o. female who presents to the emergency department complaining of cough.  Patient was discharged from this facility a couple weeks ago for COPD exacerbation and pneumonia on 6 L of oxygen and it does not look like she was on oxygen prior to being admitted.  Reportedly she has done okay since discharge until last night or this morning and started to have  "increased shortness of breath and cough again.  She denies chest pain.  EMS was called for transport from her home and she was found to be 65% on her normal 6 L, she was administered a breathing treatment and brought into the ED for further evaluation.  Upon arrival patient is notably tachycardic and tachypneic, noted to be febrile 100.2 with elevated blood pressure.  Once transitioned into the hospital bed she was placed on 5 L nasal cannula with oxygenation reading of 97%.  She states she is not currently on any antibiotics or steroids.  Did see her PCP earlier this month and appeared to be doing okay per his note.  Onset: Last night and into this morning  Duration: Ongoing  Exacerbating / Alleviating factors: Improved with DuoNeb and supplemental oxygen  Associated symptoms: Fever      Nurses Notes reviewed and agree, including vitals, allergies, social history and prior medical history.     Review of Systems   Constitutional: Positive for fever.   HENT: Negative.    Eyes: Negative.    Respiratory: Positive for cough and shortness of breath.    Cardiovascular: Negative.  Negative for chest pain and leg swelling.   Gastrointestinal: Negative.    Genitourinary: Negative.    Musculoskeletal: Negative.    Skin: Negative.    Neurological: Negative.    Psychiatric/Behavioral: Negative.        Past Medical History:   Diagnosis Date   • Arthritis    • COPD (chronic obstructive pulmonary disease)    • Gall stones    • Goiter     \"inward\"   • Hypertension    • Hypertension    • Kidney infection    • Kidney stone    • Kidney stones    • Migraine headache    • Scarlet fever        Allergies:    Contrast dye (echo or unknown ct/mr), Ciprofloxacin, Keflex [cephalexin], Penicillins, Sulfa antibiotics, Terramycin [oxytetracycline], Prednisone, Latex, Percocet [oxycodone-acetaminophen], and Xyzal [levocetirizine]      Past Surgical History:   Procedure Laterality Date   • BLADDER SURGERY     • CATARACT EXTRACTION W/ INTRAOCULAR " "LENS IMPLANT Left 8/11/2022    Procedure: CATARACT PHACO EXTRACTION WITH INTRAOCULAR LENS IMPLANT LEFT;  Surgeon: Sathish Lopez MD;  Location: [facility];  Service: Ophthalmology;  Laterality: Left;   • CATARACT EXTRACTION W/ INTRAOCULAR LENS IMPLANT Right 8/25/2022    Procedure: CATARACT PHACO EXTRACTION WITH INTRAOCULAR LENS IMPLANT RIGHT;  Surgeon: Sathish Lopez MD;  Location: [facility];  Service: Ophthalmology;  Laterality: Right;   • CHOLECYSTECTOMY     • HYSTERECTOMY     • TONSILLECTOMY           Social History     Socioeconomic History   • Marital status:    Tobacco Use   • Smoking status: Every Day     Packs/day: 1.00     Types: Cigarettes     Start date: 1960   • Smokeless tobacco: Former     Quit date: 9/24/2017   Vaping Use   • Vaping Use: Never used   Substance and Sexual Activity   • Alcohol use: No   • Drug use: No   • Sexual activity: Defer         Family History   Problem Relation Age of Onset   • Heart disease Mother    • Arthritis Mother    • Diabetes Mother    • Hypertension Mother    • Colon cancer Father    • Arthritis Father    • Diabetes Father    • Hypertension Father    • Migraines Sister        Objective  Physical Exam:  BP (!) 185/72 (Patient Position: Sitting)   Pulse (!) 133   Temp 100.2 °F (37.9 °C) (Oral)   Resp (!) 29   Ht 160 cm (63\")   Wt 70.8 kg (156 lb)   SpO2 96%   BMI 27.63 kg/m²      Physical Exam  Vitals and nursing note reviewed.   Constitutional:       General: She is in acute distress.      Appearance: Normal appearance. She is normal weight. She is ill-appearing.   HENT:      Head: Normocephalic and atraumatic.      Nose: Nose normal.   Eyes:      Extraocular Movements: Extraocular movements intact.   Cardiovascular:      Rate and Rhythm: Regular rhythm. Tachycardia present.      Heart sounds: Normal heart sounds.   Pulmonary:      Effort: Respiratory distress present.      Breath sounds: Wheezing and rhonchi present.   Abdominal:      General: " Abdomen is flat.      Palpations: Abdomen is soft.   Musculoskeletal:         General: Normal range of motion.      Cervical back: Normal range of motion.      Right lower leg: No edema.      Left lower leg: No edema.   Skin:     General: Skin is warm and dry.   Neurological:      General: No focal deficit present.      Mental Status: She is alert.   Psychiatric:         Mood and Affect: Mood normal.         Behavior: Behavior normal.           Procedures    ED Course:    ED Course as of 05/04/23 1546   Thu May 04, 2023   1448 EKG interpreted by me, sinus tachycardia with no concerning ST changes noted, rate of 135, abnormal EKG [JE]   1459 pH, Arterial(!!): 7.296 [TM]   1459 pCO2, Arterial(!!): 87.4 [TM]   1543 Parainfluenza Virus 3(!): Detected [TM]      ED Course User Index  [JE] Dewey Jordan MD  [TM] Aurelio Shields PA-C       Lab Results (last 24 hours)     Procedure Component Value Units Date/Time    CBC & Differential [[id number]]  (Abnormal) Collected: 05/04/23 1447    Specimen: Blood Updated: 05/04/23 1515    Narrative:      The following orders were created for panel order CBC & Differential.  Procedure                               Abnormality         Status                     ---------                               -----------         ------                     CBC Auto Differential[[id number]]        Abnormal            Final result               Scan Slide[[id number]]                                       Final result                 Please view results for these tests on the individual orders.    Comprehensive Metabolic Panel [[id number]]  (Abnormal) Collected: 05/04/23 1447    Specimen: Blood Updated: 05/04/23 1518     Glucose 192 mg/dL      Comment: Glucose >180, Hemoglobin A1C recommended.        BUN 10 mg/dL      Creatinine 0.38 mg/dL      Sodium 140 mmol/L      Potassium 4.4 mmol/L      Chloride 95 mmol/L      CO2 40.2 mmol/L      Calcium 9.1 mg/dL      Total Protein 6.9 g/dL       Albumin 3.8 g/dL      ALT (SGPT) 30 U/L      AST (SGOT) 23 U/L      Alkaline Phosphatase 87 U/L      Total Bilirubin 0.4 mg/dL      Globulin 3.1 gm/dL      A/G Ratio 1.2 g/dL      BUN/Creatinine Ratio 26.3     Anion Gap 4.8 mmol/L      eGFR 104.0 mL/min/1.73     Narrative:      GFR Normal >60  Chronic Kidney Disease <60  Kidney Failure <15    The GFR formula is only valid for adults with stable renal function between ages 18 and 70.    BNP [[id number]]  (Normal) Collected: 05/04/23 1447    Specimen: Blood Updated: 05/04/23 1516     proBNP 234.4 pg/mL     Narrative:      Among patients with dyspnea, NT-proBNP is highly sensitive for the detection of acute congestive heart failure. In addition NT-proBNP of <300 pg/ml effectively rules out acute congestive heart failure with 99% negative predictive value.      Single High Sensitivity Troponin T [[id number]]  (Abnormal) Collected: 05/04/23 1447    Specimen: Blood Updated: 05/04/23 1518     HS Troponin T 44 ng/L     Narrative:      High Sensitive Troponin T Reference Range:  <10.0 ng/L- Negative Female for AMI  <15.0 ng/L- Negative Male for AMI  >=10 - Abnormal Female indicating possible myocardial injury.  >=15 - Abnormal Male indicating possible myocardial injury.   Clinicians would have to utilize clinical acumen, EKG, Troponin, and serial changes to determine if it is an Acute Myocardial Infarction or myocardial injury due to an underlying chronic condition.         CBC Auto Differential [[id number]]  (Abnormal) Collected: 05/04/23 1447    Specimen: Blood Updated: 05/04/23 1515     WBC 8.04 10*3/mm3      RBC 4.60 10*6/mm3      Hemoglobin 13.3 g/dL      Hematocrit 44.5 %      MCV 96.7 fL      MCH 28.9 pg      MCHC 29.9 g/dL      RDW 14.8 %      RDW-SD 52.8 fl      MPV 10.5 fL      Platelets 149 10*3/mm3      Neutrophil % 91.6 %      Lymphocyte % 3.6 %      Monocyte % 4.1 %      Eosinophil % 0.0 %      Basophil % 0.2 %      Immature Grans % 0.5 %      Neutrophils,  Absolute 7.36 10*3/mm3      Lymphocytes, Absolute 0.29 10*3/mm3      Monocytes, Absolute 0.33 10*3/mm3      Eosinophils, Absolute 0.00 10*3/mm3      Basophils, Absolute 0.02 10*3/mm3      Immature Grans, Absolute 0.04 10*3/mm3      nRBC 0.0 /100 WBC     Scan Slide [[id number]] Collected: 05/04/23 1447    Specimen: Blood Updated: 05/04/23 1515     Hypochromia Mod/2+     WBC Morphology Normal     Platelet Estimate Adequate    Blood Gas, Arterial With Co-Ox [[id number]]  (Abnormal) Collected: 05/04/23 1448    Specimen: Arterial Blood Updated: 05/04/23 1448     Site Left Radial     Partha's Test Positive     pH, Arterial 7.296 pH units      Comment: 84 Value below reference range        pCO2, Arterial 87.4 mm Hg      Comment: 86 Value above critical limit        pO2, Arterial 94.2 mm Hg      HCO3, Arterial 42.6 mmol/L      Comment: 83 Value above reference range        Base Excess, Arterial 12.1 mmol/L      Comment: 83 Value above reference range        O2 Saturation, Arterial 97.3 %      Hematocrit, Blood Gas 42.1 %      Oxyhemoglobin 95.8 %      Methemoglobin 0.60 %      Carboxyhemoglobin 0.9 %      A-a DO2 84.8 mmHg      Barometric Pressure for Blood Gas 735 mmHg      Modality Nasal Cannula     FIO2 40 %      Flow Rate 5.0 lpm      Ventilator Mode NA     Note --     Notified Who Dr Jordan     Notified By [id number]     Notified Time 05/04/2023 14:49     Collected by sb     Comment: Meter: [id number]     :  [id number]        pH, Temp Corrected --     pCO2, Temperature Corrected --     pO2, Temperature Corrected --    Respiratory Panel PCR w/COVID-19(SARS-CoV-2) SUZY/HORTENSIA/JULIO/PAD/COR/MAD/PETER In-House, NP Swab in UTM/VTM, 3-4 HR TAT - Swab, Nasopharynx [[id number]]  (Abnormal) Collected: 05/04/23 1449    Specimen: Swab from Nasopharynx Updated: 05/04/23 1542     ADENOVIRUS, PCR Not Detected     Coronavirus 229E Not Detected     Coronavirus HKU1 Not Detected     Coronavirus NL63 Not Detected     Coronavirus OC43 Not  Detected     COVID19 Not Detected     Human Metapneumovirus Not Detected     Human Rhinovirus/Enterovirus Not Detected     Influenza A PCR Not Detected     Influenza B PCR Not Detected     Parainfluenza Virus 1 Not Detected     Parainfluenza Virus 2 Not Detected     Parainfluenza Virus 3 Detected     Parainfluenza Virus 4 Not Detected     RSV, PCR Not Detected     Bordetella pertussis pcr Not Detected     Bordetella parapertussis PCR Not Detected     Chlamydophila pneumoniae PCR Not Detected     Mycoplasma pneumo by PCR Not Detected    Narrative:      In the setting of a positive respiratory panel with a viral infection PLUS a negative procalcitonin without other underlying concern for bacterial infection, consider observing off antibiotics or discontinuation of antibiotics and continue supportive care. If the respiratory panel is positive for atypical bacterial infection (Bordetella pertussis, Chlamydophila pneumoniae, or Mycoplasma pneumoniae), consider antibiotic de-escalation to target atypical bacterial infection.    Lactic Acid, Plasma [[id number]]  (Normal) Collected: 05/04/23 1449    Specimen: Blood Updated: 05/04/23 1510     Lactate 1.3 mmol/L     Blood Culture With BERNARD - Blood, Hand, Left [[id number]] Collected: 05/04/23 1500    Specimen: Blood from Hand, Left Updated: 05/04/23 1504    Blood Culture With BERNARD - Blood, Hand, Right [[id number]] Collected: 05/04/23 1500    Specimen: Blood from Hand, Right Updated: 05/04/23 1504           XR Chest 1 View    Result Date: 5/4/2023  PROCEDURE: XR CHEST 1 VW-  HISTORY: SOA Triage Protocol  COMPARISON: 04/18/2023.  FINDINGS: The heart is normal in size. Mild interstitial disease bilaterally is similar to prior. No area of consolidation is seen. Aortic mural calcifications noted.. The mediastinum is unremarkable. There is no pneumothorax.  There are no acute osseous abnormalities.      Impression: Stable chest..    This report was signed and finalized on 5/4/2023 3:31  PM by Shraddha Wharton MD.         [facility]    Carolin Toscano is a 77 y.o. female who presents to the emergency department for evaluation of shortness of breath, cough    Differential diagnosis includes pneumonia, sepsis, COPD exacerbation, CHF, ACS among other etiologies.    CBC, CMP, lactic acid, procalcitonin, ABG, chest x-ray, troponin, BNP, urinalysis, blood cultures ordered for further evaluation of the patient's presentation.    Chart review if available included outside testing, previous visits, prior labs, prior imaging, available notes from prior evaluations or visits with specialists, medication list, allergies, past medical history, past surgical history when applicable.    Patient was treated with magnesium, Solu-Medrol, aztreonam, IV fluids sepsis bolus    Patient is hypercapnic with a PCO2 of 87.4 with baseline around 65, she is also acidotic with a pH of 7.296.  BiPAP was administered and she is tolerating well at this time 1505 hrs.  Pulmonary read of chest x-ray reveals stable chest.  Will continue to monitor and evaluate with disposition likely to the hospital.  Discussed case with Dr. Vital who graciously excepted the patient for admission.    Plan for disposition is admission to the hospital.  Patient/family comfortable with and understanding of the plan.    30 minutes of critical care provided. This time excludes other billable procedures. Time does include preparation of documents, medical consultations, review of old records, and direct bedside care. Patient is at high risk for life-threatening deterioration due to hypoxic/hypercapnic respiratory failure requiring NIPPV.     Final diagnoses:   Acute on chronic respiratory failure with hypoxia and hypercapnia   COPD exacerbation   Parainfluenza virus infection        Aurelio Shields PA-C  05/04/23 1546      Electronically signed by Mark Starr DO at 05/04/23 0152     Steffi Finn PCT at 05/04/23 1546     Summary:Bed Request              Called and requested a Tele bed per order from Jasiel Smith Supervisor at this time. She stated there was room being cleaned on the 3rd floor and they would call back when room is ready.     Electronically signed by Steffi Finn PCT at 05/04/23 1547     Steffi Finn PCT at 05/04/23 1659     Summary:Bed Assignment             Pt has been assigned to Tele 304 at this time per Jasiel Smith Supervisor.     Electronically signed by Steffi Finn PCT at 05/04/23 1658       Vital Signs (last day)     Date/Time Temp Temp src Pulse Resp BP Patient Position SpO2    05/05/23 0700 98 (36.7) Axillary 84 27 131/57 Lying 96    05/05/23 0600 -- -- 76 -- -- -- 92    05/05/23 0520 97.6 (36.4) Axillary 83 24 134/61 Lying 94    05/05/23 0500 -- -- 76 -- -- -- 92    05/05/23 0400 -- -- 80 -- -- -- 91    05/05/23 0300 -- -- 77 -- -- -- 92    05/05/23 0200 -- -- 83 -- -- -- 93    05/05/23 0100 -- -- 87 -- -- -- 90    05/05/23 0000 -- -- 86 -- 121/52 -- 90    05/04/23 2358 99.3 (37.4) Axillary 88 20 121/52 Lying 90    05/04/23 2300 -- -- 90 -- -- -- 91    05/04/23 2216 99.9 (37.7) Axillary -- -- -- -- --    05/04/23 2200 -- -- 103 -- -- -- 92    05/04/23 2100 -- -- 107 -- -- -- 93    05/04/23 2057 102.2 (39) Axillary -- -- -- -- --    05/04/23 2002 -- -- 106 24 -- -- --    05/04/23 2000 -- -- 103 -- -- -- 92    05/04/23 1957 -- -- 103 25 -- -- 93    05/04/23 1939 102.5 (39.2) Axillary 103 27 137/59 Lying 93    05/04/23 1808 -- -- 109 22 136/61 -- 93    05/04/23 1740 -- -- -- -- 176/79 -- --    05/04/23 1733 99.4 (37.4) Axillary 84 28 187/77 Lying 97    05/04/23 1616 -- -- 117 26 152/69 -- 92    05/04/23 1545 -- -- 114 -- 135/41 -- 96    05/04/23 1453 -- -- -- 29 -- -- --    05/04/23 1444 100.2 (37.9) Oral 133 30 185/72 Sitting 96            Lab Results (last 24 hours)     Procedure Component Value Units Date/Time    POC Glucose Once [[id number]]  (Abnormal) Collected: 05/05/23 0628    Specimen: Blood Updated: 05/05/23  0630     Glucose 150 mg/dL      Comment: Serial Number: [id number]:  [id number]       Basic Metabolic Panel [[id number]]  (Abnormal) Collected: 05/05/23 0524    Specimen: Blood Updated: 05/05/23 0606     Glucose 155 mg/dL      BUN 10 mg/dL      Creatinine 0.29 mg/dL      Sodium 142 mmol/L      Potassium 4.6 mmol/L      Comment: Slight hemolysis detected by analyzer. Results may be affected.        Chloride 99 mmol/L      CO2 34.6 mmol/L      Calcium 8.4 mg/dL      BUN/Creatinine Ratio 34.5     Anion Gap 8.4 mmol/L      eGFR 111.0 mL/min/1.73     Narrative:      GFR Normal >60  Chronic Kidney Disease <60  Kidney Failure <15    The GFR formula is only valid for adults with stable renal function between ages 18 and 70.    High Sensitivity Troponin T [[id number]]  (Abnormal) Collected: 05/05/23 0524    Specimen: Blood Updated: 05/05/23 0606     HS Troponin T 25 ng/L     Narrative:      High Sensitive Troponin T Reference Range:  <10.0 ng/L- Negative Female for AMI  <15.0 ng/L- Negative Male for AMI  >=10 - Abnormal Female indicating possible myocardial injury.  >=15 - Abnormal Male indicating possible myocardial injury.   Clinicians would have to utilize clinical acumen, EKG, Troponin, and serial changes to determine if it is an Acute Myocardial Infarction or myocardial injury due to an underlying chronic condition.         Hemoglobin A1c [[id number]]  (Abnormal) Collected: 05/05/23 0524    Specimen: Blood Updated: 05/05/23 0602     Hemoglobin A1C 6.70 %     Narrative:      Hemoglobin A1C Ranges:    Increased Risk for Diabetes  5.7% to 6.4%  Diabetes                     >= 6.5%  Diabetic Goal                < 7.0%    CBC (No Diff) [[id number]]  (Abnormal) Collected: 05/05/23 0525    Specimen: Blood Updated: 05/05/23 0549     WBC 4.57 10*3/mm3      RBC 4.15 10*6/mm3      Hemoglobin 12.1 g/dL      Hematocrit 40.7 %      MCV 98.1 fL      MCH 29.2 pg      MCHC 29.7 g/dL      RDW 14.9 %      RDW-SD 54.3 fl      MPV 10.6 fL       Platelets 115 10*3/mm3     Blood Culture With BERNARD - Blood, Hand, Left [[id number]]  (Normal) Collected: 05/04/23 1500    Specimen: Blood from Hand, Left Updated: 05/05/23 0315     Blood Culture No growth at less than 24 hours    Blood Culture With BERNARD - Blood, Hand, Right [[id number]]  (Normal) Collected: 05/04/23 1500    Specimen: Blood from Hand, Right Updated: 05/05/23 0315     Blood Culture No growth at less than 24 hours    POC Glucose Once [[id number]]  (Abnormal) Collected: 05/04/23 1942    Specimen: Blood Updated: 05/04/23 2011     Glucose 148 mg/dL      Comment: Serial Number: [id number]:  [id number]       POC Glucose Once [[id number]]  (Abnormal) Collected: 05/04/23 1734    Specimen: Blood Updated: 05/04/23 1736     Glucose 149 mg/dL      Comment: Serial Number: [id number]:  [id number]       Urinalysis, Microscopic Only - Indwelling Urethral Catheter [[id number]]  (Abnormal) Collected: 05/04/23 1611    Specimen: Urine from Indwelling Urethral Catheter Updated: 05/04/23 1642     RBC, UA 3-5 /HPF      WBC, UA None Seen /HPF      Comment: Urine culture not indicated.        Bacteria, UA None Seen /HPF      Squamous Epithelial Cells, UA 3-6 /HPF      Hyaline Casts, UA 0-2 /LPF      Mucus, UA Small/1+ /HPF      Methodology Manual Light Microscopy    Urinalysis With Culture If Indicated - Indwelling Urethral Catheter [[id number]]  (Abnormal) Collected: 05/04/23 1611    Specimen: Urine from Indwelling Urethral Catheter Updated: 05/04/23 1630     Color, UA Yellow     Appearance, UA Clear     pH, UA 5.5     Specific Gravity, UA 1.018     Glucose, UA Negative     Ketones, UA Negative     Bilirubin, UA Negative     Blood, UA Small (1+)     Protein, UA 30 mg/dL (1+)     Leuk Esterase, UA Negative     Nitrite, UA Negative     Urobilinogen, UA 1.0 E.U./dL    Narrative:      In absence of clinical symptoms, the presence of pyuria, bacteria, and/or nitrites on the urinalysis result does not correlate with infection.  "   Procalcitonin [[id number]]  (Normal) Collected: 05/04/23 1449    Specimen: Blood Updated: 05/04/23 1622     Procalcitonin 0.14 ng/mL     Narrative:      As a Marker for Sepsis (Non-Neonates):    1. <0.5 ng/mL represents a low risk of severe sepsis and/or septic shock.  2. >2 ng/mL represents a high risk of severe sepsis and/or septic shock.    As a Marker for Lower Respiratory Tract Infections that require antibiotic therapy:    PCT on Admission    Antibiotic Therapy       6-12 Hrs later    >0.5                Strongly Recommended  >0.25 - <0.5        Recommended   0.1 - 0.25          Discouraged              Remeasure/reassess PCT  <0.1                Strongly Discouraged     Remeasure/reassess PCT    As 28 day mortality risk marker: \"Change in Procalcitonin Result\" (>80% or <=80%) if Day 0 (or Day 1) and Day 4 values are available. Refer to http://www.[employer]-calculator.com    Change in PCT <=80%  A decrease of PCT levels below or equal to 80% defines a positive change in PCT test result representing a higher risk for 28-day all-cause mortality of patients diagnosed with severe sepsis for septic shock.    Change in PCT >80%  A decrease of PCT levels of more than 80% defines a negative change in PCT result representing a lower risk for 28-day all-cause mortality of patients diagnosed with severe sepsis or septic shock.       [place] Draw [[id number]] Collected: 05/04/23 1447    Specimen: Blood Updated: 05/04/23 1600    Narrative:      The following orders were created for panel order [place] Draw.  Procedure                               Abnormality         Status                     ---------                               -----------         ------                     Green Top (Gel)[[id number]]                                  Final result               Lavender Top[[id number]]                                     Final result               Gold Top - SST[[id number]]                                   Final result "               Light Blue Top[[id number]]                                   Final result                 Please view results for these tests on the individual orders.    Green Top (Gel) [[id number]] Collected: 05/04/23 1447    Specimen: Blood Updated: 05/04/23 1600     Extra Tube Hold for add-ons.     Comment: Auto resulted.       Lavender Top [[id number]] Collected: 05/04/23 1447    Specimen: Blood Updated: 05/04/23 1600     Extra Tube hold for add-on     Comment: Auto resulted       Light Blue Top [[id number]] Collected: 05/04/23 1447    Specimen: Blood Updated: 05/04/23 1600     Extra Tube Hold for add-ons.     Comment: Auto resulted       Gold Top - SST [[id number]] Collected: 05/04/23 1447    Specimen: Blood Updated: 05/04/23 1600     Extra Tube Hold for add-ons.     Comment: Auto resulted.       Respiratory Panel PCR w/COVID-19(SARS-CoV-2) SUZY/HORTENSIA/JULIO/PAD/COR/MAD/PETER In-House, NP Swab in UTM/VTM, 3-4 HR TAT - Swab, Nasopharynx [[id number]]  (Abnormal) Collected: 05/04/23 1449    Specimen: Swab from Nasopharynx Updated: 05/04/23 1542     ADENOVIRUS, PCR Not Detected     Coronavirus 229E Not Detected     Coronavirus HKU1 Not Detected     Coronavirus NL63 Not Detected     Coronavirus OC43 Not Detected     COVID19 Not Detected     Human Metapneumovirus Not Detected     Human Rhinovirus/Enterovirus Not Detected     Influenza A PCR Not Detected     Influenza B PCR Not Detected     Parainfluenza Virus 1 Not Detected     Parainfluenza Virus 2 Not Detected     Parainfluenza Virus 3 Detected     Parainfluenza Virus 4 Not Detected     RSV, PCR Not Detected     Bordetella pertussis pcr Not Detected     Bordetella parapertussis PCR Not Detected     Chlamydophila pneumoniae PCR Not Detected     Mycoplasma pneumo by PCR Not Detected    Narrative:      In the setting of a positive respiratory panel with a viral infection PLUS a negative procalcitonin without other underlying concern for bacterial infection, consider observing off  antibiotics or discontinuation of antibiotics and continue supportive care. If the respiratory panel is positive for atypical bacterial infection (Bordetella pertussis, Chlamydophila pneumoniae, or Mycoplasma pneumoniae), consider antibiotic de-escalation to target atypical bacterial infection.    Comprehensive Metabolic Panel [[id number]]  (Abnormal) Collected: 05/04/23 1447    Specimen: Blood Updated: 05/04/23 1518     Glucose 192 mg/dL      Comment: Glucose >180, Hemoglobin A1C recommended.        BUN 10 mg/dL      Creatinine 0.38 mg/dL      Sodium 140 mmol/L      Potassium 4.4 mmol/L      Chloride 95 mmol/L      CO2 40.2 mmol/L      Calcium 9.1 mg/dL      Total Protein 6.9 g/dL      Albumin 3.8 g/dL      ALT (SGPT) 30 U/L      AST (SGOT) 23 U/L      Alkaline Phosphatase 87 U/L      Total Bilirubin 0.4 mg/dL      Globulin 3.1 gm/dL      A/G Ratio 1.2 g/dL      BUN/Creatinine Ratio 26.3     Anion Gap 4.8 mmol/L      eGFR 104.0 mL/min/1.73     Narrative:      GFR Normal >60  Chronic Kidney Disease <60  Kidney Failure <15    The GFR formula is only valid for adults with stable renal function between ages 18 and 70.    Single High Sensitivity Troponin T [[id number]]  (Abnormal) Collected: 05/04/23 1447    Specimen: Blood Updated: 05/04/23 1518     HS Troponin T 44 ng/L     Narrative:      High Sensitive Troponin T Reference Range:  <10.0 ng/L- Negative Female for AMI  <15.0 ng/L- Negative Male for AMI  >=10 - Abnormal Female indicating possible myocardial injury.  >=15 - Abnormal Male indicating possible myocardial injury.   Clinicians would have to utilize clinical acumen, EKG, Troponin, and serial changes to determine if it is an Acute Myocardial Infarction or myocardial injury due to an underlying chronic condition.         BNP [[id number]]  (Normal) Collected: 05/04/23 1447    Specimen: Blood Updated: 05/04/23 1516     proBNP 234.4 pg/mL     Narrative:      Among patients with dyspnea, NT-proBNP is highly sensitive  for the detection of acute congestive heart failure. In addition NT-proBNP of <300 pg/ml effectively rules out acute congestive heart failure with 99% negative predictive value.      CBC & Differential [[id number]]  (Abnormal) Collected: 05/04/23 1447    Specimen: Blood Updated: 05/04/23 1515    Narrative:      The following orders were created for panel order CBC & Differential.  Procedure                               Abnormality         Status                     ---------                               -----------         ------                     CBC Auto Differential[[id number]]        Abnormal            Final result               Scan Slide[[id number]]                                       Final result                 Please view results for these tests on the individual orders.    CBC Auto Differential [[id number]]  (Abnormal) Collected: 05/04/23 1447    Specimen: Blood Updated: 05/04/23 1515     WBC 8.04 10*3/mm3      RBC 4.60 10*6/mm3      Hemoglobin 13.3 g/dL      Hematocrit 44.5 %      MCV 96.7 fL      MCH 28.9 pg      MCHC 29.9 g/dL      RDW 14.8 %      RDW-SD 52.8 fl      MPV 10.5 fL      Platelets 149 10*3/mm3      Neutrophil % 91.6 %      Lymphocyte % 3.6 %      Monocyte % 4.1 %      Eosinophil % 0.0 %      Basophil % 0.2 %      Immature Grans % 0.5 %      Neutrophils, Absolute 7.36 10*3/mm3      Lymphocytes, Absolute 0.29 10*3/mm3      Monocytes, Absolute 0.33 10*3/mm3      Eosinophils, Absolute 0.00 10*3/mm3      Basophils, Absolute 0.02 10*3/mm3      Immature Grans, Absolute 0.04 10*3/mm3      nRBC 0.0 /100 WBC     Scan Slide [[id number]] Collected: 05/04/23 1447    Specimen: Blood Updated: 05/04/23 1515     Hypochromia Mod/2+     WBC Morphology Normal     Platelet Estimate Adequate    Lactic Acid, Plasma [[id number]]  (Normal) Collected: 05/04/23 1449    Specimen: Blood Updated: 05/04/23 1510     Lactate 1.3 mmol/L     Blood Gas, Arterial With Co-Ox [[id number]]  (Abnormal) Collected: 05/04/23 1448     Specimen: Arterial Blood Updated: 05/04/23 1448     Site Left Radial     Partha's Test Positive     pH, Arterial 7.296 pH units      Comment: 84 Value below reference range        pCO2, Arterial 87.4 mm Hg      Comment: 86 Value above critical limit        pO2, Arterial 94.2 mm Hg      HCO3, Arterial 42.6 mmol/L      Comment: 83 Value above reference range        Base Excess, Arterial 12.1 mmol/L      Comment: 83 Value above reference range        O2 Saturation, Arterial 97.3 %      Hematocrit, Blood Gas 42.1 %      Oxyhemoglobin 95.8 %      Methemoglobin 0.60 %      Carboxyhemoglobin 0.9 %      A-a DO2 84.8 mmHg      Barometric Pressure for Blood Gas 735 mmHg      Modality Nasal Cannula     FIO2 40 %      Flow Rate 5.0 lpm      Ventilator Mode NA     Note --     Notified Who Dr Jordan     Notified By [id number]     Notified Time 05/04/2023 14:49     Collected by sb     Comment: Meter: [id number]     :  [id number]        pH, Temp Corrected --     pCO2, Temperature Corrected --     pO2, Temperature Corrected --        Imaging Results (Last 24 Hours)     Procedure Component Value Units Date/Time    XR Chest 1 View [[id number]] Collected: 05/04/23 1528     Updated: 05/04/23 1534    Narrative:      PROCEDURE: XR CHEST 1 VW-     HISTORY: SOA Triage Protocol     COMPARISON: 04/18/2023.     FINDINGS: The heart is normal in size. Mild interstitial disease  bilaterally is similar to prior. No area of consolidation is seen.  Aortic mural calcifications noted.. The mediastinum is unremarkable.  There is no pneumothorax.  There are no acute osseous abnormalities.       Impression:      Stable chest..           This report was signed and finalized on 5/4/2023 3:31 PM by Shraddha Wharton MD.        Ventilator/Non-Invasive Ventilation Settings (From admission, onward)     Start     Ordered    05/04/23 1840  NIPPV-IPPV / BIPAP / CPAP  Until Discontinued        Question:  Type:  Answer:  AutoBIPAP    05/04/23 0139    05/04/23 1049   NIPPV (CPAP or BIPAP)  Until Discontinued        Question Answer Comment   Indication: Acute Respiratory Failure    Type: BIPAP        05/04/23 1554

## 2023-05-05 NOTE — NURSING NOTE
Seen for wound consult. Agreeable and coooperative with assessment. Oneyda RN and Rachel RN assisted with assessment and care. Intergluteal cleft with intertrigo that has improved since picture placed in chart, less redness noted. Does currently have a serrano in place. Area with red macular rash and denuded skin, 2 spots mirror each other, 3rd open area distal on left inner fold and a scabbed area to left buttock. Area cleansed and barrier cream applied.   Recommendations for care include a turning schedule if patient turns herself chart her position, barrier cream twice daily and prn after cleansing, float heels off bed with pillows, encourage increase mobility as appropriate and tolerated to reduce pressure, for dry skin apply lotion/cream and a nutrition consult for dietary needs. Staff to contact provider and re-consult wound nurse for new skin issues or lack of improvement with current recommendations. Thank you for the consult. If you have questions or concerns do not hesitate to contact me.

## 2023-05-05 NOTE — PLAN OF CARE
Goal Outcome Evaluation:  Plan of Care Reviewed With: patient, daughter        Progress: no change  Outcome Evaluation: no acute events during shift. elevated temperature, tylenol given and ice packs applied. ST on tele. BIPAP. no other changes in pt condition. will continue to monitor.

## 2023-05-06 ENCOUNTER — APPOINTMENT (OUTPATIENT)
Dept: GENERAL RADIOLOGY | Facility: HOSPITAL | Age: 78
DRG: 190 | End: 2023-05-06
Payer: MEDICARE

## 2023-05-06 LAB
A-A DO2: ABNORMAL
ANION GAP SERPL CALCULATED.3IONS-SCNC: 3.9 MMOL/L (ref 5–15)
ARTERIAL PATENCY WRIST A: POSITIVE
ATMOSPHERIC PRESS: 737 MMHG
BASE EXCESS BLDA CALC-SCNC: 14.4 MMOL/L (ref 0–2)
BASOPHILS # BLD AUTO: 0.02 10*3/MM3 (ref 0–0.2)
BASOPHILS NFR BLD AUTO: 0.2 % (ref 0–1.5)
BDY SITE: ABNORMAL
BUN SERPL-MCNC: 19 MG/DL (ref 8–23)
BUN/CREAT SERPL: 67.9 (ref 7–25)
CALCIUM SPEC-SCNC: 9 MG/DL (ref 8.6–10.5)
CHLORIDE SERPL-SCNC: 100 MMOL/L (ref 98–107)
CO2 SERPL-SCNC: 40.1 MMOL/L (ref 22–29)
COHGB MFR BLD: 0.7 % (ref 0–2)
CREAT SERPL-MCNC: 0.28 MG/DL (ref 0.57–1)
DEPRECATED RDW RBC AUTO: 54.5 FL (ref 37–54)
EGFRCR SERPLBLD CKD-EPI 2021: 111.9 ML/MIN/1.73
EOSINOPHIL # BLD AUTO: 0 10*3/MM3 (ref 0–0.4)
EOSINOPHIL NFR BLD AUTO: 0 % (ref 0.3–6.2)
ERYTHROCYTE [DISTWIDTH] IN BLOOD BY AUTOMATED COUNT: 14.7 % (ref 12.3–15.4)
GAS FLOW AIRWAY: 6 LPM
GLUCOSE BLDC GLUCOMTR-MCNC: 147 MG/DL (ref 70–130)
GLUCOSE BLDC GLUCOMTR-MCNC: 162 MG/DL (ref 70–130)
GLUCOSE BLDC GLUCOMTR-MCNC: 179 MG/DL (ref 70–130)
GLUCOSE BLDC GLUCOMTR-MCNC: 192 MG/DL (ref 70–130)
GLUCOSE SERPL-MCNC: 178 MG/DL (ref 65–99)
HCO3 BLDA-SCNC: 43.7 MMOL/L (ref 22–28)
HCT VFR BLD AUTO: 41.3 % (ref 34–46.6)
HCT VFR BLD CALC: 37.4 %
HGB BLD-MCNC: 12.5 G/DL (ref 12–15.9)
IMM GRANULOCYTES # BLD AUTO: 0.04 10*3/MM3 (ref 0–0.05)
IMM GRANULOCYTES NFR BLD AUTO: 0.4 % (ref 0–0.5)
LYMPHOCYTES # BLD AUTO: 0.62 10*3/MM3 (ref 0.7–3.1)
LYMPHOCYTES NFR BLD AUTO: 5.9 % (ref 19.6–45.3)
Lab: ABNORMAL
Lab: ABNORMAL
MCH RBC QN AUTO: 30.2 PG (ref 26.6–33)
MCHC RBC AUTO-ENTMCNC: 30.3 G/DL (ref 31.5–35.7)
MCV RBC AUTO: 99.8 FL (ref 79–97)
METHGB BLD QL: 0.6 % (ref 0–1.5)
MODALITY: ABNORMAL
MONOCYTES # BLD AUTO: 0.5 10*3/MM3 (ref 0.1–0.9)
MONOCYTES NFR BLD AUTO: 4.8 % (ref 5–12)
NEUTROPHILS NFR BLD AUTO: 88.7 % (ref 42.7–76)
NEUTROPHILS NFR BLD AUTO: 9.28 10*3/MM3 (ref 1.7–7)
NOTE: ABNORMAL
NOTIFIED BY: ABNORMAL
NOTIFIED WHO: ABNORMAL
NRBC BLD AUTO-RTO: 0 /100 WBC (ref 0–0.2)
OXYHGB MFR BLDV: 96.8 % (ref 94–99)
PCO2 BLDA: 81.4 MM HG (ref 35–45)
PCO2 TEMP ADJ BLD: ABNORMAL MM[HG]
PH BLDA: 7.34 PH UNITS (ref 7.35–7.45)
PH, TEMP CORRECTED: ABNORMAL
PLATELET # BLD AUTO: 140 10*3/MM3 (ref 140–450)
PMV BLD AUTO: 11 FL (ref 6–12)
PO2 BLDA: 97.2 MM HG (ref 75–100)
PO2 TEMP ADJ BLD: ABNORMAL MM[HG]
POTASSIUM SERPL-SCNC: 4.9 MMOL/L (ref 3.5–5.2)
RBC # BLD AUTO: 4.14 10*6/MM3 (ref 3.77–5.28)
SAO2 % BLDCOA: 98.1 % (ref 94–100)
SODIUM SERPL-SCNC: 144 MMOL/L (ref 136–145)
VENTILATOR MODE: ABNORMAL
WBC NRBC COR # BLD: 10.46 10*3/MM3 (ref 3.4–10.8)

## 2023-05-06 PROCEDURE — 94761 N-INVAS EAR/PLS OXIMETRY MLT: CPT

## 2023-05-06 PROCEDURE — 82948 REAGENT STRIP/BLOOD GLUCOSE: CPT

## 2023-05-06 PROCEDURE — 25010000002 ENOXAPARIN PER 10 MG: Performed by: FAMILY MEDICINE

## 2023-05-06 PROCEDURE — 82805 BLOOD GASES W/O2 SATURATION: CPT

## 2023-05-06 PROCEDURE — 80048 BASIC METABOLIC PNL TOTAL CA: CPT | Performed by: FAMILY MEDICINE

## 2023-05-06 PROCEDURE — 71045 X-RAY EXAM CHEST 1 VIEW: CPT

## 2023-05-06 PROCEDURE — 85025 COMPLETE CBC W/AUTO DIFF WBC: CPT | Performed by: FAMILY MEDICINE

## 2023-05-06 PROCEDURE — 94799 UNLISTED PULMONARY SVC/PX: CPT

## 2023-05-06 PROCEDURE — 36600 WITHDRAWAL OF ARTERIAL BLOOD: CPT

## 2023-05-06 PROCEDURE — 94660 CPAP INITIATION&MGMT: CPT

## 2023-05-06 PROCEDURE — 83050 HGB METHEMOGLOBIN QUAN: CPT

## 2023-05-06 PROCEDURE — 99232 SBSQ HOSP IP/OBS MODERATE 35: CPT | Performed by: FAMILY MEDICINE

## 2023-05-06 PROCEDURE — 82375 ASSAY CARBOXYHB QUANT: CPT

## 2023-05-06 PROCEDURE — 63710000001 INSULIN ASPART PER 5 UNITS: Performed by: FAMILY MEDICINE

## 2023-05-06 PROCEDURE — 94664 DEMO&/EVAL PT USE INHALER: CPT

## 2023-05-06 PROCEDURE — 25010000002 METHYLPREDNISOLONE PER 125 MG: Performed by: FAMILY MEDICINE

## 2023-05-06 RX ORDER — METHYLPREDNISOLONE SODIUM SUCCINATE 125 MG/2ML
60 INJECTION, POWDER, LYOPHILIZED, FOR SOLUTION INTRAMUSCULAR; INTRAVENOUS EVERY 12 HOURS
Status: DISCONTINUED | OUTPATIENT
Start: 2023-05-07 | End: 2023-05-07

## 2023-05-06 RX ADMIN — TIOTROPIUM BROMIDE INHALATION SPRAY 2 PUFF: 3.12 SPRAY, METERED RESPIRATORY (INHALATION) at 06:59

## 2023-05-06 RX ADMIN — GUAIFENESIN 600 MG: 600 TABLET, EXTENDED RELEASE ORAL at 08:12

## 2023-05-06 RX ADMIN — Medication 5 MG: at 21:16

## 2023-05-06 RX ADMIN — IPRATROPIUM BROMIDE AND ALBUTEROL SULFATE 3 ML: 2.5; .5 SOLUTION RESPIRATORY (INHALATION) at 06:59

## 2023-05-06 RX ADMIN — Medication 3 ML: at 08:17

## 2023-05-06 RX ADMIN — METHYLPREDNISOLONE SODIUM SUCCINATE 60 MG: 125 INJECTION, POWDER, FOR SOLUTION INTRAMUSCULAR; INTRAVENOUS at 14:29

## 2023-05-06 RX ADMIN — MULTIPLE VITAMINS W/ MINERALS TAB 1 TABLET: TAB at 08:12

## 2023-05-06 RX ADMIN — METHYLPREDNISOLONE SODIUM SUCCINATE 60 MG: 125 INJECTION, POWDER, FOR SOLUTION INTRAMUSCULAR; INTRAVENOUS at 06:50

## 2023-05-06 RX ADMIN — GUAIFENESIN 600 MG: 600 TABLET, EXTENDED RELEASE ORAL at 21:16

## 2023-05-06 RX ADMIN — IPRATROPIUM BROMIDE AND ALBUTEROL SULFATE 3 ML: 2.5; .5 SOLUTION RESPIRATORY (INHALATION) at 13:00

## 2023-05-06 RX ADMIN — ACETAMINOPHEN 650 MG: 325 TABLET, FILM COATED ORAL at 02:55

## 2023-05-06 RX ADMIN — AMLODIPINE BESYLATE 10 MG: 5 TABLET ORAL at 08:12

## 2023-05-06 RX ADMIN — INSULIN ASPART 2 UNITS: 100 INJECTION, SOLUTION INTRAVENOUS; SUBCUTANEOUS at 16:50

## 2023-05-06 RX ADMIN — Medication 3 ML: at 21:18

## 2023-05-06 RX ADMIN — ATENOLOL 25 MG: 25 TABLET ORAL at 08:14

## 2023-05-06 RX ADMIN — BUDESONIDE AND FORMOTEROL FUMARATE DIHYDRATE 2 PUFF: 160; 4.5 AEROSOL RESPIRATORY (INHALATION) at 06:59

## 2023-05-06 RX ADMIN — ENOXAPARIN SODIUM 40 MG: 100 INJECTION SUBCUTANEOUS at 20:00

## 2023-05-06 RX ADMIN — INSULIN ASPART 2 UNITS: 100 INJECTION, SOLUTION INTRAVENOUS; SUBCUTANEOUS at 06:50

## 2023-05-06 RX ADMIN — ACETAMINOPHEN 650 MG: 325 TABLET, FILM COATED ORAL at 21:16

## 2023-05-06 RX ADMIN — SENNOSIDES AND DOCUSATE SODIUM 2 TABLET: 50; 8.6 TABLET ORAL at 08:14

## 2023-05-06 NOTE — PROGRESS NOTES
[facility]    Name:  Carolin Toscano   Age:  77 y.o.  Sex:  female  :  1945  MRN:  [id number]   Visit Number:  [id number]  Admission Date:  2023  Date Of Service:  23  Primary Care Physician:  Jason Nicholson MD     LOS: 2 days :    Chief Complaint:      Shortness of breath    Subjective:    Patient was seen and examined at bedside.  Patient laying in bed comfortably, appears to be improving clinically today, was on BiPAP, mask was removed and I spoke with the patient.  Patient is AAOx3.  Patient denies any chest pain or discomfort.  Patient reports improvement on her shortness of breath and wheezing.  Continues to have intermittent cough but getting better.  Patient trying to be compliant with BiPAP as much as she can.  Discussed management and plan.  Patient is agreeable and verbalized understanding.  Vitals are stable, has been on 6 L per nasal cannula when off BiPAP which is near her baseline oxygen requirement.    Hospital Course:    Patient is a 77 years old female with a past medical history of COPD, chronic respiratory failure on 5 to 6 L per her previous discharge, on NIV machine at home and trelegy, hypertension, and ongoing tobacco use who presented to the ER from home by EMS with a chief complaint of shortness of breath.  Patient reporting that she started having shortness of breath associated with productive cough since last night and has persisted and became worse that promoted her to call EMS.  EMS has found the patient to be at 65% saturation on her normal 6 L of oxygen.  She received a breathing treatment in route.  Patient was recently admitted to the hospital on  and discharged on 2023 with similar clinical picture of Respiratory failure, COPD exacerbation and pneumonia.  Patient was discharged on 6 L previously.  Patient reports compliance with her NIV machine at home.  Denies any sick contacts that she is aware of.   Patient denies any chest pain or abdominal pain.  Denies any other complaints.     On ER evaluation, Patient was found to be tachycardic with a heart rate of 130s, temperature of 100.2 and respirations of 30, /72.  Patient was placed on BiPAP with FiO2 of 40%.  Her ABG came back and showed pH of 7.296/PCO2 87/PO2 94/HCO3 42/saturation 97% on 5 L nasal cannula.  HS Troponin 44, proBNP WNL, glucose 192, CO2 of 40, otherwise CBC and CMP within acceptable range.  Lactate and Pro-Adi WNL.  Respiratory panel positive for parainfluenza virus.  Chest x-ray Mild interstitial disease  bilaterally is similar to prior. No area of consolidation is seen.  Urinalysis negative for nitrates, leukocytes, WBC or bacteria with squamous epithelial cells.  Patient received Solu-Medrol, normal saline bolus of 2124 mL, magnesium and Aztreonam while in the ER.  Hospitalist consulted for admission, further evaluation and treatment.    Review of Systems:     All systems were reviewed and negative except as mentioned in subjective, assessment and plan.    Vital Signs:    Temp:  [97.8 °F (36.6 °C)-98.7 °F (37.1 °C)] 97.8 °F (36.6 °C)  Heart Rate:  [67-99] 84  Resp:  [20-27] 20  BP: (109-147)/(52-89) 147/66    Intake and output:    I/O last 3 completed shifts:  In: 840 [P.O.:840]  Out: 1175 [Urine:1175]  I/O this shift:  In: 120 [P.O.:120]  Out: -     Physical Examination:    General Appearance:  Alert and cooperative.  Chronically ill-appearing.   Head:  Atraumatic and normocephalic.   Eyes: Conjunctivae and sclerae normal, no icterus. No pallor.   Throat: No oral lesions, no thrush, oral mucosa moist.   Neck: Supple, trachea midline, no thyromegaly.   Lungs:   Breath sounds heard bilaterally equally.  Mildly labored, on BiPAP.  Diffuse bilateral wheezing heard.  Decreased air movement bilaterally.    Heart:  Normal S1 and S2, no murmur, no gallop, no rub. No JVD.   Abdomen:   Normal bowel sounds, no masses, no organomegaly. Soft,  nontender, nondistended, no rebound tenderness.   Extremities: Supple, no edema, no cyanosis, no clubbing.   Skin: No bleeding or rash.   Neurologic: Alert and oriented x 3. No facial asymmetry. Moves all four limbs. No tremors.      Laboratory results:    Results from last 7 days   Lab Units 05/06/23  0611 05/05/23  0524 05/04/23  1447   SODIUM mmol/L 144 142 140   POTASSIUM mmol/L 4.9 4.6 4.4   CHLORIDE mmol/L 100 99 95*   CO2 mmol/L 40.1* 34.6* 40.2*   BUN mg/dL 19 10 10   CREATININE mg/dL 0.28* 0.29* 0.38*   CALCIUM mg/dL 9.0 8.4* 9.1   BILIRUBIN mg/dL  --   --  0.4   ALK PHOS U/L  --   --  87   ALT (SGPT) U/L  --   --  30   AST (SGOT) U/L  --   --  23   GLUCOSE mg/dL 178* 155* 192*     Results from last 7 days   Lab Units 05/06/23  0611 05/05/23  0525 05/04/23  1447   WBC 10*3/mm3 10.46 4.57 8.04   HEMOGLOBIN g/dL 12.5 12.1 13.3   HEMATOCRIT % 41.3 40.7 44.5   PLATELETS 10*3/mm3 140 115* 149         Results from last 7 days   Lab Units 05/05/23  0725 05/05/23  0524 05/04/23  1447   HSTROP T ng/L 22* 25* 44*     Results from last 7 days   Lab Units 05/04/23  1500   BLOODCX  No growth at 24 hours  No growth at 24 hours     Recent Labs     05/05/23  0932 05/05/23 2009 05/06/23  0521   PHART 7.286* 7.369 7.339*   YVV5YBO 85.2* 75.6* 81.4*   PO2ART 85.4 77.8 97.2   OFU4SXB 40.6* 43.6* 43.7*   BASEEXCESS 10.5* 14.8* 14.4*      I have reviewed the patient's laboratory results.    Radiology results:    XR Chest 1 View    Result Date: 5/6/2023  PROCEDURE: XR CHEST 1 VW-  HISTORY: Respiratory failure follow-up; J96.21-Acute and chronic respiratory failure with hypoxia; J96.22-Acute and chronic respiratory failure with hypercapnia; J44.1-Chronic obstructive pulmonary disease with (acute) exacerbation; B34.8-Other viral infections of unspecified site  COMPARISON: May 4, 2023.  FINDINGS: The heart is upper limits of normal in size. Aortic mural calcifications noted.. There is mild bilateral interstitial disease, stable  from prior exam. Slight blunting of the left costophrenic angle is stable.. The mediastinum is unremarkable. There is no pneumothorax. There are no acute osseous abnormalities.      Impression: Stable chest..    This report was signed and finalized on 5/6/2023 9:23 AM by Shraddha Wharton MD.    XR Chest 1 View    Result Date: 5/4/2023  PROCEDURE: XR CHEST 1 VW-  HISTORY: SOA Triage Protocol  COMPARISON: 04/18/2023.  FINDINGS: The heart is normal in size. Mild interstitial disease bilaterally is similar to prior. No area of consolidation is seen. Aortic mural calcifications noted.. The mediastinum is unremarkable. There is no pneumothorax.  There are no acute osseous abnormalities.      Impression: Stable chest..    This report was signed and finalized on 5/4/2023 3:31 PM by Shraddha Wharton MD.    I have reviewed the patient's radiology reports.    Medication Review:     I have reviewed the patient's active and prn medications.     Problem List:      Acute on chronic respiratory failure with hypoxia and hypercapnia      Assessment:    Acute on chronic respiratory failure with hypoxia and hypercapnia, likely secondary to #2 and 3, POA  Acute COPD exacerbation, POA  Parainfluenza infection, POA  Elevated troponin, likely secondary to demand ischemia, POA  Hypertension  Chronic tobacco abuse  Pulmonary hypertension  Arthritis    Plan:    Respiratory failure with hypoxia/hypercapnia, COPD exacerbation, parainfluenza  -Continue supplemental oxygen to keep saturation above 90%, patient on 5 to 6 L at baseline, continue to titrate down as able to.  Continue BiPAP therapy.  -Patient met SIRS criteria on arrival, likely secondary to influenza infection and respiratory failure. Received Aztreonam and sepsis protocol IV fluids boluses.  -Procalcitonin WNL, WBC WNL, bacterial infection less likely, will hold off on antibiotics for now.  -On Trelegy at home, Spiriva and budesonide while here.  - Solu-Medrol, will taper to 60 mg every 12  hours, continue to taper down as she improves.   -Bronchodilators, Mucinex and supportive care.  -Patient was seen by pulmonology on previous admission, will need close follow-up with pulmonology on discharge.  -Repeat ABG and chest x-ray as indicated.  -Patient appears to be improving clinically, will discontinue Macias catheter.     Elevated troponin  -Likely secondary to demand ischemia in settings of respiratory failure  -Patient denies any chest pain, low suspicion for ACS  -Troponin trended down and plateaued.     Hyperglycemia  -Will cover with sliding scale insulin while on steroids  -Hemoglobin A1c 6.7     Further orders as indicated per clinical course.    DVT Prophylaxis: Lovenox prophylaxis (benefit> risk)  Code Status: Full  Diet: Cardiac  Discharge Plan: To be determined, likely needs 2 to 3 days in the hospital.    Lolis Vital MD  05/06/23  10:06 EDT    Dictated utilizing Dragon dictation.

## 2023-05-06 NOTE — PLAN OF CARE
Goal Outcome Evaluation:  Plan of Care Reviewed With: patient        Progress: improving  Outcome Evaluation: no acute events during shift. VSS, SR on tele. 6L HFNC. pt wore BIPAP at beginning of shift. no other changes in pt condition. will continue to monitor.

## 2023-05-06 NOTE — PLAN OF CARE
Problem: Skin Injury Risk Increased  Goal: Skin Health and Integrity  Outcome: Ongoing, Progressing  Intervention: Optimize Skin Protection  Recent Flowsheet Documentation  Taken 5/6/2023 1000 by Pricila Fisher RN  Head of Bed (HOB) Positioning: HOB at 20 degrees  Taken 5/6/2023 0800 by Pricila Fisher RN  Pressure Reduction Techniques: frequent weight shift encouraged  Head of Bed (HOB) Positioning: HOB at 20-30 degrees  Pressure Reduction Devices: specialty bed utilized  Skin Protection: adhesive use limited     Problem: Fall Injury Risk  Goal: Absence of Fall and Fall-Related Injury  Outcome: Ongoing, Progressing  Intervention: Promote Injury-Free Environment  Recent Flowsheet Documentation  Taken 5/6/2023 1000 by Pricila Fisher RN  Safety Promotion/Fall Prevention: safety round/check completed  Taken 5/6/2023 0800 by Pricila Fisher RN  Safety Promotion/Fall Prevention: safety round/check completed     Problem: Adult Inpatient Plan of Care  Goal: Plan of Care Review  Outcome: Ongoing, Progressing  Goal: Patient-Specific Goal (Individualized)  Outcome: Ongoing, Progressing  Goal: Absence of Hospital-Acquired Illness or Injury  Outcome: Ongoing, Progressing  Intervention: Identify and Manage Fall Risk  Recent Flowsheet Documentation  Taken 5/6/2023 1000 by Pricila Fisher RN  Safety Promotion/Fall Prevention: safety round/check completed  Taken 5/6/2023 0800 by Pricila Fisher RN  Safety Promotion/Fall Prevention: safety round/check completed  Intervention: Prevent Skin Injury  Recent Flowsheet Documentation  Taken 5/6/2023 1000 by Pricila Fisher RN  Body Position:   side-lying   left  Taken 5/6/2023 0800 by Pricila Fisher RN  Body Position: sitting up in bed  Skin Protection: adhesive use limited  Intervention: Prevent and Manage VTE (Venous Thromboembolism) Risk  Recent Flowsheet Documentation  Taken 5/6/2023 1000 by Pricila Fisher RN  Activity Management: activity encouraged  Taken 5/6/2023 0800 by  Pricila Fisher, RN  Activity Management: activity encouraged  Intervention: Prevent Infection  Recent Flowsheet Documentation  Taken 5/6/2023 1000 by Pricila Fisher, RN  Infection Prevention: environmental surveillance performed  Taken 5/6/2023 0800 by Pricila Fisher, RN  Infection Prevention: environmental surveillance performed  Goal: Optimal Comfort and Wellbeing  Outcome: Ongoing, Progressing  Goal: Readiness for Transition of Care  Outcome: Ongoing, Progressing     Problem: COPD (Chronic Obstructive Pulmonary Disease) Comorbidity  Goal: Maintenance of COPD Symptom Control  Outcome: Ongoing, Progressing     Problem: Diabetes Comorbidity  Goal: Blood Glucose Level Within Targeted Range  Outcome: Ongoing, Progressing     Problem: Hypertension Comorbidity  Goal: Blood Pressure in Desired Range  Outcome: Ongoing, Progressing     Problem: Noninvasive Ventilation Acute  Goal: Effective Unassisted Ventilation and Oxygenation  Outcome: Ongoing, Progressing   Goal Outcome Evaluation:

## 2023-05-07 LAB
A-A DO2: ABNORMAL
ANION GAP SERPL CALCULATED.3IONS-SCNC: 5.7 MMOL/L (ref 5–15)
ARTERIAL PATENCY WRIST A: POSITIVE
ATMOSPHERIC PRESS: 735 MMHG
BASE EXCESS BLDA CALC-SCNC: 16 MMOL/L (ref 0–2)
BDY SITE: ABNORMAL
BUN SERPL-MCNC: 16 MG/DL (ref 8–23)
BUN/CREAT SERPL: 47.1 (ref 7–25)
CALCIUM SPEC-SCNC: 9.2 MG/DL (ref 8.6–10.5)
CHLORIDE SERPL-SCNC: 98 MMOL/L (ref 98–107)
CO2 SERPL-SCNC: 40.3 MMOL/L (ref 22–29)
COHGB MFR BLD: 0.6 % (ref 0–2)
CREAT SERPL-MCNC: 0.34 MG/DL (ref 0.57–1)
DEPRECATED RDW RBC AUTO: 54 FL (ref 37–54)
EGFRCR SERPLBLD CKD-EPI 2021: 106.8 ML/MIN/1.73
ERYTHROCYTE [DISTWIDTH] IN BLOOD BY AUTOMATED COUNT: 14.8 % (ref 12.3–15.4)
GAS FLOW AIRWAY: 6 LPM
GLUCOSE BLDC GLUCOMTR-MCNC: 132 MG/DL (ref 70–130)
GLUCOSE BLDC GLUCOMTR-MCNC: 146 MG/DL (ref 70–130)
GLUCOSE BLDC GLUCOMTR-MCNC: 169 MG/DL (ref 70–130)
GLUCOSE BLDC GLUCOMTR-MCNC: 197 MG/DL (ref 70–130)
GLUCOSE SERPL-MCNC: 195 MG/DL (ref 65–99)
HCO3 BLDA-SCNC: 44.8 MMOL/L (ref 22–28)
HCT VFR BLD AUTO: 42.4 % (ref 34–46.6)
HCT VFR BLD CALC: 40.1 %
HGB BLD-MCNC: 12.6 G/DL (ref 12–15.9)
Lab: ABNORMAL
Lab: ABNORMAL
MCH RBC QN AUTO: 29.2 PG (ref 26.6–33)
MCHC RBC AUTO-ENTMCNC: 29.7 G/DL (ref 31.5–35.7)
MCV RBC AUTO: 98.1 FL (ref 79–97)
METHGB BLD QL: 0.4 % (ref 0–1.5)
MODALITY: ABNORMAL
NOTE: ABNORMAL
NOTIFIED BY: ABNORMAL
NOTIFIED WHO: ABNORMAL
OXYHGB MFR BLDV: 93.9 % (ref 94–99)
PCO2 BLDA: 74.8 MM HG (ref 35–45)
PCO2 TEMP ADJ BLD: ABNORMAL MM[HG]
PH BLDA: 7.39 PH UNITS (ref 7.35–7.45)
PH, TEMP CORRECTED: ABNORMAL
PLATELET # BLD AUTO: 138 10*3/MM3 (ref 140–450)
PMV BLD AUTO: 10.8 FL (ref 6–12)
PO2 BLDA: 69.7 MM HG (ref 75–100)
PO2 TEMP ADJ BLD: ABNORMAL MM[HG]
POTASSIUM SERPL-SCNC: 4.4 MMOL/L (ref 3.5–5.2)
PROCALCITONIN SERPL-MCNC: 0.14 NG/ML (ref 0–0.25)
RBC # BLD AUTO: 4.32 10*6/MM3 (ref 3.77–5.28)
SAO2 % BLDCOA: 94.8 % (ref 94–100)
SODIUM SERPL-SCNC: 144 MMOL/L (ref 136–145)
VENTILATOR MODE: ABNORMAL
WBC NRBC COR # BLD: 10.6 10*3/MM3 (ref 3.4–10.8)

## 2023-05-07 PROCEDURE — 80048 BASIC METABOLIC PNL TOTAL CA: CPT | Performed by: FAMILY MEDICINE

## 2023-05-07 PROCEDURE — 94660 CPAP INITIATION&MGMT: CPT

## 2023-05-07 PROCEDURE — 84145 PROCALCITONIN (PCT): CPT | Performed by: FAMILY MEDICINE

## 2023-05-07 PROCEDURE — 94761 N-INVAS EAR/PLS OXIMETRY MLT: CPT

## 2023-05-07 PROCEDURE — 36600 WITHDRAWAL OF ARTERIAL BLOOD: CPT

## 2023-05-07 PROCEDURE — 85027 COMPLETE CBC AUTOMATED: CPT | Performed by: FAMILY MEDICINE

## 2023-05-07 PROCEDURE — 99232 SBSQ HOSP IP/OBS MODERATE 35: CPT | Performed by: FAMILY MEDICINE

## 2023-05-07 PROCEDURE — 94760 N-INVAS EAR/PLS OXIMETRY 1: CPT

## 2023-05-07 PROCEDURE — 82805 BLOOD GASES W/O2 SATURATION: CPT

## 2023-05-07 PROCEDURE — 94664 DEMO&/EVAL PT USE INHALER: CPT

## 2023-05-07 PROCEDURE — 83050 HGB METHEMOGLOBIN QUAN: CPT

## 2023-05-07 PROCEDURE — 82375 ASSAY CARBOXYHB QUANT: CPT

## 2023-05-07 PROCEDURE — 25010000002 ENOXAPARIN PER 10 MG: Performed by: FAMILY MEDICINE

## 2023-05-07 PROCEDURE — 94799 UNLISTED PULMONARY SVC/PX: CPT

## 2023-05-07 PROCEDURE — 25010000002 METHYLPREDNISOLONE PER 125 MG: Performed by: FAMILY MEDICINE

## 2023-05-07 PROCEDURE — 63710000001 INSULIN ASPART PER 5 UNITS: Performed by: FAMILY MEDICINE

## 2023-05-07 PROCEDURE — 94762 N-INVAS EAR/PLS OXIMTRY CONT: CPT

## 2023-05-07 PROCEDURE — 82948 REAGENT STRIP/BLOOD GLUCOSE: CPT

## 2023-05-07 RX ORDER — METHYLPREDNISOLONE SODIUM SUCCINATE 40 MG/ML
40 INJECTION, POWDER, LYOPHILIZED, FOR SOLUTION INTRAMUSCULAR; INTRAVENOUS EVERY 12 HOURS
Status: DISCONTINUED | OUTPATIENT
Start: 2023-05-08 | End: 2023-05-10

## 2023-05-07 RX ORDER — METHYLPREDNISOLONE SODIUM SUCCINATE 125 MG/2ML
60 INJECTION, POWDER, LYOPHILIZED, FOR SOLUTION INTRAMUSCULAR; INTRAVENOUS ONCE
Status: COMPLETED | OUTPATIENT
Start: 2023-05-07 | End: 2023-05-07

## 2023-05-07 RX ADMIN — METHYLPREDNISOLONE SODIUM SUCCINATE 60 MG: 125 INJECTION, POWDER, FOR SOLUTION INTRAMUSCULAR; INTRAVENOUS at 15:21

## 2023-05-07 RX ADMIN — Medication 3 ML: at 20:46

## 2023-05-07 RX ADMIN — ACETAMINOPHEN 650 MG: 325 TABLET, FILM COATED ORAL at 15:21

## 2023-05-07 RX ADMIN — SENNOSIDES AND DOCUSATE SODIUM 2 TABLET: 50; 8.6 TABLET ORAL at 20:46

## 2023-05-07 RX ADMIN — BUDESONIDE AND FORMOTEROL FUMARATE DIHYDRATE 2 PUFF: 160; 4.5 AEROSOL RESPIRATORY (INHALATION) at 00:29

## 2023-05-07 RX ADMIN — IPRATROPIUM BROMIDE AND ALBUTEROL SULFATE 3 ML: 2.5; .5 SOLUTION RESPIRATORY (INHALATION) at 12:56

## 2023-05-07 RX ADMIN — GUAIFENESIN 600 MG: 600 TABLET, EXTENDED RELEASE ORAL at 08:33

## 2023-05-07 RX ADMIN — ACETAMINOPHEN 650 MG: 325 TABLET, FILM COATED ORAL at 07:44

## 2023-05-07 RX ADMIN — ENOXAPARIN SODIUM 40 MG: 100 INJECTION SUBCUTANEOUS at 20:46

## 2023-05-07 RX ADMIN — IPRATROPIUM BROMIDE AND ALBUTEROL SULFATE 3 ML: 2.5; .5 SOLUTION RESPIRATORY (INHALATION) at 06:47

## 2023-05-07 RX ADMIN — GUAIFENESIN 600 MG: 600 TABLET, EXTENDED RELEASE ORAL at 20:46

## 2023-05-07 RX ADMIN — Medication 5 MG: at 20:46

## 2023-05-07 RX ADMIN — ATENOLOL 25 MG: 25 TABLET ORAL at 08:33

## 2023-05-07 RX ADMIN — METHYLPREDNISOLONE SODIUM SUCCINATE 60 MG: 125 INJECTION, POWDER, FOR SOLUTION INTRAMUSCULAR; INTRAVENOUS at 02:56

## 2023-05-07 RX ADMIN — AMLODIPINE BESYLATE 10 MG: 5 TABLET ORAL at 08:33

## 2023-05-07 RX ADMIN — BUDESONIDE AND FORMOTEROL FUMARATE DIHYDRATE 2 PUFF: 160; 4.5 AEROSOL RESPIRATORY (INHALATION) at 06:48

## 2023-05-07 RX ADMIN — IPRATROPIUM BROMIDE AND ALBUTEROL SULFATE 3 ML: 2.5; .5 SOLUTION RESPIRATORY (INHALATION) at 00:27

## 2023-05-07 RX ADMIN — BUDESONIDE AND FORMOTEROL FUMARATE DIHYDRATE 2 PUFF: 160; 4.5 AEROSOL RESPIRATORY (INHALATION) at 19:57

## 2023-05-07 RX ADMIN — IPRATROPIUM BROMIDE AND ALBUTEROL SULFATE 3 ML: 2.5; .5 SOLUTION RESPIRATORY (INHALATION) at 19:57

## 2023-05-07 RX ADMIN — TIOTROPIUM BROMIDE INHALATION SPRAY 2 PUFF: 3.12 SPRAY, METERED RESPIRATORY (INHALATION) at 06:50

## 2023-05-07 RX ADMIN — MULTIPLE VITAMINS W/ MINERALS TAB 1 TABLET: TAB at 08:33

## 2023-05-07 RX ADMIN — INSULIN ASPART 2 UNITS: 100 INJECTION, SOLUTION INTRAVENOUS; SUBCUTANEOUS at 12:12

## 2023-05-07 NOTE — PROGRESS NOTES
[facility]    Name:  Carolin Toscano   Age:  77 y.o.  Sex:  female  :  1945  MRN:  [id number]   Visit Number:  [id number]  Admission Date:  2023  Date Of Service:  23  Primary Care Physician:  Jason Nicholson MD     LOS: 3 days :    Chief Complaint:      Shortness of breath     Subjective:    Patient was seen and examined at bedside.  Patient laying in bed comfortably, appears to be improving clinically again today.  Patient was off BiPAP and was on 6 L of nasal cannula.  Patient appears more awake and energetic today.  Patient reports overall improvement, however continues to have intermittent episodes of shortness of breath and cough.  She denies any chest pain or abdominal pain.  Patient was fully conversational, patient was unaware of any sick contacts prior to admission and was wondering where she got the virus from.  Vitals are stable, afebrile on 6 L through nasal cannula.    Hospital Course:    Patient is a 77 years old female with a past medical history of COPD, chronic respiratory failure on 5 to 6 L per her previous discharge, on NIV machine at home and trelegy, hypertension, and ongoing tobacco use who presented to the ER from home by EMS with a chief complaint of shortness of breath.  Patient reporting that she started having shortness of breath associated with productive cough since last night and has persisted and became worse that promoted her to call EMS.  EMS has found the patient to be at 65% saturation on her normal 6 L of oxygen.  She received a breathing treatment in route.  Patient was recently admitted to the hospital on  and discharged on 2023 with similar clinical picture of Respiratory failure, COPD exacerbation and pneumonia.  Patient was discharged on 6 L previously.  Patient reports compliance with her NIV machine at home.  Denies any sick contacts that she is aware of.  Patient denies any chest pain or abdominal  pain.  Denies any other complaints.     On ER evaluation, Patient was found to be tachycardic with a heart rate of 130s, temperature of 100.2 and respirations of 30, /72.  Patient was placed on BiPAP with FiO2 of 40%.  Her ABG came back and showed pH of 7.296/PCO2 87/PO2 94/HCO3 42/saturation 97% on 5 L nasal cannula.  HS Troponin 44, proBNP WNL, glucose 192, CO2 of 40, otherwise CBC and CMP within acceptable range.  Lactate and Pro-Adi WNL.  Respiratory panel positive for parainfluenza virus.  Chest x-ray Mild interstitial disease  bilaterally is similar to prior. No area of consolidation is seen.  Urinalysis negative for nitrates, leukocytes, WBC or bacteria with squamous epithelial cells.  Patient received Solu-Medrol, normal saline bolus of 2124 mL, magnesium and Aztreonam while in the ER.  Hospitalist consulted for admission, further evaluation and treatment.    Review of Systems:     All systems were reviewed and negative except as mentioned in subjective, assessment and plan.    Vital Signs:    Temp:  [97.4 °F (36.3 °C)-98.6 °F (37 °C)] 98.6 °F (37 °C)  Heart Rate:  [58-95] 89  Resp:  [16-37] 16  BP: (115-159)/(56-66) 159/66    Intake and output:    I/O last 3 completed shifts:  In: 240 [P.O.:240]  Out: 725 [Urine:725]  No intake/output data recorded.    Physical Examination:    General Appearance:  Alert and cooperative.  Chronically ill-appearing.   Head:  Atraumatic and normocephalic.   Eyes: Conjunctivae and sclerae normal, no icterus. No pallor.   Throat: No oral lesions, no thrush, oral mucosa moist.   Neck: Supple, trachea midline, no thyromegaly.   Lungs:   Breath sounds heard bilaterally equally.  Mildly labored, on BiPAP.  Diffuse bilateral wheezing heard.  Decreased air movement bilaterally.    Heart:  Normal S1 and S2, no murmur, no gallop, no rub. No JVD.   Abdomen:   Normal bowel sounds, no masses, no organomegaly. Soft, nontender, nondistended, no rebound tenderness.   Extremities:  Supple, no edema, no cyanosis, no clubbing.   Skin: No bleeding or rash.   Neurologic: Alert and oriented x 3. No facial asymmetry. Moves all four limbs. No tremors.      Laboratory results:    Results from last 7 days   Lab Units 05/06/23  0611 05/05/23  0524 05/04/23  1447   SODIUM mmol/L 144 142 140   POTASSIUM mmol/L 4.9 4.6 4.4   CHLORIDE mmol/L 100 99 95*   CO2 mmol/L 40.1* 34.6* 40.2*   BUN mg/dL 19 10 10   CREATININE mg/dL 0.28* 0.29* 0.38*   CALCIUM mg/dL 9.0 8.4* 9.1   BILIRUBIN mg/dL  --   --  0.4   ALK PHOS U/L  --   --  87   ALT (SGPT) U/L  --   --  30   AST (SGOT) U/L  --   --  23   GLUCOSE mg/dL 178* 155* 192*     Results from last 7 days   Lab Units 05/06/23  0611 05/05/23  0525 05/04/23  1447   WBC 10*3/mm3 10.46 4.57 8.04   HEMOGLOBIN g/dL 12.5 12.1 13.3   HEMATOCRIT % 41.3 40.7 44.5   PLATELETS 10*3/mm3 140 115* 149         Results from last 7 days   Lab Units 05/05/23  0725 05/05/23  0524 05/04/23  1447   HSTROP T ng/L 22* 25* 44*     Results from last 7 days   Lab Units 05/04/23  1500   BLOODCX  No growth at 2 days  No growth at 2 days     Recent Labs     05/05/23  0932 05/05/23 2009 05/06/23  0521   PHART 7.286* 7.369 7.339*   [id number] 85.2* 75.6* 81.4*   PO2ART 85.4 77.8 97.2   [id number] 40.6* 43.6* 43.7*   BASEEXCESS 10.5* 14.8* 14.4*      I have reviewed the patient's laboratory results.    Radiology results:    XR Chest 1 View    Result Date: 5/6/2023  PROCEDURE: XR CHEST 1 VW-  HISTORY: Respiratory failure follow-up; J96.21-Acute and chronic respiratory failure with hypoxia; J96.22-Acute and chronic respiratory failure with hypercapnia; J44.1-Chronic obstructive pulmonary disease with (acute) exacerbation; B34.8-Other viral infections of unspecified site  COMPARISON: May 4, 2023.  FINDINGS: The heart is upper limits of normal in size. Aortic mural calcifications noted.. There is mild bilateral interstitial disease, stable from prior exam. Slight blunting of the left costophrenic angle is  stable.. The mediastinum is unremarkable. There is no pneumothorax. There are no acute osseous abnormalities.      Impression: Stable chest..    This report was signed and finalized on 5/6/2023 9:23 AM by Shraddha Wharton MD.    I have reviewed the patient's radiology reports.    Medication Review:     I have reviewed the patient's active and prn medications.     Problem List:      Acute on chronic respiratory failure with hypoxia and hypercapnia      Assessment:    Acute on chronic respiratory failure with hypoxia and hypercapnia, likely secondary to #2 and 3, POA  Acute COPD exacerbation, POA  Parainfluenza infection, POA  Elevated troponin, likely secondary to demand ischemia, POA  Hypertension  Chronic tobacco abuse  Pulmonary hypertension  Arthritis    Plan:    Respiratory failure with hypoxia/hypercapnia, COPD exacerbation, parainfluenza  -Continue supplemental oxygen to keep saturation above 90%, patient on 5 to 6 L at baseline, continue to titrate down as able to.  Continue BiPAP therapy.  -Patient met SIRS criteria on arrival, likely secondary to influenza infection and respiratory failure. Received Aztreonam and sepsis protocol IV fluids boluses.  -Procalcitonin WNL, WBC WNL, bacterial infection less likely, will hold off on antibiotics for now.  -On Trelegy at home, Spiriva and budesonide while here.  - Solu-Medrol, will taper to 40 mg every 12 hours, continue to taper down as she improves.   -Bronchodilators, Mucinex and supportive care.  -Patient was seen by pulmonology on previous admission, will need close follow-up with pulmonology on discharge.  -Repeat ABG and chest x-ray as indicated.  -Patient appears to be improving clinically     Elevated troponin  -Likely secondary to demand ischemia in settings of respiratory failure  -Patient denies any chest pain, low suspicion for ACS  -Troponin trended down and plateaued.     Hyperglycemia  -Will cover with sliding scale insulin while on steroids  -Hemoglobin  A1c 6.7     Further orders as indicated per clinical course.    DVT Prophylaxis: Lovenox prophylaxis (benefit> risk)  Code Status: Full  Diet: Cardiac  Discharge Plan: To be determined, likely needs 2 to 3 days in the hospital.    Lolis Vital MD  05/07/23  09:52 EDT    Dictated utilizing Dragon dictation.

## 2023-05-07 NOTE — PLAN OF CARE
Goal Outcome Evaluation:  Plan of Care Reviewed With: patient        Progress: no change  Outcome Evaluation: no acute events during shift. VSS, SR on tele. 6L HFNC / BIPAP. no other changes in pt condition.will continue to monitor.

## 2023-05-07 NOTE — THERAPY TREATMENT NOTE
Pt sleeping soundly and unable to wake up enough to safely participate with physical therapy. Physical therapy to f/u with pt at later date

## 2023-05-07 NOTE — PLAN OF CARE
Goal Outcome Evaluation:           Progress: improving         Problem: Skin Injury Risk Increased  Goal: Skin Health and Integrity  Outcome: Ongoing, Progressing     Problem: Fall Injury Risk  Goal: Absence of Fall and Fall-Related Injury  Outcome: Ongoing, Progressing     Problem: Adult Inpatient Plan of Care  Goal: Plan of Care Review  Outcome: Ongoing, Progressing  Flowsheets  Taken 5/7/2023 1710 by Lian Reyes, RN  Progress: improving  Taken 5/7/2023 0156 by Niecy Ríos RN  Plan of Care Reviewed With: patient  Goal: Patient-Specific Goal (Individualized)  Outcome: Ongoing, Progressing  Goal: Absence of Hospital-Acquired Illness or Injury  Outcome: Ongoing, Progressing  Goal: Optimal Comfort and Wellbeing  Outcome: Ongoing, Progressing  Intervention: Monitor Pain and Promote Comfort  Recent Flowsheet Documentation  Taken 5/7/2023 1600 by Lian Reyes RN  Pain Management Interventions: see MAR  Goal: Readiness for Transition of Care  Outcome: Ongoing, Progressing     Problem: COPD (Chronic Obstructive Pulmonary Disease) Comorbidity  Goal: Maintenance of COPD Symptom Control  Outcome: Ongoing, Progressing     Problem: Diabetes Comorbidity  Goal: Blood Glucose Level Within Targeted Range  Outcome: Ongoing, Progressing     Problem: Hypertension Comorbidity  Goal: Blood Pressure in Desired Range  Outcome: Ongoing, Progressing     Problem: Noninvasive Ventilation Acute  Goal: Effective Unassisted Ventilation and Oxygenation  Outcome: Ongoing, Progressing

## 2023-05-07 NOTE — THERAPY TREATMENT NOTE
Pt declined physical therapy d/t just finishing with shower and back is having muscle spasms. Physical therapy to f/u with pt at later date

## 2023-05-08 ENCOUNTER — APPOINTMENT (OUTPATIENT)
Dept: GENERAL RADIOLOGY | Facility: HOSPITAL | Age: 78
DRG: 190 | End: 2023-05-08
Payer: MEDICARE

## 2023-05-08 LAB
GLUCOSE BLDC GLUCOMTR-MCNC: 101 MG/DL (ref 70–130)
GLUCOSE BLDC GLUCOMTR-MCNC: 134 MG/DL (ref 70–130)
GLUCOSE BLDC GLUCOMTR-MCNC: 148 MG/DL (ref 70–130)
GLUCOSE BLDC GLUCOMTR-MCNC: 155 MG/DL (ref 70–130)

## 2023-05-08 PROCEDURE — 63710000001 INSULIN ASPART PER 5 UNITS: Performed by: FAMILY MEDICINE

## 2023-05-08 PROCEDURE — 97530 THERAPEUTIC ACTIVITIES: CPT

## 2023-05-08 PROCEDURE — 25010000002 ENOXAPARIN PER 10 MG: Performed by: FAMILY MEDICINE

## 2023-05-08 PROCEDURE — 94660 CPAP INITIATION&MGMT: CPT

## 2023-05-08 PROCEDURE — 71045 X-RAY EXAM CHEST 1 VIEW: CPT

## 2023-05-08 PROCEDURE — 94799 UNLISTED PULMONARY SVC/PX: CPT

## 2023-05-08 PROCEDURE — 25010000002 METHYLPREDNISOLONE PER 40 MG: Performed by: FAMILY MEDICINE

## 2023-05-08 PROCEDURE — 82948 REAGENT STRIP/BLOOD GLUCOSE: CPT

## 2023-05-08 PROCEDURE — 99223 1ST HOSP IP/OBS HIGH 75: CPT | Performed by: INTERNAL MEDICINE

## 2023-05-08 PROCEDURE — 99232 SBSQ HOSP IP/OBS MODERATE 35: CPT | Performed by: FAMILY MEDICINE

## 2023-05-08 PROCEDURE — 94761 N-INVAS EAR/PLS OXIMETRY MLT: CPT

## 2023-05-08 PROCEDURE — 97535 SELF CARE MNGMENT TRAINING: CPT

## 2023-05-08 PROCEDURE — 97110 THERAPEUTIC EXERCISES: CPT

## 2023-05-08 PROCEDURE — 25010000002 METHYLPREDNISOLONE PER 80 MG: Performed by: INTERNAL MEDICINE

## 2023-05-08 RX ORDER — SODIUM CHLORIDE FOR INHALATION 3 %
4 VIAL, NEBULIZER (ML) INHALATION EVERY 8 HOURS
Status: COMPLETED | OUTPATIENT
Start: 2023-05-08 | End: 2023-05-09

## 2023-05-08 RX ORDER — METHYLPREDNISOLONE ACETATE 80 MG/ML
80 INJECTION, SUSPENSION INTRA-ARTICULAR; INTRALESIONAL; INTRAMUSCULAR; SOFT TISSUE ONCE
Status: COMPLETED | OUTPATIENT
Start: 2023-05-08 | End: 2023-05-08

## 2023-05-08 RX ADMIN — GUAIFENESIN 600 MG: 600 TABLET, EXTENDED RELEASE ORAL at 09:23

## 2023-05-08 RX ADMIN — METHYLPREDNISOLONE SODIUM SUCCINATE 40 MG: 40 INJECTION, POWDER, LYOPHILIZED, FOR SOLUTION INTRAMUSCULAR; INTRAVENOUS at 17:09

## 2023-05-08 RX ADMIN — IPRATROPIUM BROMIDE AND ALBUTEROL SULFATE 3 ML: 2.5; .5 SOLUTION RESPIRATORY (INHALATION) at 12:45

## 2023-05-08 RX ADMIN — SODIUM CHLORIDE 30 MG/ML INHALATION SOLUTION 4 ML: 30 SOLUTION INHALANT at 18:25

## 2023-05-08 RX ADMIN — TIOTROPIUM BROMIDE INHALATION SPRAY 2 PUFF: 3.12 SPRAY, METERED RESPIRATORY (INHALATION) at 06:55

## 2023-05-08 RX ADMIN — Medication 3 ML: at 21:16

## 2023-05-08 RX ADMIN — ENOXAPARIN SODIUM 40 MG: 100 INJECTION SUBCUTANEOUS at 21:16

## 2023-05-08 RX ADMIN — IPRATROPIUM BROMIDE AND ALBUTEROL SULFATE 3 ML: 2.5; .5 SOLUTION RESPIRATORY (INHALATION) at 18:26

## 2023-05-08 RX ADMIN — IPRATROPIUM BROMIDE AND ALBUTEROL SULFATE 3 ML: 2.5; .5 SOLUTION RESPIRATORY (INHALATION) at 06:55

## 2023-05-08 RX ADMIN — GUAIFENESIN 600 MG: 600 TABLET, EXTENDED RELEASE ORAL at 21:16

## 2023-05-08 RX ADMIN — BUDESONIDE AND FORMOTEROL FUMARATE DIHYDRATE 2 PUFF: 160; 4.5 AEROSOL RESPIRATORY (INHALATION) at 06:55

## 2023-05-08 RX ADMIN — METHYLPREDNISOLONE SODIUM SUCCINATE 40 MG: 40 INJECTION, POWDER, LYOPHILIZED, FOR SOLUTION INTRAMUSCULAR; INTRAVENOUS at 03:47

## 2023-05-08 RX ADMIN — SENNOSIDES AND DOCUSATE SODIUM 2 TABLET: 50; 8.6 TABLET ORAL at 09:23

## 2023-05-08 RX ADMIN — ATENOLOL 25 MG: 25 TABLET ORAL at 09:23

## 2023-05-08 RX ADMIN — METHYLPREDNISOLONE ACETATE 80 MG: 80 INJECTION, SUSPENSION INTRA-ARTICULAR; INTRALESIONAL; INTRAMUSCULAR; SOFT TISSUE at 16:16

## 2023-05-08 RX ADMIN — INSULIN ASPART 2 UNITS: 100 INJECTION, SOLUTION INTRAVENOUS; SUBCUTANEOUS at 13:08

## 2023-05-08 RX ADMIN — SENNOSIDES AND DOCUSATE SODIUM 2 TABLET: 50; 8.6 TABLET ORAL at 21:16

## 2023-05-08 RX ADMIN — BUDESONIDE AND FORMOTEROL FUMARATE DIHYDRATE 2 PUFF: 160; 4.5 AEROSOL RESPIRATORY (INHALATION) at 18:25

## 2023-05-08 RX ADMIN — Medication 3 ML: at 09:23

## 2023-05-08 RX ADMIN — MULTIPLE VITAMINS W/ MINERALS TAB 1 TABLET: TAB at 09:23

## 2023-05-08 RX ADMIN — AMLODIPINE BESYLATE 10 MG: 5 TABLET ORAL at 09:23

## 2023-05-08 NOTE — THERAPY TREATMENT NOTE
"Patient Name: Carolin Toscano  : 1945    MRN: [id number]                              Today's Date: 2023       Admit Date: 2023    Visit Dx:     ICD-10-CM ICD-9-CM   1. Acute on chronic respiratory failure with hypoxia and hypercapnia  J96.21 518.84    J96.22 786.09     799.02   2. COPD exacerbation  J44.1 491.21   3. Parainfluenza virus infection  B34.8 078.89     Patient Active Problem List   Diagnosis   • CAP (community acquired pneumonia)   • Cigarette nicotine dependence with nicotine-induced disorder   • Essential hypertension   • Dyslipidemia   • Blood glucose elevated   • Muscle ache   • COPD (chronic obstructive pulmonary disease)   • Nuclear sclerotic cataract of right eye   • Acute respiratory failure   • COPD exacerbation   • Acute on chronic respiratory failure with hypoxia and hypercapnia     Past Medical History:   Diagnosis Date   • Arthritis    • COPD (chronic obstructive pulmonary disease)    • Gall stones    • Goiter     \"inward\"   • Hypertension    • Hypertension    • Kidney infection    • Kidney stone    • Kidney stones    • Migraine headache    • Scarlet fever      Past Surgical History:   Procedure Laterality Date   • BLADDER SURGERY     • CATARACT EXTRACTION W/ INTRAOCULAR LENS IMPLANT Left 2022    Procedure: CATARACT PHACO EXTRACTION WITH INTRAOCULAR LENS IMPLANT LEFT;  Surgeon: Sathish Lopez MD;  Location: [facility];  Service: Ophthalmology;  Laterality: Left;   • CATARACT EXTRACTION W/ INTRAOCULAR LENS IMPLANT Right 2022    Procedure: CATARACT PHACO EXTRACTION WITH INTRAOCULAR LENS IMPLANT RIGHT;  Surgeon: Sathish Lopez MD;  Location: [facility];  Service: Ophthalmology;  Laterality: Right;   • CHOLECYSTECTOMY     • HYSTERECTOMY     • TONSILLECTOMY        General Information     Row Name 23 1241          Physical Therapy Time and Intention    Document Type therapy note (daily note)  -RM     Mode of Treatment physical therapy  -RM     Row Name " 05/08/23 1241          General Information    Patient Profile Reviewed yes  -RM     Row Name 05/08/23 1241          Cognition    Orientation Status (Cognition) oriented x 4  -RM     Row Name 05/08/23 1241          Safety Issues, Functional Mobility    Impairments Affecting Function (Mobility) balance;endurance/activity tolerance;strength;shortness of breath  -RM           User Key  (r) = Recorded By, (t) = Taken By, (c) = Cosigned By    Initials Name Provider Type    Ronak Jimenez, ELVIRA Physical Therapist Assistant               Mobility     Row Name 05/08/23 1242          Bed Mobility    Supine-Sit [place] (Bed Mobility) contact guard  -RM     Assistive Device (Bed Mobility) bed rails;head of bed elevated  -RM     Row Name 05/08/23 1242          Bed-Chair Transfer    Bed-Chair [place] (Transfers) contact guard  -RM     Assistive Device (Bed-Chair Transfers) other (see comments)  gaitbelt  -RM     Row Name 05/08/23 1242          Sit-Stand Transfer    Sit-Stand [place] (Transfers) contact guard;minimum assist (75% patient effort);verbal cues  -RM     Assistive Device (Sit-Stand Transfers) other (see comments)  gaitbelt  -RM           User Key  (r) = Recorded By, (t) = Taken By, (c) = Cosigned By    Initials Name Provider Type    Ronak Jimenez, ELVIRA Physical Therapist Assistant               Obj/Interventions     Row Name 05/08/23 1243          Motor Skills    Therapeutic Exercise hip;knee;ankle  -RM     Row Name 05/08/23 1243          Hip (Therapeutic Exercise)    Hip (Therapeutic Exercise) strengthening exercise  -RM     Hip Strengthening (Therapeutic Exercise) bilateral;marching while seated;5 repetitions;aBduction;aDduction  -RM     Row Name 05/08/23 1243          Knee (Therapeutic Exercise)    Knee (Therapeutic Exercise) strengthening exercise  -RM     Knee Strengthening (Therapeutic Exercise) bilateral;LAQ (long arc quad);5 repetitions  -RM     Row Name 05/08/23 1243          Ankle  (Therapeutic Exercise)    Ankle (Therapeutic Exercise) AROM (active range of motion)  -RM     Ankle AROM (Therapeutic Exercise) bilateral;dorsiflexion;plantarflexion;10 repetitions  -RM           User Key  (r) = Recorded By, (t) = Taken By, (c) = Cosigned By    Initials Name Provider Type    Ronak Jimenez, ELVIRA Physical Therapist Assistant               Goals/Plan    No documentation.                Clinical Impression     Row Name 05/08/23 1244          Pain    Pretreatment Pain Rating 0/10 - no pain  -RM     Posttreatment Pain Rating 0/10 - no pain  -RM     Row Name 05/08/23 1244          Plan of Care Review    Plan of Care Reviewed With patient  -RM     Progress improving  -RM     Outcome Evaluation Pt supine in bed and willing to work with therapy.  Pt transfered to EOB with cga.  Pt performed sit to stand from EOB cga/min a with use of gaitbelt.  Pt transfered from bed to chair cga/min a with gaitbelt.  Once sitting in bedside recliner pt performed seated B LE exercises.  Pt with simple mask at 6lpm was able to perform treatment and maintain O2 92% or higher. See flowsheet for details.  -RM     Row Name 05/08/23 1244          Vital Signs    Pre SpO2 (%) 94  -RM     O2 Delivery Pre Treatment other (see comments)  simple mask 6L  -RM     Intra SpO2 (%) 92  -RM     Post SpO2 (%) 93  -RM     Pre Patient Position Supine  -RM     Intra Patient Position Standing  -RM     Post Patient Position Sitting  -RM     Row Name 05/08/23 1244          Positioning and Restraints    Pre-Treatment Position in bed  -RM     Post Treatment Position chair  -RM     In Chair reclined;call light within reach;encouraged to call for assist;exit alarm on;notified nsg  -RM           User Key  (r) = Recorded By, (t) = Taken By, (c) = Cosigned By    Initials Name Provider Type    Ronak Jimenez, ELVIRA Physical Therapist Assistant               Outcome Measures     Row Name 05/08/23 1250          How much help from another person do  you currently need...    Turning from your back to your side while in flat bed without using bedrails? 3  -RM     Moving from lying on back to sitting on the side of a flat bed without bedrails? 3  -RM     Moving to and from a bed to a chair (including a wheelchair)? 3  -RM     Standing up from a chair using your arms (e.g., wheelchair, bedside chair)? 3  -RM     Climbing 3-5 steps with a railing? 2  -RM     To walk in hospital room? 3  -RM     AM-PAC 6 Clicks Score (PT) 17  -RM     Highest level of mobility 5 --> Static standing  -RM     Row Name 05/08/23 1250 05/08/23 1221       Functional Assessment    Outcome Measure Options AM-PAC 6 Clicks Basic Mobility (PT)  -RM AM-PAC 6 Clicks Daily Activity (OT)  -SD          User Key  (r) = Recorded By, (t) = Taken By, (c) = Cosigned By    Initials Name Provider Type     Ronak Cruz, PTA Physical Therapist Assistant    Cecelia Johnson, OT Occupational Therapist                             Physical Therapy Education     Title: PT OT SLP Therapies (In Progress)     Topic: Physical Therapy (In Progress)     Point: Mobility training (Done)     Learning Progress Summary           Patient Acceptance, E,TB,D, VU,NR by  at 5/8/2023 1250    Comment: Importance of activity and exercise techniques    Acceptance, E, VU by MS at 5/5/2023 1136    Comment: importance of mobility                   Point: Home exercise program (Done)     Learning Progress Summary           Patient Acceptance, E,TB,D, VU,NR by  at 5/8/2023 1250    Comment: Importance of activity and exercise techniques    Acceptance, E, VU by MS at 5/5/2023 1136    Comment: importance of mobility                   Point: Body mechanics (Not Started)     Learner Progress:  Not documented in this visit.          Point: Precautions (Not Started)     Learner Progress:  Not documented in this visit.                      User Key     Initials Effective Dates Name Provider Type Discipline     06/16/21 -  Anthony  Ronak MAGUIRE PTA Physical Therapist Assistant PT    MS 07/01/22 -  Martín Ortiz PT Physical Therapist PT              PT Recommendation and Plan     Plan of Care Reviewed With: patient  Progress: improving  Outcome Evaluation: Pt supine in bed and willing to work with therapy.  Pt transfered to EOB with cga.  Pt performed sit to stand from EOB cga/min a with use of gaitbelt.  Pt transfered from bed to chair cga/min a with gaitbelt.  Once sitting in bedside recliner pt performed seated B LE exercises.  Pt with simple mask at 6lpm was able to perform treatment and maintain O2 92% or higher. See flowsheet for details.     Time Calculation:    PT Charges     Row Name 05/08/23 1251             Time Calculation    Start Time 1054  -RM      Stop Time 1117  -RM      Time Calculation (min) 23 min  -RM      PT Received On 05/08/23  -RM      PT Goal Re-Cert Due Date 05/15/23  -RM         Time Calculation- PT    Total Timed Code Minutes- PT 23 minute(s)  -RM         Timed Charges    53154 - PT Therapeutic Exercise Minutes 13  -RM      22020 - PT Therapeutic Activity Minutes 10  -RM         Total Minutes    Timed Charges Total Minutes 23  -RM       Total Minutes 23  -RM            User Key  (r) = Recorded By, (t) = Taken By, (c) = Cosigned By    Initials Name Provider Type    Ronak Jimenez, ELVIRA Physical Therapist Assistant              Therapy Charges for Today     Code Description Service Date Service Provider Modifiers Qty    73276489706 HC PT THER PROC EA 15 MIN 5/8/2023 Ronak Cruz, PTA GP 1    39344981470 HC PT THERAPEUTIC ACT EA 15 MIN 5/8/2023 Ronak Cruz, PTA GP 1          PT G-Codes  Outcome Measure Options: AM-PAC 6 Clicks Basic Mobility (PT)  AM-PAC 6 Clicks Score (PT): 17  AM-PAC 6 Clicks Score (OT): 19       Ronak Cruz PTA  5/8/2023

## 2023-05-08 NOTE — PLAN OF CARE
Goal Outcome Evaluation:  Plan of Care Reviewed With: patient           Outcome Evaluation: Patient wore BIPAP on and off q2 hours per Dr. Soriano order. When patient is on nasal cannula or simple mask she takes oxygen off an desats quickly to low 70%. Patient placed back on BIPAP by RN to help her recover

## 2023-05-08 NOTE — PLAN OF CARE
Goal Outcome Evaluation:  Plan of Care Reviewed With: patient        Progress: improving  Outcome Evaluation: Pt supine in bed and willing to work with therapy.  Pt transfered to EOB with cga.  Pt performed sit to stand from EOB cga/min a with use of gaitbelt.  Pt transfered from bed to chair cga/min a with gaitbelt.  Once sitting in bedside recliner pt performed seated B LE exercises.  Pt with simple mask at 6lpm was able to perform treatment and maintain O2 92% or higher. See flowsheet for details.

## 2023-05-08 NOTE — CONSULTS
"Date of admission:  5/4/2023    Date of consultation:   May 8, 2023    Requested by:   Hospitalist Service.     PCP: Jason Nicholson MD    Reason:  Acute hypoxic respiratory failure.    History of Present Illness:  77 y.o. female with past medical history significant for hypertension, heavy smoking history and COPD who was discharged about 15 days ago from the hospital and actually was seen by her primary care provider a few days ago, who once again started having symptoms of shortness of breath, cough and phlegm production for the past few days.  Patient is somewhat sleepy and history is limited from her but she was able to mention increasing cough and phlegm production along with half a day of \"feeling warm\".  She denied any chills.      she mentions that her grandchildren at home who may have been sick.    she was using her medications regularly at home, although stopped using Trelegy after a few days of discharge.      The patient says that she also started using rescue inhaler more frequently without any significant improvement.    The patient says that she used to smoke 1 pack/day for about 55 years or so, but currently smokes half a pack per day or less.    Upon questioning the patient was not able to answer the question with regards to NIV/BiPAP device.  She was unsure if she was on NIV device or BiPAP at home?.  She did however mention using \"a mask at night\".    The patient's symptoms continued to worsen and she was admitted to the hospital and pulmonary Consultation was requested for further recommendations.       Review of System: All other review of systems negative except indicated in HPI     Past Medical History: Pertinent history reviewed, as appropriate. Negative, except noted below or in HPI.  If this history could not be obtained due to a reason, the reason is listed in the HPI.  Past Medical History:   Diagnosis Date   • Arthritis    • COPD (chronic obstructive pulmonary disease)    • Gall " Assessment:  1  Sacroiliitis ([facility] [address] )    2  Myofascial pain syndrome        Plan:  Based on patient report and physical exam, the patient's symptomatology does seem to be consistent with sacroiliac mediated pain from sacroiliitis  We will schedule the patient for a left SIJ injection to decrease any inflammatory components of the patient's pain symptoms  Initiate tizanidine 4 mg 1 tablet at bedtime  Continue with heat application  Follow up after procedure        Complete risks and benefits including bleeding, infection, tissue reaction, nerve injury and allergic reaction were discussed  The approach was demonstrated using models and literature was provided  Verbal and written consent was obtained  My impressions and treatment recommendations were discussed in detail with the patient who verbalized understanding and had no further questions  Discharge instructions were provided  I personally saw and examined the patient and I agree with the above discussed plan of care  Orders Placed This Encounter   Procedures    FL spine and pain procedure     Standing Status:   Future     Standing Expiration Date:   1/25/2025     Order Specific Question:   Reason for Exam:     Answer:   left SIJ injection     Order Specific Question:   Is the patient pregnant? Answer:   No     Order Specific Question:   Anticoagulant hold needed? Answer:   none     New Medications Ordered This Visit   Medications    tiZANidine (ZANAFLEX) 4 mg tablet     Sig: Take 1 tablet (4 mg total) by mouth daily at bedtime     Dispense:  30 tablet     Refill:  1       History of Present Illness:  Juan C Segovia is a 49 y o  female who presents for a follow up office visit in regards to Back Pain (Lower), Leg Pain (Left), and Neck Pain  The patients current symptoms include left low back and thigh pain that stops the knee rated 10/10    She has constant pain throughout the entirety of the day described as burning, sharp, shooting, "stones    • Goiter     \"inward\"   • Hypertension    • Hypertension    • Kidney infection    • Kidney stone    • Kidney stones    • Migraine headache    • Scarlet fever          Past Surgical History: Pertinent history reviewed, as appropriate. Negative, except noted below or in HPI. If this history could not be obtained due to a reason, the reason is listed in the HPI.  Past Surgical History:   Procedure Laterality Date   • BLADDER SURGERY     • CATARACT EXTRACTION W/ INTRAOCULAR LENS IMPLANT Left 8/11/2022    Procedure: CATARACT PHACO EXTRACTION WITH INTRAOCULAR LENS IMPLANT LEFT;  Surgeon: Sathish Lopez MD;  Location: [facility];  Service: Ophthalmology;  Laterality: Left;   • CATARACT EXTRACTION W/ INTRAOCULAR LENS IMPLANT Right 8/25/2022    Procedure: CATARACT PHACO EXTRACTION WITH INTRAOCULAR LENS IMPLANT RIGHT;  Surgeon: Sathish Lopez MD;  Location: [facility];  Service: Ophthalmology;  Laterality: Right;   • CHOLECYSTECTOMY     • HYSTERECTOMY     • TONSILLECTOMY           Family History: Pertinent history reviewed, as appropriate. Negative, except noted below or in HPI. If this history could not be obtained due to a reason, the reason is listed in the HPI.  Family History   Problem Relation Age of Onset   • Heart disease Mother    • Arthritis Mother    • Diabetes Mother    • Hypertension Mother    • Colon cancer Father    • Arthritis Father    • Diabetes Father    • Hypertension Father    • Migraines Sister          Social History: Pertinent history reviewed, as appropriate. Negative, except noted below or in HPI. If this history could not be obtained due to a reason, the reason is listed in the HPI.  Social History     Socioeconomic History   • Marital status:    Tobacco Use   • Smoking status: Every Day     Packs/day: 1.00     Types: Cigarettes     Start date: 1960   • Smokeless tobacco: Former     Quit date: 9/24/2017   Vaping Use   • Vaping Use: Never used   Substance and Sexual " and pins and needles  Patient tells me that her pain began last Monday afternoon she was cleaning her bath tubs and then she was mopping her floor and all of a sudden her pain flared up  She was unable to get out of bed the next day she had a have her son helped her up and they went to urgent care she tells me they gave her a shot for inflammation and they gave her a steroid Dosepak  She has gotten a little bit better but is quite bothersome especially when she tries to transition from sitting to standing  Patient tells me that she moved up here from [place] she was in [place] they were giving her tramadol for her chronic neck and back pain  Her family doctor here did provide her with a prescription however she told her for further refills she should talk with our office  I did explain to her that we have preferred to use nonopioid options she is already on gabapentin which I feel is reasonable, as well as utilizing our interventional approaches and she has already had a left-sided C4-6 radiofrequency ablation in December of 2020  Patient was agreeable verbalized understanding  I have personally reviewed and/or updated the patient's past medical history, past surgical history, family history, social history, current medications, allergies, and vital signs today  Review of Systems   Constitutional: Negative  HENT: Negative  Eyes: Negative  Respiratory: Negative  Cardiovascular: Negative  Gastrointestinal: Negative  Endocrine: Negative  Genitourinary: Negative  Musculoskeletal: Positive for back pain (Lower, goes to left leg) and neck pain  Skin: Negative  Allergic/Immunologic: Negative  Neurological: Negative  Hematological: Negative  Psychiatric/Behavioral: Negative          Patient Active Problem List   Diagnosis    Obstructive sleep apnea syndrome    Restless legs syndrome (RLS)    Hypertension    Allergic rhinitis    Depression with anxiety "Activity   • Alcohol use: No   • Drug use: No   • Sexual activity: Defer             Physical Exam:  /62 (BP Location: Left arm, Patient Position: Lying)   Pulse 57   Temp 98.5 °F (36.9 °C) (Axillary)   Resp (!) 38   Ht 160 cm (63\")   Wt 68.2 kg (150 lb 5.7 oz)   SpO2 98%   BMI 26.63 kg/m²     Constitutional:            Vital signs reviewed                     General: Moderate respiratory distress noted.    Eyes:            Extraocular movement was intact.            Pupils appeared equal            Conjunctiva: Pink    ENT:             Hearing was intact              No nasal erythema noted.              Oropharynx was dry.  No lesions noted.     Neck:             Supple. No JVD noted.              Thyroid gland did not seem to be enlarged    Cardiovascular:              S1 + S2. Regular     Lungs/Respiratory:            Respiratory effort was labored            Hyperresonance to percussion.            Decreased Air Entry Bilaterally with mild to moderate wheezing.    Abdomen/GI:            Soft.  Bowel sounds were positive.  No obvious organomegaly.    Musculoskeletal/Extremities:            No edema noted.            Gait could not be assessed at this time.    Neurologic:             Awake, alert and oriented x 3.             Able to follow simple commands.    Psychiatric:             Affect appeared fair.             Awake, alert and oriented x 3.    Skin:             Warm and dry      Labs: Reviewed. Pertinent labs were noted.     Results from last 7 days   Lab Units 05/07/23  1003 05/06/23  0611 05/05/23  0525 05/04/23  1447   WBC 10*3/mm3 10.60 10.46 4.57 8.04   HEMOGLOBIN g/dL 12.6 12.5 12.1 13.3   HEMATOCRIT % 42.4 41.3 40.7 44.5   PLATELETS 10*3/mm3 138* 140 115* 149   NEUTROPHIL % %  --  88.7*  --  91.6*   NEUTROS ABS 10*3/mm3  --  9.28*  --  7.36*   EOSINOPHIL % %  --  0.0*  --  0.0*   EOS ABS 10*3/mm3  --  0.00  --  0.00   LYMPHOCYTE % %  --  5.9*  --  3.6*   LYMPHS ABS 10*3/mm3  --  0.62* "  Gastroesophageal reflux disease without esophagitis    Malabsorption due to intolerance, not elsewhere classified    CKD (chronic kidney disease) stage 3, GFR 30-59 ml/min    Degenerative disc disease, cervical    Degenerative disc disease, lumbar    Lumbar spondylosis    Chronic bilateral low back pain without sciatica    Cervical spondylosis without myelopathy    Neck pain    Gastroesophageal reflux disease with esophagitis    Hyperlipidemia       Past Medical History:   Diagnosis Date    Anxiety     Chronic kidney disease     Depression     GERD (gastroesophageal reflux disease)     Hypertension     Kidney disease     Kidney stone        Past Surgical History:   Procedure Laterality Date    CHOLECYSTECTOMY      DENTAL SURGERY      [place] teeth extraction    ENDOMETRIAL ABLATION      GASTRIC RESTRICTION SURGERY      LITHOTRIPSY      TUBAL LIGATION         Family History   Problem Relation Age of Onset    Cirrhosis Mother     Kidney failure Mother     Diabetes Mother     Hypertension Mother     Kidney disease Mother     Kidney failure Father     Heart disease Father     Hypertension Father     Alcohol abuse Father     Kidney disease Father     Alcohol abuse Paternal Grandmother     Mental illness Maternal Uncle     Substance Abuse Neg Hx        Social History     Occupational History    Not on file   Tobacco Use    Smoking status: Never Smoker    Smokeless tobacco: Never Used   Substance and Sexual Activity    Alcohol use: Yes     Comment: Rarely    Drug use: Never    Sexual activity: Not Currently     Partners: Male       Current Outpatient Medications on File Prior to Visit   Medication Sig    albuterol (PROVENTIL HFA,VENTOLIN HFA) 90 mcg/act inhaler albuterol sulfate HFA 90 mcg/actuation aerosol inhaler   INHALE 2 PUFFS EVERY 4 HOURS AS NEEDED FOR WHEEZING OR SHORTNESS OF AIR      AMILoride 5 mg tablet TAKE 2 TABLETS BY MOUTH EVERY DAY    buPROPion (WELLBUTRIN SR)  --  0.29*       Lab Results   Component Value Date    PROCALCITO 0.14 05/07/2023    PROCALCITO 0.14 05/04/2023    PROCALCITO 0.24 04/18/2023       No results found for: CRP    No results found for: SEDRATE    Lab Results   Component Value Date    PROBNP 234.4 05/04/2023    PROBNP 272.8 04/13/2023    PROBNP 315.0 (C) 10/02/2017       Results from last 7 days   Lab Units 05/07/23  1003 05/06/23  0611 05/05/23  0524 05/04/23  1447   SODIUM mmol/L 144 144 142 140   POTASSIUM mmol/L 4.4 4.9 4.6 4.4   CHLORIDE mmol/L 98 100 99 95*   CO2 mmol/L 40.3* 40.1* 34.6* 40.2*   BUN mg/dL 16 19 10 10   CREATININE mg/dL 0.34* 0.28* 0.29* 0.38*   CALCIUM mg/dL 9.2 9.0 8.4* 9.1   ANION GAP mmol/L 5.7 3.9* 8.4 4.8*   BILIRUBIN mg/dL  --   --   --  0.4   ALK PHOS U/L  --   --   --  87   ALT (SGPT) U/L  --   --   --  30   AST (SGOT) U/L  --   --   --  23   GLUCOSE mg/dL 195* 178* 155* 192*   TOTAL PROTEIN g/dL  --   --   --  6.9   ALBUMIN g/dL  --   --   --  3.8             No results found for: TSH    No results found for: FREET4    No results found for: INR    No results found for: CKTOTAL    No components found for: HSTROPT    Lab Results   Component Value Date    TROPONINT 22 (H) 05/05/2023    TROPONINT 25 (H) 05/05/2023    TROPONINT 44 (H) 05/04/2023       No results found for: DDIMER    Brief Urine Lab Results  (Last result in the past 365 days)      Color   Clarity   Blood   Leuk Est   Nitrite   Protein   CREAT   Urine HCG        05/04/23 1611 Yellow   Clear   Small (1+)   Negative   Negative   30 mg/dL (1+)                   Micro: As of May 8, 2023   Lab Results   Component Value Date    RESPCX Light growth (2+) Normal Respiratory Ana 10/02/2017     No results found for: BCIDPCR  Lab Results   Component Value Date    BLOODCX No growth at 3 days 05/04/2023    BLOODCX No growth at 3 days 05/04/2023    BLOODCX No growth at 5 days 04/16/2023    BLOODCX No growth at 5 days 04/16/2023     Lab Results   Component Value Date     150 mg 12 hr tablet Take 1 tablet (150 mg total) by mouth 2 (two) times a day    Calcium-Phosphorus-Vitamin D (CALCIUM GUMMIES PO) Take by mouth    Cholecalciferol (EQL VITAMIN D3 GUMMIES PO) Take by mouth    furosemide (LASIX) 40 mg tablet Take 1 tablet (40 mg total) by mouth once as needed (swelling)    gabapentin (NEURONTIN) 300 mg capsule TAKE 1 CAPSULE BY MOUTH THREE TIMES A DAY    lisinopril (ZESTRIL) 2 5 mg tablet Take 1 tablet (2 5 mg total) by mouth daily    loratadine (CLARITIN) 10 mg tablet Take 1 tablet (10 mg total) by mouth daily    Multiple Vitamins-Minerals (MULTI-VITAMIN GUMMIES PO) Take by mouth    nitroglycerin (NITROSTAT) 0 4 mg SL tablet Place 1 tablet (0 4 mg total) under the tongue every 5 (five) minutes as needed for chest pain    nystatin (MYCOSTATIN) powder APPLY TO AFFECTED AREA 3 TIMES A DAY    omeprazole (PriLOSEC) 20 mg delayed release capsule TAKE 1 CAPSULE BY MOUTH EVERY DAY    potassium chloride (Klor-Con M20) 20 mEq tablet Take 1 tablet (20 mEq total) by mouth 3 (three) times a day    promethazine (PHENERGAN) 25 mg tablet TAKE 1 TABLET BY MOUTH EVERY 6 HOURS AS NEEDED FOR NAUSEA AND VOMITING    rOPINIRole (REQUIP) 0 25 mg tablet TAKE 1 TABLET (0 25 MG TOTAL) BY MOUTH DAILY AT BEDTIME    sertraline (ZOLOFT) 50 mg tablet TAKE 1 TABLET BY MOUTH EVERY DAY    predniSONE 10 mg tablet Take 6 tabs on day one, then 5 tabs on day two, 4 tabs on day three, 3 tabs on day four, 2 tabs on day five, and 1 tab on day six  (Patient not taking: Reported on 1/25/2021)     No current facility-administered medications on file prior to visit  Allergies   Allergen Reactions    Sulfa Antibiotics Throat Swelling       Physical Exam:    /78   Pulse 68   Resp 16   Ht 5' 4" (1 626 m)   Wt 84 8 kg (187 lb)   BMI 32 10 kg/m²     Constitutional:normal, well developed, well nourished, alert, in no distress and non-toxic and no overt pain behavior    Eyes:anicteric  HEENT:grossly URINECX Final report 02/07/2019    URINECX 20,000-30,000 CFU/mL Escherichia coli (A) 10/05/2018     No results found for: MRSACX  Lab Results   Component Value Date    MRSAPCR No MRSA Detected 04/15/2023     No results found for: URCX  No components found for: LOWRESPCF  No results found for: THROATCX  No results found for: CULTURES  No components found for: STREPBCX  No results found for: STREPPNEUAG  No results found for: LEGIONELLA  No results found for: MYCOPLASCX  No results found for: GCCX  No results found for: WOUNDCX  No results found for: BODYFLDCX    No results found for: FLU    Lab Results   Component Value Date    ADENOVIRUS Not Detected 05/04/2023     Lab Results   Component Value Date    IC483M Not Detected 05/04/2023     Lab Results   Component Value Date    CVHKU1 Not Detected 05/04/2023     Lab Results   Component Value Date    CVNL63 Not Detected 05/04/2023     Lab Results   Component Value Date    CVOC43 Not Detected 05/04/2023     Lab Results   Component Value Date    HUMETPNEVS Not Detected 05/04/2023     Lab Results   Component Value Date    HURVEV Not Detected 05/04/2023     Lab Results   Component Value Date    FLUBPCR Not Detected 05/04/2023     Lab Results   Component Value Date    PARAINFLUE Not Detected 05/04/2023     Lab Results   Component Value Date    PARAFLUV2 Not Detected 05/04/2023     Lab Results   Component Value Date    PARAFLUV3 Detected (A) 05/04/2023     Lab Results   Component Value Date    PARAFLUV4 Not Detected 05/04/2023     Lab Results   Component Value Date    BPERTPCR Not Detected 05/04/2023     No results found for: [id number]  Lab Results   Component Value Date    CPNEUPCR Not Detected 05/04/2023     Lab Results   Component Value Date    MPNEUMO Not Detected 05/04/2023     Lab Results   Component Value Date    FLUAPCR Not Detected 05/04/2023     No results found for: FLUAH3  No results found for: FLUAH1  Lab Results   Component Value Date    RSV Not Detected  intact  Neck:supple, symmetric, trachea midline and no masses   Pulmonary:even and unlabored  Cardiovascular:No edema or pitting edema present  Skin:Normal without rashes or lesions and well hydrated  Psychiatric:Mood and affect appropriate  Neurologic:Cranial Nerves II-XII grossly intact  Musculoskeletal:normal   Lumbar Spine Exam    Appearance:  Normal lordosis  Palpation/Tenderness:  left sacroiliac joint tenderness      Range of Motion:  Flexion:  Minimally limited  with pain  Extension:  No limitation  with pain  Lateral Flexion - Left:  No limitation  with pain  Lateral Flexion - Right:  No limitation  with pain  Rotation - Left:  No limitation  with pain  Rotation - Right:  No limitation  with pain  Motor Strength:  Left hip flexion:  5/5  Left hip extension:  5/5  Right hip flexion:  5/5  Right hip extension:  5/5  Left knee flexion:  5/5  Left knee extension:  5/5  Right knee flexion:  5/5  Right knee extension:  5/5  Left foot dorsiflexion:  5/5  Left foot plantar flexion:  5/5  Right foot dorsiflexion:  5/5  Right foot plantar flexion:  5/5      Special Tests:  Left Straight Leg Test:  negative  Right Straight Leg Test:  negative  Left Piyush's Maneuver:  positive  Right Piyush's Maneuver:  negative  Left Gaenslen's Test:  positive  Right Gaenslen's Test:  negative  Left Pelvic Distraction Test:  positive  Right Pelvic Distraction Test:  negative      Imaging 05/04/2023     Lab Results   Component Value Date    BPARAPCR Not Detected 05/04/2023       COVID 19:  Lab Results   Component Value Date    COVID19 Not Detected 05/04/2023           No results found for: THCURSCR  No results found for: PCPUR  No results found for: COCAINEUR  No results found for: METAMPSCNUR  No results found for: LABOPIASCN  No results found for: AMPHETSCREEN  No results found for: LABBENZSCN  No results found for: TRICYCLICSCN  No results found for: LABMETHSCN  No results found for: BARBITSCNUR  No results found for: OXYCODONESCN  No results found for: PROPOXSCN  No results found for: BUPRENORSCNU  No results found for: ETHANOLMGDL  No results found for: ETOHPCT      ABG: Reviewed  Recent Labs     05/05/23 2009 05/06/23 0521 05/07/23  1013   PHART 7.369 7.339* 7.386   WRR7ELU 75.6* 81.4* 74.8*   PO2ART 77.8 97.2 69.7*   THM8RYN 43.6* 43.7* 44.8*   BASEEXCESS 14.8* 14.4* 16.0*       Lab Results   Component Value Date    LACTATE 1.3 05/04/2023    LACTATE 1.2 04/13/2023    LACTATE 2.7 (C) 04/13/2023         Imaging Study: Latest imaging studies was reviewed personally.   Imaging Results (Last 72 Hours)     Procedure Component Value Units Date/Time    XR Chest 1 View [[id number]] Resulted: 05/08/23 0852     Updated: 05/08/23 0906    XR Chest 1 View [[id number]] Collected: 05/06/23 0922     Updated: 05/06/23 0925    Narrative:      PROCEDURE: XR CHEST 1 VW-     HISTORY: Respiratory failure follow-up; J96.21-Acute and chronic  respiratory failure with hypoxia; J96.22-Acute and chronic respiratory  failure with hypercapnia; J44.1-Chronic obstructive pulmonary disease  with (acute) exacerbation; B34.8-Other viral infections of unspecified  site     COMPARISON: May 4, 2023.     FINDINGS: The heart is upper limits of normal in size. Aortic mural  calcifications noted.. There is mild bilateral interstitial disease,  stable from prior exam. Slight blunting of the left costophrenic angle  is stable.. The  mediastinum is unremarkable. There is no pneumothorax.   There are no acute osseous abnormalities.       Impression:      Stable chest..           This report was signed and finalized on 5/6/2023 9:23 AM by Shraddha Wharton MD.            ECHO:  Results for orders placed during the hospital encounter of 04/13/23    Adult Transthoracic Echo Complete W/ Cont if Necessary Per Protocol    Interpretation Summary  •  Left ventricular diastolic function is consistent with (grade I) impaired relaxation.  •  Mild aortic valve stenosis is present.  •  Estimated right ventricular systolic pressure from tricuspid regurgitation is moderately elevated (45-55 mmHg).  •  Mild to moderate pulmonary hypertension is present.      Results for orders placed during the hospital encounter of 10/01/17    Adult Transthoracic Echo Complete W/ Cont if Necessary Per Protocol    Interpretation Summary  TEchnically difficult study  1) Borderline LVH with normal LV systolic function ( EF is over 55%)  2) Normal left atrial size with mild elevation in LVedp  3) Trace MR  4) Mild TR with normal PA pressures  5) Mild AI  6) Small RV with normal RV function        Pharmacy to Dose enoxaparin (LOVENOX),           Assessment:  1.  Acute respiratory failure   2.  Acute exacerbation of COPD   3.  Chronic respiratory acidosis  4.  Smoking  5.  Pulmonary hypertension    Discussion/Recommendations:   The patient acute respiratory failure is once again likely secondary to exacerbation of underlying, what appears to be severe, COPD.  She unfortunately stopped using her Trelegy, as per the primary care provider's last office note that I reviewed from May 1, 2023.    Given the likelihood of severe COPD, Spiriva alone is not appropriate especially given her recent hospitalization for COPD exacerbation.    I have asked the nursing staff to decrease FiO2 as tolerated.    Given her continued respiratory acidosis, albeit better compared to before, I have asked the  "nursing staff to make sure that the patient uses BiPAP 2 hours on and 2 hours off throughout the day along with overnight use.    I was adjusted the nebulized treatments as appropriate, and started her on hypertonic saline.     I have also adjusted the dose of steroids, as appropriate.    ABG will be repeated, in the morning.    Chest x-ray will be repeated, as clinically indicated.    The patient will definitely need to quit smoking and this was discussed with the patient in great detail.    Her pulmonary hypertension is likely secondary to significant COPD.    We will consider further work-up if necessary, on an outpatient basis.    Patient will need outpatient work-up including PFTs.  This will be discussed before discharge.    I had a long discussion with the patient regarding the need to be compliant with recommendations.  Since the patient could not specify any significant issues with regards to Trelegy other than the fact that it was \"powder\", we may consider discharging her on combination of medications or Breztri.    The plan was discussed with the patient.  I have also discussed the case with the nursing staff.    Recommendations were also discussed with the referring provider.     I would like to thank you for the opportunity to participate in the care of this patient.  We will communicate changes and recommendations, if and when necessary.      This document was electronically signed by Arnaldo Soriano MD on 05/08/23 at 09:42 EDT      Dictated utilizing Dragon dictation.        "

## 2023-05-08 NOTE — PROGRESS NOTES
[facility]    Name:  Carolin Toscano   Age:  77 y.o.  Sex:  female  :  1945  MRN:  [id number]   Visit Number:  [id number]  Admission Date:  2023  Date Of Service:  23  Primary Care Physician:  Jason Nicholson MD     LOS: 4 days :    Chief Complaint:      Shortness of breath     Subjective:    Patient was seen and examined at bedside.  Patient laying in bed comfortably, appears to be improving clinically again today.  Patient was off BiPAP and was on 6 L of nasal cannula.  Patient appears more awake and energetic today.  Patient reports overall improvement, however continues to have intermittent episodes of shortness of breath and cough.  She denies any chest pain or abdominal pain.  Patient was fully conversational, patient was unaware of any sick contacts prior to admission and was wondering where she got the virus from.  Vitals are stable, afebrile on 6 L through nasal cannula.    Hospital Course:    Patient is a 77 years old female with a past medical history of COPD, chronic respiratory failure on 5 to 6 L per her previous discharge, on NIV machine at home and trelegy, hypertension, and ongoing tobacco use who presented to the ER from home by EMS with a chief complaint of shortness of breath.  Patient reporting that she started having shortness of breath associated with productive cough since last night and has persisted and became worse that promoted her to call EMS.  EMS has found the patient to be at 65% saturation on her normal 6 L of oxygen.  She received a breathing treatment in route.  Patient was recently admitted to the hospital on  and discharged on 2023 with similar clinical picture of Respiratory failure, COPD exacerbation and pneumonia.  Patient was discharged on 6 L previously.  Patient reports compliance with her NIV machine at home.  Denies any sick contacts that she is aware of.  Patient denies any chest pain or abdominal  pain.  Denies any other complaints.     On ER evaluation, Patient was found to be tachycardic with a heart rate of 130s, temperature of 100.2 and respirations of 30, /72.  Patient was placed on BiPAP with FiO2 of 40%.  Her ABG came back and showed pH of 7.296/PCO2 87/PO2 94/HCO3 42/saturation 97% on 5 L nasal cannula.  HS Troponin 44, proBNP WNL, glucose 192, CO2 of 40, otherwise CBC and CMP within acceptable range.  Lactate and Pro-Adi WNL.  Respiratory panel positive for parainfluenza virus.  Chest x-ray Mild interstitial disease  bilaterally is similar to prior. No area of consolidation is seen.  Urinalysis negative for nitrates, leukocytes, WBC or bacteria with squamous epithelial cells.  Patient received Solu-Medrol, normal saline bolus of 2124 mL, magnesium and Aztreonam while in the ER.  Hospitalist consulted for admission, further evaluation and treatment.    Review of Systems:     All systems were reviewed and negative except as mentioned in subjective, assessment and plan.    Vital Signs:    Temp:  [97.9 °F (36.6 °C)-98.6 °F (37 °C)] 98.5 °F (36.9 °C)  Heart Rate:  [57-83] 57  Resp:  [18-38] 38  BP: (119-149)/(52-90) 143/62    Intake and output:    I/O last 3 completed shifts:  In: 360 [P.O.:360]  Out: -   No intake/output data recorded.    Physical Examination:    General Appearance:  Alert and cooperative.  Chronically ill-appearing.   Head:  Atraumatic and normocephalic.   Eyes: Conjunctivae and sclerae normal, no icterus. No pallor.   Throat: No oral lesions, no thrush, oral mucosa moist.   Neck: Supple, trachea midline, no thyromegaly.   Lungs:   Breath sounds heard bilaterally equally.  Mildly labored, on BiPAP.  Diffuse bilateral wheezing heard.  Decreased air movement bilaterally.    Heart:  Normal S1 and S2, no murmur, no gallop, no rub. No JVD.   Abdomen:   Normal bowel sounds, no masses, no organomegaly. Soft, nontender, nondistended, no rebound tenderness.   Extremities: Supple, no edema,  no cyanosis, no clubbing.   Skin: No bleeding or rash.   Neurologic: Alert and oriented x 3. No facial asymmetry. Moves all four limbs. No tremors.      Laboratory results:    Results from last 7 days   Lab Units 05/07/23  1003 05/06/23  0611 05/05/23  0524 05/04/23  1447   SODIUM mmol/L 144 144 142 140   POTASSIUM mmol/L 4.4 4.9 4.6 4.4   CHLORIDE mmol/L 98 100 99 95*   CO2 mmol/L 40.3* 40.1* 34.6* 40.2*   BUN mg/dL 16 19 10 10   CREATININE mg/dL 0.34* 0.28* 0.29* 0.38*   CALCIUM mg/dL 9.2 9.0 8.4* 9.1   BILIRUBIN mg/dL  --   --   --  0.4   ALK PHOS U/L  --   --   --  87   ALT (SGPT) U/L  --   --   --  30   AST (SGOT) U/L  --   --   --  23   GLUCOSE mg/dL 195* 178* 155* 192*     Results from last 7 days   Lab Units 05/07/23  1003 05/06/23  0611 05/05/23  0525   WBC 10*3/mm3 10.60 10.46 4.57   HEMOGLOBIN g/dL 12.6 12.5 12.1   HEMATOCRIT % 42.4 41.3 40.7   PLATELETS 10*3/mm3 138* 140 115*         Results from last 7 days   Lab Units 05/05/23  0725 05/05/23  0524 05/04/23  1447   HSTROP T ng/L 22* 25* 44*     Results from last 7 days   Lab Units 05/04/23  1500   BLOODCX  No growth at 3 days  No growth at 3 days     Recent Labs     05/05/23 2009 05/06/23  0521 05/07/23  1013   PHART 7.369 7.339* 7.386   BVZ4QQV 75.6* 81.4* 74.8*   PO2ART 77.8 97.2 69.7*   RCS2RBR 43.6* 43.7* 44.8*   BASEEXCESS 14.8* 14.4* 16.0*      I have reviewed the patient's laboratory results.    Radiology results:    No radiology results from the last 24 hrs  I have reviewed the patient's radiology reports.    Medication Review:     I have reviewed the patient's active and prn medications.     Problem List:      Acute on chronic respiratory failure with hypoxia and hypercapnia      Assessment:    Acute on chronic respiratory failure with hypoxia and hypercapnia, likely secondary to #2 and 3, POA  Acute COPD exacerbation, POA  Parainfluenza infection, POA  Elevated troponin, likely secondary to demand ischemia, POA  Hypertension  Chronic tobacco  abuse  Pulmonary hypertension  Arthritis    Plan:    Respiratory failure with hypoxia/hypercapnia, COPD exacerbation, parainfluenza  -Continue supplemental oxygen to keep saturation above 90%, patient on 5 to 6 L at baseline, continue to titrate down as able to.  Continue BiPAP therapy.  -Patient met SIRS criteria on arrival, likely secondary to influenza infection and respiratory failure. Received Aztreonam and sepsis protocol IV fluids boluses.  -Procalcitonin WNL, WBC WNL, bacterial infection less likely, will hold off on antibiotics for now.  -On Trelegy at home, Spiriva and Symbicort while here.  - Solu-Medrol, will taper to 40 mg every 12 hours, continue to taper down as she improves.   -Bronchodilators, Mucinex and supportive care.  -Discussed with pulmonology, Dr. Soriano, appreciate his recommendations.  -Repeat ABG and chest x-ray as indicated.  -Patient appears to be improving clinically    Elevated troponin  -Likely secondary to demand ischemia in settings of respiratory failure  -Patient denies any chest pain, low suspicion for ACS  -Troponin trended down and plateaued.     Hyperglycemia  -Will cover with sliding scale insulin while on steroids  -Hemoglobin A1c 6.7     Further orders as indicated per clinical course.    PT/ OT consulted, case management consulted, appreciate their help.  Patient would benefit from rehab on discharge, she has declined rehab in the past, case management following after contacting the family who will discuss with the patient and let us know.    DVT Prophylaxis: Lovenox prophylaxis (benefit> risk)  Code Status: Full  Diet: Cardiac  Discharge Plan: To be determined, likely needs 1 to 2 days in the hospital.     Lolis Vital MD  05/08/23  09:03 EDT    Dictated utilizing Dragon dictation.

## 2023-05-08 NOTE — PLAN OF CARE
Goal Outcome Evaluation:  Plan of Care Reviewed With: patient        Progress: no change  Outcome Evaluation: OT tx completed. Patient sitting in chair, on 6L simple mask satting 93%. Patient performed BUE AROM. Performed sit to stand min A, for perineal hygiene and brief change max A d/t urinary incontinence. Notified RN urine smelled strong. Patient noticeably fatigued with minimal activity. Further ther ex/ther act held so patient could rest before lunch. Continue OT POC

## 2023-05-08 NOTE — PAYOR COMM NOTE
"To:  [employer]  From: Lucia Burgos RN  Phone: [phone]  Fax: [phone]  NPI: [id number]  TIN: [id number]  Member ID: [id number]  MRN: [id number]    Carolin Toscano (77 y.o. Female)       Date of Birth   1945    Social Security Number       Address   [address] [facility] [address]    Home Phone   [phone]    MRN   [id number]       Worship   [employer]    Marital Status                               Admission Date   5/4/23    Admission Type   Emergency    Admitting Provider       Attending Provider   Lolis Vital MD    [facility]       Discharge Date       Discharge Disposition       Discharge Destination                                 Attending Provider: Lolis Vital MD    Allergies: Contrast Dye (Echo Or Unknown Ct/mr), Ciprofloxacin, Keflex [Cephalexin], Penicillins, Sulfa Antibiotics, Terramycin [Oxytetracycline], Prednisone, Latex, Percocet [Oxycodone-acetaminophen], Xyzal [Levocetirizine]    Isolation: Droplet   Infection: None   Code Status: CPR    Ht: 160 cm (63\")   Wt: 68.2 kg (150 lb 5.7 oz)    Admission Cmt: None   Principal Problem: Acute on chronic respiratory failure with hypoxia and hypercapnia [J96.21,J96.22]                   Active Insurance as of 5/4/2023       Primary Coverage       Payor Plan Insurance Group Employer/Plan Group    HUMANA MEDICARE REPLACEMENT HUMANA MEDICARE REPLACEMENT [id number]       Payor Plan Address Payor Plan Phone Number Payor Plan Fax Number Effective Dates    [address] [address] [phone]  1/1/2018 - None Entered    [facility] [address]         Subscriber Name Subscriber Birth Date Member ID       CAROLIN TOSCANO 1945 [id number]               Secondary Coverage       Payor Plan Insurance Group Employer/Plan Group    [place]        Payor Plan Address Payor Plan Phone Number Payor Plan Fax Number Effective Dates    [address] [phone]  10/11/2021 - None " Entered    [facility] [id number]         Subscriber Name Subscriber Birth Date Member ID       DAMIAN DYE 1945 [id number]                     Emergency Contacts        (Rel.) Home Phone Work Phone Mobile Phone    Nehal Hoffmann (Daughter) [phone] -- --              Vital Signs (last day)       Date/Time Temp Temp src Pulse Resp BP Patient Position SpO2    05/08/23 0705 -- -- 57 38 -- -- 98    05/08/23 0645 98.5 (36.9) Axillary 83 25 143/62 Lying 93    05/08/23 0300 -- -- -- 22 -- -- --    05/08/23 0257 98.5 (36.9) Oral 66 35 137/52 Lying --    05/07/23 2259 97.9 (36.6) Oral 74 20 144/61 Lying --    05/07/23 1957 -- -- 80 20 -- -- 93    05/07/23 1846 98.6 (37) Oral 78 24 149/60 Lying --    05/07/23 1558 98.4 (36.9) Oral 79 18 119/90 Lying --    05/07/23 1256 -- -- -- 20 -- -- --    05/07/23 1045 98.3 (36.8) Oral 71 18 135/69 Lying 93    05/07/23 0700 98.6 (37) Oral 89 16 159/66 Lying 96    05/07/23 0648 -- -- 85 21 -- -- 97    05/07/23 0600 -- -- 86 -- -- -- 95    05/07/23 0500 -- -- 84 -- -- -- 93    05/07/23 0410 97.4 (36.3) Axillary 84 28 147/62 Lying 94    05/07/23 0400 -- -- 71 -- -- -- 93    05/07/23 0300 -- -- 75 -- -- -- 93    05/07/23 0200 -- -- 58 -- -- -- 93    05/07/23 0100 -- -- 82 -- -- -- 94    05/07/23 0027 -- -- 88 37 -- -- 95    05/07/23 0000 -- -- 84 -- -- -- 95          Current Facility-Administered Medications   Medication Dose Route Frequency Provider Last Rate Last Admin    acetaminophen (TYLENOL) tablet 650 mg  650 mg Oral Q4H PRN Lolis Vital MD   650 mg at 05/07/23 1521    Or    acetaminophen (TYLENOL) 160 MG/5ML solution 650 mg  650 mg Oral Q4H PRN Lolis Vital MD        Or    acetaminophen (TYLENOL) suppository 650 mg  650 mg Rectal Q4H PRN Lolis Vital MD   650 mg at 05/04/23 2005    albuterol (PROVENTIL) nebulizer solution 0.083% 2.5 mg/3mL  2.5 mg Nebulization Q6H PRN Lolis Vital MD        amLODIPine (NORVASC) tablet 10 mg  10 mg Oral Daily  Lolis Vital MD   10 mg at 05/07/23 0833    atenolol (TENORMIN) tablet 25 mg  25 mg Oral Daily Lolis Vital MD   25 mg at 05/07/23 0833    sennosides-docusate (PERICOLACE) 8.6-50 MG per tablet 2 tablet  2 tablet Oral BID Lolis Vital MD   2 tablet at 05/07/23 2046    And    polyethylene glycol (MIRALAX) packet 17 g  17 g Oral Daily PRN Lolis Vital MD        And    bisacodyl (DULCOLAX) EC tablet 5 mg  5 mg Oral Daily PRN Lolis Vital MD        And    bisacodyl (DULCOLAX) suppository 10 mg  10 mg Rectal Daily PRN Lolis Vital MD        budesonide-formoterol (SYMBICORT) 160-4.5 MCG/ACT inhaler 2 puff  2 puff Inhalation BID - RT Lolis Vital MD   2 puff at 05/08/23 0655    And    tiotropium (SPIRIVA RESPIMAT) 2.5 mcg/act aerosol solution inhaler  2 puff Inhalation Daily - RT Lolis Vital MD   2 puff at 05/08/23 0655    dextrose (D50W) (25 g/50 mL) IV injection 25 g  25 g Intravenous Q15 Min PRN Lolis Vital MD        dextrose (GLUTOSE) oral gel 15 g  15 g Oral Q15 Min PRN Lolis Vital MD        Enoxaparin Sodium (LOVENOX) syringe 40 mg  40 mg Subcutaneous Q24H Lolis Vital MD   40 mg at 05/07/23 2046    glucagon (GLUCAGEN) injection 1 mg  1 mg Intramuscular Q15 Min PRN Lolis Vital MD        guaiFENesin (MUCINEX) 12 hr tablet 600 mg  600 mg Oral Q12H Lolis Vital MD   600 mg at 05/07/23 2046    guaiFENesin-dextromethorphan (ROBITUSSIN DM) 100-10 MG/5ML syrup 10 mL  10 mL Oral Q4H PRN Lolis Vital MD        Insulin Aspart (novoLOG) injection 0-9 Units  0-9 Units Subcutaneous TID AC Lolis Vital MD   2 Units at 05/07/23 1212    ipratropium-albuterol (DUO-NEB) nebulizer solution 3 mL  3 mL Nebulization Q6H - RT Lolis Vital MD   3 mL at 05/08/23 0655    melatonin tablet 5 mg  5 mg Oral Nightly PRN Lolis Vital MD   5 mg at 05/07/23 2046    methylPREDNISolone sodium succinate (SOLU-Medrol) injection 40 mg  40 mg Intravenous Q12H Lolis Vital MD   40 mg  at 05/08/23 0347    multivitamin with minerals 1 tablet  1 tablet Oral Daily Lolis Vital MD   1 tablet at 05/07/23 0833    ondansetron (ZOFRAN) injection 4 mg  4 mg Intravenous Q6H PRN Lolis Vital MD        Pharmacy to Dose enoxaparin (LOVENOX)   Does not apply Continuous PRN Lolis Vital MD        sodium chloride 0.9 % flush 10 mL  10 mL Intravenous PRN Lolis Vital MD        sodium chloride 0.9 % flush 3 mL  3 mL Intravenous Q12H Lolis Vital MD   3 mL at 05/07/23 2046    sodium chloride 0.9 % flush 3-10 mL  3-10 mL Intravenous PRN Lolis Vital MD        sodium chloride 0.9 % infusion 40 mL  40 mL Intravenous PRN Lolis Vital MD         Lab Results (last 24 hours)       Procedure Component Value Units Date/Time    POC Glucose Once [[id number]]  (Abnormal) Collected: 05/08/23 0607    Specimen: Blood Updated: 05/08/23 0611     Glucose 148 mg/dL      Comment: Serial Number: [id number]:  [id number]       POC Glucose Once [[id number]]  (Abnormal) Collected: 05/07/23 2018    Specimen: Blood Updated: 05/07/23 2024     Glucose 197 mg/dL      Comment: Serial Number: [id number]:  [id number]       POC Glucose Once [[id number]]  (Abnormal) Collected: 05/07/23 1616    Specimen: Blood Updated: 05/07/23 1621     Glucose 146 mg/dL      Comment: Serial Number: [id number]:  [id number]       Blood Culture With BERNARD - Blood, Hand, Left [[id number]]  (Normal) Collected: 05/04/23 1500    Specimen: Blood from Hand, Left Updated: 05/07/23 1515     Blood Culture No growth at 3 days    Blood Culture With BERNARD - Blood, Hand, Right [[id number]]  (Normal) Collected: 05/04/23 1500    Specimen: Blood from Hand, Right Updated: 05/07/23 1515     Blood Culture No growth at 3 days    POC Glucose Once [[id number]]  (Abnormal) Collected: 05/07/23 1044    Specimen: Blood Updated: 05/07/23 1056     Glucose 169 mg/dL      Comment: Serial Number: [id number]:  [id number]       Procalcitonin [[id number]]  (Normal)  "Collected: 05/07/23 1003    Specimen: Blood Updated: 05/07/23 1048     Procalcitonin 0.14 ng/mL     Narrative:      As a Marker for Sepsis (Non-Neonates):    1. <0.5 ng/mL represents a low risk of severe sepsis and/or septic shock.  2. >2 ng/mL represents a high risk of severe sepsis and/or septic shock.    As a Marker for Lower Respiratory Tract Infections that require antibiotic therapy:    PCT on Admission    Antibiotic Therapy       6-12 Hrs later    >0.5                Strongly Recommended  >0.25 - <0.5        Recommended   0.1 - 0.25          Discouraged              Remeasure/reassess PCT  <0.1                Strongly Discouraged     Remeasure/reassess PCT    As 28 day mortality risk marker: \"Change in Procalcitonin Result\" (>80% or <=80%) if Day 0 (or Day 1) and Day 4 values are available. Refer to http://www.[employer]-pct-calculator.com    Change in PCT <=80%  A decrease of PCT levels below or equal to 80% defines a positive change in PCT test result representing a higher risk for 28-day all-cause mortality of patients diagnosed with severe sepsis for septic shock.    Change in PCT >80%  A decrease of PCT levels of more than 80% defines a negative change in PCT result representing a lower risk for 28-day all-cause mortality of patients diagnosed with severe sepsis or septic shock.       Basic Metabolic Panel [[id number]]  (Abnormal) Collected: 05/07/23 1003    Specimen: Blood Updated: 05/07/23 1043     Glucose 195 mg/dL      BUN 16 mg/dL      Creatinine 0.34 mg/dL      Sodium 144 mmol/L      Potassium 4.4 mmol/L      Chloride 98 mmol/L      CO2 40.3 mmol/L      Calcium 9.2 mg/dL      BUN/Creatinine Ratio 47.1     Anion Gap 5.7 mmol/L      eGFR 106.8 mL/min/1.73     Narrative:      GFR Normal >60  Chronic Kidney Disease <60  Kidney Failure <15    The GFR formula is only valid for adults with stable renal function between ages 18 and 70.    CBC (No Diff) [[id number]]  (Abnormal) Collected: 05/07/23 1003    " Specimen: Blood Updated: 23 1025     WBC 10.60 10*3/mm3      RBC 4.32 10*6/mm3      Hemoglobin 12.6 g/dL      Hematocrit 42.4 %      MCV 98.1 fL      MCH 29.2 pg      MCHC 29.7 g/dL      RDW 14.8 %      RDW-SD 54.0 fl      MPV 10.8 fL      Platelets 138 10*3/mm3     Blood Gas, Arterial With Co-Ox [[id number]]  (Abnormal) Collected: 23 1013    Specimen: Arterial Blood Updated: 23 1013     Site Right Radial     Partha's Test Positive     pH, Arterial 7.386 pH units      pCO2, Arterial 74.8 mm Hg      Comment: 86 Value above critical limit        pO2, Arterial 69.7 mm Hg      Comment: 84 Value below reference range        HCO3, Arterial 44.8 mmol/L      Comment: 83 Value above reference range        Base Excess, Arterial 16.0 mmol/L      Comment: 83 Value above reference range        O2 Saturation, Arterial 94.8 %      Hematocrit, Blood Gas 40.1 %      Oxyhemoglobin 93.9 %      Comment: 84 Value below reference range        Methemoglobin 0.40 %      Carboxyhemoglobin 0.6 %      A-a DO2 --     Comment: UNABLE TO CALCULATE        Barometric Pressure for Blood Gas 735 mmHg      Modality Nasal Cannula     Flow Rate 6.0 lpm      Ventilator Mode NA     Note --     Notified Who NURSE     Notified By [id number]     Notified Time 2023 10:13     Collected by [id number]     Comment: Meter: [id number]     :  [id number]        pH, Temp Corrected --     pCO2, Temperature Corrected --     pO2, Temperature Corrected --          Imaging Results (Last 24 Hours)       ** No results found for the last 24 hours. **          Orders (last 24 hrs)           Physician Progress Notes (last 24 hours)        Lolis Vital MD at 23 0952              [facility]    Name:  Carolin Toscano   Age:  77 y.o.  Sex:  female  :  1945  MRN:  [id number]   Visit Number:  [id number]  Admission Date:  2023  Date Of Service:  23  Primary Care Physician:  Jason Nicholson  MD DELMER     LOS: 3 days :    Chief Complaint:      Shortness of breath     Subjective:    Patient was seen and examined at bedside.  Patient laying in bed comfortably, appears to be improving clinically again today.  Patient was off BiPAP and was on 6 L of nasal cannula.  Patient appears more awake and energetic today.  Patient reports overall improvement, however continues to have intermittent episodes of shortness of breath and cough.  She denies any chest pain or abdominal pain.  Patient was fully conversational, patient was unaware of any sick contacts prior to admission and was wondering where she got the virus from.  Vitals are stable, afebrile on 6 L through nasal cannula.    Hospital Course:    Patient is a 77 years old female with a past medical history of COPD, chronic respiratory failure on 5 to 6 L per her previous discharge, on NIV machine at home and trelegy, hypertension, and ongoing tobacco use who presented to the ER from home by EMS with a chief complaint of shortness of breath.  Patient reporting that she started having shortness of breath associated with productive cough since last night and has persisted and became worse that promoted her to call EMS.  EMS has found the patient to be at 65% saturation on her normal 6 L of oxygen.  She received a breathing treatment in route.  Patient was recently admitted to the hospital on 4/13 and discharged on 4/21/2023 with similar clinical picture of Respiratory failure, COPD exacerbation and pneumonia.  Patient was discharged on 6 L previously.  Patient reports compliance with her NIV machine at home.  Denies any sick contacts that she is aware of.  Patient denies any chest pain or abdominal pain.  Denies any other complaints.     On ER evaluation, Patient was found to be tachycardic with a heart rate of 130s, temperature of 100.2 and respirations of 30, /72.  Patient was placed on BiPAP with FiO2 of 40%.  Her ABG came back and showed pH of 7.296/PCO2  87/PO2 94/HCO3 42/saturation 97% on 5 L nasal cannula.  HS Troponin 44, proBNP WNL, glucose 192, CO2 of 40, otherwise CBC and CMP within acceptable range.  Lactate and Pro-Adi WNL.  Respiratory panel positive for parainfluenza virus.  Chest x-ray Mild interstitial disease  bilaterally is similar to prior. No area of consolidation is seen.  Urinalysis negative for nitrates, leukocytes, WBC or bacteria with squamous epithelial cells.  Patient received Solu-Medrol, normal saline bolus of 2124 mL, magnesium and Aztreonam while in the ER.  Hospitalist consulted for admission, further evaluation and treatment.    Review of Systems:     All systems were reviewed and negative except as mentioned in subjective, assessment and plan.    Vital Signs:    Temp:  [97.4 °F (36.3 °C)-98.6 °F (37 °C)] 98.6 °F (37 °C)  Heart Rate:  [58-95] 89  Resp:  [16-37] 16  BP: (115-159)/(56-66) 159/66    Intake and output:    I/O last 3 completed shifts:  In: 240 [P.O.:240]  Out: 725 [Urine:725]  No intake/output data recorded.    Physical Examination:    General Appearance:  Alert and cooperative.  Chronically ill-appearing.   Head:  Atraumatic and normocephalic.   Eyes: Conjunctivae and sclerae normal, no icterus. No pallor.   Throat: No oral lesions, no thrush, oral mucosa moist.   Neck: Supple, trachea midline, no thyromegaly.   Lungs:   Breath sounds heard bilaterally equally.  Mildly labored, on BiPAP.  Diffuse bilateral wheezing heard.  Decreased air movement bilaterally.    Heart:  Normal S1 and S2, no murmur, no gallop, no rub. No JVD.   Abdomen:   Normal bowel sounds, no masses, no organomegaly. Soft, nontender, nondistended, no rebound tenderness.   Extremities: Supple, no edema, no cyanosis, no clubbing.   Skin: No bleeding or rash.   Neurologic: Alert and oriented x 3. No facial asymmetry. Moves all four limbs. No tremors.      Laboratory results:    Results from last 7 days   Lab Units 05/06/23  0611 05/05/23  0524 05/04/23  8505    SODIUM mmol/L 144 142 140   POTASSIUM mmol/L 4.9 4.6 4.4   CHLORIDE mmol/L 100 99 95*   CO2 mmol/L 40.1* 34.6* 40.2*   BUN mg/dL 19 10 10   CREATININE mg/dL 0.28* 0.29* 0.38*   CALCIUM mg/dL 9.0 8.4* 9.1   BILIRUBIN mg/dL  --   --  0.4   ALK PHOS U/L  --   --  87   ALT (SGPT) U/L  --   --  30   AST (SGOT) U/L  --   --  23   GLUCOSE mg/dL 178* 155* 192*     Results from last 7 days   Lab Units 05/06/23  0611 05/05/23  0525 05/04/23  1447   WBC 10*3/mm3 10.46 4.57 8.04   HEMOGLOBIN g/dL 12.5 12.1 13.3   HEMATOCRIT % 41.3 40.7 44.5   PLATELETS 10*3/mm3 140 115* 149         Results from last 7 days   Lab Units 05/05/23  0725 05/05/23  0524 05/04/23  1447   HSTROP T ng/L 22* 25* 44*     Results from last 7 days   Lab Units 05/04/23  1500   BLOODCX  No growth at 2 days  No growth at 2 days     Recent Labs     05/05/23  0932 05/05/23 2009 05/06/23  0521   PHART 7.286* 7.369 7.339*   AZI1QOJ 85.2* 75.6* 81.4*   PO2ART 85.4 77.8 97.2   RIL1CUV 40.6* 43.6* 43.7*   BASEEXCESS 10.5* 14.8* 14.4*      I have reviewed the patient's laboratory results.    Radiology results:    XR Chest 1 View    Result Date: 5/6/2023  PROCEDURE: XR CHEST 1 VW-  HISTORY: Respiratory failure follow-up; J96.21-Acute and chronic respiratory failure with hypoxia; J96.22-Acute and chronic respiratory failure with hypercapnia; J44.1-Chronic obstructive pulmonary disease with (acute) exacerbation; B34.8-Other viral infections of unspecified site  COMPARISON: May 4, 2023.  FINDINGS: The heart is upper limits of normal in size. Aortic mural calcifications noted.. There is mild bilateral interstitial disease, stable from prior exam. Slight blunting of the left costophrenic angle is stable.. The mediastinum is unremarkable. There is no pneumothorax. There are no acute osseous abnormalities.      Impression: Stable chest..    This report was signed and finalized on 5/6/2023 9:23 AM by Shraddha Wharton MD.    I have reviewed the patient's radiology  reports.    Medication Review:     I have reviewed the patient's active and prn medications.     Problem List:      Acute on chronic respiratory failure with hypoxia and hypercapnia      Assessment:    Acute on chronic respiratory failure with hypoxia and hypercapnia, likely secondary to #2 and 3, POA  Acute COPD exacerbation, POA  Parainfluenza infection, POA  Elevated troponin, likely secondary to demand ischemia, POA  Hypertension  Chronic tobacco abuse  Pulmonary hypertension  Arthritis    Plan:    Respiratory failure with hypoxia/hypercapnia, COPD exacerbation, parainfluenza  -Continue supplemental oxygen to keep saturation above 90%, patient on 5 to 6 L at baseline, continue to titrate down as able to.  Continue BiPAP therapy.  -Patient met SIRS criteria on arrival, likely secondary to influenza infection and respiratory failure. Received Aztreonam and sepsis protocol IV fluids boluses.  -Procalcitonin WNL, WBC WNL, bacterial infection less likely, will hold off on antibiotics for now.  -On Trelegy at home, Spiriva and budesonide while here.  - Solu-Medrol, will taper to 40 mg every 12 hours, continue to taper down as she improves.   -Bronchodilators, Mucinex and supportive care.  -Patient was seen by pulmonology on previous admission, will need close follow-up with pulmonology on discharge.  -Repeat ABG and chest x-ray as indicated.  -Patient appears to be improving clinically     Elevated troponin  -Likely secondary to demand ischemia in settings of respiratory failure  -Patient denies any chest pain, low suspicion for ACS  -Troponin trended down and plateaued.     Hyperglycemia  -Will cover with sliding scale insulin while on steroids  -Hemoglobin A1c 6.7     Further orders as indicated per clinical course.    DVT Prophylaxis: Lovenox prophylaxis (benefit> risk)  Code Status: Full  Diet: Cardiac  Discharge Plan: To be determined, likely needs 2 to 3 days in the hospital.    Lolis Vital,  MD  05/07/23  09:52 EDT    Dictated utilizing Dragon dictation.      Electronically signed by Lolis Vital MD at 05/07/23 1434       Consult Notes (last 24 hours)  Notes from 05/07/23 0811 through 05/08/23 0811   No notes of this type exist for this encounter.

## 2023-05-08 NOTE — CASE MANAGEMENT/SOCIAL WORK
Case Management/Social Work    Patient Name:  Carolin Toscano  YOB: 1945  MRN: [id number]  Admit Date:  5/4/2023        TIFFANIE spoke with pt at bedside. Pt was wearing her BiPAP at time of visit, with no family in the room. SW asked pt if she would be agreeable to STR at d/c. Pt did not really respond to this question when asked. She mumbled something, SW was unable to understand. TIFFANIE contacted daughter, Nehal, who states pt lives with her brother and has her brother staying with her at all times and daughter Nehal and her  takes care of pt when needs. Nehal plans on coming to hospital today to speak with pt regarding STR options and see if pt is receptive to this dc plan. Nehal will follow up with this SW tomorrow. TIFFANIE following.       Electronically signed by:  NAOMI Fairbanks  05/08/23 14:08 EDT

## 2023-05-08 NOTE — PLAN OF CARE
Goal Outcome Evaluation:  Plan of Care Reviewed With: patient        Progress: improving  Outcome Evaluation: VSS; remains on 6L nc; wore bipap overnight; no acute events overnight

## 2023-05-08 NOTE — THERAPY TREATMENT NOTE
"Patient Name: Carolin Toscano  : 1945    MRN: [id number]                              Today's Date: 2023       Admit Date: 2023    Visit Dx:     ICD-10-CM ICD-9-CM   1. Acute on chronic respiratory failure with hypoxia and hypercapnia  J96.21 518.84    J96.22 786.09     799.02   2. COPD exacerbation  J44.1 491.21   3. Parainfluenza virus infection  B34.8 078.89     Patient Active Problem List   Diagnosis   • CAP (community acquired pneumonia)   • Cigarette nicotine dependence with nicotine-induced disorder   • Essential hypertension   • Dyslipidemia   • Blood glucose elevated   • Muscle ache   • COPD (chronic obstructive pulmonary disease)   • Nuclear sclerotic cataract of right eye   • Acute respiratory failure   • COPD exacerbation   • Acute on chronic respiratory failure with hypoxia and hypercapnia     Past Medical History:   Diagnosis Date   • Arthritis    • COPD (chronic obstructive pulmonary disease)    • Gall stones    • Goiter     \"inward\"   • Hypertension    • Hypertension    • Kidney infection    • Kidney stone    • Kidney stones    • Migraine headache    • Scarlet fever      Past Surgical History:   Procedure Laterality Date   • BLADDER SURGERY     • CATARACT EXTRACTION W/ INTRAOCULAR LENS IMPLANT Left 2022    Procedure: CATARACT PHACO EXTRACTION WITH INTRAOCULAR LENS IMPLANT LEFT;  Surgeon: Sathish Lopez MD;  Location: [facility];  Service: Ophthalmology;  Laterality: Left;   • CATARACT EXTRACTION W/ INTRAOCULAR LENS IMPLANT Right 2022    Procedure: CATARACT PHACO EXTRACTION WITH INTRAOCULAR LENS IMPLANT RIGHT;  Surgeon: Sathish Lopez MD;  Location: [facility];  Service: Ophthalmology;  Laterality: Right;   • CHOLECYSTECTOMY     • HYSTERECTOMY     • TONSILLECTOMY        General Information     Row Name 23 1216          OT Time and Intention    Document Type therapy note (daily note)  -SD     Mode of Treatment occupational therapy  -SD     Row Name 23 " 1216          General Information    Patient Profile Reviewed yes  -SD           User Key  (r) = Recorded By, (t) = Taken By, (c) = Cosigned By    Initials Name Provider Type    Cecelia Johnson OT Occupational Therapist                 Mobility/ADL's     Row Name 05/08/23 1216          Bed Mobility    Comment, (Bed Mobility) in chair  -SD     Row Name 05/08/23 1216          Transfers    Transfers sit-stand transfer  -SD     Row Name 05/08/23 1216          Sit-Stand Transfer    Sit-Stand [place] (Transfers) minimum assist (75% patient effort)  -SD     Assistive Device (Sit-Stand Transfers) walker, front-wheeled  -SD     Row Name 05/08/23 1216          Toileting Assessment/Training    [place] Level (Toileting) maximum assist (25% patient effort);change pad/brief;perform perineal hygiene  -SD     Comment, (Toileting) urinary incontinence; urine smelled foul notified RN  -SD           User Key  (r) = Recorded By, (t) = Taken By, (c) = Cosigned By    Initials Name Provider Type    Cecelia Johnson OT Occupational Therapist               Obj/Interventions     Row Name 05/08/23 1217          Motor Skills    Therapeutic Exercise shoulder;elbow/forearm;hand  BUE AROM x 10  -SD           User Key  (r) = Recorded By, (t) = Taken By, (c) = Cosigned By    Initials Name Provider Type    Cecelia Johnson OT Occupational Therapist               Goals/Plan    No documentation.                Clinical Impression     Row Name 05/08/23 1218          Pain Assessment    Pretreatment Pain Rating 0/10 - no pain  -SD     Posttreatment Pain Rating 0/10 - no pain  -SD     Row Name 05/08/23 1218          Plan of Care Review    Plan of Care Reviewed With patient  -SD     Progress no change  -SD     Outcome Evaluation OT tx completed. Patient sitting in chair, on 6L simple mask satting 93%. Patient performed BUE AROM. Performed sit to stand min A, for perineal hygiene and brief change max A d/t urinary incontinence.  Notified RN urine smelled strong. Patient noticeably fatigued with minimal activity. Further ther ex/ther act held so patient could rest before lunch. Continue OT POC  -SD     Row Name 05/08/23 1218          Vital Signs    Pre SpO2 (%) 93  -SD     O2 Delivery Pre Treatment supplemental O2  -SD     Intra SpO2 (%) 90  -SD     O2 Delivery Intra Treatment supplemental O2  -SD     Post SpO2 (%) 91  -SD     O2 Delivery Post Treatment supplemental O2  -SD     Row Name 05/08/23 1218          Positioning and Restraints    Pre-Treatment Position sitting in chair/recliner  -SD     Post Treatment Position chair  -SD     In Chair reclined;call light within reach;encouraged to call for assist;exit alarm on;notified nsg  -SD           User Key  (r) = Recorded By, (t) = Taken By, (c) = Cosigned By    Initials Name Provider Type    Cecelia Johnson OT Occupational Therapist               Outcome Measures     Row Name 05/08/23 1221          How much help from another is currently needed...    Putting on and taking off regular lower body clothing? 3  -SD     Bathing (including washing, rinsing, and drying) 3  -SD     Toileting (which includes using toilet bed pan or urinal) 2  -SD     Putting on and taking off regular upper body clothing 3  -SD     Taking care of personal grooming (such as brushing teeth) 4  -SD     Eating meals 4  -SD     AM-PAC 6 Clicks Score (OT) 19  -SD     Row Name 05/08/23 1221          Functional Assessment    Outcome Measure Options AM-PAC 6 Clicks Daily Activity (OT)  -SD           User Key  (r) = Recorded By, (t) = Taken By, (c) = Cosigned By    Initials Name Provider Type    Cecelia Johnson OT Occupational Therapist                Occupational Therapy Education     Title: PT OT SLP Therapies (In Progress)     Topic: Occupational Therapy (In Progress)     Point: ADL training (Done)     Description:   Instruct learner(s) on proper safety adaptation and remediation techniques during self care or  transfers.   Instruct in proper use of assistive devices.              Learning Progress Summary           Patient Acceptance, E,TB, VU by SD at 5/8/2023 1222    Comment: Safety during tf    Acceptance, E, VU by SP at 5/5/2023 1146    Comment: OT edcuated pt on purpose of IE and POC. Pt is agreeable.                   Point: Home exercise program (Not Started)     Description:   Instruct learner(s) on appropriate technique for monitoring, assisting and/or progressing therapeutic exercises/activities.              Learner Progress:  Not documented in this visit.          Point: Precautions (Not Started)     Description:   Instruct learner(s) on prescribed precautions during self-care and functional transfers.              Learner Progress:  Not documented in this visit.          Point: Body mechanics (Not Started)     Description:   Instruct learner(s) on proper positioning and spine alignment during self-care, functional mobility activities and/or exercises.              Learner Progress:  Not documented in this visit.                      User Key     Initials Effective Dates Name Provider Type Discipline    SD 06/16/21 -  Cecelia Knight OT Occupational Therapist OT    SP 09/08/22 -  January Chapa OT Occupational Therapist OT              OT Recommendation and Plan     Plan of Care Review  Plan of Care Reviewed With: patient  Progress: no change  Outcome Evaluation: OT tx completed. Patient sitting in chair, on 6L simple mask satting 93%. Patient performed BUE AROM. Performed sit to stand min A, for perineal hygiene and brief change max A d/t urinary incontinence. Notified RN urine smelled strong. Patient noticeably fatigued with minimal activity. Further ther ex/ther act held so patient could rest before lunch. Continue OT POC     Time Calculation:    Time Calculation- OT     Row Name 05/08/23 1223             Time Calculation- OT    OT Start Time 1133  -SD      OT Stop Time 1150  -SD      OT Time Calculation  (min) 17 min  -SD      OT Received On 05/08/23  -SD      OT Goal Re-Cert Due Date 05/15/23  -SD         Timed Charges    40340 - OT Therapeutic Exercise Minutes 7  -SD      87854 - OT Self Care/Mgmt Minutes 10  -SD         Total Minutes    Timed Charges Total Minutes 17  -SD       Total Minutes 17  -SD            User Key  (r) = Recorded By, (t) = Taken By, (c) = Cosigned By    Initials Name Provider Type    Cecelia Johnson OT Occupational Therapist              Therapy Charges for Today     Code Description Service Date Service Provider Modifiers Qty    [id number]  OT SELF CARE/MGMT/TRAIN EA 15 MIN 5/8/2023 Cecelia Knight OT GO 1               Cecelia Knight OT  5/8/2023

## 2023-05-08 NOTE — PROGRESS NOTES
Adult Nutrition  Assessment/PES    Patient Name:  Carolin Toscano  YOB: 1945  MRN: [id number]  Admit Date:  5/4/2023    Assessment Date:  5/8/2023    Comments:    Pt with continued poor PO intake, currently averaging 25% x 4 meals. Boost Plus ordered BID, pt refused yesterday 5/7 per flowsheets. Pt with elevated BG, will change ONS to Mighty Shake Reduced Sugar BID. RD to F/U and available PRN.      Reason for Assessment     Row Name 05/08/23 1326          Reason for Assessment    Reason For Assessment follow-up protocol                  Labs/Tests/Procedures/Meds     Row Name 05/08/23 1327          Labs/Procedures/Meds    Lab Results Reviewed reviewed, pertinent     Lab Results Comments High: Glu        Medications    Pertinent Medications Reviewed reviewed, pertinent     Pertinent Medications Comments lovenox, insulin, multivitamin with minerals, docusate, NaCl                Physical Findings     Row Name 05/08/23 1328          Physical Findings    Overall Physical Appearance normal weight, DUDLEY sacral spine Jose Miguel NGUYEN 16                  Nutrition Prescription Ordered     Row Name 05/08/23 1328          Nutrition Prescription PO    Current PO Diet Regular     Fluid Consistency Thin     Supplement Boost Plus (Ensure Enlive, Ensure Plus)     Supplement Frequency 2 times a day     Common Modifiers Cardiac                Evaluation of Received Nutrient/Fluid Intake     Row Name 05/08/23 1329          Intake Assessment    Energy/Calorie Requirement Assessment not meeting needs     Protein Requirement Assessment not meeting needs        PO Evaluation    Number of Days PO Intake Evaluated 2 days     Number of Meals 4     % PO Intake 25                   Problem/Interventions:   Problem 1     Row Name 05/08/23 1329          Nutrition Diagnoses Problem 1    Problem 1 Increased Nutrient Needs     Macronutrient Kcal;Protein     Etiology (related to) Medical Diagnosis     Pulmonary/Critical Care A/C  respiratory failure     Signs/Symptoms (evidenced by) Other (comment)  need for oral nutrition supplement to meet increased estimated needs                Problem 2     Row Name 05/08/23 1329          Nutrition Diagnoses Problem 2    Problem 2 Predicted Suboptimal Intake     Etiology (related to) Factors Affecting Nutrition     Signs/Symptoms (evidenced by) Report of Minimal PO Intake  per flowsheets                    Intervention Goal     Row Name 05/08/23 1329          Intervention Goal    General Maintain nutrition;Meet nutritional needs for age/condition;Disease management/therapy;Improved nutrition related lab(s)     PO Tolerate PO;Meet estimated needs;Increase intake     Weight Maintain weight                Nutrition Intervention     Row Name 05/08/23 1330          Nutrition Intervention    RD/Tech Action Follow Tx progress;Encourage intake;Care plan reviewd;Supplement provided     Recommended/Ordered Supplement                Nutrition Prescription     Row Name 05/08/23 1331          Nutrition Prescription PO    PO Prescription Begin/change supplement     Supplement Mighty Shake     Supplement Frequency 2 times a day     New PO Prescription Ordered? Yes        Other Orders    Obtain Weight Daily     Obtain Weight Ordered? No, recommended     Supplement Vitamin mineral supplement     Supplement Ordered? No, recommended     Labs K+;Phos;Mg++;Na+     Labs Ordered? No, recommended                Education/Evaluation     Row Name 05/08/23 1332          Education    Education Will Instruct as appropriate        Monitor/Evaluation    Monitor Per protocol;PO intake;Weight;Skin status;Supplement intake;Symptoms;Pertinent labs                 Electronically signed by:  Niecy Dupont RD  05/08/23 13:33 EDT

## 2023-05-09 LAB
A-A DO2: 148.8 MMHG
ANION GAP SERPL CALCULATED.3IONS-SCNC: 9.9 MMOL/L (ref 5–15)
ARTERIAL PATENCY WRIST A: ABNORMAL
ATMOSPHERIC PRESS: 731 MMHG
BACTERIA SPEC AEROBE CULT: NORMAL
BACTERIA SPEC AEROBE CULT: NORMAL
BASE EXCESS BLDA CALC-SCNC: 18 MMOL/L (ref 0–2)
BDY SITE: ABNORMAL
BUN SERPL-MCNC: 15 MG/DL (ref 8–23)
BUN/CREAT SERPL: 44.1 (ref 7–25)
CALCIUM SPEC-SCNC: 9.4 MG/DL (ref 8.6–10.5)
CHLORIDE SERPL-SCNC: 95 MMOL/L (ref 98–107)
CO2 SERPL-SCNC: 40.1 MMOL/L (ref 22–29)
COHGB MFR BLD: 0.9 % (ref 0–2)
CREAT SERPL-MCNC: 0.34 MG/DL (ref 0.57–1)
DEPRECATED RDW RBC AUTO: 52 FL (ref 37–54)
EGFRCR SERPLBLD CKD-EPI 2021: 106.8 ML/MIN/1.73
ERYTHROCYTE [DISTWIDTH] IN BLOOD BY AUTOMATED COUNT: 15 % (ref 12.3–15.4)
GAS FLOW AIRWAY: 6 LPM
GLUCOSE BLDC GLUCOMTR-MCNC: 108 MG/DL (ref 70–130)
GLUCOSE BLDC GLUCOMTR-MCNC: 131 MG/DL (ref 70–130)
GLUCOSE BLDC GLUCOMTR-MCNC: 151 MG/DL (ref 70–130)
GLUCOSE SERPL-MCNC: 142 MG/DL (ref 65–99)
HCO3 BLDA-SCNC: 47.5 MMOL/L (ref 22–28)
HCT VFR BLD AUTO: 43.6 % (ref 34–46.6)
HCT VFR BLD CALC: 40.3 %
HGB BLD-MCNC: 13.3 G/DL (ref 12–15.9)
INHALED O2 CONCENTRATION: 44 %
LYMPHOCYTES # BLD MANUAL: 0.63 10*3/MM3 (ref 0.7–3.1)
LYMPHOCYTES NFR BLD MANUAL: 7 % (ref 5–12)
Lab: ABNORMAL
Lab: ABNORMAL
MAGNESIUM SERPL-MCNC: 2.2 MG/DL (ref 1.6–2.4)
MCH RBC QN AUTO: 29.1 PG (ref 26.6–33)
MCHC RBC AUTO-ENTMCNC: 30.5 G/DL (ref 31.5–35.7)
MCV RBC AUTO: 95.4 FL (ref 79–97)
METHGB BLD QL: 0.5 % (ref 0–1.5)
MODALITY: ABNORMAL
MONOCYTES # BLD: 0.44 10*3/MM3 (ref 0.1–0.9)
NEUTROPHILS # BLD AUTO: 5.2 10*3/MM3 (ref 1.7–7)
NEUTROPHILS NFR BLD MANUAL: 63 % (ref 42.7–76)
NEUTS BAND NFR BLD MANUAL: 20 % (ref 0–5)
NOTE: ABNORMAL
NOTIFIED BY: ABNORMAL
NOTIFIED WHO: ABNORMAL
OXYHGB MFR BLDV: 90.7 % (ref 94–99)
PCO2 BLDA: 81 MM HG (ref 35–45)
PCO2 TEMP ADJ BLD: ABNORMAL MM[HG]
PH BLDA: 7.38 PH UNITS (ref 7.35–7.45)
PH, TEMP CORRECTED: ABNORMAL
PLATELET # BLD AUTO: 185 10*3/MM3 (ref 140–450)
PMV BLD AUTO: 11.6 FL (ref 6–12)
PO2 BLDA: 63.8 MM HG (ref 75–100)
PO2 TEMP ADJ BLD: ABNORMAL MM[HG]
POTASSIUM SERPL-SCNC: 5 MMOL/L (ref 3.5–5.2)
RBC # BLD AUTO: 4.57 10*6/MM3 (ref 3.77–5.28)
RBC MORPH BLD: NORMAL
SAO2 % BLDCOA: 92 % (ref 94–100)
SCAN SLIDE: NORMAL
SMALL PLATELETS BLD QL SMEAR: ADEQUATE
SODIUM SERPL-SCNC: 145 MMOL/L (ref 136–145)
TSH SERPL DL<=0.05 MIU/L-ACNC: 0.3 UIU/ML (ref 0.27–4.2)
VARIANT LYMPHS NFR BLD MANUAL: 10 % (ref 19.6–45.3)
VENTILATOR MODE: ABNORMAL
WBC MORPH BLD: NORMAL
WBC NRBC COR # BLD: 6.26 10*3/MM3 (ref 3.4–10.8)

## 2023-05-09 PROCEDURE — 63710000001 INSULIN ASPART PER 5 UNITS: Performed by: FAMILY MEDICINE

## 2023-05-09 PROCEDURE — 82948 REAGENT STRIP/BLOOD GLUCOSE: CPT

## 2023-05-09 PROCEDURE — 82805 BLOOD GASES W/O2 SATURATION: CPT

## 2023-05-09 PROCEDURE — 99233 SBSQ HOSP IP/OBS HIGH 50: CPT | Performed by: INTERNAL MEDICINE

## 2023-05-09 PROCEDURE — 94660 CPAP INITIATION&MGMT: CPT

## 2023-05-09 PROCEDURE — 94761 N-INVAS EAR/PLS OXIMETRY MLT: CPT

## 2023-05-09 PROCEDURE — 94799 UNLISTED PULMONARY SVC/PX: CPT

## 2023-05-09 PROCEDURE — 84443 ASSAY THYROID STIM HORMONE: CPT | Performed by: FAMILY MEDICINE

## 2023-05-09 PROCEDURE — 83050 HGB METHEMOGLOBIN QUAN: CPT

## 2023-05-09 PROCEDURE — 94664 DEMO&/EVAL PT USE INHALER: CPT

## 2023-05-09 PROCEDURE — 85007 BL SMEAR W/DIFF WBC COUNT: CPT | Performed by: FAMILY MEDICINE

## 2023-05-09 PROCEDURE — 80048 BASIC METABOLIC PNL TOTAL CA: CPT | Performed by: FAMILY MEDICINE

## 2023-05-09 PROCEDURE — 85025 COMPLETE CBC W/AUTO DIFF WBC: CPT | Performed by: FAMILY MEDICINE

## 2023-05-09 PROCEDURE — 99232 SBSQ HOSP IP/OBS MODERATE 35: CPT | Performed by: FAMILY MEDICINE

## 2023-05-09 PROCEDURE — 93005 ELECTROCARDIOGRAM TRACING: CPT | Performed by: FAMILY MEDICINE

## 2023-05-09 PROCEDURE — 83735 ASSAY OF MAGNESIUM: CPT | Performed by: FAMILY MEDICINE

## 2023-05-09 PROCEDURE — 25010000002 ENOXAPARIN PER 10 MG: Performed by: FAMILY MEDICINE

## 2023-05-09 PROCEDURE — 25010000002 METHYLPREDNISOLONE PER 40 MG: Performed by: FAMILY MEDICINE

## 2023-05-09 PROCEDURE — 82375 ASSAY CARBOXYHB QUANT: CPT

## 2023-05-09 PROCEDURE — 36600 WITHDRAWAL OF ARTERIAL BLOOD: CPT

## 2023-05-09 RX ORDER — DEXMEDETOMIDINE HYDROCHLORIDE 4 UG/ML
.2-1.5 INJECTION, SOLUTION INTRAVENOUS
Status: DISCONTINUED | OUTPATIENT
Start: 2023-05-09 | End: 2023-05-22

## 2023-05-09 RX ORDER — SODIUM CHLORIDE 9 MG/ML
30 INJECTION, SOLUTION INTRAVENOUS CONTINUOUS
Status: ACTIVE | OUTPATIENT
Start: 2023-05-09 | End: 2023-05-10

## 2023-05-09 RX ADMIN — METHYLPREDNISOLONE SODIUM SUCCINATE 40 MG: 40 INJECTION, POWDER, LYOPHILIZED, FOR SOLUTION INTRAMUSCULAR; INTRAVENOUS at 16:41

## 2023-05-09 RX ADMIN — INSULIN ASPART 2 UNITS: 100 INJECTION, SOLUTION INTRAVENOUS; SUBCUTANEOUS at 06:32

## 2023-05-09 RX ADMIN — IPRATROPIUM BROMIDE AND ALBUTEROL SULFATE 3 ML: 2.5; .5 SOLUTION RESPIRATORY (INHALATION) at 00:19

## 2023-05-09 RX ADMIN — AMLODIPINE BESYLATE 10 MG: 5 TABLET ORAL at 09:11

## 2023-05-09 RX ADMIN — BUDESONIDE AND FORMOTEROL FUMARATE DIHYDRATE 2 PUFF: 160; 4.5 AEROSOL RESPIRATORY (INHALATION) at 07:06

## 2023-05-09 RX ADMIN — SODIUM CHLORIDE 30 MG/ML INHALATION SOLUTION 4 ML: 30 SOLUTION INHALANT at 07:07

## 2023-05-09 RX ADMIN — GUAIFENESIN 600 MG: 600 TABLET, EXTENDED RELEASE ORAL at 09:11

## 2023-05-09 RX ADMIN — BUDESONIDE AND FORMOTEROL FUMARATE DIHYDRATE 2 PUFF: 160; 4.5 AEROSOL RESPIRATORY (INHALATION) at 19:03

## 2023-05-09 RX ADMIN — ENOXAPARIN SODIUM 40 MG: 100 INJECTION SUBCUTANEOUS at 22:28

## 2023-05-09 RX ADMIN — MULTIPLE VITAMINS W/ MINERALS TAB 1 TABLET: TAB at 09:11

## 2023-05-09 RX ADMIN — DEXMEDETOMIDINE HYDROCHLORIDE 0.2 MCG/KG/HR: 400 INJECTION INTRAVENOUS at 22:28

## 2023-05-09 RX ADMIN — ACETAMINOPHEN 650 MG: 325 TABLET, FILM COATED ORAL at 05:57

## 2023-05-09 RX ADMIN — ALBUTEROL SULFATE 2.5 MG: 2.5 SOLUTION RESPIRATORY (INHALATION) at 11:07

## 2023-05-09 RX ADMIN — SODIUM CHLORIDE 30 MG/ML INHALATION SOLUTION 4 ML: 30 SOLUTION INHALANT at 00:19

## 2023-05-09 RX ADMIN — TIOTROPIUM BROMIDE INHALATION SPRAY 2 PUFF: 3.12 SPRAY, METERED RESPIRATORY (INHALATION) at 07:07

## 2023-05-09 RX ADMIN — METHYLPREDNISOLONE SODIUM SUCCINATE 40 MG: 40 INJECTION, POWDER, LYOPHILIZED, FOR SOLUTION INTRAMUSCULAR; INTRAVENOUS at 04:08

## 2023-05-09 RX ADMIN — SENNOSIDES AND DOCUSATE SODIUM 2 TABLET: 50; 8.6 TABLET ORAL at 09:11

## 2023-05-09 RX ADMIN — SODIUM CHLORIDE 75 ML/HR: 9 INJECTION, SOLUTION INTRAVENOUS at 17:48

## 2023-05-09 RX ADMIN — IPRATROPIUM BROMIDE AND ALBUTEROL SULFATE 3 ML: 2.5; .5 SOLUTION RESPIRATORY (INHALATION) at 07:06

## 2023-05-09 RX ADMIN — ATENOLOL 25 MG: 25 TABLET ORAL at 09:11

## 2023-05-09 RX ADMIN — IPRATROPIUM BROMIDE AND ALBUTEROL SULFATE 3 ML: 2.5; .5 SOLUTION RESPIRATORY (INHALATION) at 19:03

## 2023-05-09 RX ADMIN — Medication 3 ML: at 09:11

## 2023-05-09 NOTE — PLAN OF CARE
Goal Outcome Evaluation:  Plan of Care Reviewed With: patient           Outcome Evaluation: Patient unable to tolerate coming off BIPAP for dinner, requested to put it back on immediately. pauses noted on telemetry, Dr. Vital made aware of status change and spoke with family at bedside.

## 2023-05-09 NOTE — PROGRESS NOTES
[facility]    Name:  Carolin Toscano   Age:  77 y.o.  Sex:  female  :  1945  MRN:  [id number]   Visit Number:  [id number]  Admission Date:  2023  Date Of Service:  23  Primary Care Physician:  Jason Nicholson MD     LOS: 5 days :    Chief Complaint:      Shortness of breath     Subjective:    Patient was seen and examined at bedside.  Patient laying in bed comfortably, appears tachypneic with respiratory rate in the 30s on 6 L through nasal cannula.  Patient however denies shortness of breath or chest pain.  Patient actually reporting feeling better.  Patient has tolerated BiPAP overnight.  Patient denies any other complaints.  Discussed with nursing.  Vitals otherwise stable and afebrile.  Pulmonology following.    When family (including the daughter) came to visit the patient, I went and reexamined the patient and discussed with family.  I discussed treatment plan.  All questions were answered to her satisfaction.    Hospital Course:    Patient is a 77 years old female with a past medical history of COPD, chronic respiratory failure on 5 to 6 L per her previous discharge, on NIV machine at home and trelegy, hypertension, and ongoing tobacco use who presented to the ER from home by EMS with a chief complaint of shortness of breath.  Patient reporting that she started having shortness of breath associated with productive cough since last night and has persisted and became worse that promoted her to call EMS.  EMS has found the patient to be at 65% saturation on her normal 6 L of oxygen.  She received a breathing treatment in route.  Patient was recently admitted to the hospital on  and discharged on 2023 with similar clinical picture of Respiratory failure, COPD exacerbation and pneumonia.  Patient was discharged on 6 L previously.  Patient reports compliance with her NIV machine at home.  Denies any sick contacts that she is aware of.   Patient denies any chest pain or abdominal pain.  Denies any other complaints.     On ER evaluation, Patient was found to be tachycardic with a heart rate of 130s, temperature of 100.2 and respirations of 30, /72.  Patient was placed on BiPAP with FiO2 of 40%.  Her ABG came back and showed pH of 7.296/PCO2 87/PO2 94/HCO3 42/saturation 97% on 5 L nasal cannula.  HS Troponin 44, proBNP WNL, glucose 192, CO2 of 40, otherwise CBC and CMP within acceptable range.  Lactate and Pro-Adi WNL.  Respiratory panel positive for parainfluenza virus.  Chest x-ray Mild interstitial disease  bilaterally is similar to prior. No area of consolidation is seen.  Urinalysis negative for nitrates, leukocytes, WBC or bacteria with squamous epithelial cells.  Patient received Solu-Medrol, normal saline bolus of 2124 mL, magnesium and Aztreonam while in the ER.  Hospitalist consulted for admission, further evaluation and treatment.    Review of Systems:     All systems were reviewed and negative except as mentioned in subjective, assessment and plan.    Vital Signs:    Temp:  [97.5 °F (36.4 °C)-98.8 °F (37.1 °C)] 98.8 °F (37.1 °C)  Heart Rate:  [77-94] 94  Resp:  [20-39] 22  BP: (140-160)/(55-71) 160/67    Intake and output:    I/O last 3 completed shifts:  In: 420 [P.O.:420]  Out: 1000 [Urine:1000]  I/O this shift:  In: 60 [P.O.:60]  Out: -     Physical Examination:    General Appearance:  Alert and cooperative.  Chronically ill-appearing.   Head:  Atraumatic and normocephalic.   Eyes: Conjunctivae and sclerae normal, no icterus. No pallor.   Throat: No oral lesions, no thrush, oral mucosa moist.   Neck: Supple, trachea midline, no thyromegaly.   Lungs:   Breath sounds heard bilaterally equally.  Mildly labored, on BiPAP.  Diffuse bilateral wheezing heard.  Decreased air movement bilaterally.    Heart:  Normal S1 and S2, no murmur, no gallop, no rub. No JVD.   Abdomen:   Normal bowel sounds, no masses, no organomegaly. Soft,  nontender, nondistended, no rebound tenderness.   Extremities: Supple, no edema, no cyanosis, no clubbing.   Skin: No bleeding or rash.   Neurologic: Alert and oriented x 3. No facial asymmetry. Moves all four limbs. No tremors.      Laboratory results:    Results from last 7 days   Lab Units 05/07/23  1003 05/06/23  0611 05/05/23  0524 05/04/23  1447   SODIUM mmol/L 144 144 142 140   POTASSIUM mmol/L 4.4 4.9 4.6 4.4   CHLORIDE mmol/L 98 100 99 95*   CO2 mmol/L 40.3* 40.1* 34.6* 40.2*   BUN mg/dL 16 19 10 10   CREATININE mg/dL 0.34* 0.28* 0.29* 0.38*   CALCIUM mg/dL 9.2 9.0 8.4* 9.1   BILIRUBIN mg/dL  --   --   --  0.4   ALK PHOS U/L  --   --   --  87   ALT (SGPT) U/L  --   --   --  30   AST (SGOT) U/L  --   --   --  23   GLUCOSE mg/dL 195* 178* 155* 192*     Results from last 7 days   Lab Units 05/07/23  1003 05/06/23  0611 05/05/23  0525   WBC 10*3/mm3 10.60 10.46 4.57   HEMOGLOBIN g/dL 12.6 12.5 12.1   HEMATOCRIT % 42.4 41.3 40.7   PLATELETS 10*3/mm3 138* 140 115*         Results from last 7 days   Lab Units 05/05/23  0725 05/05/23  0524 05/04/23  1447   HSTROP T ng/L 22* 25* 44*     Results from last 7 days   Lab Units 05/04/23  1500   BLOODCX  No growth at 4 days  No growth at 4 days     Recent Labs     05/06/23  0521 05/07/23  1013 05/09/23  0725   PHART 7.339* 7.386 7.377   WEV0PCY 81.4* 74.8* 81.0*   PO2ART 97.2 69.7* 63.8*   VSF1LWZ 43.7* 44.8* 47.5*   BASEEXCESS 14.4* 16.0* 18.0*      I have reviewed the patient's laboratory results.    Radiology results:    XR Chest 1 View    Result Date: 5/8/2023  PROCEDURE: XR CHEST 1 VW-    HISTORY: Respiratory failure follow-up; J96.21-Acute and chronic respiratory failure with hypoxia; J96.22-Acute and chronic respiratory failure with hypercapnia; J44.1-Chronic obstructive pulmonary disease with (acute) exacerbation; B34.8-Other viral infections of unspecified site  COMPARISON: May 6, 2023.  FINDINGS: Apical lordotic view. The heart is at the upper limits of normal  in size. There is mild interstitial disease bilaterally. There is bronchial wall thickening, similar to the prior exam. There is no pneumothorax. There are no acute osseous abnormalities.      Impression: No significant interval change.        Images were reviewed, interpreted, and dictated by Dr. Shraddha Wharton MD Transcribed by Ade Solis PA-C.  This report was signed and finalized on 5/8/2023 10:24 AM by Shraddha Wharton MD.    I have reviewed the patient's radiology reports.    Medication Review:     I have reviewed the patient's active and prn medications.     Problem List:      Acute on chronic respiratory failure with hypoxia and hypercapnia      Assessment:    Acute on chronic respiratory failure with hypoxia and hypercapnia, likely secondary to #2 and 3, POA  Acute COPD exacerbation, POA  Parainfluenza infection, POA  Elevated troponin, likely secondary to demand ischemia, POA  Hypertension  Chronic tobacco abuse  Pulmonary hypertension  Arthritis    Plan:    Respiratory failure with hypoxia/hypercapnia, COPD exacerbation, parainfluenza  -Continue supplemental oxygen to keep saturation above 90%, patient on 5 to 6 L at baseline, continue to titrate down as able to.  Continue BiPAP therapy.  -Patient met SIRS criteria on arrival, likely secondary to influenza infection and respiratory failure. Received Aztreonam and sepsis protocol IV fluids boluses.  -Procalcitonin WNL, WBC WNL, bacterial infection less likely, will hold off on antibiotics for now.  -On Trelegy at home, Spiriva and Symbicort while here.  - Solu-Medrol, will taper to 40 mg every 12 hours, continue to taper down as she improves.   -Bronchodilators, Mucinex and supportive care.  -Discussed with pulmonology, Dr. Soriano, appreciate his recommendations.  -Repeat ABG and chest x-ray as indicated.  -Patient appears to be improving clinically    Elevated troponin  -Likely secondary to demand ischemia in settings of respiratory failure  -Patient  denies any chest pain, low suspicion for ACS  -Troponin trended down and plateaued.     Hyperglycemia  -Will cover with sliding scale insulin while on steroids  -Hemoglobin A1c 6.7     Further orders as indicated per clinical course.    PT/ OT consulted, case management consulted, appreciate their help.  Patient would benefit from rehab on discharge, she has declined rehab in the past, case management following.  Daughter will discuss with the patient placement for rehab to see if she is agreeable.    DVT Prophylaxis: Lovenox prophylaxis (benefit> risk)  Code Status: Full  Diet: Cardiac  Discharge Plan: To be determined, likely needs 1 to 2 days in the hospital.     Lolis Vital MD  05/09/23  10:39 EDT    Dictated utilizing Dragon dictation.

## 2023-05-09 NOTE — PLAN OF CARE
Goal Outcome Evaluation:  Plan of Care Reviewed With: patient        Progress: improving  Outcome Evaluation: VSS; pt wore bipap most of night; no acute events noted

## 2023-05-09 NOTE — NURSING NOTE
Patient disconnected BIPAP and dropped to 55% oxygen saturation. Patient put back on BIPAP but has been breathing 35-40/min for the past hour. Dr. Vital placing ICU transfer orders at this time in case intubation is needed. Dr. Mirian salas hospitalist contacted to place eyes on patient in case intubation is needed. House supervisor Sarah made aware and patient will be going to ICU 7.     Daughter Nehal updated on patient transfer.

## 2023-05-09 NOTE — PROGRESS NOTES
"  CC: Acute respiratory failure.    S: Currently off BiPAP.  Appears to be in mild respiratory distress.  Does mention ongoing shortness of breath and cough.  Says that she had some issues using BiPAP but was able to tolerate it fairly well.  The nursing staff is also noticed significant decrease in oxygenation with minimal interruption to oxygen, which she currently requires at about 8 L/min on a regular basis.    ROS: Positive for shortness of breath and anxiety.  Denies chest pain, diarrhea or fever.    O: /67 (BP Location: Left arm, Patient Position: Lying)   Pulse 94   Temp 98.8 °F (37.1 °C) (Oral)   Resp 22   Ht 160 cm (63\")   Wt 68.3 kg (150 lb 9.2 oz)   SpO2 94%   BMI 26.67 kg/m²     Vital signs reviewed.  General/Constitutional: Mild to moderate respiratory distress noted.  ENT: Oropharynx was dry.  Neck: No obvious JVD   Cardiovascular: S1 + S2.  Mildly tachycardic  Lungs/Respiratory: Significant respiratory distress noted with bilateral scattered wheezing heard.  No significant crackles noted  Musculoskeletal/Extremities: Mild edema noted.  Skin: No obvious lesions noted.  No obvious rashes noted.  Neurologic: AAOx3. Was able to follow commands   Psych: Appears anxious.      Labs: Reviewed.   Results from last 7 days   Lab Units 05/07/23  1003 05/06/23  0611 05/05/23  0525 05/04/23  1447   WBC 10*3/mm3 10.60 10.46 4.57 8.04   HEMOGLOBIN g/dL 12.6 12.5 12.1 13.3   HEMATOCRIT % 42.4 41.3 40.7 44.5   PLATELETS 10*3/mm3 138* 140 115* 149       Lab Results   Component Value Date    PROCALCITO 0.14 05/07/2023    PROCALCITO 0.14 05/04/2023    PROCALCITO 0.24 04/18/2023       No results found for: CRP    No results found for: SEDRATE    Lab Results   Component Value Date    PROBNP 234.4 05/04/2023    PROBNP 272.8 04/13/2023    PROBNP 315.0 (C) 10/02/2017       Results from last 7 days   Lab Units 05/07/23  1003 05/06/23  0611 05/05/23  0524 05/04/23  1447   SODIUM mmol/L 144 144 142 140   POTASSIUM " mmol/L 4.4 4.9 4.6 4.4   CHLORIDE mmol/L 98 100 99 95*   CO2 mmol/L 40.3* 40.1* 34.6* 40.2*   BUN mg/dL 16 19 10 10   CREATININE mg/dL 0.34* 0.28* 0.29* 0.38*   CALCIUM mg/dL 9.2 9.0 8.4* 9.1   ANION GAP mmol/L 5.7 3.9* 8.4 4.8*   BILIRUBIN mg/dL  --   --   --  0.4   ALK PHOS U/L  --   --   --  87   ALT (SGPT) U/L  --   --   --  30   AST (SGOT) U/L  --   --   --  23   GLUCOSE mg/dL 195* 178* 155* 192*   TOTAL PROTEIN g/dL  --   --   --  6.9   ALBUMIN g/dL  --   --   --  3.8                   No results found for: DDIMER    Brief Urine Lab Results  (Last result in the past 365 days)      Color   Clarity   Blood   Leuk Est   Nitrite   Protein   CREAT   Urine HCG        05/04/23 1611 Yellow   Clear   Small (1+)   Negative   Negative   30 mg/dL (1+)                   Micro: As of May 9, 2023   Lab Results   Component Value Date    RESPCX Light growth (2+) Normal Respiratory Ana 10/02/2017     No results found for: BCIDPCR  Lab Results   Component Value Date    BLOODCX No growth at 4 days 05/04/2023    BLOODCX No growth at 4 days 05/04/2023    BLOODCX No growth at 5 days 04/16/2023    BLOODCX No growth at 5 days 04/16/2023     Lab Results   Component Value Date    URINECX Final report 02/07/2019    URINECX 20,000-30,000 CFU/mL Escherichia coli (A) 10/05/2018     No results found for: MRSACX  Lab Results   Component Value Date    MRSAPCR No MRSA Detected 04/15/2023     No results found for: URCX  No components found for: LOWRESPCF  No results found for: THROATCX  No results found for: CULTURES  No components found for: STREPBCX  No results found for: STREPPNEUAG  No results found for: LEGIONELLA  No results found for: MYCOPLASCX  No results found for: GCCX  No results found for: WOUNDCX  No results found for: BODYFLDCX    No results found for: FLU    Lab Results   Component Value Date    ADENOVIRUS Not Detected 05/04/2023     Lab Results   Component Value Date    FH087O Not Detected 05/04/2023     Lab Results    Component Value Date    CVHKU1 Not Detected 05/04/2023     Lab Results   Component Value Date    CVNL63 Not Detected 05/04/2023     Lab Results   Component Value Date    CVOC43 Not Detected 05/04/2023     Lab Results   Component Value Date    HUMETPNEVS Not Detected 05/04/2023     Lab Results   Component Value Date    HURVEV Not Detected 05/04/2023     Lab Results   Component Value Date    FLUBPCR Not Detected 05/04/2023     Lab Results   Component Value Date    PARAINFLUE Not Detected 05/04/2023     Lab Results   Component Value Date    PARAFLUV2 Not Detected 05/04/2023     Lab Results   Component Value Date    PARAFLUV3 Detected (A) 05/04/2023     Lab Results   Component Value Date    PARAFLUV4 Not Detected 05/04/2023     Lab Results   Component Value Date    BPERTPCR Not Detected 05/04/2023     No results found for: [id number]  Lab Results   Component Value Date    CPNEUPCR Not Detected 05/04/2023     Lab Results   Component Value Date    MPNEUMO Not Detected 05/04/2023     Lab Results   Component Value Date    FLUAPCR Not Detected 05/04/2023     No results found for: FLUAH3  No results found for: FLUAH1  Lab Results   Component Value Date    RSV Not Detected 05/04/2023     Lab Results   Component Value Date    BPARAPCR Not Detected 05/04/2023       COVID 19:  Lab Results   Component Value Date    COVID19 Not Detected 05/04/2023           ABG: Reviewed  Recent Labs     05/06/23  0521 05/07/23  1013 05/09/23  0725   PHART 7.339* 7.386 7.377   HZO3IAH 81.4* 74.8* 81.0*   PO2ART 97.2 69.7* 63.8*   EGB1ICI 43.7* 44.8* 47.5*   BASEEXCESS 14.4* 16.0* 18.0*         Pharmacy to Dose enoxaparin (LOVENOX),         CXRay: Latest imaging study was reviewed personally.   Imaging Results (Last 24 Hours)     Procedure Component Value Units Date/Time    XR Chest 1 View [[id number]] Collected: 05/08/23 1023     Updated: 05/08/23 1026    Narrative:      PROCEDURE: XR CHEST 1 VW-        HISTORY: Respiratory failure follow-up;  J96.21-Acute and chronic  respiratory failure with hypoxia; J96.22-Acute and chronic respiratory  failure with hypercapnia; J44.1-Chronic obstructive pulmonary disease  with (acute) exacerbation; B34.8-Other viral infections of unspecified  site     COMPARISON: May 6, 2023.     FINDINGS: Apical lordotic view. The heart is at the upper limits of  normal in size. There is mild interstitial disease bilaterally. There is  bronchial wall thickening, similar to the prior exam. There is no  pneumothorax. There are no acute osseous abnormalities.       Impression:      No significant interval change.                       Images were reviewed, interpreted, and dictated by Dr. Shraddha Wharton MD  Transcribed by Ade Solis PA-C.     This report was signed and finalized on 5/8/2023 10:24 AM by Shraddha Wharton MD.          Assessment & Recommendations/Plan:   1.  Acute respiratory failure  Continues to require high flow nasal cannula on a regular basis.  Since the patient remains tachypneic and in respiratory distress, I have changed the BiPAP to AVAPS.  Latest ABGs continue to show mostly compensated mild acute on chronic respiratory acidosis    2.  COPD  With acute exacerbation  Nebulized treatments adjusted further  Continue current dose of Solu-Medrol  I have asked the respiratory therapist to administer 1 extra dose of Atrovent    3.  Chronic respiratory acidosis  We will try to obtain a compliance on her NIV use at home.    4.  Smoking  Will advised to quit smoking    5.  Pulmonary hypertension  May need outpatient work-up  Likely secondary    6.  Anxiety  May need to be considered for anxiolytics or Precedex?    She is definitely at very high risk for further deterioration and may need to be considered for ICU transfer versus closer monitoring given her repeated episodes of severe hypoxemia.    Patient's overall situation was discussed in great detail with hospitalist    We have reviewed patient's current orders and  changes, if any, have been suggested to primary care team. Plan was also discussed with nursing staff, as necessary.     This document was electronically signed by Arnaldo Soriano MD on 05/09/23 at 09:11 EDT      Dictated utilizing Dragon dictation.

## 2023-05-10 ENCOUNTER — APPOINTMENT (OUTPATIENT)
Dept: GENERAL RADIOLOGY | Facility: HOSPITAL | Age: 78
DRG: 190 | End: 2023-05-10
Payer: MEDICARE

## 2023-05-10 LAB
A-A DO2: 294.5 MMHG
ANION GAP SERPL CALCULATED.3IONS-SCNC: 7.9 MMOL/L (ref 5–15)
ANISOCYTOSIS BLD QL: ABNORMAL
ARTERIAL PATENCY WRIST A: ABNORMAL
ATMOSPHERIC PRESS: 738 MMHG
BASE EXCESS BLDA CALC-SCNC: 14.9 MMOL/L (ref 0–2)
BDY SITE: ABNORMAL
BUN SERPL-MCNC: 22 MG/DL (ref 8–23)
BUN/CREAT SERPL: 61.1 (ref 7–25)
CALCIUM SPEC-SCNC: 9.1 MG/DL (ref 8.6–10.5)
CHLORIDE SERPL-SCNC: 98 MMOL/L (ref 98–107)
CO2 SERPL-SCNC: 38.1 MMOL/L (ref 22–29)
COHGB MFR BLD: 0.8 % (ref 0–2)
CREAT SERPL-MCNC: 0.36 MG/DL (ref 0.57–1)
DEPRECATED RDW RBC AUTO: 55.1 FL (ref 37–54)
EGFRCR SERPLBLD CKD-EPI 2021: 105.4 ML/MIN/1.73
ERYTHROCYTE [DISTWIDTH] IN BLOOD BY AUTOMATED COUNT: 15.3 % (ref 12.3–15.4)
GLUCOSE BLDC GLUCOMTR-MCNC: 105 MG/DL (ref 70–130)
GLUCOSE BLDC GLUCOMTR-MCNC: 125 MG/DL (ref 70–130)
GLUCOSE SERPL-MCNC: 126 MG/DL (ref 65–99)
HCO3 BLDA-SCNC: 42.9 MMOL/L (ref 22–28)
HCT VFR BLD AUTO: 40.6 % (ref 34–46.6)
HCT VFR BLD CALC: 37.8 %
HGB BLD-MCNC: 12.3 G/DL (ref 12–15.9)
INHALED O2 CONCENTRATION: 65 %
LARGE PLATELETS: ABNORMAL
LYMPHOCYTES # BLD MANUAL: 0.66 10*3/MM3 (ref 0.7–3.1)
LYMPHOCYTES NFR BLD MANUAL: 5 % (ref 5–12)
Lab: ABNORMAL
Lab: ABNORMAL
MCH RBC QN AUTO: 29.6 PG (ref 26.6–33)
MCHC RBC AUTO-ENTMCNC: 30.3 G/DL (ref 31.5–35.7)
MCV RBC AUTO: 97.8 FL (ref 79–97)
METAMYELOCYTES NFR BLD MANUAL: 14 % (ref 0–0)
METHGB BLD QL: 0.5 % (ref 0–1.5)
MODALITY: ABNORMAL
MONOCYTES # BLD: 0.37 10*3/MM3 (ref 0.1–0.9)
MYELOCYTES NFR BLD MANUAL: 1 % (ref 0–0)
NEUTROPHILS # BLD AUTO: 5.21 10*3/MM3 (ref 1.7–7)
NEUTROPHILS NFR BLD MANUAL: 36 % (ref 42.7–76)
NEUTS BAND NFR BLD MANUAL: 35 % (ref 0–5)
NOTE: ABNORMAL
NOTIFIED BY: ABNORMAL
NOTIFIED WHO: ABNORMAL
NT-PROBNP SERPL-MCNC: 479.4 PG/ML (ref 0–1800)
OXYHGB MFR BLDV: 94.8 % (ref 94–99)
PCO2 BLDA: 70.6 MM HG (ref 35–45)
PCO2 TEMP ADJ BLD: ABNORMAL MM[HG]
PH BLDA: 7.39 PH UNITS (ref 7.35–7.45)
PH, TEMP CORRECTED: ABNORMAL
PLATELET # BLD AUTO: 172 10*3/MM3 (ref 140–450)
PMV BLD AUTO: 11.5 FL (ref 6–12)
PO2 BLDA: 79.8 MM HG (ref 75–100)
PO2 TEMP ADJ BLD: ABNORMAL MM[HG]
POIKILOCYTOSIS BLD QL SMEAR: ABNORMAL
POTASSIUM SERPL-SCNC: 5 MMOL/L (ref 3.5–5.2)
PROCALCITONIN SERPL-MCNC: 0.2 NG/ML (ref 0–0.25)
QT INTERVAL: 374 MS
QTC INTERVAL: 426 MS
RBC # BLD AUTO: 4.15 10*6/MM3 (ref 3.77–5.28)
SAO2 % BLDCOA: 96 % (ref 94–100)
SCAN SLIDE: NORMAL
SMALL PLATELETS BLD QL SMEAR: ADEQUATE
SODIUM SERPL-SCNC: 144 MMOL/L (ref 136–145)
VARIANT LYMPHS NFR BLD MANUAL: 9 % (ref 19.6–45.3)
VENTILATOR MODE: ABNORMAL
WBC MORPH BLD: NORMAL
WBC NRBC COR # BLD: 7.34 10*3/MM3 (ref 3.4–10.8)

## 2023-05-10 PROCEDURE — 94799 UNLISTED PULMONARY SVC/PX: CPT

## 2023-05-10 PROCEDURE — 80048 BASIC METABOLIC PNL TOTAL CA: CPT | Performed by: FAMILY MEDICINE

## 2023-05-10 PROCEDURE — 25010000002 FUROSEMIDE PER 20 MG: Performed by: INTERNAL MEDICINE

## 2023-05-10 PROCEDURE — 99233 SBSQ HOSP IP/OBS HIGH 50: CPT | Performed by: INTERNAL MEDICINE

## 2023-05-10 PROCEDURE — 83050 HGB METHEMOGLOBIN QUAN: CPT

## 2023-05-10 PROCEDURE — 94761 N-INVAS EAR/PLS OXIMETRY MLT: CPT

## 2023-05-10 PROCEDURE — 94664 DEMO&/EVAL PT USE INHALER: CPT

## 2023-05-10 PROCEDURE — 99233 SBSQ HOSP IP/OBS HIGH 50: CPT | Performed by: FAMILY MEDICINE

## 2023-05-10 PROCEDURE — 82948 REAGENT STRIP/BLOOD GLUCOSE: CPT

## 2023-05-10 PROCEDURE — 25010000002 ENOXAPARIN PER 10 MG: Performed by: FAMILY MEDICINE

## 2023-05-10 PROCEDURE — 94760 N-INVAS EAR/PLS OXIMETRY 1: CPT

## 2023-05-10 PROCEDURE — 83880 ASSAY OF NATRIURETIC PEPTIDE: CPT | Performed by: FAMILY MEDICINE

## 2023-05-10 PROCEDURE — 82375 ASSAY CARBOXYHB QUANT: CPT

## 2023-05-10 PROCEDURE — 85007 BL SMEAR W/DIFF WBC COUNT: CPT | Performed by: FAMILY MEDICINE

## 2023-05-10 PROCEDURE — 36600 WITHDRAWAL OF ARTERIAL BLOOD: CPT

## 2023-05-10 PROCEDURE — 85025 COMPLETE CBC W/AUTO DIFF WBC: CPT | Performed by: FAMILY MEDICINE

## 2023-05-10 PROCEDURE — 82805 BLOOD GASES W/O2 SATURATION: CPT

## 2023-05-10 PROCEDURE — 94660 CPAP INITIATION&MGMT: CPT

## 2023-05-10 PROCEDURE — 25010000002 METHYLPREDNISOLONE PER 40 MG: Performed by: FAMILY MEDICINE

## 2023-05-10 PROCEDURE — 84145 PROCALCITONIN (PCT): CPT | Performed by: FAMILY MEDICINE

## 2023-05-10 PROCEDURE — 71045 X-RAY EXAM CHEST 1 VIEW: CPT

## 2023-05-10 PROCEDURE — 25010000002 METHYLPREDNISOLONE PER 40 MG: Performed by: INTERNAL MEDICINE

## 2023-05-10 RX ORDER — METHYLPREDNISOLONE SODIUM SUCCINATE 40 MG/ML
40 INJECTION, POWDER, LYOPHILIZED, FOR SOLUTION INTRAMUSCULAR; INTRAVENOUS EVERY 8 HOURS
Status: DISCONTINUED | OUTPATIENT
Start: 2023-05-10 | End: 2023-05-12

## 2023-05-10 RX ORDER — FUROSEMIDE 10 MG/ML
40 INJECTION INTRAMUSCULAR; INTRAVENOUS ONCE
Status: COMPLETED | OUTPATIENT
Start: 2023-05-10 | End: 2023-05-10

## 2023-05-10 RX ORDER — BUDESONIDE 0.5 MG/2ML
1 INHALANT ORAL
Status: DISCONTINUED | OUTPATIENT
Start: 2023-05-10 | End: 2023-05-25

## 2023-05-10 RX ADMIN — IPRATROPIUM BROMIDE AND ALBUTEROL SULFATE 3 ML: 2.5; .5 SOLUTION RESPIRATORY (INHALATION) at 00:50

## 2023-05-10 RX ADMIN — BUDESONIDE INHALATION 1 MG: 0.5 SUSPENSION RESPIRATORY (INHALATION) at 13:47

## 2023-05-10 RX ADMIN — FUROSEMIDE 40 MG: 10 INJECTION, SOLUTION INTRAMUSCULAR; INTRAVENOUS at 13:19

## 2023-05-10 RX ADMIN — METHYLPREDNISOLONE SODIUM SUCCINATE 40 MG: 40 INJECTION, POWDER, LYOPHILIZED, FOR SOLUTION INTRAMUSCULAR; INTRAVENOUS at 14:11

## 2023-05-10 RX ADMIN — IPRATROPIUM BROMIDE AND ALBUTEROL SULFATE 3 ML: 2.5; .5 SOLUTION RESPIRATORY (INHALATION) at 06:52

## 2023-05-10 RX ADMIN — DEXMEDETOMIDINE HYDROCHLORIDE 0.3 MCG/KG/HR: 400 INJECTION INTRAVENOUS at 21:53

## 2023-05-10 RX ADMIN — METHYLPREDNISOLONE SODIUM SUCCINATE 40 MG: 40 INJECTION, POWDER, LYOPHILIZED, FOR SOLUTION INTRAMUSCULAR; INTRAVENOUS at 21:53

## 2023-05-10 RX ADMIN — DEXMEDETOMIDINE HYDROCHLORIDE 0.5 MCG/KG/HR: 400 INJECTION INTRAVENOUS at 11:03

## 2023-05-10 RX ADMIN — TIOTROPIUM BROMIDE INHALATION SPRAY 2 PUFF: 3.12 SPRAY, METERED RESPIRATORY (INHALATION) at 08:21

## 2023-05-10 RX ADMIN — ENOXAPARIN SODIUM 40 MG: 100 INJECTION SUBCUTANEOUS at 21:07

## 2023-05-10 RX ADMIN — BUDESONIDE AND FORMOTEROL FUMARATE DIHYDRATE 2 PUFF: 160; 4.5 AEROSOL RESPIRATORY (INHALATION) at 19:40

## 2023-05-10 RX ADMIN — BUDESONIDE AND FORMOTEROL FUMARATE DIHYDRATE 2 PUFF: 160; 4.5 AEROSOL RESPIRATORY (INHALATION) at 06:52

## 2023-05-10 RX ADMIN — Medication 3 ML: at 08:21

## 2023-05-10 RX ADMIN — IPRATROPIUM BROMIDE AND ALBUTEROL SULFATE 3 ML: 2.5; .5 SOLUTION RESPIRATORY (INHALATION) at 12:20

## 2023-05-10 RX ADMIN — METHYLPREDNISOLONE SODIUM SUCCINATE 40 MG: 40 INJECTION, POWDER, LYOPHILIZED, FOR SOLUTION INTRAMUSCULAR; INTRAVENOUS at 06:29

## 2023-05-10 RX ADMIN — SODIUM CHLORIDE 500 ML: 9 INJECTION, SOLUTION INTRAVENOUS at 23:06

## 2023-05-10 RX ADMIN — Medication 3 ML: at 21:08

## 2023-05-10 RX ADMIN — IPRATROPIUM BROMIDE AND ALBUTEROL SULFATE 3 ML: 2.5; .5 SOLUTION RESPIRATORY (INHALATION) at 19:40

## 2023-05-10 NOTE — THERAPY RE-EVALUATION
PT re-evaluation held due to patient not being medically stable enough to participate.  She is currently BiPAP dependent and on precedex.  PT will follow up tomorrow.

## 2023-05-10 NOTE — PLAN OF CARE
Goal Outcome Evaluation:           Progress: no change  Outcome Evaluation: Patient stayed on BIPAP throughout the day, unable to tolerate being off, precedex still infusing, resting comfortably in bed.

## 2023-05-10 NOTE — PROGRESS NOTES
[facility]    Name:  Carolin Toscano   Age:  77 y.o.  Sex:  female  :  1945  MRN:  [id number]   Visit Number:  [id number]  Admission Date:  2023  Date Of Service:  05/10/23  Primary Care Physician:  Jason Nicholson MD     LOS: 6 days :    Chief Complaint:      Shortness of breath     Subjective:    Patient was seen and examined at bedside.  Patient unfortunately had worsening respiratory failure with tachypnea yesterday and had to be transferred to ICU.  Patient is currently on AVAPS with FiO2 of 70%, pulmonology following and was started on Precedex drip.  Patient currently denies pain or discomfort and tolerating AVAPS well so far. Discussed with nursing.  Remains tachypneic intermittently up to 30s respiratory rate. Afebrile.  Pulmonology following.  No family at bedside today.    Hospital Course:    Patient is a 77 years old female with a past medical history of COPD, chronic respiratory failure on 5 to 6 L per her previous discharge, on NIV machine at home and trelegy, hypertension, and ongoing tobacco use who presented to the ER from home by EMS with a chief complaint of shortness of breath.  Patient reporting that she started having shortness of breath associated with productive cough since last night and has persisted and became worse that promoted her to call EMS.  EMS has found the patient to be at 65% saturation on her normal 6 L of oxygen.  She received a breathing treatment in route.  Patient was recently admitted to the hospital on  and discharged on 2023 with similar clinical picture of Respiratory failure, COPD exacerbation and pneumonia.  Patient was discharged on 6 L previously.  Patient reports compliance with her NIV machine at home.  Denies any sick contacts that she is aware of.  Patient denies any chest pain or abdominal pain.  Denies any other complaints.     On ER evaluation, Patient was found to be tachycardic with a heart  rate of 130s, temperature of 100.2 and respirations of 30, /72.  Patient was placed on BiPAP with FiO2 of 40%.  Her ABG came back and showed pH of 7.296/PCO2 87/PO2 94/HCO3 42/saturation 97% on 5 L nasal cannula.  HS Troponin 44, proBNP WNL, glucose 192, CO2 of 40, otherwise CBC and CMP within acceptable range.  Lactate and Pro-Adi WNL.  Respiratory panel positive for parainfluenza virus.  Chest x-ray Mild interstitial disease  bilaterally is similar to prior. No area of consolidation is seen.  Urinalysis negative for nitrates, leukocytes, WBC or bacteria with squamous epithelial cells.  Patient received Solu-Medrol, normal saline bolus of 2124 mL, magnesium and Aztreonam while in the ER.  Hospitalist consulted for admission, further evaluation and treatment.    Review of Systems:     All systems were reviewed and negative except as mentioned in subjective, assessment and plan.    Vital Signs:    Temp:  [97.9 °F (36.6 °C)-99.8 °F (37.7 °C)] 98.4 °F (36.9 °C)  Heart Rate:  [54-96] 58  Resp:  [20-45] 22  BP: ()/(40-80) 105/48    Intake and output:    I/O last 3 completed shifts:  In: 60 [P.O.:60]  Out: 1700 [Urine:1700]  No intake/output data recorded.    Physical Examination:    General Appearance:  Alert and cooperative.  Chronically ill-appearing.   Head:  Atraumatic and normocephalic.   Eyes: Conjunctivae and sclerae normal, no icterus. No pallor.   Throat: No oral lesions, no thrush, oral mucosa moist.   Neck: Supple, trachea midline, no thyromegaly.   Lungs:   Breath sounds heard bilaterally equally.  Mildly labored, on BiPAP.  Diffuse bilateral wheezing heard.  Decreased air movement bilaterally.  Tachypneic.   Heart:  Normal S1 and S2, no murmur, no gallop, no rub. No JVD.   Abdomen:   Normal bowel sounds, no masses, no organomegaly. Soft, nontender, nondistended, no rebound tenderness.   Extremities: Supple, no edema, no cyanosis, no clubbing.   Skin: No bleeding or rash.   Neurologic: Alert and  oriented x 3. No facial asymmetry. Moves all four limbs. No tremors.      Laboratory results:    Results from last 7 days   Lab Units 05/09/23  1049 05/07/23  1003 05/06/23  0611 05/05/23  0524 05/04/23  1447   SODIUM mmol/L 145 144 144   < > 140   POTASSIUM mmol/L 5.0 4.4 4.9   < > 4.4   CHLORIDE mmol/L 95* 98 100   < > 95*   CO2 mmol/L 40.1* 40.3* 40.1*   < > 40.2*   BUN mg/dL 15 16 19   < > 10   CREATININE mg/dL 0.34* 0.34* 0.28*   < > 0.38*   CALCIUM mg/dL 9.4 9.2 9.0   < > 9.1   BILIRUBIN mg/dL  --   --   --   --  0.4   ALK PHOS U/L  --   --   --   --  87   ALT (SGPT) U/L  --   --   --   --  30   AST (SGOT) U/L  --   --   --   --  23   GLUCOSE mg/dL 142* 195* 178*   < > 192*    < > = values in this interval not displayed.     Results from last 7 days   Lab Units 05/09/23  1049 05/07/23  1003 05/06/23  0611   WBC 10*3/mm3 6.26 10.60 10.46   HEMOGLOBIN g/dL 13.3 12.6 12.5   HEMATOCRIT % 43.6 42.4 41.3   PLATELETS 10*3/mm3 185 138* 140         Results from last 7 days   Lab Units 05/05/23  0725 05/05/23  0524 05/04/23  1447   HSTROP T ng/L 22* 25* 44*     Results from last 7 days   Lab Units 05/04/23  1500   BLOODCX  No growth at 5 days  No growth at 5 days     Recent Labs     05/07/23  1013 05/09/23  0725 05/10/23  0923   PHART 7.386 7.377 7.392   TLH3DXO 74.8* 81.0* 70.6*   PO2ART 69.7* 63.8* 79.8   ACK8HOI 44.8* 47.5* 42.9*   BASEEXCESS 16.0* 18.0* 14.9*      I have reviewed the patient's laboratory results.    Radiology results:    No radiology results from the last 24 hrs  I have reviewed the patient's radiology reports.    Medication Review:     I have reviewed the patient's active and prn medications.     Problem List:      Acute on chronic respiratory failure with hypoxia and hypercapnia      Assessment:    Acute on chronic respiratory failure with hypoxia and hypercapnia, likely secondary to #2 and 3, POA  Acute COPD exacerbation, POA  Parainfluenza infection, POA  Elevated troponin, likely secondary to  demand ischemia, POA  Hypertension  Chronic tobacco abuse  Pulmonary hypertension  Arthritis    Plan:    Respiratory failure with hypoxia/hypercapnia, COPD exacerbation, parainfluenza  -Continue supplemental oxygen to keep saturation above 90%, patient on 5 to 6 L at baseline, continue to titrate down as able to.  Continue BiPAP therapy.  -Patient met SIRS criteria on arrival, likely secondary to influenza infection and respiratory failure. Received Aztreonam and sepsis protocol IV fluids boluses.  -Procalcitonin and WBC WNL, bacterial infection less likely, will hold off on antibiotics for now.  - Solu-Medrol, will taper to 40 mg every 8 hours, continue to taper down as she improves.   -Bronchodilators, Mucinex and supportive care.  -Discussed with pulmonology, Dr. Soriano, appreciate his recommendations.  -Repeat ABG and chest x-ray as indicated.  -Moved to ICU on 5/9 due to worsening respiratory failure and tachypnea.  -Repeat labs today with WBC and Pro-Adi WNL.  proBNP WNL.  ABG reviewed, PCO2 70.   -Chest x-ray with worsening interstitial disease, given a dose of Lasix.    Elevated troponin  -Likely secondary to demand ischemia in settings of respiratory failure  -Patient denies any chest pain, low suspicion for ACS  -Troponin trended down and plateaued.     Hyperglycemia  -Will cover with sliding scale insulin while on steroids  -Hemoglobin A1c 6.7     Further orders as indicated per clinical course.    PT/ OT consulted, case management consulted, appreciate their help.  Patient would benefit from rehab on discharge, she has declined rehab in the past, case management following.  Daughter will discuss with the patient placement for rehab to see if she is agreeable.    DVT Prophylaxis: Lovenox prophylaxis (benefit> risk)  Code Status: Full  Diet: Cardiac  Discharge Plan: To be determined, likely needs 2 to 3 days in the hospital.     Lolis Vital MD  05/10/23  09:37 EDT    Dictated utilizing Dragon  dictation.

## 2023-05-10 NOTE — PLAN OF CARE
Problem: Noninvasive Ventilation Acute  Goal: Effective Unassisted Ventilation and Oxygenation  Outcome: Ongoing, Progressing   Goal Outcome Evaluation:

## 2023-05-10 NOTE — PLAN OF CARE
Goal Outcome Evaluation:    Precedex added for anxiety & restlessness this shift. Pt has rested well on bipap tonight. Pt put on HFNC for oral care this morning without desaturation but increase in anxiety - Precedex titrated and bipap reapplied per pt request. Precedex currently infusing at 0.6 mcg/kg/min. FiO2 65% this morning.

## 2023-05-10 NOTE — PROGRESS NOTES
"  CC: Acute Respiratory Failure.     S: Currently on PAP therapy.  Events reviewed.  The patient's clinical situation worsened and she had to be moved to the ICU.    ROS: Could not be reliably obtained as the patient is on PAP therapy.     O:Vital signs reviewed. FiO2: 70%.    /48 (BP Location: Left arm, Patient Position: Lying)   Pulse 58   Temp 98.4 °F (36.9 °C) (Temporal)   Resp 22   Ht 160 cm (63\")   Wt 69.9 kg (154 lb 1.6 oz)   SpO2 94%   BMI 27.30 kg/m²     Temp (24hrs), Av.5 °F (36.9 °C), Min:97.9 °F (36.6 °C), Max:99.8 °F (37.7 °C)      I & Os reviewed.   Intake/Output       23 0700 - 05/10/23 0659 05/10/23 0700 - 23 0659    Intake (ml) 60 0    Output (ml) 1000 --    Net (ml) -940 0    Last Weight 69.9 kg (154 lb 1.6 oz) --          Net IO Since Admission: 324 mL [05/10/23 0950]    General/Constitutional: On PAP therapy. Appears to be in minimal distress.  Eyes: Eyes were closed.  Neck: Supple without JVD. No obvious masses noted.   Cardiovascular: S1 + S2.  Regular  Lungs/Respiratory: Transmitted Breath sounds noted.  Bilateral, somewhat diffuse, wheezing heard.  Minimal bilateral crackles noted  GI/Abdomen: Soft. Bowel sounds positive.  Musculoskeletal/Extremities: Trace edema noted. Gait could not be assessed at this time, as the patient was laying in bed.   Neurologic: Was able to open eyes to loud verbal stimulus. Detailed exam couldn't be performed due to her being on PAP therapy.   Psych: Seemed somewhat sleepy  Skin: Appeared somewhat dry         Labs: Reviewed.   Results from last 7 days   Lab Units 23  1049 23  1003 23  0611 23  0525 23  1447   WBC 10*3/mm3 6.26 10.60 10.46 4.57 8.04   HEMOGLOBIN g/dL 13.3 12.6 12.5 12.1 13.3   HEMATOCRIT % 43.6 42.4 41.3 40.7 44.5   PLATELETS 10*3/mm3 185 138* 140 115* 149   NEUTROPHIL % %  --   --  88.7*  --  91.6*   NEUTROS ABS 10*3/mm3 5.20  --  9.28*  --  7.36*   EOSINOPHIL % %  --   --  0.0*  --  0.0* "   EOS ABS 10*3/mm3  --   --  0.00  --  0.00   LYMPHOCYTE % %  --   --  5.9*  --  3.6*   LYMPHS ABS 10*3/mm3  --   --  0.62*  --  0.29*       Lab Results   Component Value Date    PROCALCITO 0.20 05/10/2023    PROCALCITO 0.14 05/07/2023    PROCALCITO 0.14 05/04/2023       No results found for: CRP    No results found for: SEDRATE    Lab Results   Component Value Date    PROBNP 479.4 05/10/2023    PROBNP 234.4 05/04/2023    PROBNP 272.8 04/13/2023       Results from last 7 days   Lab Units 05/10/23  0908 05/09/23  1049 05/07/23  1003 05/05/23  0524 05/04/23  1447   SODIUM mmol/L 144 145 144   < > 140   POTASSIUM mmol/L 5.0 5.0 4.4   < > 4.4   CHLORIDE mmol/L 98 95* 98   < > 95*   CO2 mmol/L 38.1* 40.1* 40.3*   < > 40.2*   BUN mg/dL 22 15 16   < > 10   CREATININE mg/dL 0.36* 0.34* 0.34*   < > 0.38*   CALCIUM mg/dL 9.1 9.4 9.2   < > 9.1   ANION GAP mmol/L 7.9 9.9 5.7   < > 4.8*   BILIRUBIN mg/dL  --   --   --   --  0.4   ALK PHOS U/L  --   --   --   --  87   ALT (SGPT) U/L  --   --   --   --  30   AST (SGOT) U/L  --   --   --   --  23   GLUCOSE mg/dL 126* 142* 195*   < > 192*   TOTAL PROTEIN g/dL  --   --   --   --  6.9   ALBUMIN g/dL  --   --   --   --  3.8    < > = values in this interval not displayed.       Results from last 7 days   Lab Units 05/09/23  1049   MAGNESIUM mg/dL 2.2             No results found for: CKTOTAL    No components found for: HSTROPT    Lab Results   Component Value Date    TROPONINT 22 (H) 05/05/2023    TROPONINT 25 (H) 05/05/2023    TROPONINT 44 (H) 05/04/2023       No results found for: DDIMER    Brief Urine Lab Results  (Last result in the past 365 days)      Color   Clarity   Blood   Leuk Est   Nitrite   Protein   CREAT   Urine HCG        05/04/23 1611 Yellow   Clear   Small (1+)   Negative   Negative   30 mg/dL (1+)                 Lab Results   Component Value Date    TSH 0.304 05/09/2023       No results found for: FREET4      Micro: As of May 10, 2023   Lab Results   Component Value  Date    RESPCX Light growth (2+) Normal Respiratory Ana 10/02/2017     No results found for: BCIDPCR  Lab Results   Component Value Date    BLOODCX No growth at 5 days 05/04/2023    BLOODCX No growth at 5 days 05/04/2023    BLOODCX No growth at 5 days 04/16/2023    BLOODCX No growth at 5 days 04/16/2023     Lab Results   Component Value Date    URINECX Final report 02/07/2019    URINECX 20,000-30,000 CFU/mL Escherichia coli (A) 10/05/2018     No results found for: MRSACX  Lab Results   Component Value Date    MRSAPCR No MRSA Detected 04/15/2023     No results found for: URCX  No components found for: LOWRESPCF  No results found for: THROATCX  No results found for: CULTURES  No components found for: STREPBCX  No results found for: STREPPNEUAG  No results found for: LEGIONELLA  No results found for: MYCOPLASCX  No results found for: GCCX  No results found for: WOUNDCX  No results found for: BODYFLDCX    No results found for: FLU    Lab Results   Component Value Date    ADENOVIRUS Not Detected 05/04/2023     Lab Results   Component Value Date    VH533O Not Detected 05/04/2023     Lab Results   Component Value Date    CVHKU1 Not Detected 05/04/2023     Lab Results   Component Value Date    CVNL63 Not Detected 05/04/2023     Lab Results   Component Value Date    CVOC43 Not Detected 05/04/2023     Lab Results   Component Value Date    HUMETPNEVS Not Detected 05/04/2023     Lab Results   Component Value Date    HURVEV Not Detected 05/04/2023     Lab Results   Component Value Date    FLUBPCR Not Detected 05/04/2023     Lab Results   Component Value Date    PARAINFLUE Not Detected 05/04/2023     Lab Results   Component Value Date    PARAFLUV2 Not Detected 05/04/2023     Lab Results   Component Value Date    PARAFLUV3 Detected (A) 05/04/2023     Lab Results   Component Value Date    PARAFLUV4 Not Detected 05/04/2023     Lab Results   Component Value Date    BPERTPCR Not Detected 05/04/2023     No results found for:  [id number]  Lab Results   Component Value Date    CPNEUPCR Not Detected 05/04/2023     Lab Results   Component Value Date    MPNEUMO Not Detected 05/04/2023     Lab Results   Component Value Date    FLUAPCR Not Detected 05/04/2023     No results found for: FLUAH3  No results found for: FLUAH1  Lab Results   Component Value Date    RSV Not Detected 05/04/2023     Lab Results   Component Value Date    BPARAPCR Not Detected 05/04/2023       COVID 19:  Lab Results   Component Value Date    COVID19 Not Detected 05/04/2023         ABG: Reviewed   Recent Labs     05/07/23  1013 05/09/23  0725 05/10/23  0923   PHART 7.386 7.377 7.392   BAA5RWX 74.8* 81.0* 70.6*   PO2ART 69.7* 63.8* 79.8   IIU9PJN 44.8* 47.5* 42.9*   BASEEXCESS 16.0* 18.0* 14.9*       Lab Results   Component Value Date    LACTATE 1.3 05/04/2023    LACTATE 1.2 04/13/2023    LACTATE 2.7 (C) 04/13/2023         Echo: Results for orders placed during the hospital encounter of 04/13/23    Adult Transthoracic Echo Complete W/ Cont if Necessary Per Protocol    Interpretation Summary  •  Left ventricular diastolic function is consistent with (grade I) impaired relaxation.  •  Mild aortic valve stenosis is present.  •  Estimated right ventricular systolic pressure from tricuspid regurgitation is moderately elevated (45-55 mmHg).  •  Mild to moderate pulmonary hypertension is present.      Results for orders placed during the hospital encounter of 10/01/17    Adult Transthoracic Echo Complete W/ Cont if Necessary Per Protocol    Interpretation Summary  TEchnically difficult study  1) Borderline LVH with normal LV systolic function ( EF is over 55%)  2) Normal left atrial size with mild elevation in LVedp  3) Trace MR  4) Mild TR with normal PA pressures  5) Mild AI  6) Small RV with normal RV function        dexmedetomidine, 0.2-1.5 mcg/kg/hr, Last Rate: 0.5 mcg/kg/hr (05/10/23 0713)  Pharmacy to Dose enoxaparin (LOVENOX),   sodium chloride, 75 mL/hr, Last Rate: 75  mL/hr (05/09/23 1748)          CXRay: Latest imaging study was reviewed personally.   Imaging Results (Last 24 Hours)     Procedure Component Value Units Date/Time    XR Chest 1 View [[id number]] Resulted: 05/10/23 0908     Updated: 05/10/23 0931      Today's chest x-ray appears to show worsening interstitial edema?      Assessment & Recommendations/Plan:   1.  Acute respiratory failure  Continue AVAPS at the current setting.  Continue using AVAPS 2 hours on and 2 hours off.  Currently on 70% FiO2.  Will asked the respiratory therapist to decrease FiO2 as tolerated.  Latest ABGs show compensated mild acute on chronic respiratory acidosis, with improving PCO2.  Will administer 1 dose of Lasix especially given the chest x-ray findings which could suggest interstitial edema  We will also decrease IV fluids to 30 mls an hour.    2.  COPD with acute exacerbation  Likely triggered by parainfluenza bronchitis  We will add Pulmicort nebulized treatments once a day, given continued significant wheezing  Will adjust dose of Solu-Medrol to 40 mg every 8 hourly for 24 hours given at least some worsening clinically over the past 24 hours.  Continue Spiriva and Symbicort  Will benefit from outpatient PFTs    3.  Chronic respiratory acidosis  We will try to obtain a compliance on her NIV use at home.    4.  Smoking  Will advised to quit smoking    5.  Pulmonary hypertension  May need outpatient work-up  Likely secondary to underlying likely severe COPD    6.  Anxiety  Currently on Precedex and seems to be doing fair  We will recommend continuation of Precedex for at least 1 more day      Patient remains at very high risk for further deterioration and the hospitalist service has discussed the possibility of further worsening requiring intubation with the patient's family members.    We have reviewed patient's current orders and changes, if any, have been suggested to primary care team. Plan was also discussed with nursing staff, as  necessary.     This document was electronically signed by Arnaldo Soriano MD on 05/10/23 at 09:50 EDT      Dictated utilizing Dragon dictation.

## 2023-05-10 NOTE — THERAPY RE-EVALUATION
Pt on hold for OT re-evaluation d/t being bi-pap dependent and on precedex.  Will follow up once pt is more medically stable.

## 2023-05-10 NOTE — PROGRESS NOTES
"Adult Nutrition  Assessment/PES    Patient Name:  Carolin Toscano  YOB: 1945  MRN: [id number]  Admit Date:  5/4/2023    Assessment Date:  5/10/2023    Comments:    Poor PO x 5 days. Pt transferred to ICU 2/2 decline in status. Pt currently BiPAP dependent per RN. Precedex infusing. If pt status does not improve and she is unable to tolerate adequate PO, would consider TF. Will provide recommendations below if EN is desired.      Rec#1: When medically appropriate, initiate Nutren 1.5 @ 20mL/hr and advance 10mL q8hrs to goal rate of 55 mL/hr x 22 hrs.      Rec#2: Free water flush 150 mL q4hrs.      Total TF regimen to provide: 1,815 kcals, 82 g protein, and 1,824 mL fluid/day.      Monitor and replace electrolytes PRN.      RD to follow-up and available PRN       Reason for Assessment     Row Name 05/10/23 1453          Reason for Assessment    Reason For Assessment follow-up protocol                  Labs/Tests/Procedures/Meds     Row Name 05/10/23 1453          Labs/Procedures/Meds    Lab Results Reviewed reviewed, pertinent     Lab Results Comments Low: Creatinine        Medications    Pertinent Medications Reviewed reviewed, pertinent     Pertinent Medications Comments lovenox, insulin, multivitamin, docusate, precedex                Physical Findings     Row Name 05/10/23 1454          Physical Findings    Overall Physical Appearance normal weight, DUDLEY sacral spine MASD                Estimated/Assessed Needs - Anthropometrics     Row Name 05/10/23 1454          Anthropometrics    Height for Calculation 1.6 m (5' 3\")     Weight for Calculation 70 kg (154 lb 5.2 oz)  CBW        Estimated/Assessed Needs    Additional Documentation Fluid Requirements (Group);KCAL/KG (Group);Estimated Calorie Needs (Group);Protein Requirements (Group)        Estimated Calorie Needs    Estimated Calorie Requirement (kcal/day) 1,750-2,100     Estimated Calorie Need Method kcal/kg        KCAL/KG    KCAL/KG 25 Kcal/Kg " (kcal);30 Kcal/Kg (kcal)     25 Kcal/Kg (kcal) 1750     30 Kcal/Kg (kcal) 2100        Protein Requirements    Weight Used For Protein Calculations 70 kg (154 lb 5.2 oz)  CBW     Est Protein Requirement Amount (gms/kg) 1.2 gm protein     Estimated Protein Requirements (gms/day) 84        Fluid Requirements    Fluid Requirements (mL/day) 1750  1 mL/kcal     RDA Method (mL) 1750                Nutrition Prescription Ordered     Row Name 05/10/23 1457          Nutrition Prescription PO    Current PO Diet Regular     Fluid Consistency Thin     Supplement Mighty Shake     Supplement Frequency 2 times a day     Common Modifiers Cardiac                Evaluation of Received Nutrient/Fluid Intake     Row Name 05/10/23 1457          Intake Assessment    Energy/Calorie Requirement Assessment not meeting needs     Protein Requirement Assessment not meeting needs        PO Evaluation    Number of Days PO Intake Evaluated 3 days     Number of Meals 5     % PO Intake 10                   Problem/Interventions:   Problem 1     Row Name 05/10/23 1458          Nutrition Diagnoses Problem 1    Problem 1 Increased Nutrient Needs     Macronutrient Kcal;Protein     Etiology (related to) Medical Diagnosis     Pulmonary/Critical Care A/C respiratory failure     Signs/Symptoms (evidenced by) Other (comment)  need for oral nutrition supplement to meet increased estimated needs                Problem 2     Row Name 05/10/23 1452          Nutrition Diagnoses Problem 2    Problem 2 Predicted Suboptimal Intake     Etiology (related to) Factors Affecting Nutrition     Signs/Symptoms (evidenced by) Report of Minimal PO Intake  per flowsheets                    Intervention Goal     Row Name 05/10/23 1457          Intervention Goal    General Maintain nutrition;Meet nutritional needs for age/condition;Disease management/therapy;Provide information regarding MNT for treatment/condition;Improved nutrition related lab(s)     PO Tolerate PO;Meet  estimated needs;Increase intake     TF/PN Inititiate TF/PN  If unable to come off of the BiPAP, tolerate adequate PO     Weight Maintain weight                Nutrition Intervention     Row Name 05/10/23 1459          Nutrition Intervention    RD/Tech Action Follow Tx progress;Encourage intake;Supplement provided;Care plan reviewd;Recommend/ordered     Recommended/Ordered EN  If unable to come off of the BiPAP, tolerate adequate PO                Nutrition Prescription     Row Name 05/10/23 1503          Nutrition Prescription EN    Enteral Prescription Enteral begin/change     Enteral Route Other (comment)  Per physician     Product Nutren 1.5 kamilla     TF Delivery Method Continuous     Continuous TF Goal Rate (mL/hr) 55 mL/hr     Continuous TF Starting Rate (mL/hr) 20 mL/hr     Continuous TF Goal Volume (mL) 1210 mL     Continuous TF Starting Volume (mL) 440 mL     Water flush (mL)  150 mL     Water Flush Frequency Every 4 hours     New EN Prescription Ordered? No, recommended  If unable to come off of the BiPAP, tolerate adequate PO        Other Orders    Obtain Weight Daily     Obtain Weight Ordered? No, recommended     Supplement Vitamin mineral supplement     Supplement Ordered? No, recommended     Labs K+;Phos;Mg++;Na+     Labs Ordered? No, recommended                Education/Evaluation     Row Name 05/10/23 1504          Education    Education Will Instruct as appropriate        Monitor/Evaluation    Monitor Per protocol;Weight;PO intake;Skin status;Symptoms;Pertinent labs;Supplement intake                 Electronically signed by:  Niecy Dupont RD  05/10/23 15:04 EDT

## 2023-05-10 NOTE — CASE MANAGEMENT/SOCIAL WORK
Continued Stay Note  NAMOY Henrandez     Patient Name: Carolin Toscano  MRN: [id number]  Today's Date: 5/10/2023    Admit Date: 5/4/2023    Plan: DCP   Discharge Plan     Row Name 05/10/23 1441       Plan    Plan Comments Unsure of Discharge plans at this time Case Management to continue to follow    Final Discharge Disposition Code 30 - still a patient               Discharge Codes    No documentation.                     Ekaterina Pierce RN

## 2023-05-11 ENCOUNTER — APPOINTMENT (OUTPATIENT)
Dept: GENERAL RADIOLOGY | Facility: HOSPITAL | Age: 78
DRG: 190 | End: 2023-05-11
Payer: MEDICARE

## 2023-05-11 LAB
A-A DO2: 175 MMHG
ANION GAP SERPL CALCULATED.3IONS-SCNC: 10.9 MMOL/L (ref 5–15)
ARTERIAL PATENCY WRIST A: ABNORMAL
ATMOSPHERIC PRESS: 735 MMHG
BASE EXCESS BLDA CALC-SCNC: 14.4 MMOL/L (ref 0–2)
BDY SITE: ABNORMAL
BUN SERPL-MCNC: 28 MG/DL (ref 8–23)
BUN/CREAT SERPL: 73.7 (ref 7–25)
CALCIUM SPEC-SCNC: 9.1 MG/DL (ref 8.6–10.5)
CHLORIDE SERPL-SCNC: 101 MMOL/L (ref 98–107)
CO2 SERPL-SCNC: 36.1 MMOL/L (ref 22–29)
COHGB MFR BLD: 0.7 % (ref 0–2)
CREAT SERPL-MCNC: 0.38 MG/DL (ref 0.57–1)
DEPRECATED RDW RBC AUTO: 55.3 FL (ref 37–54)
EGFRCR SERPLBLD CKD-EPI 2021: 104 ML/MIN/1.73
ERYTHROCYTE [DISTWIDTH] IN BLOOD BY AUTOMATED COUNT: 15.3 % (ref 12.3–15.4)
GLUCOSE BLDC GLUCOMTR-MCNC: 154 MG/DL (ref 70–130)
GLUCOSE BLDC GLUCOMTR-MCNC: 155 MG/DL (ref 70–130)
GLUCOSE BLDC GLUCOMTR-MCNC: 160 MG/DL (ref 70–130)
GLUCOSE BLDC GLUCOMTR-MCNC: 206 MG/DL (ref 70–130)
GLUCOSE BLDC GLUCOMTR-MCNC: 277 MG/DL (ref 70–130)
GLUCOSE SERPL-MCNC: 164 MG/DL (ref 65–99)
HCO3 BLDA-SCNC: 43.3 MMOL/L (ref 22–28)
HCT VFR BLD AUTO: 39.9 % (ref 34–46.6)
HCT VFR BLD CALC: 39.4 %
HGB BLD-MCNC: 12.1 G/DL (ref 12–15.9)
INHALED O2 CONCENTRATION: 50 %
Lab: ABNORMAL
Lab: ABNORMAL
MCH RBC QN AUTO: 29.7 PG (ref 26.6–33)
MCHC RBC AUTO-ENTMCNC: 30.3 G/DL (ref 31.5–35.7)
MCV RBC AUTO: 97.8 FL (ref 79–97)
METHGB BLD QL: 0.7 % (ref 0–1.5)
MODALITY: ABNORMAL
NOTE: ABNORMAL
NOTIFIED BY: ABNORMAL
NOTIFIED WHO: ABNORMAL
OXYHGB MFR BLDV: 95.1 % (ref 94–99)
PCO2 BLDA: 76 MM HG (ref 35–45)
PCO2 TEMP ADJ BLD: ABNORMAL MM[HG]
PH BLDA: 7.36 PH UNITS (ref 7.35–7.45)
PH, TEMP CORRECTED: ABNORMAL
PLATELET # BLD AUTO: 183 10*3/MM3 (ref 140–450)
PMV BLD AUTO: 11.6 FL (ref 6–12)
PO2 BLDA: 86.8 MM HG (ref 75–100)
PO2 TEMP ADJ BLD: ABNORMAL MM[HG]
POTASSIUM SERPL-SCNC: 4.5 MMOL/L (ref 3.5–5.2)
RBC # BLD AUTO: 4.08 10*6/MM3 (ref 3.77–5.28)
SAO2 % BLDCOA: 96.5 % (ref 94–100)
SODIUM SERPL-SCNC: 148 MMOL/L (ref 136–145)
VENTILATOR MODE: ABNORMAL
WBC NRBC COR # BLD: 10 10*3/MM3 (ref 3.4–10.8)

## 2023-05-11 PROCEDURE — 94664 DEMO&/EVAL PT USE INHALER: CPT

## 2023-05-11 PROCEDURE — 94799 UNLISTED PULMONARY SVC/PX: CPT

## 2023-05-11 PROCEDURE — 36600 WITHDRAWAL OF ARTERIAL BLOOD: CPT

## 2023-05-11 PROCEDURE — 85027 COMPLETE CBC AUTOMATED: CPT | Performed by: FAMILY MEDICINE

## 2023-05-11 PROCEDURE — 99232 SBSQ HOSP IP/OBS MODERATE 35: CPT | Performed by: INTERNAL MEDICINE

## 2023-05-11 PROCEDURE — 25010000002 FUROSEMIDE PER 20 MG: Performed by: FAMILY MEDICINE

## 2023-05-11 PROCEDURE — 99232 SBSQ HOSP IP/OBS MODERATE 35: CPT | Performed by: FAMILY MEDICINE

## 2023-05-11 PROCEDURE — 25010000002 METHYLPREDNISOLONE PER 40 MG: Performed by: INTERNAL MEDICINE

## 2023-05-11 PROCEDURE — 71045 X-RAY EXAM CHEST 1 VIEW: CPT

## 2023-05-11 PROCEDURE — 80048 BASIC METABOLIC PNL TOTAL CA: CPT | Performed by: FAMILY MEDICINE

## 2023-05-11 PROCEDURE — 82805 BLOOD GASES W/O2 SATURATION: CPT

## 2023-05-11 PROCEDURE — 83050 HGB METHEMOGLOBIN QUAN: CPT

## 2023-05-11 PROCEDURE — 94660 CPAP INITIATION&MGMT: CPT

## 2023-05-11 PROCEDURE — 82375 ASSAY CARBOXYHB QUANT: CPT

## 2023-05-11 PROCEDURE — 25010000002 ENOXAPARIN PER 10 MG: Performed by: FAMILY MEDICINE

## 2023-05-11 PROCEDURE — 82948 REAGENT STRIP/BLOOD GLUCOSE: CPT

## 2023-05-11 PROCEDURE — 94761 N-INVAS EAR/PLS OXIMETRY MLT: CPT

## 2023-05-11 PROCEDURE — 63710000001 INSULIN ASPART PER 5 UNITS: Performed by: FAMILY MEDICINE

## 2023-05-11 RX ORDER — SODIUM CHLORIDE 9 MG/ML
75 INJECTION, SOLUTION INTRAVENOUS CONTINUOUS
Status: ACTIVE | OUTPATIENT
Start: 2023-05-11 | End: 2023-05-12

## 2023-05-11 RX ORDER — FUROSEMIDE 10 MG/ML
40 INJECTION INTRAMUSCULAR; INTRAVENOUS ONCE
Status: COMPLETED | OUTPATIENT
Start: 2023-05-11 | End: 2023-05-11

## 2023-05-11 RX ADMIN — IPRATROPIUM BROMIDE AND ALBUTEROL SULFATE 3 ML: 2.5; .5 SOLUTION RESPIRATORY (INHALATION) at 06:47

## 2023-05-11 RX ADMIN — METHYLPREDNISOLONE SODIUM SUCCINATE 40 MG: 40 INJECTION, POWDER, LYOPHILIZED, FOR SOLUTION INTRAMUSCULAR; INTRAVENOUS at 21:59

## 2023-05-11 RX ADMIN — METHYLPREDNISOLONE SODIUM SUCCINATE 40 MG: 40 INJECTION, POWDER, LYOPHILIZED, FOR SOLUTION INTRAMUSCULAR; INTRAVENOUS at 06:26

## 2023-05-11 RX ADMIN — DEXMEDETOMIDINE HYDROCHLORIDE 0.6 MCG/KG/HR: 400 INJECTION INTRAVENOUS at 22:01

## 2023-05-11 RX ADMIN — SENNOSIDES AND DOCUSATE SODIUM 2 TABLET: 50; 8.6 TABLET ORAL at 21:16

## 2023-05-11 RX ADMIN — INSULIN ASPART 4 UNITS: 100 INJECTION, SOLUTION INTRAVENOUS; SUBCUTANEOUS at 16:49

## 2023-05-11 RX ADMIN — INSULIN ASPART 2 UNITS: 100 INJECTION, SOLUTION INTRAVENOUS; SUBCUTANEOUS at 06:44

## 2023-05-11 RX ADMIN — FUROSEMIDE 40 MG: 10 INJECTION, SOLUTION INTRAMUSCULAR; INTRAVENOUS at 14:56

## 2023-05-11 RX ADMIN — BUDESONIDE AND FORMOTEROL FUMARATE DIHYDRATE 2 PUFF: 160; 4.5 AEROSOL RESPIRATORY (INHALATION) at 19:54

## 2023-05-11 RX ADMIN — ENOXAPARIN SODIUM 40 MG: 100 INJECTION SUBCUTANEOUS at 20:12

## 2023-05-11 RX ADMIN — DEXMEDETOMIDINE HYDROCHLORIDE 0.3 MCG/KG/HR: 400 INJECTION INTRAVENOUS at 14:59

## 2023-05-11 RX ADMIN — INSULIN ASPART 6 UNITS: 100 INJECTION, SOLUTION INTRAVENOUS; SUBCUTANEOUS at 13:05

## 2023-05-11 RX ADMIN — GUAIFENESIN 600 MG: 600 TABLET, EXTENDED RELEASE ORAL at 21:15

## 2023-05-11 RX ADMIN — BUDESONIDE INHALATION 1 MG: 0.5 SUSPENSION RESPIRATORY (INHALATION) at 06:47

## 2023-05-11 RX ADMIN — Medication 3 ML: at 21:16

## 2023-05-11 RX ADMIN — METHYLPREDNISOLONE SODIUM SUCCINATE 40 MG: 40 INJECTION, POWDER, LYOPHILIZED, FOR SOLUTION INTRAMUSCULAR; INTRAVENOUS at 13:06

## 2023-05-11 RX ADMIN — IPRATROPIUM BROMIDE AND ALBUTEROL SULFATE 3 ML: 2.5; .5 SOLUTION RESPIRATORY (INHALATION) at 13:05

## 2023-05-11 RX ADMIN — BUDESONIDE AND FORMOTEROL FUMARATE DIHYDRATE 2 PUFF: 160; 4.5 AEROSOL RESPIRATORY (INHALATION) at 06:47

## 2023-05-11 RX ADMIN — SODIUM CHLORIDE 75 ML/HR: 9 INJECTION, SOLUTION INTRAVENOUS at 13:06

## 2023-05-11 RX ADMIN — IPRATROPIUM BROMIDE AND ALBUTEROL SULFATE 3 ML: 2.5; .5 SOLUTION RESPIRATORY (INHALATION) at 19:54

## 2023-05-11 NOTE — PROGRESS NOTES
[facility]    Name:  Carolin Toscano   Age:  77 y.o.  Sex:  female  :  1945  MRN:  [id number]   Visit Number:  [id number]  Admission Date:  2023  Date Of Service:  23  Primary Care Physician:  Jason Nicholson MD     LOS: 7 days :    Chief Complaint:      Shortness of breath     Subjective:    Patient was seen and examined at bedside.  Patient remains in ICU.  Patient laying comfortably in bed in mild respiratory distress on AVAPS.  Reports tolerating AVAPS okay so far, she was able to get back on the nasal cannula but was requiring 10 L this morning per nursing report.  Patient currently denies any pain.  She also denies any acute complaints other than asking for something to drink. Remains tachypneic intermittently up to 30s respiratory rate. Afebrile.  Pulmonology following.  No family at bedside today.    Hospital Course:    Patient is a 77 years old female with a past medical history of COPD, chronic respiratory failure on 5 to 6 L per her previous discharge, on NIV machine at home and trelegy, hypertension, and ongoing tobacco use who presented to the ER from home by EMS with a chief complaint of shortness of breath.  Patient reporting that she started having shortness of breath associated with productive cough since last night and has persisted and became worse that promoted her to call EMS.  EMS has found the patient to be at 65% saturation on her normal 6 L of oxygen.  She received a breathing treatment in route.  Patient was recently admitted to the hospital on  and discharged on 2023 with similar clinical picture of Respiratory failure, COPD exacerbation and pneumonia.  Patient was discharged on 6 L previously.  Patient reports compliance with her NIV machine at home.  Denies any sick contacts that she is aware of.  Patient denies any chest pain or abdominal pain.  Denies any other complaints.     On ER evaluation, Patient was found  to be tachycardic with a heart rate of 130s, temperature of 100.2 and respirations of 30, /72.  Patient was placed on BiPAP with FiO2 of 40%.  Her ABG came back and showed pH of 7.296/PCO2 87/PO2 94/HCO3 42/saturation 97% on 5 L nasal cannula.  HS Troponin 44, proBNP WNL, glucose 192, CO2 of 40, otherwise CBC and CMP within acceptable range.  Lactate and Pro-Adi WNL.  Respiratory panel positive for parainfluenza virus.  Chest x-ray Mild interstitial disease  bilaterally is similar to prior. No area of consolidation is seen.  Urinalysis negative for nitrates, leukocytes, WBC or bacteria with squamous epithelial cells.  Patient received Solu-Medrol, normal saline bolus of 2124 mL, magnesium and Aztreonam while in the ER.  Hospitalist consulted for admission, further evaluation and treatment.    Moved to ICU on 5/9 due to worsening respiratory failure and tachypnea.    Review of Systems:     All systems were reviewed and negative except as mentioned in subjective, assessment and plan.    Vital Signs:    Temp:  [97.4 °F (36.3 °C)-98.6 °F (37 °C)] 98.2 °F (36.8 °C)  Heart Rate:  [] 80  Resp:  [17-39] 33  BP: ()/(33-64) 134/53    Intake and output:    I/O last 3 completed shifts:  In: 1679 [P.O.:474; I.V.:1205]  Out: 1250 [Urine:1250]  I/O this shift:  In: 240 [P.O.:240]  Out: -     Physical Examination:    General Appearance:  Alert and cooperative.  Chronically ill-appearing.   Head:  Atraumatic and normocephalic.   Eyes: Conjunctivae and sclerae normal, no icterus. No pallor.   Throat: No oral lesions, no thrush, oral mucosa moist.   Neck: Supple, trachea midline, no thyromegaly.   Lungs:   Breath sounds heard bilaterally equally.  Mildly labored, on BiPAP.  Diffuse bilateral wheezing heard.  Decreased air movement bilaterally.  Tachypneic.   Heart:  Normal S1 and S2, no murmur, no gallop, no rub. No JVD.   Abdomen:   Normal bowel sounds, no masses, no organomegaly. Soft, nontender, nondistended, no  rebound tenderness.   Extremities: Supple, no edema, no cyanosis, no clubbing.   Skin: No bleeding or rash.   Neurologic: Alert and oriented x 3. No facial asymmetry. Moves all four limbs. No tremors.      Laboratory results:    Results from last 7 days   Lab Units 05/11/23  0503 05/10/23  0908 05/09/23  1049 05/05/23  0524 05/04/23  1447   SODIUM mmol/L 148* 144 145   < > 140   POTASSIUM mmol/L 4.5 5.0 5.0   < > 4.4   CHLORIDE mmol/L 101 98 95*   < > 95*   CO2 mmol/L 36.1* 38.1* 40.1*   < > 40.2*   BUN mg/dL 28* 22 15   < > 10   CREATININE mg/dL 0.38* 0.36* 0.34*   < > 0.38*   CALCIUM mg/dL 9.1 9.1 9.4   < > 9.1   BILIRUBIN mg/dL  --   --   --   --  0.4   ALK PHOS U/L  --   --   --   --  87   ALT (SGPT) U/L  --   --   --   --  30   AST (SGOT) U/L  --   --   --   --  23   GLUCOSE mg/dL 164* 126* 142*   < > 192*    < > = values in this interval not displayed.     Results from last 7 days   Lab Units 05/11/23  0503 05/10/23  0908 05/09/23  1049   WBC 10*3/mm3 10.00 7.34 6.26   HEMOGLOBIN g/dL 12.1 12.3 13.3   HEMATOCRIT % 39.9 40.6 43.6   PLATELETS 10*3/mm3 183 172 185         Results from last 7 days   Lab Units 05/05/23  0725 05/05/23  0524 05/04/23  1447   HSTROP T ng/L 22* 25* 44*     Results from last 7 days   Lab Units 05/04/23  1500   BLOODCX  No growth at 5 days  No growth at 5 days     Recent Labs     05/09/23  0725 05/10/23  0923 05/11/23  0659   PHART 7.377 7.392 7.364   DAB3WWN 81.0* 70.6* 76.0*   PO2ART 63.8* 79.8 86.8   OHA6YHU 47.5* 42.9* 43.3*   BASEEXCESS 18.0* 14.9* 14.4*      I have reviewed the patient's laboratory results.    Radiology results:    XR Chest 1 View    Result Date: 5/10/2023  PROCEDURE: XR CHEST 1 VW-  HISTORY: resp failure follow up; J96.21-Acute and chronic respiratory failure with hypoxia; J96.22-Acute and chronic respiratory failure with hypercapnia; J44.1-Chronic obstructive pulmonary disease with (acute) exacerbation; B34.8-Other viral infections of unspecified site   COMPARISON: 2 days prior.  FINDINGS: The heart is upper limits of normal in size, stable.. Interstitial disease again noted bilaterally, worsening on the left. There is also worsening peribronchial thickening, most prominent in the left lung base. Consider infection versus edema. Recommend follow-up.. The mediastinum is unremarkable. There is no pneumothorax.  There are no acute osseous abnormalities.      Impression: Worsening interstitial disease and bronchial wall thickening most prominent left lung base, consider infection versus edema and recommend follow-up..    This report was signed and finalized on 5/10/2023 10:44 AM by Shraddha Wharton MD.    I have reviewed the patient's radiology reports.    Medication Review:     I have reviewed the patient's active and prn medications.     Problem List:      Acute on chronic respiratory failure with hypoxia and hypercapnia      Assessment:    Acute on chronic respiratory failure with hypoxia and hypercapnia, likely secondary to #2 and 3, POA  Acute COPD exacerbation, POA  Parainfluenza infection, POA  Elevated troponin, likely secondary to demand ischemia, POA  Hypertension  Chronic tobacco abuse  Pulmonary hypertension  Arthritis    Plan:    Respiratory failure with hypoxia/hypercapnia, COPD exacerbation, parainfluenza  -Continue supplemental oxygen to keep saturation above 90%, patient on 5 to 6 L at baseline, continue to titrate down as able to.  Continue BiPAP therapy.  -Patient met SIRS criteria on arrival, likely secondary to influenza infection and respiratory failure. Received Aztreonam and sepsis protocol IV fluids boluses.  -Procalcitonin and WBC WNL, bacterial infection less likely, will hold off on antibiotics for now.  - Solu-Medrol, will taper to 40 mg every 8 hours, continue to taper down as she improves.   -Bronchodilators, Mucinex and supportive care.  -Discussed with pulmonology, Dr. Cline, appreciate her recommendations.  -Repeat ABG and chest x-ray  as indicated.  -Repeat labs today with WBC WNL.  ABG reviewed  -Chest x-ray with worsening interstitial disease  -Will give another dose of Lasix today.     Elevated troponin  -Likely secondary to demand ischemia in settings of respiratory failure  -Patient denies any chest pain, low suspicion for ACS  -Troponin trended down and plateaued.     Hyperglycemia  -Will cover with sliding scale insulin while on steroids  -Hemoglobin A1c 6.7     Further orders as indicated per clinical course.    PT/ OT consulted, case management consulted, appreciate their help.  Patient would benefit from rehab on discharge, she has declined rehab in the past, case management following.  Daughter will discuss with the patient placement for rehab to see if she is agreeable.    DVT Prophylaxis: Lovenox prophylaxis (benefit> risk)  Code Status: Full  Diet: Cardiac  Discharge Plan: To be determined, likely needs 2 to 3 days in the hospital.     Lolis Vital MD  05/11/23  10:49 EDT    Dictated utilizing Dragon dictation.

## 2023-05-11 NOTE — THERAPY RE-EVALUATION
PT re-evaluation held.  She is BiPAP dependent and unable to tolerate therapy.  Patient will follow up tomorrow.

## 2023-05-11 NOTE — CASE MANAGEMENT/SOCIAL WORK
1226 Pt on %50  bipap during rounds. RR  48. Cm spoke with Santiago Jacques who states pt is only able to tolerate HFNC for short periods to receive fluids and nutrition. Cm following for dc needs.

## 2023-05-11 NOTE — PROGRESS NOTES
"  CC: Acute Respiratory Failure.     S: Patient did well overnight in the ICU.  NIV able to be weaned down to 50% and able to go to a high flow nasal cannula this morning and able to drink boost.  Did require 500 cc bolus secondary to blood pressure with good response.  Back on NIV now    ROS: Could not be reliably obtained as the patient is on PAP therapy, but overall states she is feeling better.    O:Vital signs reviewed. FiO2: 50%.    /54   Pulse 93   Temp 98.2 °F (36.8 °C) (Temporal)   Resp (!) 39   Ht 160 cm (63\")   Wt 70.2 kg (154 lb 12.2 oz)   SpO2 98%   BMI 27.42 kg/m²     Temp (24hrs), Av.9 °F (36.6 °C), Min:97.4 °F (36.3 °C), Max:98.6 °F (37 °C)      I & Os reviewed.   Intake/Output       05/10/23 0700 - 23 0659 23 0700 - 23 0659    Intake (ml) 1679 240    Output (ml) 850 --    Net (ml) 829 240    Last Weight 70.2 kg (154 lb 12.2 oz) --          Net IO Since Admission: 1,393 mL [23 0857]    General/Constitutional: On PAP therapy. Appears chronically ill, but calm  Eyes: Extraocular muscles are intact   Mouth: Mildly dry mucosa behind PAP mask   neck: Supple without JVD. No obvious masses noted.   Cardiovascular: S1 + S2.  Regular  Lungs/Respiratory: Decreased breath sounds with scattered wheezing   GI/Abdomen: Soft. Bowel sounds positive.  Musculoskeletal/Extremities: Trace edema noted.  Diffuse deconditioning and sarcopenia appreciated  Neurologic: Was able to answer some questions, more awake and alert  Psych: Did not appear anxious   skin: Appeared somewhat dry         Labs: Reviewed.   Results from last 7 days   Lab Units 23  0503 05/10/23  0908 23  1049 23  1003 23  0611 23  0525 23  1447   WBC 10*3/mm3 10.00 7.34 6.26 10.60 10.46   < > 8.04   HEMOGLOBIN g/dL 12.1 12.3 13.3 12.6 12.5   < > 13.3   HEMATOCRIT % 39.9 40.6 43.6 42.4 41.3   < > 44.5   PLATELETS 10*3/mm3 183 172 185 138* 140   < > 149   NEUTROPHIL % %  --   --   " --   --  88.7*  --  91.6*   NEUTROS ABS 10*3/mm3  --  5.21 5.20  --  9.28*  --  7.36*   EOSINOPHIL % %  --   --   --   --  0.0*  --  0.0*   EOS ABS 10*3/mm3  --   --   --   --  0.00  --  0.00   LYMPHOCYTE % %  --   --   --   --  5.9*  --  3.6*   LYMPHS ABS 10*3/mm3  --   --   --   --  0.62*  --  0.29*    < > = values in this interval not displayed.       Lab Results   Component Value Date    PROCALCITO 0.20 05/10/2023    PROCALCITO 0.14 05/07/2023    PROCALCITO 0.14 05/04/2023       No results found for: CRP    No results found for: SEDRATE    Lab Results   Component Value Date    PROBNP 479.4 05/10/2023    PROBNP 234.4 05/04/2023    PROBNP 272.8 04/13/2023       Results from last 7 days   Lab Units 05/11/23  0503 05/10/23  0908 05/09/23  1049 05/05/23  0524 05/04/23  1447   SODIUM mmol/L 148* 144 145   < > 140   POTASSIUM mmol/L 4.5 5.0 5.0   < > 4.4   CHLORIDE mmol/L 101 98 95*   < > 95*   CO2 mmol/L 36.1* 38.1* 40.1*   < > 40.2*   BUN mg/dL 28* 22 15   < > 10   CREATININE mg/dL 0.38* 0.36* 0.34*   < > 0.38*   CALCIUM mg/dL 9.1 9.1 9.4   < > 9.1   ANION GAP mmol/L 10.9 7.9 9.9   < > 4.8*   BILIRUBIN mg/dL  --   --   --   --  0.4   ALK PHOS U/L  --   --   --   --  87   ALT (SGPT) U/L  --   --   --   --  30   AST (SGOT) U/L  --   --   --   --  23   GLUCOSE mg/dL 164* 126* 142*   < > 192*   TOTAL PROTEIN g/dL  --   --   --   --  6.9   ALBUMIN g/dL  --   --   --   --  3.8    < > = values in this interval not displayed.       Results from last 7 days   Lab Units 05/09/23  1049   MAGNESIUM mg/dL 2.2             No results found for: CKTOTAL    No components found for: HSTROPT    Lab Results   Component Value Date    TROPONINT 22 (H) 05/05/2023    TROPONINT 25 (H) 05/05/2023    TROPONINT 44 (H) 05/04/2023       No results found for: DDIMER    Brief Urine Lab Results  (Last result in the past 365 days)      Color   Clarity   Blood   Leuk Est   Nitrite   Protein   CREAT   Urine HCG        05/04/23 1611 Yellow   Clear    Small (1+)   Negative   Negative   30 mg/dL (1+)                 Lab Results   Component Value Date    TSH 0.304 05/09/2023       No results found for: FREET4      Micro: As of May 11, 2023   Lab Results   Component Value Date    RESPCX Light growth (2+) Normal Respiratory Ana 10/02/2017     No results found for: BCIDPCR  Lab Results   Component Value Date    BLOODCX No growth at 5 days 05/04/2023    BLOODCX No growth at 5 days 05/04/2023    BLOODCX No growth at 5 days 04/16/2023    BLOODCX No growth at 5 days 04/16/2023     Lab Results   Component Value Date    URINECX Final report 02/07/2019    URINECX 20,000-30,000 CFU/mL Escherichia coli (A) 10/05/2018     No results found for: MRSACX  Lab Results   Component Value Date    MRSAPCR No MRSA Detected 04/15/2023     No results found for: URCX  No components found for: LOWRESPCF  No results found for: THROATCX  No results found for: CULTURES  No components found for: STREPBCX  No results found for: STREPPNEUAG  No results found for: LEGIONELLA  No results found for: MYCOPLASCX  No results found for: GCCX  No results found for: WOUNDCX  No results found for: BODYFLDCX    No results found for: FLU    Lab Results   Component Value Date    ADENOVIRUS Not Detected 05/04/2023     Lab Results   Component Value Date    TM984M Not Detected 05/04/2023     Lab Results   Component Value Date    CVHKU1 Not Detected 05/04/2023     Lab Results   Component Value Date    CVNL63 Not Detected 05/04/2023     Lab Results   Component Value Date    CVOC43 Not Detected 05/04/2023     Lab Results   Component Value Date    HUMETPNEVS Not Detected 05/04/2023     Lab Results   Component Value Date    HURVEV Not Detected 05/04/2023     Lab Results   Component Value Date    FLUBPCR Not Detected 05/04/2023     Lab Results   Component Value Date    PARAINFLUE Not Detected 05/04/2023     Lab Results   Component Value Date    PARAFLUV2 Not Detected 05/04/2023     Lab Results   Component Value Date     PARAFLUV3 Detected (A) 05/04/2023     Lab Results   Component Value Date    PARAFLUV4 Not Detected 05/04/2023     Lab Results   Component Value Date    BPERTPCR Not Detected 05/04/2023     No results found for: [id number]  Lab Results   Component Value Date    CPNEUPCR Not Detected 05/04/2023     Lab Results   Component Value Date    MPNEUMO Not Detected 05/04/2023     Lab Results   Component Value Date    FLUAPCR Not Detected 05/04/2023     No results found for: FLUAH3  No results found for: FLUAH1  Lab Results   Component Value Date    RSV Not Detected 05/04/2023     Lab Results   Component Value Date    BPARAPCR Not Detected 05/04/2023       COVID 19:  Lab Results   Component Value Date    COVID19 Not Detected 05/04/2023         ABG: Reviewed   Recent Labs     05/09/23  0725 05/10/23  0923 05/11/23  0659   PHART 7.377 7.392 7.364   CAP7YUP 81.0* 70.6* 76.0*   PO2ART 63.8* 79.8 86.8   HMJ6KLN 47.5* 42.9* 43.3*   BASEEXCESS 18.0* 14.9* 14.4*       Lab Results   Component Value Date    LACTATE 1.3 05/04/2023    LACTATE 1.2 04/13/2023    LACTATE 2.7 (C) 04/13/2023         Echo: Results for orders placed during the hospital encounter of 04/13/23    Adult Transthoracic Echo Complete W/ Cont if Necessary Per Protocol    Interpretation Summary  •  Left ventricular diastolic function is consistent with (grade I) impaired relaxation.  •  Mild aortic valve stenosis is present.  •  Estimated right ventricular systolic pressure from tricuspid regurgitation is moderately elevated (45-55 mmHg).  •  Mild to moderate pulmonary hypertension is present.      Results for orders placed during the hospital encounter of 10/01/17    Adult Transthoracic Echo Complete W/ Cont if Necessary Per Protocol    Interpretation Summary  TEchnically difficult study  1) Borderline LVH with normal LV systolic function ( EF is over 55%)  2) Normal left atrial size with mild elevation in LVedp  3) Trace MR  4) Mild TR with normal PA pressures  5)  Mild AI  6) Small RV with normal RV function        dexmedetomidine, 0.2-1.5 mcg/kg/hr, Last Rate: 0.3 mcg/kg/hr (05/10/23 2153)  Pharmacy to Dose enoxaparin (LOVENOX),           CXRay: Latest imaging study was reviewed personally.   Imaging Results (Last 24 Hours)     Procedure Component Value Units Date/Time    XR Chest 1 View [[id number]] Resulted: 05/11/23 0522     Updated: 05/11/23 0522    XR Chest 1 View [[id number]] Collected: 05/10/23 1043     Updated: 05/10/23 1046    Narrative:      PROCEDURE: XR CHEST 1 VW-     HISTORY: resp failure follow up; J96.21-Acute and chronic respiratory  failure with hypoxia; J96.22-Acute and chronic respiratory failure with  hypercapnia; J44.1-Chronic obstructive pulmonary disease with (acute)  exacerbation; B34.8-Other viral infections of unspecified site     COMPARISON: 2 days prior.     FINDINGS: The heart is upper limits of normal in size, stable..  Interstitial disease again noted bilaterally, worsening on the left.  There is also worsening peribronchial thickening, most prominent in the  left lung base. Consider infection versus edema. Recommend follow-up..  The mediastinum is unremarkable. There is no pneumothorax.  There are no  acute osseous abnormalities.       Impression:      Worsening interstitial disease and bronchial wall thickening  most prominent left lung base, consider infection versus edema and  recommend follow-up..           This report was signed and finalized on 5/10/2023 10:44 AM by Shraddha Wharton MD.      Today's chest x-ray appears to show worsening interstitial edema?      Assessment & Recommendations/Plan:   1.  Acute respiratory failure  Continue AVAPS and will adjust settings as needed   Continue using AVAPS 2 hours on and 2 hours off.  Currently on 50% FiO2.   We will continue to in/decrease FiO2 as tolerated.  Fluids were stopped and the patient had to have a 500 cc bolus.  We will restart fluids for the next 24 hours especially given elevated  sodium level this morning.  We will monitor closely for volume overload.        2.  COPD with acute exacerbation  Secondary to parainfluenza bronchitis  Continue Spiriva, Pulmicort nebs and Symbicort given significant wheezing  Continue with DuoNebs  Continues on Solu-Medrol.  May consider decreasing dose tomorrow if she clinically improves    Will benefit from outpatient PFTs to determine severity of underlying disease once clinically improved from current exacerbation    3.  Chronic respiratory acidosis  On AVAPS.    4.  Smoking  Encouraged her to use this as an option to quit smoking.    5.  Pulmonary hypertension  May need outpatient work-up  Likely secondary to underlying likely severe COPD    6.  Anxiety  Currently on Precedex and this seems to be helping.    Patient is able to be further weaned down on NIV and oxygen requirements over the next 24 hours would stop Precedex tomorrow.      We have reviewed patient's current orders and changes, if any, have been suggested to primary care team. Plan was also discussed with nursing staff, as necessary.     This document was electronically signed by Miranda Cline MD on 05/11/23 at 08:57 EDT      Dictated utilizing Dragon dictation.

## 2023-05-11 NOTE — PROGRESS NOTES
Adult Nutrition  Assessment/PES    Patient Name:  Carolin Toscano  YOB: 1945  MRN: [id number]  Admit Date:  5/4/2023    Assessment Date:  5/11/2023    Comments:    Overall PO intake remains poor, currently averaging 12.5% x 4 meals. However, pt is tolerating ONS per RN documentation, flowsheets. RN updated supplement order to provide Mighty Shake TID via Epic, however order not communicated to [facility]. Will update order in [facility] and will F/U. Available PRN.      Reason for Assessment     Row Name 05/11/23 1314          Reason for Assessment    Reason For Assessment follow-up protocol                  Labs/Tests/Procedures/Meds     Row Name 05/11/23 1314          Labs/Procedures/Meds    Lab Results Reviewed reviewed, pertinent     Lab Results Comments Low: Creatinine; High: NaCl, Glucose, BUN        Medications    Pertinent Medications Reviewed reviewed, pertinent     Pertinent Medications Comments lovenox, insulin, multivitamin, docusate, precedex                Physical Findings     Row Name 05/11/23 1315          Physical Findings    Overall Physical Appearance normal weight, DUDLEY sacral spine MASD                  Nutrition Prescription Ordered     Row Name 05/11/23 1315          Nutrition Prescription PO    Current PO Diet Regular     Fluid Consistency Thin     Supplement Mighty Shake     Supplement Frequency 2 times a day     Common Modifiers Cardiac                Evaluation of Received Nutrient/Fluid Intake     Row Name 05/11/23 1318          PO Evaluation    Number of Days PO Intake Evaluated 2 days     Number of Meals 4     % PO Intake 12.5                   Problem/Interventions:   Problem 1     Row Name 05/11/23 1319          Nutrition Diagnoses Problem 1    Problem 1 Increased Nutrient Needs     Macronutrient Kcal;Protein     Etiology (related to) Medical Diagnosis     Pulmonary/Critical Care A/C respiratory failure     Signs/Symptoms (evidenced by) Other (comment)  need for oral nutrition  supplement to meet increased estimated needs                Problem 2     Row Name 05/11/23 1320          Nutrition Diagnoses Problem 2    Problem 2 Predicted Suboptimal Intake     Etiology (related to) Factors Affecting Nutrition     Signs/Symptoms (evidenced by) Report of Minimal PO Intake  per flowsheets                    Intervention Goal     Row Name 05/11/23 1320          Intervention Goal    General Maintain nutrition;Meet nutritional needs for age/condition;Improved nutrition related lab(s);Provide information regarding MNT for treatment/condition     PO Increase intake;Tolerate PO;Meet estimated needs     Weight Maintain weight                Nutrition Intervention     Row Name 05/11/23 1320          Nutrition Intervention    RD/Tech Action Follow Tx progress;Encourage intake;Supplement provided;Care plan reviewd                Nutrition Prescription     Row Name 05/11/23 1320          Other Orders    Obtain Weight Daily     Obtain Weight Ordered? No, recommended     Supplement Vitamin mineral supplement     Supplement Ordered? No, recommended     Labs K+;Phos;Mg++;Na+     Labs Ordered? No, recommended                Education/Evaluation     Row Name 05/11/23 1320          Education    Education Will Instruct as appropriate        Monitor/Evaluation    Monitor Per protocol;PO intake;Weight;Skin status;Supplement intake;Symptoms;Pertinent labs                 Electronically signed by:  Niecy Dupont RD  05/11/23 13:21 EDT

## 2023-05-11 NOTE — THERAPY RE-EVALUATION
Pt on hold for OT re-evaluation d/t being bi-pap dependent and unable to tolerate activity.  Will follow up with pt tomorrow.

## 2023-05-11 NOTE — PLAN OF CARE
Goal Outcome Evaluation:      Able to come off of BiPAP for limited periods of time throughout the shift for fluids and nutrition. Used 10L HFNC for said periods. External catheter discontinued for leakage issues. Discussed need of IV fluids with pulmonology. See orders.

## 2023-05-11 NOTE — PLAN OF CARE
Goal Outcome Evaluation:    Pt has rested on bipap for majority of shift, FiO2 titrated down to 50%. Able to come off this AM to 10L HFNC and drink Boost drink x2. O2 sats >95%. 500ml NS bolus x1 given for low BP with good response.

## 2023-05-12 LAB
A-A DO2: 251.9 MMHG
ARTERIAL PATENCY WRIST A: POSITIVE
ATMOSPHERIC PRESS: 735 MMHG
BACTERIA UR QL AUTO: ABNORMAL /HPF
BASE EXCESS BLDA CALC-SCNC: 14 MMOL/L (ref 0–2)
BDY SITE: ABNORMAL
BILIRUB UR QL STRIP: NEGATIVE
CLARITY UR: ABNORMAL
COHGB MFR BLD: 0.8 % (ref 0–2)
COLOR UR: YELLOW
GLUCOSE BLDC GLUCOMTR-MCNC: 157 MG/DL (ref 70–130)
GLUCOSE BLDC GLUCOMTR-MCNC: 173 MG/DL (ref 70–130)
GLUCOSE BLDC GLUCOMTR-MCNC: 279 MG/DL (ref 70–130)
GLUCOSE UR STRIP-MCNC: NEGATIVE MG/DL
HCO3 BLDA-SCNC: 40.4 MMOL/L (ref 22–28)
HCT VFR BLD CALC: 36.5 %
HGB UR QL STRIP.AUTO: NEGATIVE
HYALINE CASTS UR QL AUTO: ABNORMAL /LPF
INHALED O2 CONCENTRATION: 60 %
KETONES UR QL STRIP: NEGATIVE
LEUKOCYTE ESTERASE UR QL STRIP.AUTO: ABNORMAL
Lab: ABNORMAL
METHGB BLD QL: 0.6 % (ref 0–1.5)
MODALITY: ABNORMAL
NITRITE UR QL STRIP: NEGATIVE
NOTE: ABNORMAL
OXYHGB MFR BLDV: 97.2 % (ref 94–99)
PCO2 BLDA: 58.1 MM HG (ref 35–45)
PCO2 TEMP ADJ BLD: ABNORMAL MM[HG]
PH BLDA: 7.45 PH UNITS (ref 7.35–7.45)
PH UR STRIP.AUTO: 6.5 [PH] (ref 5–8)
PH, TEMP CORRECTED: ABNORMAL
PO2 BLDA: 99 MM HG (ref 75–100)
PO2 TEMP ADJ BLD: ABNORMAL MM[HG]
PROT UR QL STRIP: ABNORMAL
RBC # UR STRIP: ABNORMAL /HPF
REF LAB TEST METHOD: ABNORMAL
SAO2 % BLDCOA: 98.6 % (ref 94–100)
SP GR UR STRIP: 1.02 (ref 1–1.03)
SQUAMOUS #/AREA URNS HPF: ABNORMAL /HPF
STARCH GRANULES URNS QL MICRO: ABNORMAL /HPF
UROBILINOGEN UR QL STRIP: ABNORMAL
VENTILATOR MODE: ABNORMAL
WBC # UR STRIP: ABNORMAL /HPF

## 2023-05-12 PROCEDURE — 94799 UNLISTED PULMONARY SVC/PX: CPT

## 2023-05-12 PROCEDURE — 94664 DEMO&/EVAL PT USE INHALER: CPT

## 2023-05-12 PROCEDURE — 82375 ASSAY CARBOXYHB QUANT: CPT

## 2023-05-12 PROCEDURE — 97168 OT RE-EVAL EST PLAN CARE: CPT

## 2023-05-12 PROCEDURE — 94660 CPAP INITIATION&MGMT: CPT

## 2023-05-12 PROCEDURE — 82948 REAGENT STRIP/BLOOD GLUCOSE: CPT

## 2023-05-12 PROCEDURE — 63710000001 INSULIN ASPART PER 5 UNITS: Performed by: FAMILY MEDICINE

## 2023-05-12 PROCEDURE — 97164 PT RE-EVAL EST PLAN CARE: CPT

## 2023-05-12 PROCEDURE — 83050 HGB METHEMOGLOBIN QUAN: CPT

## 2023-05-12 PROCEDURE — 36600 WITHDRAWAL OF ARTERIAL BLOOD: CPT

## 2023-05-12 PROCEDURE — 25010000002 ENOXAPARIN PER 10 MG: Performed by: FAMILY MEDICINE

## 2023-05-12 PROCEDURE — 99233 SBSQ HOSP IP/OBS HIGH 50: CPT | Performed by: FAMILY MEDICINE

## 2023-05-12 PROCEDURE — 82805 BLOOD GASES W/O2 SATURATION: CPT

## 2023-05-12 PROCEDURE — 94761 N-INVAS EAR/PLS OXIMETRY MLT: CPT

## 2023-05-12 PROCEDURE — 81001 URINALYSIS AUTO W/SCOPE: CPT | Performed by: FAMILY MEDICINE

## 2023-05-12 PROCEDURE — 25010000002 METHYLPREDNISOLONE PER 40 MG: Performed by: INTERNAL MEDICINE

## 2023-05-12 RX ORDER — ALPRAZOLAM 0.5 MG/1
0.5 TABLET ORAL 2 TIMES DAILY PRN
Status: DISPENSED | OUTPATIENT
Start: 2023-05-12 | End: 2023-05-30

## 2023-05-12 RX ORDER — METHYLPREDNISOLONE SODIUM SUCCINATE 40 MG/ML
40 INJECTION, POWDER, LYOPHILIZED, FOR SOLUTION INTRAMUSCULAR; INTRAVENOUS EVERY 12 HOURS
Status: DISCONTINUED | OUTPATIENT
Start: 2023-05-13 | End: 2023-05-19

## 2023-05-12 RX ADMIN — MULTIPLE VITAMINS W/ MINERALS TAB 1 TABLET: TAB at 08:09

## 2023-05-12 RX ADMIN — ATENOLOL 25 MG: 25 TABLET ORAL at 08:10

## 2023-05-12 RX ADMIN — METHYLPREDNISOLONE SODIUM SUCCINATE 40 MG: 40 INJECTION, POWDER, LYOPHILIZED, FOR SOLUTION INTRAMUSCULAR; INTRAVENOUS at 07:24

## 2023-05-12 RX ADMIN — Medication 3 ML: at 22:43

## 2023-05-12 RX ADMIN — GUAIFENESIN 600 MG: 600 TABLET, EXTENDED RELEASE ORAL at 22:42

## 2023-05-12 RX ADMIN — AMLODIPINE BESYLATE 10 MG: 5 TABLET ORAL at 08:09

## 2023-05-12 RX ADMIN — Medication 5 MG: at 22:42

## 2023-05-12 RX ADMIN — SENNOSIDES AND DOCUSATE SODIUM 2 TABLET: 50; 8.6 TABLET ORAL at 08:09

## 2023-05-12 RX ADMIN — BUDESONIDE INHALATION 1 MG: 0.5 SUSPENSION RESPIRATORY (INHALATION) at 13:14

## 2023-05-12 RX ADMIN — SENNOSIDES AND DOCUSATE SODIUM 2 TABLET: 50; 8.6 TABLET ORAL at 22:42

## 2023-05-12 RX ADMIN — SODIUM CHLORIDE 75 ML/HR: 9 INJECTION, SOLUTION INTRAVENOUS at 02:33

## 2023-05-12 RX ADMIN — IPRATROPIUM BROMIDE AND ALBUTEROL SULFATE 3 ML: 2.5; .5 SOLUTION RESPIRATORY (INHALATION) at 07:09

## 2023-05-12 RX ADMIN — INSULIN ASPART 2 UNITS: 100 INJECTION, SOLUTION INTRAVENOUS; SUBCUTANEOUS at 07:25

## 2023-05-12 RX ADMIN — Medication 3 ML: at 08:10

## 2023-05-12 RX ADMIN — ENOXAPARIN SODIUM 40 MG: 100 INJECTION SUBCUTANEOUS at 22:42

## 2023-05-12 RX ADMIN — IPRATROPIUM BROMIDE AND ALBUTEROL SULFATE 3 ML: 2.5; .5 SOLUTION RESPIRATORY (INHALATION) at 19:03

## 2023-05-12 RX ADMIN — INSULIN ASPART 6 UNITS: 100 INJECTION, SOLUTION INTRAVENOUS; SUBCUTANEOUS at 12:19

## 2023-05-12 RX ADMIN — IPRATROPIUM BROMIDE AND ALBUTEROL SULFATE 3 ML: 2.5; .5 SOLUTION RESPIRATORY (INHALATION) at 00:33

## 2023-05-12 RX ADMIN — GUAIFENESIN 600 MG: 600 TABLET, EXTENDED RELEASE ORAL at 08:09

## 2023-05-12 RX ADMIN — ALPRAZOLAM 0.5 MG: 0.5 TABLET ORAL at 15:55

## 2023-05-12 RX ADMIN — TIOTROPIUM BROMIDE INHALATION SPRAY 2 PUFF: 3.12 SPRAY, METERED RESPIRATORY (INHALATION) at 07:10

## 2023-05-12 RX ADMIN — IPRATROPIUM BROMIDE AND ALBUTEROL SULFATE 3 ML: 2.5; .5 SOLUTION RESPIRATORY (INHALATION) at 13:14

## 2023-05-12 RX ADMIN — BUDESONIDE AND FORMOTEROL FUMARATE DIHYDRATE 2 PUFF: 160; 4.5 AEROSOL RESPIRATORY (INHALATION) at 07:10

## 2023-05-12 RX ADMIN — METHYLPREDNISOLONE SODIUM SUCCINATE 40 MG: 40 INJECTION, POWDER, LYOPHILIZED, FOR SOLUTION INTRAMUSCULAR; INTRAVENOUS at 14:43

## 2023-05-12 RX ADMIN — DEXMEDETOMIDINE HYDROCHLORIDE 0.6 MCG/KG/HR: 400 INJECTION INTRAVENOUS at 14:43

## 2023-05-12 RX ADMIN — BUDESONIDE AND FORMOTEROL FUMARATE DIHYDRATE 2 PUFF: 160; 4.5 AEROSOL RESPIRATORY (INHALATION) at 19:03

## 2023-05-12 NOTE — THERAPY RE-EVALUATION
"Patient Name: Carolin Toscano  : 1945    MRN: [id number]                              Today's Date: 2023       Admit Date: 2023    Visit Dx:     ICD-10-CM ICD-9-CM   1. Acute on chronic respiratory failure with hypoxia and hypercapnia  J96.21 518.84    J96.22 786.09     799.02   2. COPD exacerbation  J44.1 491.21   3. Parainfluenza virus infection  B34.8 078.89     Patient Active Problem List   Diagnosis   • CAP (community acquired pneumonia)   • Cigarette nicotine dependence with nicotine-induced disorder   • Essential hypertension   • Dyslipidemia   • Blood glucose elevated   • Muscle ache   • COPD (chronic obstructive pulmonary disease)   • Nuclear sclerotic cataract of right eye   • Acute respiratory failure   • COPD exacerbation   • Acute on chronic respiratory failure with hypoxia and hypercapnia     Past Medical History:   Diagnosis Date   • Arthritis    • COPD (chronic obstructive pulmonary disease)    • Gall stones    • Goiter     \"inward\"   • Hypertension    • Hypertension    • Kidney infection    • Kidney stone    • Kidney stones    • Migraine headache    • Scarlet fever      Past Surgical History:   Procedure Laterality Date   • BLADDER SURGERY     • CATARACT EXTRACTION W/ INTRAOCULAR LENS IMPLANT Left 2022    Procedure: CATARACT PHACO EXTRACTION WITH INTRAOCULAR LENS IMPLANT LEFT;  Surgeon: Sathish Lopez MD;  Location: [facility];  Service: Ophthalmology;  Laterality: Left;   • CATARACT EXTRACTION W/ INTRAOCULAR LENS IMPLANT Right 2022    Procedure: CATARACT PHACO EXTRACTION WITH INTRAOCULAR LENS IMPLANT RIGHT;  Surgeon: Sathish Lopez MD;  Location: [facility];  Service: Ophthalmology;  Laterality: Right;   • CHOLECYSTECTOMY     • HYSTERECTOMY     • TONSILLECTOMY        General Information     Row Name 23 1455          OT Time and Intention    Document Type re-evaluation  -     Mode of Treatment occupational therapy  -     Row Name 23 1455          " General Information    Patient Profile Reviewed yes  -     Prior Level of Function independent:;ADL's;all household mobility  -     Barriers to Rehab previous functional deficit  -[facility] Name 05/12/23 1455          Cognition    Orientation Status (Cognition) oriented x 4  -AH     Row Name 05/12/23 1455          Safety Issues, Functional Mobility    Impairments Affecting Function (Mobility) balance;endurance/activity tolerance;strength;shortness of breath  -           User Key  (r) = Recorded By, (t) = Taken By, (c) = Cosigned By    Initials Name Provider Type    Roselyn Pena Occupational Therapist                 Mobility/ADL's     [facility] 05/12/23 1456          Bed Mobility    Bed Mobility supine-sit;sit-supine  -     Supine-Sit Armstrong (Bed Mobility) minimum assist (75% patient effort)  -     Sit-Supine Armstrong (Bed Mobility) minimum assist (75% patient effort)  -     Assistive Device (Bed Mobility) bed rails;head of bed elevated  -     Comment, (Bed Mobility) pt sat eob ~5 min with cga  -AH     Row Name 05/12/23 1456          Sit-Stand Transfer    Comment, (Sit-Stand Transfer) not assessed today  -AH     Row Name 05/12/23 1456          Bathing Assessment/Intervention    Armstrong Level (Bathing) bathing skills;moderate assist (50% patient effort)  -AH     Row Name 05/12/23 1456          Upper Body Dressing Assessment/Training    Armstrong Level (Upper Body Dressing) upper body dressing skills;contact guard assist  -AH     Row Name 05/12/23 1456          Lower Body Dressing Assessment/Training    Armstrong Level (Lower Body Dressing) lower body dressing skills;moderate assist (50% patient effort)  -AH     Row Name 05/12/23 1456          Grooming Assessment/Training    Armstrong Level (Grooming) grooming skills;minimum assist (75% patient effort)  -AH     Row Name 05/12/23 1456          Self-Feeding Assessment/Training    Armstrong Level (Feeding) feeding  skills;minimum assist (75% patient effort)  -AH     Row Name 05/12/23 1456          Toileting Assessment/Training    [place] Level (Toileting) toileting skills;maximum assist (25% patient effort)  -           User Key  (r) = Recorded By, (t) = Taken By, (c) = Cosigned By    Initials Name Provider Type    Roselyn Pena Occupational Therapist               Obj/Interventions     Row Name 05/12/23 1458          Sensory Assessment (Somatosensory)    Sensory Assessment (Somatosensory) sensation intact  -AH     Row Name 05/12/23 1458          Vision Assessment/Intervention    Visual Impairment/Limitations WFL  -AH     Row Name 05/12/23 1458          Range of Motion Comprehensive    General Range of Motion bilateral upper extremity ROM L  -AH     Row Name 05/12/23 1458          Strength Comprehensive (MMT)    Comment, General Manual Muscle Testing (MMT) Assessment BUE 4-/5  -AH     Row Name 05/12/23 1458          Shoulder (Therapeutic Exercise)    Shoulder (Therapeutic Exercise) AROM (active range of motion)  -     Shoulder AROM (Therapeutic Exercise) bilateral;flexion;extension;5 repetitions  -           User Key  (r) = Recorded By, (t) = Taken By, (c) = Cosigned By    Initials Name Provider Type    Roselyn Pena Occupational Therapist               Goals/Plan     Row Name 05/12/23 1505          Bed Mobility Goal 1 (OT)    Activity/Assistive Device (Bed Mobility Goal 1, OT) bed mobility activities, all  -     [place] Level/Cues Needed (Bed Mobility Goal 1, OT) contact guard required  -     Time Frame (Bed Mobility Goal 1, OT) by discharge  -     Progress/Outcomes (Bed Mobility Goal 1, OT) goal ongoing  -AH     Row Name 05/12/23 1507          Transfer Goal 1 (OT)    Activity/Assistive Device (Transfer Goal 1, OT) sit-to-stand/stand-to-sit;walker, rolling  -     [place] Level/Cues Needed (Transfer Goal 1, OT) contact guard required  -     Time Frame (Transfer Goal 1, OT) long term  goal (LTG);5 days  -AH     Progress/Outcome (Transfer Goal 1, OT) goal ongoing  -[facility] Name 05/12/23 1505          Bathing Goal 1 (OT)    Activity/Device (Bathing Goal 1, OT) bathing skills, all  -AH     [place] Level/Cues Needed (Bathing Goal 1, OT) set-up required  -AH     Time Frame (Bathing Goal 1, OT) by discharge  -     Progress/Outcomes (Bathing Goal 1, OT) goal ongoing  -AH     Row Name 05/12/23 1505          Dressing Goal 1 (OT)    Activity/Device (Dressing Goal 1, OT) dressing skills, all  -AH     [place]/Cues Needed (Dressing Goal 1, OT) contact guard required  -AH     Time Frame (Dressing Goal 1, OT) long term goal (LTG);5 days  -     Progress/Outcome (Dressing Goal 1, OT) goal ongoing  -AH     Row Name 05/12/23 1505          Toileting Goal 1 (OT)    Activity/Device (Toileting Goal 1, OT) toileting skills, all  -AH     [place] Level/Cues Needed (Toileting Goal 1, OT) contact guard required  -AH     Time Frame (Toileting Goal 1, OT) by discharge  -     Progress/Outcome (Toileting Goal 1, OT) goal ongoing  -[facility] Name 05/12/23 1505          Strength Goal 1 (OT)    Strength Goal 1 (OT) Pt will complete B UE exercise as tolerated for muscular strengthening and endurance to promote safety and independence with ADLs and IADLs  -     Time Frame (Strength Goal 1, OT) by discharge  -     Progress/Outcome (Strength Goal 1, OT) goal ongoing  -AH     Row Name 05/12/23 150          Therapy Assessment/Plan (OT)    Planned Therapy Interventions (OT) activity tolerance training;BADL retraining;patient/caregiver education/training;transfer/mobility retraining;strengthening exercise  -           User Key  (r) = Recorded By, (t) = Taken By, (c) = Cosigned By    Initials Name Provider Type    Roselyn Pena Occupational Therapist               Clinical Impression     Row Name 05/12/23 1508          Pain Assessment    Pretreatment Pain Rating 0/10 - no pain  -     Posttreatment Pain  Rating 0/10 - no pain  -     Pain Intervention(s) Repositioned;Ambulation/increased activity  -[facility] Name 05/12/23 1501          Plan of Care Review    Plan of Care Reviewed With patient  -     Progress no change  -     Outcome Evaluation Pt seen for OT re-evaluation today.  Pt presents with weakness and decreased independence with self care and functional mobility tasks.  Pt noted with soa on 11L O2 via high flow nasal cannula.  Pt sats stayed >91% with activity.  pt is expected to benefit from skilled OT to improve her strength and independence with ADL tasks.  -[facility] Name 05/12/23 1501          Therapy Assessment/Plan (OT)    Patient/Family Therapy Goal Statement (OT) d/c home  -     Rehab Potential (OT) good, to achieve stated therapy goals  -     Criteria for Skilled Therapeutic Interventions Met (OT) yes  -     Therapy Frequency (OT) 3 times/wk  -AH     Row Name 05/12/23 1501          Therapy Plan Review/Discharge Plan (OT)    Anticipated Discharge Disposition (OT) home with home health;inpatient rehabilitation facility  -[facility] Name 05/12/23 1501          Vital Signs    Pre SpO2 (%) 91  -AH     O2 Delivery Pre Treatment hi-flow  11L  -AH     Intra SpO2 (%) 91  -AH     O2 Delivery Intra Treatment hi-flow  -AH     Post SpO2 (%) 94  -AH     O2 Delivery Post Treatment hi-flow  -[facility] Name 05/12/23 1501          Positioning and Restraints    Pre-Treatment Position in bed  -     Post Treatment Position bed  -     In Bed fowlers;call light within reach;encouraged to call for assist;patient within staff view  -           User Key  (r) = Recorded By, (t) = Taken By, (c) = Cosigned By    Initials Name Provider Type    Roselyn Pena Occupational Therapist               Outcome Measures     [facility] Name 05/12/23 1506          How much help from another is currently needed...    Putting on and taking off regular lower body clothing? 2  -     Bathing (including washing, rinsing, and  drying) 2  -AH     Toileting (which includes using toilet bed pan or urinal) 2  -AH     Putting on and taking off regular upper body clothing 3  -AH     Taking care of personal grooming (such as brushing teeth) 3  -AH     Eating meals 3  -AH     AM-PAC 6 Clicks Score (OT) 15  -AH     Row Name 05/12/23 3658          How much help from another person do you currently need...    Turning from your back to your side while in flat bed without using bedrails? 3  -MS     Moving from lying on back to sitting on the side of a flat bed without bedrails? 3  -MS     Moving to and from a bed to a chair (including a wheelchair)? 2  -MS     Standing up from a chair using your arms (e.g., wheelchair, bedside chair)? 2  -MS     Climbing 3-5 steps with a railing? 1  -MS     To walk in hospital room? 1  -MS     AM-PAC 6 Clicks Score (PT) 12  -MS     Highest level of mobility 4 --> Transferred to chair/commode  -MS     Row Name 05/12/23 1506          Functional Assessment    Outcome Measure Options AM-PAC 6 Clicks Daily Activity (OT)  -           User Key  (r) = Recorded By, (t) = Taken By, (c) = Cosigned By    Initials Name Provider Type    Roselyn Pena Occupational Therapist    Martín Carreno, ROB Physical Therapist                Occupational Therapy Education     Title: PT OT SLP Therapies (In Progress)     Topic: Occupational Therapy (In Progress)     Point: ADL training (Done)     Description:   Instruct learner(s) on proper safety adaptation and remediation techniques during self care or transfers.   Instruct in proper use of assistive devices.              Learning Progress Summary           Patient Acceptance, E,TB, VU by  at 5/12/2023 1507    Comment: purpose of re-evaluation    Acceptance, E,TB, VU by SD at 5/8/2023 1222    Comment: Safety during tf    Acceptance, E, VU by SP at 5/5/2023 1146    Comment: OT edcuated pt on purpose of IE and POC. Pt is agreeable.                   Point: Home exercise program (Not  Started)     Description:   Instruct learner(s) on appropriate technique for monitoring, assisting and/or progressing therapeutic exercises/activities.              Learner Progress:  Not documented in this visit.          Point: Precautions (Not Started)     Description:   Instruct learner(s) on prescribed precautions during self-care and functional transfers.              Learner Progress:  Not documented in this visit.          Point: Body mechanics (Not Started)     Description:   Instruct learner(s) on proper positioning and spine alignment during self-care, functional mobility activities and/or exercises.              Learner Progress:  Not documented in this visit.                      User Key     Initials Effective Dates Name Provider Type Discipline     06/16/21 -  Roselyn Donaldson Occupational Therapist OT    SD 06/16/21 -  Cecelia Knight OT Occupational Therapist OT    SP 09/08/22 -  January Chapa OT Occupational Therapist OT              OT Recommendation and Plan  Planned Therapy Interventions (OT): activity tolerance training, BADL retraining, patient/caregiver education/training, transfer/mobility retraining, strengthening exercise  Therapy Frequency (OT): 3 times/wk  Plan of Care Review  Plan of Care Reviewed With: patient  Progress: no change  Outcome Evaluation: Pt seen for OT re-evaluation today.  Pt presents with weakness and decreased independence with self care and functional mobility tasks.  Pt noted with soa on 11L O2 via high flow nasal cannula.  Pt sats stayed >91% with activity.  pt is expected to benefit from skilled OT to improve her strength and independence with ADL tasks.     Time Calculation:    Time Calculation- OT     Row Name 05/12/23 1507             Time Calculation- OT    OT Start Time 0935  -      OT Received On 05/12/23  -      OT Goal Re-Cert Due Date 05/22/23  -         Untimed Charges    OT Eval/Re-eval Minutes 30  -         Total Minutes    Untimed Charges  Total Minutes 30  -AH       Total Minutes 30  -AH            User Key  (r) = Recorded By, (t) = Taken By, (c) = Cosigned By    Initials Name Provider Type    Roselyn Pena Occupational Therapist              Therapy Charges for Today     Code Description Service Date Service Provider Modifiers Qty    [id number]  OT RE-EVAL 2 5/12/2023 Roselyn Donaldson GO 1               Roselyn Donaldson  5/12/2023

## 2023-05-12 NOTE — THERAPY RE-EVALUATION
"Patient Name: Carolin Toscano  : 1945    MRN: [id number]                              Today's Date: 2023       Admit Date: 2023    Visit Dx:     ICD-10-CM ICD-9-CM   1. Acute on chronic respiratory failure with hypoxia and hypercapnia  J96.21 518.84    J96.22 786.09     799.02   2. COPD exacerbation  J44.1 491.21   3. Parainfluenza virus infection  B34.8 078.89     Patient Active Problem List   Diagnosis   • CAP (community acquired pneumonia)   • Cigarette nicotine dependence with nicotine-induced disorder   • Essential hypertension   • Dyslipidemia   • Blood glucose elevated   • Muscle ache   • COPD (chronic obstructive pulmonary disease)   • Nuclear sclerotic cataract of right eye   • Acute respiratory failure   • COPD exacerbation   • Acute on chronic respiratory failure with hypoxia and hypercapnia     Past Medical History:   Diagnosis Date   • Arthritis    • COPD (chronic obstructive pulmonary disease)    • Gall stones    • Goiter     \"inward\"   • Hypertension    • Hypertension    • Kidney infection    • Kidney stone    • Kidney stones    • Migraine headache    • Scarlet fever      Past Surgical History:   Procedure Laterality Date   • BLADDER SURGERY     • CATARACT EXTRACTION W/ INTRAOCULAR LENS IMPLANT Left 2022    Procedure: CATARACT PHACO EXTRACTION WITH INTRAOCULAR LENS IMPLANT LEFT;  Surgeon: Sathish Lopez MD;  Location: [facility];  Service: Ophthalmology;  Laterality: Left;   • CATARACT EXTRACTION W/ INTRAOCULAR LENS IMPLANT Right 2022    Procedure: CATARACT PHACO EXTRACTION WITH INTRAOCULAR LENS IMPLANT RIGHT;  Surgeon: Sathish Lopez MD;  Location: [facility];  Service: Ophthalmology;  Laterality: Right;   • CHOLECYSTECTOMY     • HYSTERECTOMY     • TONSILLECTOMY        General Information     Row Name 23 1437          Physical Therapy Time and Intention    Document Type re-evaluation  -MS     Mode of Treatment physical therapy  -MS     Row Name 23 1437 "          General Information    Patient Profile Reviewed yes  -MS           User Key  (r) = Recorded By, (t) = Taken By, (c) = Cosigned By    Initials Name Provider Type    Martín Carreno PT Physical Therapist               Mobility     Row Name 05/12/23 1437          Bed Mobility    Bed Mobility supine-sit;sit-supine  -MS     Supine-Sit Tracy (Bed Mobility) minimum assist (75% patient effort)  -MS     Sit-Supine Tracy (Bed Mobility) minimum assist (75% patient effort)  -MS     Assistive Device (Bed Mobility) bed rails;head of bed elevated  -MS     Comment, (Bed Mobility) Pt sat EOB 5mins wiht CGA  -MS           User Key  (r) = Recorded By, (t) = Taken By, (c) = Cosigned By    Initials Name Provider Type    Martín Carreno PT Physical Therapist               Obj/Interventions     Row Name 05/12/23 1444          Range of Motion Comprehensive    General Range of Motion lower extremity range of motion deficits identified  -MS     Row Name 05/12/23 1444          Strength Comprehensive (MMT)    General Manual Muscle Testing (MMT) Assessment lower extremity strength deficits identified  -MS     Row Name 05/12/23 1444          Hip (Therapeutic Exercise)    Hip Strengthening (Therapeutic Exercise) aBduction;aDduction;5 repetitions  -MS     [facility] Name 05/12/23 1444          Knee (Therapeutic Exercise)    Knee (Therapeutic Exercise) isometric exercises  -MS     Knee Isometrics (Therapeutic Exercise) quad sets;10 repetitions  -MS     [facility] Name 05/12/23 1444          Ankle (Therapeutic Exercise)    Ankle AROM (Therapeutic Exercise) dorsiflexion;plantarflexion;10 repetitions  -MS           User Key  (r) = Recorded By, (t) = Taken By, (c) = Cosigned By    Initials Name Provider Type    Martín Carreno PT Physical Therapist               Goals/Plan     Row Name 05/12/23 1457          Bed Mobility Goal 1 (PT)    Activity/Assistive Device (Bed Mobility Goal 1, PT) bed mobility activities, all  -MS      Waller Level/Cues Needed (Bed Mobility Goal 1, PT) standby assist  -MS     Time Frame (Bed Mobility Goal 1, PT) long term goal (LTG);2 weeks  -MS     Row Name 05/12/23 7294          Transfer Goal 1 (PT)    Activity/Assistive Device (Transfer Goal 1, PT) transfers, all;sit-to-stand/stand-to-sit  -MS     Waller Level/Cues Needed (Transfer Goal 1, PT) standby assist  -MS     Time Frame (Transfer Goal 1, PT) long term goal (LTG);2 weeks  -MS     Row Name 05/12/23 6507          Gait Training Goal 1 (PT)    Activity/Assistive Device (Gait Training Goal 1, PT) gait (walking locomotion);assistive device use  -MS     Waller Level (Gait Training Goal 1, PT) standby assist  -MS     Distance (Gait Training Goal 1, PT) 75 ft  -MS     Time Frame (Gait Training Goal 1, PT) long term goal (LTG);2 weeks  -MS     Row Name 05/12/23 9711          Therapy Assessment/Plan (PT)    Planned Therapy Interventions (PT) balance training;bed mobility training;gait training;home exercise program;transfer training;ROM (range of motion);stair training;strengthening;patient/family education  -MS           User Key  (r) = Recorded By, (t) = Taken By, (c) = Cosigned By    Initials Name Provider Type    MS Martín Ortiz, PT Physical Therapist               Clinical Impression     Row Name 05/12/23 7641          Pain    Pretreatment Pain Rating 0/10 - no pain  -MS     Posttreatment Pain Rating 0/10 - no pain  -MS     Row Name 05/12/23 1442          Plan of Care Review    Plan of Care Reviewed With patient  -MS     Progress no change  -MS     Outcome Evaluation Pt participated in PT re-eval this date. Pt transferred to EOB with Jaden. Pt able to maintain sitting EOB for 5 mins with CGA. Pt's O2 sats on 11L O2 via high flow n/c maintained >91% throughout. Pt returned to supine. Pt is expected to benefit from skilled PTx during this inpatient stay.  -MS     Row Name 05/12/23 2242          Therapy Assessment/Plan (PT)    Rehab Potential  (PT) good, to achieve stated therapy goals  -MS     Criteria for Skilled Interventions Met (PT) yes;meets criteria  -MS     Therapy Frequency (PT) 5 times/wk  -MS     Row Name 05/12/23 1445          Vital Signs    Pre SpO2 (%) 91  -MS     O2 Delivery Pre Treatment supplemental O2  11L  -MS     Intra SpO2 (%) 91  -MS     O2 Delivery Intra Treatment supplemental O2  11L  -MS     Post SpO2 (%) 94  -MS     O2 Delivery Post Treatment supplemental O2  11L  -MS     Pre Patient Position Supine  -MS     Intra Patient Position Sitting  -MS     Post Patient Position Supine  -MS     Row Name 05/12/23 1445          Positioning and Restraints    Pre-Treatment Position in bed  -MS     Post Treatment Position bed  -MS     In Bed fowlers;call light within reach;encouraged to call for assist;patient within staff view  -MS           User Key  (r) = Recorded By, (t) = Taken By, (c) = Cosigned By    Initials Name Provider Type    Martín Carreno, PT Physical Therapist               Outcome Measures     Row Name 05/12/23 1458          How much help from another person do you currently need...    Turning from your back to your side while in flat bed without using bedrails? 3  -MS     Moving from lying on back to sitting on the side of a flat bed without bedrails? 3  -MS     Moving to and from a bed to a chair (including a wheelchair)? 2  -MS     Standing up from a chair using your arms (e.g., wheelchair, bedside chair)? 2  -MS     Climbing 3-5 steps with a railing? 1  -MS     To walk in hospital room? 1  -MS     AM-PAC 6 Clicks Score (PT) 12  -MS     Highest level of mobility 4 --> Transferred to chair/commode  -MS           User Key  (r) = Recorded By, (t) = Taken By, (c) = Cosigned By    Initials Name Provider Type    Martín Carreno PT Physical Therapist                             Physical Therapy Education     Title: PT OT SLP Therapies (In Progress)     Topic: Physical Therapy (In Progress)     Point: Mobility training  (Done)     Learning Progress Summary           Patient Acceptance, E, VU by MS at 5/12/2023 1458    Comment: importance of mobility    Acceptance, E,TB,D, VU,NR by  at 5/8/2023 1250    Comment: Importance of activity and exercise techniques    Acceptance, E, VU by MS at 5/5/2023 1136    Comment: importance of mobility                   Point: Home exercise program (Done)     Learning Progress Summary           Patient Acceptance, E,TB,D, VU,NR by  at 5/8/2023 1250    Comment: Importance of activity and exercise techniques    Acceptance, E, VU by MS at 5/5/2023 1136    Comment: importance of mobility                   Point: Body mechanics (Not Started)     Learner Progress:  Not documented in this visit.          Point: Precautions (Not Started)     Learner Progress:  Not documented in this visit.                      User Key     Initials Effective Dates Name Provider Type Discipline     06/16/21 -  Ronak Cruz, PTA Physical Therapist Assistant PT    MS 07/01/22 -  Martín Ortiz, ROB Physical Therapist PT              PT Recommendation and Plan  Planned Therapy Interventions (PT): balance training, bed mobility training, gait training, home exercise program, transfer training, ROM (range of motion), stair training, strengthening, patient/family education  Plan of Care Reviewed With: patient  Progress: no change  Outcome Evaluation: Pt participated in PT re-eval this date. Pt transferred to EOB with Jaden. Pt able to maintain sitting EOB for 5 mins with CGA. Pt's O2 sats on 11L O2 via high flow n/c maintained >91% throughout. Pt returned to supine. Pt is expected to benefit from skilled PTx during this inpatient stay.     Time Calculation:    PT Charges     Row Name 05/12/23 5689             Time Calculation    Start Time 0934  -MS      PT Received On 05/12/23  -MS      PT Goal Re-Cert Due Date 05/22/23  -MS         Untimed Charges    PT Eval/Re-eval Minutes 30  -MS         Total Minutes    Untimed  Charges Total Minutes 30  -MS       Total Minutes 30  -MS            User Key  (r) = Recorded By, (t) = Taken By, (c) = Cosigned By    Initials Name Provider Type    Martín Carreno, ROB Physical Therapist              Therapy Charges for Today     Code Description Service Date Service Provider Modifiers Qty    09602109051  PT RE-EVAL ESTABLISHED PLAN 2 5/12/2023 Martín Ortiz PT GP 1          PT G-Codes  Outcome Measure Options: AM-PAC 6 Clicks Basic Mobility (PT)  AM-PAC 6 Clicks Score (PT): 12  AM-PAC 6 Clicks Score (OT): 19  PT Discharge Summary  Anticipated Discharge Disposition (PT): inpatient rehabilitation facility, skilled nursing facility    Martín Ortiz PT  5/12/2023

## 2023-05-12 NOTE — PROGRESS NOTES
[facility]    Name:  Carolin Toscano   Age:  77 y.o.  Sex:  female  :  1945  MRN:  [id number]   Visit Number:  [id number]  Admission Date:  2023  Date Of Service:  23  Primary Care Physician:  Jason Nicholson MD     LOS: 8 days :    Chief Complaint:      Shortness of breath     Subjective:    Patient was seen and examined at bedside.  Patient remains in ICU.  Patient laying comfortably in bed on AVAPS and Precedex drip.  Mildly sedated however would wake up and answer questions appropriately.  Patient denies any pain or discomfort.  Has been tolerating AVAPS.  Patient reporting doing better overall.  She denies any complaints.  Has been requiring 10 to 12 L on nasal cannula when off AVAPS.  Discussed with nursing.  No other acute events overnight.    Hospital Course:    Patient is a 77 years old female with a past medical history of COPD, chronic respiratory failure on 5 to 6 L per her previous discharge, on NIV machine at home and trelegy, hypertension, and ongoing tobacco use who presented to the ER from home by EMS with a chief complaint of shortness of breath.  Patient reporting that she started having shortness of breath associated with productive cough since last night and has persisted and became worse that promoted her to call EMS.  EMS has found the patient to be at 65% saturation on her normal 6 L of oxygen.  She received a breathing treatment in route.  Patient was recently admitted to the hospital on  and discharged on 2023 with similar clinical picture of Respiratory failure, COPD exacerbation and pneumonia.  Patient was discharged on 6 L previously.  Patient reports compliance with her NIV machine at home.  Denies any sick contacts that she is aware of.  Patient denies any chest pain or abdominal pain.  Denies any other complaints.     On ER evaluation, Patient was found to be tachycardic with a heart rate of 130s, temperature  of 100.2 and respirations of 30, /72.  Patient was placed on BiPAP with FiO2 of 40%.  Her ABG came back and showed pH of 7.296/PCO2 87/PO2 94/HCO3 42/saturation 97% on 5 L nasal cannula.  HS Troponin 44, proBNP WNL, glucose 192, CO2 of 40, otherwise CBC and CMP within acceptable range.  Lactate and Pro-Adi WNL.  Respiratory panel positive for parainfluenza virus.  Chest x-ray Mild interstitial disease  bilaterally is similar to prior. No area of consolidation is seen.  Urinalysis negative for nitrates, leukocytes, WBC or bacteria with squamous epithelial cells.  Patient received Solu-Medrol, normal saline bolus of 2124 mL, magnesium and Aztreonam while in the ER.  Hospitalist consulted for admission, further evaluation and treatment.    Moved to ICU on 5/9 due to worsening respiratory failure and tachypnea.    Review of Systems:     All systems were reviewed and negative except as mentioned in subjective, assessment and plan.    Vital Signs:    Temp:  [97 °F (36.1 °C)-98.6 °F (37 °C)] 97.6 °F (36.4 °C)  Heart Rate:  [45-98] 94  Resp:  [16-36] 24  BP: ()/(43-86) 126/53    Intake and output:    I/O last 3 completed shifts:  In: 3510 [P.O.:1354; I.V.:2156]  Out: 900 [Urine:900]  I/O this shift:  In: 200 [P.O.:200]  Out: -     Physical Examination:    General Appearance:  Alert and cooperative.  Chronically ill-appearing.   Head:  Atraumatic and normocephalic.   Eyes: Conjunctivae and sclerae normal, no icterus. No pallor.   Throat: No oral lesions, no thrush, oral mucosa moist.   Neck: Supple, trachea midline, no thyromegaly.   Lungs:   Breath sounds heard bilaterally equally.  Mildly labored, on BiPAP.  Diffuse bilateral wheezing heard.  Decreased air movement bilaterally.  Tachypneic.   Heart:  Normal S1 and S2, no murmur, no gallop, no rub. No JVD.   Abdomen:   Normal bowel sounds, no masses, no organomegaly. Soft, nontender, nondistended, no rebound tenderness.   Extremities: Supple, no edema, no  cyanosis, no clubbing.   Skin: No bleeding or rash.   Neurologic: Alert and oriented x 3. No facial asymmetry. Moves all four limbs. No tremors.      Laboratory results:    Results from last 7 days   Lab Units 05/11/23  0503 05/10/23  0908 05/09/23  1049   SODIUM mmol/L 148* 144 145   POTASSIUM mmol/L 4.5 5.0 5.0   CHLORIDE mmol/L 101 98 95*   CO2 mmol/L 36.1* 38.1* 40.1*   BUN mg/dL 28* 22 15   CREATININE mg/dL 0.38* 0.36* 0.34*   CALCIUM mg/dL 9.1 9.1 9.4   GLUCOSE mg/dL 164* 126* 142*     Results from last 7 days   Lab Units 05/11/23  0503 05/10/23  0908 05/09/23  1049   WBC 10*3/mm3 10.00 7.34 6.26   HEMOGLOBIN g/dL 12.1 12.3 13.3   HEMATOCRIT % 39.9 40.6 43.6   PLATELETS 10*3/mm3 183 172 185                 Recent Labs     05/09/23  0725 05/10/23  0923 05/11/23  0659   PHART 7.377 7.392 7.364   YUI0MGP 81.0* 70.6* 76.0*   PO2ART 63.8* 79.8 86.8   PNV0NWW 47.5* 42.9* 43.3*   BASEEXCESS 18.0* 14.9* 14.4*      I have reviewed the patient's laboratory results.    Radiology results:    XR Chest 1 View    Result Date: 5/11/2023  PROCEDURE: XR CHEST 1 VW-    HISTORY: Resp Failure.; J96.21-Acute and chronic respiratory failure with hypoxia; J96.22-Acute and chronic respiratory failure with hypercapnia; J44.1-Chronic obstructive pulmonary disease with (acute) exacerbation; B34.8-Other viral infections of unspecified site  COMPARISON: One day prior.  FINDINGS: The heart is borderline enlarged. The mediastinum is unremarkable. There is bronchial wall thickening. There is interstitial disease most prominent in the left lung base. There is mild interval improvement in the right lung base. There is no pneumothorax. There are no acute osseous abnormalities.      Impression: Mild interval improvement in aeration of the right lung base. Otherwise, stable exam.        Images were reviewed, interpreted, and dictated by Dr. Shraddha Wharton MD Transcribed by Ade Solis PA-C.  This report was signed and finalized on 5/11/2023  3:09 PM by Shraddha Wharton MD.    I have reviewed the patient's radiology reports.    Medication Review:     I have reviewed the patient's active and prn medications.     Problem List:      Acute on chronic respiratory failure with hypoxia and hypercapnia      Assessment:    Acute on chronic respiratory failure with hypoxia and hypercapnia, likely secondary to #2 and 3, POA  Acute COPD exacerbation, POA  Parainfluenza infection, POA  Elevated troponin, likely secondary to demand ischemia, POA  Hypertension  Chronic tobacco abuse  Pulmonary hypertension  Arthritis    Plan:    Respiratory failure with hypoxia/hypercapnia, COPD exacerbation, parainfluenza  -Continue supplemental oxygen to keep saturation above 90%, patient on 5 to 6 L at baseline, continue to titrate down as able to.  Continue BiPAP therapy.  -Patient met SIRS criteria on arrival, likely secondary to influenza infection and respiratory failure. Received Aztreonam and sepsis protocol IV fluids boluses.  -Procalcitonin and WBC WNL, bacterial infection less likely, will hold off on antibiotics for now.  - Solu-Medrol, will taper to 40 mg every 12 hours, continue to taper down as she improves.   -Bronchodilators, Mucinex and supportive care.  -No pulmonology coverage today.  -Repeat ABG and chest x-ray as indicated.  -ABG reviewed  -Given 2 doses of Lasix previously.  -Urine has been darker in color, will check urinalysis  -Continue PT/OT.  -Ordered Xanax as needed, will try to titrate Precedex down.    Elevated troponin  -Likely secondary to demand ischemia in settings of respiratory failure  -Patient denies any chest pain, low suspicion for ACS  -Troponin trended down and plateaued.     Hyperglycemia  -Will cover with sliding scale insulin while on steroids  -Hemoglobin A1c 6.7     Further orders as indicated per clinical course.    PT/ OT consulted, case management consulted, appreciate their help.  Patient would benefit from rehab on discharge, she has  declined rehab in the past, case management following.  Daughter will discuss with the patient placement for rehab to see if she is agreeable, will rediscuss when she is ready for discharge.    DVT Prophylaxis: Lovenox prophylaxis (benefit> risk)  Code Status: Full  Diet: Cardiac  Discharge Plan: To be determined, likely needs 2 to 3 days in the hospital.     Lolis Vital MD  05/12/23  10:28 EDT    Dictated utilizing Dragon dictation.

## 2023-05-12 NOTE — PLAN OF CARE
Goal Outcome Evaluation:           Progress: improving  Outcome Evaluation: Pt tolerated bipap through the night.  Pt denies any pain.

## 2023-05-12 NOTE — PLAN OF CARE
Goal Outcome Evaluation:  Plan of Care Reviewed With: patient        Progress: no change  Outcome Evaluation: Pt seen for OT re-evaluation today.  Pt presents with weakness and decreased independence with self care and functional mobility tasks.  Pt noted with soa on 11L O2 via high flow nasal cannula.  Pt sats stayed >91% with activity.  pt is expected to benefit from skilled OT to improve her strength and independence with ADL tasks.

## 2023-05-12 NOTE — PLAN OF CARE
Goal Outcome Evaluation:  Plan of Care Reviewed With: patient        Progress: no change  Outcome Evaluation: Pt participated in PT re-eval this date. Pt transferred to EOB with Jaden. Pt able to maintain sitting EOB for 5 mins with CGA. Pt's O2 sats on 11L O2 via high flow n/c maintained >91% throughout. Pt returned to supine. Pt is expected to benefit from skilled PTx during this inpatient stay.

## 2023-05-12 NOTE — PAYOR COMM NOTE
"Carolin Toscano (77 y.o. Female)     Date of Birth   1945    Social Security Number       Address   [address] [place] [address]    Home Phone   [phone]    MRN   [id number]       Zoroastrianism   [place]    Marital Status                               Admission Date   5/4/23    Admission Type   Emergency    Admitting Provider   Lolis Vital MD    Attending Provider   Lolis Vital MD    Department, Room/Bed   [facility] INTENSIVE CARE, I07/1       Discharge Date       Discharge Disposition       Discharge Destination                               Attending Provider: Lolis Vital MD    Allergies: Contrast Dye (Echo Or Unknown Ct/mr), Ciprofloxacin, Keflex [Cephalexin], Penicillins, Sulfa Antibiotics, Terramycin [Oxytetracycline], Prednisone, Latex, Percocet [Oxycodone-acetaminophen], Xyzal [Levocetirizine]    Isolation: Contact, Droplet   Infection: Other (05/08/23)   Code Status: CPR    Ht: 160 cm (63\")   Wt: 70.2 kg (154 lb 12.2 oz)    Admission Cmt: None   Principal Problem: Acute on chronic respiratory failure with hypoxia and hypercapnia [J96.21,J96.22]                 Active Insurance as of 5/4/2023     Primary Coverage     Payor Plan Insurance Group Employer/Plan Group    HUMANA MEDICARE REPLACEMENT HUMANA MEDICARE REPLACEMENT [id number]     Payor Plan Address Payor Plan Phone Number Payor Plan Fax Number Effective Dates    [address] [address] [phone]  1/1/2018 - None Entered    [facility] [address]       Subscriber Name Subscriber Birth Date Member ID       CAROLIN TOSCANO 1945 [id number]           Secondary Coverage     Payor Plan Insurance Group Employer/Plan Group    [place]      Payor Plan Address Payor Plan Phone Number Payor Plan Fax Number Effective Dates    [address] [phone]  10/11/2021 - None Entered    [facility] [id number]       Subscriber Name Subscriber Birth Date Member ID       CAROLIN TOSCANO 1945 [id number]  "                Emergency Contacts      (Rel.) Home Phone Work Phone Mobile Phone    Nehal Hoffmann (Daughter) [phone] -- --              Current Facility-Administered Medications   Medication Dose Route Frequency Provider Last Rate Last Admin   • acetaminophen (TYLENOL) tablet 650 mg  650 mg Oral Q4H PRN oLlis Vital MD   650 mg at 05/09/23 0557    Or   • acetaminophen (TYLENOL) 160 MG/5ML solution 650 mg  650 mg Oral Q4H PRN Lolis Vital MD        Or   • acetaminophen (TYLENOL) suppository 650 mg  650 mg Rectal Q4H PRN Lolis Vital MD   650 mg at 05/04/23 2005   • albuterol (PROVENTIL) nebulizer solution 0.083% 2.5 mg/3mL  2.5 mg Nebulization Q6H PRN Lolis Vital MD   2.5 mg at 05/09/23 1107   • ALPRAZolam (XANAX) tablet 0.5 mg  0.5 mg Oral BID PRN Lolis Vital MD   0.5 mg at 05/12/23 1555   • amLODIPine (NORVASC) tablet 10 mg  10 mg Oral Daily Lolis Vital MD   10 mg at 05/12/23 0809   • atenolol (TENORMIN) tablet 25 mg  25 mg Oral Daily Lolis Vital MD   25 mg at 05/12/23 0810   • sennosides-docusate (PERICOLACE) 8.6-50 MG per tablet 2 tablet  2 tablet Oral BID Lolis Vital MD   2 tablet at 05/12/23 0809    And   • polyethylene glycol (MIRALAX) packet 17 g  17 g Oral Daily PRN Lolis Vital MD        And   • bisacodyl (DULCOLAX) EC tablet 5 mg  5 mg Oral Daily PRN Lolis Vital MD        And   • bisacodyl (DULCOLAX) suppository 10 mg  10 mg Rectal Daily PRN Lolis Vital MD       • budesonide (PULMICORT) nebulizer solution 1 mg  1 mg Nebulization Daily - RT Arnaldo Soriano MD   1 mg at 05/12/23 1314   • budesonide-formoterol (SYMBICORT) 160-4.5 MCG/ACT inhaler 2 puff  2 puff Inhalation BID - RT Lolis Vital MD   2 puff at 05/12/23 0710    And   • tiotropium (SPIRIVA RESPIMAT) 2.5 mcg/act aerosol solution inhaler  2 puff Inhalation Daily - RT Lolis Vital MD   2 puff at 05/12/23 0710   • dexmedetomidine (PRECEDEX) 400 mcg in 100 mL NS infusion   0.2-1.5 mcg/kg/hr Intravenous Titrated Lolis Vital MD   Stopped at 05/12/23 1556   • dextrose (D50W) (25 g/50 mL) IV injection 25 g  25 g Intravenous Q15 Min PRN Lolis Vital MD       • dextrose (GLUTOSE) oral gel 15 g  15 g Oral Q15 Min PRN Lolis Vital MD       • Enoxaparin Sodium (LOVENOX) syringe 40 mg  40 mg Subcutaneous Q24H Lolis Vital MD   40 mg at 05/11/23 2012   • glucagon (GLUCAGEN) injection 1 mg  1 mg Intramuscular Q15 Min PRN Lolis Vital MD       • guaiFENesin (MUCINEX) 12 hr tablet 600 mg  600 mg Oral Q12H Lolis Vital MD   600 mg at 05/12/23 0809   • guaiFENesin-dextromethorphan (ROBITUSSIN DM) 100-10 MG/5ML syrup 10 mL  10 mL Oral Q4H PRN Lolis Vital MD       • Insulin Aspart (novoLOG) injection 0-9 Units  0-9 Units Subcutaneous TID AC Lolis Vital MD   6 Units at 05/12/23 1219   • ipratropium-albuterol (DUO-NEB) nebulizer solution 3 mL  3 mL Nebulization Q6H - RT Lolis Vital MD   3 mL at 05/12/23 1314   • melatonin tablet 5 mg  5 mg Oral Nightly PRN Lolis Vital MD   5 mg at 05/07/23 2046   • [START ON 5/13/2023] methylPREDNISolone sodium succinate (SOLU-Medrol) injection 40 mg  40 mg Intravenous Q12H Lolis Vital MD       • multivitamin with minerals 1 tablet  1 tablet Oral Daily Lolis Vital MD   1 tablet at 05/12/23 0809   • ondansetron (ZOFRAN) injection 4 mg  4 mg Intravenous Q6H PRN Lolis Vital MD       • Pharmacy to Dose enoxaparin (LOVENOX)   Does not apply Continuous PRN Lolis Vital MD       • sodium chloride 0.9 % flush 10 mL  10 mL Intravenous PRN Lolis Vital MD       • sodium chloride 0.9 % flush 3 mL  3 mL Intravenous Q12H Lolis Vital MD   3 mL at 05/12/23 0810   • sodium chloride 0.9 % flush 3-10 mL  3-10 mL Intravenous PRN Lolis Vital MD       • sodium chloride 0.9 % infusion 40 mL  40 mL Intravenous PRN Lolis Vital MD         Lab Results (last 48 hours)     Procedure Component Value Units Date/Time     Blood Gas, Arterial With Co-Ox [[id number]]  (Abnormal) Collected: 05/12/23 1544    Specimen: Arterial Blood Updated: 05/12/23 1544     Site Right Radial     Partha's Test Positive     pH, Arterial 7.450 pH units      Comment: 83 Value above reference range        pCO2, Arterial 58.1 mm Hg      Comment: 83 Value above reference range        pO2, Arterial 99.0 mm Hg      HCO3, Arterial 40.4 mmol/L      Comment: 83 Value above reference range        Base Excess, Arterial 14.0 mmol/L      Comment: 83 Value above reference range        O2 Saturation, Arterial 98.6 %      Hematocrit, Blood Gas 36.5 %      Comment: 84 Value below reference range        Oxyhemoglobin 97.2 %      Methemoglobin 0.60 %      Carboxyhemoglobin 0.8 %      A-a DO2 251.9 mmHg      Barometric Pressure for Blood Gas 735 mmHg      Modality BiPap     FIO2 60 %      Ventilator Mode AVAP     Note --     Collected by cb     Comment: Meter: [id number]     :  [id number]        pH, Temp Corrected --     pCO2, Temperature Corrected --     pO2, Temperature Corrected --    POC Glucose Once [[id number]]  (Abnormal) Collected: 05/12/23 1441    Specimen: Blood Updated: 05/12/23 1449     Glucose 173 mg/dL      Comment: Serial Number: [id number]:  [id number]       POC Glucose Once [[id number]]  (Abnormal) Collected: 05/12/23 1127    Specimen: Blood Updated: 05/12/23 1129     Glucose 279 mg/dL      Comment: Serial Number: [id number]:  [id number]       POC Glucose Once [[id number]]  (Abnormal) Collected: 05/12/23 0621    Specimen: Blood Updated: 05/12/23 0627     Glucose 157 mg/dL      Comment: Serial Number: [id number]:  [id number]       POC Glucose Once [[id number]]  (Abnormal) Collected: 05/11/23 1211    Specimen: Blood Updated: 05/11/23 1947     Glucose 277 mg/dL      Comment: Serial Number: [id number]:  [id number]       POC Glucose Once [[id number]]  (Abnormal) Collected: 05/11/23 1639    Specimen: Blood Updated: 05/11/23 1643     Glucose  206 mg/dL      Comment: Serial Number: [id number]:  [id number]       Blood Gas, Arterial With Co-Ox [[id number]]  (Abnormal) Collected: 05/11/23 0659    Specimen: Arterial Blood Updated: 05/11/23 0659     Site Right Radial     Partha's Test N/A     pH, Arterial 7.364 pH units      pCO2, Arterial 76.0 mm Hg      Comment: 86 Value above critical limit        pO2, Arterial 86.8 mm Hg      HCO3, Arterial 43.3 mmol/L      Comment: 83 Value above reference range        Base Excess, Arterial 14.4 mmol/L      Comment: 83 Value above reference range        O2 Saturation, Arterial 96.5 %      Hematocrit, Blood Gas 39.4 %      Oxyhemoglobin 95.1 %      Methemoglobin 0.70 %      Carboxyhemoglobin 0.7 %      A-a DO2 175.0 mmHg      Barometric Pressure for Blood Gas 735 mmHg      Modality BiPap     FIO2 50 %      Ventilator Mode NA     Note --     Notified Who rn     Notified By [id number]     Notified Time 05/11/2023 06:58     Collected by [id number]     Comment: Meter: [id number]     :  [id number]        pH, Temp Corrected --     pCO2, Temperature Corrected --     pO2, Temperature Corrected --    POC Glucose Once [[id number]]  (Abnormal) Collected: 05/11/23 0623    Specimen: Blood Updated: 05/11/23 0632     Glucose 160 mg/dL      Comment: Serial Number: [id number]:  [id number]       Basic Metabolic Panel [[id number]]  (Abnormal) Collected: 05/11/23 0503    Specimen: Blood Updated: 05/11/23 0608     Glucose 164 mg/dL      BUN 28 mg/dL      Creatinine 0.38 mg/dL      Sodium 148 mmol/L      Potassium 4.5 mmol/L      Chloride 101 mmol/L      CO2 36.1 mmol/L      Calcium 9.1 mg/dL      BUN/Creatinine Ratio 73.7     Anion Gap 10.9 mmol/L      eGFR 104.0 mL/min/1.73     Narrative:      GFR Normal >60  Chronic Kidney Disease <60  Kidney Failure <15    The GFR formula is only valid for adults with stable renal function between ages 18 and 70.    CBC (No Diff) [[id number]]  (Abnormal) Collected: 05/11/23 0503    Specimen: Blood  Updated: 05/11/23 0546     WBC 10.00 10*3/mm3      RBC 4.08 10*6/mm3      Hemoglobin 12.1 g/dL      Hematocrit 39.9 %      MCV 97.8 fL      MCH 29.7 pg      MCHC 30.3 g/dL      RDW 15.3 %      RDW-SD 55.3 fl      MPV 11.6 fL      Platelets 183 10*3/mm3     POC Glucose Once [[id number]]  (Abnormal) Collected: 05/10/23 2339    Specimen: Blood Updated: 05/11/23 0044     Glucose 155 mg/dL      Comment: Serial Number: [id number]:  [id number]       POC Glucose Once [[id number]]  (Abnormal) Collected: 05/10/23 1834    Specimen: Blood Updated: 05/11/23 0042     Glucose 154 mg/dL      Comment: Serial Number: [id number]:  [id number]             Imaging Results (Last 48 Hours)     Procedure Component Value Units Date/Time    XR Chest 1 View [[id number]] Collected: 05/11/23 1504     Updated: 05/11/23 1511    Narrative:      PROCEDURE: XR CHEST 1 VW-        HISTORY: Resp Failure.; J96.21-Acute and chronic respiratory failure  with hypoxia; J96.22-Acute and chronic respiratory failure with  hypercapnia; J44.1-Chronic obstructive pulmonary disease with (acute)  exacerbation; B34.8-Other viral infections of unspecified site     COMPARISON: One day prior.     FINDINGS: The heart is borderline enlarged. The mediastinum is  unremarkable. There is bronchial wall thickening. There is interstitial  disease most prominent in the left lung base. There is mild interval  improvement in the right lung base. There is no pneumothorax. There are  no acute osseous abnormalities.       Impression:      Mild interval improvement in aeration of the right lung  base. Otherwise, stable exam.                       Images were reviewed, interpreted, and dictated by Dr. Shraddha Wharton MD  Transcribed by Ade Solis PA-C.     This report was signed and finalized on 5/11/2023 3:09 PM by Shraddha Wharton MD.           Physician Progress Notes (last 48 hours)      Lolis Vital MD at 05/12/23 1028              [facility]     PROGRESS NOTE    Name:  Carolin Toscano   Age:  77 y.o.  Sex:  female  :  1945  MRN:  [id number]   Visit Number:  [id number]  Admission Date:  2023  Date Of Service:  23  Primary Care Physician:  Jason Nicholson MD     LOS: 8 days :    Chief Complaint:      Shortness of breath     Subjective:    Patient was seen and examined at bedside.  Patient remains in ICU.  Patient laying comfortably in bed on AVAPS and Precedex drip.  Mildly sedated however would wake up and answer questions appropriately.  Patient denies any pain or discomfort.  Has been tolerating AVAPS.  Patient reporting doing better overall.  She denies any complaints.  Has been requiring 10 to 12 L on nasal cannula when off AVAPS.  Discussed with nursing.  No other acute events overnight.    Hospital Course:    Patient is a 77 years old female with a past medical history of COPD, chronic respiratory failure on 5 to 6 L per her previous discharge, on NIV machine at home and trelegy, hypertension, and ongoing tobacco use who presented to the ER from home by EMS with a chief complaint of shortness of breath.  Patient reporting that she started having shortness of breath associated with productive cough since last night and has persisted and became worse that promoted her to call EMS.  EMS has found the patient to be at 65% saturation on her normal 6 L of oxygen.  She received a breathing treatment in route.  Patient was recently admitted to the hospital on  and discharged on 2023 with similar clinical picture of Respiratory failure, COPD exacerbation and pneumonia.  Patient was discharged on 6 L previously.  Patient reports compliance with her NIV machine at home.  Denies any sick contacts that she is aware of.  Patient denies any chest pain or abdominal pain.  Denies any other complaints.     On ER evaluation, Patient was found to be tachycardic with a heart rate of 130s, temperature of 100.2 and respirations of 30, BP  185/72.  Patient was placed on BiPAP with FiO2 of 40%.  Her ABG came back and showed pH of 7.296/PCO2 87/PO2 94/HCO3 42/saturation 97% on 5 L nasal cannula.  HS Troponin 44, proBNP WNL, glucose 192, CO2 of 40, otherwise CBC and CMP within acceptable range.  Lactate and Pro-Adi WNL.  Respiratory panel positive for parainfluenza virus.  Chest x-ray Mild interstitial disease  bilaterally is similar to prior. No area of consolidation is seen.  Urinalysis negative for nitrates, leukocytes, WBC or bacteria with squamous epithelial cells.  Patient received Solu-Medrol, normal saline bolus of 2124 mL, magnesium and Aztreonam while in the ER.  Hospitalist consulted for admission, further evaluation and treatment.    Moved to ICU on 5/9 due to worsening respiratory failure and tachypnea.    Review of Systems:     All systems were reviewed and negative except as mentioned in subjective, assessment and plan.    Vital Signs:    Temp:  [97 °F (36.1 °C)-98.6 °F (37 °C)] 97.6 °F (36.4 °C)  Heart Rate:  [45-98] 94  Resp:  [16-36] 24  BP: ()/(43-86) 126/53    Intake and output:    I/O last 3 completed shifts:  In: 3510 [P.O.:1354; I.V.:2156]  Out: 900 [Urine:900]  I/O this shift:  In: 200 [P.O.:200]  Out: -     Physical Examination:    General Appearance:  Alert and cooperative.  Chronically ill-appearing.   Head:  Atraumatic and normocephalic.   Eyes: Conjunctivae and sclerae normal, no icterus. No pallor.   Throat: No oral lesions, no thrush, oral mucosa moist.   Neck: Supple, trachea midline, no thyromegaly.   Lungs:   Breath sounds heard bilaterally equally.  Mildly labored, on BiPAP.  Diffuse bilateral wheezing heard.  Decreased air movement bilaterally.  Tachypneic.   Heart:  Normal S1 and S2, no murmur, no gallop, no rub. No JVD.   Abdomen:   Normal bowel sounds, no masses, no organomegaly. Soft, nontender, nondistended, no rebound tenderness.   Extremities: Supple, no edema, no cyanosis, no clubbing.   Skin: No bleeding  or rash.   Neurologic: Alert and oriented x 3. No facial asymmetry. Moves all four limbs. No tremors.      Laboratory results:    Results from last 7 days   Lab Units 05/11/23  0503 05/10/23  0908 05/09/23  1049   SODIUM mmol/L 148* 144 145   POTASSIUM mmol/L 4.5 5.0 5.0   CHLORIDE mmol/L 101 98 95*   CO2 mmol/L 36.1* 38.1* 40.1*   BUN mg/dL 28* 22 15   CREATININE mg/dL 0.38* 0.36* 0.34*   CALCIUM mg/dL 9.1 9.1 9.4   GLUCOSE mg/dL 164* 126* 142*     Results from last 7 days   Lab Units 05/11/23  0503 05/10/23  0908 05/09/23  1049   WBC 10*3/mm3 10.00 7.34 6.26   HEMOGLOBIN g/dL 12.1 12.3 13.3   HEMATOCRIT % 39.9 40.6 43.6   PLATELETS 10*3/mm3 183 172 185                 Recent Labs     05/09/23  0725 05/10/23  0923 05/11/23  0659   PHART 7.377 7.392 7.364   YOY3ZCX 81.0* 70.6* 76.0*   PO2ART 63.8* 79.8 86.8   LQO5FNP 47.5* 42.9* 43.3*   BASEEXCESS 18.0* 14.9* 14.4*      I have reviewed the patient's laboratory results.    Radiology results:    XR Chest 1 View    Result Date: 5/11/2023  PROCEDURE: XR CHEST 1 VW-    HISTORY: Resp Failure.; J96.21-Acute and chronic respiratory failure with hypoxia; J96.22-Acute and chronic respiratory failure with hypercapnia; J44.1-Chronic obstructive pulmonary disease with (acute) exacerbation; B34.8-Other viral infections of unspecified site  COMPARISON: One day prior.  FINDINGS: The heart is borderline enlarged. The mediastinum is unremarkable. There is bronchial wall thickening. There is interstitial disease most prominent in the left lung base. There is mild interval improvement in the right lung base. There is no pneumothorax. There are no acute osseous abnormalities.      Impression: Mild interval improvement in aeration of the right lung base. Otherwise, stable exam.        Images were reviewed, interpreted, and dictated by Dr. Shraddha Wharton MD Transcribed by Ade Solis PA-C.  This report was signed and finalized on 5/11/2023 3:09 PM by Shraddha Wharton MD.    I have reviewed  the patient's radiology reports.    Medication Review:     I have reviewed the patient's active and prn medications.     Problem List:      Acute on chronic respiratory failure with hypoxia and hypercapnia      Assessment:    Acute on chronic respiratory failure with hypoxia and hypercapnia, likely secondary to #2 and 3, POA  Acute COPD exacerbation, POA  Parainfluenza infection, POA  Elevated troponin, likely secondary to demand ischemia, POA  Hypertension  Chronic tobacco abuse  Pulmonary hypertension  Arthritis    Plan:    Respiratory failure with hypoxia/hypercapnia, COPD exacerbation, parainfluenza  -Continue supplemental oxygen to keep saturation above 90%, patient on 5 to 6 L at baseline, continue to titrate down as able to.  Continue BiPAP therapy.  -Patient met SIRS criteria on arrival, likely secondary to influenza infection and respiratory failure. Received Aztreonam and sepsis protocol IV fluids boluses.  -Procalcitonin and WBC WNL, bacterial infection less likely, will hold off on antibiotics for now.  - Solu-Medrol, will taper to 40 mg every 12 hours, continue to taper down as she improves.   -Bronchodilators, Mucinex and supportive care.  -No pulmonology coverage today.  -Repeat ABG and chest x-ray as indicated.  -ABG reviewed  -Given 2 doses of Lasix previously.  -Urine has been darker in color, will check urinalysis  -Continue PT/OT.  -Ordered Xanax as needed, will try to titrate Precedex down.    Elevated troponin  -Likely secondary to demand ischemia in settings of respiratory failure  -Patient denies any chest pain, low suspicion for ACS  -Troponin trended down and plateaued.     Hyperglycemia  -Will cover with sliding scale insulin while on steroids  -Hemoglobin A1c 6.7     Further orders as indicated per clinical course.    PT/ OT consulted, case management consulted, appreciate their help.  Patient would benefit from rehab on discharge, she has declined rehab in the past, case management  following.  Daughter will discuss with the patient placement for rehab to see if she is agreeable, will rediscuss when she is ready for discharge.    DVT Prophylaxis: Lovenox prophylaxis (benefit> risk)  Code Status: Full  Diet: Cardiac  Discharge Plan: To be determined, likely needs 2 to 3 days in the hospital.     Lolis Vital MD  23  10:28 EDT    Dictated utilizing Dragon dictation.      Electronically signed by Lolis Vital MD at 23 [phone]     Lolis Vital MD at 23 1049              [facility]    PROGRESS NOTE    Name:  Carolin Toscano   Age:  77 y.o.  Sex:  female  :  1945  MRN:  [id number]   Visit Number:  [id number]  Admission Date:  2023  Date Of Service:  23  Primary Care Physician:  Jason Nicholson MD     LOS: 7 days :    Chief Complaint:      Shortness of breath     Subjective:    Patient was seen and examined at bedside.  Patient remains in ICU.  Patient laying comfortably in bed in mild respiratory distress on AVAPS.  Reports tolerating AVAPS okay so far, she was able to get back on the nasal cannula but was requiring 10 L this morning per nursing report.  Patient currently denies any pain.  She also denies any acute complaints other than asking for something to drink. Remains tachypneic intermittently up to 30s respiratory rate. Afebrile.  Pulmonology following.  No family at bedside today.    Hospital Course:    Patient is a 77 years old female with a past medical history of COPD, chronic respiratory failure on 5 to 6 L per her previous discharge, on NIV machine at home and trelegy, hypertension, and ongoing tobacco use who presented to the ER from home by EMS with a chief complaint of shortness of breath.  Patient reporting that she started having shortness of breath associated with productive cough since last night and has persisted and became worse that promoted her to call EMS.  EMS has found the patient to be at 65%  saturation on her normal 6 L of oxygen.  She received a breathing treatment in route.  Patient was recently admitted to the hospital on 4/13 and discharged on 4/21/2023 with similar clinical picture of Respiratory failure, COPD exacerbation and pneumonia.  Patient was discharged on 6 L previously.  Patient reports compliance with her NIV machine at home.  Denies any sick contacts that she is aware of.  Patient denies any chest pain or abdominal pain.  Denies any other complaints.     On ER evaluation, Patient was found to be tachycardic with a heart rate of 130s, temperature of 100.2 and respirations of 30, /72.  Patient was placed on BiPAP with FiO2 of 40%.  Her ABG came back and showed pH of 7.296/PCO2 87/PO2 94/HCO3 42/saturation 97% on 5 L nasal cannula.  HS Troponin 44, proBNP WNL, glucose 192, CO2 of 40, otherwise CBC and CMP within acceptable range.  Lactate and Pro-Adi WNL.  Respiratory panel positive for parainfluenza virus.  Chest x-ray Mild interstitial disease  bilaterally is similar to prior. No area of consolidation is seen.  Urinalysis negative for nitrates, leukocytes, WBC or bacteria with squamous epithelial cells.  Patient received Solu-Medrol, normal saline bolus of 2124 mL, magnesium and Aztreonam while in the ER.  Hospitalist consulted for admission, further evaluation and treatment.    Moved to ICU on 5/9 due to worsening respiratory failure and tachypnea.    Review of Systems:     All systems were reviewed and negative except as mentioned in subjective, assessment and plan.    Vital Signs:    Temp:  [97.4 °F (36.3 °C)-98.6 °F (37 °C)] 98.2 °F (36.8 °C)  Heart Rate:  [] 80  Resp:  [17-39] 33  BP: ()/(33-64) 134/53    Intake and output:    I/O last 3 completed shifts:  In: 1679 [P.O.:474; I.V.:1205]  Out: 1250 [Urine:1250]  I/O this shift:  In: 240 [P.O.:240]  Out: -     Physical Examination:    General Appearance:  Alert and cooperative.  Chronically ill-appearing.   Head:   Atraumatic and normocephalic.   Eyes: Conjunctivae and sclerae normal, no icterus. No pallor.   Throat: No oral lesions, no thrush, oral mucosa moist.   Neck: Supple, trachea midline, no thyromegaly.   Lungs:   Breath sounds heard bilaterally equally.  Mildly labored, on BiPAP.  Diffuse bilateral wheezing heard.  Decreased air movement bilaterally.  Tachypneic.   Heart:  Normal S1 and S2, no murmur, no gallop, no rub. No JVD.   Abdomen:   Normal bowel sounds, no masses, no organomegaly. Soft, nontender, nondistended, no rebound tenderness.   Extremities: Supple, no edema, no cyanosis, no clubbing.   Skin: No bleeding or rash.   Neurologic: Alert and oriented x 3. No facial asymmetry. Moves all four limbs. No tremors.      Laboratory results:    Results from last 7 days   Lab Units 05/11/23  0503 05/10/23  0908 05/09/23  1049 05/05/23  0524 05/04/23  1447   SODIUM mmol/L 148* 144 145   < > 140   POTASSIUM mmol/L 4.5 5.0 5.0   < > 4.4   CHLORIDE mmol/L 101 98 95*   < > 95*   CO2 mmol/L 36.1* 38.1* 40.1*   < > 40.2*   BUN mg/dL 28* 22 15   < > 10   CREATININE mg/dL 0.38* 0.36* 0.34*   < > 0.38*   CALCIUM mg/dL 9.1 9.1 9.4   < > 9.1   BILIRUBIN mg/dL  --   --   --   --  0.4   ALK PHOS U/L  --   --   --   --  87   ALT (SGPT) U/L  --   --   --   --  30   AST (SGOT) U/L  --   --   --   --  23   GLUCOSE mg/dL 164* 126* 142*   < > 192*    < > = values in this interval not displayed.     Results from last 7 days   Lab Units 05/11/23  0503 05/10/23  0908 05/09/23  1049   WBC 10*3/mm3 10.00 7.34 6.26   HEMOGLOBIN g/dL 12.1 12.3 13.3   HEMATOCRIT % 39.9 40.6 43.6   PLATELETS 10*3/mm3 183 172 185         Results from last 7 days   Lab Units 05/05/23  0725 05/05/23  0524 05/04/23  1447   HSTROP T ng/L 22* 25* 44*     Results from last 7 days   Lab Units 05/04/23  1500   BLOODCX  No growth at 5 days  No growth at 5 days     Recent Labs     05/09/23  0725 05/10/23  0923 05/11/23  0659   PHART 7.377 7.392 7.364   UCU3AEB 81.0* 70.6*  76.0*   PO2ART 63.8* 79.8 86.8   PFH9BOW 47.5* 42.9* 43.3*   BASEEXCESS 18.0* 14.9* 14.4*      I have reviewed the patient's laboratory results.    Radiology results:    XR Chest 1 View    Result Date: 5/10/2023  PROCEDURE: XR CHEST 1 VW-  HISTORY: resp failure follow up; J96.21-Acute and chronic respiratory failure with hypoxia; J96.22-Acute and chronic respiratory failure with hypercapnia; J44.1-Chronic obstructive pulmonary disease with (acute) exacerbation; B34.8-Other viral infections of unspecified site  COMPARISON: 2 days prior.  FINDINGS: The heart is upper limits of normal in size, stable.. Interstitial disease again noted bilaterally, worsening on the left. There is also worsening peribronchial thickening, most prominent in the left lung base. Consider infection versus edema. Recommend follow-up.. The mediastinum is unremarkable. There is no pneumothorax.  There are no acute osseous abnormalities.      Impression: Worsening interstitial disease and bronchial wall thickening most prominent left lung base, consider infection versus edema and recommend follow-up..    This report was signed and finalized on 5/10/2023 10:44 AM by Shraddha Wharton MD.    I have reviewed the patient's radiology reports.    Medication Review:     I have reviewed the patient's active and prn medications.     Problem List:      Acute on chronic respiratory failure with hypoxia and hypercapnia      Assessment:    Acute on chronic respiratory failure with hypoxia and hypercapnia, likely secondary to #2 and 3, POA  Acute COPD exacerbation, POA  Parainfluenza infection, POA  Elevated troponin, likely secondary to demand ischemia, POA  Hypertension  Chronic tobacco abuse  Pulmonary hypertension  Arthritis    Plan:    Respiratory failure with hypoxia/hypercapnia, COPD exacerbation, parainfluenza  -Continue supplemental oxygen to keep saturation above 90%, patient on 5 to 6 L at baseline, continue to titrate down as able to.  Continue BiPAP  therapy.  -Patient met SIRS criteria on arrival, likely secondary to influenza infection and respiratory failure. Received Aztreonam and sepsis protocol IV fluids boluses.  -Procalcitonin and WBC WNL, bacterial infection less likely, will hold off on antibiotics for now.  - Solu-Medrol, will taper to 40 mg every 8 hours, continue to taper down as she improves.   -Bronchodilators, Mucinex and supportive care.  -Discussed with pulmonology, Dr. Cline, appreciate her recommendations.  -Repeat ABG and chest x-ray as indicated.  -Repeat labs today with WBC WNL.  ABG reviewed  -Chest x-ray with worsening interstitial disease  -Will give another dose of Lasix today.     Elevated troponin  -Likely secondary to demand ischemia in settings of respiratory failure  -Patient denies any chest pain, low suspicion for ACS  -Troponin trended down and plateaued.     Hyperglycemia  -Will cover with sliding scale insulin while on steroids  -Hemoglobin A1c 6.7     Further orders as indicated per clinical course.    PT/ OT consulted, case management consulted, appreciate their help.  Patient would benefit from rehab on discharge, she has declined rehab in the past, case management following.  Daughter will discuss with the patient placement for rehab to see if she is agreeable.    DVT Prophylaxis: Lovenox prophylaxis (benefit> risk)  Code Status: Full  Diet: Cardiac  Discharge Plan: To be determined, likely needs 2 to 3 days in the hospital.     Lolis Vital MD  05/11/23  10:49 EDT    Dictated utilizing Dragon dictation.      Electronically signed by Lolis Vital MD at 05/11/23 3891     Miranda Cline MD at 05/11/23 4699            CC: Acute Respiratory Failure.     S: Patient did well overnight in the ICU.  NIV able to be weaned down to 50% and able to go to a high flow nasal cannula this morning and able to drink boost.  Did require 500 cc bolus secondary to blood pressure with good response.  Back on NIV now    ROS:  "Could not be reliably obtained as the patient is on PAP therapy, but overall states she is feeling better.    O:Vital signs reviewed. FiO2: 50%.    /54   Pulse 93   Temp 98.2 °F (36.8 °C) (Temporal)   Resp (!) 39   Ht 160 cm (63\")   Wt 70.2 kg (154 lb 12.2 oz)   SpO2 98%   BMI 27.42 kg/m²     Temp (24hrs), Av.9 °F (36.6 °C), Min:97.4 °F (36.3 °C), Max:98.6 °F (37 °C)      I & Os reviewed.   Intake/Output       05/10/23 0700 - 23 0659 23 0700 - 23 0659    Intake (ml) 1679 240    Output (ml) 850 --    Net (ml) 829 240    Last Weight 70.2 kg (154 lb 12.2 oz) --          Net IO Since Admission: 1,393 mL [23 0857]    General/Constitutional: On PAP therapy. Appears chronically ill, but calm  Eyes: Extraocular muscles are intact   Mouth: Mildly dry mucosa behind PAP mask   neck: Supple without JVD. No obvious masses noted.   Cardiovascular: S1 + S2.  Regular  Lungs/Respiratory: Decreased breath sounds with scattered wheezing   GI/Abdomen: Soft. Bowel sounds positive.  Musculoskeletal/Extremities: Trace edema noted.  Diffuse deconditioning and sarcopenia appreciated  Neurologic: Was able to answer some questions, more awake and alert  Psych: Did not appear anxious   skin: Appeared somewhat dry         Labs: Reviewed.   Results from last 7 days   Lab Units 23  0503 05/10/23  0908 23  1049 23  1003 23  0611 23  0525 23  1447   WBC 10*3/mm3 10.00 7.34 6.26 10.60 10.46   < > 8.04   HEMOGLOBIN g/dL 12.1 12.3 13.3 12.6 12.5   < > 13.3   HEMATOCRIT % 39.9 40.6 43.6 42.4 41.3   < > 44.5   PLATELETS 10*3/mm3 183 172 185 138* 140   < > 149   NEUTROPHIL % %  --   --   --   --  88.7*  --  91.6*   NEUTROS ABS 10*3/mm3  --  5.21 5.20  --  9.28*  --  7.36*   EOSINOPHIL % %  --   --   --   --  0.0*  --  0.0*   EOS ABS 10*3/mm3  --   --   --   --  0.00  --  0.00   LYMPHOCYTE % %  --   --   --   --  5.9*  --  3.6*   LYMPHS ABS 10*3/mm3  --   --   --   --  0.62*  --  " 0.29*    < > = values in this interval not displayed.       Lab Results   Component Value Date    PROCALCITO 0.20 05/10/2023    PROCALCITO 0.14 05/07/2023    PROCALCITO 0.14 05/04/2023       No results found for: CRP    No results found for: SEDRATE    Lab Results   Component Value Date    PROBNP 479.4 05/10/2023    PROBNP 234.4 05/04/2023    PROBNP 272.8 04/13/2023       Results from last 7 days   Lab Units 05/11/23  0503 05/10/23  0908 05/09/23  1049 05/05/23  0524 05/04/23  1447   SODIUM mmol/L 148* 144 145   < > 140   POTASSIUM mmol/L 4.5 5.0 5.0   < > 4.4   CHLORIDE mmol/L 101 98 95*   < > 95*   CO2 mmol/L 36.1* 38.1* 40.1*   < > 40.2*   BUN mg/dL 28* 22 15   < > 10   CREATININE mg/dL 0.38* 0.36* 0.34*   < > 0.38*   CALCIUM mg/dL 9.1 9.1 9.4   < > 9.1   ANION GAP mmol/L 10.9 7.9 9.9   < > 4.8*   BILIRUBIN mg/dL  --   --   --   --  0.4   ALK PHOS U/L  --   --   --   --  87   ALT (SGPT) U/L  --   --   --   --  30   AST (SGOT) U/L  --   --   --   --  23   GLUCOSE mg/dL 164* 126* 142*   < > 192*   TOTAL PROTEIN g/dL  --   --   --   --  6.9   ALBUMIN g/dL  --   --   --   --  3.8    < > = values in this interval not displayed.       Results from last 7 days   Lab Units 05/09/23  1049   MAGNESIUM mg/dL 2.2             No results found for: CKTOTAL    No components found for: HSTROPT    Lab Results   Component Value Date    TROPONINT 22 (H) 05/05/2023    TROPONINT 25 (H) 05/05/2023    TROPONINT 44 (H) 05/04/2023       No results found for: DDIMER    Brief Urine Lab Results  (Last result in the past 365 days)      Color   Clarity   Blood   Leuk Est   Nitrite   Protein   CREAT   Urine HCG        05/04/23 1611 Yellow   Clear   Small (1+)   Negative   Negative   30 mg/dL (1+)                 Lab Results   Component Value Date    TSH 0.304 05/09/2023       No results found for: FREET4      Micro: As of May 11, 2023   Lab Results   Component Value Date    RESPCX Light growth (2+) Normal Respiratory Ana 10/02/2017     No  results found for: BCIDPCR  Lab Results   Component Value Date    BLOODCX No growth at 5 days 05/04/2023    BLOODCX No growth at 5 days 05/04/2023    BLOODCX No growth at 5 days 04/16/2023    BLOODCX No growth at 5 days 04/16/2023     Lab Results   Component Value Date    URINECX Final report 02/07/2019    URINECX 20,000-30,000 CFU/mL Escherichia coli (A) 10/05/2018     No results found for: MRSACX  Lab Results   Component Value Date    MRSAPCR No MRSA Detected 04/15/2023     No results found for: URCX  No components found for: LOWRESPCF  No results found for: THROATCX  No results found for: CULTURES  No components found for: STREPBCX  No results found for: STREPPNEUAG  No results found for: LEGIONELLA  No results found for: MYCOPLASCX  No results found for: GCCX  No results found for: WOUNDCX  No results found for: BODYFLDCX    No results found for: FLU    Lab Results   Component Value Date    ADENOVIRUS Not Detected 05/04/2023     Lab Results   Component Value Date    FD985J Not Detected 05/04/2023     Lab Results   Component Value Date    CVHKU1 Not Detected 05/04/2023     Lab Results   Component Value Date    CVNL63 Not Detected 05/04/2023     Lab Results   Component Value Date    CVOC43 Not Detected 05/04/2023     Lab Results   Component Value Date    HUMETPNEVS Not Detected 05/04/2023     Lab Results   Component Value Date    HURVEV Not Detected 05/04/2023     Lab Results   Component Value Date    FLUBPCR Not Detected 05/04/2023     Lab Results   Component Value Date    PARAINFLUE Not Detected 05/04/2023     Lab Results   Component Value Date    PARAFLUV2 Not Detected 05/04/2023     Lab Results   Component Value Date    PARAFLUV3 Detected (A) 05/04/2023     Lab Results   Component Value Date    PARAFLUV4 Not Detected 05/04/2023     Lab Results   Component Value Date    BPERTPCR Not Detected 05/04/2023     No results found for: [id number]  Lab Results   Component Value Date    CPNEUPCR Not Detected 05/04/2023      Lab Results   Component Value Date    MPNEUMO Not Detected 05/04/2023     Lab Results   Component Value Date    FLUAPCR Not Detected 05/04/2023     No results found for: FLUAH3  No results found for: FLUAH1  Lab Results   Component Value Date    RSV Not Detected 05/04/2023     Lab Results   Component Value Date    BPARAPCR Not Detected 05/04/2023       COVID 19:  Lab Results   Component Value Date    COVID19 Not Detected 05/04/2023         ABG: Reviewed   Recent Labs     05/09/23  0725 05/10/23  0923 05/11/23  0659   PHART 7.377 7.392 7.364   QBF4GRS 81.0* 70.6* 76.0*   PO2ART 63.8* 79.8 86.8   UEM3MII 47.5* 42.9* 43.3*   BASEEXCESS 18.0* 14.9* 14.4*       Lab Results   Component Value Date    LACTATE 1.3 05/04/2023    LACTATE 1.2 04/13/2023    LACTATE 2.7 (C) 04/13/2023         Echo: Results for orders placed during the hospital encounter of 04/13/23    Adult Transthoracic Echo Complete W/ Cont if Necessary Per Protocol    Interpretation Summary  •  Left ventricular diastolic function is consistent with (grade I) impaired relaxation.  •  Mild aortic valve stenosis is present.  •  Estimated right ventricular systolic pressure from tricuspid regurgitation is moderately elevated (45-55 mmHg).  •  Mild to moderate pulmonary hypertension is present.      Results for orders placed during the hospital encounter of 10/01/17    Adult Transthoracic Echo Complete W/ Cont if Necessary Per Protocol    Interpretation Summary  TEchnically difficult study  1) Borderline LVH with normal LV systolic function ( EF is over 55%)  2) Normal left atrial size with mild elevation in LVedp  3) Trace MR  4) Mild TR with normal PA pressures  5) Mild AI  6) Small RV with normal RV function        dexmedetomidine, 0.2-1.5 mcg/kg/hr, Last Rate: 0.3 mcg/kg/hr (05/10/23 2153)  Pharmacy to Dose enoxaparin (LOVENOX),           CXRay: Latest imaging study was reviewed personally.   Imaging Results (Last 24 Hours)     Procedure Component Value  Units Date/Time    XR Chest 1 View [[id number]] Resulted: 05/11/23 0522     Updated: 05/11/23 0522    XR Chest 1 View [[id number]] Collected: 05/10/23 1043     Updated: 05/10/23 1046    Narrative:      PROCEDURE: XR CHEST 1 VW-     HISTORY: resp failure follow up; J96.21-Acute and chronic respiratory  failure with hypoxia; J96.22-Acute and chronic respiratory failure with  hypercapnia; J44.1-Chronic obstructive pulmonary disease with (acute)  exacerbation; B34.8-Other viral infections of unspecified site     COMPARISON: 2 days prior.     FINDINGS: The heart is upper limits of normal in size, stable..  Interstitial disease again noted bilaterally, worsening on the left.  There is also worsening peribronchial thickening, most prominent in the  left lung base. Consider infection versus edema. Recommend follow-up..  The mediastinum is unremarkable. There is no pneumothorax.  There are no  acute osseous abnormalities.       Impression:      Worsening interstitial disease and bronchial wall thickening  most prominent left lung base, consider infection versus edema and  recommend follow-up..           This report was signed and finalized on 5/10/2023 10:44 AM by Shraddha Wharton MD.      Today's chest x-ray appears to show worsening interstitial edema?      Assessment & Recommendations/Plan:   1.  Acute respiratory failure  Continue AVAPS and will adjust settings as needed   Continue using AVAPS 2 hours on and 2 hours off.  Currently on 50% FiO2.   We will continue to in/decrease FiO2 as tolerated.  Fluids were stopped and the patient had to have a 500 cc bolus.  We will restart fluids for the next 24 hours especially given elevated sodium level this morning.  We will monitor closely for volume overload.        2.  COPD with acute exacerbation  Secondary to parainfluenza bronchitis  Continue Spiriva, Pulmicort nebs and Symbicort given significant wheezing  Continue with DuoNebs  Continues on Solu-Medrol.  May consider  decreasing dose tomorrow if she clinically improves    Will benefit from outpatient PFTs to determine severity of underlying disease once clinically improved from current exacerbation    3.  Chronic respiratory acidosis  On AVAPS.    4.  Smoking  Encouraged her to use this as an option to quit smoking.    5.  Pulmonary hypertension  May need outpatient work-up  Likely secondary to underlying likely severe COPD    6.  Anxiety  Currently on Precedex and this seems to be helping.    Patient is able to be further weaned down on NIV and oxygen requirements over the next 24 hours would stop Precedex tomorrow.      We have reviewed patient's current orders and changes, if any, have been suggested to primary care team. Plan was also discussed with nursing staff, as necessary.     This document was electronically signed by Miranda Cline MD on 05/11/23 at 08:57 EDT      Dictated utilizing Dragon dictation.          Electronically signed by Miranda Cline MD at 05/11/23 1200       Consult Notes (last 48 hours)  Notes from 05/10/23 1654 through 05/12/23 1654   No notes of this type exist for this encounter.

## 2023-05-12 NOTE — CASE MANAGEMENT/SOCIAL WORK
1040 Cm spoke with pt at bedside about dcp. Pt wearing 10 L HFNC. Cm asked if she would be willing to consider STR or HH. Pt denied both services. States she lives with her brother and does not think he would like people coming into his home. Pt walks without assistance at baseline. States she does not have a walker. Cm following for dc needs.

## 2023-05-13 ENCOUNTER — APPOINTMENT (OUTPATIENT)
Dept: GENERAL RADIOLOGY | Facility: HOSPITAL | Age: 78
DRG: 190 | End: 2023-05-13
Payer: MEDICARE

## 2023-05-13 LAB
A-A DO2: 262.8 MMHG
ANION GAP SERPL CALCULATED.3IONS-SCNC: 6.1 MMOL/L (ref 5–15)
ARTERIAL PATENCY WRIST A: POSITIVE
ATMOSPHERIC PRESS: 734 MMHG
BASE EXCESS BLDA CALC-SCNC: 17.9 MMOL/L (ref 0–2)
BDY SITE: ABNORMAL
BUN SERPL-MCNC: 22 MG/DL (ref 8–23)
BUN/CREAT SERPL: 84.6 (ref 7–25)
CALCIUM SPEC-SCNC: 9.3 MG/DL (ref 8.6–10.5)
CHLORIDE SERPL-SCNC: 102 MMOL/L (ref 98–107)
CO2 SERPL-SCNC: 38.9 MMOL/L (ref 22–29)
COHGB MFR BLD: 0.8 % (ref 0–2)
CREAT SERPL-MCNC: 0.26 MG/DL (ref 0.57–1)
DEPRECATED RDW RBC AUTO: 56 FL (ref 37–54)
EGFRCR SERPLBLD CKD-EPI 2021: 114 ML/MIN/1.73
ERYTHROCYTE [DISTWIDTH] IN BLOOD BY AUTOMATED COUNT: 15.5 % (ref 12.3–15.4)
GLUCOSE BLDC GLUCOMTR-MCNC: 128 MG/DL (ref 70–130)
GLUCOSE BLDC GLUCOMTR-MCNC: 191 MG/DL (ref 70–130)
GLUCOSE SERPL-MCNC: 126 MG/DL (ref 65–99)
HCO3 BLDA-SCNC: 45.7 MMOL/L (ref 22–28)
HCT VFR BLD AUTO: 39 % (ref 34–46.6)
HCT VFR BLD CALC: 35.3 %
HGB BLD-MCNC: 11.7 G/DL (ref 12–15.9)
INHALED O2 CONCENTRATION: 60 %
Lab: ABNORMAL
Lab: ABNORMAL
MCH RBC QN AUTO: 29.8 PG (ref 26.6–33)
MCHC RBC AUTO-ENTMCNC: 30 G/DL (ref 31.5–35.7)
MCV RBC AUTO: 99.2 FL (ref 79–97)
METHGB BLD QL: 0.6 % (ref 0–1.5)
MODALITY: ABNORMAL
NOTE: ABNORMAL
NOTIFIED BY: ABNORMAL
NOTIFIED WHO: ABNORMAL
OXYHGB MFR BLDV: 94.5 % (ref 94–99)
PCO2 BLDA: 70.6 MM HG (ref 35–45)
PCO2 TEMP ADJ BLD: ABNORMAL MM[HG]
PH BLDA: 7.42 PH UNITS (ref 7.35–7.45)
PH, TEMP CORRECTED: ABNORMAL
PLATELET # BLD AUTO: 168 10*3/MM3 (ref 140–450)
PMV BLD AUTO: 11.6 FL (ref 6–12)
PO2 BLDA: 74.4 MM HG (ref 75–100)
PO2 TEMP ADJ BLD: ABNORMAL MM[HG]
POTASSIUM SERPL-SCNC: 4.1 MMOL/L (ref 3.5–5.2)
RBC # BLD AUTO: 3.93 10*6/MM3 (ref 3.77–5.28)
SAO2 % BLDCOA: 95.8 % (ref 94–100)
SET MECH RESP RATE: 16
SODIUM SERPL-SCNC: 147 MMOL/L (ref 136–145)
VENTILATOR MODE: ABNORMAL
VT ON VENT VENT: 480 ML
WBC NRBC COR # BLD: 16.25 10*3/MM3 (ref 3.4–10.8)

## 2023-05-13 PROCEDURE — 94799 UNLISTED PULMONARY SVC/PX: CPT

## 2023-05-13 PROCEDURE — 71045 X-RAY EXAM CHEST 1 VIEW: CPT

## 2023-05-13 PROCEDURE — 25010000002 METHYLPREDNISOLONE PER 40 MG: Performed by: FAMILY MEDICINE

## 2023-05-13 PROCEDURE — 25010000002 ENOXAPARIN PER 10 MG: Performed by: FAMILY MEDICINE

## 2023-05-13 PROCEDURE — 25010000002 FUROSEMIDE PER 20 MG: Performed by: FAMILY MEDICINE

## 2023-05-13 PROCEDURE — 82948 REAGENT STRIP/BLOOD GLUCOSE: CPT

## 2023-05-13 PROCEDURE — 80048 BASIC METABOLIC PNL TOTAL CA: CPT | Performed by: FAMILY MEDICINE

## 2023-05-13 PROCEDURE — 94660 CPAP INITIATION&MGMT: CPT

## 2023-05-13 PROCEDURE — 36600 WITHDRAWAL OF ARTERIAL BLOOD: CPT

## 2023-05-13 PROCEDURE — 94761 N-INVAS EAR/PLS OXIMETRY MLT: CPT

## 2023-05-13 PROCEDURE — 82805 BLOOD GASES W/O2 SATURATION: CPT

## 2023-05-13 PROCEDURE — 99233 SBSQ HOSP IP/OBS HIGH 50: CPT | Performed by: FAMILY MEDICINE

## 2023-05-13 PROCEDURE — 94664 DEMO&/EVAL PT USE INHALER: CPT

## 2023-05-13 PROCEDURE — 82375 ASSAY CARBOXYHB QUANT: CPT

## 2023-05-13 PROCEDURE — 63710000001 INSULIN ASPART PER 5 UNITS: Performed by: FAMILY MEDICINE

## 2023-05-13 PROCEDURE — 85027 COMPLETE CBC AUTOMATED: CPT | Performed by: FAMILY MEDICINE

## 2023-05-13 PROCEDURE — 83050 HGB METHEMOGLOBIN QUAN: CPT

## 2023-05-13 RX ORDER — FUROSEMIDE 10 MG/ML
40 INJECTION INTRAMUSCULAR; INTRAVENOUS ONCE
Status: COMPLETED | OUTPATIENT
Start: 2023-05-13 | End: 2023-05-13

## 2023-05-13 RX ADMIN — ATENOLOL 25 MG: 25 TABLET ORAL at 09:08

## 2023-05-13 RX ADMIN — BUDESONIDE INHALATION 1 MG: 0.5 SUSPENSION RESPIRATORY (INHALATION) at 06:59

## 2023-05-13 RX ADMIN — GUAIFENESIN 600 MG: 600 TABLET, EXTENDED RELEASE ORAL at 09:08

## 2023-05-13 RX ADMIN — INSULIN ASPART 2 UNITS: 100 INJECTION, SOLUTION INTRAVENOUS; SUBCUTANEOUS at 16:55

## 2023-05-13 RX ADMIN — FUROSEMIDE 40 MG: 10 INJECTION, SOLUTION INTRAMUSCULAR; INTRAVENOUS at 16:11

## 2023-05-13 RX ADMIN — Medication 3 ML: at 09:08

## 2023-05-13 RX ADMIN — GUAIFENESIN 600 MG: 600 TABLET, EXTENDED RELEASE ORAL at 21:19

## 2023-05-13 RX ADMIN — BUDESONIDE AND FORMOTEROL FUMARATE DIHYDRATE 2 PUFF: 160; 4.5 AEROSOL RESPIRATORY (INHALATION) at 19:18

## 2023-05-13 RX ADMIN — IPRATROPIUM BROMIDE AND ALBUTEROL SULFATE 3 ML: 2.5; .5 SOLUTION RESPIRATORY (INHALATION) at 06:59

## 2023-05-13 RX ADMIN — MULTIPLE VITAMINS W/ MINERALS TAB 1 TABLET: TAB at 09:07

## 2023-05-13 RX ADMIN — IPRATROPIUM BROMIDE AND ALBUTEROL SULFATE 3 ML: 2.5; .5 SOLUTION RESPIRATORY (INHALATION) at 00:02

## 2023-05-13 RX ADMIN — METHYLPREDNISOLONE SODIUM SUCCINATE 40 MG: 40 INJECTION, POWDER, LYOPHILIZED, FOR SOLUTION INTRAMUSCULAR; INTRAVENOUS at 03:02

## 2023-05-13 RX ADMIN — ENOXAPARIN SODIUM 40 MG: 100 INJECTION SUBCUTANEOUS at 21:19

## 2023-05-13 RX ADMIN — Medication 3 ML: at 21:20

## 2023-05-13 RX ADMIN — TIOTROPIUM BROMIDE INHALATION SPRAY 2 PUFF: 3.12 SPRAY, METERED RESPIRATORY (INHALATION) at 07:00

## 2023-05-13 RX ADMIN — IPRATROPIUM BROMIDE AND ALBUTEROL SULFATE 3 ML: 2.5; .5 SOLUTION RESPIRATORY (INHALATION) at 19:17

## 2023-05-13 RX ADMIN — SENNOSIDES AND DOCUSATE SODIUM 2 TABLET: 50; 8.6 TABLET ORAL at 09:07

## 2023-05-13 RX ADMIN — SENNOSIDES AND DOCUSATE SODIUM 2 TABLET: 50; 8.6 TABLET ORAL at 21:19

## 2023-05-13 RX ADMIN — ACETAMINOPHEN 650 MG: 325 TABLET, FILM COATED ORAL at 22:48

## 2023-05-13 RX ADMIN — METHYLPREDNISOLONE SODIUM SUCCINATE 40 MG: 40 INJECTION, POWDER, LYOPHILIZED, FOR SOLUTION INTRAMUSCULAR; INTRAVENOUS at 14:58

## 2023-05-13 RX ADMIN — AMLODIPINE BESYLATE 10 MG: 5 TABLET ORAL at 09:08

## 2023-05-13 RX ADMIN — IPRATROPIUM BROMIDE AND ALBUTEROL SULFATE 3 ML: 2.5; .5 SOLUTION RESPIRATORY (INHALATION) at 12:26

## 2023-05-13 RX ADMIN — Medication 5 MG: at 22:48

## 2023-05-13 RX ADMIN — BUDESONIDE AND FORMOTEROL FUMARATE DIHYDRATE 2 PUFF: 160; 4.5 AEROSOL RESPIRATORY (INHALATION) at 07:00

## 2023-05-13 RX ADMIN — ALPRAZOLAM 0.5 MG: 0.5 TABLET ORAL at 09:08

## 2023-05-13 RX ADMIN — INSULIN ASPART 2 UNITS: 100 INJECTION, SOLUTION INTRAVENOUS; SUBCUTANEOUS at 12:31

## 2023-05-13 NOTE — PROGRESS NOTES
[facility]    Name:  Carolin Toscano   Age:  77 y.o.  Sex:  female  :  1945  MRN:  [id number]   Visit Number:  [id number]  Admission Date:  2023  Date Of Service:  23  Primary Care Physician:  Jason Nicholson MD     LOS: 9 days :    Chief Complaint:      Shortness of breath     Subjective:    Patient was seen and examined at bedside.  Patient remains in ICU.  Patient laying comfortably in bed on AVAPS.  Precedex was titrated down since yesterday.  Patient appears awake, alert but appears generally weak.  Would answer questions appropriately.  I took the mask off and spoke with the patient.  Patient denies any pain or discomfort.  Patient reports tolerating AVAPS okay.  She denies any complaints today.  She has been requiring 12 L through nasal cannula when off AVAPS. Discussed with nursing.  No other acute events overnight.    Hospital Course:    Patient is a 77 years old female with a past medical history of COPD, chronic respiratory failure on 5 to 6 L per her previous discharge, on NIV machine at home and trelegy, hypertension, and ongoing tobacco use who presented to the ER from home by EMS with a chief complaint of shortness of breath.  Patient reporting that she started having shortness of breath associated with productive cough since last night and has persisted and became worse that promoted her to call EMS.  EMS has found the patient to be at 65% saturation on her normal 6 L of oxygen.  She received a breathing treatment in route.  Patient was recently admitted to the hospital on  and discharged on 2023 with similar clinical picture of Respiratory failure, COPD exacerbation and pneumonia.  Patient was discharged on 6 L previously.  Patient reports compliance with her NIV machine at home.  Denies any sick contacts that she is aware of.  Patient denies any chest pain or abdominal pain.  Denies any other complaints.     On ER  evaluation, Patient was found to be tachycardic with a heart rate of 130s, temperature of 100.2 and respirations of 30, /72.  Patient was placed on BiPAP with FiO2 of 40%.  Her ABG came back and showed pH of 7.296/PCO2 87/PO2 94/HCO3 42/saturation 97% on 5 L nasal cannula.  HS Troponin 44, proBNP WNL, glucose 192, CO2 of 40, otherwise CBC and CMP within acceptable range.  Lactate and Pro-Adi WNL.  Respiratory panel positive for parainfluenza virus.  Chest x-ray Mild interstitial disease  bilaterally is similar to prior. No area of consolidation is seen.  Urinalysis negative for nitrates, leukocytes, WBC or bacteria with squamous epithelial cells.  Patient received Solu-Medrol, normal saline bolus of 2124 mL, magnesium and Aztreonam while in the ER.  Hospitalist consulted for admission, further evaluation and treatment.    Moved to ICU on 5/9 due to worsening respiratory failure and tachypnea.    Review of Systems:     All systems were reviewed and negative except as mentioned in subjective, assessment and plan.    Vital Signs:    Temp:  [97.1 °F (36.2 °C)-98 °F (36.7 °C)] 97.7 °F (36.5 °C)  Heart Rate:  [55-92] 85  Resp:  [20-39] 28  BP: (107-143)/(40-97) 107/56    Intake and output:    I/O last 3 completed shifts:  In: 3421 [P.O.:1080; I.V.:2341]  Out: 2550 [Urine:2550]  I/O this shift:  In: -   Out: 500 [Urine:500]    Physical Examination:    General Appearance:  Alert and cooperative.  Chronically ill-appearing.   Head:  Atraumatic and normocephalic.   Eyes: Conjunctivae and sclerae normal, no icterus. No pallor.   Throat: No oral lesions, no thrush, oral mucosa moist.   Neck: Supple, trachea midline, no thyromegaly.   Lungs:   Breath sounds heard bilaterally equally.  Mildly labored, on BiPAP.  Diffuse bilateral wheezing heard.  Decreased air movement bilaterally.  Tachypneic.   Heart:  Normal S1 and S2, no murmur, no gallop, no rub. No JVD.   Abdomen:   Normal bowel sounds, no masses, no organomegaly.  Soft, nontender, nondistended, no rebound tenderness.   Extremities: Supple, no edema, no cyanosis, no clubbing.   Skin: No bleeding or rash.   Neurologic: Alert and oriented x 3. No facial asymmetry. Moves all four limbs. No tremors.      Laboratory results:    Results from last 7 days   Lab Units 05/13/23  0510 05/11/23  0503 05/10/23  0908   SODIUM mmol/L 147* 148* 144   POTASSIUM mmol/L 4.1 4.5 5.0   CHLORIDE mmol/L 102 101 98   CO2 mmol/L 38.9* 36.1* 38.1*   BUN mg/dL 22 28* 22   CREATININE mg/dL 0.26* 0.38* 0.36*   CALCIUM mg/dL 9.3 9.1 9.1   GLUCOSE mg/dL 126* 164* 126*     Results from last 7 days   Lab Units 05/13/23  0510 05/11/23  0503 05/10/23  0908   WBC 10*3/mm3 16.25* 10.00 7.34   HEMOGLOBIN g/dL 11.7* 12.1 12.3   HEMATOCRIT % 39.0 39.9 40.6   PLATELETS 10*3/mm3 168 183 172                 Recent Labs     05/11/23  0659 05/12/23  1544 05/13/23  0510   PHART 7.364 7.450 7.420   VLL7KAQ 76.0* 58.1* 70.6*   PO2ART 86.8 99.0 74.4*   [id number] 43.3* 40.4* 45.7*   BASEEXCESS 14.4* 14.0* 17.9*      I have reviewed the patient's laboratory results.    Radiology results:    No radiology results from the last 24 hrs  I have reviewed the patient's radiology reports.    Medication Review:     I have reviewed the patient's active and prn medications.     Problem List:      Acute on chronic respiratory failure with hypoxia and hypercapnia      Assessment:    Acute on chronic respiratory failure with hypoxia and hypercapnia, likely secondary to #2 and 3, POA  Acute COPD exacerbation, POA  Parainfluenza infection, POA  Elevated troponin, likely secondary to demand ischemia, POA  Hypertension  Chronic tobacco abuse  Pulmonary hypertension  Arthritis    Plan:    Respiratory failure with hypoxia/hypercapnia, COPD exacerbation, parainfluenza  -Continue supplemental oxygen to keep saturation above 90%, patient on 5 to 6 L at baseline, continue to titrate down as able to.  Continue BiPAP therapy.  -Patient met SIRS criteria  on arrival, likely secondary to influenza infection and respiratory failure. Received Aztreonam and sepsis protocol IV fluids boluses.  -Procalcitonin and WBC WNL, bacterial infection less likely, will hold off on antibiotics for now.  - Solu-Medrol, will taper to 40 mg every 12 hours, continue to taper down as she improves.   -Bronchodilators, Mucinex and supportive care.  -No pulmonology coverage today.  -Repeat ABG and chest x-ray as indicated.  -ABG reviewed  -Will give another dose of Lasix today.  -Continue PT/OT.  -Ordered Xanax as needed, currently off Precedex.    Elevated troponin  -Likely secondary to demand ischemia in settings of respiratory failure  -Patient denies any chest pain, low suspicion for ACS  -Troponin trended down and plateaued.     Hyperglycemia  -Will cover with sliding scale insulin while on steroids  -Hemoglobin A1c 6.7     Further orders as indicated per clinical course.    Patient is high risk for intubation and further decompensation of her respiratory failure.     PT/ OT consulted, case management consulted, appreciate their help.    Patient would benefit from rehab on discharge, she has declined rehab in the past, case management following.  Daughter will discuss with the patient placement for rehab to see if she is agreeable, will rediscuss when she is ready for discharge.    DVT Prophylaxis: Lovenox prophylaxis (benefit> risk)  Code Status: Full  Diet: Cardiac  Discharge Plan: To be determined, likely needs 2 to 3 days in the hospital.     Lolis Vital MD  05/13/23  15:36 EDT    Dictated utilizing Dragon dictation.

## 2023-05-13 NOTE — THERAPY TREATMENT NOTE
Pt declined PT d/t not feeling up to it today maybe tomorrow. Physical therapy to f/u with pt at later date

## 2023-05-13 NOTE — PLAN OF CARE
Goal Outcome Evaluation:      Pt calm, cooperative this shift. Vitals remain stable, pt continues to require 12L HFNC or 60-65% bipap support to maintain o2 sats. Tolerated bipap well this shift. IV lasix admin per MD order

## 2023-05-14 LAB
ANION GAP SERPL CALCULATED.3IONS-SCNC: 5.7 MMOL/L (ref 5–15)
BASOPHILS # BLD AUTO: 0.08 10*3/MM3 (ref 0–0.2)
BASOPHILS NFR BLD AUTO: 0.5 % (ref 0–1.5)
BUN SERPL-MCNC: 23 MG/DL (ref 8–23)
BUN/CREAT SERPL: 63.9 (ref 7–25)
CALCIUM SPEC-SCNC: 9.4 MG/DL (ref 8.6–10.5)
CHLORIDE SERPL-SCNC: 99 MMOL/L (ref 98–107)
CO2 SERPL-SCNC: 42.3 MMOL/L (ref 22–29)
CREAT SERPL-MCNC: 0.36 MG/DL (ref 0.57–1)
DEPRECATED RDW RBC AUTO: 56.5 FL (ref 37–54)
EGFRCR SERPLBLD CKD-EPI 2021: 105.4 ML/MIN/1.73
EOSINOPHIL # BLD AUTO: 0 10*3/MM3 (ref 0–0.4)
EOSINOPHIL NFR BLD AUTO: 0 % (ref 0.3–6.2)
ERYTHROCYTE [DISTWIDTH] IN BLOOD BY AUTOMATED COUNT: 15.4 % (ref 12.3–15.4)
GLUCOSE BLDC GLUCOMTR-MCNC: 158 MG/DL (ref 70–130)
GLUCOSE BLDC GLUCOMTR-MCNC: 211 MG/DL (ref 70–130)
GLUCOSE SERPL-MCNC: 138 MG/DL (ref 65–99)
HCT VFR BLD AUTO: 39.8 % (ref 34–46.6)
HGB BLD-MCNC: 12 G/DL (ref 12–15.9)
IMM GRANULOCYTES # BLD AUTO: 0.46 10*3/MM3 (ref 0–0.05)
IMM GRANULOCYTES NFR BLD AUTO: 2.7 % (ref 0–0.5)
LYMPHOCYTES # BLD AUTO: 1.17 10*3/MM3 (ref 0.7–3.1)
LYMPHOCYTES NFR BLD AUTO: 6.9 % (ref 19.6–45.3)
MCH RBC QN AUTO: 30 PG (ref 26.6–33)
MCHC RBC AUTO-ENTMCNC: 30.2 G/DL (ref 31.5–35.7)
MCV RBC AUTO: 99.5 FL (ref 79–97)
MONOCYTES # BLD AUTO: 0.87 10*3/MM3 (ref 0.1–0.9)
MONOCYTES NFR BLD AUTO: 5.2 % (ref 5–12)
NEUTROPHILS NFR BLD AUTO: 14.27 10*3/MM3 (ref 1.7–7)
NEUTROPHILS NFR BLD AUTO: 84.7 % (ref 42.7–76)
NRBC BLD AUTO-RTO: 0 /100 WBC (ref 0–0.2)
NT-PROBNP SERPL-MCNC: 572.7 PG/ML (ref 0–1800)
PLATELET # BLD AUTO: 187 10*3/MM3 (ref 140–450)
PMV BLD AUTO: 12.1 FL (ref 6–12)
POTASSIUM SERPL-SCNC: 4.3 MMOL/L (ref 3.5–5.2)
PROCALCITONIN SERPL-MCNC: 0.08 NG/ML (ref 0–0.25)
RBC # BLD AUTO: 4 10*6/MM3 (ref 3.77–5.28)
SODIUM SERPL-SCNC: 147 MMOL/L (ref 136–145)
WBC NRBC COR # BLD: 16.85 10*3/MM3 (ref 3.4–10.8)

## 2023-05-14 PROCEDURE — 97112 NEUROMUSCULAR REEDUCATION: CPT

## 2023-05-14 PROCEDURE — 25010000002 FUROSEMIDE PER 20 MG: Performed by: FAMILY MEDICINE

## 2023-05-14 PROCEDURE — 94799 UNLISTED PULMONARY SVC/PX: CPT

## 2023-05-14 PROCEDURE — 63710000001 INSULIN ASPART PER 5 UNITS: Performed by: FAMILY MEDICINE

## 2023-05-14 PROCEDURE — 97530 THERAPEUTIC ACTIVITIES: CPT

## 2023-05-14 PROCEDURE — 94664 DEMO&/EVAL PT USE INHALER: CPT

## 2023-05-14 PROCEDURE — 94660 CPAP INITIATION&MGMT: CPT

## 2023-05-14 PROCEDURE — 99232 SBSQ HOSP IP/OBS MODERATE 35: CPT | Performed by: FAMILY MEDICINE

## 2023-05-14 PROCEDURE — 84145 PROCALCITONIN (PCT): CPT | Performed by: FAMILY MEDICINE

## 2023-05-14 PROCEDURE — 25010000002 METHYLPREDNISOLONE PER 40 MG: Performed by: FAMILY MEDICINE

## 2023-05-14 PROCEDURE — 83880 ASSAY OF NATRIURETIC PEPTIDE: CPT | Performed by: FAMILY MEDICINE

## 2023-05-14 PROCEDURE — 94761 N-INVAS EAR/PLS OXIMETRY MLT: CPT

## 2023-05-14 PROCEDURE — 85025 COMPLETE CBC W/AUTO DIFF WBC: CPT | Performed by: FAMILY MEDICINE

## 2023-05-14 PROCEDURE — 25010000002 ENOXAPARIN PER 10 MG: Performed by: FAMILY MEDICINE

## 2023-05-14 PROCEDURE — 80048 BASIC METABOLIC PNL TOTAL CA: CPT | Performed by: FAMILY MEDICINE

## 2023-05-14 PROCEDURE — 82948 REAGENT STRIP/BLOOD GLUCOSE: CPT

## 2023-05-14 RX ORDER — FUROSEMIDE 10 MG/ML
40 INJECTION INTRAMUSCULAR; INTRAVENOUS
Status: COMPLETED | OUTPATIENT
Start: 2023-05-14 | End: 2023-05-16

## 2023-05-14 RX ADMIN — IPRATROPIUM BROMIDE AND ALBUTEROL SULFATE 3 ML: 2.5; .5 SOLUTION RESPIRATORY (INHALATION) at 18:28

## 2023-05-14 RX ADMIN — AMLODIPINE BESYLATE 10 MG: 5 TABLET ORAL at 08:21

## 2023-05-14 RX ADMIN — TIOTROPIUM BROMIDE INHALATION SPRAY 2 PUFF: 3.12 SPRAY, METERED RESPIRATORY (INHALATION) at 07:31

## 2023-05-14 RX ADMIN — METHYLPREDNISOLONE SODIUM SUCCINATE 40 MG: 40 INJECTION, POWDER, LYOPHILIZED, FOR SOLUTION INTRAMUSCULAR; INTRAVENOUS at 04:32

## 2023-05-14 RX ADMIN — ACETAMINOPHEN 650 MG: 325 TABLET, FILM COATED ORAL at 08:21

## 2023-05-14 RX ADMIN — IPRATROPIUM BROMIDE AND ALBUTEROL SULFATE 3 ML: 2.5; .5 SOLUTION RESPIRATORY (INHALATION) at 13:26

## 2023-05-14 RX ADMIN — IPRATROPIUM BROMIDE AND ALBUTEROL SULFATE 3 ML: 2.5; .5 SOLUTION RESPIRATORY (INHALATION) at 07:31

## 2023-05-14 RX ADMIN — Medication 3 ML: at 08:22

## 2023-05-14 RX ADMIN — ENOXAPARIN SODIUM 40 MG: 100 INJECTION SUBCUTANEOUS at 20:03

## 2023-05-14 RX ADMIN — BUDESONIDE AND FORMOTEROL FUMARATE DIHYDRATE 2 PUFF: 160; 4.5 AEROSOL RESPIRATORY (INHALATION) at 18:28

## 2023-05-14 RX ADMIN — IPRATROPIUM BROMIDE AND ALBUTEROL SULFATE 3 ML: 2.5; .5 SOLUTION RESPIRATORY (INHALATION) at 00:21

## 2023-05-14 RX ADMIN — MULTIPLE VITAMINS W/ MINERALS TAB 1 TABLET: TAB at 08:21

## 2023-05-14 RX ADMIN — ATENOLOL 25 MG: 25 TABLET ORAL at 08:21

## 2023-05-14 RX ADMIN — ALPRAZOLAM 0.5 MG: 0.5 TABLET ORAL at 08:21

## 2023-05-14 RX ADMIN — INSULIN ASPART 4 UNITS: 100 INJECTION, SOLUTION INTRAVENOUS; SUBCUTANEOUS at 16:39

## 2023-05-14 RX ADMIN — SENNOSIDES AND DOCUSATE SODIUM 2 TABLET: 50; 8.6 TABLET ORAL at 08:21

## 2023-05-14 RX ADMIN — INSULIN ASPART 4 UNITS: 100 INJECTION, SOLUTION INTRAVENOUS; SUBCUTANEOUS at 06:35

## 2023-05-14 RX ADMIN — ALPRAZOLAM 0.5 MG: 0.5 TABLET ORAL at 23:51

## 2023-05-14 RX ADMIN — GUAIFENESIN 600 MG: 600 TABLET, EXTENDED RELEASE ORAL at 08:21

## 2023-05-14 RX ADMIN — GUAIFENESIN 600 MG: 600 TABLET, EXTENDED RELEASE ORAL at 20:03

## 2023-05-14 RX ADMIN — FUROSEMIDE 40 MG: 10 INJECTION, SOLUTION INTRAMUSCULAR; INTRAVENOUS at 15:23

## 2023-05-14 RX ADMIN — GUAIFENESIN AND DEXTROMETHORPHAN 10 ML: 100; 10 SYRUP ORAL at 16:40

## 2023-05-14 RX ADMIN — Medication 3 ML: at 20:02

## 2023-05-14 RX ADMIN — BUDESONIDE INHALATION 1 MG: 0.5 SUSPENSION RESPIRATORY (INHALATION) at 07:31

## 2023-05-14 RX ADMIN — BUDESONIDE AND FORMOTEROL FUMARATE DIHYDRATE 2 PUFF: 160; 4.5 AEROSOL RESPIRATORY (INHALATION) at 07:31

## 2023-05-14 RX ADMIN — METHYLPREDNISOLONE SODIUM SUCCINATE 40 MG: 40 INJECTION, POWDER, LYOPHILIZED, FOR SOLUTION INTRAMUSCULAR; INTRAVENOUS at 15:24

## 2023-05-14 RX ADMIN — INSULIN ASPART 2 UNITS: 100 INJECTION, SOLUTION INTRAVENOUS; SUBCUTANEOUS at 12:28

## 2023-05-14 NOTE — THERAPY TREATMENT NOTE
"Patient Name: Carolin Toscano  : 1945    MRN: [id number]                              Today's Date: 2023       Admit Date: 2023    Visit Dx:     ICD-10-CM ICD-9-CM   1. Acute on chronic respiratory failure with hypoxia and hypercapnia  J96.21 518.84    J96.22 786.09     799.02   2. COPD exacerbation  J44.1 491.21   3. Parainfluenza virus infection  B34.8 078.89     Patient Active Problem List   Diagnosis   • CAP (community acquired pneumonia)   • Cigarette nicotine dependence with nicotine-induced disorder   • Essential hypertension   • Dyslipidemia   • Blood glucose elevated   • Muscle ache   • COPD (chronic obstructive pulmonary disease)   • Nuclear sclerotic cataract of right eye   • Acute respiratory failure   • COPD exacerbation   • Acute on chronic respiratory failure with hypoxia and hypercapnia     Past Medical History:   Diagnosis Date   • Arthritis    • COPD (chronic obstructive pulmonary disease)    • Gall stones    • Goiter     \"inward\"   • Hypertension    • Hypertension    • Kidney infection    • Kidney stone    • Kidney stones    • Migraine headache    • Scarlet fever      Past Surgical History:   Procedure Laterality Date   • BLADDER SURGERY     • CATARACT EXTRACTION W/ INTRAOCULAR LENS IMPLANT Left 2022    Procedure: CATARACT PHACO EXTRACTION WITH INTRAOCULAR LENS IMPLANT LEFT;  Surgeon: Sathish Lopez MD;  Location: [facility];  Service: Ophthalmology;  Laterality: Left;   • CATARACT EXTRACTION W/ INTRAOCULAR LENS IMPLANT Right 2022    Procedure: CATARACT PHACO EXTRACTION WITH INTRAOCULAR LENS IMPLANT RIGHT;  Surgeon: Sathish Lopez MD;  Location: [facility];  Service: Ophthalmology;  Laterality: Right;   • CHOLECYSTECTOMY     • HYSTERECTOMY     • TONSILLECTOMY        General Information     Row Name 23 1259          Physical Therapy Time and Intention    Document Type therapy note (daily note)  -CC     Mode of Treatment physical therapy  -CC     Row Name " 05/14/23 1259          General Information    Patient Profile Reviewed yes  -CC     Row Name 05/14/23 1259          Safety Issues, Functional Mobility    Safety Issues Affecting Function (Mobility) friction/shear risk;insight into deficits/self-awareness;safety precautions follow-through/compliance  -CC     Impairments Affecting Function (Mobility) balance;endurance/activity tolerance;strength;shortness of breath  -CC           User Key  (r) = Recorded By, (t) = Taken By, (c) = Cosigned By    Initials Name Provider Type    Nasra Calixto PTA Physical Therapist Assistant               Mobility     Row Name 05/14/23 1300          Bed Mobility    Bed Mobility supine-sit;sit-supine;scooting/bridging  -CC     Scooting/Bridging [place] (Bed Mobility) moderate assist (50% patient effort);verbal cues  -CC     Supine-Sit [place] (Bed Mobility) minimum assist (75% patient effort);verbal cues  -CC     Sit-Supine [place] (Bed Mobility) minimum assist (75% patient effort)  -CC     Assistive Device (Bed Mobility) bed rails;head of bed elevated  -     Row Name 05/14/23 1300          Sit-Stand Transfer    Comment, (Sit-Stand Transfer) Pt declined transfers today  -CC           User Key  (r) = Recorded By, (t) = Taken By, (c) = Cosigned By    Initials Name Provider Type    Nasra Calixto, ELVIRA Physical Therapist Assistant               Obj/Interventions    No documentation.                Goals/Plan    No documentation.                Clinical Impression     Row Name 05/14/23 1301          Pain    Pretreatment Pain Rating 0/10 - no pain  -CC     Posttreatment Pain Rating 0/10 - no pain  -     Additional Documentation Pain Scale: Numbers Pre/Post-Treatment (Group)  -     Row Name 05/14/23 1301          Plan of Care Review    Plan of Care Reviewed With patient  -CC     Progress improving  -     Outcome Evaluation Pt agreeable to therapy. Performed supine to sit with VC for sequencing TC and VC to  advance B LE to EOB and min A with VC to come to upright sitting position, scooting to EOB and scooting up in bed with foot of bed elevated mod A, sitting EOB with and occ without B UE support x15 mins. PTA attempted to engage pt in B LE ex in supine without success and sitting performed 5x1 rep with VC to perform. Pt requires increased time to perfom tasks. Con't with PT POC and progress as tolerated  -CC     Row Name 05/14/23 1301          Positioning and Restraints    Pre-Treatment Position in bed  -CC     Post Treatment Position bed  -CC     In Bed supine;fowlers;call light within reach;encouraged to call for assist;patient within staff view  -CC           User Key  (r) = Recorded By, (t) = Taken By, (c) = Cosigned By    Initials Name Provider Type    Nasra Calixto PTA Physical Therapist Assistant               Outcome Measures     Row Name 05/14/23 1309          How much help from another person do you currently need...    Turning from your back to your side while in flat bed without using bedrails? 2  -CC     Moving from lying on back to sitting on the side of a flat bed without bedrails? 2  -CC     Moving to and from a bed to a chair (including a wheelchair)? 2  -CC     Standing up from a chair using your arms (e.g., wheelchair, bedside chair)? 2  -CC     Climbing 3-5 steps with a railing? 1  -CC     To walk in hospital room? 1  -CC     AM-PAC 6 Clicks Score (PT) 10  -CC     Highest level of mobility 4 --> Transferred to chair/commode  -CC     Row Name 05/14/23 1309          Functional Assessment    Outcome Measure Options AM-PAC 6 Clicks Basic Mobility (PT)  -CC           User Key  (r) = Recorded By, (t) = Taken By, (c) = Cosigned By    Initials Name Provider Type    Nasra Calixto PTA Physical Therapist Assistant                             Physical Therapy Education     Title: PT OT SLP Therapies (In Progress)     Topic: Physical Therapy (In Progress)     Point: Mobility training (Done)      Learning Progress Summary           Patient Acceptance, E, VU by MS at 5/12/2023 1458    Comment: importance of mobility    Acceptance, E,TB,D, VU,NR by  at 5/8/2023 1250    Comment: Importance of activity and exercise techniques    Acceptance, E, VU by MS at 5/5/2023 1136    Comment: importance of mobility                   Point: Home exercise program (Done)     Learning Progress Summary           Patient Acceptance, E,TB, VU,NR by CC at 5/14/2023 1310    Comment: Importance of increasing movement of B LE    Acceptance, E,TB,D, VU,NR by RM at 5/8/2023 1250    Comment: Importance of activity and exercise techniques    Acceptance, E, VU by MS at 5/5/2023 1136    Comment: importance of mobility                   Point: Body mechanics (Not Started)     Learner Progress:  Not documented in this visit.          Point: Precautions (Not Started)     Learner Progress:  Not documented in this visit.                      User Key     Initials Effective Dates Name Provider Type Discipline     06/16/21 -  Nasra Quiñones PTA Physical Therapist Assistant PT     06/16/21 -  Ronak Cruz PTA Physical Therapist Assistant PT    MS 07/01/22 -  Martín Ortiz, ROB Physical Therapist PT              PT Recommendation and Plan     Plan of Care Reviewed With: patient  Progress: improving  Outcome Evaluation: Pt agreeable to therapy. Performed supine to sit with VC for sequencing TC and VC to advance B LE to EOB and min A with VC to come to upright sitting position, scooting to EOB and scooting up in bed with foot of bed elevated mod A, sitting EOB with and occ without B UE support x15 mins. PTA attempted to engage pt in B LE ex in supine without success and sitting performed 5x1 rep with VC to perform. Pt requires increased time to perfom tasks. Con't with PT POC and progress as tolerated     Time Calculation:    PT Charges     Row Name 05/14/23 1311             Time Calculation    PT Received On 05/14/23  -CC      PT  Goal Re-Cert Due Date 05/22/23  -CC         Time Calculation- PT    Total Timed Code Minutes- PT 42 minute(s)  -CC         Timed Charges    10391 -  PT Neuromuscular Reeducation Minutes 15  -CC      06386 - PT Therapeutic Activity Minutes 27  -CC         Total Minutes    Timed Charges Total Minutes 42  -CC       Total Minutes 42  -CC            User Key  (r) = Recorded By, (t) = Taken By, (c) = Cosigned By    Initials Name Provider Type    CC Nasra Quiñones, ELVIRA Physical Therapist Assistant              Therapy Charges for Today     Code Description Service Date Service Provider Modifiers Qty    60800750590 HC PT NEUROMUSC RE EDUCATION EA 15 MIN 5/14/2023 Nasra Quiñones, ELVIRA GP 1    72865294990 HC PT THERAPEUTIC ACT EA 15 MIN 5/14/2023 Nasra Quiñones, ELVIRA GP 2          PT G-Codes  Outcome Measure Options: AM-PAC 6 Clicks Basic Mobility (PT)  AM-PAC 6 Clicks Score (PT): 10  AM-PAC 6 Clicks Score (OT): 15       Nasra Quiñones PTA  5/14/2023

## 2023-05-14 NOTE — PLAN OF CARE
Goal Outcome Evaluation:  Plan of Care Reviewed With: patient        Progress: improving  Outcome Evaluation: Pt agreeable to therapy. Performed supine to sit with VC for sequencing TC and VC to advance B LE to EOB and min A with VC to come to upright sitting position, scooting to EOB and scooting up in bed with foot of bed elevated mod A, sitting EOB with and occ without B UE support x15 mins. PTA attempted to engage pt in B LE ex in supine without success and sitting performed 5x1 rep with VC to perform. Pt requires increased time to perfom tasks. Con't with PT POC and progress as tolerated

## 2023-05-14 NOTE — PROGRESS NOTES
[facility]    Name:  Carolin Toscano   Age:  77 y.o.  Sex:  female  :  1945  MRN:  [id number]   Visit Number:  [id number]  Admission Date:  2023  Date Of Service:  23  Primary Care Physician:  Jason Nicholson MD     LOS: 10 days :    Chief Complaint:      Shortness of breath     Subjective:    Patient was seen and examined at bedside.  Patient remains in ICU.  Patient laying comfortably in bed on AVAPS.  She is off Precedex now.  Patient is conversational however appears generally weak.  She denies any pain or discomfort.  Has been tolerating AVAPS.  She denies any acute complaints currently.  Patient has intermittent episodes of increased anxiety and tachypnea while off AVAPS, will continue Xanax which has been helping.  Discussed with nursing at bedside.  Otherwise hemodynamically stable.     Hospital Course:    Patient is a 77 years old female with a past medical history of COPD, chronic respiratory failure on 5 to 6 L per her previous discharge, on NIV machine at home and trelegy, hypertension, and ongoing tobacco use who presented to the ER from home by EMS with a chief complaint of shortness of breath.  Patient reporting that she started having shortness of breath associated with productive cough since last night and has persisted and became worse that promoted her to call EMS.  EMS has found the patient to be at 65% saturation on her normal 6 L of oxygen.  She received a breathing treatment in route.  Patient was recently admitted to the hospital on  and discharged on 2023 with similar clinical picture of Respiratory failure, COPD exacerbation and pneumonia.  Patient was discharged on 6 L previously.  Patient reports compliance with her NIV machine at home.  Denies any sick contacts that she is aware of.  Patient denies any chest pain or abdominal pain.  Denies any other complaints.     On ER evaluation, Patient was found to be  tachycardic with a heart rate of 130s, temperature of 100.2 and respirations of 30, /72.  Patient was placed on BiPAP with FiO2 of 40%.  Her ABG came back and showed pH of 7.296/PCO2 87/PO2 94/HCO3 42/saturation 97% on 5 L nasal cannula.  HS Troponin 44, proBNP WNL, glucose 192, CO2 of 40, otherwise CBC and CMP within acceptable range.  Lactate and Pro-Adi WNL.  Respiratory panel positive for parainfluenza virus.  Chest x-ray Mild interstitial disease  bilaterally is similar to prior. No area of consolidation is seen.  Urinalysis negative for nitrates, leukocytes, WBC or bacteria with squamous epithelial cells.  Patient received Solu-Medrol, normal saline bolus of 2124 mL, magnesium and Aztreonam while in the ER.  Hospitalist consulted for admission, further evaluation and treatment.    Moved to ICU on 5/9 due to worsening respiratory failure and tachypnea.    Review of Systems:     All systems were reviewed and negative except as mentioned in subjective, assessment and plan.    Vital Signs:    Temp:  [97.2 °F (36.2 °C)-98.4 °F (36.9 °C)] 98.4 °F (36.9 °C)  Heart Rate:  [55-92] 89  Resp:  [20-33] 24  BP: (107-156)/(44-84) 129/52    Intake and output:    I/O last 3 completed shifts:  In: 450 [P.O.:340; I.V.:110]  Out: 2530 [Urine:2530]  No intake/output data recorded.    Physical Examination:    General Appearance:  Alert and cooperative.  Chronically ill-appearing.   Head:  Atraumatic and normocephalic.   Eyes: Conjunctivae and sclerae normal, no icterus. No pallor.   Throat: No oral lesions, no thrush, oral mucosa moist.   Neck: Supple, trachea midline, no thyromegaly.   Lungs:   Breath sounds heard bilaterally equally.  Mildly labored, on BiPAP.  Diffuse bilateral wheezing heard.  Decreased air movement bilaterally.  Tachypneic.   Heart:  Normal S1 and S2, no murmur, no gallop, no rub. No JVD.   Abdomen:   Normal bowel sounds, no masses, no organomegaly. Soft, nontender, nondistended, no rebound tenderness.    Extremities: Supple, no edema, no cyanosis, no clubbing.   Skin: No bleeding or rash.   Neurologic: Alert and oriented x 3. No facial asymmetry. Moves all four limbs. No tremors.      Laboratory results:    Results from last 7 days   Lab Units 05/14/23  0430 05/13/23  0510 05/11/23  0503   SODIUM mmol/L 147* 147* 148*   POTASSIUM mmol/L 4.3 4.1 4.5   CHLORIDE mmol/L 99 102 101   CO2 mmol/L 42.3* 38.9* 36.1*   BUN mg/dL 23 22 28*   CREATININE mg/dL 0.36* 0.26* 0.38*   CALCIUM mg/dL 9.4 9.3 9.1   GLUCOSE mg/dL 138* 126* 164*     Results from last 7 days   Lab Units 05/14/23  0430 05/13/23  0510 05/11/23  0503   WBC 10*3/mm3 16.85* 16.25* 10.00   HEMOGLOBIN g/dL 12.0 11.7* 12.1   HEMATOCRIT % 39.8 39.0 39.9   PLATELETS 10*3/mm3 187 168 183                 Recent Labs     05/11/23  0659 05/12/23  1544 05/13/23  0510   PHART 7.364 7.450 7.420   HOB6VVR 76.0* 58.1* 70.6*   PO2ART 86.8 99.0 74.4*   QNK5QJV 43.3* 40.4* 45.7*   BASEEXCESS 14.4* 14.0* 17.9*      I have reviewed the patient's laboratory results.    Radiology results:    XR Chest 1 View    Result Date: 5/13/2023  PROCEDURE: XR CHEST 1 VW-  HISTORY: Respiratory failure follow-up; J96.21-Acute and chronic respiratory failure with hypoxia; J96.22-Acute and chronic respiratory failure with hypercapnia; J44.1-Chronic obstructive pulmonary disease with (acute) exacerbation; B34.8-Other viral infections of unspecified site  COMPARISON: May 11, 2023.  FINDINGS: The heart is normal in size. The mediastinum is unremarkable. Increased interstitial markings appear not significantly changed. There is no pneumothorax.  There are no acute osseous abnormalities.      Impression: Increased interstitial markings appear not significantly changed.  Continued followup is recommended.  This report was signed and finalized on 5/13/2023 4:57 PM by Rodrigo Goodman DO.    I have reviewed the patient's radiology reports.    Medication Review:     I have reviewed the patient's active and  prn medications.     Problem List:      Acute on chronic respiratory failure with hypoxia and hypercapnia      Assessment:    Acute on chronic respiratory failure with hypoxia and hypercapnia, likely secondary to #2 and 3, POA  Acute COPD exacerbation, POA  Parainfluenza infection, POA  Elevated troponin, likely secondary to demand ischemia, POA  Hypertension  Chronic tobacco abuse  Pulmonary hypertension  Arthritis    Plan:    Respiratory failure with hypoxia/hypercapnia, COPD exacerbation, parainfluenza  -Continue supplemental oxygen to keep saturation above 90%, patient on 5 to 6 L at baseline, continue to titrate down as able to.  Continue BiPAP therapy.  -Patient met SIRS criteria on arrival, likely secondary to influenza infection and respiratory failure. Received Aztreonam and sepsis protocol IV fluids boluses.  -Procalcitonin and WBC WNL, bacterial infection less likely, will hold off on antibiotics for now.  - Solu-Medrol, will taper to 40 mg every 12 hours, continue to taper down as she improves.   -Bronchodilators, Mucinex and supportive care.  -No pulmonology coverage today.  -Repeat ABG and chest x-ray as indicated.  -ABG reviewed  -Lasix 40 twice daily x 2 days.  -Continue PT/OT.  -Ordered Xanax as needed, currently off Precedex.    Elevated troponin  -Likely secondary to demand ischemia in settings of respiratory failure  -Patient denies any chest pain, low suspicion for ACS  -Troponin trended down and plateaued.     Hyperglycemia  -Will cover with sliding scale insulin while on steroids  -Hemoglobin A1c 6.7     Further orders as indicated per clinical course.    Patient is high risk for intubation and further decompensation of her respiratory failure.     PT/ OT consulted, case management consulted, appreciate their help.    Patient would benefit from rehab on discharge, she has declined rehab in the past, case management following.  Daughter will discuss with the patient placement for rehab to see  if she is agreeable, will rediscuss when she is ready for discharge.    DVT Prophylaxis: Lovenox prophylaxis (benefit> risk)  Code Status: Full  Diet: Cardiac  Discharge Plan: To be determined, likely needs 2 to 3 days in the hospital.     Lolis Vital MD  05/14/23  09:09 EDT    Dictated utilizing Dragon dictation.

## 2023-05-14 NOTE — PLAN OF CARE
Goal Outcome Evaluation:            PT calm this shift, vitals have remained stable on 10L HFNC and intermittent bipap. Had episode this AM of shortness of air and tachynpea, resolved with bipap and anxiety relief medication per MD order. IV lasix administered with good response per MD order

## 2023-05-15 ENCOUNTER — APPOINTMENT (OUTPATIENT)
Dept: GENERAL RADIOLOGY | Facility: HOSPITAL | Age: 78
DRG: 190 | End: 2023-05-15
Payer: MEDICARE

## 2023-05-15 LAB
A-A DO2: 241.7 MMHG
ANION GAP SERPL CALCULATED.3IONS-SCNC: 6.3 MMOL/L (ref 5–15)
ARTERIAL PATENCY WRIST A: POSITIVE
ATMOSPHERIC PRESS: 738 MMHG
BASE EXCESS BLDA CALC-SCNC: 23.5 MMOL/L (ref 0–2)
BDY SITE: ABNORMAL
BUN SERPL-MCNC: 21 MG/DL (ref 8–23)
BUN/CREAT SERPL: 58.3 (ref 7–25)
CALCIUM SPEC-SCNC: 9.4 MG/DL (ref 8.6–10.5)
CHLORIDE SERPL-SCNC: 95 MMOL/L (ref 98–107)
CO2 SERPL-SCNC: 44.7 MMOL/L (ref 22–29)
COHGB MFR BLD: 1 % (ref 0–2)
CREAT SERPL-MCNC: 0.36 MG/DL (ref 0.57–1)
DEPRECATED RDW RBC AUTO: 53.9 FL (ref 37–54)
EGFRCR SERPLBLD CKD-EPI 2021: 105.4 ML/MIN/1.73
ERYTHROCYTE [DISTWIDTH] IN BLOOD BY AUTOMATED COUNT: 15.2 % (ref 12.3–15.4)
GLUCOSE BLDC GLUCOMTR-MCNC: 143 MG/DL (ref 70–130)
GLUCOSE BLDC GLUCOMTR-MCNC: 168 MG/DL (ref 70–130)
GLUCOSE SERPL-MCNC: 148 MG/DL (ref 65–99)
HCO3 BLDA-SCNC: 51.9 MMOL/L (ref 22–28)
HCT VFR BLD AUTO: 40.2 % (ref 34–46.6)
HCT VFR BLD CALC: 36.8 %
HGB BLD-MCNC: 12.2 G/DL (ref 12–15.9)
INHALED O2 CONCENTRATION: 55 %
Lab: ABNORMAL
Lab: ABNORMAL
MCH RBC QN AUTO: 29.3 PG (ref 26.6–33)
MCHC RBC AUTO-ENTMCNC: 30.3 G/DL (ref 31.5–35.7)
MCV RBC AUTO: 96.6 FL (ref 79–97)
METHGB BLD QL: 0.6 % (ref 0–1.5)
MODALITY: ABNORMAL
NOTE: ABNORMAL
NOTIFIED BY: ABNORMAL
NOTIFIED WHO: ABNORMAL
OXYHGB MFR BLDV: 88.6 % (ref 94–99)
PCO2 BLDA: 75.7 MM HG (ref 35–45)
PCO2 TEMP ADJ BLD: ABNORMAL MM[HG]
PH BLDA: 7.45 PH UNITS (ref 7.35–7.45)
PH, TEMP CORRECTED: ABNORMAL
PLATELET # BLD AUTO: 187 10*3/MM3 (ref 140–450)
PMV BLD AUTO: 11.9 FL (ref 6–12)
PO2 BLDA: 56.8 MM HG (ref 75–100)
PO2 TEMP ADJ BLD: ABNORMAL MM[HG]
POTASSIUM SERPL-SCNC: 4.9 MMOL/L (ref 3.5–5.2)
RBC # BLD AUTO: 4.16 10*6/MM3 (ref 3.77–5.28)
SAO2 % BLDCOA: 90 % (ref 94–100)
SET MECH RESP RATE: 16
SODIUM SERPL-SCNC: 146 MMOL/L (ref 136–145)
VENTILATOR MODE: ABNORMAL
VT ON VENT VENT: 480 ML
WBC NRBC COR # BLD: 16.64 10*3/MM3 (ref 3.4–10.8)

## 2023-05-15 PROCEDURE — 85027 COMPLETE CBC AUTOMATED: CPT | Performed by: FAMILY MEDICINE

## 2023-05-15 PROCEDURE — 71045 X-RAY EXAM CHEST 1 VIEW: CPT

## 2023-05-15 PROCEDURE — 25010000002 FUROSEMIDE PER 20 MG: Performed by: FAMILY MEDICINE

## 2023-05-15 PROCEDURE — 94799 UNLISTED PULMONARY SVC/PX: CPT

## 2023-05-15 PROCEDURE — 25010000002 ENOXAPARIN PER 10 MG: Performed by: FAMILY MEDICINE

## 2023-05-15 PROCEDURE — 80048 BASIC METABOLIC PNL TOTAL CA: CPT | Performed by: FAMILY MEDICINE

## 2023-05-15 PROCEDURE — 99233 SBSQ HOSP IP/OBS HIGH 50: CPT | Performed by: FAMILY MEDICINE

## 2023-05-15 PROCEDURE — 82805 BLOOD GASES W/O2 SATURATION: CPT

## 2023-05-15 PROCEDURE — 25010000002 METHYLPREDNISOLONE PER 40 MG: Performed by: FAMILY MEDICINE

## 2023-05-15 PROCEDURE — 36600 WITHDRAWAL OF ARTERIAL BLOOD: CPT

## 2023-05-15 PROCEDURE — 83050 HGB METHEMOGLOBIN QUAN: CPT

## 2023-05-15 PROCEDURE — 82948 REAGENT STRIP/BLOOD GLUCOSE: CPT

## 2023-05-15 PROCEDURE — 94664 DEMO&/EVAL PT USE INHALER: CPT

## 2023-05-15 PROCEDURE — 94660 CPAP INITIATION&MGMT: CPT

## 2023-05-15 PROCEDURE — 94761 N-INVAS EAR/PLS OXIMETRY MLT: CPT

## 2023-05-15 PROCEDURE — 82375 ASSAY CARBOXYHB QUANT: CPT

## 2023-05-15 RX ADMIN — GUAIFENESIN 600 MG: 600 TABLET, EXTENDED RELEASE ORAL at 21:09

## 2023-05-15 RX ADMIN — BUDESONIDE INHALATION 1 MG: 0.5 SUSPENSION RESPIRATORY (INHALATION) at 07:05

## 2023-05-15 RX ADMIN — SENNOSIDES AND DOCUSATE SODIUM 2 TABLET: 50; 8.6 TABLET ORAL at 21:10

## 2023-05-15 RX ADMIN — SENNOSIDES AND DOCUSATE SODIUM 2 TABLET: 50; 8.6 TABLET ORAL at 08:00

## 2023-05-15 RX ADMIN — ALPRAZOLAM 0.5 MG: 0.5 TABLET ORAL at 11:24

## 2023-05-15 RX ADMIN — TIOTROPIUM BROMIDE INHALATION SPRAY 2 PUFF: 3.12 SPRAY, METERED RESPIRATORY (INHALATION) at 07:47

## 2023-05-15 RX ADMIN — Medication 5 MG: at 21:09

## 2023-05-15 RX ADMIN — ATENOLOL 25 MG: 25 TABLET ORAL at 08:00

## 2023-05-15 RX ADMIN — ENOXAPARIN SODIUM 40 MG: 100 INJECTION SUBCUTANEOUS at 21:09

## 2023-05-15 RX ADMIN — IPRATROPIUM BROMIDE AND ALBUTEROL SULFATE 3 ML: 2.5; .5 SOLUTION RESPIRATORY (INHALATION) at 12:46

## 2023-05-15 RX ADMIN — FUROSEMIDE 40 MG: 10 INJECTION, SOLUTION INTRAMUSCULAR; INTRAVENOUS at 08:00

## 2023-05-15 RX ADMIN — IPRATROPIUM BROMIDE AND ALBUTEROL SULFATE 3 ML: 2.5; .5 SOLUTION RESPIRATORY (INHALATION) at 00:01

## 2023-05-15 RX ADMIN — AMLODIPINE BESYLATE 10 MG: 5 TABLET ORAL at 08:00

## 2023-05-15 RX ADMIN — METHYLPREDNISOLONE SODIUM SUCCINATE 40 MG: 40 INJECTION, POWDER, LYOPHILIZED, FOR SOLUTION INTRAMUSCULAR; INTRAVENOUS at 18:56

## 2023-05-15 RX ADMIN — GUAIFENESIN 600 MG: 600 TABLET, EXTENDED RELEASE ORAL at 08:00

## 2023-05-15 RX ADMIN — BUDESONIDE AND FORMOTEROL FUMARATE DIHYDRATE 2 PUFF: 160; 4.5 AEROSOL RESPIRATORY (INHALATION) at 07:05

## 2023-05-15 RX ADMIN — Medication 3 ML: at 21:09

## 2023-05-15 RX ADMIN — IPRATROPIUM BROMIDE AND ALBUTEROL SULFATE 3 ML: 2.5; .5 SOLUTION RESPIRATORY (INHALATION) at 19:25

## 2023-05-15 RX ADMIN — IPRATROPIUM BROMIDE AND ALBUTEROL SULFATE 3 ML: 2.5; .5 SOLUTION RESPIRATORY (INHALATION) at 07:05

## 2023-05-15 RX ADMIN — BUDESONIDE AND FORMOTEROL FUMARATE DIHYDRATE 2 PUFF: 160; 4.5 AEROSOL RESPIRATORY (INHALATION) at 19:25

## 2023-05-15 RX ADMIN — METHYLPREDNISOLONE SODIUM SUCCINATE 40 MG: 40 INJECTION, POWDER, LYOPHILIZED, FOR SOLUTION INTRAMUSCULAR; INTRAVENOUS at 03:28

## 2023-05-15 RX ADMIN — FUROSEMIDE 40 MG: 10 INJECTION, SOLUTION INTRAMUSCULAR; INTRAVENOUS at 18:56

## 2023-05-15 RX ADMIN — MULTIPLE VITAMINS W/ MINERALS TAB 1 TABLET: TAB at 08:01

## 2023-05-15 NOTE — PLAN OF CARE
Problem: Skin Injury Risk Increased  Goal: Skin Health and Integrity  Outcome: Ongoing, Progressing     Problem: Fall Injury Risk  Goal: Absence of Fall and Fall-Related Injury  Outcome: Ongoing, Progressing  Intervention: Identify and Manage Contributors  Recent Flowsheet Documentation  Taken 5/15/2023 0400 by Rob Lu RN  Medication Review/Management: medications reviewed  Taken 5/15/2023 0000 by Rob Lu RN  Medication Review/Management: medications reviewed  Taken 5/14/2023 2000 by Rob Lu RN  Medication Review/Management: medications reviewed  Intervention: Promote Injury-Free Environment  Recent Flowsheet Documentation  Taken 5/15/2023 0400 by Rob Lu RN  Safety Promotion/Fall Prevention:   activity supervised   clutter free environment maintained   fall prevention program maintained   lighting adjusted   room organization consistent   safety round/check completed  Taken 5/15/2023 0000 by Rob Lu RN  Safety Promotion/Fall Prevention:   activity supervised   clutter free environment maintained   fall prevention program maintained   lighting adjusted   room organization consistent   safety round/check completed  Taken 5/14/2023 2000 by Rob Lu RN  Safety Promotion/Fall Prevention:   activity supervised   clutter free environment maintained   fall prevention program maintained   lighting adjusted   room organization consistent   safety round/check completed     Problem: Adult Inpatient Plan of Care  Goal: Plan of Care Review  Outcome: Ongoing, Progressing  Flowsheets  Taken 5/15/2023 0658 by Rob Lu RN  Outcome Evaluation:   Pt unable to sleep last night but expressed comfort with BIPAP and kept TV on. Pt tolerated being on BIPAP throughout night with option to remove and switch to HFNC   pt refused. No issues while on bipap.  Q2 turns kept to offlaod pressure off coccyx.  Taken 5/14/2023 1301 by Nasra Quiñones PTA  Plan of Care Reviewed With: patient  Goal: Patient-Specific Goal  (Individualized)  Outcome: Ongoing, Progressing  Goal: Absence of Hospital-Acquired Illness or Injury  Outcome: Ongoing, Progressing  Intervention: Identify and Manage Fall Risk  Recent Flowsheet Documentation  Taken 5/15/2023 0400 by Rob Lu RN  Safety Promotion/Fall Prevention:   activity supervised   clutter free environment maintained   fall prevention program maintained   lighting adjusted   room organization consistent   safety round/check completed  Taken 5/15/2023 0000 by Rob Lu RN  Safety Promotion/Fall Prevention:   activity supervised   clutter free environment maintained   fall prevention program maintained   lighting adjusted   room organization consistent   safety round/check completed  Taken 5/14/2023 2000 by Rob Lu RN  Safety Promotion/Fall Prevention:   activity supervised   clutter free environment maintained   fall prevention program maintained   lighting adjusted   room organization consistent   safety round/check completed  Intervention: Prevent and Manage VTE (Venous Thromboembolism) Risk  Recent Flowsheet Documentation  Taken 5/15/2023 0400 by Rob Lu RN  Activity Management:   activity encouraged   bedrest  VTE Prevention/Management: sequential compression devices on  Taken 5/15/2023 0000 by Rob Lu RN  Activity Management: bedrest  VTE Prevention/Management: sequential compression devices on  Taken 5/14/2023 2000 by Rob Lu RN  Activity Management: bedrest  VTE Prevention/Management: (see mar) sequential compression devices on  Range of Motion: active ROM (range of motion) encouraged  Intervention: Prevent Infection  Recent Flowsheet Documentation  Taken 5/15/2023 0400 by Rob Lu RN  Infection Prevention:   cohorting utilized   environmental surveillance performed   equipment surfaces disinfected   hand hygiene promoted   personal protective equipment utilized   rest/sleep promoted   single patient room provided  Taken 5/15/2023 0000 by Rob Lu RN  Infection  Prevention:   cohorting utilized   environmental surveillance performed   equipment surfaces disinfected   hand hygiene promoted   personal protective equipment utilized   rest/sleep promoted   single patient room provided  Taken 5/14/2023 2000 by Rob Lu RN  Infection Prevention:   cohorting utilized   environmental surveillance performed   equipment surfaces disinfected   hand hygiene promoted   personal protective equipment utilized   single patient room provided   rest/sleep promoted  Goal: Optimal Comfort and Wellbeing  Outcome: Ongoing, Progressing  Intervention: Provide Person-Centered Care  Recent Flowsheet Documentation  Taken 5/14/2023 2000 by Rob Lu RN  Trust Relationship/Rapport:   care explained   choices provided   questions answered   reassurance provided   questions encouraged  Goal: Readiness for Transition of Care  Outcome: Ongoing, Progressing     Problem: COPD (Chronic Obstructive Pulmonary Disease) Comorbidity  Goal: Maintenance of COPD Symptom Control  Outcome: Ongoing, Progressing  Intervention: Maintain COPD-Symptom Control  Recent Flowsheet Documentation  Taken 5/15/2023 0400 by Rob Lu RN  Medication Review/Management: medications reviewed  Taken 5/15/2023 0000 by Rob Lu RN  Medication Review/Management: medications reviewed  Taken 5/14/2023 2000 by Rob Lu RN  Medication Review/Management: medications reviewed     Problem: Diabetes Comorbidity  Goal: Blood Glucose Level Within Targeted Range  Outcome: Ongoing, Progressing     Problem: Hypertension Comorbidity  Goal: Blood Pressure in Desired Range  Outcome: Ongoing, Progressing  Intervention: Maintain Blood Pressure Management  Recent Flowsheet Documentation  Taken 5/15/2023 0400 by Rob Lu RN  Medication Review/Management: medications reviewed  Taken 5/15/2023 0000 by Rob Lu RN  Medication Review/Management: medications reviewed  Taken 5/14/2023 2000 by Rob Lu RN  Medication Review/Management:  medications reviewed     Problem: Noninvasive Ventilation Acute  Goal: Effective Unassisted Ventilation and Oxygenation  Outcome: Ongoing, Progressing  Intervention: Monitor and Manage Noninvasive Ventilation  Recent Flowsheet Documentation  Taken 5/14/2023 2000 by Rob Lu RN  Airway/Ventilation Management: airway patency maintained     Problem: Gas Exchange Impaired  Goal: Optimal Gas Exchange  Outcome: Ongoing, Progressing  Intervention: Optimize Oxygenation and Ventilation  Recent Flowsheet Documentation  Taken 5/14/2023 2000 by Rob Lu RN  Airway/Ventilation Management: airway patency maintained     Problem: Impaired Wound Healing  Goal: Optimal Wound Healing  Outcome: Ongoing, Progressing  Intervention: Promote Wound Healing  Recent Flowsheet Documentation  Taken 5/15/2023 0400 by Rob Lu RN  Activity Management:   activity encouraged   bedrest  Taken 5/15/2023 0000 by Rob Lu RN  Activity Management: bedrest  Taken 5/14/2023 2000 by Rob Lu RN  Activity Management: bedrest  Sleep/Rest Enhancement:   awakenings minimized   room darkened   relaxation techniques promoted   Goal Outcome Evaluation:              Outcome Evaluation: Pt unable to sleep last night but expressed comfort with BIPAP and kept TV on. Pt tolerated being on BIPAP throughout night with option to remove and switch to HFNC; pt refused. No issues while on bipap.  Q2 turns kept to offlaod pressure off coccyx.

## 2023-05-15 NOTE — THERAPY TREATMENT NOTE
PT held today, as patient is requiring BiPAP to maintain good oxygen saturation.  PT will follow up tomorrow.

## 2023-05-15 NOTE — PROGRESS NOTES
[facility]    Name:  Carolin Toscano   Age:  77 y.o.  Sex:  female  :  1945  MRN:  [id number]   Visit Number:  [id number]  Admission Date:  2023  Date Of Service:  05/15/23  Primary Care Physician:  Jason Nicholson MD     LOS: 11 days :    Chief Complaint:      Shortness of breath     Subjective:    Patient was seen and examined at bedside.  Patient remains in ICU.  Patient laying comfortably in bed on AVAPS.  She is off Precedex.  Patient is conversational continues to appear generally weak.  She denies any pain or discomfort.  Her only request today was having something to drink. Has been tolerating AVAPS but requiring 10 L when AVAPS of.  Still has intermittent episodes of tachypnea and anxiety while off AVAPS,  Xanax has been helping. Discussed with nursing at bedside.  Otherwise hemodynamically stable.  Afebrile.     Hospital Course:    Patient is a 77 years old female with a past medical history of COPD, chronic respiratory failure on 5 to 6 L per her previous discharge, on NIV machine at home and trelegy, hypertension, and ongoing tobacco use who presented to the ER from home by EMS with a chief complaint of shortness of breath.  Patient reporting that she started having shortness of breath associated with productive cough since last night and has persisted and became worse that promoted her to call EMS.  EMS has found the patient to be at 65% saturation on her normal 6 L of oxygen. Patient reports compliance with her NIV machine at home.      On ER evaluation, Patient was found to be tachycardic with a heart rate of 130s, temperature of 100.2 and respirations of 30, /72.  Patient was placed on BiPAP with FiO2 of 40%.  Her ABG came back and showed pH of 7.296/PCO2 87/PO2 94/HCO3 42/saturation 97% on 5 L nasal cannula.  HS Troponin 44, proBNP WNL, glucose 192, CO2 of 40, otherwise CBC and CMP within acceptable range.  Lactate and Pro-Adi WNL.   Respiratory panel positive for parainfluenza virus.  Chest x-ray Mild interstitial disease  bilaterally is similar to prior. No area of consolidation is seen. Patient received Solu-Medrol and Aztreonam while in the ER.  Hospitalist consulted for admission.    Patient was admitted and treated with steroids.  Antibiotics were not indicated.  Pulmonology consulted and was moved to ICU on 5/9 due to worsening respiratory failure and tachypnea.  She initially had to be on Precedex drip which was discontinued and switched to Xanax due to anxiety. Patient was placed on AVAPS.  Patient continued on steroids and received Lasix.    Review of Systems:     All systems were reviewed and negative except as mentioned in subjective, assessment and plan.    Vital Signs:    Temp:  [97.2 °F (36.2 °C)-99.3 °F (37.4 °C)] 98.3 °F (36.8 °C)  Heart Rate:  [57-86] 64  Resp:  [16-32] 16  BP: (113-143)/(46-70) 124/48    Intake and output:    I/O last 3 completed shifts:  In: 300 [P.O.:300]  Out: 1750 [Urine:1750]  I/O this shift:  In: 120 [P.O.:120]  Out: 600 [Urine:600]    Physical Examination:    General Appearance:  Alert and cooperative.  Chronically ill-appearing.   Head:  Atraumatic and normocephalic.   Eyes: Conjunctivae and sclerae normal, no icterus. No pallor.   Throat: No oral lesions, no thrush, oral mucosa moist.   Neck: Supple, trachea midline, no thyromegaly.   Lungs:   Breath sounds heard bilaterally equally.  Mildly labored, on BiPAP.  Diffuse bilateral wheezing heard.  Decreased air movement bilaterally.  Tachypneic.   Heart:  Normal S1 and S2, no murmur, no gallop, no rub. No JVD.   Abdomen:   Normal bowel sounds, no masses, no organomegaly. Soft, nontender, nondistended, no rebound tenderness.   Extremities: Supple, no edema, no cyanosis, no clubbing.   Skin: No bleeding or rash.   Neurologic: Alert and oriented x 3. No facial asymmetry. Moves all four limbs. No tremors.  Generalized weakness noted.     Laboratory  results:    Results from last 7 days   Lab Units 05/15/23  0413 05/14/23  0430 05/13/23  0510   SODIUM mmol/L 146* 147* 147*   POTASSIUM mmol/L 4.9 4.3 4.1   CHLORIDE mmol/L 95* 99 102   CO2 mmol/L 44.7* 42.3* 38.9*   BUN mg/dL 21 23 22   CREATININE mg/dL 0.36* 0.36* 0.26*   CALCIUM mg/dL 9.4 9.4 9.3   GLUCOSE mg/dL 148* 138* 126*     Results from last 7 days   Lab Units 05/15/23  0413 05/14/23  0430 05/13/23  0510   WBC 10*3/mm3 16.64* 16.85* 16.25*   HEMOGLOBIN g/dL 12.2 12.0 11.7*   HEMATOCRIT % 40.2 39.8 39.0   PLATELETS 10*3/mm3 187 187 168                 Recent Labs     05/12/23  1544 05/13/23  0510 05/15/23  0453   PHART 7.450 7.420 7.445   UBI0TWR 58.1* 70.6* 75.7*   PO2ART 99.0 74.4* 56.8*   PND5HZJ 40.4* 45.7* 51.9*   BASEEXCESS 14.0* 17.9* 23.5*      I have reviewed the patient's laboratory results.    Radiology results:    XR Chest 1 View    Result Date: 5/15/2023  PROCEDURE: XR CHEST 1 VW-  HISTORY: follow up, increased WBC & CO2; J96.21-Acute and chronic respiratory failure with hypoxia; J96.22-Acute and chronic respiratory failure with hypercapnia; J44.1-Chronic obstructive pulmonary disease with (acute) exacerbation; B34.8-Other viral infections of unspecified site  COMPARISON: 05/13/2023.  FINDINGS: The heart is normal in size. Bilateral interstitial disease most prominent left lung base is stable from prior. Bronchial wall thickening noted.. The mediastinum is unremarkable. There is no pneumothorax.  There are no acute osseous abnormalities.      Impression: Stable chest..    This report was signed and finalized on 5/15/2023 8:37 AM by Shraddha Wharton MD.    I have reviewed the patient's radiology reports.    Medication Review:     I have reviewed the patient's active and prn medications.     Problem List:      Acute on chronic respiratory failure with hypoxia and hypercapnia      Assessment:    Acute on chronic respiratory failure with hypoxia and hypercapnia, likely secondary to #2 and 3,  POA  Acute COPD exacerbation, POA  Parainfluenza infection, POA  Elevated troponin, likely secondary to demand ischemia, POA  Hypertension  Chronic tobacco abuse  Pulmonary hypertension  Arthritis    Plan:    Respiratory failure with hypoxia/hypercapnia, COPD exacerbation, parainfluenza  -Continue supplemental oxygen to keep saturation above 90%, patient on 5 to 6 L at baseline, continue to titrate down as able to.  Continue BiPAP therapy.  -Patient met SIRS criteria on arrival, likely secondary to influenza infection and respiratory failure. Received Aztreonam and sepsis protocol IV fluids boluses.  -Procalcitonin and WBC WNL, bacterial infection less likely, held off on antibiotics.  - Solu-Medrol, will taper to 40 mg every 12 hours, continue to taper down as she improves.   -Bronchodilators, Mucinex and supportive care.  -No pulmonology coverage today.  Dr. Soriano to see patient in a.m., appreciate recommendations.  -Repeat ABG and chest x-ray as indicated.  -ABG reviewed  -Lasix 40 twice daily x 2 days.  -Continue PT/OT.  -Ordered Xanax as needed, currently off Precedex.    Elevated troponin  -Likely secondary to demand ischemia in settings of respiratory failure  -Patient denies any chest pain, low suspicion for ACS  -Troponin trended down and plateaued.     Hyperglycemia  -Will cover with sliding scale insulin while on steroids  -Hemoglobin A1c 6.7     Further orders as indicated per clinical course.    PT/ OT consulted, case management consulted, appreciate their help.    Patient would benefit from rehab on discharge, she has declined rehab or home health in the past, case management following.  Daughter will discuss with the patient placement for rehab to see if she is agreeable, will rediscuss when she is ready for discharge.     Patient is high risk for intubation and further decompensation of her respiratory failure.     DVT Prophylaxis: Lovenox prophylaxis (benefit> risk)  Code Status: Full  Diet:  Cardiac  Discharge Plan: To be determined, likely needs 2 to 3 days in the hospital.     Lolis Vital MD  05/15/23  09:55 EDT    Dictated utilizing Dragon dictation.

## 2023-05-15 NOTE — NURSING NOTE
Seen for wound consult. Cooperative with assessment. Sawyer BHARDWAJ assisted with assessment and care.   Gluteal MASD improved, has peeling skin consistent with healing yeast. Staff to continue skin protective measures.   Bridge of nose with deep tissue pressure injury, protective measures have been in place. Chin is red blanchable. With her decreased nutritional status, continued need for bipap and decreased mobility more breakdown is probable. Respiratory has been contacted to discuss options.   Staff to continue best practice measures/skin protection measures. Staff to contact provider and re-consult wound nurse for new skin issues or lack of improvement with current recommendations. Thank you for the consult. If you have questions or concerns do not hesitate to contact me.

## 2023-05-15 NOTE — CASE MANAGEMENT/SOCIAL WORK
Pt continues to require oxygen primarily by bipap. Pt awake, wearing bipap/55%  during rounds with sat 90%. RR 22-23. CM following for dc needs. Referral sent to [facility].    [id number] Referral accepted by Lian/No. States she can start precert when appropriate. Lux advised her that pulmonology will likely see pt tomorrow and Im unsure of when attending will be ready to dc pt.  Lux also advised her that I have not yet talked to pt about going to [facility] due to her being on bipap all day. Lian agrees to follow in Epic and Lux agrees to notify her when appropriate to start precert.

## 2023-05-15 NOTE — DISCHARGE PLACEMENT REQUEST
"  Carolin Toscano (77 y.o. Female)     Date of Birth   1945    Social Security Number       Address   [address] [place] [address]    Home Phone   [phone]    MRN   [id number]       [employer]   [place]    Marital Status                               Admission Date   5/4/23    Admission Type   Emergency    Admitting Provider   Lolis Vital MD    Attending Provider   Lolis Vital MD    Department, Room/Bed   [facility] INTENSIVE CARE, I07/1       Discharge Date       Discharge Disposition       Discharge Destination                               Attending Provider: Lolis Vital MD    Allergies: Contrast Dye (Echo Or Unknown Ct/mr), Ciprofloxacin, Keflex [Cephalexin], Penicillins, Sulfa Antibiotics, Terramycin [Oxytetracycline], Prednisone, Latex, Percocet [Oxycodone-acetaminophen], Xyzal [Levocetirizine]    Isolation: Contact, Droplet   Infection: Other (05/08/23)   Code Status: CPR    Ht: 160 cm (63\")   Wt: 68.2 kg (150 lb 5.7 oz)    Admission Cmt: None   Principal Problem: Acute on chronic respiratory failure with hypoxia and hypercapnia [J96.21,J96.22]                 Active Insurance as of 5/4/2023     Primary Coverage     Payor Plan Insurance Group Employer/Plan Group    HUMANA MEDICARE REPLACEMENT HUMANA MEDICARE REPLACEMENT [id number]     Payor Plan Address Payor Plan Phone Number Payor Plan Fax Number Effective Dates    [address] [address] [phone]  1/1/2018 - None Entered    Formerly [facility] [id number]       Subscriber Name Subscriber Birth Date Member ID       CAROLIN TOSCANO 1945 [id number]           Secondary Coverage     Payor Plan Insurance Group Employer/Plan Group    [place]      Payor Plan Address Payor Plan Phone Number Payor Plan Fax Number Effective Dates    [address] [phone]  10/11/2021 - None Entered    [facility] [id number]       Subscriber Name Subscriber Birth Date Member ID       CAROLIN TOSCANO 1945 [id number]  "                Emergency Contacts      (Rel.) Home Phone Work Phone Mobile Phone    Nehal Hoffmann (Daughter) [phone] -- --               History & Physical      Lolis Vital MD at 23 1550            [facility]   HISTORY AND PHYSICAL      Name:  Carolin Toscano   Age:  77 y.o.  Sex:  female  :  1945  MRN:  [id number]   Visit Number:  [id number]  Admission Date:  2023  Date Of Service:  23  Primary Care Physician:  Jason Nicholson MD    Chief Complaint:     Shortness of breath    History Of Presenting Illness:      Patient is a 77 years old female with a past medical history of COPD, chronic respiratory failure on 5 to 6 L per her previous discharge, on NIV machine at home and trelegy, hypertension, and ongoing tobacco use who presented to the ER from home by EMS with a chief complaint of shortness of breath.  Patient reporting that she started having shortness of breath associated with productive cough since last night and has persisted and became worse that promoted her to call EMS.  EMS has found the patient to be at 65% saturation on her normal 6 L of oxygen.  She received a breathing treatment in route.  Patient was recently admitted to the hospital on  and discharged on 2023 with similar clinical picture of Respiratory failure, COPD exacerbation and pneumonia.  Patient was discharged on 6 L previously.  Patient reports compliance with her NIV machine at home.  Denies any sick contacts that she is aware of.  Patient denies any chest pain or abdominal pain.  Denies any other complaints.    On ER evaluation, Patient was found to be tachycardic with a heart rate of 130s, temperature of 100.2 and respirations of 30, /72.  Patient was placed on BiPAP with FiO2 of 40%.  Her ABG came back and showed pH of 7.296/PCO2 87/PO2 94/HCO3 42/saturation 97% on 5 L nasal cannula.  HS Troponin 44, proBNP WNL, glucose 192, CO2 of 40, otherwise  CBC and CMP within acceptable range.  Lactate and Pro-Adi WNL.  Respiratory panel positive for parainfluenza virus.  Chest x-ray Mild interstitial disease  bilaterally is similar to prior. No area of consolidation is seen.  Urinalysis negative for nitrates, leukocytes, WBC or bacteria with squamous epithelial cells.  Patient received Solu-Medrol, normal saline bolus of 2124 mL, magnesium and Aztreonam while in the ER.  Hospitalist consulted for admission, further evaluation and treatment.    Review Of Systems:    All systems were reviewed and negative except as mentioned in history of presenting illness, assessment and plan.    Past Medical History: Patient  has a past medical history of Arthritis, COPD (chronic obstructive pulmonary disease), Gall stones, Goiter, Hypertension, Hypertension, Kidney infection, Kidney stone, Kidney stones, Migraine headache, and Scarlet fever.    Past Surgical History: Patient  has a past surgical history that includes Cholecystectomy; Bladder surgery; Tonsillectomy; Hysterectomy; Cataract extraction w/ intraocular lens implant (Left, 8/11/2022); and Cataract extraction w/ intraocular lens implant (Right, 8/25/2022).    Social History: Patient  reports that she has been smoking cigarettes. She started smoking about 63 years ago. She has been smoking an average of 1 pack per day. She quit smokeless tobacco use about 5 years ago. She reports that she does not drink alcohol and does not use drugs.    Family History:  Patient's family history has been reviewed and found to be noncontributory.     Allergies:      Contrast dye (echo or unknown ct/mr), Ciprofloxacin, Keflex [cephalexin], Penicillins, Sulfa antibiotics, Terramycin [oxytetracycline], Prednisone, Latex, Percocet [oxycodone-acetaminophen], and Xyzal [levocetirizine]    Home Medications:    Prior to Admission Medications     Prescriptions Last Dose Informant Patient Reported? Taking?    albuterol (PROVENTIL) (2.5 MG/3ML) 0.083%  "nebulizer solution   No No    Inhale contents of 1 vial (3 mL) by nebulization Every 6 (Six) Hours As Needed for Wheezing for up to 30 days.    amLODIPine (NORVASC) 10 MG tablet   No No    Take 1 tablet by mouth Daily.    atenolol (TENORMIN) 25 MG tablet   No No    Take 1 tablet by mouth Daily.    Fluticasone-Umeclidin-Vilant (TRELEGY) 100-62.5-25 MCG/ACT inhaler   No No    Inhale 1 puff  by mouth Daily    Patient not taking:  Reported on 5/1/2023    multivitamins-minerals (PRESERVISION AREDS 2) capsule capsule  Self Yes No    Take 1 capsule by mouth Daily.    tiotropium (Spiriva HandiHaler) 18 MCG per inhalation capsule   No No    Place 1 capsule into inhaler and inhale Daily.        ED Medications:    Medications   sodium chloride 0.9 % flush 10 mL (has no administration in time range)   sodium chloride 0.9 % bolus 2,124 mL (2,124 mL Intravenous New Bag 5/4/23 1517)   methylPREDNISolone sodium succinate (SOLU-Medrol) injection 125 mg (125 mg Intravenous Given 5/4/23 1516)   magnesium sulfate 2g/50 mL (PREMIX) infusion (0 g Intravenous Stopped 5/4/23 1543)   aztreonam (AZACTAM) 2 g/100 mL 0.9% NS (mbp) (2 g Intravenous New Bag 5/4/23 1516)     Vital Signs:  Temp:  [100.2 °F (37.9 °C)] 100.2 °F (37.9 °C)  Heart Rate:  [133] 133  Resp:  [29-30] 29  BP: (185)/(72) 185/72        05/04/23  1444   Weight: 70.8 kg (156 lb)     Body mass index is 27.63 kg/m².    Physical Exam:     Most recent vital Signs: BP (!) 185/72 (Patient Position: Sitting)   Pulse (!) 133   Temp 100.2 °F (37.9 °C) (Oral)   Resp (!) 29   Ht 160 cm (63\")   Wt 70.8 kg (156 lb)   SpO2 96%   BMI 27.63 kg/m²     Physical Exam  Vitals and nursing note reviewed.   Constitutional:       General: She is in acute distress.      Appearance: She is ill-appearing.      Comments: Chronically ill-appearing.   HENT:      Head: Normocephalic and atraumatic.      Right Ear: External ear normal.      Left Ear: External ear normal.      Nose: Nose normal.      " Mouth/Throat:      Mouth: Mucous membranes are dry.   Eyes:      Extraocular Movements: Extraocular movements intact.      Conjunctiva/sclera: Conjunctivae normal.      Pupils: Pupils are equal, round, and reactive to light.   Cardiovascular:      Rate and Rhythm: Regular rhythm. Tachycardia present.      Pulses: Normal pulses.      Heart sounds: Normal heart sounds.   Pulmonary:      Effort: Tachypnea, accessory muscle usage, prolonged expiration and respiratory distress present.      Breath sounds: Decreased air movement present. Wheezing present. No rhonchi.      Comments: Diffuse bilateral expiratory and inspiratory wheezing heard.  Abdominal:      General: Bowel sounds are normal. There is no distension.      Palpations: Abdomen is soft.      Tenderness: There is no abdominal tenderness.   Musculoskeletal:         General: No tenderness. Normal range of motion.      Cervical back: Normal range of motion and neck supple.      Right lower leg: No edema.      Left lower leg: No edema.   Skin:     General: Skin is warm and dry.      Findings: No rash.   Neurological:      General: No focal deficit present.      Mental Status: She is alert and oriented to person, place, and time. Mental status is at baseline.      Motor: No weakness.   Psychiatric:         Mood and Affect: Mood normal.         Behavior: Behavior normal.         Thought Content: Thought content normal.         Laboratory data:    I have reviewed the labs done in the emergency room.    Results from last 7 days   Lab Units 05/04/23  1447   SODIUM mmol/L 140   POTASSIUM mmol/L 4.4   CHLORIDE mmol/L 95*   CO2 mmol/L 40.2*   BUN mg/dL 10   CREATININE mg/dL 0.38*   CALCIUM mg/dL 9.1   BILIRUBIN mg/dL 0.4   ALK PHOS U/L 87   ALT (SGPT) U/L 30   AST (SGOT) U/L 23   GLUCOSE mg/dL 192*     Results from last 7 days   Lab Units 05/04/23  1447   WBC 10*3/mm3 8.04   HEMOGLOBIN g/dL 13.3   HEMATOCRIT % 44.5   PLATELETS 10*3/mm3 149         Results from last 7 days    Lab Units 05/04/23  1447   HSTROP T ng/L 44*     Results from last 7 days   Lab Units 05/04/23  1447   PROBNP pg/mL 234.4             Results from last 7 days   Lab Units 05/04/23  1448   PH, ARTERIAL pH units 7.296*   PO2 ART mm Hg 94.2   PCO2, ARTERIAL mm Hg 87.4*   HCO3 ART mmol/L 42.6*           Invalid input(s): USDES,  BLOODU, NITRITITE, BACT, EP    Pain Management Panel          View : No data to display.                      EKG:      Sinus tachycardia, heart rate 135, nonspecific ST/T wave changes.    Radiology:    XR Chest 1 View    Result Date: 5/4/2023  PROCEDURE: XR CHEST 1 VW-  HISTORY: SOA Triage Protocol  COMPARISON: 04/18/2023.  FINDINGS: The heart is normal in size. Mild interstitial disease bilaterally is similar to prior. No area of consolidation is seen. Aortic mural calcifications noted.. The mediastinum is unremarkable. There is no pneumothorax.  There are no acute osseous abnormalities.      Stable chest..    This report was signed and finalized on 5/4/2023 3:31 PM by Shraddha Wharton MD.      Assessment:    Acute on chronic respiratory failure with hypoxia and hypercapnia, likely secondary to #2 and 3, POA  Acute COPD exacerbation, POA  Parainfluenza infection, POA  Elevated troponin, likely secondary to demand ischemia, POA  Hypertension  Chronic tobacco abuse  Pulmonary hypertension  Arthritis    Plan:    Patient is admitted to telemetry for further evaluation and treatment.    Respiratory failure with hypoxia/hypercapnia, COPD exacerbation, parainfluenza  -Continue supplemental oxygen to keep saturation above 90%, patient on 5 to 6 L at baseline, continue to titrate down as able to.  Continue BiPAP therapy.  -Patient met SIRS criteria on arrival, likely secondary to influenza infection and respiratory failure. Received Aztreonam and sepsis protocol IV fluids boluses.  -Procalcitonin WNL, WBC WNL, bacterial infection less likely, will hold off on antibiotics for now.  -On Trelegy at home,  Spiriva and budesonide while here.  -We will continue patient on Solu-Medrol 60 mg every 8 hours  -Bronchodilators, Mucinex and supportive care.  -Will continue Macias catheter for now while on BiPAP  -Patient was seen by pulmonology on previous admission, will need close follow-up with pulmonology on discharge.    Elevated troponin  -Likely secondary to demand ischemia in settings of respiratory failure  -Patient denies any chest pain, low suspicion for ACS  -Trending troponins, recheck in a.m.    Hyperglycemia  -Will cover with sliding scale insulin while on steroids  -Will check hemoglobin A1c    Further orders as indicated per clinical course.    Risk Assessment: High  DVT Prophylaxis: Lovenox prophylaxis (benefit> risk)  Code Status: Full  Diet: Cardiac    Advance Care Planning   ACP discussion was held with the patient during this visit. Patient does not have an advance directive, information provided.      Lolis Vital MD  05/04/23  15:50 EDT    Dictated utilizing Dragon dictation.    Electronically signed by Lolis Vital MD at 05/04/23 1656         Current Facility-Administered Medications   Medication Dose Route Frequency Provider Last Rate Last Admin   • acetaminophen (TYLENOL) tablet 650 mg  650 mg Oral Q4H PRN Lolis Vital MD   650 mg at 05/14/23 0821    Or   • acetaminophen (TYLENOL) 160 MG/5ML solution 650 mg  650 mg Oral Q4H PRN Lolis Vital MD        Or   • acetaminophen (TYLENOL) suppository 650 mg  650 mg Rectal Q4H PRN Lolis Vital MD   650 mg at 05/04/23 2005   • albuterol (PROVENTIL) nebulizer solution 0.083% 2.5 mg/3mL  2.5 mg Nebulization Q6H PRN Lolis Vital MD   2.5 mg at 05/09/23 1107   • ALPRAZolam (XANAX) tablet 0.5 mg  0.5 mg Oral BID PRN Lolis Vtial MD   0.5 mg at 05/15/23 1124   • amLODIPine (NORVASC) tablet 10 mg  10 mg Oral Daily Lolis Vital MD   10 mg at 05/15/23 0800   • atenolol (TENORMIN) tablet 25 mg  25 mg Oral Daily Lolis Vital MD   25 mg  at 05/15/23 0800   • sennosides-docusate (PERICOLACE) 8.6-50 MG per tablet 2 tablet  2 tablet Oral BID Lolis Vital MD   2 tablet at 05/15/23 0800    And   • polyethylene glycol (MIRALAX) packet 17 g  17 g Oral Daily PRN Lolis Vital MD        And   • bisacodyl (DULCOLAX) EC tablet 5 mg  5 mg Oral Daily PRN Lolis Vital MD        And   • bisacodyl (DULCOLAX) suppository 10 mg  10 mg Rectal Daily PRN Lolis Vital MD       • budesonide (PULMICORT) nebulizer solution 1 mg  1 mg Nebulization Daily - RT Arnaldo Soriano MD   1 mg at 05/15/23 0705   • budesonide-formoterol (SYMBICORT) 160-4.5 MCG/ACT inhaler 2 puff  2 puff Inhalation BID - RT Lolis Vital MD   2 puff at 05/15/23 0705    And   • tiotropium (SPIRIVA RESPIMAT) 2.5 mcg/act aerosol solution inhaler  2 puff Inhalation Daily - RT Lolis Vital MD   2 puff at 05/15/23 0747   • dexmedetomidine (PRECEDEX) 400 mcg in 100 mL NS infusion  0.2-1.5 mcg/kg/hr Intravenous Titrated Lolis Vital MD   Stopped at 05/12/23 1556   • dextrose (D50W) (25 g/50 mL) IV injection 25 g  25 g Intravenous Q15 Min PRN Lolis Vital MD       • dextrose (GLUTOSE) oral gel 15 g  15 g Oral Q15 Min PRN Lolis Vital MD       • Enoxaparin Sodium (LOVENOX) syringe 40 mg  40 mg Subcutaneous Q24H Lolis Vital MD   40 mg at 05/14/23 2003   • furosemide (LASIX) injection 40 mg  40 mg Intravenous BID Lolis Vital MD   40 mg at 05/15/23 0800   • glucagon (GLUCAGEN) injection 1 mg  1 mg Intramuscular Q15 Min PRN Lolis Vital MD       • guaiFENesin (MUCINEX) 12 hr tablet 600 mg  600 mg Oral Q12H Lolis Vital MD   600 mg at 05/15/23 0800   • guaiFENesin-dextromethorphan (ROBITUSSIN DM) 100-10 MG/5ML syrup 10 mL  10 mL Oral Q4H PRN Lolis Vital MD   10 mL at 05/14/23 1640   • Insulin Aspart (novoLOG) injection 0-9 Units  0-9 Units Subcutaneous TID AC Lolis Vital MD   4 Units at 05/14/23 1639   • ipratropium-albuterol (DUO-NEB) nebulizer solution  3 mL  3 mL Nebulization Q6H - RT Lolis Vital MD   3 mL at 05/15/23 1246   • melatonin tablet 5 mg  5 mg Oral Nightly PRN Lolis Vital MD   5 mg at 23 2248   • methylPREDNISolone sodium succinate (SOLU-Medrol) injection 40 mg  40 mg Intravenous Q12H Lolis Vital MD   40 mg at 05/15/23 0328   • multivitamin with minerals 1 tablet  1 tablet Oral Daily Lolis Vital MD   1 tablet at 05/15/23 0801   • ondansetron (ZOFRAN) injection 4 mg  4 mg Intravenous Q6H PRN Lolis Vital MD       • Pharmacy to Dose enoxaparin (LOVENOX)   Does not apply Continuous PRN Lolis Vital MD       • sodium chloride 0.9 % flush 10 mL  10 mL Intravenous PRN Lolis Vital MD       • sodium chloride 0.9 % flush 3 mL  3 mL Intravenous Q12H Lolis Vital MD   3 mL at 23   • sodium chloride 0.9 % flush 3-10 mL  3-10 mL Intravenous PRN Lolis Vital MD       • sodium chloride 0.9 % infusion 40 mL  40 mL Intravenous PRN Lolis Vital MD            Physician Progress Notes (most recent note)      Lolis Vital MD at 05/15/23 0955              [facility]    [facility]    Name:  Carolin Toscano   Age:  77 y.o.  Sex:  female  :  1945  MRN:  [id number]   Visit Number:  [id number]  Admission Date:  2023  Date Of Service:  05/15/23  Primary Care Physician:  Jason Nicholson MD     LOS: 11 days :    Chief Complaint:      Shortness of breath     Subjective:    Patient was seen and examined at bedside.  Patient remains in ICU.  Patient laying comfortably in bed on AVAPS.  She is off Precedex.  Patient is conversational continues to appear generally weak.  She denies any pain or discomfort.  Her only request today was having something to drink. Has been tolerating AVAPS but requiring 10 L when AVAPS of.  Still has intermittent episodes of tachypnea and anxiety while off AVAPS,  Xanax has been helping. Discussed with nursing at bedside.  Otherwise hemodynamically  stable.  Afebrile.     Hospital Course:    Patient is a 77 years old female with a past medical history of COPD, chronic respiratory failure on 5 to 6 L per her previous discharge, on NIV machine at home and trelegy, hypertension, and ongoing tobacco use who presented to the ER from home by EMS with a chief complaint of shortness of breath.  Patient reporting that she started having shortness of breath associated with productive cough since last night and has persisted and became worse that promoted her to call EMS.  EMS has found the patient to be at 65% saturation on her normal 6 L of oxygen. Patient reports compliance with her NIV machine at home.      On ER evaluation, Patient was found to be tachycardic with a heart rate of 130s, temperature of 100.2 and respirations of 30, /72.  Patient was placed on BiPAP with FiO2 of 40%.  Her ABG came back and showed pH of 7.296/PCO2 87/PO2 94/HCO3 42/saturation 97% on 5 L nasal cannula.  HS Troponin 44, proBNP WNL, glucose 192, CO2 of 40, otherwise CBC and CMP within acceptable range.  Lactate and Pro-Adi WNL.  Respiratory panel positive for parainfluenza virus.  Chest x-ray Mild interstitial disease  bilaterally is similar to prior. No area of consolidation is seen. Patient received Solu-Medrol and Aztreonam while in the ER.  Hospitalist consulted for admission.    Patient was admitted and treated with steroids.  Antibiotics were not indicated.  Pulmonology consulted and was moved to ICU on 5/9 due to worsening respiratory failure and tachypnea.  She initially had to be on Precedex drip which was discontinued and switched to Xanax due to anxiety. Patient was placed on AVAPS.  Patient continued on steroids and received Lasix.    Review of Systems:     All systems were reviewed and negative except as mentioned in subjective, assessment and plan.    Vital Signs:    Temp:  [97.2 °F (36.2 °C)-99.3 °F (37.4 °C)] 98.3 °F (36.8 °C)  Heart Rate:  [57-86] 64  Resp:  [16-32]  16  BP: (113-143)/(46-70) 124/48    Intake and output:    I/O last 3 completed shifts:  In: 300 [P.O.:300]  Out: 1750 [Urine:1750]  I/O this shift:  In: 120 [P.O.:120]  Out: 600 [Urine:600]    Physical Examination:    General Appearance:  Alert and cooperative.  Chronically ill-appearing.   Head:  Atraumatic and normocephalic.   Eyes: Conjunctivae and sclerae normal, no icterus. No pallor.   Throat: No oral lesions, no thrush, oral mucosa moist.   Neck: Supple, trachea midline, no thyromegaly.   Lungs:   Breath sounds heard bilaterally equally.  Mildly labored, on BiPAP.  Diffuse bilateral wheezing heard.  Decreased air movement bilaterally.  Tachypneic.   Heart:  Normal S1 and S2, no murmur, no gallop, no rub. No JVD.   Abdomen:   Normal bowel sounds, no masses, no organomegaly. Soft, nontender, nondistended, no rebound tenderness.   Extremities: Supple, no edema, no cyanosis, no clubbing.   Skin: No bleeding or rash.   Neurologic: Alert and oriented x 3. No facial asymmetry. Moves all four limbs. No tremors.  Generalized weakness noted.     Laboratory results:    Results from last 7 days   Lab Units 05/15/23  0413 05/14/23  0430 05/13/23  0510   SODIUM mmol/L 146* 147* 147*   POTASSIUM mmol/L 4.9 4.3 4.1   CHLORIDE mmol/L 95* 99 102   CO2 mmol/L 44.7* 42.3* 38.9*   BUN mg/dL 21 23 22   CREATININE mg/dL 0.36* 0.36* 0.26*   CALCIUM mg/dL 9.4 9.4 9.3   GLUCOSE mg/dL 148* 138* 126*     Results from last 7 days   Lab Units 05/15/23  0413 05/14/23  0430 05/13/23  0510   WBC 10*3/mm3 16.64* 16.85* 16.25*   HEMOGLOBIN g/dL 12.2 12.0 11.7*   HEMATOCRIT % 40.2 39.8 39.0   PLATELETS 10*3/mm3 187 187 168                 Recent Labs     05/12/23  1544 05/13/23  0510 05/15/23  0453   PHART 7.450 7.420 7.445   ZSK4LSY 58.1* 70.6* 75.7*   PO2ART 99.0 74.4* 56.8*   JMA0SNH 40.4* 45.7* 51.9*   BASEEXCESS 14.0* 17.9* 23.5*      I have reviewed the patient's laboratory results.    Radiology results:    XR Chest 1 View    Result Date:  5/15/2023  PROCEDURE: XR CHEST 1 VW-  HISTORY: follow up, increased WBC & CO2; J96.21-Acute and chronic respiratory failure with hypoxia; J96.22-Acute and chronic respiratory failure with hypercapnia; J44.1-Chronic obstructive pulmonary disease with (acute) exacerbation; B34.8-Other viral infections of unspecified site  COMPARISON: 05/13/2023.  FINDINGS: The heart is normal in size. Bilateral interstitial disease most prominent left lung base is stable from prior. Bronchial wall thickening noted.. The mediastinum is unremarkable. There is no pneumothorax.  There are no acute osseous abnormalities.      Impression: Stable chest..    This report was signed and finalized on 5/15/2023 8:37 AM by Shraddha Wharton MD.    I have reviewed the patient's radiology reports.    Medication Review:     I have reviewed the patient's active and prn medications.     Problem List:      Acute on chronic respiratory failure with hypoxia and hypercapnia      Assessment:    Acute on chronic respiratory failure with hypoxia and hypercapnia, likely secondary to #2 and 3, POA  Acute COPD exacerbation, POA  Parainfluenza infection, POA  Elevated troponin, likely secondary to demand ischemia, POA  Hypertension  Chronic tobacco abuse  Pulmonary hypertension  Arthritis    Plan:    Respiratory failure with hypoxia/hypercapnia, COPD exacerbation, parainfluenza  -Continue supplemental oxygen to keep saturation above 90%, patient on 5 to 6 L at baseline, continue to titrate down as able to.  Continue BiPAP therapy.  -Patient met SIRS criteria on arrival, likely secondary to influenza infection and respiratory failure. Received Aztreonam and sepsis protocol IV fluids boluses.  -Procalcitonin and WBC WNL, bacterial infection less likely, held off on antibiotics.  - Solu-Medrol, will taper to 40 mg every 12 hours, continue to taper down as she improves.   -Bronchodilators, Mucinex and supportive care.  -No pulmonology coverage today.  Dr. Soriano to see  patient in a.m., appreciate recommendations.  -Repeat ABG and chest x-ray as indicated.  -ABG reviewed  -Lasix 40 twice daily x 2 days.  -Continue PT/OT.  -Ordered Xanax as needed, currently off Precedex.    Elevated troponin  -Likely secondary to demand ischemia in settings of respiratory failure  -Patient denies any chest pain, low suspicion for ACS  -Troponin trended down and plateaued.     Hyperglycemia  -Will cover with sliding scale insulin while on steroids  -Hemoglobin A1c 6.7     Further orders as indicated per clinical course.    PT/ OT consulted, case management consulted, appreciate their help.    Patient would benefit from rehab on discharge, she has declined rehab or home health in the past, case management following.  Daughter will discuss with the patient placement for rehab to see if she is agreeable, will rediscuss when she is ready for discharge.     Patient is high risk for intubation and further decompensation of her respiratory failure.     DVT Prophylaxis: Lovenox prophylaxis (benefit> risk)  Code Status: Full  Diet: Cardiac  Discharge Plan: To be determined, likely needs 2 to 3 days in the hospital.     Lolis Vital MD  05/15/23  09:55 EDT    Dictated utilizing Dragon dictation.      Electronically signed by Lolis Vital MD at 05/15/23 1328          Physical Therapy Notes (most recent note)      Nasra Quiñones PTA at 23 1315  Version 1 of 1         Patient Name: Carolin Toscano  : 1945    MRN: [id number]                              Today's Date: 2023       Admit Date: 2023    Visit Dx:     ICD-10-CM ICD-9-CM   1. Acute on chronic respiratory failure with hypoxia and hypercapnia  J96.21 518.84    J96.22 786.09     799.02   2. COPD exacerbation  J44.1 491.21   3. Parainfluenza virus infection  B34.8 078.89     Patient Active Problem List   Diagnosis   • CAP (community acquired pneumonia)   • Cigarette nicotine dependence with nicotine-induced disorder   •  "Essential hypertension   • Dyslipidemia   • Blood glucose elevated   • Muscle ache   • COPD (chronic obstructive pulmonary disease)   • Nuclear sclerotic cataract of right eye   • Acute respiratory failure   • COPD exacerbation   • Acute on chronic respiratory failure with hypoxia and hypercapnia     Past Medical History:   Diagnosis Date   • Arthritis    • COPD (chronic obstructive pulmonary disease)    • Gall stones    • Goiter     \"inward\"   • Hypertension    • Hypertension    • Kidney infection    • Kidney stone    • Kidney stones    • Migraine headache    • Scarlet fever      Past Surgical History:   Procedure Laterality Date   • BLADDER SURGERY     • CATARACT EXTRACTION W/ INTRAOCULAR LENS IMPLANT Left 8/11/2022    Procedure: CATARACT PHACO EXTRACTION WITH INTRAOCULAR LENS IMPLANT LEFT;  Surgeon: Sathish Lopez MD;  Location: [facility];  Service: Ophthalmology;  Laterality: Left;   • CATARACT EXTRACTION W/ INTRAOCULAR LENS IMPLANT Right 8/25/2022    Procedure: CATARACT PHACO EXTRACTION WITH INTRAOCULAR LENS IMPLANT RIGHT;  Surgeon: Sathish Lopez MD;  Location: [facility];  Service: Ophthalmology;  Laterality: Right;   • CHOLECYSTECTOMY     • HYSTERECTOMY     • TONSILLECTOMY        General Information     Row Name 05/14/23 1259          Physical Therapy Time and Intention    Document Type therapy note (daily note)  -     Mode of Treatment physical therapy  -CC     Row Name 05/14/23 1259          General Information    Patient Profile Reviewed yes  -CC     Row Name 05/14/23 1259          Safety Issues, Functional Mobility    Safety Issues Affecting Function (Mobility) friction/shear risk;insight into deficits/self-awareness;safety precautions follow-through/compliance  -CC     Impairments Affecting Function (Mobility) balance;endurance/activity tolerance;strength;shortness of breath  -CC           User Key  (r) = Recorded By, (t) = Taken By, (c) = Cosigned By    Initials Name Provider Type    CC " Nasra Quiñones PTA Physical Therapist Assistant               Mobility     Row Name 05/14/23 1300          Bed Mobility    Bed Mobility supine-sit;sit-supine;scooting/bridging  -CC     Scooting/Bridging [place] (Bed Mobility) moderate assist (50% patient effort);verbal cues  -CC     Supine-Sit [place] (Bed Mobility) minimum assist (75% patient effort);verbal cues  -CC     Sit-Supine [place] (Bed Mobility) minimum assist (75% patient effort)  -CC     Assistive Device (Bed Mobility) bed rails;head of bed elevated  -     Row Name 05/14/23 1300          Sit-Stand Transfer    Comment, (Sit-Stand Transfer) Pt declined transfers today  -           User Key  (r) = Recorded By, (t) = Taken By, (c) = Cosigned By    Initials Name Provider Type    CC Nasra Quiñones PTA Physical Therapist Assistant               Obj/Interventions    No documentation.                Goals/Plan    No documentation.                Clinical Impression     Row Name 05/14/23 1301          Pain    Pretreatment Pain Rating 0/10 - no pain  -CC     Posttreatment Pain Rating 0/10 - no pain  -     Additional Documentation Pain Scale: Numbers Pre/Post-Treatment (Group)  -     Row Name 05/14/23 1301          Plan of Care Review    Plan of Care Reviewed With patient  -CC     Progress improving  -     Outcome Evaluation Pt agreeable to therapy. Performed supine to sit with VC for sequencing TC and VC to advance B LE to EOB and min A with VC to come to upright sitting position, scooting to EOB and scooting up in bed with foot of bed elevated mod A, sitting EOB with and occ without B UE support x15 mins. PTA attempted to engage pt in B LE ex in supine without success and sitting performed 5x1 rep with VC to perform. Pt requires increased time to perfom tasks. Con't with PT POC and progress as tolerated  -     Row Name 05/14/23 1301          Positioning and Restraints    Pre-Treatment Position in bed  -CC     Post Treatment  Position bed  -CC     In Bed supine;fowlers;call light within reach;encouraged to call for assist;patient within staff view  -CC           User Key  (r) = Recorded By, (t) = Taken By, (c) = Cosigned By    Initials Name Provider Type    Nasra Calixto PTA Physical Therapist Assistant               Outcome Measures     Row Name 05/14/23 1309          How much help from another person do you currently need...    Turning from your back to your side while in flat bed without using bedrails? 2  -CC     Moving from lying on back to sitting on the side of a flat bed without bedrails? 2  -CC     Moving to and from a bed to a chair (including a wheelchair)? 2  -CC     Standing up from a chair using your arms (e.g., wheelchair, bedside chair)? 2  -CC     Climbing 3-5 steps with a railing? 1  -CC     To walk in hospital room? 1  -CC     AM-PAC 6 Clicks Score (PT) 10  -CC     Highest level of mobility 4 --> Transferred to chair/commode  -CC     Row Name 05/14/23 1309          Functional Assessment    Outcome Measure Options AM-PAC 6 Clicks Basic Mobility (PT)  -CC           User Key  (r) = Recorded By, (t) = Taken By, (c) = Cosigned By    Initials Name Provider Type    Nasra Calixto PTA Physical Therapist Assistant                             Physical Therapy Education     Title: PT OT SLP Therapies (In Progress)     Topic: Physical Therapy (In Progress)     Point: Mobility training (Done)     Learning Progress Summary           Patient Acceptance, E, VU by MS at 5/12/2023 1458    Comment: importance of mobility    Acceptance, E,TB,D, VU,NR by  at 5/8/2023 1250    Comment: Importance of activity and exercise techniques    Acceptance, E, VU by MS at 5/5/2023 1136    Comment: importance of mobility                   Point: Home exercise program (Done)     Learning Progress Summary           Patient Acceptance, E,TB, VU,NR by  at 5/14/2023 1310    Comment: Importance of increasing movement of B LE    Acceptance,  E,TB,D, VU,NR by RM at 5/8/2023 1250    Comment: Importance of activity and exercise techniques    Acceptance, E, VU by MS at 5/5/2023 1136    Comment: importance of mobility                   Point: Body mechanics (Not Started)     Learner Progress:  Not documented in this visit.          Point: Precautions (Not Started)     Learner Progress:  Not documented in this visit.                      User Key     Initials Effective Dates Name Provider Type Discipline    CC 06/16/21 -  Nasra Quiñones PTA Physical Therapist Assistant PT     06/16/21 -  Ronak Cruz PTA Physical Therapist Assistant PT    MS 07/01/22 -  Martín Ortiz, ROB Physical Therapist PT              PT Recommendation and Plan     Plan of Care Reviewed With: patient  Progress: improving  Outcome Evaluation: Pt agreeable to therapy. Performed supine to sit with VC for sequencing TC and VC to advance B LE to EOB and min A with VC to come to upright sitting position, scooting to EOB and scooting up in bed with foot of bed elevated mod A, sitting EOB with and occ without B UE support x15 mins. PTA attempted to engage pt in B LE ex in supine without success and sitting performed 5x1 rep with VC to perform. Pt requires increased time to perfom tasks. Con't with PT POC and progress as tolerated     Time Calculation:    PT Charges     Row Name 05/14/23 1311             Time Calculation    PT Received On 05/14/23  -CC      PT Goal Re-Cert Due Date 05/22/23  -CC         Time Calculation- PT    Total Timed Code Minutes- PT 42 minute(s)  -CC         Timed Charges    81914 -  PT Neuromuscular Reeducation Minutes 15  -CC      46818 - PT Therapeutic Activity Minutes 27  -CC         Total Minutes    Timed Charges Total Minutes 42  -CC       Total Minutes 42  -CC            User Key  (r) = Recorded By, (t) = Taken By, (c) = Cosigned By    Initials Name Provider Type    CC Nasra Quiñones PTA Physical Therapist Assistant              Therapy Charges for  "Today     Code Description Service Date Service Provider Modifiers Qty    46510570345 HC PT NEUROMUSC RE EDUCATION EA 15 MIN 2023 Nasra Quiñones PTA GP 1    95565536906 HC PT THERAPEUTIC ACT EA 15 MIN 2023 Nasra Quiñones PTA GP 2          PT G-Codes  Outcome Measure Options: AM-PAC 6 Clicks Basic Mobility (PT)  AM-PAC 6 Clicks Score (PT): 10  AM-PAC 6 Clicks Score (OT): 15       Nasra Quiñones PTA  2023      Electronically signed by Nasra Quiñones PTA at 23 1315          Occupational Therapy Notes (most recent note)      Roselyn Donaldson at 23 1507          Patient Name: Carolin Toscano  : 1945    MRN: [id number]                              Today's Date: 2023       Admit Date: 2023    Visit Dx:     ICD-10-CM ICD-9-CM   1. Acute on chronic respiratory failure with hypoxia and hypercapnia  J96.21 518.84    J96.22 786.09     799.02   2. COPD exacerbation  J44.1 491.21   3. Parainfluenza virus infection  B34.8 078.89     Patient Active Problem List   Diagnosis   • CAP (community acquired pneumonia)   • Cigarette nicotine dependence with nicotine-induced disorder   • Essential hypertension   • Dyslipidemia   • Blood glucose elevated   • Muscle ache   • COPD (chronic obstructive pulmonary disease)   • Nuclear sclerotic cataract of right eye   • Acute respiratory failure   • COPD exacerbation   • Acute on chronic respiratory failure with hypoxia and hypercapnia     Past Medical History:   Diagnosis Date   • Arthritis    • COPD (chronic obstructive pulmonary disease)    • Gall stones    • Goiter     \"inward\"   • Hypertension    • Hypertension    • Kidney infection    • Kidney stone    • Kidney stones    • Migraine headache    • Scarlet fever      Past Surgical History:   Procedure Laterality Date   • BLADDER SURGERY     • CATARACT EXTRACTION W/ INTRAOCULAR LENS IMPLANT Left 2022    Procedure: CATARACT PHACO EXTRACTION WITH INTRAOCULAR LENS IMPLANT LEFT;  " Surgeon: Sathish Lopez MD;  Location: [facility];  Service: Ophthalmology;  Laterality: Left;   • CATARACT EXTRACTION W/ INTRAOCULAR LENS IMPLANT Right 8/25/2022    Procedure: CATARACT PHACO EXTRACTION WITH INTRAOCULAR LENS IMPLANT RIGHT;  Surgeon: Sathish Lopez MD;  Location: [facility];  Service: Ophthalmology;  Laterality: Right;   • CHOLECYSTECTOMY     • HYSTERECTOMY     • TONSILLECTOMY        General Information     Row Name 05/12/23 1455          OT Time and Intention    Document Type re-evaluation  -     Mode of Treatment occupational therapy  -     Row Name 05/12/23 1455          General Information    Patient Profile Reviewed yes  -     Prior Level of Function independent:;ADL's;all household mobility  -     Barriers to Rehab previous functional deficit  -     Row Name 05/12/23 1455          Cognition    Orientation Status (Cognition) oriented x 4  -[facility] Name 05/12/23 1455          Safety Issues, Functional Mobility    Impairments Affecting Function (Mobility) balance;endurance/activity tolerance;strength;shortness of breath  -           User Key  (r) = Recorded By, (t) = Taken By, (c) = Cosigned By    Initials Name Provider Type     Roselyn Donaldson Occupational Therapist                 Mobility/ADL's     Row Name 05/12/23 1456          Bed Mobility    Bed Mobility supine-sit;sit-supine  -     Supine-Sit [place] (Bed Mobility) minimum assist (75% patient effort)  -     Sit-Supine [place] (Bed Mobility) minimum assist (75% patient effort)  -     Assistive Device (Bed Mobility) bed rails;head of bed elevated  -     Comment, (Bed Mobility) pt sat eob ~5 min with cga  -[facility] Name 05/12/23 1456          Sit-Stand Transfer    Comment, (Sit-Stand Transfer) not assessed today  -[facility] Name 05/12/23 1456          Bathing Assessment/Intervention    [place] Level (Bathing) bathing skills;moderate assist (50% patient effort)  -     Row Name 05/12/23 1456           Upper Body Dressing Assessment/Training    [place] Level (Upper Body Dressing) upper body dressing skills;contact guard assist  -AH     Row Name 05/12/23 1456          Lower Body Dressing Assessment/Training    [place] Level (Lower Body Dressing) lower body dressing skills;moderate assist (50% patient effort)  -AH     Row Name 05/12/23 1456          Grooming Assessment/Training    [place] Level (Grooming) grooming skills;minimum assist (75% patient effort)  -AH     Row Name 05/12/23 1456          Self-Feeding Assessment/Training    [place] Level (Feeding) feeding skills;minimum assist (75% patient effort)  -AH     Row Name 05/12/23 1456          Toileting Assessment/Training    [place] Level (Toileting) toileting skills;maximum assist (25% patient effort)  -           User Key  (r) = Recorded By, (t) = Taken By, (c) = Cosigned By    Initials Name Provider Type    Roselyn Pena Occupational Therapist               Obj/Interventions     Row Name 05/12/23 1458          Sensory Assessment (Somatosensory)    Sensory Assessment (Somatosensory) sensation intact  -AH     Row Name 05/12/23 1458          Vision Assessment/Intervention    Visual Impairment/Limitations WFL  -AH     Row Name 05/12/23 1458          Range of Motion Comprehensive    General Range of Motion bilateral upper extremity ROM L  -AH     Row Name 05/12/23 1458          Strength Comprehensive (MMT)    Comment, General Manual Muscle Testing (MMT) Assessment BUE 4-/5  -AH     Row Name 05/12/23 1458          Shoulder (Therapeutic Exercise)    Shoulder (Therapeutic Exercise) AROM (active range of motion)  -     Shoulder AROM (Therapeutic Exercise) bilateral;flexion;extension;5 repetitions  -           User Key  (r) = Recorded By, (t) = Taken By, (c) = Cosigned By    Initials Name Provider Type    Roselyn Pena Occupational Therapist               Goals/Plan     Row Name 05/12/23 1505          Bed Mobility Goal 1  (OT)    Activity/Assistive Device (Bed Mobility Goal 1, OT) bed mobility activities, all  -     [place] Level/Cues Needed (Bed Mobility Goal 1, OT) contact guard required  -     Time Frame (Bed Mobility Goal 1, OT) by discharge  -     Progress/Outcomes (Bed Mobility Goal 1, OT) goal ongoing  -     Row Name 05/12/23 1505          Transfer Goal 1 (OT)    Activity/Assistive Device (Transfer Goal 1, OT) sit-to-stand/stand-to-sit;walker, rolling  -     [place] Level/Cues Needed (Transfer Goal 1, OT) contact guard required  -     Time Frame (Transfer Goal 1, OT) long term goal (LTG);5 days  -     Progress/Outcome (Transfer Goal 1, OT) goal ongoing  -     Row Name 05/12/23 1505          Bathing Goal 1 (OT)    Activity/Device (Bathing Goal 1, OT) bathing skills, all  -     [place] Level/Cues Needed (Bathing Goal 1, OT) set-up required  -     Time Frame (Bathing Goal 1, OT) by discharge  -     Progress/Outcomes (Bathing Goal 1, OT) goal ongoing  -     Row Name 05/12/23 1509          Dressing Goal 1 (OT)    Activity/Device (Dressing Goal 1, OT) dressing skills, all  -     [place]/Cues Needed (Dressing Goal 1, OT) contact guard required  -     Time Frame (Dressing Goal 1, OT) long term goal (LTG);5 days  -     Progress/Outcome (Dressing Goal 1, OT) goal ongoing  -     Row Name 05/12/23 1504          Toileting Goal 1 (OT)    Activity/Device (Toileting Goal 1, OT) toileting skills, all  -     [place] Level/Cues Needed (Toileting Goal 1, OT) contact guard required  -     Time Frame (Toileting Goal 1, OT) by discharge  -     Progress/Outcome (Toileting Goal 1, OT) goal ongoing  -     Row Name 05/12/23 1504          Strength Goal 1 (OT)    Strength Goal 1 (OT) Pt will complete B UE exercise as tolerated for muscular strengthening and endurance to promote safety and independence with ADLs and IADLs  -     Time Frame (Strength Goal 1, OT) by discharge  -      Progress/Outcome (Strength Goal 1, OT) goal ongoing  -[facility] Name 05/12/23 1505          Therapy Assessment/Plan (OT)    Planned Therapy Interventions (OT) activity tolerance training;BADL retraining;patient/caregiver education/training;transfer/mobility retraining;strengthening exercise  -           User Key  (r) = Recorded By, (t) = Taken By, (c) = Cosigned By    Initials Name Provider Type     Roselyn Donaldson Occupational Therapist               Clinical Impression     [facility] Name 05/12/23 1501          Pain Assessment    Pretreatment Pain Rating 0/10 - no pain  -     Posttreatment Pain Rating 0/10 - no pain  -     Pain Intervention(s) Repositioned;Ambulation/increased activity  -[facility] Name 05/12/23 1501          Plan of Care Review    Plan of Care Reviewed With patient  -     Progress no change  -     Outcome Evaluation Pt seen for OT re-evaluation today.  Pt presents with weakness and decreased independence with self care and functional mobility tasks.  Pt noted with soa on 11L O2 via high flow nasal cannula.  Pt sats stayed >91% with activity.  pt is expected to benefit from skilled OT to improve her strength and independence with ADL tasks.  -[facility] Name 05/12/23 1501          Therapy Assessment/Plan (OT)    Patient/Family Therapy Goal Statement (OT) d/c home  -     Rehab Potential (OT) good, to achieve stated therapy goals  -     Criteria for Skilled Therapeutic Interventions Met (OT) yes  -     Therapy Frequency (OT) 3 times/wk  -[facility] Name 05/12/23 1501          Therapy Plan Review/Discharge Plan (OT)    Anticipated Discharge Disposition (OT) home with home health;inpatient rehabilitation facility  -[facility] Name 05/12/23 1501          Vital Signs    Pre SpO2 (%) 91  -     O2 Delivery Pre Treatment hi-flow  11L  -AH     Intra SpO2 (%) 91  -AH     O2 Delivery Intra Treatment hi-flow  -     Post SpO2 (%) 94  -AH     O2 Delivery Post Treatment hi-flow  -[facility] Name  05/12/23 1501          Positioning and Restraints    Pre-Treatment Position in bed  -AH     Post Treatment Position bed  -AH     In Bed fowlers;call light within reach;encouraged to call for assist;patient within staff view  -           User Key  (r) = Recorded By, (t) = Taken By, (c) = Cosigned By    Initials Name Provider Type    Roselyn Pena Occupational Therapist               Outcome Measures     Row Name 05/12/23 1506          How much help from another is currently needed...    Putting on and taking off regular lower body clothing? 2  -AH     Bathing (including washing, rinsing, and drying) 2  -AH     Toileting (which includes using toilet bed pan or urinal) 2  -AH     Putting on and taking off regular upper body clothing 3  -AH     Taking care of personal grooming (such as brushing teeth) 3  -AH     Eating meals 3  -AH     AM-PAC 6 Clicks Score (OT) 15  -AH     Row Name 05/12/23 1458          How much help from another person do you currently need...    Turning from your back to your side while in flat bed without using bedrails? 3  -MS     Moving from lying on back to sitting on the side of a flat bed without bedrails? 3  -MS     Moving to and from a bed to a chair (including a wheelchair)? 2  -MS     Standing up from a chair using your arms (e.g., wheelchair, bedside chair)? 2  -MS     Climbing 3-5 steps with a railing? 1  -MS     To walk in hospital room? 1  -MS     AM-PAC 6 Clicks Score (PT) 12  -MS     Highest level of mobility 4 --> Transferred to chair/commode  -MS     Row Name 05/12/23 1506          Functional Assessment    Outcome Measure Options AM-PAC 6 Clicks Daily Activity (OT)  -           User Key  (r) = Recorded By, (t) = Taken By, (c) = Cosigned By    Initials Name Provider Type    Roselyn Pena Occupational Therapist    Martín Carreno, PT Physical Therapist                Occupational Therapy Education     Title: PT OT SLP Therapies (In Progress)     Topic: Occupational  Therapy (In Progress)     Point: ADL training (Done)     Description:   Instruct learner(s) on proper safety adaptation and remediation techniques during self care or transfers.   Instruct in proper use of assistive devices.              Learning Progress Summary           Patient Acceptance, E,TB, VU by  at 5/12/2023 1507    Comment: purpose of re-evaluation    Acceptance, E,TB, VU by SD at 5/8/2023 1222    Comment: Safety during tf    Acceptance, E, VU by SP at 5/5/2023 1146    Comment: OT edcuated pt on purpose of IE and POC. Pt is agreeable.                   Point: Home exercise program (Not Started)     Description:   Instruct learner(s) on appropriate technique for monitoring, assisting and/or progressing therapeutic exercises/activities.              Learner Progress:  Not documented in this visit.          Point: Precautions (Not Started)     Description:   Instruct learner(s) on prescribed precautions during self-care and functional transfers.              Learner Progress:  Not documented in this visit.          Point: Body mechanics (Not Started)     Description:   Instruct learner(s) on proper positioning and spine alignment during self-care, functional mobility activities and/or exercises.              Learner Progress:  Not documented in this visit.                      User Key     Initials Effective Dates Name Provider Type Discipline     06/16/21 -  Roselyn Donaldson Occupational Therapist OT    SD 06/16/21 -  Cecelia Knight OT Occupational Therapist OT    SP 09/08/22 -  January Chapa OT Occupational Therapist OT              OT Recommendation and Plan  Planned Therapy Interventions (OT): activity tolerance training, BADL retraining, patient/caregiver education/training, transfer/mobility retraining, strengthening exercise  Therapy Frequency (OT): 3 times/wk  Plan of Care Review  Plan of Care Reviewed With: patient  Progress: no change  Outcome Evaluation: Pt seen for OT re-evaluation today.   Pt presents with weakness and decreased independence with self care and functional mobility tasks.  Pt noted with soa on 11L O2 via high flow nasal cannula.  Pt sats stayed >91% with activity.  pt is expected to benefit from skilled OT to improve her strength and independence with ADL tasks.     Time Calculation:    Time Calculation- OT     Row Name 05/12/23 1507             Time Calculation- OT    OT Start Time 0935  -      OT Received On 05/12/23  -      OT Goal Re-Cert Due Date 05/22/23  -         Untimed Charges    OT Eval/Re-eval Minutes 30  -AH         Total Minutes    Untimed Charges Total Minutes 30  -AH       Total Minutes 30  -AH            User Key  (r) = Recorded By, (t) = Taken By, (c) = Cosigned By    Initials Name Provider Type    Roselyn Pena Occupational Therapist              Therapy Charges for Today     Code Description Service Date Service Provider Modifiers Qty    [id number]  OT RE-EVAL 2 5/12/2023 Roselyn Donaldson GO 1               Roselyn Donaldson  5/12/2023    Electronically signed by Roselyn Donaldson at 05/12/23 150

## 2023-05-15 NOTE — PLAN OF CARE
Goal Outcome Evaluation:   ACP visit attempted.  Pt was sleeping and on bipap.  No family was present

## 2023-05-15 NOTE — PROGRESS NOTES
Adult Nutrition  Assessment/PES    Patient Name:  Carolin Toscano  YOB: 1945  MRN: [id number]  Admit Date:  5/4/2023    Assessment Date:  5/15/2023    Comments:      Pt w/ ok PO intake and drinking supplements provided. Will order Xu and Boost C BID to support wound healing r/t bridge of nose deep tissue pressure injury. RD to follow-up and available PRN.      Reason for Assessment     Row Name 05/15/23 1434          Reason for Assessment    Reason For Assessment follow-up protocol                  Labs/Tests/Procedures/Meds     Row Name 05/15/23 1434          Labs/Procedures/Meds    Lab Results Reviewed reviewed, pertinent     Lab Results Comments High: Na Low: Cr        Medications    Pertinent Medications Reviewed reviewed, pertinent     Pertinent Medications Comments lasix, insulin, lovenox, methylprednisolone, MVI w/ minerals, docusate                Physical Findings     Row Name 05/15/23 1435          Physical Findings    Overall Physical Appearance normal wt for age, nose pressure injury, B/L upper sacral spine MASD                  Nutrition Prescription Ordered     Row Name 05/15/23 1436          Nutrition Prescription PO    Current PO Diet Regular     Fluid Consistency Thin     Supplement Mighty Shake     Supplement Frequency 2 times a day     Common Modifiers Cardiac                Evaluation of Received Nutrient/Fluid Intake     Row Name 05/15/23 1436          Intake Assessment    Energy/Calorie Requirement Assessment not meeting needs     Protein Requirement Assessment not meeting needs        PO Evaluation    Number of Days PO Intake Evaluated 1 day     Number of Meals 2     % PO Intake 38                   Problem/Interventions:   Problem 1     Row Name 05/15/23 1433          Nutrition Diagnoses Problem 1    Problem 1 Increased Nutrient Needs     Macronutrient Kcal;Protein     Etiology (related to) Medical Diagnosis     Pulmonary/Critical Care A/C respiratory failure      Signs/Symptoms (evidenced by) Other (comment)  need for oral nutrition supplement to meet increased estimated needs                Problem 2     Row Name 05/15/23 1437          Nutrition Diagnoses Problem 2    Problem 2 Predicted Suboptimal Intake     Etiology (related to) Factors Affecting Nutrition     Signs/Symptoms (evidenced by) Report of Minimal PO Intake  per flowsheets                    Intervention Goal     Row Name 05/15/23 1437          Intervention Goal    General Maintain nutrition;Reduce/improve symptoms;Improved nutrition related lab(s);Meet nutritional needs for age/condition;Disease management/therapy     PO Establish PO;Tolerate PO;Increase intake;Maintain intake;Continue positive trend;Meet estimated needs     Weight Maintain weight                Nutrition Intervention     Row Name 05/15/23 1437          Nutrition Intervention    RD/Tech Action Care plan reviewd;Follow Tx progress;Await begin PO;Encourage intake;Recommend/ordered     Recommended/Ordered Supplement                Nutrition Prescription     Row Name 05/15/23 1437          Nutrition Prescription PO    Supplement Xu  Boost Very High Calorie     Supplement Frequency 2 times a day     New PO Prescription Ordered? Yes        Other Orders    Obtain Weight Daily     Obtain Weight Ordered? No, recommended     Supplement Vitamin mineral supplement     Supplement Ordered? No, recommended     Labs Mg++;Na+;K+;Phos     Labs Ordered? No, recommended                Education/Evaluation     Row Name 05/15/23 1434          Education    Education Will Instruct as appropriate        Monitor/Evaluation    Monitor Per protocol;PO intake;Supplement intake;Pertinent labs;Weight;Skin status;Symptoms                 Electronically signed by:  Cassidy Tello RD  05/15/23 14:38 EDT

## 2023-05-16 ENCOUNTER — APPOINTMENT (OUTPATIENT)
Dept: CT IMAGING | Facility: HOSPITAL | Age: 78
DRG: 190 | End: 2023-05-16
Payer: MEDICARE

## 2023-05-16 LAB
A-A DO2: 238 MMHG
ANION GAP SERPL CALCULATED.3IONS-SCNC: 8.7 MMOL/L (ref 5–15)
ARTERIAL PATENCY WRIST A: POSITIVE
ATMOSPHERIC PRESS: 732 MMHG
BASE EXCESS BLDA CALC-SCNC: 24.7 MMOL/L (ref 0–2)
BASOPHILS # BLD AUTO: 0.07 10*3/MM3 (ref 0–0.2)
BASOPHILS NFR BLD AUTO: 0.4 % (ref 0–1.5)
BDY SITE: ABNORMAL
BUN SERPL-MCNC: 31 MG/DL (ref 8–23)
BUN/CREAT SERPL: 83.8 (ref 7–25)
CALCIUM SPEC-SCNC: 9.6 MG/DL (ref 8.6–10.5)
CHLORIDE SERPL-SCNC: 93 MMOL/L (ref 98–107)
CO2 SERPL-SCNC: 43.3 MMOL/L (ref 22–29)
COHGB MFR BLD: 1.1 % (ref 0–2)
CREAT SERPL-MCNC: 0.37 MG/DL (ref 0.57–1)
DEPRECATED RDW RBC AUTO: 52 FL (ref 37–54)
EGFRCR SERPLBLD CKD-EPI 2021: 104.7 ML/MIN/1.73
EOSINOPHIL # BLD AUTO: 0 10*3/MM3 (ref 0–0.4)
EOSINOPHIL NFR BLD AUTO: 0 % (ref 0.3–6.2)
ERYTHROCYTE [DISTWIDTH] IN BLOOD BY AUTOMATED COUNT: 15.1 % (ref 12.3–15.4)
GLUCOSE SERPL-MCNC: 202 MG/DL (ref 65–99)
HCO3 BLDA-SCNC: 53.4 MMOL/L (ref 22–28)
HCT VFR BLD AUTO: 40.1 % (ref 34–46.6)
HCT VFR BLD CALC: 38.7 %
HGB BLD-MCNC: 12.4 G/DL (ref 12–15.9)
IMM GRANULOCYTES # BLD AUTO: 0.4 10*3/MM3 (ref 0–0.05)
IMM GRANULOCYTES NFR BLD AUTO: 2.3 % (ref 0–0.5)
INHALED O2 CONCENTRATION: 55 %
LYMPHOCYTES # BLD AUTO: 0.88 10*3/MM3 (ref 0.7–3.1)
LYMPHOCYTES NFR BLD AUTO: 5.1 % (ref 19.6–45.3)
Lab: ABNORMAL
Lab: ABNORMAL
MCH RBC QN AUTO: 29.2 PG (ref 26.6–33)
MCHC RBC AUTO-ENTMCNC: 30.9 G/DL (ref 31.5–35.7)
MCV RBC AUTO: 94.4 FL (ref 79–97)
METHGB BLD QL: 0.5 % (ref 0–1.5)
MODALITY: ABNORMAL
MONOCYTES # BLD AUTO: 0.47 10*3/MM3 (ref 0.1–0.9)
MONOCYTES NFR BLD AUTO: 2.7 % (ref 5–12)
NEUTROPHILS NFR BLD AUTO: 15.32 10*3/MM3 (ref 1.7–7)
NEUTROPHILS NFR BLD AUTO: 89.5 % (ref 42.7–76)
NOTE: ABNORMAL
NOTIFIED BY: ABNORMAL
NOTIFIED WHO: ABNORMAL
NRBC BLD AUTO-RTO: 0 /100 WBC (ref 0–0.2)
OXYHGB MFR BLDV: 88.1 % (ref 94–99)
PCO2 BLDA: 76.4 MM HG (ref 35–45)
PCO2 TEMP ADJ BLD: ABNORMAL MM[HG]
PH BLDA: 7.45 PH UNITS (ref 7.35–7.45)
PH, TEMP CORRECTED: ABNORMAL
PLATELET # BLD AUTO: 204 10*3/MM3 (ref 140–450)
PMV BLD AUTO: 12.2 FL (ref 6–12)
PO2 BLDA: 56.7 MM HG (ref 75–100)
PO2 TEMP ADJ BLD: ABNORMAL MM[HG]
POTASSIUM SERPL-SCNC: 4.1 MMOL/L (ref 3.5–5.2)
PROCALCITONIN SERPL-MCNC: 0.11 NG/ML (ref 0–0.25)
RBC # BLD AUTO: 4.25 10*6/MM3 (ref 3.77–5.28)
SAO2 % BLDCOA: 89.5 % (ref 94–100)
SET MECH RESP RATE: 16
SODIUM SERPL-SCNC: 145 MMOL/L (ref 136–145)
VENTILATOR MODE: ABNORMAL
WBC NRBC COR # BLD: 17.14 10*3/MM3 (ref 3.4–10.8)

## 2023-05-16 PROCEDURE — 25010000002 FUROSEMIDE PER 20 MG: Performed by: FAMILY MEDICINE

## 2023-05-16 PROCEDURE — 94660 CPAP INITIATION&MGMT: CPT

## 2023-05-16 PROCEDURE — 63710000001 INSULIN ASPART PER 5 UNITS: Performed by: FAMILY MEDICINE

## 2023-05-16 PROCEDURE — 94761 N-INVAS EAR/PLS OXIMETRY MLT: CPT

## 2023-05-16 PROCEDURE — 82948 REAGENT STRIP/BLOOD GLUCOSE: CPT

## 2023-05-16 PROCEDURE — 25010000002 METHYLPREDNISOLONE PER 40 MG: Performed by: FAMILY MEDICINE

## 2023-05-16 PROCEDURE — 94664 DEMO&/EVAL PT USE INHALER: CPT

## 2023-05-16 PROCEDURE — 25010000002 ENOXAPARIN PER 10 MG: Performed by: FAMILY MEDICINE

## 2023-05-16 PROCEDURE — 94799 UNLISTED PULMONARY SVC/PX: CPT

## 2023-05-16 PROCEDURE — 71250 CT THORAX DX C-: CPT

## 2023-05-16 PROCEDURE — 94760 N-INVAS EAR/PLS OXIMETRY 1: CPT

## 2023-05-16 PROCEDURE — 99233 SBSQ HOSP IP/OBS HIGH 50: CPT | Performed by: INTERNAL MEDICINE

## 2023-05-16 PROCEDURE — 84145 PROCALCITONIN (PCT): CPT | Performed by: FAMILY MEDICINE

## 2023-05-16 PROCEDURE — 85025 COMPLETE CBC W/AUTO DIFF WBC: CPT | Performed by: FAMILY MEDICINE

## 2023-05-16 PROCEDURE — 83050 HGB METHEMOGLOBIN QUAN: CPT

## 2023-05-16 PROCEDURE — 36600 WITHDRAWAL OF ARTERIAL BLOOD: CPT

## 2023-05-16 PROCEDURE — 97110 THERAPEUTIC EXERCISES: CPT

## 2023-05-16 PROCEDURE — 80048 BASIC METABOLIC PNL TOTAL CA: CPT | Performed by: FAMILY MEDICINE

## 2023-05-16 PROCEDURE — 82375 ASSAY CARBOXYHB QUANT: CPT

## 2023-05-16 PROCEDURE — 82805 BLOOD GASES W/O2 SATURATION: CPT

## 2023-05-16 PROCEDURE — 99232 SBSQ HOSP IP/OBS MODERATE 35: CPT | Performed by: INTERNAL MEDICINE

## 2023-05-16 RX ORDER — SODIUM CHLORIDE FOR INHALATION 3 %
4 VIAL, NEBULIZER (ML) INHALATION EVERY 8 HOURS
Status: COMPLETED | OUTPATIENT
Start: 2023-05-16 | End: 2023-05-17

## 2023-05-16 RX ADMIN — GUAIFENESIN 600 MG: 600 TABLET, EXTENDED RELEASE ORAL at 09:04

## 2023-05-16 RX ADMIN — SODIUM CHLORIDE 30 MG/ML INHALATION SOLUTION 4 ML: 30 SOLUTION INHALANT at 19:44

## 2023-05-16 RX ADMIN — SENNOSIDES AND DOCUSATE SODIUM 2 TABLET: 50; 8.6 TABLET ORAL at 09:05

## 2023-05-16 RX ADMIN — ATENOLOL 25 MG: 25 TABLET ORAL at 09:04

## 2023-05-16 RX ADMIN — ACETAMINOPHEN 650 MG: 325 TABLET, FILM COATED ORAL at 20:50

## 2023-05-16 RX ADMIN — BUDESONIDE AND FORMOTEROL FUMARATE DIHYDRATE 2 PUFF: 160; 4.5 AEROSOL RESPIRATORY (INHALATION) at 07:08

## 2023-05-16 RX ADMIN — INSULIN ASPART 2 UNITS: 100 INJECTION, SOLUTION INTRAVENOUS; SUBCUTANEOUS at 15:46

## 2023-05-16 RX ADMIN — Medication 3 ML: at 09:04

## 2023-05-16 RX ADMIN — INSULIN ASPART 2 UNITS: 100 INJECTION, SOLUTION INTRAVENOUS; SUBCUTANEOUS at 11:22

## 2023-05-16 RX ADMIN — IPRATROPIUM BROMIDE AND ALBUTEROL SULFATE 3 ML: 2.5; .5 SOLUTION RESPIRATORY (INHALATION) at 19:44

## 2023-05-16 RX ADMIN — AMLODIPINE BESYLATE 10 MG: 5 TABLET ORAL at 09:04

## 2023-05-16 RX ADMIN — IPRATROPIUM BROMIDE AND ALBUTEROL SULFATE 3 ML: 2.5; .5 SOLUTION RESPIRATORY (INHALATION) at 01:47

## 2023-05-16 RX ADMIN — INSULIN ASPART 4 UNITS: 100 INJECTION, SOLUTION INTRAVENOUS; SUBCUTANEOUS at 07:38

## 2023-05-16 RX ADMIN — ALPRAZOLAM 0.5 MG: 0.5 TABLET ORAL at 20:50

## 2023-05-16 RX ADMIN — BUDESONIDE AND FORMOTEROL FUMARATE DIHYDRATE 2 PUFF: 160; 4.5 AEROSOL RESPIRATORY (INHALATION) at 19:44

## 2023-05-16 RX ADMIN — ENOXAPARIN SODIUM 40 MG: 100 INJECTION SUBCUTANEOUS at 20:50

## 2023-05-16 RX ADMIN — METHYLPREDNISOLONE SODIUM SUCCINATE 40 MG: 40 INJECTION, POWDER, LYOPHILIZED, FOR SOLUTION INTRAMUSCULAR; INTRAVENOUS at 15:47

## 2023-05-16 RX ADMIN — IPRATROPIUM BROMIDE AND ALBUTEROL SULFATE 3 ML: 2.5; .5 SOLUTION RESPIRATORY (INHALATION) at 12:44

## 2023-05-16 RX ADMIN — SENNOSIDES AND DOCUSATE SODIUM 2 TABLET: 50; 8.6 TABLET ORAL at 20:50

## 2023-05-16 RX ADMIN — BUDESONIDE INHALATION 1 MG: 0.5 SUSPENSION RESPIRATORY (INHALATION) at 07:07

## 2023-05-16 RX ADMIN — TIOTROPIUM BROMIDE INHALATION SPRAY 2 PUFF: 3.12 SPRAY, METERED RESPIRATORY (INHALATION) at 08:48

## 2023-05-16 RX ADMIN — GUAIFENESIN 600 MG: 600 TABLET, EXTENDED RELEASE ORAL at 20:50

## 2023-05-16 RX ADMIN — FUROSEMIDE 40 MG: 10 INJECTION, SOLUTION INTRAMUSCULAR; INTRAVENOUS at 09:04

## 2023-05-16 RX ADMIN — METHYLPREDNISOLONE SODIUM SUCCINATE 40 MG: 40 INJECTION, POWDER, LYOPHILIZED, FOR SOLUTION INTRAMUSCULAR; INTRAVENOUS at 03:43

## 2023-05-16 RX ADMIN — IPRATROPIUM BROMIDE AND ALBUTEROL SULFATE 3 ML: 2.5; .5 SOLUTION RESPIRATORY (INHALATION) at 07:07

## 2023-05-16 RX ADMIN — ACETAMINOPHEN 650 MG: 325 TABLET, FILM COATED ORAL at 00:37

## 2023-05-16 RX ADMIN — ALPRAZOLAM 0.5 MG: 0.5 TABLET ORAL at 00:37

## 2023-05-16 RX ADMIN — MULTIPLE VITAMINS W/ MINERALS TAB 1 TABLET: TAB at 09:04

## 2023-05-16 RX ADMIN — Medication 5 MG: at 20:50

## 2023-05-16 NOTE — PLAN OF CARE
Goal Outcome Evaluation:      Patient oxygenation >90% most of day on BiPap. Patient has copious amounts of secretions that are difficult for her to cough up, has episodes of desats 70-80% SpO2. Patient tolerating eating and drinking at this time. Turning Q2 and encouraging weight shifting to patient for optimal skin health. Call light in reach, no new orders.

## 2023-05-16 NOTE — PAYOR COMM NOTE
"To:  [employer]  From: Lucia Burgos RN  Phone: [phone]  Fax: [phone]  NPI: [id number]  TIN: [id number]  Member ID: [id number]  MRN: [id number]    Carolin Toscano (77 y.o. Female)       Date of Birth   1945    Social Security Number       Address   [address] [facility] [address]    Home Phone   [phone]    MRN   [id number]       Christianity   Nazarene    Marital Status                               Admission Date   5/4/23    Admission Type   Emergency    Admitting Provider       Attending Provider   Primitivo Nuñez DO    Department, Room/Bed   [facility] INTENSIVE [facility], I07/1       Discharge Date       Discharge Disposition       Discharge Destination                                 Attending Provider: Primitivo Nuñez DO    Allergies: Contrast Dye (Echo Or Unknown Ct/mr), Ciprofloxacin, Keflex [Cephalexin], Penicillins, Sulfa Antibiotics, Terramycin [Oxytetracycline], Prednisone, Latex, Percocet [Oxycodone-acetaminophen], Xyzal [Levocetirizine]    Isolation: None   Infection: None   Code Status: CPR    Ht: 160 cm (63\")   Wt: 64.5 kg (142 lb 3.2 oz)    Admission Cmt: None   Principal Problem: Acute on chronic respiratory failure with hypoxia and hypercapnia [J96.21,J96.22]                   Active Insurance as of 5/4/2023       Primary Coverage       Payor Plan Insurance Group Employer/Plan Group    HUMANA MEDICARE REPLACEMENT HUMANA MEDICARE REPLACEMENT [id number]       Payor Plan Address Payor Plan Phone Number Payor Plan Fax Number Effective Dates    [address] [address] [phone]  1/1/2018 - None Entered    [facility] [id number]         Subscriber Name Subscriber Birth Date Member ID       CAROLIN TOSCANO 1945 [id number]               Secondary Coverage       Payor Plan Insurance Group Employer/Plan Group    [place]        Payor Plan Address Payor Plan Phone Number Payor Plan Fax Number Effective Dates    [address] [phone]  10/11/2021 " - None Entered    [facility] [id number]         Subscriber Name Subscriber Birth Date Member ID       DAMIAN DYE 1945 [id number]                     Emergency Contacts        (Rel.) Home Phone Work Phone Mobile Phone    Nehal Hoffmann (Daughter) [phone] -- --              Vital Signs (last day)       Date/Time Temp Temp src Pulse Resp BP Patient Position SpO2    05/16/23 1500 -- -- 71 -- 134/65 -- 97    05/16/23 1400 -- -- 76 -- 132/63 -- 97    05/16/23 1340 -- -- 75 -- -- -- 95    05/16/23 1330 -- -- 77 -- -- -- 93    05/16/23 1324 -- -- 88 -- -- -- 59    05/16/23 1317 -- -- 87 -- 135/72 -- 82    05/16/23 1244 -- -- 78 19 -- -- 94    05/16/23 1200 -- -- 82 24 115/56 Sitting --    05/16/23 1131 98.5 (36.9) Temporal -- -- -- -- --    05/16/23 1100 -- -- 92 -- 136/64 -- 95    05/16/23 1030 -- -- 87 -- -- -- 93    05/16/23 1000 -- -- 85 -- 137/56 -- 97    05/16/23 0904 -- -- 85 -- 105/63 -- 98    05/16/23 0900 -- -- 86 -- 105/63 -- 98    05/16/23 0800 -- -- 78 24 143/60 Lying 94    05/16/23 0745 -- -- 74 -- -- -- 93    05/16/23 0707 98.5 (36.9) Temporal 82 20 -- -- 94    05/16/23 0700 -- -- 78 -- 140/64 -- 94    05/16/23 0600 -- -- 71 -- 130/58 -- 94    05/16/23 0500 -- -- 63 -- 128/58 -- 96    05/16/23 0431 97 (36.1) Temporal 60 -- -- -- 95    05/16/23 0400 97 (36.1) Temporal 62 23 106/55 Lying 94    05/16/23 0359 -- -- 63 -- 106/55 -- 94    05/16/23 0300 -- -- 65 -- 122/51 -- 96    05/16/23 0200 -- -- 65 -- 106/47 -- 94    05/16/23 0147 -- -- 61 -- -- -- 94    05/16/23 0100 -- -- 75 -- 124/54 -- 96    05/16/23 0000 97.1 (36.2) Temporal 79 17 129/61 Lying 96    05/15/23 2325 97.1 (36.2) Temporal 79 -- -- -- 95    05/15/23 2300 -- -- 82 -- 120/43 -- 95    05/15/23 2200 -- -- 93 -- 125/47 -- 94    05/15/23 2100 -- -- 85 -- 137/57 -- 92    05/15/23 2004 -- -- 94 -- -- -- 92    05/15/23 2000 -- -- 86 22 137/62 Lying 87    05/15/23 1928 97.4 (36.3) Temporal 87 -- -- -- 89    05/15/23 1925 -- -- --  20 -- -- --    05/15/23 1800 -- -- 82 24 150/52 Lying 90    05/15/23 1700 97.8 (36.6) -- 80 26 145/57 Lying 90    05/15/23 1600 -- -- 73 27 130/53 Lying 92    05/15/23 1500 -- -- 78 26 125/57 Lying 92    05/15/23 1400 -- -- 66 27 120/57 Lying 91    05/15/23 1300 -- -- 63 30 114/53 Lying 91    05/15/23 1246 -- -- 75 30 -- -- 91    05/15/23 1200 -- -- 75 26 125/56 Lying 89    05/15/23 1100 98.3 (36.8) Temporal 83 27 133/70 Lying 89    05/15/23 1000 -- -- 87 -- 128/58 -- 92    05/15/23 0900 -- -- 77 -- 150/72 -- 90    05/15/23 0800 -- -- 90 28 144/79 Lying 93    05/15/23 0705 98.3 (36.8) Temporal -- 16 -- -- --    05/15/23 0700 -- -- 64 -- 124/48 -- 90    05/15/23 0600 -- -- 63 -- 143/61 -- 91    05/15/23 0500 -- -- 60 -- 119/51 -- 90    05/15/23 0400 97.7 (36.5) Axillary 68 21 130/57 Lying 92    05/15/23 0300 -- -- 75 -- 134/59 -- 94    05/15/23 0200 -- -- 68 -- 123/58 -- 90    05/15/23 0100 -- -- 71 -- 122/46 -- 90    05/15/23 0007 -- -- 78 19 -- -- --    05/15/23 0001 -- -- 76 27 -- -- 91    05/15/23 0000 97.7 (36.5) Axillary 79 23 134/57 Lying 95          Current Facility-Administered Medications   Medication Dose Route Frequency Provider Last Rate Last Admin    acetaminophen (TYLENOL) tablet 650 mg  650 mg Oral Q4H PRN Lolis Vital MD   650 mg at 05/16/23 0037    Or    acetaminophen (TYLENOL) 160 MG/5ML solution 650 mg  650 mg Oral Q4H PRN Lolis Vital MD        Or    acetaminophen (TYLENOL) suppository 650 mg  650 mg Rectal Q4H PRN Lolis Vital MD   650 mg at 05/04/23 2005    albuterol (PROVENTIL) nebulizer solution 0.083% 2.5 mg/3mL  2.5 mg Nebulization Q6H PRN Lolis Vital MD   2.5 mg at 05/09/23 1107    ALPRAZolam (XANAX) tablet 0.5 mg  0.5 mg Oral BID PRN Lolis Vital MD   0.5 mg at 05/16/23 0037    amLODIPine (NORVASC) tablet 10 mg  10 mg Oral Daily Lolis Vital MD   10 mg at 05/16/23 0904    atenolol (TENORMIN) tablet 25 mg  25 mg Oral Daily Lolis Vital MD   25 mg at 05/16/23  0904    sennosides-docusate (PERICOLACE) 8.6-50 MG per tablet 2 tablet  2 tablet Oral BID Lolis Vital MD   2 tablet at 05/16/23 0905    And    polyethylene glycol (MIRALAX) packet 17 g  17 g Oral Daily PRN Lolis Vital MD        And    bisacodyl (DULCOLAX) EC tablet 5 mg  5 mg Oral Daily PRN Lolis Vital MD        And    bisacodyl (DULCOLAX) suppository 10 mg  10 mg Rectal Daily PRN Lolis Vital MD        budesonide (PULMICORT) nebulizer solution 1 mg  1 mg Nebulization Daily - RT Arnaldo Soriano MD   1 mg at 05/16/23 0707    budesonide-formoterol (SYMBICORT) 160-4.5 MCG/ACT inhaler 2 puff  2 puff Inhalation BID - RT Lolis Vital MD   2 puff at 05/16/23 0708    And    tiotropium (SPIRIVA RESPIMAT) 2.5 mcg/act aerosol solution inhaler  2 puff Inhalation Daily - RT Lolis Vital MD   2 puff at 05/16/23 0848    dexmedetomidine (PRECEDEX) 400 mcg in 100 mL NS infusion  0.2-1.5 mcg/kg/hr Intravenous Titrated Lolis Vital MD   Stopped at 05/12/23 1556    dextrose (D50W) (25 g/50 mL) IV injection 25 g  25 g Intravenous Q15 Min PRN Lolis Vital MD        dextrose (GLUTOSE) oral gel 15 g  15 g Oral Q15 Min PRN Lolis Vital MD        Enoxaparin Sodium (LOVENOX) syringe 40 mg  40 mg Subcutaneous Q24H Lolis Vital MD   40 mg at 05/15/23 2109    glucagon (GLUCAGEN) injection 1 mg  1 mg Intramuscular Q15 Min PRN Lolis Vital MD        guaiFENesin (MUCINEX) 12 hr tablet 600 mg  600 mg Oral Q12H Lolis Vital MD   600 mg at 05/16/23 0904    guaiFENesin-dextromethorphan (ROBITUSSIN DM) 100-10 MG/5ML syrup 10 mL  10 mL Oral Q4H PRN Lolis Vital MD   10 mL at 05/14/23 1640    Insulin Aspart (novoLOG) injection 0-9 Units  0-9 Units Subcutaneous TID AC Lolis Vital MD   2 Units at 05/16/23 1122    ipratropium-albuterol (DUO-NEB) nebulizer solution 3 mL  3 mL Nebulization Q6H - RT Lolis Vital MD   3 mL at 05/16/23 1244    melatonin tablet 5 mg  5 mg Oral Nightly PRN Zahraa,  "MD Lolis   5 mg at 05/15/23 2109    methylPREDNISolone sodium succinate (SOLU-Medrol) injection 40 mg  40 mg Intravenous Q12H Lolis Vital MD   40 mg at 05/16/23 0343    multivitamin with minerals 1 tablet  1 tablet Oral Daily Lolis Vital MD   1 tablet at 05/16/23 0904    ondansetron (ZOFRAN) injection 4 mg  4 mg Intravenous Q6H PRN Lolis Vital MD        Pharmacy to Dose enoxaparin (LOVENOX)   Does not apply Continuous PRN Lolis Vital MD        sodium chloride 0.9 % flush 10 mL  10 mL Intravenous PRN Lolis Vital MD        sodium chloride 0.9 % flush 3 mL  3 mL Intravenous Q12H Lolis Vital MD   3 mL at 05/16/23 0904    sodium chloride 0.9 % flush 3-10 mL  3-10 mL Intravenous PRN Lolis Vital MD        sodium chloride 0.9 % infusion 40 mL  40 mL Intravenous PRN Lolis Vital MD         Lab Results (last 24 hours)       Procedure Component Value Units Date/Time    Procalcitonin [[id number]]  (Normal) Collected: 05/16/23 0453    Specimen: Blood Updated: 05/16/23 0613     Procalcitonin 0.11 ng/mL     Narrative:      As a Marker for Sepsis (Non-Neonates):    1. <0.5 ng/mL represents a low risk of severe sepsis and/or septic shock.  2. >2 ng/mL represents a high risk of severe sepsis and/or septic shock.    As a Marker for Lower Respiratory Tract Infections that require antibiotic therapy:    PCT on Admission    Antibiotic Therapy       6-12 Hrs later    >0.5                Strongly Recommended  >0.25 - <0.5        Recommended   0.1 - 0.25          Discouraged              Remeasure/reassess PCT  <0.1                Strongly Discouraged     Remeasure/reassess PCT    As 28 day mortality risk marker: \"Change in Procalcitonin Result\" (>80% or <=80%) if Day 0 (or Day 1) and Day 4 values are available. Refer to http://www.[facility]-pct-calculator.com    Change in PCT <=80%  A decrease of PCT levels below or equal to 80% defines a positive change in PCT test result representing a higher risk for " 28-day all-cause mortality of patients diagnosed with severe sepsis for septic shock.    Change in PCT >80%  A decrease of PCT levels of more than 80% defines a negative change in PCT result representing a lower risk for 28-day all-cause mortality of patients diagnosed with severe sepsis or septic shock.       Basic Metabolic Panel [[id number]]  (Abnormal) Collected: 05/16/23 0453    Specimen: Blood Updated: 05/16/23 0606     Glucose 202 mg/dL      BUN 31 mg/dL      Creatinine 0.37 mg/dL      Sodium 145 mmol/L      Potassium 4.1 mmol/L      Chloride 93 mmol/L      CO2 43.3 mmol/L      Calcium 9.6 mg/dL      BUN/Creatinine Ratio 83.8     Anion Gap 8.7 mmol/L      eGFR 104.7 mL/min/1.73     Narrative:      GFR Normal >60  Chronic Kidney Disease <60  Kidney Failure <15    The GFR formula is only valid for adults with stable renal function between ages 18 and 70.    CBC & Differential [[id number]]  (Abnormal) Collected: 05/16/23 0453    Specimen: Blood Updated: 05/16/23 0601    Narrative:      The following orders were created for panel order CBC & Differential.  Procedure                               Abnormality         Status                     ---------                               -----------         ------                     CBC Auto Differential[[id number]]        Abnormal            Final result                 Please view results for these tests on the individual orders.    CBC Auto Differential [[id number]]  (Abnormal) Collected: 05/16/23 0453    Specimen: Blood Updated: 05/16/23 0601     WBC 17.14 10*3/mm3      RBC 4.25 10*6/mm3      Hemoglobin 12.4 g/dL      Hematocrit 40.1 %      MCV 94.4 fL      MCH 29.2 pg      MCHC 30.9 g/dL      RDW 15.1 %      RDW-SD 52.0 fl      MPV 12.2 fL      Platelets 204 10*3/mm3      Neutrophil % 89.5 %      Lymphocyte % 5.1 %      Monocyte % 2.7 %      Eosinophil % 0.0 %      Basophil % 0.4 %      Immature Grans % 2.3 %      Neutrophils, Absolute 15.32 10*3/mm3      Lymphocytes,  Absolute 0.88 10*3/mm3      Monocytes, Absolute 0.47 10*3/mm3      Eosinophils, Absolute 0.00 10*3/mm3      Basophils, Absolute 0.07 10*3/mm3      Immature Grans, Absolute 0.40 10*3/mm3      nRBC 0.0 /100 WBC     Blood Gas, Arterial With Co-Ox [[id number]]  (Abnormal) Collected: 05/16/23 0519    Specimen: Arterial Blood Updated: 05/16/23 0519     Site Right Radial     Partha's Test Positive     pH, Arterial 7.453 pH units      Comment: 83 Value above reference range        pCO2, Arterial 76.4 mm Hg      Comment: 86 Value above critical limit        pO2, Arterial 56.7 mm Hg      Comment: 84 Value below reference range        HCO3, Arterial 53.4 mmol/L      Comment: 83 Value above reference range        Base Excess, Arterial 24.7 mmol/L      Comment: 83 Value above reference range        O2 Saturation, Arterial 89.5 %      Comment: 84 Value below reference range        Hematocrit, Blood Gas 38.7 %      Oxyhemoglobin 88.1 %      Comment: 84 Value below reference range        Methemoglobin 0.50 %      Carboxyhemoglobin 1.1 %      A-a DO2 238.0 mmHg      Barometric Pressure for Blood Gas 732 mmHg      Modality BiPap     FIO2 55 %      Ventilator Mode AVAP     Set Mech Resp Rate 16.0     Note --     Notified Who rn     Notified By [id number]     Notified Time 05/16/2023 05:18     Collected by cris     Comment: Meter: [id number]     :  [id number]        pH, Temp Corrected --     pCO2, Temperature Corrected --     pO2, Temperature Corrected --    POC Glucose Once [[id number]]  (Abnormal) Collected: 05/15/23 2030    Specimen: Blood Updated: 05/15/23 2033     Glucose 168 mg/dL      Comment: Serial Number: [id number]:  [id number]             Imaging Results (Last 24 Hours)       Procedure Component Value Units Date/Time    CT Chest Without Contrast Diagnostic [[id number]] Collected: 05/16/23 1336     Updated: 05/16/23 1340    Narrative:      PROCEDURE: CT CHEST WO CONTRAST DIAGNOSTIC-     HISTORY: Dyspnea, chronic,  unclear etiology; J96.21-Acute and chronic  respiratory failure with hypoxia; J96.22-Acute and chronic respiratory  failure with hypercapnia; J44.1-Chronic obstructive pulmonary disease  with (acute) exacerbation; B34.8-Other viral infections of unspecified  site     COMPARISON: April 13, 2023.     PROCEDURE:  Multiple axial CT images were obtained from the thoracic  inlet through the upper abdomen without the use of contrast.      FINDINGS:   Soft tissue windows reveal no suspicious axillary, hilar or mediastinal  adenopathy. Heart size is normal. The aorta is normal in caliber. There  are vascular calcifications. There are no pleural or pericardial  effusions. There is bronchiectasis in the bilateral lower lobes. There  is new mucous plugging and airspace disease in the bilateral lower lobes  consistent with pneumonia. There are tree-in-bud opacities diffusely  which have worsened since the prior exam consistent with bronchiolitis.  There is evidence of old calcified granulomatous disease. The visualized  upper abdomen shows the gallbladder to be surgically absent. Bone  windows reveal no bony destructive lesions.       Impression:      Mucous plugging, bilateral lower lobe pneumonia, and  bronchiolitis. 8-12 week follow-up exam is recommended to document  stability.     193.74 mGy.cm        This study was performed with techniques to keep radiation doses as low  as reasonably achievable (ALARA). Individualized dose reduction  techniques using automated exposure control or adjustment of mA and/or  kV according to the patient size were employed.               Images were reviewed, interpreted, and dictated by Dr. Shraddha Wharton MD  Transcribed by Ade Solis PA-C.     This report was signed and finalized on 5/16/2023 1:38 PM by Shraddha Wharton MD.          Orders (last 24 hrs)        Start     Ordered    05/16/23 1158  CT Chest Without Contrast Diagnostic  1 Time Imaging         05/16/23 1158    05/16/23 0600   Blood Gas, Arterial -With Co-Ox Panel: Yes  Morning Draw         05/15/23 1145    05/16/23 0600  CBC & Differential  Morning Draw         05/15/23 1145    05/16/23 0600  Basic Metabolic Panel  Morning Draw         05/15/23 1145    05/16/23 0600  Procalcitonin  Morning Draw         05/15/23 1145    05/16/23 0600  CBC Auto Differential  PROCEDURE ONCE         05/15/23 2204    05/16/23 0520  Blood Gas, Arterial With Co-Ox  PROCEDURE ONCE         05/16/23 0519    05/15/23 2034  POC Glucose Once  PROCEDURE ONCE         05/15/23 2030    05/15/23 1800  Dietary Nutrition Supplements Boost VHC  2 Times Daily,   Status:  Canceled      Comments: Reduced Sugar    05/15/23 1445    05/15/23 1800  Dietary Nutrition Supplements Xu  2 Times Daily       05/15/23 1445    05/15/23 1800  Dietary Nutrition Supplements Boost VHC  2 Times Daily       05/15/23 1446    05/14/23 1445  furosemide (LASIX) injection 40 mg  2 Times Daily (Diuretics)         05/14/23 1356    05/13/23 0243  methylPREDNISolone sodium succinate (SOLU-Medrol) injection 40 mg  Every 12 Hours         05/12/23 1507    05/12/23 1510  ALPRAZolam (XANAX) tablet 0.5 mg  2 Times Daily PRN         05/12/23 1510    05/11/23 1759  Apply Moisture Barrier to All Bony Prominences  Every Shift       05/11/23 1800    05/11/23 1700  POC Glucose TID AC  3 Times Daily Before Meals       05/11/23 1537    05/11/23 1200  Dietary Nutrition Supplements Mighty Shake  Daily With Breakfast, Lunch & Dinner,   Status:  Canceled      Comments: Reduced Sugar    05/11/23 0826    05/10/23 1345  budesonide (PULMICORT) nebulizer solution 1 mg  Daily - RT         05/10/23 1245    05/09/23 2245  dexmedetomidine (PRECEDEX) 400 mcg in 100 mL NS infusion  Titrated         05/09/23 2157    05/05/23 1300  ipratropium-albuterol (DUO-NEB) nebulizer solution 3 mL  Every 6 Hours - RT         05/05/23 0700    05/05/23 0933  Skin Care  Daily       05/05/23 0932    05/05/23 0800  Oral Care  2 Times Daily        05/04/23 1839 05/04/23 2200  Incentive Spirometry  Every 4 Hours While Awake       05/04/23 1839 05/04/23 2130  budesonide-formoterol (SYMBICORT) 160-4.5 MCG/ACT inhaler 2 puff  2 Times Daily - RT        See Hyperspace for full Linked Orders Report.    05/04/23 1839 05/04/23 2100  sodium chloride 0.9 % flush 3 mL  Every 12 Hours Scheduled         05/04/23 1839 05/04/23 2100  sennosides-docusate (PERICOLACE) 8.6-50 MG per tablet 2 tablet  2 Times Daily        See Hyperspace for full Linked Orders Report.    05/04/23 1839 05/04/23 2100  guaiFENesin (MUCINEX) 12 hr tablet 600 mg  Every 12 Hours Scheduled         05/04/23 1839 05/04/23 2000  Enoxaparin Sodium (LOVENOX) syringe 40 mg  Every 24 Hours         05/04/23 1849 05/04/23 1930  amLODIPine (NORVASC) tablet 10 mg  Daily         05/04/23 1839 05/04/23 1930  atenolol (TENORMIN) tablet 25 mg  Daily         05/04/23 1839 05/04/23 1930  tiotropium (SPIRIVA RESPIMAT) 2.5 mcg/act aerosol solution inhaler  Daily - RT        See Hyperspace for full Linked Orders Report.    05/04/23 1839 05/04/23 1930  multivitamin with minerals 1 tablet  Daily         05/04/23 1839 05/04/23 1840  Intake & Output  Every Shift       05/04/23 1839 05/04/23 1840  Ambulate Patient  Every Shift       05/04/23 1839 05/04/23 1839  albuterol (PROVENTIL) nebulizer solution 0.083% 2.5 mg/3mL  Every 6 Hours PRN         05/04/23 1839 05/04/23 1839  guaiFENesin-dextromethorphan (ROBITUSSIN DM) 100-10 MG/5ML syrup 10 mL  Every 4 Hours PRN         05/04/23 1839 05/04/23 1839  sodium chloride 0.9 % flush 3-10 mL  As Needed         05/04/23 1839 05/04/23 1839  sodium chloride 0.9 % infusion 40 mL  As Needed         05/04/23 1839 05/04/23 1839  acetaminophen (TYLENOL) tablet 650 mg  Every 4 Hours PRN        See Hyperspace for full Linked Orders Report.    05/04/23 1839 05/04/23 1839  acetaminophen (TYLENOL) 160 MG/5ML solution 650 mg  Every 4 Hours PRN         See Hyperspace for full Linked Orders Report.    05/04/23 1839    05/04/23 1839  acetaminophen (TYLENOL) suppository 650 mg  Every 4 Hours PRN        See Hyperspace for full Linked Orders Report.    05/04/23 1839    05/04/23 1839  polyethylene glycol (MIRALAX) packet 17 g  Daily PRN        See Hyperspace for full Linked Orders Report.    05/04/23 1839    05/04/23 1839  bisacodyl (DULCOLAX) EC tablet 5 mg  Daily PRN        See Hyperspace for full Linked Orders Report.    05/04/23 1839    05/04/23 1839  bisacodyl (DULCOLAX) suppository 10 mg  Daily PRN        See Hyperspace for full Linked Orders Report.    05/04/23 1839    05/04/23 1839  ondansetron (ZOFRAN) injection 4 mg  Every 6 Hours PRN         05/04/23 1839    05/04/23 1839  Pharmacy to Dose enoxaparin (LOVENOX)  Continuous PRN         05/04/23 1839    05/04/23 1839  melatonin tablet 5 mg  Nightly PRN         05/04/23 1839    05/04/23 1730  Insulin Aspart (novoLOG) injection 0-9 Units  3 Times Daily Before Meals         05/04/23 1643    05/04/23 1643  dextrose (GLUTOSE) oral gel 15 g  Every 15 Minutes PRN         05/04/23 1643    05/04/23 1643  dextrose (D50W) (25 g/50 mL) IV injection 25 g  Every 15 Minutes PRN         05/04/23 1643    05/04/23 1643  glucagon (GLUCAGEN) injection 1 mg  Every 15 Minutes PRN         05/04/23 1643    05/04/23 1447  sodium chloride 0.9 % flush 10 mL  As Needed         05/04/23 1447    Unscheduled  Up With Assistance  As Needed       05/04/23 1839    Unscheduled  Follow Hypoglycemia Standing Orders For Blood Glucose <70 & Notify Provider of Treatment  As Needed      Comments: Follow Hypoglycemia Orders As Outlined in Process Instructions (Open Order Report to View Full Instructions)  Notify Provider Any Time Hypoglycemia Treatment is Administered    05/04/23 1643    Unscheduled  Perineal Dermatitis Care PRN  As Needed      Comments: - Gently Cleanse Area With Perineal Foam Cleanser or Gentle Perineal Cleanser  - Pat Dry  -  Gently Apply Protective Moisture Barrier BID & After Each Incontinence Episode  - Notify Wound Care if No Improvement After 3 Days    05/04/23 1838    Unscheduled  Bladder Scan if Patient Unable to Void 4-6 Hours After Catheter Removal  As Needed         05/06/23 1021    Unscheduled  If Bladder Scan Volume is Less Than 500mL & Patient is Without Symptoms of Bladder Discomfort / Distention Monitor Every 1-2 Hours for Spontaneous Void  As Needed       05/06/23 1021    Unscheduled  Straight Cath Every 4-6 Hours As Needed If Patient is Unable to Void After 4-6 Hours, Bladder Scan Volume is Greater Than 500mL & Patient Has Symptoms of Bladder Discomfort / Distention  As Needed       05/06/23 1021    Unscheduled  Schedule / Prompt Voiding For Patients With Urinary Incontinence  As Needed       05/06/23 1021    Unscheduled  Apply Moisture Barrier After Any Incontinence  As Needed       05/11/23 1800    Unscheduled  Wound Care  As Needed       05/11/23 1800    Unscheduled  Deep Tissue Injury Care PRN  As Needed      Comments: - Gently Cleanse With Normal Saline  - Cover With Silicone Border Dressing (If Indicated)  - For Frequent Incontinence - Apply Skin Protective Barrier Cream Rather Than Silicone Border Dressing    05/13/23 1217    --  SCANNED - TELEMETRY           05/04/23 0000    --  SCANNED - TELEMETRY           05/04/23 0000    --  SCANNED - TELEMETRY           05/04/23 0000    --  SCANNED - TELEMETRY           05/04/23 0000    --  multivitamin with minerals tablet tablet  Every Other Day         05/05/23 0949    --  saccharomyces boulardii (FLORASTOR) 250 MG capsule  Every Other Day         05/05/23 0949    --  acetaminophen (TYLENOL) 325 MG tablet  Every 6 Hours PRN         05/05/23 0949    --  SCANNED - TELEMETRY           05/04/23 0000    --  SCANNED - TELEMETRY           05/04/23 0000    --  SCANNED - TELEMETRY           05/04/23 0000    --  SCANNED - TELEMETRY           05/04/23 0000    --  SCANNED - TELEMETRY            23 0000    --  SCANNED - TELEMETRY           23 0000    --  SCANNED - TELEMETRY           23 0000    --  SCANNED - TELEMETRY           23 0000    --  SCANNED - TELEMETRY           23 0000    --  SCANNED - TELEMETRY           23 0000    --  SCANNED - TELEMETRY           23 0000    --  SCANNED - TELEMETRY           23 0000    --  SCANNED - TELEMETRY           23 0000    --  SCANNED - TELEMETRY           23 0000    --  SCANNED - TELEMETRY           23 0000    --  SCANNED - TELEMETRY           23 0000    --  SCANNED - TELEMETRY           23 0000    --  SCANNED - TELEMETRY           23 0000    --  SCANNED - TELEMETRY           23 0000    --  SCANNED - TELEMETRY           23 0000    --  SCANNED - TELEMETRY           23 0000    --  SCANNED - TELEMETRY           23 0000                     Physician Progress Notes (last 24 hours)        Primitivo Nuñez DO at 23 1506              [facility]    Name:  Carolin Toscano   Age:  77 y.o.  Sex:  female  :  1945  MRN:  [id number]   Visit Number:  [id number]  Admission Date:  2023  Date Of Service:  23  Primary Care Physician:  Jason Nicholson MD     LOS: 12 days :    Chief Complaint:      Shortness of air    Subjective:    23: d/w nsg intermittently on avaps and hi flow. Patient awake in bed. CXR from 5/15 reviewed. Medications reviewed.  Discussed with case management they [facility]    Hospital Course:     Patient is a 77 years old female with a past medical history of COPD, chronic respiratory failure on 5 to 6 L per her previous discharge, on NIV machine at home and trelegy, hypertension, and ongoing tobacco use who presented to the ER from home by EMS with a chief complaint of shortness of breath.  Patient reporting that she started having shortness of  breath associated with productive cough since last night and has persisted and became worse that promoted her to call EMS.  EMS has found the patient to be at 65% saturation on her normal 6 L of oxygen. Patient reports compliance with her NIV machine at home.      On ER evaluation, Patient was found to be tachycardic with a heart rate of 130s, temperature of 100.2 and respirations of 30, /72.  Patient was placed on BiPAP with FiO2 of 40%.  Her ABG came back and showed pH of 7.296/PCO2 87/PO2 94/HCO3 42/saturation 97% on 5 L nasal cannula.  HS Troponin 44, proBNP WNL, glucose 192, CO2 of 40, otherwise CBC and CMP within acceptable range.  Lactate and Pro-Adi WNL.  Respiratory panel positive for parainfluenza virus.  Chest x-ray Mild interstitial disease  bilaterally is similar to prior. No area of consolidation is seen. Patient received Solu-Medrol and Aztreonam while in the ER.  Hospitalist consulted for admission.     Patient was admitted and treated with steroids.  Antibiotics were not indicated.  Pulmonology consulted and was moved to ICU on 5/9 due to worsening respiratory failure and tachypnea.  She initially had to be on Precedex drip which was discontinued and switched to Xanax due to anxiety. Patient was placed on AVAPS.  Patient continued on steroids and received Lasix.  Edited by: Primitivo Nuñez DO at 5/16/2023 1503     Review of Systems:     All systems were reviewed and negative except as mentioned in subjective, assessment and plan.    Vital Signs:    Temp:  [97 °F (36.1 °C)-98.5 °F (36.9 °C)] 98.5 °F (36.9 °C)  Heart Rate:  [60-94] 76  Resp:  [17-27] 19  BP: (105-150)/(43-72) 132/63    Intake and output:    I/O last 3 completed shifts:  In: 120 [P.O.:120]  Out: 2100 [Urine:2100]  I/O this shift:  In: 480 [P.O.:480]  Out: 975 [Urine:975]    Physical Examination:    General Appearance:  Alert and cooperative.  Chronically ill-appearing.   Head:  Atraumatic and normocephalic.   Eyes:  Conjunctivae and sclerae normal, no icterus. No pallor.   Throat: No oral lesions, no thrush, oral mucosa moist.   Neck: Supple, trachea midline, no thyromegaly.   Lungs:   Breath sounds heard bilaterally equally.  Mildly labored, on BiPAP.  Diffuse bilateral wheezing heard.  Decreased air movement bilaterally.  Tachypneic.   Heart:  Normal S1 and S2, no murmur, no gallop, no rub. No JVD.   Abdomen:   Normal bowel sounds, no masses, no organomegaly. Soft, nontender, nondistended, no rebound tenderness.   Extremities: Supple, no edema, no cyanosis, no clubbing.   Skin: No bleeding or rash.   Neurologic: Alert and oriented x 3. No facial asymmetry. Moves all four limbs. No tremors.  Generalized weakness noted.     Edited by: Primitivo Nuñez DO at 5/16/2023 0957     Laboratory results:    Results from last 7 days   Lab Units 05/16/23  0453 05/15/23  0413 05/14/23  0430   SODIUM mmol/L 145 146* 147*   POTASSIUM mmol/L 4.1 4.9 4.3   CHLORIDE mmol/L 93* 95* 99   CO2 mmol/L 43.3* 44.7* 42.3*   BUN mg/dL 31* 21 23   CREATININE mg/dL 0.37* 0.36* 0.36*   CALCIUM mg/dL 9.6 9.4 9.4   GLUCOSE mg/dL 202* 148* 138*     Results from last 7 days   Lab Units 05/16/23  0453 05/15/23  0413 05/14/23  0430   WBC 10*3/mm3 17.14* 16.64* 16.85*   HEMOGLOBIN g/dL 12.4 12.2 12.0   HEMATOCRIT % 40.1 40.2 39.8   PLATELETS 10*3/mm3 204 187 187                 Recent Labs     05/13/23  0510 05/15/23  0453 05/16/23  0519   PHART 7.420 7.445 7.453*   PEB1SIW 70.6* 75.7* 76.4*   PO2ART 74.4* 56.8* 56.7*   VKF4TDN 45.7* 51.9* 53.4*   BASEEXCESS 17.9* 23.5* 24.7*      I have reviewed the patient's laboratory results.    Radiology results:    CT Chest Without Contrast Diagnostic    Result Date: 5/16/2023  PROCEDURE: CT CHEST WO CONTRAST DIAGNOSTIC-  HISTORY: Dyspnea, chronic, unclear etiology; J96.21-Acute and chronic respiratory failure with hypoxia; J96.22-Acute and chronic respiratory failure with hypercapnia; J44.1-Chronic obstructive  pulmonary disease with (acute) exacerbation; B34.8-Other viral infections of unspecified site  COMPARISON: April 13, 2023.  PROCEDURE:  Multiple axial CT images were obtained from the thoracic inlet through the upper abdomen without the use of contrast.  FINDINGS: Soft tissue windows reveal no suspicious axillary, hilar or mediastinal adenopathy. Heart size is normal. The aorta is normal in caliber. There are vascular calcifications. There are no pleural or pericardial effusions. There is bronchiectasis in the bilateral lower lobes. There is new mucous plugging and airspace disease in the bilateral lower lobes consistent with pneumonia. There are tree-in-bud opacities diffusely which have worsened since the prior exam consistent with bronchiolitis. There is evidence of old calcified granulomatous disease. The visualized upper abdomen shows the gallbladder to be surgically absent. Bone windows reveal no bony destructive lesions.      Impression: Mucous plugging, bilateral lower lobe pneumonia, and bronchiolitis. 8-12 week follow-up exam is recommended to document stability.  193.74 mGy.cm   This study was performed with techniques to keep radiation doses as low as reasonably achievable (ALARA). Individualized dose reduction techniques using automated exposure control or adjustment of mA and/or kV according to the patient size were employed.     Images were reviewed, interpreted, and dictated by Dr. Shraddha Wharton MD Transcribed by Ade Solis PA-C.  This report was signed and finalized on 5/16/2023 1:38 PM by Shraddha Wharton MD.    XR Chest 1 View    Result Date: 5/15/2023  PROCEDURE: XR CHEST 1 VW-  HISTORY: follow up, increased WBC & CO2; J96.21-Acute and chronic respiratory failure with hypoxia; J96.22-Acute and chronic respiratory failure with hypercapnia; J44.1-Chronic obstructive pulmonary disease with (acute) exacerbation; B34.8-Other viral infections of unspecified site  COMPARISON: 05/13/2023.  FINDINGS: The  heart is normal in size. Bilateral interstitial disease most prominent left lung base is stable from prior. Bronchial wall thickening noted.. The mediastinum is unremarkable. There is no pneumothorax.  There are no acute osseous abnormalities.      Impression: Stable chest..    This report was signed and finalized on 5/15/2023 8:37 AM by Shraddha Wharton MD.    I have reviewed the patient's radiology reports.    Medication Review:     I have reviewed the patient's active and prn medications.     Problem List:      Acute on chronic respiratory failure with hypoxia and hypercapnia      Assessment/Plan:    Respiratory failure with hypoxia/hypercapnia, COPD exacerbation, parainfluenza  -Continue supplemental oxygen to keep saturation above 90%, patient on 5 to 6 L at baseline, continue to titrate down as able to.  Continue BiPAP therapy.  Has NIV at home.  -Patient met SIRS criteria on arrival, likely secondary to parainfluenza infection and respiratory failure. Received Aztreonam and sepsis protocol IV fluids boluses.  - Solu-Medrol, will taper to 40 mg every 12 hours, continue to taper down as she improves.   -Bronchodilators, Mucinex and supportive care.  -Dr. Soriano following  -Lasix 40 twice daily x 2 days to finish May 16, 2023  -Ordered Xanax as needed, currently off Precedex.     Elevated troponin  -Likely secondary to demand ischemia in settings of respiratory failure  -Patient denies any chest pain, low suspicion for ACS  -Troponin trended down and plateaued.     Hyperglycemia  -Will cover with sliding scale insulin while on steroids  -Hemoglobin A1c 6.7     [facility]: [facility]    Prophylaxis: Lovenox    Edited by: Primitivo Nuñez, DO at 5/16/2023 1505     DVT Prophylaxis: Lovenox  Code Status:   Code Status and Medical Interventions:   Ordered at: 05/04/23 7075     Code Status (Patient has no pulse and is not breathing):    CPR (Attempt to Resuscitate)     Medical  "Interventions (Patient has pulse or is breathing):    Full Support      Diet:   Dietary Orders (From admission, onward)       Start     Ordered    05/15/23 1800  Dietary Nutrition Supplements Xu  2 Times Daily      Question:  Select Supplement:  Answer:  Xu    05/15/23 1445    05/15/23 1800  Dietary Nutrition Supplements Boost VHC  2 Times Daily      Question:  Select Supplement:  Answer:  Boost VHC    05/15/23 1446    23 1840  Diet: Cardiac Diets; Healthy Heart (2-3 Na+); Texture: Regular Texture (IDDSI 7); Fluid Consistency: Thin (IDDSI 0)  Diet Effective Now        References:    Diet Order [facility]   Question Answer Comment   Diets: Cardiac Diets    Cardiac Diet: Healthy Heart (2-3 Na+)    Texture: Regular Texture (IDDSI 7)    Fluid Consistency: Thin (IDDSI 0)        23 1839                   Discharge Plan: [facility] in 1 to 2 days    Primitivo Nuñez DO  23  15:06 EDT    Dictated utilizing Dragon dictation.        Electronically signed by Primitivo Nuñez DO at 23 1507       Arnaldo Soriano MD at 23 1156            CC: Acute Respiratory Failure.     S: Currently on NIV therapy.  Events reviewed.  Continues to require high flow nasal cannula when off the NIV/AVAPS.  As per the nursing staff, the patient requires up to 12 L/min high flow nasal cannula and becomes more tachypneic when off AVAPS.    ROS: Could not be reliably obtained as the patient is on NIV therapy.     O:Vital signs reviewed. FiO2: 55-60%.    /64   Pulse 92   Temp 98.5 °F (36.9 °C) (Temporal)   Resp 24   Ht 160 cm (63\")   Wt 64.5 kg (142 lb 3.2 oz)   SpO2 95%   BMI 25.19 kg/m²     Temp (24hrs), Av.4 °F (36.3 °C), Min:97 °F (36.1 °C), Max:98.5 °F (36.9 °C)      I & Os reviewed.   Intake/Output         05/15/23 0700 - 23 0659 23 0700 - 23 0659    Intake (ml) 120 240    Output (ml) 1850 --    Net (ml) -1730 240    Last Weight 64.5 kg (142 lb 3.2 oz) --            Net IO Since " Admission: -1,076 mL [05/16/23 1156]    General/Constitutional: On NIV therapy. Appears to be in mild distress.  Eyes: Eyes were closed.  Neck: Supple without JVD. No obvious masses noted.   Cardiovascular: S1 + S2.  Regular  Lungs/Respiratory: Transmitted Breath sounds noted.  Bilateral, somewhat diffuse, wheezing heard.  Bilateral basal crackles noted  GI/Abdomen: Soft. Bowel sounds positive.  Musculoskeletal/Extremities: Trace edema noted. Gait could not be assessed at this time, as the patient was laying in bed.   Neurologic: Was able to respond appropriately when asked to move upper and lower extremities. Detailed exam couldn't be performed due to her being on NIV therapy.   Psych: Was able to follow simple commands.  Skin: Appeared somewhat dry         Labs: Reviewed.   Results from last 7 days   Lab Units 05/16/23  0453 05/15/23  0413 05/14/23  0430 05/13/23  0510 05/11/23  0503 05/10/23  0908   WBC 10*3/mm3 17.14* 16.64* 16.85* 16.25* 10.00 7.34   HEMOGLOBIN g/dL 12.4 12.2 12.0 11.7* 12.1 12.3   HEMATOCRIT % 40.1 40.2 39.8 39.0 39.9 40.6   PLATELETS 10*3/mm3 204 187 187 168 183 172   NEUTROPHIL % % 89.5*  --  84.7*  --   --   --    NEUTROS ABS 10*3/mm3 15.32*  --  14.27*  --   --  5.21   EOSINOPHIL % % 0.0*  --  0.0*  --   --   --    EOS ABS 10*3/mm3 0.00  --  0.00  --   --   --    LYMPHOCYTE % % 5.1*  --  6.9*  --   --   --    LYMPHS ABS 10*3/mm3 0.88  --  1.17  --   --   --        Lab Results   Component Value Date    PROCALCITO 0.11 05/16/2023    PROCALCITO 0.08 05/14/2023    PROCALCITO 0.20 05/10/2023       No results found for: CRP    No results found for: SEDRATE    Lab Results   Component Value Date    PROBNP 572.7 05/14/2023    PROBNP 479.4 05/10/2023    PROBNP 234.4 05/04/2023       Results from last 7 days   Lab Units 05/16/23  0453 05/15/23  0413 05/14/23  0430   SODIUM mmol/L 145 146* 147*   POTASSIUM mmol/L 4.1 4.9 4.3   CHLORIDE mmol/L 93* 95* 99   CO2 mmol/L 43.3* 44.7* 42.3*   BUN mg/dL 31*  21 23   CREATININE mg/dL 0.37* 0.36* 0.36*   CALCIUM mg/dL 9.6 9.4 9.4   ANION GAP mmol/L 8.7 6.3 5.7   GLUCOSE mg/dL 202* 148* 138*                   No results found for: CKTOTAL    No components found for: HSTROPT    Lab Results   Component Value Date    TROPONINT 22 (H) 05/05/2023    TROPONINT 25 (H) 05/05/2023    TROPONINT 44 (H) 05/04/2023       No results found for: DDIMER    Brief Urine Lab Results  (Last result in the past 365 days)        Color   Clarity   Blood   Leuk Est   Nitrite   Protein   CREAT   Urine HCG        05/12/23 1738 Yellow   Cloudy   Negative   Trace   Negative   Trace                   Lab Results   Component Value Date    TSH 0.304 05/09/2023       No results found for: FREET4      Micro: As of May 16, 2023   Lab Results   Component Value Date    RESPCX Light growth (2+) Normal Respiratory Ana 10/02/2017     No results found for: BCIDPCR  Lab Results   Component Value Date    BLOODCX No growth at 5 days 05/04/2023    BLOODCX No growth at 5 days 05/04/2023    BLOODCX No growth at 5 days 04/16/2023    BLOODCX No growth at 5 days 04/16/2023     Lab Results   Component Value Date    URINECX Final report 02/07/2019    URINECX 20,000-30,000 CFU/mL Escherichia coli (A) 10/05/2018     No results found for: MRSACX  Lab Results   Component Value Date    MRSAPCR No MRSA Detected 04/15/2023     No results found for: URCX  No components found for: LOWRESPCF  No results found for: THROATCX  No results found for: CULTURES  No components found for: STREPBCX  No results found for: STREPPNEUAG  No results found for: LEGIONELLA  No results found for: MYCOPLASCX  No results found for: GCCX  No results found for: WOUNDCX  No results found for: BODYFLDCX    No results found for: FLU    Lab Results   Component Value Date    ADENOVIRUS Not Detected 05/04/2023     Lab Results   Component Value Date    TU134V Not Detected 05/04/2023     Lab Results   Component Value Date    CVHKU1 Not Detected 05/04/2023      Lab Results   Component Value Date    CVNL63 Not Detected 05/04/2023     Lab Results   Component Value Date    CVOC43 Not Detected 05/04/2023     Lab Results   Component Value Date    HUMETPNEVS Not Detected 05/04/2023     Lab Results   Component Value Date    HURVEV Not Detected 05/04/2023     Lab Results   Component Value Date    FLUBPCR Not Detected 05/04/2023     Lab Results   Component Value Date    PARAINFLUE Not Detected 05/04/2023     Lab Results   Component Value Date    PARAFLUV2 Not Detected 05/04/2023     Lab Results   Component Value Date    PARAFLUV3 Detected (A) 05/04/2023     Lab Results   Component Value Date    PARAFLUV4 Not Detected 05/04/2023     Lab Results   Component Value Date    BPERTPCR Not Detected 05/04/2023     No results found for: [id number]  Lab Results   Component Value Date    CPNEUPCR Not Detected 05/04/2023     Lab Results   Component Value Date    MPNEUMO Not Detected 05/04/2023     Lab Results   Component Value Date    FLUAPCR Not Detected 05/04/2023     No results found for: FLUAH3  No results found for: FLUAH1  Lab Results   Component Value Date    RSV Not Detected 05/04/2023     Lab Results   Component Value Date    BPARAPCR Not Detected 05/04/2023       COVID 19:  Lab Results   Component Value Date    COVID19 Not Detected 05/04/2023         ABG: Reviewed   Recent Labs     05/13/23  0510 05/15/23  0453 05/16/23  0519   PHART 7.420 7.445 7.453*   TVI1ACP 70.6* 75.7* 76.4*   PO2ART 74.4* 56.8* 56.7*   UPW5ZLO 45.7* 51.9* 53.4*   BASEEXCESS 17.9* 23.5* 24.7*       Lab Results   Component Value Date    LACTATE 1.3 05/04/2023    LACTATE 1.2 04/13/2023    LACTATE 2.7 (C) 04/13/2023         Echo: Results for orders placed during the hospital encounter of 04/13/23    Adult Transthoracic Echo Complete W/ Cont if Necessary Per Protocol    Interpretation Summary    Left ventricular diastolic function is consistent with (grade I) impaired relaxation.    Mild aortic valve stenosis is  present.    Estimated right ventricular systolic pressure from tricuspid regurgitation is moderately elevated (45-55 mmHg).    Mild to moderate pulmonary hypertension is present.      Results for orders placed during the hospital encounter of 10/01/17    Adult Transthoracic Echo Complete W/ Cont if Necessary Per Protocol    Interpretation Summary  TEchnically difficult study  1) Borderline LVH with normal LV systolic function ( EF is over 55%)  2) Normal left atrial size with mild elevation in LVedp  3) Trace MR  4) Mild TR with normal PA pressures  5) Mild AI  6) Small RV with normal RV function        dexmedetomidine, 0.2-1.5 mcg/kg/hr, Last Rate: Stopped (05/12/23 0471)  Pharmacy to Dose enoxaparin (LOVENOX),           CXRay: Latest imaging study was reviewed personally.   Imaging Results (Last 48 Hours)       Procedure Component Value Units Date/Time    XR Chest 1 View [[id number]] Collected: 05/15/23 0837     Updated: 05/15/23 0839    Narrative:      PROCEDURE: XR CHEST 1 VW-     HISTORY: follow up, increased WBC & CO2; J96.21-Acute and chronic  respiratory failure with hypoxia; J96.22-Acute and chronic respiratory  failure with hypercapnia; J44.1-Chronic obstructive pulmonary disease  with (acute) exacerbation; B34.8-Other viral infections of unspecified  site     COMPARISON: 05/13/2023.     FINDINGS: The heart is normal in size. Bilateral interstitial disease  most prominent left lung base is stable from prior. Bronchial wall  thickening noted.. The mediastinum is unremarkable. There is no  pneumothorax.  There are no acute osseous abnormalities.       Impression:      Stable chest..           This report was signed and finalized on 5/15/2023 8:37 AM by Shraddha Wharton MD.              Assessment & Recommendations/Plan:   1.  Acute respiratory failure  Continue AVAPS at the current setting.  Continue using AVAPS 2 hours on and 2 hours off.  We will decrease FiO2 further, as tolerated.  Currently on 55% FiO2.  I  have asked the respiratory therapist, at the bedside, to decrease FiO2 as tolerated.  Latest ABGs show compensated mild acute on chronic respiratory acidosis, with improving PCO2.  Given her continued hypoxemia, I will order CT of the chest without contrast.  Unfortunately anaphylaxis is listed as one of the allergies to contrast medium.  Based on the CT of the chest without contrast results, further recommendation will be made.    2.  COPD with acute exacerbation  Likely triggered by parainfluenza bronchitis  Continue current nebulized treatments.  Has remained on Solu-Medrol 40 mg every 12 hourly for at least 3-4 days.  Continue Spiriva and Symbicort  Will benefit from outpatient PFTs    3.  Chronic respiratory acidosis  Appears to be on NIV device at home.    4.  Smoking  Was advised to quit smoking    5.  Pulmonary hypertension  May need outpatient work-up  Likely secondary to underlying likely severe COPD    6.  Anxiety  Has been on Precedex in the past  Continue other anxiolytics.    Patient continues to be at high risk for further deterioration and possible intubation.    We have reviewed patient's current orders and changes, if any, have been suggested to primary care team. Plan was also discussed with nursing staff, as necessary.     This document was electronically signed by Arnaldo Soriano MD on 05/16/23 at 11:56 EDT      Dictated utilizing Dragon dictation.          Electronically signed by Arnaldo Soriano MD at 05/16/23 1202       Consult Notes (last 24 hours)  Notes from 05/15/23 1533 through 05/16/23 1533   No notes of this type exist for this encounter.

## 2023-05-16 NOTE — PLAN OF CARE
Goal Outcome Evaluation:  Plan of Care Reviewed With: patient        Progress: declining  Outcome Evaluation: Pt participated in PTx this date. Pt declined OOB activities but agreeable to rolling and ther exer in supine. Pt rolled with Jaden. Pt then performed B LE ther exer, see f/s for details. Pt very fatigued following ther exer, declining further ther exer d/t difficulty breathing. Pt on 10L O2 throughout O2 sats 94, 90, 94 before, during and after activity. Cont per POC      PT Evaluation Complexity  History, PT Evaluation Complexity: 1-2 personal factors and/or comorbidities  Examination of Body Systems (PT Eval Complexity): 1-2 elements  Clinical Presentation (PT Evaluation Complexity): evolving  Clinical Decision Making (PT Evaluation Complexity): moderate complexity  Overall Complexity (PT Evaluation Complexity): low complexity

## 2023-05-16 NOTE — PROGRESS NOTES
"  CC: Acute Respiratory Failure.     S: Currently on NIV therapy.  Events reviewed.  Continues to require high flow nasal cannula when off the NIV/AVAPS.  As per the nursing staff, the patient requires up to 12 L/min high flow nasal cannula and becomes more tachypneic when off AVAPS.    ROS: Could not be reliably obtained as the patient is on NIV therapy.     O:Vital signs reviewed. FiO2: 55-60%.    /64   Pulse 92   Temp 98.5 °F (36.9 °C) (Temporal)   Resp 24   Ht 160 cm (63\")   Wt 64.5 kg (142 lb 3.2 oz)   SpO2 95%   BMI 25.19 kg/m²     Temp (24hrs), Av.4 °F (36.3 °C), Min:97 °F (36.1 °C), Max:98.5 °F (36.9 °C)      I & Os reviewed.   Intake/Output       05/15/23 0700 - 23 0659 23 0700 - 23 0659    Intake (ml) 120 240    Output (ml) 1850 --    Net (ml) -1730 240    Last Weight 64.5 kg (142 lb 3.2 oz) --          Net IO Since Admission: -1,076 mL [23 1156]    General/Constitutional: On NIV therapy. Appears to be in mild distress.  Eyes: Eyes were closed.  Neck: Supple without JVD. No obvious masses noted.   Cardiovascular: S1 + S2.  Regular  Lungs/Respiratory: Transmitted Breath sounds noted.  Bilateral, somewhat diffuse, wheezing heard.  Bilateral basal crackles noted  GI/Abdomen: Soft. Bowel sounds positive.  Musculoskeletal/Extremities: Trace edema noted. Gait could not be assessed at this time, as the patient was laying in bed.   Neurologic: Was able to respond appropriately when asked to move upper and lower extremities. Detailed exam couldn't be performed due to her being on NIV therapy.   Psych: Was able to follow simple commands.  Skin: Appeared somewhat dry         Labs: Reviewed.   Results from last 7 days   Lab Units 23  0453 05/15/23  0413 23  0430 23  0510 23  0503 05/10/23  0908   WBC 10*3/mm3 17.14* 16.64* 16.85* 16.25* 10.00 7.34   HEMOGLOBIN g/dL 12.4 12.2 12.0 11.7* 12.1 12.3   HEMATOCRIT % 40.1 40.2 39.8 39.0 39.9 40.6   PLATELETS " 10*3/mm3 204 187 187 168 183 172   NEUTROPHIL % % 89.5*  --  84.7*  --   --   --    NEUTROS ABS 10*3/mm3 15.32*  --  14.27*  --   --  5.21   EOSINOPHIL % % 0.0*  --  0.0*  --   --   --    EOS ABS 10*3/mm3 0.00  --  0.00  --   --   --    LYMPHOCYTE % % 5.1*  --  6.9*  --   --   --    LYMPHS ABS 10*3/mm3 0.88  --  1.17  --   --   --        Lab Results   Component Value Date    PROCALCITO 0.11 05/16/2023    PROCALCITO 0.08 05/14/2023    PROCALCITO 0.20 05/10/2023       No results found for: CRP    No results found for: SEDRATE    Lab Results   Component Value Date    PROBNP 572.7 05/14/2023    PROBNP 479.4 05/10/2023    PROBNP 234.4 05/04/2023       Results from last 7 days   Lab Units 05/16/23  0453 05/15/23  0413 05/14/23  0430   SODIUM mmol/L 145 146* 147*   POTASSIUM mmol/L 4.1 4.9 4.3   CHLORIDE mmol/L 93* 95* 99   CO2 mmol/L 43.3* 44.7* 42.3*   BUN mg/dL 31* 21 23   CREATININE mg/dL 0.37* 0.36* 0.36*   CALCIUM mg/dL 9.6 9.4 9.4   ANION GAP mmol/L 8.7 6.3 5.7   GLUCOSE mg/dL 202* 148* 138*                   No results found for: CKTOTAL    No components found for: HSTROPT    Lab Results   Component Value Date    TROPONINT 22 (H) 05/05/2023    TROPONINT 25 (H) 05/05/2023    TROPONINT 44 (H) 05/04/2023       No results found for: DDIMER    Brief Urine Lab Results  (Last result in the past 365 days)      Color   Clarity   Blood   Leuk Est   Nitrite   Protein   CREAT   Urine HCG        05/12/23 1738 Yellow   Cloudy   Negative   Trace   Negative   Trace                 Lab Results   Component Value Date    TSH 0.304 05/09/2023       No results found for: FREET4      Micro: As of May 16, 2023   Lab Results   Component Value Date    RESPCX Light growth (2+) Normal Respiratory Ana 10/02/2017     No results found for: BCIDPCR  Lab Results   Component Value Date    BLOODCX No growth at 5 days 05/04/2023    BLOODCX No growth at 5 days 05/04/2023    BLOODCX No growth at 5 days 04/16/2023    BLOODCX No growth at 5 days  04/16/2023     Lab Results   Component Value Date    URINECX Final report 02/07/2019    URINECX 20,000-30,000 CFU/mL Escherichia coli (A) 10/05/2018     No results found for: MRSACX  Lab Results   Component Value Date    MRSAPCR No MRSA Detected 04/15/2023     No results found for: URCX  No components found for: LOWRESPCF  No results found for: THROATCX  No results found for: CULTURES  No components found for: STREPBCX  No results found for: STREPPNEUAG  No results found for: LEGIONELLA  No results found for: MYCOPLASCX  No results found for: GCCX  No results found for: WOUNDCX  No results found for: BODYFLDCX    No results found for: FLU    Lab Results   Component Value Date    ADENOVIRUS Not Detected 05/04/2023     Lab Results   Component Value Date    RZ252N Not Detected 05/04/2023     Lab Results   Component Value Date    CVHKU1 Not Detected 05/04/2023     Lab Results   Component Value Date    CVNL63 Not Detected 05/04/2023     Lab Results   Component Value Date    CVOC43 Not Detected 05/04/2023     Lab Results   Component Value Date    HUMETPNEVS Not Detected 05/04/2023     Lab Results   Component Value Date    HURVEV Not Detected 05/04/2023     Lab Results   Component Value Date    FLUBPCR Not Detected 05/04/2023     Lab Results   Component Value Date    PARAINFLUE Not Detected 05/04/2023     Lab Results   Component Value Date    PARAFLUV2 Not Detected 05/04/2023     Lab Results   Component Value Date    PARAFLUV3 Detected (A) 05/04/2023     Lab Results   Component Value Date    PARAFLUV4 Not Detected 05/04/2023     Lab Results   Component Value Date    BPERTPCR Not Detected 05/04/2023     No results found for: [id number]  Lab Results   Component Value Date    CPNEUPCR Not Detected 05/04/2023     Lab Results   Component Value Date    MPNEUMO Not Detected 05/04/2023     Lab Results   Component Value Date    FLUAPCR Not Detected 05/04/2023     No results found for: FLUAH3  No results found for: FLUAH1  Lab  Results   Component Value Date    RSV Not Detected 05/04/2023     Lab Results   Component Value Date    BPARAPCR Not Detected 05/04/2023       COVID 19:  Lab Results   Component Value Date    COVID19 Not Detected 05/04/2023         ABG: Reviewed   Recent Labs     05/13/23  0510 05/15/23  0453 05/16/23  0519   PHART 7.420 7.445 7.453*   AWR4GPI 70.6* 75.7* 76.4*   PO2ART 74.4* 56.8* 56.7*   WXG6MYK 45.7* 51.9* 53.4*   BASEEXCESS 17.9* 23.5* 24.7*       Lab Results   Component Value Date    LACTATE 1.3 05/04/2023    LACTATE 1.2 04/13/2023    LACTATE 2.7 (C) 04/13/2023         Echo: Results for orders placed during the hospital encounter of 04/13/23    Adult Transthoracic Echo Complete W/ Cont if Necessary Per Protocol    Interpretation Summary  •  Left ventricular diastolic function is consistent with (grade I) impaired relaxation.  •  Mild aortic valve stenosis is present.  •  Estimated right ventricular systolic pressure from tricuspid regurgitation is moderately elevated (45-55 mmHg).  •  Mild to moderate pulmonary hypertension is present.      Results for orders placed during the hospital encounter of 10/01/17    Adult Transthoracic Echo Complete W/ Cont if Necessary Per Protocol    Interpretation Summary  TEchnically difficult study  1) Borderline LVH with normal LV systolic function ( EF is over 55%)  2) Normal left atrial size with mild elevation in LVedp  3) Trace MR  4) Mild TR with normal PA pressures  5) Mild AI  6) Small RV with normal RV function        dexmedetomidine, 0.2-1.5 mcg/kg/hr, Last Rate: Stopped (05/12/23 1556)  Pharmacy to Dose enoxaparin (LOVENOX),           CXRay: Latest imaging study was reviewed personally.   Imaging Results (Last 48 Hours)     Procedure Component Value Units Date/Time    XR Chest 1 View [[id number]] Collected: 05/15/23 0837     Updated: 05/15/23 0839    Narrative:      PROCEDURE: XR CHEST 1 VW-     HISTORY: follow up, increased WBC & CO2; J96.21-Acute and  chronic  respiratory failure with hypoxia; J96.22-Acute and chronic respiratory  failure with hypercapnia; J44.1-Chronic obstructive pulmonary disease  with (acute) exacerbation; B34.8-Other viral infections of unspecified  site     COMPARISON: 05/13/2023.     FINDINGS: The heart is normal in size. Bilateral interstitial disease  most prominent left lung base is stable from prior. Bronchial wall  thickening noted.. The mediastinum is unremarkable. There is no  pneumothorax.  There are no acute osseous abnormalities.       Impression:      Stable chest..           This report was signed and finalized on 5/15/2023 8:37 AM by Shraddha Wharton MD.            Assessment & Recommendations/Plan:   1.  Acute respiratory failure  Continue AVAPS at the current setting.  Continue using AVAPS 2 hours on and 2 hours off.  We will decrease FiO2 further, as tolerated.  Currently on 55% FiO2.  I have asked the respiratory therapist, at the bedside, to decrease FiO2 as tolerated.  Latest ABGs show compensated mild acute on chronic respiratory acidosis, with improving PCO2.  Given her continued hypoxemia, I will order CT of the chest without contrast.  Unfortunately anaphylaxis is listed as one of the allergies to contrast medium.  Based on the CT of the chest without contrast results, further recommendation will be made.    2.  COPD with acute exacerbation  Likely triggered by parainfluenza bronchitis  Continue current nebulized treatments.  Has remained on Solu-Medrol 40 mg every 12 hourly for at least 3-4 days.  Continue Spiriva and Symbicort  Will benefit from outpatient PFTs    3.  Chronic respiratory acidosis  Appears to be on NIV device at home.    4.  Smoking  Was advised to quit smoking    5.  Pulmonary hypertension  May need outpatient work-up  Likely secondary to underlying likely severe COPD    6.  Anxiety  Has been on Precedex in the past  Continue other anxiolytics.    Patient continues to be at high risk for further  deterioration and possible intubation.    We have reviewed patient's current orders and changes, if any, have been suggested to primary care team. Plan was also discussed with nursing staff, as necessary.     This document was electronically signed by Arnaldo Soriano MD on 05/16/23 at 11:56 EDT      Dictated utilizing Dragon dictation.    ADDENDUM:  Last CT scan results was reviewed in great detail with the patient.  Results for orders placed during the hospital encounter of 05/04/23    CT Chest Without Contrast Diagnostic    Narrative  PROCEDURE: CT CHEST WO CONTRAST DIAGNOSTIC-    HISTORY: Dyspnea, chronic, unclear etiology; J96.21-Acute and chronic  respiratory failure with hypoxia; J96.22-Acute and chronic respiratory  failure with hypercapnia; J44.1-Chronic obstructive pulmonary disease  with (acute) exacerbation; B34.8-Other viral infections of unspecified  site    COMPARISON: April 13, 2023.    PROCEDURE:  Multiple axial CT images were obtained from the thoracic  inlet through the upper abdomen without the use of contrast.    FINDINGS:  Soft tissue windows reveal no suspicious axillary, hilar or mediastinal  adenopathy. Heart size is normal. The aorta is normal in caliber. There  are vascular calcifications. There are no pleural or pericardial  effusions. There is bronchiectasis in the bilateral lower lobes. There  is new mucous plugging and airspace disease in the bilateral lower lobes  consistent with pneumonia. There are tree-in-bud opacities diffusely  which have worsened since the prior exam consistent with bronchiolitis.  There is evidence of old calcified granulomatous disease. The visualized  upper abdomen shows the gallbladder to be surgically absent. Bone  windows reveal no bony destructive lesions.    Impression  Mucous plugging, bilateral lower lobe pneumonia, and  bronchiolitis. 8-12 week follow-up exam is recommended to document  stability.    193.74 mGy.cm      This study was performed with  techniques to keep radiation doses as low  as reasonably achievable (ALARA). Individualized dose reduction  techniques using automated exposure control or adjustment of mA and/or  kV according to the patient size were employed.      Images were reviewed, interpreted, and dictated by Dr. Shraddha Wharton MD  Transcribed by Ade Solis PA-C.    This report was signed and finalized on 5/16/2023 1:38 PM by Shraddha Wharton MD.      I talked to her daughter, Nehal at [phone], and told her about the findings on the CT scan and told her that we will try noninvasive measures such as hypertonic saline and MetaNeb treatment.  I told the patient's daughter that if noninvasive measures do not improve her oxygen saturations and she continues to require high flow nasal cannula supplementation, then the next option would be to consider bronchoscopy.    I also explained to the patient's daughter that bronchoscopy may be risky especially given the possibility of intubation and mechanical ventilation although this may be the only option available, if there is no clinical improvement over the next 24-48 hours.    The patient's daughter is hesitant to move forward with any procedures at this point, but to understand the possibility of considering bronchoscopy and will discuss it further with her other family members, if bronchoscopy is indeed needed.    She requested to continue noninvasive measures for the next 24-48 hours.      This document was electronically signed by Arnaldo Soriano MD on 05/16/23 at 16:06 EDT

## 2023-05-16 NOTE — THERAPY TREATMENT NOTE
"Patient Name: Carolin Toscano  : 1945    MRN: [id number]                              Today's Date: 2023       Admit Date: 2023    Visit Dx:     ICD-10-CM ICD-9-CM   1. Acute on chronic respiratory failure with hypoxia and hypercapnia  J96.21 518.84    J96.22 786.09     799.02   2. COPD exacerbation  J44.1 491.21   3. Parainfluenza virus infection  B34.8 078.89     Patient Active Problem List   Diagnosis   • CAP (community acquired pneumonia)   • Cigarette nicotine dependence with nicotine-induced disorder   • Essential hypertension   • Dyslipidemia   • Blood glucose elevated   • Muscle ache   • COPD (chronic obstructive pulmonary disease)   • Nuclear sclerotic cataract of right eye   • Acute respiratory failure   • COPD exacerbation   • Acute on chronic respiratory failure with hypoxia and hypercapnia     Past Medical History:   Diagnosis Date   • Arthritis    • COPD (chronic obstructive pulmonary disease)    • Gall stones    • Goiter     \"inward\"   • Hypertension    • Hypertension    • Kidney infection    • Kidney stone    • Kidney stones    • Migraine headache    • Scarlet fever      Past Surgical History:   Procedure Laterality Date   • BLADDER SURGERY     • CATARACT EXTRACTION W/ INTRAOCULAR LENS IMPLANT Left 2022    Procedure: CATARACT PHACO EXTRACTION WITH INTRAOCULAR LENS IMPLANT LEFT;  Surgeon: Sathish Lopez MD;  Location: [facility];  Service: Ophthalmology;  Laterality: Left;   • CATARACT EXTRACTION W/ INTRAOCULAR LENS IMPLANT Right 2022    Procedure: CATARACT PHACO EXTRACTION WITH INTRAOCULAR LENS IMPLANT RIGHT;  Surgeon: Sathish Lopez MD;  Location: [facility];  Service: Ophthalmology;  Laterality: Right;   • CHOLECYSTECTOMY     • HYSTERECTOMY     • TONSILLECTOMY        General Information     Row Name 23 1356          Physical Therapy Time and Intention    Document Type therapy note (daily note)  -MS     Mode of Treatment physical therapy  -MS     Row Name " 05/16/23 1356          General Information    Patient Profile Reviewed yes  -MS     Prior Level of Function independent:;all household mobility  -MS     Existing Precautions/Restrictions fall  -MS     Barriers to Rehab previous functional deficit  -MS     Row Name 05/16/23 1356          Living Environment    People in Home sibling(s)  -MS     Row Name 05/16/23 1356          Home Main Entrance    Number of Stairs, Main Entrance none  -MS     Row Name 05/16/23 1356          Stairs Within Home, Primary    Number of Stairs, Within Home, Primary none  -MS     Row Name 05/16/23 1356          Cognition    Orientation Status (Cognition) oriented x 4  -MS           User Key  (r) = Recorded By, (t) = Taken By, (c) = Cosigned By    Initials Name Provider Type    Martín Carreno, ROB Physical Therapist               Mobility     Row Name 05/16/23 1358          Bed Mobility    Bed Mobility rolling left  -MS     Rolling Left Isabela (Bed Mobility) minimum assist (75% patient effort)  -MS           User Key  (r) = Recorded By, (t) = Taken By, (c) = Cosigned By    Initials Name Provider Type    Martín Carreno, ROB Physical Therapist               Obj/Interventions     Row Name 05/16/23 1358          Range of Motion Comprehensive    General Range of Motion bilateral lower extremity ROM WFL  -MS     Row Name 05/16/23 1358          Strength Comprehensive (MMT)    General Manual Muscle Testing (MMT) Assessment lower extremity strength deficits identified  -MS     Row Name 05/16/23 1356          Motor Skills    Therapeutic Exercise hip;knee;ankle  Pt performed x10 reps AP, x5 reps QS ob LE  -MS           User Key  (r) = Recorded By, (t) = Taken By, (c) = Cosigned By    Initials Name Provider Type    Martín Carreno, ROB Physical Therapist               Goals/Plan    No documentation.                Clinical Impression     Row Name 05/16/23 1353          Pain    Pretreatment Pain Rating 0/10 - no pain  -MS      Posttreatment Pain Rating 0/10 - no pain  -MS     Row Name 05/16/23 2186          Plan of Care Review    Plan of Care Reviewed With patient  -MS     Progress declining  -MS     Outcome Evaluation Pt participated in PTx this date. Pt declined OOB activities but agreeable to rolling and ther exer in supine. Pt rolled with Jaden. Pt then performed B LE ther exer, see f/s for details. Pt very fatigued following ther exer, declining further ther exer d/t difficulty breathing. Pt on 10L O2 throughout O2 sats 94, 90, 94 before, during and after activity. Cont per POC  -MS     Row Name 05/16/23 1556          Therapy Assessment/Plan (PT)    Criteria for Skilled Interventions Met (PT) yes;meets criteria  -MS     Row Name 05/16/23 8275          Vital Signs    Pre SpO2 (%) 94  -MS     O2 Delivery Pre Treatment hi-flow  -MS     Intra SpO2 (%) 90  -MS     O2 Delivery Intra Treatment hi-flow  -MS     Post SpO2 (%) 94  -MS     O2 Delivery Post Treatment hi-flow  -MS     Pre Patient Position Supine  -MS     Intra Patient Position Sitting  -MS     Post Patient Position Supine  -MS     Row Name 05/16/23 6182          Positioning and Restraints    Pre-Treatment Position in bed  -MS     Post Treatment Position bed  -MS     In Bed fowlers;call light within reach;encouraged to call for assist  -MS           User Key  (r) = Recorded By, (t) = Taken By, (c) = Cosigned By    Initials Name Provider Type    Martín Carreno, PT Physical Therapist               Outcome Measures     Row Name 05/16/23 5256          How much help from another person do you currently need...    Turning from your back to your side while in flat bed without using bedrails? 3  -MS     Moving from lying on back to sitting on the side of a flat bed without bedrails? 3  -MS     Moving to and from a bed to a chair (including a wheelchair)? 2  -MS     Standing up from a chair using your arms (e.g., wheelchair, bedside chair)? 2  -MS     Climbing 3-5 steps with a railing?  1  -MS     To walk in hospital room? 1  -MS     AM-PAC 6 Clicks Score (PT) 12  -MS     Highest level of mobility 4 --> Transferred to chair/commode  -MS     Row Name 05/16/23 1246          Functional Assessment    Outcome Measure Options AM-PAC 6 Clicks Daily Activity (OT)  -SD           User Key  (r) = Recorded By, (t) = Taken By, (c) = Cosigned By    Initials Name Provider Type    Cecelia Johnson OT Occupational Therapist    Martín Carreno, PT Physical Therapist                             Physical Therapy Education     Title: PT OT SLP Therapies (In Progress)     Topic: Physical Therapy (In Progress)     Point: Mobility training (Done)     Learning Progress Summary           Patient Acceptance, E, VU by MS at 5/16/2023 1403    Comment: importance of mobility    Acceptance, E, VU by AM at 5/15/2023 0701    Acceptance, E, VU by MS at 5/12/2023 1458    Comment: importance of mobility    Acceptance, E,TB,D, VU,NR by  at 5/8/2023 1250    Comment: Importance of activity and exercise techniques    Acceptance, E, VU by MS at 5/5/2023 1136    Comment: importance of mobility                   Point: Home exercise program (Done)     Learning Progress Summary           Patient Acceptance, E, VU by MS at 5/16/2023 1403    Comment: importance of mobility    Acceptance, E, VU by AM at 5/15/2023 0701    Acceptance, E,TB, VU,NR by  at 5/14/2023 1310    Comment: Importance of increasing movement of B LE    Acceptance, E,TB,D, VU,NR by  at 5/8/2023 1250    Comment: Importance of activity and exercise techniques    Acceptance, E, VU by MS at 5/5/2023 1136    Comment: importance of mobility                   Point: Body mechanics (Not Started)     Learner Progress:  Not documented in this visit.          Point: Precautions (Not Started)     Learner Progress:  Not documented in this visit.                      User Key     Initials Effective Dates Name Provider Type Discipline    CC 06/16/21 -  Nasra Quiñones, ELVIRA  Physical Therapist Assistant PT     06/16/21 -  Ronak Cruz, PTA Physical Therapist Assistant PT    MS 07/01/22 -  Martín Ortiz PT Physical Therapist PT    AM 02/22/23 -  Rob Lu, RN Registered Nurse Nurse              PT Recommendation and Plan  Planned Therapy Interventions (PT): balance training, bed mobility training, gait training, home exercise program, transfer training, ROM (range of motion), stair training, strengthening, patient/family education  Plan of Care Reviewed With: patient  Progress: declining  Outcome Evaluation: Pt participated in PTx this date. Pt declined OOB activities but agreeable to rolling and ther exer in supine. Pt rolled with Jaden. Pt then performed B LE ther exer, see f/s for details. Pt very fatigued following ther exer, declining further ther exer d/t difficulty breathing. Pt on 10L O2 throughout O2 sats 94, 90, 94 before, during and after activity. Cont per POC     Time Calculation:    PT Charges     Row Name 05/16/23 1404             Time Calculation    Start Time 1010  -MS         Time Calculation- PT    Total Timed Code Minutes- PT 18 minute(s)  -MS         Timed Charges    51357 - PT Therapeutic Exercise Minutes 18  -MS         Total Minutes    Timed Charges Total Minutes 18  -MS       Total Minutes 18  -MS            User Key  (r) = Recorded By, (t) = Taken By, (c) = Cosigned By    Initials Name Provider Type    MS Diana Martín, PT Physical Therapist              Therapy Charges for Today     Code Description Service Date Service Provider Modifiers Qty    29795330072 HC PT THER PROC EA 15 MIN 5/16/2023 Martín Ortiz PT GP 1          PT G-Codes  Outcome Measure Options: AM-PAC 6 Clicks Daily Activity (OT)  AM-PAC 6 Clicks Score (PT): 12  AM-PAC 6 Clicks Score (OT): 13  PT Discharge Summary  Anticipated Discharge Disposition (PT): inpatient rehabilitation facility, skilled nursing facility, LTCH (long-term care hospital)    Martín Ortiz  PT  5/16/2023

## 2023-05-16 NOTE — THERAPY TREATMENT NOTE
"Patient Name: Carolin Toscano  : 1945    MRN: [id number]                              Today's Date: 2023       Admit Date: 2023    Visit Dx:     ICD-10-CM ICD-9-CM   1. Acute on chronic respiratory failure with hypoxia and hypercapnia  J96.21 518.84    J96.22 786.09     799.02   2. COPD exacerbation  J44.1 491.21   3. Parainfluenza virus infection  B34.8 078.89     Patient Active Problem List   Diagnosis   • CAP (community acquired pneumonia)   • Cigarette nicotine dependence with nicotine-induced disorder   • Essential hypertension   • Dyslipidemia   • Blood glucose elevated   • Muscle ache   • COPD (chronic obstructive pulmonary disease)   • Nuclear sclerotic cataract of right eye   • Acute respiratory failure   • COPD exacerbation   • Acute on chronic respiratory failure with hypoxia and hypercapnia     Past Medical History:   Diagnosis Date   • Arthritis    • COPD (chronic obstructive pulmonary disease)    • Gall stones    • Goiter     \"inward\"   • Hypertension    • Hypertension    • Kidney infection    • Kidney stone    • Kidney stones    • Migraine headache    • Scarlet fever      Past Surgical History:   Procedure Laterality Date   • BLADDER SURGERY     • CATARACT EXTRACTION W/ INTRAOCULAR LENS IMPLANT Left 2022    Procedure: CATARACT PHACO EXTRACTION WITH INTRAOCULAR LENS IMPLANT LEFT;  Surgeon: Sathish Lopez MD;  Location: [facility];  Service: Ophthalmology;  Laterality: Left;   • CATARACT EXTRACTION W/ INTRAOCULAR LENS IMPLANT Right 2022    Procedure: CATARACT PHACO EXTRACTION WITH INTRAOCULAR LENS IMPLANT RIGHT;  Surgeon: Sathish Lopez MD;  Location: [facility];  Service: Ophthalmology;  Laterality: Right;   • CHOLECYSTECTOMY     • HYSTERECTOMY     • TONSILLECTOMY        General Information     Row Name 23 1239          OT Time and Intention    Document Type therapy note (daily note)  -SD     Mode of Treatment occupational therapy  -SD     Row Name 23 " [facility]          General Information    Patient Profile Reviewed yes  -SD           User Key  (r) = Recorded By, (t) = Taken By, (c) = Cosigned By    Initials Name Provider Type    Cecelia Johnson OT Occupational Therapist                 Mobility/ADL's     Row Name 05/16/23 [facility]          Bed Mobility    Bed Mobility rolling left  -SD     Rolling Left [place] (Bed Mobility) minimum assist (75% patient effort)  -SD     Assistive Device (Bed Mobility) bed rails;head of bed elevated  -SD     Row Name 05/16/23 [facility]          Grooming Assessment/Training    [place] Level (Grooming) wash face, hands;minimum assist (75% patient effort)  -SD     Row Name 05/16/23 [facility]          Self-Feeding Assessment/Training    [place] Level (Feeding) scoop food and bring to mouth;supervision  -SD           User Key  (r) = Recorded By, (t) = Taken By, (c) = Cosigned By    Initials Name Provider Type    Cecelia Johnson OT Occupational Therapist               Obj/Interventions     Row Name 05/16/23 [facility]          Shoulder (Therapeutic Exercise)    Shoulder (Therapeutic Exercise) AAROM (active assistive range of motion)  -SD     Shoulder AAROM (Therapeutic Exercise) bilateral;flexion;extension;10 repetitions  -SD     Row Name 05/16/23 [facility]          Elbow/Forearm (Therapeutic Exercise)    Elbow/Forearm (Therapeutic Exercise) AROM (active range of motion)  -SD     Elbow/Forearm AROM (Therapeutic Exercise) bilateral;flexion;extension;10 repetitions  -SD     Row Name 05/16/23 1239          Wrist (Therapeutic Exercise)    Wrist (Therapeutic Exercise) AROM (active range of motion)  -SD     Wrist AROM (Therapeutic Exercise) bilateral;flexion;extension  -SD     Row Name 05/16/23 [facility]          Hand (Therapeutic Exercise)    Hand (Therapeutic Exercise) AROM (active range of motion)  -SD     Hand AROM/AAROM (Therapeutic Exercise) bilateral;AROM (active range of motion);finger flexion;finger extension  -SD     Row Name 05/16/23 1239           Motor Skills    Therapeutic Exercise shoulder;elbow/forearm;wrist;hand  -SD           User Key  (r) = Recorded By, (t) = Taken By, (c) = Cosigned By    Initials Name Provider Type    Cecelia Johnson OT Occupational Therapist               Goals/Plan    No documentation.                Clinical Impression     Row Name 05/16/23 1240          Pain Assessment    Pretreatment Pain Rating 0/10 - no pain  -SD     Posttreatment Pain Rating 0/10 - no pain  -SD     Row Name 05/16/23 1240          Plan of Care Review    Plan of Care Reviewed With patient  -SD     Progress declining  -SD     Outcome Evaluation OT tx completed. Patient supine in bed, satting 97% on 10L HFNC. Patient rolled to left with min A for PCT to check purewick. Patient performed BUE AAROM for shoulder, AROM distally with patient visibly fatigued with minimal activity. Patient declined EOB or OOB. Patient sitting up in able to wash face with min A, perform self feeding with Sup, requiring additional time d/t fatigue. O2 90 with activity, 94 following. Bed placed in chair position. Continue OT POC  -SD     Row Name 05/16/23 1240          Vital Signs    Pre SpO2 (%) 97  -SD     O2 Delivery Pre Treatment hi-flow  -SD     Intra SpO2 (%) 90  -SD     O2 Delivery Intra Treatment hi-flow  -SD     Post SpO2 (%) 94  -SD     O2 Delivery Post Treatment hi-flow  -SD     Row Name 05/16/23 1240          Positioning and Restraints    Pre-Treatment Position in bed  -SD     Post Treatment Position bed  -SD     In Bed fowlers;call light within reach;encouraged to call for assist  -SD           User Key  (r) = Recorded By, (t) = Taken By, (c) = Cosigned By    Initials Name Provider Type    Cecelia Johnson OT Occupational Therapist               Outcome Measures     Row Name 05/16/23 1246          How much help from another is currently needed...    Putting on and taking off regular lower body clothing? 2  -SD     Bathing (including washing, rinsing, and  drying) 2  -SD     Toileting (which includes using toilet bed pan or urinal) 2  -SD     Putting on and taking off regular upper body clothing 2  -SD     Taking care of personal grooming (such as brushing teeth) 2  -SD     Eating meals 3  -SD     AM-PAC 6 Clicks Score (OT) 13  -SD     Row Name 05/16/23 1246          Functional Assessment    Outcome Measure Options AM-PAC 6 Clicks Daily Activity (OT)  -SD           User Key  (r) = Recorded By, (t) = Taken By, (c) = Cosigned By    Initials Name Provider Type    Cecelia Johnson OT Occupational Therapist                Occupational Therapy Education     Title: PT OT SLP Therapies (In Progress)     Topic: Occupational Therapy (In Progress)     Point: ADL training (Done)     Description:   Instruct learner(s) on proper safety adaptation and remediation techniques during self care or transfers.   Instruct in proper use of assistive devices.              Learning Progress Summary           Patient Acceptance, E, VU by AM at 5/15/2023 0701    Acceptance, E,TB, VU by AH at 5/12/2023 1507    Comment: purpose of re-evaluation    Acceptance, E,TB, VU by SD at 5/8/2023 1222    Comment: Safety during tf    Acceptance, E, VU by SP at 5/5/2023 1146    Comment: OT edcuated pt on purpose of IE and POC. Pt is agreeable.                   Point: Home exercise program (Done)     Description:   Instruct learner(s) on appropriate technique for monitoring, assisting and/or progressing therapeutic exercises/activities.              Learning Progress Summary           Patient Acceptance, E,TB, VU by SD at 5/16/2023 1246    Comment: Importance of progressing activity.                   Point: Precautions (Not Started)     Description:   Instruct learner(s) on prescribed precautions during self-care and functional transfers.              Learner Progress:  Not documented in this visit.          Point: Body mechanics (Not Started)     Description:   Instruct learner(s) on proper positioning  and spine alignment during self-care, functional mobility activities and/or exercises.              Learner Progress:  Not documented in this visit.                      User Key     Initials Effective Dates Name Provider Type Discipline     06/16/21 -  Roselyn Donaldson Occupational Therapist OT    SD 06/16/21 -  Cecelia Knight OT Occupational Therapist OT    SP 09/08/22 -  January Chapa OT Occupational Therapist OT    AM 02/22/23 -  Rob Lu, RN Registered Nurse Nurse              OT Recommendation and Plan     Plan of Care Review  Plan of Care Reviewed With: patient  Progress: declining  Outcome Evaluation: OT tx completed. Patient supine in bed, satting 97% on 10L HFNC. Patient rolled to left with min A for PCT to check purewick. Patient performed BUE AAROM for shoulder, AROM distally with patient visibly fatigued with minimal activity. Patient declined EOB or OOB. Patient sitting up in able to wash face with min A, perform self feeding with Sup, requiring additional time d/t fatigue. O2 90 with activity, 94 following. Bed placed in chair position. Continue OT POC     Time Calculation:    Time Calculation- OT     Row Name 05/16/23 1247             Time Calculation- OT    OT Start Time 1010  -SD      OT Received On 05/16/23  -SD      OT Goal Re-Cert Due Date 05/22/23  -SD         Timed Charges    28601 - OT Therapeutic Exercise Minutes 10  -SD      71942 - OT Self Care/Mgmt Minutes 5  -SD         Total Minutes    Timed Charges Total Minutes 15  -SD       Total Minutes 15  -SD            User Key  (r) = Recorded By, (t) = Taken By, (c) = Cosigned By    Initials Name Provider Type    Cecelia Johnson OT Occupational Therapist              Therapy Charges for Today     Code Description Service Date Service Provider Modifiers Qty    [id number]  OT THER PROC EA 15 MIN 5/16/2023 Cecelia Knight OT GO 1               Cecelia Knight OT  5/16/2023

## 2023-05-16 NOTE — PROGRESS NOTES
[facility]    PROGRESS NOTE    Name:  Carolin Toscano   Age:  77 y.o.  Sex:  female  :  1945  MRN:  [id number]   Visit Number:  [id number]  Admission Date:  2023  Date Of Service:  23  Primary Care Physician:  Jason Nicholson MD     LOS: 12 days :    Chief Complaint:      Shortness of air    Subjective:    23: d/w nsg intermittently on avaps and hi flow. Patient awake in bed. CXR from 5/15 reviewed. Medications reviewed.  Discussed with case management they [facility]    Hospital Course:     Patient is a 77 years old female with a past medical history of COPD, chronic respiratory failure on 5 to 6 L per her previous discharge, on NIV machine at home and trelegy, hypertension, and ongoing tobacco use who presented to the ER from home by EMS with a chief complaint of shortness of breath.  Patient reporting that she started having shortness of breath associated with productive cough since last night and has persisted and became worse that promoted her to call EMS.  EMS has found the patient to be at 65% saturation on her normal 6 L of oxygen. Patient reports compliance with her NIV machine at home.      On ER evaluation, Patient was found to be tachycardic with a heart rate of 130s, temperature of 100.2 and respirations of 30, /72.  Patient was placed on BiPAP with FiO2 of 40%.  Her ABG came back and showed pH of 7.296/PCO2 87/PO2 94/HCO3 42/saturation 97% on 5 L nasal cannula.  HS Troponin 44, proBNP WNL, glucose 192, CO2 of 40, otherwise CBC and CMP within acceptable range.  Lactate and Pro-Adi WNL.  Respiratory panel positive for parainfluenza virus.  Chest x-ray Mild interstitial disease  bilaterally is similar to prior. No area of consolidation is seen. Patient received Solu-Medrol and Aztreonam while in the ER.  Hospitalist consulted for admission.     Patient was admitted and treated with steroids.  Antibiotics were not  indicated.  Pulmonology consulted and was moved to ICU on 5/9 due to worsening respiratory failure and tachypnea.  She initially had to be on Precedex drip which was discontinued and switched to Xanax due to anxiety. Patient was placed on AVAPS.  Patient continued on steroids and received Lasix.  Edited by: Primitivo Nuñez DO at 5/16/2023 1503     Review of Systems:     All systems were reviewed and negative except as mentioned in subjective, assessment and plan.    Vital Signs:    Temp:  [97 °F (36.1 °C)-98.5 °F (36.9 °C)] 98.5 °F (36.9 °C)  Heart Rate:  [60-94] 76  Resp:  [17-27] 19  BP: (105-150)/(43-72) 132/63    Intake and output:    I/O last 3 completed shifts:  In: 120 [P.O.:120]  Out: 2100 [Urine:2100]  I/O this shift:  In: 480 [P.O.:480]  Out: 975 [Urine:975]    Physical Examination:    General Appearance:  Alert and cooperative.  Chronically ill-appearing.   Head:  Atraumatic and normocephalic.   Eyes: Conjunctivae and sclerae normal, no icterus. No pallor.   Throat: No oral lesions, no thrush, oral mucosa moist.   Neck: Supple, trachea midline, no thyromegaly.   Lungs:   Breath sounds heard bilaterally equally.  Mildly labored, on BiPAP.  Diffuse bilateral wheezing heard.  Decreased air movement bilaterally.  Tachypneic.   Heart:  Normal S1 and S2, no murmur, no gallop, no rub. No JVD.   Abdomen:   Normal bowel sounds, no masses, no organomegaly. Soft, nontender, nondistended, no rebound tenderness.   Extremities: Supple, no edema, no cyanosis, no clubbing.   Skin: No bleeding or rash.   Neurologic: Alert and oriented x 3. No facial asymmetry. Moves all four limbs. No tremors.  Generalized weakness noted.     Edited by: Primitivo Nuñez DO at 5/16/2023 0957     Laboratory results:    Results from last 7 days   Lab Units 05/16/23  0453 05/15/23  0413 05/14/23  0430   SODIUM mmol/L 145 146* 147*   POTASSIUM mmol/L 4.1 4.9 4.3   CHLORIDE mmol/L 93* 95* 99   CO2 mmol/L 43.3* 44.7* 42.3*   BUN mg/dL  31* 21 23   CREATININE mg/dL 0.37* 0.36* 0.36*   CALCIUM mg/dL 9.6 9.4 9.4   GLUCOSE mg/dL 202* 148* 138*     Results from last 7 days   Lab Units 05/16/23  0453 05/15/23  0413 05/14/23  0430   WBC 10*3/mm3 17.14* 16.64* 16.85*   HEMOGLOBIN g/dL 12.4 12.2 12.0   HEMATOCRIT % 40.1 40.2 39.8   PLATELETS 10*3/mm3 204 187 187                 Recent Labs     05/13/23  0510 05/15/23  0453 05/16/23  0519   PHART 7.420 7.445 7.453*   WBO3FAT 70.6* 75.7* 76.4*   PO2ART 74.4* 56.8* 56.7*   HWX2GWD 45.7* 51.9* 53.4*   BASEEXCESS 17.9* 23.5* 24.7*      I have reviewed the patient's laboratory results.    Radiology results:    CT Chest Without Contrast Diagnostic    Result Date: 5/16/2023  PROCEDURE: CT CHEST WO CONTRAST DIAGNOSTIC-  HISTORY: Dyspnea, chronic, unclear etiology; J96.21-Acute and chronic respiratory failure with hypoxia; J96.22-Acute and chronic respiratory failure with hypercapnia; J44.1-Chronic obstructive pulmonary disease with (acute) exacerbation; B34.8-Other viral infections of unspecified site  COMPARISON: April 13, 2023.  PROCEDURE:  Multiple axial CT images were obtained from the thoracic inlet through the upper abdomen without the use of contrast.  FINDINGS: Soft tissue windows reveal no suspicious axillary, hilar or mediastinal adenopathy. Heart size is normal. The aorta is normal in caliber. There are vascular calcifications. There are no pleural or pericardial effusions. There is bronchiectasis in the bilateral lower lobes. There is new mucous plugging and airspace disease in the bilateral lower lobes consistent with pneumonia. There are tree-in-bud opacities diffusely which have worsened since the prior exam consistent with bronchiolitis. There is evidence of old calcified granulomatous disease. The visualized upper abdomen shows the gallbladder to be surgically absent. Bone windows reveal no bony destructive lesions.      Impression: Mucous plugging, bilateral lower lobe pneumonia, and  bronchiolitis. 8-12 week follow-up exam is recommended to document stability.  193.74 mGy.cm   This study was performed with techniques to keep radiation doses as low as reasonably achievable (ALARA). Individualized dose reduction techniques using automated exposure control or adjustment of mA and/or kV according to the patient size were employed.     Images were reviewed, interpreted, and dictated by Dr. Shraddha Wharton MD Transcribed by Ade Solis PA-C.  This report was signed and finalized on 5/16/2023 1:38 PM by Shraddha Wharton MD.    XR Chest 1 View    Result Date: 5/15/2023  PROCEDURE: XR CHEST 1 VW-  HISTORY: follow up, increased WBC & CO2; J96.21-Acute and chronic respiratory failure with hypoxia; J96.22-Acute and chronic respiratory failure with hypercapnia; J44.1-Chronic obstructive pulmonary disease with (acute) exacerbation; B34.8-Other viral infections of unspecified site  COMPARISON: 05/13/2023.  FINDINGS: The heart is normal in size. Bilateral interstitial disease most prominent left lung base is stable from prior. Bronchial wall thickening noted.. The mediastinum is unremarkable. There is no pneumothorax.  There are no acute osseous abnormalities.      Impression: Stable chest..    This report was signed and finalized on 5/15/2023 8:37 AM by Shraddha Wharton MD.    I have reviewed the patient's radiology reports.    Medication Review:     I have reviewed the patient's active and prn medications.     Problem List:      Acute on chronic respiratory failure with hypoxia and hypercapnia      Assessment/Plan:    Respiratory failure with hypoxia/hypercapnia, COPD exacerbation, parainfluenza  -Continue supplemental oxygen to keep saturation above 90%, patient on 5 to 6 L at baseline, continue to titrate down as able to.  Continue BiPAP therapy.  Has NIV at home.  -Patient met SIRS criteria on arrival, likely secondary to parainfluenza infection and respiratory failure. Received Aztreonam and sepsis protocol IV  fluids boluses.  - Solu-Medrol, will taper to 40 mg every 12 hours, continue to taper down as she improves.   -Bronchodilators, Mucinex and supportive care.  -Dr. Soriano following  -Lasix 40 twice daily x 2 days to finish May 16, 2023  -Ordered Xanax as needed, currently off Precedex.     Elevated troponin  -Likely secondary to demand ischemia in settings of respiratory failure  -Patient denies any chest pain, low suspicion for ACS  -Troponin trended down and plateaued.     Hyperglycemia  -Will cover with sliding scale insulin while on steroids  -Hemoglobin A1c 6.7     Disposition: [facility] 1 to 2 days    Prophylaxis: Lovenox    Edited by: Primitivo Nuñez DO at 5/16/2023 1505     DVT Prophylaxis: Lovenox  Code Status:   Code Status and Medical Interventions:   Ordered at: 05/04/23 1634     Code Status (Patient has no pulse and is not breathing):    CPR (Attempt to Resuscitate)     Medical Interventions (Patient has pulse or is breathing):    Full Support      Diet:   Dietary Orders (From admission, onward)     Start     Ordered    05/15/23 1800  Dietary Nutrition Supplements Xu  2 Times Daily      Question:  Select Supplement:  Answer:  Xu    05/15/23 1445    05/15/23 1800  Dietary Nutrition Supplements Boost VHC  2 Times Daily      Question:  Select Supplement:  Answer:  Boost VHC    05/15/23 1446    05/04/23 1840  Diet: Cardiac Diets; Healthy Heart (2-3 Na+); Texture: Regular Texture (IDDSI 7); Fluid Consistency: Thin (IDDSI 0)  Diet Effective Now        References:    Diet Order [place]   Question Answer Comment   Diets: Cardiac Diets    Cardiac Diet: Healthy Heart (2-3 Na+)    Texture: Regular Texture (IDDSI 7)    Fluid Consistency: Thin (IDDSI 0)        05/04/23 1839               Discharge Plan: LTAC in 1 to 2 days    Primitivo Nuñez DO  05/16/23  15:06 EDT    Dictated utilizing Dragon dictation.

## 2023-05-16 NOTE — CASE MANAGEMENT/SOCIAL WORK
Continued Stay Note  NAOMY Hernandez     Patient Name: Carolin Toscano  MRN: [id number]  Today's Date: 5/16/2023    Admit Date: 5/4/2023    Plan: DCP   Discharge Plan     Row Name 05/16/23 1630       Plan    Plan Comments Spoke to pt she is in agreement  to [facility] and they will start the precert               Discharge Codes    No documentation.                     Ekaterina Pierce RN

## 2023-05-16 NOTE — PLAN OF CARE
Goal Outcome Evaluation:  Plan of Care Reviewed With: patient        Progress: declining  Outcome Evaluation: OT tx completed. Patient supine in bed, satting 97% on 10L HFNC. Patient rolled to left with min A for PCT to check purewick. Patient performed BUE AAROM for shoulder, AROM distally with patient visibly fatigued with minimal activity. Patient declined EOB or OOB. Patient sitting up in chair able to perform self feeding with Sup, requiring additional time d/t fatigue. O2 90 with activity, 94 following. Continue OT POC

## 2023-05-17 LAB
ANION GAP SERPL CALCULATED.3IONS-SCNC: 8 MMOL/L (ref 5–15)
BUN SERPL-MCNC: 34 MG/DL (ref 8–23)
BUN/CREAT SERPL: 87.2 (ref 7–25)
CALCIUM SPEC-SCNC: 9.8 MG/DL (ref 8.6–10.5)
CHLORIDE SERPL-SCNC: 91 MMOL/L (ref 98–107)
CO2 SERPL-SCNC: 45 MMOL/L (ref 22–29)
CREAT SERPL-MCNC: 0.39 MG/DL (ref 0.57–1)
DEPRECATED RDW RBC AUTO: 53 FL (ref 37–54)
EGFRCR SERPLBLD CKD-EPI 2021: 103.3 ML/MIN/1.73
ERYTHROCYTE [DISTWIDTH] IN BLOOD BY AUTOMATED COUNT: 15 % (ref 12.3–15.4)
GLUCOSE BLDC GLUCOMTR-MCNC: 162 MG/DL (ref 70–130)
GLUCOSE BLDC GLUCOMTR-MCNC: 166 MG/DL (ref 70–130)
GLUCOSE BLDC GLUCOMTR-MCNC: 191 MG/DL (ref 70–130)
GLUCOSE BLDC GLUCOMTR-MCNC: 251 MG/DL (ref 70–130)
GLUCOSE SERPL-MCNC: 173 MG/DL (ref 65–99)
HCT VFR BLD AUTO: 40.9 % (ref 34–46.6)
HGB BLD-MCNC: 12.4 G/DL (ref 12–15.9)
MCH RBC QN AUTO: 29.3 PG (ref 26.6–33)
MCHC RBC AUTO-ENTMCNC: 30.3 G/DL (ref 31.5–35.7)
MCV RBC AUTO: 96.7 FL (ref 79–97)
PLATELET # BLD AUTO: 194 10*3/MM3 (ref 140–450)
PMV BLD AUTO: 12.6 FL (ref 6–12)
POTASSIUM SERPL-SCNC: 4.3 MMOL/L (ref 3.5–5.2)
RBC # BLD AUTO: 4.23 10*6/MM3 (ref 3.77–5.28)
SODIUM SERPL-SCNC: 144 MMOL/L (ref 136–145)
WBC NRBC COR # BLD: 14.56 10*3/MM3 (ref 3.4–10.8)

## 2023-05-17 PROCEDURE — 94799 UNLISTED PULMONARY SVC/PX: CPT

## 2023-05-17 PROCEDURE — 82948 REAGENT STRIP/BLOOD GLUCOSE: CPT

## 2023-05-17 PROCEDURE — 94660 CPAP INITIATION&MGMT: CPT

## 2023-05-17 PROCEDURE — 80048 BASIC METABOLIC PNL TOTAL CA: CPT | Performed by: INTERNAL MEDICINE

## 2023-05-17 PROCEDURE — 99232 SBSQ HOSP IP/OBS MODERATE 35: CPT | Performed by: INTERNAL MEDICINE

## 2023-05-17 PROCEDURE — 63710000001 INSULIN ASPART PER 5 UNITS: Performed by: FAMILY MEDICINE

## 2023-05-17 PROCEDURE — 94761 N-INVAS EAR/PLS OXIMETRY MLT: CPT

## 2023-05-17 PROCEDURE — 99233 SBSQ HOSP IP/OBS HIGH 50: CPT | Performed by: INTERNAL MEDICINE

## 2023-05-17 PROCEDURE — 97530 THERAPEUTIC ACTIVITIES: CPT

## 2023-05-17 PROCEDURE — 94664 DEMO&/EVAL PT USE INHALER: CPT

## 2023-05-17 PROCEDURE — 25010000002 METHYLPREDNISOLONE PER 40 MG: Performed by: FAMILY MEDICINE

## 2023-05-17 PROCEDURE — 25010000002 ENOXAPARIN PER 10 MG: Performed by: FAMILY MEDICINE

## 2023-05-17 PROCEDURE — 85027 COMPLETE CBC AUTOMATED: CPT | Performed by: INTERNAL MEDICINE

## 2023-05-17 RX ORDER — SODIUM CHLORIDE FOR INHALATION 3 %
4 VIAL, NEBULIZER (ML) INHALATION EVERY 12 HOURS
Status: DISCONTINUED | OUTPATIENT
Start: 2023-05-17 | End: 2023-05-26

## 2023-05-17 RX ADMIN — SODIUM CHLORIDE 30 MG/ML INHALATION SOLUTION 4 ML: 30 SOLUTION INHALANT at 19:46

## 2023-05-17 RX ADMIN — IPRATROPIUM BROMIDE AND ALBUTEROL SULFATE 3 ML: 2.5; .5 SOLUTION RESPIRATORY (INHALATION) at 12:40

## 2023-05-17 RX ADMIN — AMLODIPINE BESYLATE 10 MG: 5 TABLET ORAL at 08:31

## 2023-05-17 RX ADMIN — GUAIFENESIN 600 MG: 600 TABLET, EXTENDED RELEASE ORAL at 20:38

## 2023-05-17 RX ADMIN — INSULIN ASPART 2 UNITS: 100 INJECTION, SOLUTION INTRAVENOUS; SUBCUTANEOUS at 17:34

## 2023-05-17 RX ADMIN — ENOXAPARIN SODIUM 40 MG: 100 INJECTION SUBCUTANEOUS at 20:38

## 2023-05-17 RX ADMIN — IPRATROPIUM BROMIDE AND ALBUTEROL SULFATE 3 ML: 2.5; .5 SOLUTION RESPIRATORY (INHALATION) at 07:01

## 2023-05-17 RX ADMIN — SENNOSIDES AND DOCUSATE SODIUM 2 TABLET: 50; 8.6 TABLET ORAL at 20:38

## 2023-05-17 RX ADMIN — SODIUM CHLORIDE 30 MG/ML INHALATION SOLUTION 4 ML: 30 SOLUTION INHALANT at 07:01

## 2023-05-17 RX ADMIN — IPRATROPIUM BROMIDE AND ALBUTEROL SULFATE 3 ML: 2.5; .5 SOLUTION RESPIRATORY (INHALATION) at 19:46

## 2023-05-17 RX ADMIN — TIOTROPIUM BROMIDE INHALATION SPRAY 2 PUFF: 3.12 SPRAY, METERED RESPIRATORY (INHALATION) at 07:02

## 2023-05-17 RX ADMIN — Medication 3 ML: at 08:31

## 2023-05-17 RX ADMIN — GUAIFENESIN 600 MG: 600 TABLET, EXTENDED RELEASE ORAL at 08:31

## 2023-05-17 RX ADMIN — BUDESONIDE AND FORMOTEROL FUMARATE DIHYDRATE 2 PUFF: 160; 4.5 AEROSOL RESPIRATORY (INHALATION) at 07:02

## 2023-05-17 RX ADMIN — SODIUM CHLORIDE 30 MG/ML INHALATION SOLUTION 4 ML: 30 SOLUTION INHALANT at 00:36

## 2023-05-17 RX ADMIN — ALPRAZOLAM 0.5 MG: 0.5 TABLET ORAL at 20:38

## 2023-05-17 RX ADMIN — ATENOLOL 25 MG: 25 TABLET ORAL at 08:31

## 2023-05-17 RX ADMIN — BUDESONIDE AND FORMOTEROL FUMARATE DIHYDRATE 2 PUFF: 160; 4.5 AEROSOL RESPIRATORY (INHALATION) at 19:46

## 2023-05-17 RX ADMIN — METHYLPREDNISOLONE SODIUM SUCCINATE 40 MG: 40 INJECTION, POWDER, LYOPHILIZED, FOR SOLUTION INTRAMUSCULAR; INTRAVENOUS at 02:12

## 2023-05-17 RX ADMIN — IPRATROPIUM BROMIDE AND ALBUTEROL SULFATE 3 ML: 2.5; .5 SOLUTION RESPIRATORY (INHALATION) at 00:35

## 2023-05-17 RX ADMIN — INSULIN ASPART 2 UNITS: 100 INJECTION, SOLUTION INTRAVENOUS; SUBCUTANEOUS at 12:10

## 2023-05-17 RX ADMIN — ACETAMINOPHEN 650 MG: 325 TABLET, FILM COATED ORAL at 20:57

## 2023-05-17 RX ADMIN — SENNOSIDES AND DOCUSATE SODIUM 2 TABLET: 50; 8.6 TABLET ORAL at 08:31

## 2023-05-17 RX ADMIN — ALPRAZOLAM 0.5 MG: 0.5 TABLET ORAL at 08:31

## 2023-05-17 RX ADMIN — METHYLPREDNISOLONE SODIUM SUCCINATE 40 MG: 40 INJECTION, POWDER, LYOPHILIZED, FOR SOLUTION INTRAMUSCULAR; INTRAVENOUS at 20:38

## 2023-05-17 RX ADMIN — Medication 5 MG: at 20:38

## 2023-05-17 RX ADMIN — Medication 3 ML: at 20:38

## 2023-05-17 RX ADMIN — INSULIN ASPART 6 UNITS: 100 INJECTION, SOLUTION INTRAVENOUS; SUBCUTANEOUS at 06:57

## 2023-05-17 RX ADMIN — BUDESONIDE INHALATION 1 MG: 0.5 SUSPENSION RESPIRATORY (INHALATION) at 07:01

## 2023-05-17 RX ADMIN — Medication 3 ML: at 02:12

## 2023-05-17 RX ADMIN — MULTIPLE VITAMINS W/ MINERALS TAB 1 TABLET: TAB at 08:31

## 2023-05-17 NOTE — PLAN OF CARE
Goal Outcome Evaluation:  Plan of Care Reviewed With: patient        Progress: improving  Outcome Evaluation: Patient demonstrates improving strength and activity tolerance as seen by her ability to tolerate bed mobility and standing with 15 LPM of oxygen via nasal cannula.  She is able to stand twice for a few seconds per stand.  She maintained oxygen saturation levels in the 90s, with a short drop to 87%.  She is tired, but pleased with progress.  Plan to continue PT per POC

## 2023-05-17 NOTE — PLAN OF CARE
Goal Outcome Evaluation:         Spoke with pt at her bedside.  Pt was awake and conversational. I provided information about the reason and purpose of an Advance Care Plan.  Pt stated that her daughter or brother would speak for her if necessary and that she was not interested in having an ACP.  I offered to return if she changes her mind of would like more information.

## 2023-05-17 NOTE — PLAN OF CARE
Goal Outcome Evaluation:  Plan of Care Reviewed With: patient        Progress: no change Pt remains on bipap and high flow nasal cannula for respiratory support. Pt did make progress to sit on EOB today, was agreeable to work with PT

## 2023-05-17 NOTE — THERAPY TREATMENT NOTE
"Patient Name: Carolin Toscano  : 1945    MRN: [id number]                              Today's Date: 2023       Admit Date: 2023    Visit Dx:     ICD-10-CM ICD-9-CM   1. Acute on chronic respiratory failure with hypoxia and hypercapnia  J96.21 518.84    J96.22 786.09     799.02   2. COPD exacerbation  J44.1 491.21   3. Parainfluenza virus infection  B34.8 078.89     Patient Active Problem List   Diagnosis   • CAP (community acquired pneumonia)   • Cigarette nicotine dependence with nicotine-induced disorder   • Essential hypertension   • Dyslipidemia   • Blood glucose elevated   • Muscle ache   • COPD (chronic obstructive pulmonary disease)   • Nuclear sclerotic cataract of right eye   • Acute respiratory failure   • COPD exacerbation   • Acute on chronic respiratory failure with hypoxia and hypercapnia     Past Medical History:   Diagnosis Date   • Arthritis    • COPD (chronic obstructive pulmonary disease)    • Gall stones    • Goiter     \"inward\"   • Hypertension    • Hypertension    • Kidney infection    • Kidney stone    • Kidney stones    • Migraine headache    • Scarlet fever      Past Surgical History:   Procedure Laterality Date   • BLADDER SURGERY     • CATARACT EXTRACTION W/ INTRAOCULAR LENS IMPLANT Left 2022    Procedure: CATARACT PHACO EXTRACTION WITH INTRAOCULAR LENS IMPLANT LEFT;  Surgeon: Sathish Lopez MD;  Location: [facility];  Service: Ophthalmology;  Laterality: Left;   • CATARACT EXTRACTION W/ INTRAOCULAR LENS IMPLANT Right 2022    Procedure: CATARACT PHACO EXTRACTION WITH INTRAOCULAR LENS IMPLANT RIGHT;  Surgeon: Sathish Lopez MD;  Location: [facility];  Service: Ophthalmology;  Laterality: Right;   • CHOLECYSTECTOMY     • HYSTERECTOMY     • TONSILLECTOMY        General Information     Row Name 23 1525          Physical Therapy Time and Intention    Document Type therapy note (daily note)  -JR     Mode of Treatment physical therapy  -     Row Name " 05/17/23 1525          General Information    Patient Profile Reviewed yes  -JR           User Key  (r) = Recorded By, (t) = Taken By, (c) = Cosigned By    Initials Name Provider Type    Kavitha Michael PT Physical Therapist               Mobility     Row Name 05/17/23 1525          Bed Mobility    Bed Mobility supine-sit;sit-supine;rolling left  -JR     Rolling Left [place] (Bed Mobility) minimum assist (75% patient effort)  -JR     Supine-Sit [place] (Bed Mobility) minimum assist (75% patient effort);verbal cues  -JR     Sit-Supine [place] (Bed Mobility) minimum assist (75% patient effort);verbal cues  -JR     Assistive Device (Bed Mobility) head of bed elevated;bed rails  -JR     Comment, (Bed Mobility) Sat EOB x 8 minutes  -JR     Row Name 05/17/23 1525          Sit-Stand Transfer    Sit-Stand [place] (Transfers) minimum assist (75% patient effort);2 person assist  -JR     Assistive Device (Sit-Stand Transfers) other (see comments)  gait belt and HHA  -JR     Comment, (Sit-Stand Transfer) Pt stood x 5 seconds x 2 trials  -JR           User Key  (r) = Recorded By, (t) = Taken By, (c) = Cosigned By    Initials Name Provider Type    Kavitha Michael PT Physical Therapist               Obj/Interventions    No documentation.                Goals/Plan    No documentation.                Clinical Impression     Row Name 05/17/23 1525          Pain    Pretreatment Pain Rating 0/10 - no pain  -JR     Posttreatment Pain Rating 0/10 - no pain  -JR     Row Name 05/17/23 1525          Plan of Care Review    Plan of Care Reviewed With patient  -JR     Progress improving  -JR     Outcome Evaluation Patient demonstrates improving strength and activity tolerance as seen by her ability to tolerate bed mobility and standing with 15 LPM of oxygen via nasal cannula.  She is able to stand twice for a few seconds per stand.  She maintained oxygen saturation levels in the 90s, with a short drop to 87%.  She is  tired, but pleased with progress.  Plan to continue PT per POC  -JR     Row Name 05/17/23 1525          Positioning and Restraints    Pre-Treatment Position in bed  -JR     Post Treatment Position bed  -JR     In Bed fowlers;call light within reach;encouraged to call for assist  -JR           User Key  (r) = Recorded By, (t) = Taken By, (c) = Cosigned By    Initials Name Provider Type    Kavitha Michael, ROB Physical Therapist               Outcome Measures     Row Name 05/17/23 1525 05/17/23 0800       How much help from another person do you currently need...    Turning from your back to your side while in flat bed without using bedrails? 3  -JR 3  -WW    Moving from lying on back to sitting on the side of a flat bed without bedrails? 2  -JR 2  -WW    Moving to and from a bed to a chair (including a wheelchair)? 2  -JR 2  -WW    Standing up from a chair using your arms (e.g., wheelchair, bedside chair)? 2  -JR 2  -WW    Climbing 3-5 steps with a railing? 1  -JR 1  -WW    To walk in hospital room? 1  -JR 1  -WW    AM-PAC 6 Clicks Score (PT) 11  -JR 11  -WW    Highest level of mobility 4 --> Transferred to chair/commode  -JR 4 --> Transferred to chair/commode  -WW    Row Name 05/17/23 1525          Functional Assessment    Outcome Measure Options AM-PAC 6 Clicks Basic Mobility (PT)  -JR           User Key  (r) = Recorded By, (t) = Taken By, (c) = Cosigned By    Initials Name Provider Type    Kavitha Michael, ORB Physical Therapist    Sadaf Tinajero, RN Registered Nurse                             Physical Therapy Education     Title: PT OT SLP Therapies (In Progress)     Topic: Physical Therapy (In Progress)     Point: Mobility training (Done)     Learning Progress Summary           Patient Acceptance, E, VU by MS at 5/16/2023 1403    Comment: importance of mobility    Acceptance, E, VU by AM at 5/15/2023 0701    Acceptance, E, VU by MS at 5/12/2023 1458    Comment: importance of mobility    Acceptance, E,TB,D,  VU,NR by  at 5/8/2023 1250    Comment: Importance of activity and exercise techniques    Acceptance, E, VU by MS at 5/5/2023 1136    Comment: importance of mobility                   Point: Home exercise program (Done)     Learning Progress Summary           Patient Acceptance, E, VU by MS at 5/16/2023 1403    Comment: importance of mobility    Acceptance, E, VU by AM at 5/15/2023 0701    Acceptance, E,TB, VU,NR by CC at 5/14/2023 1310    Comment: Importance of increasing movement of B LE    Acceptance, E,TB,D, VU,NR by  at 5/8/2023 1250    Comment: Importance of activity and exercise techniques    Acceptance, E, VU by MS at 5/5/2023 1136    Comment: importance of mobility                   Point: Body mechanics (Done)     Learning Progress Summary           Patient Acceptance, E,TB, VU by  at 5/17/2023 1846    Comment: upright posture                   Point: Precautions (Not Started)     Learner Progress:  Not documented in this visit.                      User Key     Initials Effective Dates Name Provider Type Discipline     03/23/22 -  Kavitha Tavarez, PT Physical Therapist PT    CC 06/16/21 -  Nasra Quiñones PTA Physical Therapist Assistant PT     06/16/21 -  Ronak Cruz, PTA Physical Therapist Assistant PT    MS 07/01/22 -  Martín Ortiz, PT Physical Therapist PT    AM 02/22/23 -  Rob Lu, LASHAE Registered Nurse Nurse              PT Recommendation and Plan     Plan of Care Reviewed With: patient  Progress: improving  Outcome Evaluation: Patient demonstrates improving strength and activity tolerance as seen by her ability to tolerate bed mobility and standing with 15 LPM of oxygen via nasal cannula.  She is able to stand twice for a few seconds per stand.  She maintained oxygen saturation levels in the 90s, with a short drop to 87%.  She is tired, but pleased with progress.  Plan to continue PT per POC     Time Calculation:    PT Charges     Row Name 05/17/23 1846             Time  Calculation    Start Time 1525  -JR      Stop Time 1604  -JR      Time Calculation (min) 39 min  -JR      PT Received On 05/17/23  -JR      PT Goal Re-Cert Due Date 05/22/23  -JR         Time Calculation- PT    Total Timed Code Minutes- PT 39 minute(s)  -JR         Timed Charges    88722 - PT Therapeutic Activity Minutes 39  -JR         Total Minutes    Timed Charges Total Minutes 39  -JR       Total Minutes 39  -JR            User Key  (r) = Recorded By, (t) = Taken By, (c) = Cosigned By    Initials Name Provider Type    Kavitha Michael, PT Physical Therapist              Therapy Charges for Today     Code Description Service Date Service Provider Modifiers Qty    [id number] HC PT THERAPEUTIC ACT EA 15 MIN 5/17/2023 Kavitha Tavarez, PT GP 3          PT G-Codes  Outcome Measure Options: AM-PAC 6 Clicks Basic Mobility (PT)  AM-PAC 6 Clicks Score (PT): 11  AM-PAC 6 Clicks Score (OT): 13       Kavitha Tavarez, PT  5/17/2023

## 2023-05-17 NOTE — CASE MANAGEMENT/SOCIAL WORK
1427 Cm received email from [employer], stating pt insurance denied [facility] and is requesting P2P to be done by 5pm today. Cm will f/u with pt to see if she is still willing to go and will update with MD regarding P2P    1130 Cm spoke with pt at bedside about going to [facility]. Pt wearing AVAPS 70% during visit. Cm advised pt that facilities like [facility] provide a bridge between hospital and home for patients that are improving, but continue to  require medical treatment. Pt appears ambivalent regarding decision to go, but did not appear opposed.     Lux updated Dr Nuñez on need for peer to peer due to insurance denial . He is agreeable to complete P2P. Cm emailed [employer] with his phone number and requested she set up P2P. Cm called and spoke to her regarding pt potential need for bronch in a couple days and concern that if insurance did approve, the auth might  before pt is ready to discharge. She agrees to call and schedule P2P for tomorrow, . Dr Nuñez updated.     1400 Cm received email update from [employer]. P2P is scheduled for 3pm tomorrow 2023. Ref # - [id number]. Dr Nuñez updated by secure chat.

## 2023-05-17 NOTE — PLAN OF CARE
Goal Outcome Evaluation:    Pt rested well on bipap throughout shift. FiO2 titrated down to 70%. Pt on 12L HFNC with O2 sats >90%. Able to drink Boost x2 this AM. VS WNL.

## 2023-05-17 NOTE — PROGRESS NOTES
"  CC: Acute Respiratory Failure.     S: Currently off NIV therapy.  Continues to require high flow nasal cannula when off the NIV/AVAPS.  Has been able to cough some clear secretions although the nursing staff continues to mention that the patient's cough remains weak.    ROS: Could not be reliably obtained as the patient was somewhat sleepy.    O:Vital signs reviewed. FiO2: 10-12 L/min high flow nasal cannula.  /49 (BP Location: Right arm, Patient Position: Sitting)   Pulse 76   Temp 97.1 °F (36.2 °C) (Temporal)   Resp 18   Ht 160 cm (63\")   Wt 64.5 kg (142 lb 3.2 oz)   SpO2 95%   BMI 25.19 kg/m²     Temp (24hrs), Av.2 °F (36.2 °C), Min:97.1 °F (36.2 °C), Max:97.4 °F (36.3 °C)      I & Os reviewed.   Intake/Output       23 0700 - 23 0659 23 0700 - 23 0659    Intake (ml) 954 200    Output (ml) 1575 --    Net (ml) -621 200          Net IO Since Admission: -1,737 mL [23 1249]    General/Constitutional: Off NIV therapy. Appears to be in mild distress.  Eyes: Eyes were closed.  Neck: Supple without JVD. No obvious masses noted.   Cardiovascular: S1 + S2.  Regular  Lungs/Respiratory: Decreased bilateral breath sounds noted.  Bilateral, somewhat diffuse, wheezing heard.  Bilateral basal crackles noted  GI/Abdomen: Soft. Bowel sounds positive.  No obvious organomegaly  Musculoskeletal/Extremities: Trace edema noted. Gait could not be assessed at this time, as the patient was laying in bed.   Neurologic: Was able to respond appropriately although was somewhat sleepy.   Psych: Was able to follow simple commands.  Skin: Appeared somewhat dry         Labs: Reviewed.   Results from last 7 days   Lab Units 23  0450 23  0453 05/15/23  0413 23  0430 23  0510   WBC 10*3/mm3 14.56* 17.14* 16.64* 16.85* 16.25*   HEMOGLOBIN g/dL 12.4 12.4 12.2 12.0 11.7*   HEMATOCRIT % 40.9 40.1 40.2 39.8 39.0   PLATELETS 10*3/mm3 194 204 187 187 168   NEUTROPHIL % %  --  89.5*  -- "  84.7*  --    NEUTROS ABS 10*3/mm3  --  15.32*  --  14.27*  --    EOSINOPHIL % %  --  0.0*  --  0.0*  --    EOS ABS 10*3/mm3  --  0.00  --  0.00  --    LYMPHOCYTE % %  --  5.1*  --  6.9*  --    LYMPHS ABS 10*3/mm3  --  0.88  --  1.17  --        Lab Results   Component Value Date    PROCALCITO 0.11 05/16/2023    PROCALCITO 0.08 05/14/2023    PROCALCITO 0.20 05/10/2023       No results found for: CRP    No results found for: SEDRATE    Lab Results   Component Value Date    PROBNP 572.7 05/14/2023    PROBNP 479.4 05/10/2023    PROBNP 234.4 05/04/2023       Results from last 7 days   Lab Units 05/17/23  0450 05/16/23  0453 05/15/23  0413   SODIUM mmol/L 144 145 146*   POTASSIUM mmol/L 4.3 4.1 4.9   CHLORIDE mmol/L 91* 93* 95*   CO2 mmol/L 45.0* 43.3* 44.7*   BUN mg/dL 34* 31* 21   CREATININE mg/dL 0.39* 0.37* 0.36*   CALCIUM mg/dL 9.8 9.6 9.4   ANION GAP mmol/L 8.0 8.7 6.3   GLUCOSE mg/dL 173* 202* 148*                   No results found for: CKTOTAL    No components found for: HSTROPT    Lab Results   Component Value Date    TROPONINT 22 (H) 05/05/2023    TROPONINT 25 (H) 05/05/2023    TROPONINT 44 (H) 05/04/2023       No results found for: DDIMER    Brief Urine Lab Results  (Last result in the past 365 days)      Color   Clarity   Blood   Leuk Est   Nitrite   Protein   CREAT   Urine HCG        05/12/23 1738 Yellow   Cloudy   Negative   Trace   Negative   Trace                 Lab Results   Component Value Date    TSH 0.304 05/09/2023       No results found for: FREET4      Micro: As of May 17, 2023   Lab Results   Component Value Date    RESPCX Light growth (2+) Normal Respiratory Ana 10/02/2017     No results found for: BCIDPCR  Lab Results   Component Value Date    BLOODCX No growth at 5 days 05/04/2023    BLOODCX No growth at 5 days 05/04/2023    BLOODCX No growth at 5 days 04/16/2023    BLOODCX No growth at 5 days 04/16/2023     Lab Results   Component Value Date    URINECX Final report 02/07/2019    URINECX  20,000-30,000 CFU/mL Escherichia coli (A) 10/05/2018     No results found for: MRSACX  Lab Results   Component Value Date    MRSAPCR No MRSA Detected 04/15/2023     No results found for: URCX  No components found for: LOWRESPCF  No results found for: THROATCX  No results found for: CULTURES  No components found for: STREPBCX  No results found for: STREPPNEUAG  No results found for: LEGIONELLA  No results found for: MYCOPLASCX  No results found for: GCCX  No results found for: WOUNDCX  No results found for: BODYFLDCX    No results found for: FLU    Lab Results   Component Value Date    ADENOVIRUS Not Detected 05/04/2023     Lab Results   Component Value Date    LR236H Not Detected 05/04/2023     Lab Results   Component Value Date    CVHKU1 Not Detected 05/04/2023     Lab Results   Component Value Date    CVNL63 Not Detected 05/04/2023     Lab Results   Component Value Date    CVOC43 Not Detected 05/04/2023     Lab Results   Component Value Date    HUMETPNEVS Not Detected 05/04/2023     Lab Results   Component Value Date    HURVEV Not Detected 05/04/2023     Lab Results   Component Value Date    FLUBPCR Not Detected 05/04/2023     Lab Results   Component Value Date    PARAINFLUE Not Detected 05/04/2023     Lab Results   Component Value Date    PARAFLUV2 Not Detected 05/04/2023     Lab Results   Component Value Date    PARAFLUV3 Detected (A) 05/04/2023     Lab Results   Component Value Date    PARAFLUV4 Not Detected 05/04/2023     Lab Results   Component Value Date    BPERTPCR Not Detected 05/04/2023     No results found for: [id number]  Lab Results   Component Value Date    CPNEUPCR Not Detected 05/04/2023     Lab Results   Component Value Date    MPNEUMO Not Detected 05/04/2023     Lab Results   Component Value Date    FLUAPCR Not Detected 05/04/2023     No results found for: FLUAH3  No results found for: FLUAH1  Lab Results   Component Value Date    RSV Not Detected 05/04/2023     Lab Results   Component Value Date     BPARAPCR Not Detected 05/04/2023       COVID 19:  Lab Results   Component Value Date    COVID19 Not Detected 05/04/2023         ABG: Reviewed   Recent Labs     05/13/23  0510 05/15/23  0453 05/16/23  0519   PHART 7.420 7.445 7.453*   FKV3YWG 70.6* 75.7* 76.4*   PO2ART 74.4* 56.8* 56.7*   XWB8PRK 45.7* 51.9* 53.4*   BASEEXCESS 17.9* 23.5* 24.7*       Lab Results   Component Value Date    LACTATE 1.3 05/04/2023    LACTATE 1.2 04/13/2023    LACTATE 2.7 (C) 04/13/2023         Echo: Results for orders placed during the hospital encounter of 04/13/23    Adult Transthoracic Echo Complete W/ Cont if Necessary Per Protocol    Interpretation Summary  •  Left ventricular diastolic function is consistent with (grade I) impaired relaxation.  •  Mild aortic valve stenosis is present.  •  Estimated right ventricular systolic pressure from tricuspid regurgitation is moderately elevated (45-55 mmHg).  •  Mild to moderate pulmonary hypertension is present.      Results for orders placed during the hospital encounter of 10/01/17    Adult Transthoracic Echo Complete W/ Cont if Necessary Per Protocol    Interpretation Summary  TEchnically difficult study  1) Borderline LVH with normal LV systolic function ( EF is over 55%)  2) Normal left atrial size with mild elevation in LVedp  3) Trace MR  4) Mild TR with normal PA pressures  5) Mild AI  6) Small RV with normal RV function        dexmedetomidine, 0.2-1.5 mcg/kg/hr, Last Rate: Stopped (05/12/23 1556)  Pharmacy to Dose enoxaparin (LOVENOX),           CXRay: Latest imaging study was reviewed personally.   Imaging Results (Last 48 Hours)     Procedure Component Value Units Date/Time    CT Chest Without Contrast Diagnostic [[id number]] Collected: 05/16/23 1336     Updated: 05/16/23 1340    Narrative:      PROCEDURE: CT CHEST WO CONTRAST DIAGNOSTIC-     HISTORY: Dyspnea, chronic, unclear etiology; J96.21-Acute and chronic  respiratory failure with hypoxia; J96.22-Acute and chronic  respiratory  failure with hypercapnia; J44.1-Chronic obstructive pulmonary disease  with (acute) exacerbation; B34.8-Other viral infections of unspecified  site     COMPARISON: April 13, 2023.     PROCEDURE:  Multiple axial CT images were obtained from the thoracic  inlet through the upper abdomen without the use of contrast.      FINDINGS:   Soft tissue windows reveal no suspicious axillary, hilar or mediastinal  adenopathy. Heart size is normal. The aorta is normal in caliber. There  are vascular calcifications. There are no pleural or pericardial  effusions. There is bronchiectasis in the bilateral lower lobes. There  is new mucous plugging and airspace disease in the bilateral lower lobes  consistent with pneumonia. There are tree-in-bud opacities diffusely  which have worsened since the prior exam consistent with bronchiolitis.  There is evidence of old calcified granulomatous disease. The visualized  upper abdomen shows the gallbladder to be surgically absent. Bone  windows reveal no bony destructive lesions.       Impression:      Mucous plugging, bilateral lower lobe pneumonia, and  bronchiolitis. 8-12 week follow-up exam is recommended to document  stability.     193.74 mGy.cm        This study was performed with techniques to keep radiation doses as low  as reasonably achievable (ALARA). Individualized dose reduction  techniques using automated exposure control or adjustment of mA and/or  kV according to the patient size were employed.               Images were reviewed, interpreted, and dictated by Dr. Shraddha Wharton MD  Transcribed by Ade Solis PA-C.     This report was signed and finalized on 5/16/2023 1:38 PM by Shraddha Wharton MD.            Assessment & Recommendations/Plan:   1.  Acute respiratory failure  Continue AVAPS at the current setting.  Continue using AVAPS 2 hours on and 2 hours off.  Currently on 55% FiO2.  I have once again asked the respiratory therapist, at the bedside, to decrease  FiO2 as tolerated.  Latest ABGs show compensated mild acute on chronic respiratory acidosis, with improving PCO2.  We will repeat ABG in the morning.    2.  COPD with acute exacerbation  Likely triggered by parainfluenza bronchitis  Continue current nebulized treatments.  Due to continued significant hypoxemia, will suggest continuation of Solu-Medrol at 40 mg every 12 hourly for now.  Continue Spiriva and Symbicort  Will benefit from outpatient PFTs    3.  Abnormal CT of the chest  Continue MetaNeb treatments.  We will change hypertonic saline to every 12 hourly.  CT images reviewed personally.  Consistent with bilateral lower lobe atelectasis.  I spoke to patient's daughter, Nehal at [phone], on 5/16/2023.    Findings on the CT were discussed with her in great detail.    She was made aware of the possibility of bronchoscopy.  The patient's daughter was hesitant to proceed with bronchoscopy and wanted to try noninvasive measures.  Based on her clinical response, we may need to discuss the possibility of bronchoscopy with the patient's daughter on 5/18/2023.    4.  Chronic respiratory acidosis  Appears to be on NIV device at home.    5.  Pulmonary hypertension  May need outpatient work-up  Likely secondary to underlying likely severe COPD    6.  Anxiety  Has been on Precedex in the past  Continue other anxiolytics.    7.  Smoking  Was advised to quit smoking    We have reviewed patient's current orders and changes, if any, have been suggested to primary care team. Plan was also discussed with nursing staff, as necessary.     This document was electronically signed by Arnaldo Soriano MD on 05/17/23 at 12:49 EDT      Dictated utilizing Dragon dictation.

## 2023-05-17 NOTE — ACP (ADVANCE CARE PLANNING)
I provided information about the reason and purpose of an ACP if she becomes unable to speak for herself.  Pt stated that her daughter or brother would speak for her and that she was not interested in having an ACP at this time.  I offered a prayer for continued healing, and will continue to visit to proide support and assistance if she changes her mind about an ACP.

## 2023-05-17 NOTE — PROGRESS NOTES
[facility]    Name:  Carolin Toscano   Age:  77 y.o.  Sex:  female  :  1945  MRN:  [id number]   Visit Number:  [id number]  Admission Date:  2023  Date Of Service:  23  Primary Care Physician:  Jason Nicholson MD     LOS: 13 days :    Chief Complaint:      Shortness of air    Subjective:    2023 discussed with case management still working [facility].  I am planning to do a peer to peer with them tomorrow.  Otherwise has persistent hypoxia.  Dr. Soriano's note reviewed considering bronchoscopy.    Hospital Course:  Patient was admitted and treated with steroids.  Antibiotics were not indicated.  Pulmonology consulted and was moved to ICU on  due to worsening respiratory failure and tachypnea.  She initially had to be on Precedex drip which was discontinued and switched to Xanax due to anxiety. Patient was placed on AVAPS.  Patient continued on steroids and received Lasix.    23: d/w nsg intermittently on avaps and hi flow. Patient awake in bed. CXR from 5/15 reviewed. Medications reviewed.  Discussed with case management [facility]       History of presenting illness:  Patient is a 77 years old female with a past medical history of COPD, chronic respiratory failure on 5 to 6 L per her previous discharge, on NIV machine at home and trelegy, hypertension, and ongoing tobacco use who presented to the ER from home by EMS with a chief complaint of shortness of breath.  Patient reporting that she started having shortness of breath associated with productive cough since last night and has persisted and became worse that promoted her to call EMS.  EMS has found the patient to be at 65% saturation on her normal 6 L of oxygen. Patient reports compliance with her NIV machine at home.      On ER evaluation, Patient was found to be tachycardic with a heart rate of 130s, temperature of 100.2 and  respirations of 30, /72.  Patient was placed on BiPAP with FiO2 of 40%.  Her ABG came back and showed pH of 7.296/PCO2 87/PO2 94/HCO3 42/saturation 97% on 5 L nasal cannula.  HS Troponin 44, proBNP WNL, glucose 192, CO2 of 40, otherwise CBC and CMP within acceptable range.  Lactate and Pro-Adi WNL.  Respiratory panel positive for parainfluenza virus.  Chest x-ray Mild interstitial disease  bilaterally is similar to prior. No area of consolidation is seen. Patient received Solu-Medrol and Aztreonam while in the ER.  Hospitalist consulted for admission.       Edited by: Primitivo Nuñez DO at 5/17/2023 1652     Review of Systems:     All systems were reviewed and negative except as mentioned in subjective, assessment and plan.    Vital Signs:    Temp:  [97.1 °F (36.2 °C)-97.4 °F (36.3 °C)] 97.2 °F (36.2 °C)  Heart Rate:  [54-98] 74  Resp:  [16-22] 18  BP: (114-142)/(37-65) 115/46    Intake and output:    I/O last 3 completed shifts:  In: 954 [P.O.:954]  Out: 2375 [Urine:2375]  I/O this shift:  In: 390 [P.O.:390]  Out: -     Physical Examination:    General Appearance:  Alert and cooperative.  Chronically ill-appearing.   Head:  Atraumatic and normocephalic.   Eyes: Conjunctivae and sclerae normal, no icterus. No pallor.   Throat: No oral lesions, no thrush, oral mucosa moist.   Neck: Supple, trachea midline, no thyromegaly.   Lungs:   Breath sounds diminished bilaterally equally.  On nasal cannula.   Heart:  Normal S1 and S2, no murmur, no gallop, no rub. No JVD.   Abdomen:   Normal bowel sounds, no masses, no organomegaly. Soft, nontender, nondistended, no rebound tenderness.   Extremities: Supple, no edema, no cyanosis, no clubbing.   Skin: No bleeding or rash.   Neurologic: Alert and oriented x 3. No facial asymmetry. Moves all four limbs. No tremors.  Generalized weakness noted.     Edited by: Primitivo Nuñez DO at 5/17/2023 2496     Laboratory results:    Results from last 7 days   Lab Units  05/17/23  0450 05/16/23  0453 05/15/23  0413   SODIUM mmol/L 144 145 146*   POTASSIUM mmol/L 4.3 4.1 4.9   CHLORIDE mmol/L 91* 93* 95*   CO2 mmol/L 45.0* 43.3* 44.7*   BUN mg/dL 34* 31* 21   CREATININE mg/dL 0.39* 0.37* 0.36*   CALCIUM mg/dL 9.8 9.6 9.4   GLUCOSE mg/dL 173* 202* 148*     Results from last 7 days   Lab Units 05/17/23  0450 05/16/23  0453 05/15/23  0413   WBC 10*3/mm3 14.56* 17.14* 16.64*   HEMOGLOBIN g/dL 12.4 12.4 12.2   HEMATOCRIT % 40.9 40.1 40.2   PLATELETS 10*3/mm3 194 204 187                 Recent Labs     05/13/23  0510 05/15/23  0453 05/16/23  0519   PHART 7.420 7.445 7.453*   YAM6USC 70.6* 75.7* 76.4*   PO2ART 74.4* 56.8* 56.7*   KQE8OMB 45.7* 51.9* 53.4*   BASEEXCESS 17.9* 23.5* 24.7*      I have reviewed the patient's laboratory results.    Radiology results:    CT Chest Without Contrast Diagnostic    Result Date: 5/16/2023  PROCEDURE: CT CHEST WO CONTRAST DIAGNOSTIC-  HISTORY: Dyspnea, chronic, unclear etiology; J96.21-Acute and chronic respiratory failure with hypoxia; J96.22-Acute and chronic respiratory failure with hypercapnia; J44.1-Chronic obstructive pulmonary disease with (acute) exacerbation; B34.8-Other viral infections of unspecified site  COMPARISON: April 13, 2023.  PROCEDURE:  Multiple axial CT images were obtained from the thoracic inlet through the upper abdomen without the use of contrast.  FINDINGS: Soft tissue windows reveal no suspicious axillary, hilar or mediastinal adenopathy. Heart size is normal. The aorta is normal in caliber. There are vascular calcifications. There are no pleural or pericardial effusions. There is bronchiectasis in the bilateral lower lobes. There is new mucous plugging and airspace disease in the bilateral lower lobes consistent with pneumonia. There are tree-in-bud opacities diffusely which have worsened since the prior exam consistent with bronchiolitis. There is evidence of old calcified granulomatous disease. The visualized upper abdomen  shows the gallbladder to be surgically absent. Bone windows reveal no bony destructive lesions.      Impression: Mucous plugging, bilateral lower lobe pneumonia, and bronchiolitis. 8-12 week follow-up exam is recommended to document stability.  193.74 mGy.cm   This study was performed with techniques to keep radiation doses as low as reasonably achievable (ALARA). Individualized dose reduction techniques using automated exposure control or adjustment of mA and/or kV according to the patient size were employed.     Images were reviewed, interpreted, and dictated by Dr. Shraddha Wharton MD Transcribed by Ade Solis PA-C.  This report was signed and finalized on 5/16/2023 1:38 PM by Shraddha Wharton MD.    I have reviewed the patient's radiology reports.    Medication Review:     I have reviewed the patient's active and prn medications.     Problem List:      Acute on chronic respiratory failure with hypoxia and hypercapnia      Assessment/Plan:    Respiratory failure with hypoxia/hypercapnia, COPD exacerbation, parainfluenza  -patient on 5 to 6 L oxygen at baseline,  Continue BiPAP therapy.  Has NIV at home.  -Recent parainfluenza virus infection met SIRS due to this  - Solu-Medrol taper as appropriate  -Bronchodilators, Mucinex MetaNeb and supportive care.  -Dr. Soriano note reviewed and appreciated  -Lasix 40 twice daily x 2 days.  -Xanax and Precedex as needed     Elevated troponin  -Likely secondary to demand ischemia in settings of respiratory failure  -Patient denies any chest pain, low suspicion for ACS  -Troponin trended down and plateaued.     Hyperglycemia  -Will cover with sliding scale insulin while on steroids  -Hemoglobin A1c 6.7     [facility]: [facility]    Prophylaxis: Lovenox    Edited by: Primitivo Nuñez DO at 5/17/2023 1653     DVT Prophylaxis: Lovenox  Code Status:   Code Status and Medical Interventions:   Ordered at: 05/04/23 1634     Code Status (Patient has  no pulse and is not breathing):    CPR (Attempt to Resuscitate)     Medical Interventions (Patient has pulse or is breathing):    Full Support      Diet:   Dietary Orders (From admission, onward)     Start     Ordered    05/17/23 1038  Diet: Cardiac Diets; Healthy Heart (2-3 Na+); Texture: Regular Texture (IDDSI 7); Fluid Consistency: Thin (IDDSI 0)  Diet Effective Now        Comments: Pt prefers cottage cheese, bananas, oranges, peaches, watermelon.   References:    Diet Order Crosswalk   Question Answer Comment   Diets: Cardiac Diets    Cardiac Diet: Healthy Heart (2-3 Na+)    Texture: Regular Texture (IDDSI 7)    Fluid Consistency: Thin (IDDSI 0)        05/17/23 1039    05/15/23 1800  Dietary Nutrition Supplements Xu  2 Times Daily      Question:  Select Supplement:  Answer:  Xu    05/15/23 1445    05/15/23 1800  Dietary Nutrition Supplements Boost VHC  2 Times Daily      Question:  Select Supplement:  Answer:  Boost VHC    05/15/23 1446               [facility]    Primitivo Nuñez DO  05/17/23  16:53 EDT    Dictated utilizing Dragon dictation.

## 2023-05-18 ENCOUNTER — APPOINTMENT (OUTPATIENT)
Dept: GENERAL RADIOLOGY | Facility: HOSPITAL | Age: 78
DRG: 190 | End: 2023-05-18
Payer: MEDICARE

## 2023-05-18 LAB
A-A DO2: ABNORMAL
ANION GAP SERPL CALCULATED.3IONS-SCNC: 6.7 MMOL/L (ref 5–15)
ARTERIAL PATENCY WRIST A: ABNORMAL
ATMOSPHERIC PRESS: 733 MMHG
BASE EXCESS BLDA CALC-SCNC: 22.8 MMOL/L (ref 0–2)
BDY SITE: ABNORMAL
BUN SERPL-MCNC: 29 MG/DL (ref 8–23)
BUN/CREAT SERPL: 70.7 (ref 7–25)
CALCIUM SPEC-SCNC: 9.4 MG/DL (ref 8.6–10.5)
CHLORIDE SERPL-SCNC: 90 MMOL/L (ref 98–107)
CO2 SERPL-SCNC: 43.3 MMOL/L (ref 22–29)
COHGB MFR BLD: 0.8 % (ref 0–2)
CREAT SERPL-MCNC: 0.41 MG/DL (ref 0.57–1)
DEPRECATED RDW RBC AUTO: 51.3 FL (ref 37–54)
EGFRCR SERPLBLD CKD-EPI 2021: 102.1 ML/MIN/1.73
ERYTHROCYTE [DISTWIDTH] IN BLOOD BY AUTOMATED COUNT: 14.8 % (ref 12.3–15.4)
GAS FLOW AIRWAY: 12 LPM
GLUCOSE BLDC GLUCOMTR-MCNC: 164 MG/DL (ref 70–130)
GLUCOSE BLDC GLUCOMTR-MCNC: 197 MG/DL (ref 70–130)
GLUCOSE BLDC GLUCOMTR-MCNC: 263 MG/DL (ref 70–130)
GLUCOSE SERPL-MCNC: 215 MG/DL (ref 65–99)
HCO3 BLDA-SCNC: 51.1 MMOL/L (ref 22–28)
HCT VFR BLD AUTO: 38.9 % (ref 34–46.6)
HCT VFR BLD CALC: 38.4 %
HGB BLD-MCNC: 12 G/DL (ref 12–15.9)
Lab: ABNORMAL
MCH RBC QN AUTO: 29.4 PG (ref 26.6–33)
MCHC RBC AUTO-ENTMCNC: 30.8 G/DL (ref 31.5–35.7)
MCV RBC AUTO: 95.3 FL (ref 79–97)
METHGB BLD QL: 0.4 % (ref 0–1.5)
MODALITY: ABNORMAL
NOTE: ABNORMAL
NOTIFIED BY: ABNORMAL
NOTIFIED WHO: ABNORMAL
OXYHGB MFR BLDV: 91.3 % (ref 94–99)
PCO2 BLDA: 73.4 MM HG (ref 35–45)
PCO2 TEMP ADJ BLD: ABNORMAL MM[HG]
PH BLDA: 7.45 PH UNITS (ref 7.35–7.45)
PH, TEMP CORRECTED: ABNORMAL
PLATELET # BLD AUTO: 212 10*3/MM3 (ref 140–450)
PMV BLD AUTO: 12.2 FL (ref 6–12)
PO2 BLDA: 64 MM HG (ref 75–100)
PO2 TEMP ADJ BLD: ABNORMAL MM[HG]
POTASSIUM SERPL-SCNC: 4.4 MMOL/L (ref 3.5–5.2)
RBC # BLD AUTO: 4.08 10*6/MM3 (ref 3.77–5.28)
SAO2 % BLDCOA: 92.4 % (ref 94–100)
SODIUM SERPL-SCNC: 140 MMOL/L (ref 136–145)
VENTILATOR MODE: ABNORMAL
WBC NRBC COR # BLD: 20.83 10*3/MM3 (ref 3.4–10.8)

## 2023-05-18 PROCEDURE — 87040 BLOOD CULTURE FOR BACTERIA: CPT | Performed by: INTERNAL MEDICINE

## 2023-05-18 PROCEDURE — 80048 BASIC METABOLIC PNL TOTAL CA: CPT | Performed by: INTERNAL MEDICINE

## 2023-05-18 PROCEDURE — 85027 COMPLETE CBC AUTOMATED: CPT | Performed by: INTERNAL MEDICINE

## 2023-05-18 PROCEDURE — 25010000002 METHYLPREDNISOLONE PER 40 MG: Performed by: FAMILY MEDICINE

## 2023-05-18 PROCEDURE — 25010000002 AZITHROMYCIN PER 500 MG: Performed by: INTERNAL MEDICINE

## 2023-05-18 PROCEDURE — 36600 WITHDRAWAL OF ARTERIAL BLOOD: CPT

## 2023-05-18 PROCEDURE — 94664 DEMO&/EVAL PT USE INHALER: CPT

## 2023-05-18 PROCEDURE — 94660 CPAP INITIATION&MGMT: CPT

## 2023-05-18 PROCEDURE — 71045 X-RAY EXAM CHEST 1 VIEW: CPT

## 2023-05-18 PROCEDURE — 82375 ASSAY CARBOXYHB QUANT: CPT

## 2023-05-18 PROCEDURE — 99232 SBSQ HOSP IP/OBS MODERATE 35: CPT | Performed by: INTERNAL MEDICINE

## 2023-05-18 PROCEDURE — 94799 UNLISTED PULMONARY SVC/PX: CPT

## 2023-05-18 PROCEDURE — 87147 CULTURE TYPE IMMUNOLOGIC: CPT | Performed by: INTERNAL MEDICINE

## 2023-05-18 PROCEDURE — 87070 CULTURE OTHR SPECIMN AEROBIC: CPT | Performed by: INTERNAL MEDICINE

## 2023-05-18 PROCEDURE — 82805 BLOOD GASES W/O2 SATURATION: CPT

## 2023-05-18 PROCEDURE — 87077 CULTURE AEROBIC IDENTIFY: CPT | Performed by: INTERNAL MEDICINE

## 2023-05-18 PROCEDURE — 82948 REAGENT STRIP/BLOOD GLUCOSE: CPT

## 2023-05-18 PROCEDURE — 83050 HGB METHEMOGLOBIN QUAN: CPT

## 2023-05-18 PROCEDURE — 99233 SBSQ HOSP IP/OBS HIGH 50: CPT | Performed by: INTERNAL MEDICINE

## 2023-05-18 PROCEDURE — 25010000002 ENOXAPARIN PER 10 MG: Performed by: FAMILY MEDICINE

## 2023-05-18 PROCEDURE — 63710000001 INSULIN ASPART PER 5 UNITS: Performed by: FAMILY MEDICINE

## 2023-05-18 PROCEDURE — 87205 SMEAR GRAM STAIN: CPT | Performed by: INTERNAL MEDICINE

## 2023-05-18 PROCEDURE — 87186 SC STD MICRODIL/AGAR DIL: CPT | Performed by: INTERNAL MEDICINE

## 2023-05-18 PROCEDURE — 94761 N-INVAS EAR/PLS OXIMETRY MLT: CPT

## 2023-05-18 PROCEDURE — 87185 SC STD ENZYME DETCJ PER NZM: CPT | Performed by: INTERNAL MEDICINE

## 2023-05-18 RX ADMIN — BUDESONIDE INHALATION 1 MG: 0.5 SUSPENSION RESPIRATORY (INHALATION) at 07:14

## 2023-05-18 RX ADMIN — INSULIN ASPART 6 UNITS: 100 INJECTION, SOLUTION INTRAVENOUS; SUBCUTANEOUS at 16:52

## 2023-05-18 RX ADMIN — SODIUM CHLORIDE 500 MG: 900 INJECTION, SOLUTION INTRAVENOUS at 11:17

## 2023-05-18 RX ADMIN — METHYLPREDNISOLONE SODIUM SUCCINATE 40 MG: 40 INJECTION, POWDER, LYOPHILIZED, FOR SOLUTION INTRAMUSCULAR; INTRAVENOUS at 20:44

## 2023-05-18 RX ADMIN — ALPRAZOLAM 0.5 MG: 0.5 TABLET ORAL at 20:45

## 2023-05-18 RX ADMIN — Medication 3 ML: at 09:02

## 2023-05-18 RX ADMIN — BUDESONIDE AND FORMOTEROL FUMARATE DIHYDRATE 2 PUFF: 160; 4.5 AEROSOL RESPIRATORY (INHALATION) at 07:14

## 2023-05-18 RX ADMIN — GUAIFENESIN 600 MG: 600 TABLET, EXTENDED RELEASE ORAL at 09:01

## 2023-05-18 RX ADMIN — ACETAMINOPHEN 650 MG: 325 TABLET, FILM COATED ORAL at 10:25

## 2023-05-18 RX ADMIN — IPRATROPIUM BROMIDE AND ALBUTEROL SULFATE 3 ML: 2.5; .5 SOLUTION RESPIRATORY (INHALATION) at 13:01

## 2023-05-18 RX ADMIN — Medication 3 ML: at 20:46

## 2023-05-18 RX ADMIN — INSULIN ASPART 2 UNITS: 100 INJECTION, SOLUTION INTRAVENOUS; SUBCUTANEOUS at 11:23

## 2023-05-18 RX ADMIN — IPRATROPIUM BROMIDE AND ALBUTEROL SULFATE 3 ML: 2.5; .5 SOLUTION RESPIRATORY (INHALATION) at 19:08

## 2023-05-18 RX ADMIN — AMLODIPINE BESYLATE 10 MG: 5 TABLET ORAL at 09:01

## 2023-05-18 RX ADMIN — SODIUM CHLORIDE 30 MG/ML INHALATION SOLUTION 4 ML: 30 SOLUTION INHALANT at 19:08

## 2023-05-18 RX ADMIN — IPRATROPIUM BROMIDE AND ALBUTEROL SULFATE 3 ML: 2.5; .5 SOLUTION RESPIRATORY (INHALATION) at 01:43

## 2023-05-18 RX ADMIN — METHYLPREDNISOLONE SODIUM SUCCINATE 40 MG: 40 INJECTION, POWDER, LYOPHILIZED, FOR SOLUTION INTRAMUSCULAR; INTRAVENOUS at 09:01

## 2023-05-18 RX ADMIN — TIOTROPIUM BROMIDE INHALATION SPRAY 2 PUFF: 3.12 SPRAY, METERED RESPIRATORY (INHALATION) at 07:14

## 2023-05-18 RX ADMIN — ATENOLOL 25 MG: 25 TABLET ORAL at 09:01

## 2023-05-18 RX ADMIN — INSULIN ASPART 2 UNITS: 100 INJECTION, SOLUTION INTRAVENOUS; SUBCUTANEOUS at 06:30

## 2023-05-18 RX ADMIN — GUAIFENESIN 600 MG: 600 TABLET, EXTENDED RELEASE ORAL at 20:45

## 2023-05-18 RX ADMIN — Medication 5 MG: at 20:45

## 2023-05-18 RX ADMIN — SODIUM CHLORIDE 30 MG/ML INHALATION SOLUTION 4 ML: 30 SOLUTION INHALANT at 07:13

## 2023-05-18 RX ADMIN — MULTIPLE VITAMINS W/ MINERALS TAB 1 TABLET: TAB at 09:01

## 2023-05-18 RX ADMIN — IPRATROPIUM BROMIDE AND ALBUTEROL SULFATE 3 ML: 2.5; .5 SOLUTION RESPIRATORY (INHALATION) at 07:14

## 2023-05-18 RX ADMIN — ENOXAPARIN SODIUM 40 MG: 100 INJECTION SUBCUTANEOUS at 20:45

## 2023-05-18 RX ADMIN — SENNOSIDES AND DOCUSATE SODIUM 2 TABLET: 50; 8.6 TABLET ORAL at 09:01

## 2023-05-18 RX ADMIN — BUDESONIDE AND FORMOTEROL FUMARATE DIHYDRATE 2 PUFF: 160; 4.5 AEROSOL RESPIRATORY (INHALATION) at 19:33

## 2023-05-18 RX ADMIN — SENNOSIDES AND DOCUSATE SODIUM 2 TABLET: 50; 8.6 TABLET ORAL at 20:45

## 2023-05-18 NOTE — PROGRESS NOTES
Adult Nutrition  Assessment/PES    Patient Name:  Carolin Toscano  YOB: 1945  MRN: [id number]  Admit Date:  5/4/2023    Assessment Date:  5/18/2023    Comments:    Pt w/ poor PO intake and averaging 25% over the past four meals. Will order magic cup BID to encourage PO intake. Continue to encourage PO and supplement intake. RD to follow-up and available PRN.      Reason for Assessment     Row Name 05/18/23 1509          Reason for Assessment    Reason For Assessment follow-up protocol                  Labs/Tests/Procedures/Meds     Row Name 05/18/23 1509          Labs/Procedures/Meds    Lab Results Reviewed reviewed, pertinent     Lab Results Comments High: BUN  Low: Cr        Medications    Pertinent Medications Reviewed reviewed, pertinent     Pertinent Medications Comments lovenox, methylprednisolone, docusate                Physical Findings     Row Name 05/18/23 1510          Physical Findings    Overall Physical Appearance normal wt for age, nose pressure injury                  Nutrition Prescription Ordered     Row Name 05/18/23 1510          Nutrition Prescription PO    Current PO Diet Regular     Fluid Consistency Thin     Supplement --  Boost Very High Calorie     Supplement Frequency 2 times a day     Common Modifiers Cardiac                Evaluation of Received Nutrient/Fluid Intake     Row Name 05/18/23 1511          Intake Assessment    Energy/Calorie Requirement Assessment not meeting needs     Protein Requirement Assessment not meeting needs        PO Evaluation    Number of Days PO Intake Evaluated 1 day     Number of Meals 4     % PO Intake 25                   Problem/Interventions:   Problem 1     Row Name 05/18/23 1513          Nutrition Diagnoses Problem 1    Problem 1 Increased Nutrient Needs     Macronutrient Kcal;Protein     Etiology (related to) Medical Diagnosis     Pulmonary/Critical Care A/C respiratory failure     Signs/Symptoms (evidenced by) Other (comment)  need for  oral nutrition supplement to meet increased estimated needs                Problem 2     Row Name 05/18/23 1513          Nutrition Diagnoses Problem 2    Problem 2 Predicted Suboptimal Intake     Etiology (related to) Factors Affecting Nutrition     Signs/Symptoms (evidenced by) Report of Minimal PO Intake  per flowsheets                    Intervention Goal     Row Name 05/18/23 1513          Intervention Goal    General Maintain nutrition;Improved nutrition related lab(s);Reduce/improve symptoms;Meet nutritional needs for age/condition;Disease management/therapy     PO Increase intake;Maintain intake;Tolerate PO;Establish PO;Meet estimated needs     Weight Maintain weight                Nutrition Intervention     Row Name 05/18/23 1513          Nutrition Intervention    RD/Tech Action Follow Tx progress;Care plan reviewd;Recommend/ordered;Supplement provided;Await begin PO;Encourage intake     Recommended/Ordered Supplement                Nutrition Prescription     Row Name 05/18/23 1514          Nutrition Prescription PO    Supplement Magic Cup     Supplement Frequency 2 times a day     New PO Prescription Ordered? Yes        Other Orders    Obtain Weight Daily     Obtain Weight Ordered? No, recommended     Supplement Vitamin mineral supplement     Supplement Ordered? No, recommended     Labs Na+;Phos;K+;Mg++     Labs Ordered? No, recommended                Education/Evaluation     Row Name 05/18/23 1514          Education    Education Will Instruct as appropriate        Monitor/Evaluation    Monitor Per protocol;PO intake;Supplement intake;Pertinent labs;Weight;Skin status;Symptoms                 Electronically signed by:  Cassidy Tello RD  05/18/23 15:15 EDT

## 2023-05-18 NOTE — THERAPY TREATMENT NOTE
Pt supine in bed requiring 11 lpm per HFNC. Pt was noted to drop from 91% to 84% with speaking.  Treatment held this date. Will f/u with pt tomorrow.

## 2023-05-18 NOTE — CASE MANAGEMENT/SOCIAL WORK
"1115 Pt appears to be sleeping during CM rounds. Sat 97% on 90% Fio2. P2P scheduled for 3 pm today to determine if insurance will approve transfer to [facility]. Cm spoke with Adrianne Rn who states pt dtr is coming in from [place] today. She does not think there are plans for bronch at this time as family has stated \"they want her to rest.\"   "

## 2023-05-18 NOTE — PLAN OF CARE
Problem: Noninvasive Ventilation Acute  Goal: Effective Unassisted Ventilation and Oxygenation  Outcome: Ongoing, Progressing     Problem: Gas Exchange Impaired  Goal: Optimal Gas Exchange  Outcome: Ongoing, Progressing   Goal Outcome Evaluation:

## 2023-05-18 NOTE — ACP (ADVANCE CARE PLANNING)
Patient stated that she is not interested in completing an Advance Care Plan.  She knows that her daughter and brother are listed as contacts and they will speak for her.

## 2023-05-18 NOTE — PROGRESS NOTES
[facility]    Name:  Carolin Toscano   Age:  77 y.o.  Sex:  female  :  1945  MRN:  [id number]   Visit Number:  [id number]  Admission Date:  2023  Date Of Service:  23  Primary Care Physician:  Jason Nicholson MD     LOS: 14 days :    Chief Complaint:      Shortness of air    Subjective:    : Daughter Janet is coming today.  Otherwise not much change still having dyspnea and hypoxemia.  Case management note reviewed.    Hospital Course:  Patient was admitted and treated with steroids.  Antibiotics were not indicated.  Pulmonology consulted and was moved to ICU on  due to worsening respiratory failure and tachypnea.  She initially had to be on Precedex drip which was discontinued and switched to Xanax due to anxiety. Patient was placed on AVAPS.  Patient continued on steroids and received Lasix.    23: d/w nsg intermittently on avaps and hi flow. Patient awake in bed. CXR from 5/15 reviewed. Medications reviewed.  Discussed with case management [facility]    2023 discussed with case management still working [facility].  I am planning to do a peer to peer with them tomorrow.  Otherwise has persistent hypoxia.  Dr. Soriano's note reviewed considering bronchoscopy.     History of presenting illness:  Patient is a 77 years old female with a past medical history of COPD, chronic respiratory failure on 5 to 6 L per her previous discharge, on NIV machine at home and trelegy, hypertension, and ongoing tobacco use who presented to the ER from home by EMS with a chief complaint of shortness of breath.  Patient reporting that she started having shortness of breath associated with productive cough since last night and has persisted and became worse that promoted her to call EMS.  EMS has found the patient to be at 65% saturation on her normal 6 L of oxygen. Patient reports compliance with her NIV  machine at home.      On ER evaluation, Patient was found to be tachycardic with a heart rate of 130s, temperature of 100.2 and respirations of 30, /72.  Patient was placed on BiPAP with FiO2 of 40%.  Her ABG came back and showed pH of 7.296/PCO2 87/PO2 94/HCO3 42/saturation 97% on 5 L nasal cannula.  HS Troponin 44, proBNP WNL, glucose 192, CO2 of 40, otherwise CBC and CMP within acceptable range.  Lactate and Pro-Adi WNL.  Respiratory panel positive for parainfluenza virus.  Chest x-ray Mild interstitial disease  bilaterally is similar to prior. No area of consolidation is seen. Patient received Solu-Medrol and Aztreonam while in the ER.  Hospitalist consulted for admission.       Edited by: Primitivo Nuñez DO at 5/18/2023 9203     Review of Systems:     All systems were reviewed and negative except as mentioned in subjective, assessment and plan.    Vital Signs:    Temp:  [97.2 °F (36.2 °C)-97.8 °F (36.6 °C)] 97.8 °F (36.6 °C)  Heart Rate:  [59-94] 64  Resp:  [16-35] 23  BP: (108-150)/(43-82) 112/57    Intake and output:    I/O last 3 completed shifts:  In: 1064 [P.O.:1064]  Out: 1360 [Urine:1360]  I/O this shift:  In: 60 [P.O.:60]  Out: -     Physical Examination:    General Appearance:  Alert and cooperative.  Chronically ill-appearing.   Head:  Atraumatic and normocephalic.   Eyes: Conjunctivae and sclerae normal, no icterus. No pallor.   Throat: No oral lesions, no thrush, oral mucosa moist.   Neck: Supple, trachea midline, no thyromegaly.   Lungs:   Breath sounds diminished bilaterally right > Left with crackles and wheeze.  On nasal cannula.   Heart:  Normal S1 and S2, no murmur, no gallop, no rub. No JVD.   Abdomen:   Normal bowel sounds, no masses, no organomegaly. Soft, nontender, nondistended, no rebound tenderness.   Extremities: Supple, no edema, no cyanosis, no clubbing.   Skin: No bleeding or rash.   Neurologic: Alert and oriented x 3. No facial asymmetry. Moves all four limbs. No  tremors.  Generalized weakness noted.     Edited by: Primitivo Nuñez, DO at 5/18/2023 1042     Laboratory results:    Results from last 7 days   Lab Units 05/18/23 0618 05/17/23 0450 05/16/23 0453   SODIUM mmol/L 140 144 145   POTASSIUM mmol/L 4.4 4.3 4.1   CHLORIDE mmol/L 90* 91* 93*   CO2 mmol/L 43.3* 45.0* 43.3*   BUN mg/dL 29* 34* 31*   CREATININE mg/dL 0.41* 0.39* 0.37*   CALCIUM mg/dL 9.4 9.8 9.6   GLUCOSE mg/dL 215* 173* 202*     Results from last 7 days   Lab Units 05/18/23 0618 05/17/23 0450 05/16/23 0453   WBC 10*3/mm3 20.83* 14.56* 17.14*   HEMOGLOBIN g/dL 12.0 12.4 12.4   HEMATOCRIT % 38.9 40.9 40.1   PLATELETS 10*3/mm3 212 194 204                 Recent Labs     05/15/23  0453 05/16/23  0519 05/18/23  0708   PHART 7.445 7.453* 7.451*   SSP3ARI 75.7* 76.4* 73.4*   PO2ART 56.8* 56.7* 64.0*   YJN7VNA 51.9* 53.4* 51.1*   BASEEXCESS 23.5* 24.7* 22.8*      I have reviewed the patient's laboratory results.    Radiology results:    XR Chest 1 View    Result Date: 5/18/2023  PROCEDURE: XR CHEST 1 VW-  HISTORY: Resp Failure.; J96.21-Acute and chronic respiratory failure with hypoxia; J96.22-Acute and chronic respiratory failure with hypercapnia; J44.1-Chronic obstructive pulmonary disease with (acute) exacerbation; B34.8-Other viral infections of unspecified site  COMPARISON: 05/15/2023.  FINDINGS: The heart is upper limits of normal.. Again noted is bilateral interstitial disease and bronchial wall thickening. More prominent markings noted in the lung bases, left greater than right. This is shown to represent bronchiectasis with mucus plugging on recent CT. Findings are stable from prior. Aortic mural calcifications noted.. The mediastinum is unremarkable. There is no pneumothorax.  There are no acute osseous abnormalities.      Impression: Stable chest..    This report was signed and finalized on 5/18/2023 7:53 AM by Shraddha Wharton MD.    I have reviewed the patient's radiology reports.    Medication  Review:     I have reviewed the patient's active and prn medications.     Problem List:      Acute on chronic respiratory failure with hypoxia and hypercapnia      Assessment/Plan:    Respiratory failure with hypoxia/hypercapnia, COPD exacerbation, parainfluenza  -patient on 5 to 6 L oxygen at baseline,  Continue BiPAP therapy.  Has NIV at home. Currently on 12 liters  -Recent parainfluenza virus infection met SIRS due to this  - Solu-Medrol taper as appropriate  -Bronchodilators, Mucinex MetaNeb and supportive care.  -Dr. Soriano note reviewed and appreciated  -s/p lasix  -Xanax and Precedex as needed  - add azithromycin, send sputum culture and blood culture.     Elevated troponin  -Likely secondary to demand ischemia in settings of respiratory failure  -Patient denies any chest pain, low suspicion for ACS  -Troponin trended down and plateaued.     Hyperglycemia  -Will cover with sliding scale insulin while on steroids  -Hemoglobin A1c 6.7     [facility]: [facility]    Prophylaxis: Lovenox    Edited by: Primitivo Nuñez, DO at 5/18/2023 1042     DVT Prophylaxis: Lovenox  Code Status:   Code Status and Medical Interventions:   Ordered at: 05/04/23 1634     Code Status (Patient has no pulse and is not breathing):    CPR (Attempt to Resuscitate)     Medical Interventions (Patient has pulse or is breathing):    Full Support      Diet:   Dietary Orders (From admission, onward)     Start     Ordered    05/17/23 1038  Diet: Cardiac Diets; Healthy Heart (2-3 Na+); Texture: Regular Texture (IDDSI 7); Fluid Consistency: Thin (IDDSI 0)  Diet Effective Now        Comments: Pt prefers cottage cheese, bananas, oranges, peaches, watermelon.   References:    Diet Order Crosswalk   Question Answer Comment   Diets: Cardiac Diets    Cardiac Diet: Healthy Heart (2-3 Na+)    Texture: Regular Texture (IDDSI 7)    Fluid Consistency: Thin (IDDSI 0)        05/17/23 1039    05/15/23 1800  Dietary  Nutrition Supplements Xu  2 Times Daily      Question:  Select Supplement:  Answer:  Xu    05/15/23 1445    05/15/23 1800  Dietary Nutrition Supplements Boost VHC  2 Times Daily      Question:  Select Supplement:  Answer:  Boost VHC    05/15/23 1446               Discharge Plan: [facility] in 1 to 2 days    Primitivo Nuñez DO  05/18/23  14:05 EDT    Dictated utilizing Dragon dictation.

## 2023-05-18 NOTE — PLAN OF CARE
Goal Outcome Evaluation:      Patient still requiring 10-15L High flow NC, 80-90% FiO2 NPPV/NIV. Patient still has very high oxygen demands. Cough is poor-fair - unable to rid herself of mucus secretions. Turned Q2 to promote skin protection, IV antibiotics given, fluid and food intake promoted throughout shift. Patient comfortable, call light in reach, no new orders at this time.

## 2023-05-18 NOTE — PAYOR COMM NOTE
"  Updated clinicals  Ur manager; china peres [phone] and fax [phone]    Carolin Toscano (77 y.o. Female)     Date of Birth   1945    Social Security Number       Address   [address] [facility] [address]    Home Phone   [phone]    MRN   [id number]       [place]   [place]    Marital Status                               Admission Date   5/4/23    Admission Type   Emergency    Admitting Provider       Attending Provider   Primitivo Nuñez DO    Department, Room/Bed   [facility] INTENSIVE CARE, I07/1       Discharge Date       Discharge Disposition       Discharge Destination                               Attending Provider: Primitivo Nuñez DO    Allergies: Contrast Dye (Echo Or Unknown Ct/mr), Ciprofloxacin, Keflex [Cephalexin], Penicillins, Sulfa Antibiotics, Terramycin [Oxytetracycline], Prednisone, Latex, Percocet [Oxycodone-acetaminophen], Xyzal [Levocetirizine]    Isolation: None   Infection: None   Code Status: CPR    Ht: 160 cm (63\")   Wt: 64.5 kg (142 lb 3.2 oz)    Admission Cmt: None   Principal Problem: Acute on chronic respiratory failure with hypoxia and hypercapnia [J96.21,J96.22]                 Active Insurance as of 5/4/2023     Primary Coverage     Payor Plan Insurance Group Employer/Plan Group    HUMANA MEDICARE REPLACEMENT HUMANA MEDICARE REPLACEMENT [id number]     Payor Plan Address Payor Plan Phone Number Payor Plan Fax Number Effective Dates    [address] [address] [phone]  1/1/2018 - None Entered    [facility] [id number]       Subscriber Name Subscriber Birth Date Member ID       CAROLIN TOSCANO 1945 [id number]           Secondary Coverage     Payor Plan Insurance Group Employer/Plan Group    [place]      Payor Plan Address Payor Plan Phone Number Payor Plan Fax Number Effective Dates    [address] [phone]  10/11/2021 - None Entered    [facility] [id number]       Subscriber Name Subscriber Birth Date " Member ID       DAMIAN DYE 1945 [id number]                 Emergency Contacts      (Rel.) Home Phone Work Phone Mobile Phone    Nehal Hoffmann (Daughter) [phone] -- --               Physician Progress Notes (last 24 hours)      Primitivo Nuñez, DO at 23 1405              [facility]    [facility]ESS [facility]    Name:  Damian Dye   Age:  77 y.o.  Sex:  female  :  1945  MRN:  [id number]   Visit Number:  [id number]  Admission Date:  2023  Date Of Service:  23  Primary Care Physician:  Jason Nicholson MD     LOS: 14 days :    Chief Complaint:      Shortness of air    Subjective:    : Daughter Janet is coming today.  Otherwise not much change still having dyspnea and hypoxemia.  Case management note reviewed.    Hospital Course:  Patient was admitted and treated with steroids.  Antibiotics were not indicated.  Pulmonology consulted and was moved to ICU on  due to worsening respiratory failure and tachypnea.  She initially had to be on Precedex drip which was discontinued and switched to Xanax due to anxiety. Patient was placed on AVAPS.  Patient continued on steroids and received Lasix.    23: d/w nsg intermittently on avaps and hi flow. Patient awake in bed. CXR from 5/15 reviewed. Medications reviewed.  Discussed with case management [facility]    2023 discussed with case management still working [facility].  I am planning to do a peer to peer with them tomorrow.  Otherwise has persistent hypoxia.  Dr. Soriano's note reviewed considering bronchoscopy.     History of presenting illness:  Patient is a 77 years old female with a past medical history of COPD, chronic respiratory failure on 5 to 6 L per her previous discharge, on NIV machine at home and trelegy, hypertension, and ongoing tobacco use who presented to the ER from home by EMS with a chief complaint of shortness of  breath.  Patient reporting that she started having shortness of breath associated with productive cough since last night and has persisted and became worse that promoted her to call EMS.  EMS has found the patient to be at 65% saturation on her normal 6 L of oxygen. Patient reports compliance with her NIV machine at home.      On ER evaluation, Patient was found to be tachycardic with a heart rate of 130s, temperature of 100.2 and respirations of 30, /72.  Patient was placed on BiPAP with FiO2 of 40%.  Her ABG came back and showed pH of 7.296/PCO2 87/PO2 94/HCO3 42/saturation 97% on 5 L nasal cannula.  HS Troponin 44, proBNP WNL, glucose 192, CO2 of 40, otherwise CBC and CMP within acceptable range.  Lactate and Pro-Adi WNL.  Respiratory panel positive for parainfluenza virus.  Chest x-ray Mild interstitial disease  bilaterally is similar to prior. No area of consolidation is seen. Patient received Solu-Medrol and Aztreonam while in the ER.  Hospitalist consulted for admission.       Edited by: Primitivo Nuñez DO at 5/18/2023 6303     Review of Systems:     All systems were reviewed and negative except as mentioned in subjective, assessment and plan.    Vital Signs:    Temp:  [97.2 °F (36.2 °C)-97.8 °F (36.6 °C)] 97.8 °F (36.6 °C)  Heart Rate:  [59-94] 64  Resp:  [16-35] 23  BP: (108-150)/(43-82) 112/57    Intake and output:    I/O last 3 completed shifts:  In: 1064 [P.O.:1064]  Out: 1360 [Urine:1360]  I/O this shift:  In: 60 [P.O.:60]  Out: -     Physical Examination:    General Appearance:  Alert and cooperative.  Chronically ill-appearing.   Head:  Atraumatic and normocephalic.   Eyes: Conjunctivae and sclerae normal, no icterus. No pallor.   Throat: No oral lesions, no thrush, oral mucosa moist.   Neck: Supple, trachea midline, no thyromegaly.   Lungs:   Breath sounds diminished bilaterally right > Left with crackles and wheeze.  On nasal cannula.   Heart:  Normal S1 and S2, no murmur, no gallop, no  rub. No JVD.   Abdomen:   Normal bowel sounds, no masses, no organomegaly. Soft, nontender, nondistended, no rebound tenderness.   Extremities: Supple, no edema, no cyanosis, no clubbing.   Skin: No bleeding or rash.   Neurologic: Alert and oriented x 3. No facial asymmetry. Moves all four limbs. No tremors.  Generalized weakness noted.     Edited by: Primitivo Nuñez DO at 5/18/2023 1042     Laboratory results:    Results from last 7 days   Lab Units 05/18/23  0618 05/17/23 0450 05/16/23 0453   SODIUM mmol/L 140 144 145   POTASSIUM mmol/L 4.4 4.3 4.1   CHLORIDE mmol/L 90* 91* 93*   CO2 mmol/L 43.3* 45.0* 43.3*   BUN mg/dL 29* 34* 31*   CREATININE mg/dL 0.41* 0.39* 0.37*   CALCIUM mg/dL 9.4 9.8 9.6   GLUCOSE mg/dL 215* 173* 202*     Results from last 7 days   Lab Units 05/18/23  0618 05/17/23 0450 05/16/23 0453   WBC 10*3/mm3 20.83* 14.56* 17.14*   HEMOGLOBIN g/dL 12.0 12.4 12.4   HEMATOCRIT % 38.9 40.9 40.1   PLATELETS 10*3/mm3 212 194 204                 Recent Labs     05/15/23  0453 05/16/23  0519 05/18/23  0708   PHART 7.445 7.453* 7.451*   ERG6UIC 75.7* 76.4* 73.4*   PO2ART 56.8* 56.7* 64.0*   RUN7HLG 51.9* 53.4* 51.1*   BASEEXCESS 23.5* 24.7* 22.8*      I have reviewed the patient's laboratory results.    Radiology results:    XR Chest 1 View    Result Date: 5/18/2023  PROCEDURE: XR CHEST 1 VW-  HISTORY: Resp Failure.; J96.21-Acute and chronic respiratory failure with hypoxia; J96.22-Acute and chronic respiratory failure with hypercapnia; J44.1-Chronic obstructive pulmonary disease with (acute) exacerbation; B34.8-Other viral infections of unspecified site  COMPARISON: 05/15/2023.  FINDINGS: The heart is upper limits of normal.. Again noted is bilateral interstitial disease and bronchial wall thickening. More prominent markings noted in the lung bases, left greater than right. This is shown to represent bronchiectasis with mucus plugging on recent CT. Findings are stable from prior. Aortic mural  calcifications noted.. The mediastinum is unremarkable. There is no pneumothorax.  There are no acute osseous abnormalities.      Impression: Stable chest..    This report was signed and finalized on 5/18/2023 7:53 AM by Shraddha Wharton MD.    I have reviewed the patient's radiology reports.    Medication Review:     I have reviewed the patient's active and prn medications.     Problem List:      Acute on chronic respiratory failure with hypoxia and hypercapnia      Assessment/Plan:    Respiratory failure with hypoxia/hypercapnia, COPD exacerbation, parainfluenza  -patient on 5 to 6 L oxygen at baseline,  Continue BiPAP therapy.  Has NIV at home. Currently on 12 liters  -Recent parainfluenza virus infection met SIRS due to this  - Solu-Medrol taper as appropriate  -Bronchodilators, Mucinex MetaNeb and supportive care.  -Dr. Soriano note reviewed and appreciated  -s/p lasix  -Xanax and Precedex as needed  - add azithromycin, send sputum culture and blood culture.     Elevated troponin  -Likely secondary to demand ischemia in settings of respiratory failure  -Patient denies any chest pain, low suspicion for ACS  -Troponin trended down and plateaued.     Hyperglycemia  -Will cover with sliding scale insulin while on steroids  -Hemoglobin A1c 6.7     [facility]: [facility]    Prophylaxis: Lovenox    Edited by: Primitivo Nuñez,  at 5/18/2023 1042     DVT Prophylaxis: Lovenox  Code Status:   Code Status and Medical Interventions:   Ordered at: 05/04/23 1634     Code Status (Patient has no pulse and is not breathing):    CPR (Attempt to Resuscitate)     Medical Interventions (Patient has pulse or is breathing):    Full Support      Diet:   Dietary Orders (From admission, onward)     Start     Ordered    05/17/23 1038  Diet: Cardiac Diets; Healthy Heart (2-3 Na+); Texture: Regular Texture (IDDSI 7); Fluid Consistency: Thin (IDDSI 0)  Diet Effective Now        Comments: Pt prefers cottage  cheese, bananas, oranges, peaches, watermelon.   References:    Diet Order Crosswalk   Question Answer Comment   Diets: Cardiac Diets    Cardiac Diet: Healthy Heart (2-3 Na+)    Texture: Regular Texture (IDDSI 7)    Fluid Consistency: Thin (IDDSI 0)        23 1039    05/15/23 1800  Dietary Nutrition Supplements Xu  2 Times Daily      Question:  Select Supplement:  Answer:  Xu    05/15/23 1445    05/15/23 1800  Dietary Nutrition Supplements Boost VHC  2 Times Daily      Question:  Select Supplement:  Answer:  Boost VHC    05/15/23 1446               Discharge Plan: [facility] in 1 to 2 days    Primitivo Nuñez DO  23  14:05 EDT    Dictated utilizing Dragon dictation.        Electronically signed by Primitivo Nuñez DO at 23 1405     Primitivo Nuñez DO at 23 1653              [facility]    Name:  Carolin Toscano   Age:  77 y.o.  Sex:  female  :  1945  MRN:  [id number]   Visit Number:  [id number]  Admission Date:  2023  Date Of Service:  23  Primary Care Physician:  Jason Nicholson MD     LOS: 13 days :    Chief Complaint:      Shortness of air    Subjective:    2023 discussed with case management [facility]  I am planning to do a peer to peer with them tomorrow.  Otherwise has persistent hypoxia.  Dr. Soriano's note reviewed considering bronchoscopy.    Hospital Course:  Patient was admitted and treated with steroids.  Antibiotics were not indicated.  Pulmonology consulted and was moved to ICU on  due to worsening respiratory failure and tachypnea.  She initially had to be on Precedex drip which was discontinued and switched to Xanax due to anxiety. Patient was placed on AVAPS.  Patient continued on steroids and received Lasix.    23: d/w nsg intermittently on avaps and hi flow. Patient awake in bed. CXR from 5/15 reviewed. Medications reviewed.  Discussed with case management they are  [facility]       History of presenting illness:  Patient is a 77 years old female with a past medical history of COPD, chronic respiratory failure on 5 to 6 L per her previous discharge, on NIV machine at home and trelegy, hypertension, and ongoing tobacco use who presented to the ER from home by EMS with a chief complaint of shortness of breath.  Patient reporting that she started having shortness of breath associated with productive cough since last night and has persisted and became worse that promoted her to call EMS.  EMS has found the patient to be at 65% saturation on her normal 6 L of oxygen. Patient reports compliance with her NIV machine at home.      On ER evaluation, Patient was found to be tachycardic with a heart rate of 130s, temperature of 100.2 and respirations of 30, /72.  Patient was placed on BiPAP with FiO2 of 40%.  Her ABG came back and showed pH of 7.296/PCO2 87/PO2 94/HCO3 42/saturation 97% on 5 L nasal cannula.  HS Troponin 44, proBNP WNL, glucose 192, CO2 of 40, otherwise CBC and CMP within acceptable range.  Lactate and Pro-Adi WNL.  Respiratory panel positive for parainfluenza virus.  Chest x-ray Mild interstitial disease  bilaterally is similar to prior. No area of consolidation is seen. Patient received Solu-Medrol and Aztreonam while in the ER.  Hospitalist consulted for admission.       Edited by: Primitivo Nuñez DO at 5/17/2023 4228     Review of Systems:     All systems were reviewed and negative except as mentioned in subjective, assessment and plan.    Vital Signs:    Temp:  [97.1 °F (36.2 °C)-97.4 °F (36.3 °C)] 97.2 °F (36.2 °C)  Heart Rate:  [54-98] 74  Resp:  [16-22] 18  BP: (114-142)/(37-65) 115/46    Intake and output:    I/O last 3 completed shifts:  In: 954 [P.O.:954]  Out: 2375 [Urine:2375]  I/O this shift:  In: 390 [P.O.:390]  Out: -     Physical Examination:    General Appearance:  Alert and cooperative.  Chronically ill-appearing.    Head:  Atraumatic and normocephalic.   Eyes: Conjunctivae and sclerae normal, no icterus. No pallor.   Throat: No oral lesions, no thrush, oral mucosa moist.   Neck: Supple, trachea midline, no thyromegaly.   Lungs:   Breath sounds diminished bilaterally equally.  On nasal cannula.   Heart:  Normal S1 and S2, no murmur, no gallop, no rub. No JVD.   Abdomen:   Normal bowel sounds, no masses, no organomegaly. Soft, nontender, nondistended, no rebound tenderness.   Extremities: Supple, no edema, no cyanosis, no clubbing.   Skin: No bleeding or rash.   Neurologic: Alert and oriented x 3. No facial asymmetry. Moves all four limbs. No tremors.  Generalized weakness noted.     Edited by: Primitivo Nuñez DO at 5/17/2023 2783     Laboratory results:    Results from last 7 days   Lab Units 05/17/23  0450 05/16/23  0453 05/15/23  0413   SODIUM mmol/L 144 145 146*   POTASSIUM mmol/L 4.3 4.1 4.9   CHLORIDE mmol/L 91* 93* 95*   CO2 mmol/L 45.0* 43.3* 44.7*   BUN mg/dL 34* 31* 21   CREATININE mg/dL 0.39* 0.37* 0.36*   CALCIUM mg/dL 9.8 9.6 9.4   GLUCOSE mg/dL 173* 202* 148*     Results from last 7 days   Lab Units 05/17/23  0450 05/16/23  0453 05/15/23  0413   WBC 10*3/mm3 14.56* 17.14* 16.64*   HEMOGLOBIN g/dL 12.4 12.4 12.2   HEMATOCRIT % 40.9 40.1 40.2   PLATELETS 10*3/mm3 194 204 187                 Recent Labs     05/13/23  0510 05/15/23  0453 05/16/23  0519   PHART 7.420 7.445 7.453*   PVC4TGW 70.6* 75.7* 76.4*   PO2ART 74.4* 56.8* 56.7*   HPX3KQX 45.7* 51.9* 53.4*   BASEEXCESS 17.9* 23.5* 24.7*      I have reviewed the patient's laboratory results.    Radiology results:    CT Chest Without Contrast Diagnostic    Result Date: 5/16/2023  PROCEDURE: CT CHEST WO CONTRAST DIAGNOSTIC-  HISTORY: Dyspnea, chronic, unclear etiology; J96.21-Acute and chronic respiratory failure with hypoxia; J96.22-Acute and chronic respiratory failure with hypercapnia; J44.1-Chronic obstructive pulmonary disease with (acute) exacerbation;  B34.8-Other viral infections of unspecified site  COMPARISON: April 13, 2023.  PROCEDURE:  Multiple axial CT images were obtained from the thoracic inlet through the upper abdomen without the use of contrast.  FINDINGS: Soft tissue windows reveal no suspicious axillary, hilar or mediastinal adenopathy. Heart size is normal. The aorta is normal in caliber. There are vascular calcifications. There are no pleural or pericardial effusions. There is bronchiectasis in the bilateral lower lobes. There is new mucous plugging and airspace disease in the bilateral lower lobes consistent with pneumonia. There are tree-in-bud opacities diffusely which have worsened since the prior exam consistent with bronchiolitis. There is evidence of old calcified granulomatous disease. The visualized upper abdomen shows the gallbladder to be surgically absent. Bone windows reveal no bony destructive lesions.      Impression: Mucous plugging, bilateral lower lobe pneumonia, and bronchiolitis. 8-12 week follow-up exam is recommended to document stability.  193.74 mGy.cm   This study was performed with techniques to keep radiation doses as low as reasonably achievable (ALARA). Individualized dose reduction techniques using automated exposure control or adjustment of mA and/or kV according to the patient size were employed.     Images were reviewed, interpreted, and dictated by Dr. Shraddha Wharton MD Transcribed by Ade Solis PA-C.  This report was signed and finalized on 5/16/2023 1:38 PM by Shraddha Wharton MD.    I have reviewed the patient's radiology reports.    Medication Review:     I have reviewed the patient's active and prn medications.     Problem List:      Acute on chronic respiratory failure with hypoxia and hypercapnia      Assessment/Plan:    Respiratory failure with hypoxia/hypercapnia, COPD exacerbation, parainfluenza  -patient on 5 to 6 L oxygen at baseline,  Continue BiPAP therapy.  Has NIV at home.  -Recent parainfluenza  virus infection met SIRS due to this  - Solu-Medrol taper as appropriate  -Bronchodilators, Mucinex MetaNeb and supportive care.  -Dr. Soriano note reviewed and appreciated  -Lasix 40 twice daily x 2 days.  -Xanax and Precedex as needed     Elevated troponin  -Likely secondary to demand ischemia in settings of respiratory failure  -Patient denies any chest pain, low suspicion for ACS  -Troponin trended down and plateaued.     Hyperglycemia  -Will cover with sliding scale insulin while on steroids  -Hemoglobin A1c 6.7     [facility]: [facility]    Prophylaxis: Lovenox    Edited by: Primitivo Nuñez DO at 5/17/2023 1656     DVT Prophylaxis: Lovenox  Code Status:   Code Status and Medical Interventions:   Ordered at: 05/04/23 1634     Code Status (Patient has no pulse and is not breathing):    CPR (Attempt to Resuscitate)     Medical Interventions (Patient has pulse or is breathing):    Full Support      Diet:   Dietary Orders (From admission, onward)     Start     Ordered    05/17/23 1038  Diet: Cardiac Diets; Healthy Heart (2-3 Na+); Texture: Regular Texture (IDDSI 7); Fluid Consistency: Thin (IDDSI 0)  Diet Effective Now        Comments: Pt prefers cottage cheese, bananas, oranges, peaches, watermelon.   References:    Diet Order Crosswalk   Question Answer Comment   Diets: Cardiac Diets    Cardiac Diet: Healthy Heart (2-3 Na+)    Texture: Regular Texture (IDDSI 7)    Fluid Consistency: Thin (IDDSI 0)        05/17/23 1039    05/15/23 1800  Dietary Nutrition Supplements [facility]  2 Times Daily      Question:  Select Supplement:  Answer:  Xu    05/15/23 1445    05/15/23 1800  Dietary Nutrition Supplements Boost VHC  2 Times Daily      Question:  Select Supplement:  Answer:  Boost VHC    05/15/23 1446               [facility]    Primitivo Nuñez DO  05/17/23  16:53 EDT    Dictated utilizing Dragon dictation.        Electronically signed by Savannah  Primitivo Rodriguez, DO at 05/17/23 4825

## 2023-05-18 NOTE — THERAPY TREATMENT NOTE
Pt received laying in bed on 11L, O2 sats 90%.  Lab came to get blood, pts sats noted to drop to 87%, pt talking with therapy and her sats dropped to 84% on 11L.  Therapy notified pts nurse.  OT held today.  Will follow up with pt tomorrow.

## 2023-05-18 NOTE — PLAN OF CARE
Problem: Skin Injury Risk Increased  Goal: Skin Health and Integrity  Outcome: Ongoing, Progressing  Intervention: Promote and Optimize Oral Intake  Recent Flowsheet Documentation  Taken 5/17/2023 2000 by Santana Yan RN  Oral Nutrition Promotion: rest periods promoted  Intervention: Optimize Skin Protection  Recent Flowsheet Documentation  Taken 5/18/2023 0400 by Santana Yan RN  Head of Bed (HOB) Positioning: HOB elevated  Taken 5/18/2023 0200 by Santana Yan RN  Head of Bed (HOB) Positioning: HOB elevated  Taken 5/18/2023 0000 by Santana Yan RN  Head of Bed (HOB) Positioning: HOB elevated  Taken 5/17/2023 2000 by Santana Yan RN  Pressure Reduction Techniques:   frequent weight shift encouraged   weight shift assistance provided   pressure points protected   positioned off wounds   heels elevated off bed  Head of Bed (HOB) Positioning: HOB elevated  Pressure Reduction Devices:   pressure-redistributing mattress utilized   heel offloading device utilized   specialty bed utilized   positioning supports utilized  Skin Protection:   adhesive use limited   tubing/devices free from skin contact   transparent dressing maintained   skin-to-skin areas padded   skin-to-device areas padded   pulse oximeter probe site changed   incontinence pads utilized     Problem: Fall Injury Risk  Goal: Absence of Fall and Fall-Related Injury  Outcome: Ongoing, Progressing  Intervention: Identify and Manage Contributors  Recent Flowsheet Documentation  Taken 5/18/2023 0400 by Santana Yan RN  Medication Review/Management: medications reviewed  Taken 5/18/2023 0200 by Santana Yan RN  Medication Review/Management: medications reviewed  Taken 5/18/2023 0000 by Santana Yan RN  Medication Review/Management: medications reviewed  Taken 5/17/2023 2200 by Santana Yan RN  Medication Review/Management: medications reviewed  Taken 5/17/2023 2000 by Santana Yan RN  Medication Review/Management: medications reviewed  Self-Care Promotion:  independence encouraged  Intervention: Promote Injury-Free Environment  Recent Flowsheet Documentation  Taken 5/18/2023 0400 by Santana Yan RN  Safety Promotion/Fall Prevention:   safety round/check completed   activity supervised   clutter free environment maintained  Taken 5/18/2023 0200 by Santana Yan RN  Safety Promotion/Fall Prevention: safety round/check completed  Taken 5/18/2023 0000 by Santana Yan RN  Safety Promotion/Fall Prevention:   safety round/check completed   activity supervised   clutter free environment maintained   nonskid shoes/slippers when out of bed  Taken 5/17/2023 2200 by Santana Yan RN  Safety Promotion/Fall Prevention: safety round/check completed  Taken 5/17/2023 2000 by Santana Yan RN  Safety Promotion/Fall Prevention:   activity supervised   toileting scheduled   safety round/check completed   clutter free environment maintained     Problem: Adult Inpatient Plan of Care  Goal: Plan of Care Review  Outcome: Ongoing, Progressing  Flowsheets  Taken 5/18/2023 0558 by Santana Yan RN  Progress: improving  Outcome Evaluation: VSS. Pt tolerated Bipap throughout the night. Pt sitting up in bed with High flow NC sating above 90%. No acute changes. Call light within reach.  Taken 5/17/2023 1826 by Shawanda Dyer, RN  Plan of Care Reviewed With: patient  Goal: Patient-Specific Goal (Individualized)  Outcome: Ongoing, Progressing  Goal: Absence of Hospital-Acquired Illness or Injury  Outcome: Ongoing, Progressing  Intervention: Identify and Manage Fall Risk  Recent Flowsheet Documentation  Taken 5/18/2023 0400 by Santana Yan RN  Safety Promotion/Fall Prevention:   safety round/check completed   activity supervised   clutter free environment maintained  Taken 5/18/2023 0200 by Santana Yan RN  Safety Promotion/Fall Prevention: safety round/check completed  Taken 5/18/2023 0000 by Santana Yan RN  Safety Promotion/Fall Prevention:   safety round/check completed   activity supervised    clutter free environment maintained   nonskid shoes/slippers when out of bed  Taken 5/17/2023 2200 by Santana Yan RN  Safety Promotion/Fall Prevention: safety round/check completed  Taken 5/17/2023 2000 by Santana Yan RN  Safety Promotion/Fall Prevention:   activity supervised   toileting scheduled   safety round/check completed   clutter free environment maintained  Intervention: Prevent Skin Injury  Recent Flowsheet Documentation  Taken 5/18/2023 0400 by Santana Yan RN  Body Position:   turned   left  Taken 5/18/2023 0200 by Santana Yan RN  Body Position:   turned   right  Taken 5/18/2023 0000 by Santana Yan RN  Body Position:   upper extremity elevated   turned   supine   supine, legs elevated  Taken 5/17/2023 2000 by Santana Yan RN  Body Position:   turned   right   upper extremity elevated  Skin Protection:   adhesive use limited   tubing/devices free from skin contact   transparent dressing maintained   skin-to-skin areas padded   skin-to-device areas padded   pulse oximeter probe site changed   incontinence pads utilized  Intervention: Prevent and Manage VTE (Venous Thromboembolism) Risk  Recent Flowsheet Documentation  Taken 5/18/2023 0400 by Santana Yan RN  Activity Management: activity encouraged  Range of Motion: ROM (range of motion) performed  Taken 5/18/2023 0200 by Santana Yan RN  Activity Management: activity encouraged  Taken 5/18/2023 0000 by Santana Yan RN  Activity Management: activity encouraged  Taken 5/17/2023 2200 by Santana Yan RN  Activity Management: activity encouraged  Taken 5/17/2023 2000 by Santana Yan RN  Activity Management: activity encouraged  VTE Prevention/Management:   bilateral   sequential compression devices on  Range of Motion:   active ROM (range of motion) encouraged   ROM (range of motion) performed  Intervention: Prevent Infection  Recent Flowsheet Documentation  Taken 5/18/2023 0200 by Santana Yan RN  Infection Prevention: single patient room provided  Taken  5/17/2023 2200 by Santana Yan RN  Infection Prevention: single patient room provided  Taken 5/17/2023 2000 by Santana Yan RN  Infection Prevention: single patient room provided  Goal: Optimal Comfort and Wellbeing  Outcome: Ongoing, Progressing  Intervention: Monitor Pain and Promote Comfort  Recent Flowsheet Documentation  Taken 5/17/2023 2127 by Santana Yan RN  Pain Management Interventions: see MAR  Taken 5/17/2023 2100 by Santana Yan RN  Pain Management Interventions:   care clustered   see MAR  Intervention: Provide Person-Centered Care  Recent Flowsheet Documentation  Taken 5/17/2023 2000 by Santana Yan RN  Trust Relationship/Rapport:   care explained   choices provided   emotional support provided   empathic listening provided   questions answered   questions encouraged   thoughts/feelings acknowledged  Goal: Readiness for Transition of Care  Outcome: Ongoing, Progressing     Problem: COPD (Chronic Obstructive Pulmonary Disease) Comorbidity  Goal: Maintenance of COPD Symptom Control  Outcome: Ongoing, Progressing  Intervention: Maintain COPD-Symptom Control  Recent Flowsheet Documentation  Taken 5/18/2023 0400 by Santana Yan RN  Medication Review/Management: medications reviewed  Taken 5/18/2023 0200 by Santana Yan RN  Medication Review/Management: medications reviewed  Taken 5/18/2023 0000 by Santana Yan RN  Medication Review/Management: medications reviewed  Taken 5/17/2023 2200 by Santana Yan RN  Medication Review/Management: medications reviewed  Taken 5/17/2023 2000 by Santana Yan RN  Supportive Measures:   relaxation techniques promoted   positive reinforcement provided  Medication Review/Management: medications reviewed     Problem: Diabetes Comorbidity  Goal: Blood Glucose Level Within Targeted Range  Outcome: Ongoing, Progressing  Intervention: Monitor and Manage Glycemia  Recent Flowsheet Documentation  Taken 5/17/2023 2000 by Santana Yan RN  Glycemic Management: blood glucose monitored      Problem: Hypertension Comorbidity  Goal: Blood Pressure in Desired Range  Outcome: Ongoing, Progressing  Intervention: Maintain Blood Pressure Management  Recent Flowsheet Documentation  Taken 5/18/2023 0400 by Santana Yan RN  Medication Review/Management: medications reviewed  Taken 5/18/2023 0200 by Santana Yan RN  Medication Review/Management: medications reviewed  Taken 5/18/2023 0000 by Santana Yan RN  Medication Review/Management: medications reviewed  Taken 5/17/2023 2200 by Santana Yan RN  Medication Review/Management: medications reviewed  Taken 5/17/2023 2000 by Santana Yan RN  Medication Review/Management: medications reviewed     Problem: Noninvasive Ventilation Acute  Goal: Effective Unassisted Ventilation and Oxygenation  Outcome: Ongoing, Progressing  Intervention: Monitor and Manage Noninvasive Ventilation  Recent Flowsheet Documentation  Taken 5/17/2023 2043 by Santana Yan RN  NPPV/CPAP Maintenance:   nasal prongs adjusted   nasal pillows secure   nasal prongs secure  Taken 5/17/2023 2000 by Santana Yan RN  Airway/Ventilation Management:   airway patency maintained   humidification applied   calming measures promoted  NPPV/CPAP Maintenance:   mask adjusted   mask secure     Problem: Gas Exchange Impaired  Goal: Optimal Gas Exchange  Outcome: Ongoing, Progressing  Intervention: Optimize Oxygenation and Ventilation  Recent Flowsheet Documentation  Taken 5/18/2023 0400 by Santana Yan RN  Head of Bed (HOB) Positioning: HOB elevated  Taken 5/18/2023 0200 by Santana Yan RN  Head of Bed (HOB) Positioning: HOB elevated  Taken 5/18/2023 0000 by Santana Yan RN  Head of Bed (HOB) Positioning: HOB elevated  Taken 5/17/2023 2000 by Santana Yan RN  Head of Bed (HOB) Positioning: HOB elevated  Airway/Ventilation Management:   airway patency maintained   humidification applied   calming measures promoted     Problem: Impaired Wound Healing  Goal: Optimal Wound Healing  Outcome: Ongoing,  Progressing  Intervention: Promote Wound Healing  Recent Flowsheet Documentation  Taken 5/18/2023 0400 by Santana Yan RN  Activity Management: activity encouraged  Taken 5/18/2023 0200 by Santana Yan RN  Activity Management: activity encouraged  Taken 5/18/2023 0000 by Santana Yan RN  Activity Management: activity encouraged  Taken 5/17/2023 2200 by Santana Yan RN  Activity Management: activity encouraged  Taken 5/17/2023 2127 by Santana Yan RN  Pain Management Interventions: see MAR  Taken 5/17/2023 2100 by Santana Yan RN  Pain Management Interventions:   care clustered   see MAR  Taken 5/17/2023 2000 by Santana Yan RN  Activity Management: activity encouraged  Oral Nutrition Promotion: rest periods promoted  Sleep/Rest Enhancement: relaxation techniques promoted     Problem: Adjustment to Illness (Sepsis/Septic Shock)  Goal: Optimal Coping  Outcome: Ongoing, Progressing  Intervention: Optimize Psychosocial Adjustment to Illness  Recent Flowsheet Documentation  Taken 5/17/2023 2000 by Santana Yan RN  Supportive Measures:   relaxation techniques promoted   positive reinforcement provided  Family/Support System Care:   caregiver stress acknowledged   support provided     Problem: Bleeding (Sepsis/Septic Shock)  Goal: Absence of Bleeding  Outcome: Ongoing, Progressing  Intervention: Monitor and Manage Bleeding  Recent Flowsheet Documentation  Taken 5/17/2023 2000 by Santana Yan RN  Bleeding Precautions: blood pressure closely monitored  Bleeding Management: dressing monitored     Problem: Glycemic Control Impaired (Sepsis/Septic Shock)  Goal: Blood Glucose Level Within Desired Range  Outcome: Ongoing, Progressing  Intervention: Optimize Glycemic Control  Recent Flowsheet Documentation  Taken 5/17/2023 2000 by Santana Yan RN  Glycemic Management: blood glucose monitored     Problem: Infection Progression (Sepsis/Septic Shock)  Goal: Absence of Infection Signs and Symptoms  Outcome: Ongoing,  Progressing  Intervention: Initiate Sepsis Management  Recent Flowsheet Documentation  Taken 5/18/2023 0200 by Santana Yan RN  Infection Prevention: single patient room provided  Taken 5/17/2023 2200 by Santana Yan RN  Infection Prevention: single patient room provided  Taken 5/17/2023 2000 by Santana Yan RN  Stabilization Measures: airway opened  Infection Management: aseptic technique maintained  Infection Prevention: single patient room provided  Intervention: Promote Recovery  Recent Flowsheet Documentation  Taken 5/18/2023 0400 by Santana Yan RN  Activity Management: activity encouraged  Taken 5/18/2023 0200 by Santana Yan RN  Activity Management: activity encouraged  Taken 5/18/2023 0000 by Santana Yan RN  Activity Management: activity encouraged  Taken 5/17/2023 2200 by Santana Yan RN  Activity Management: activity encouraged  Taken 5/17/2023 2000 by Santana Yan RN  Activity Management: activity encouraged  Airway/Ventilation Support:   comfort measures provided   cough relief provided   humidification applied  Sleep/Rest Enhancement: relaxation techniques promoted  Intervention: Promote Stabilization  Recent Flowsheet Documentation  Taken 5/17/2023 2000 by Santana Yan RN  Lung Protection Measures: fluid excess minimized  Fluid/Electrolyte Management: fluids provided     Problem: Nutrition Impaired (Sepsis/Septic Shock)  Goal: Optimal Nutrition Intake  Outcome: Ongoing, Progressing  Intervention: Promote and Optimize Nutrition Delivery  Recent Flowsheet Documentation  Taken 5/17/2023 2000 by Santana Yan RN  Nutrition Support Management: weight trending reviewed   Goal Outcome Evaluation:           Progress: improving  Outcome Evaluation: VSS. Pt tolerated Bipap throughout the night. Pt sitting up in bed with High flow NC sating above 90%. No acute changes. Call light within reach.

## 2023-05-18 NOTE — PROGRESS NOTES
"  CC: Acute Respiratory Failure.     S: Currently on NIV therapy.  Continues to require high flow nasal cannula when off the NIV/AVAPS.   States she feels slightly better, but is not sure that she wants bronchoscopy    ROS: Could not be reliably obtained as the patient is on NIV, she feels like she has been able to cough a little bit better, but not very productive.  Denies any fevers or chills.  Denies any hemoptysis.    O:Vital signs reviewed. FiO2: 12 L/min high flow nasal cannula.  /43   Pulse 68   Temp 97.8 °F (36.6 °C) (Temporal)   Resp 20   Ht 160 cm (63\")   Wt 64.5 kg (142 lb 3.2 oz)   SpO2 98%   BMI 25.19 kg/m²     Temp (24hrs), Av.4 °F (36.3 °C), Min:97.1 °F (36.2 °C), Max:97.8 °F (36.6 °C)      I & Os reviewed.   Intake/Output       23 0700 - 23 0659 23 0700 - 23 0659    Intake (ml) 590 60    Output (ml) 960 --    Net (ml) -370 60    Last Weight 64.5 kg (142 lb 3.2 oz) --          Net IO Since Admission: -2,247 mL [23 1152]    General/Constitutional: On NIV therapy. Appears to be in mild distress when speaking complete sentences  Eyes: Extraocular muscles are intact, no eye discharge  Neck: Supple without JVD. No obvious masses noted.   Cardiovascular: S1 + S2.  Regular rate  Lungs/Respiratory: Decreased bilateral breath sounds noted with diffuse wheezing bilaterally.    GI/Abdomen: Soft. Bowel sounds positive.  No obvious tenderness to palpation   musculoskeletal/Extremities: Trace edema noted.  Diffuse deconditioning and sarcopenia appreciated  Neurologic: Was able to respond appropriately and answered questions appropriately   psych: Was able to follow simple commands.  Was mildly anxious when discussing bronchoscopy  Skin: Appeared somewhat dry         Labs: Reviewed.   Results from last 7 days   Lab Units 23  0618 23  0450 23  0453 05/15/23  0413 23  0430   WBC 10*3/mm3 20.83* 14.56* 17.14* 16.64* 16.85*   HEMOGLOBIN g/dL 12.0 " 12.4 12.4 12.2 12.0   HEMATOCRIT % 38.9 40.9 40.1 40.2 39.8   PLATELETS 10*3/mm3 212 194 204 187 187   NEUTROPHIL % %  --   --  89.5*  --  84.7*   NEUTROS ABS 10*3/mm3  --   --  15.32*  --  14.27*   EOSINOPHIL % %  --   --  0.0*  --  0.0*   EOS ABS 10*3/mm3  --   --  0.00  --  0.00   LYMPHOCYTE % %  --   --  5.1*  --  6.9*   LYMPHS ABS 10*3/mm3  --   --  0.88  --  1.17       Lab Results   Component Value Date    PROCALCITO 0.11 05/16/2023    PROCALCITO 0.08 05/14/2023    PROCALCITO 0.20 05/10/2023       No results found for: CRP    No results found for: SEDRATE    Lab Results   Component Value Date    PROBNP 572.7 05/14/2023    PROBNP 479.4 05/10/2023    PROBNP 234.4 05/04/2023       Results from last 7 days   Lab Units 05/18/23  0618 05/17/23  0450 05/16/23  0453   SODIUM mmol/L 140 144 145   POTASSIUM mmol/L 4.4 4.3 4.1   CHLORIDE mmol/L 90* 91* 93*   CO2 mmol/L 43.3* 45.0* 43.3*   BUN mg/dL 29* 34* 31*   CREATININE mg/dL 0.41* 0.39* 0.37*   CALCIUM mg/dL 9.4 9.8 9.6   ANION GAP mmol/L 6.7 8.0 8.7   GLUCOSE mg/dL 215* 173* 202*                   No results found for: CKTOTAL    No components found for: HSTROPT    Lab Results   Component Value Date    TROPONINT 22 (H) 05/05/2023    TROPONINT 25 (H) 05/05/2023    TROPONINT 44 (H) 05/04/2023       No results found for: DDIMER    Brief Urine Lab Results  (Last result in the past 365 days)      Color   Clarity   Blood   Leuk Est   Nitrite   Protein   CREAT   Urine HCG        05/12/23 1738 Yellow   Cloudy   Negative   Trace   Negative   Trace                 Lab Results   Component Value Date    TSH 0.304 05/09/2023       No results found for: FREET4      Micro: As of May 18, 2023   Lab Results   Component Value Date    RESPCX Light growth (2+) Normal Respiratory Ana 10/02/2017     No results found for: BCIDPCR  Lab Results   Component Value Date    BLOODCX No growth at 5 days 05/04/2023    BLOODCX No growth at 5 days 05/04/2023    BLOODCX No growth at 5 days  04/16/2023    BLOODCX No growth at 5 days 04/16/2023     Lab Results   Component Value Date    URINECX Final report 02/07/2019    URINECX 20,000-30,000 CFU/mL Escherichia coli (A) 10/05/2018     No results found for: MRSACX  Lab Results   Component Value Date    MRSAPCR No MRSA Detected 04/15/2023     No results found for: URCX  No components found for: LOWRESPCF  No results found for: THROATCX  No results found for: CULTURES  No components found for: STREPBCX  No results found for: STREPPNEUAG  No results found for: LEGIONELLA  No results found for: MYCOPLASCX  No results found for: GCCX  No results found for: WOUNDCX  No results found for: BODYFLDCX    No results found for: FLU    Lab Results   Component Value Date    ADENOVIRUS Not Detected 05/04/2023     Lab Results   Component Value Date    VB442S Not Detected 05/04/2023     Lab Results   Component Value Date    CVHKU1 Not Detected 05/04/2023     Lab Results   Component Value Date    CVNL63 Not Detected 05/04/2023     Lab Results   Component Value Date    CVOC43 Not Detected 05/04/2023     Lab Results   Component Value Date    HUMETPNEVS Not Detected 05/04/2023     Lab Results   Component Value Date    HURVEV Not Detected 05/04/2023     Lab Results   Component Value Date    FLUBPCR Not Detected 05/04/2023     Lab Results   Component Value Date    PARAINFLUE Not Detected 05/04/2023     Lab Results   Component Value Date    PARAFLUV2 Not Detected 05/04/2023     Lab Results   Component Value Date    PARAFLUV3 Detected (A) 05/04/2023     Lab Results   Component Value Date    PARAFLUV4 Not Detected 05/04/2023     Lab Results   Component Value Date    BPERTPCR Not Detected 05/04/2023     No results found for: [id number]  Lab Results   Component Value Date    CPNEUPCR Not Detected 05/04/2023     Lab Results   Component Value Date    MPNEUMO Not Detected 05/04/2023     Lab Results   Component Value Date    FLUAPCR Not Detected 05/04/2023     No results found for:  FLUAH3  No results found for: FLUAH1  Lab Results   Component Value Date    RSV Not Detected 05/04/2023     Lab Results   Component Value Date    BPARAPCR Not Detected 05/04/2023       COVID 19:  Lab Results   Component Value Date    COVID19 Not Detected 05/04/2023         ABG: Reviewed   Recent Labs     05/15/23  0453 05/16/23  0519 05/18/23  0708   PHART 7.445 7.453* 7.451*   OJS4ASB 75.7* 76.4* 73.4*   PO2ART 56.8* 56.7* 64.0*   CHK6MZJ 51.9* 53.4* 51.1*   BASEEXCESS 23.5* 24.7* 22.8*       Lab Results   Component Value Date    LACTATE 1.3 05/04/2023    LACTATE 1.2 04/13/2023    LACTATE 2.7 (C) 04/13/2023         Echo: Results for orders placed during the hospital encounter of 04/13/23    Adult Transthoracic Echo Complete W/ Cont if Necessary Per Protocol    Interpretation Summary  •  Left ventricular diastolic function is consistent with (grade I) impaired relaxation.  •  Mild aortic valve stenosis is present.  •  Estimated right ventricular systolic pressure from tricuspid regurgitation is moderately elevated (45-55 mmHg).  •  Mild to moderate pulmonary hypertension is present.      Results for orders placed during the hospital encounter of 10/01/17    Adult Transthoracic Echo Complete W/ Cont if Necessary Per Protocol    Interpretation Summary  TEchnically difficult study  1) Borderline LVH with normal LV systolic function ( EF is over 55%)  2) Normal left atrial size with mild elevation in LVedp  3) Trace MR  4) Mild TR with normal PA pressures  5) Mild AI  6) Small RV with normal RV function        dexmedetomidine, 0.2-1.5 mcg/kg/hr, Last Rate: Stopped (05/12/23 1556)  Pharmacy to Dose enoxaparin (LOVENOX),           CXRay: Latest imaging study was reviewed personally.   Imaging Results (Last 48 Hours)     Procedure Component Value Units Date/Time    XR Chest 1 View [[id number]] Collected: 05/18/23 0751     Updated: 05/18/23 0755    Narrative:      PROCEDURE: XR CHEST 1 VW-     HISTORY: Resp Failure.;  J96.21-Acute and chronic respiratory failure  with hypoxia; J96.22-Acute and chronic respiratory failure with  hypercapnia; J44.1-Chronic obstructive pulmonary disease with (acute)  exacerbation; B34.8-Other viral infections of unspecified site     COMPARISON: 05/15/2023.     FINDINGS: The heart is upper limits of normal.. Again noted is bilateral  interstitial disease and bronchial wall thickening. More prominent  markings noted in the lung bases, left greater than right. This is shown  to represent bronchiectasis with mucus plugging on recent CT. Findings  are stable from prior. Aortic mural calcifications noted.. The  mediastinum is unremarkable. There is no pneumothorax.  There are no  acute osseous abnormalities.       Impression:      Stable chest..           This report was signed and finalized on 5/18/2023 7:53 AM by Shraddha Wharton MD.    CT Chest Without Contrast Diagnostic [[id number]] Collected: 05/16/23 1336     Updated: 05/16/23 1340    Narrative:      PROCEDURE: CT CHEST WO CONTRAST DIAGNOSTIC-     HISTORY: Dyspnea, chronic, unclear etiology; J96.21-Acute and chronic  respiratory failure with hypoxia; J96.22-Acute and chronic respiratory  failure with hypercapnia; J44.1-Chronic obstructive pulmonary disease  with (acute) exacerbation; B34.8-Other viral infections of unspecified  site     COMPARISON: April 13, 2023.     PROCEDURE:  Multiple axial CT images were obtained from the thoracic  inlet through the upper abdomen without the use of contrast.      FINDINGS:   Soft tissue windows reveal no suspicious axillary, hilar or mediastinal  adenopathy. Heart size is normal. The aorta is normal in caliber. There  are vascular calcifications. There are no pleural or pericardial  effusions. There is bronchiectasis in the bilateral lower lobes. There  is new mucous plugging and airspace disease in the bilateral lower lobes  consistent with pneumonia. There are tree-in-bud opacities diffusely  which have worsened  since the prior exam consistent with bronchiolitis.  There is evidence of old calcified granulomatous disease. The visualized  upper abdomen shows the gallbladder to be surgically absent. Bone  windows reveal no bony destructive lesions.       Impression:      Mucous plugging, bilateral lower lobe pneumonia, and  bronchiolitis. 8-12 week follow-up exam is recommended to document  stability.     193.74 mGy.cm        This study was performed with techniques to keep radiation doses as low  as reasonably achievable (ALARA). Individualized dose reduction  techniques using automated exposure control or adjustment of mA and/or  kV according to the patient size were employed.               Images were reviewed, interpreted, and dictated by Dr. Shraddha Wharton MD  Transcribed by Ade Solis PA-C.     This report was signed and finalized on 5/16/2023 1:38 PM by Shraddha Wharton MD.            Assessment & Recommendations/Plan:   1.  Acute respiratory failure  Continue AVAPS at the current setting.  Continue using AVAPS 2 hours on and 2 hours off and seems to be tolerating this   will continue to wean FiO2 as tolerated.  ABG reviewed and will repeat as needed    I discussed with the patient as well as patient's daughters Janet and Nehal on 5/18 about possible bronchoscopy for mucous plug removal.  Patient was very adamant about not wanting to proceed, but it appears that it is related to she had difficulty with sedation during recent cataract procedures.  It sounds like she was difficult to wake up at one point and the patient is very scared of having any form of sedation.  Patient's third daughter just arrived in town from [place] and all 3 daughters plan to discuss bronchoscopy with their mother and make a final decision later this evening.  They will inform nursing about their decision.    2.  COPD with acute exacerbation  Likely triggered by parainfluenza bronchitis  Continue current nebulized treatments.  Continues on  Solu-Medrol  Continue Spiriva and Symbicort  Will benefit from outpatient PFTs when clinically improved    3.  Abnormal CT of the chest  Consistent with mucous plugging I discussed extensively with family and patient about mucous plugging removal via bronchoscopy to help improve oxygenation.  However, they are very hesitant and want patient to get stronger before they consider the procedure.  However, patient is already admitted to hospital for 14 days and not significantly improving.  I discussed that they do not want to pursue aggressive treatment they need to consider hospice and palliative care given lack of significant improvement despite this prolonged hospitalization.    4.  Chronic respiratory acidosis  Appears to be on NIV device at home.    5.  Pulmonary hypertension  May need outpatient work-up  Likely secondary to underlying likely severe COPD    6.  Anxiety  Continue with as needed benzodiazepines as needed    We have reviewed patient's current orders and changes, if any, have been suggested to primary care team. Plan was also discussed with nursing staff, as necessary.     This document was electronically signed by Miranda Cline MD on 05/18/23 at 11:52 EDT      Dictated utilizing Dragon dictation.

## 2023-05-19 LAB
ANION GAP SERPL CALCULATED.3IONS-SCNC: 5.6 MMOL/L (ref 5–15)
BUN SERPL-MCNC: 22 MG/DL (ref 8–23)
BUN/CREAT SERPL: 78.6 (ref 7–25)
CALCIUM SPEC-SCNC: 9.1 MG/DL (ref 8.6–10.5)
CHLORIDE SERPL-SCNC: 90 MMOL/L (ref 98–107)
CO2 SERPL-SCNC: 41.4 MMOL/L (ref 22–29)
CREAT SERPL-MCNC: 0.28 MG/DL (ref 0.57–1)
DEPRECATED RDW RBC AUTO: 51.5 FL (ref 37–54)
EGFRCR SERPLBLD CKD-EPI 2021: 111.9 ML/MIN/1.73
ERYTHROCYTE [DISTWIDTH] IN BLOOD BY AUTOMATED COUNT: 14.7 % (ref 12.3–15.4)
GLUCOSE BLDC GLUCOMTR-MCNC: 134 MG/DL (ref 70–130)
GLUCOSE BLDC GLUCOMTR-MCNC: 212 MG/DL (ref 70–130)
GLUCOSE BLDC GLUCOMTR-MCNC: 272 MG/DL (ref 70–130)
GLUCOSE SERPL-MCNC: 242 MG/DL (ref 65–99)
HCT VFR BLD AUTO: 37.4 % (ref 34–46.6)
HGB BLD-MCNC: 11.6 G/DL (ref 12–15.9)
MCH RBC QN AUTO: 29.5 PG (ref 26.6–33)
MCHC RBC AUTO-ENTMCNC: 31 G/DL (ref 31.5–35.7)
MCV RBC AUTO: 95.2 FL (ref 79–97)
PLATELET # BLD AUTO: 191 10*3/MM3 (ref 140–450)
PMV BLD AUTO: 11.9 FL (ref 6–12)
POTASSIUM SERPL-SCNC: 4.4 MMOL/L (ref 3.5–5.2)
RBC # BLD AUTO: 3.93 10*6/MM3 (ref 3.77–5.28)
SODIUM SERPL-SCNC: 137 MMOL/L (ref 136–145)
WBC NRBC COR # BLD: 15.77 10*3/MM3 (ref 3.4–10.8)

## 2023-05-19 PROCEDURE — 25010000002 ENOXAPARIN PER 10 MG: Performed by: FAMILY MEDICINE

## 2023-05-19 PROCEDURE — 99232 SBSQ HOSP IP/OBS MODERATE 35: CPT | Performed by: INTERNAL MEDICINE

## 2023-05-19 PROCEDURE — 97110 THERAPEUTIC EXERCISES: CPT

## 2023-05-19 PROCEDURE — 25010000002 AZITHROMYCIN PER 500 MG: Performed by: INTERNAL MEDICINE

## 2023-05-19 PROCEDURE — 82948 REAGENT STRIP/BLOOD GLUCOSE: CPT

## 2023-05-19 PROCEDURE — 85027 COMPLETE CBC AUTOMATED: CPT | Performed by: INTERNAL MEDICINE

## 2023-05-19 PROCEDURE — 25010000002 METHYLPREDNISOLONE PER 40 MG: Performed by: FAMILY MEDICINE

## 2023-05-19 PROCEDURE — 94761 N-INVAS EAR/PLS OXIMETRY MLT: CPT

## 2023-05-19 PROCEDURE — 94799 UNLISTED PULMONARY SVC/PX: CPT

## 2023-05-19 PROCEDURE — 80048 BASIC METABOLIC PNL TOTAL CA: CPT | Performed by: INTERNAL MEDICINE

## 2023-05-19 PROCEDURE — 99233 SBSQ HOSP IP/OBS HIGH 50: CPT | Performed by: INTERNAL MEDICINE

## 2023-05-19 PROCEDURE — 63710000001 INSULIN ASPART PER 5 UNITS: Performed by: FAMILY MEDICINE

## 2023-05-19 PROCEDURE — 94660 CPAP INITIATION&MGMT: CPT

## 2023-05-19 PROCEDURE — 94664 DEMO&/EVAL PT USE INHALER: CPT

## 2023-05-19 RX ORDER — GUAIFENESIN AND DEXTROMETHORPHAN HYDROBROMIDE 600; 30 MG/1; MG/1
1 TABLET, EXTENDED RELEASE ORAL 2 TIMES DAILY
Status: COMPLETED | OUTPATIENT
Start: 2023-05-19 | End: 2023-05-26

## 2023-05-19 RX ORDER — METHYLPREDNISOLONE SODIUM SUCCINATE 40 MG/ML
40 INJECTION, POWDER, LYOPHILIZED, FOR SOLUTION INTRAMUSCULAR; INTRAVENOUS EVERY 24 HOURS
Status: DISCONTINUED | OUTPATIENT
Start: 2023-05-20 | End: 2023-05-22

## 2023-05-19 RX ADMIN — SODIUM CHLORIDE 500 MG: 900 INJECTION, SOLUTION INTRAVENOUS at 13:32

## 2023-05-19 RX ADMIN — GUAIFENESIN AND DEXTROMETHORPHAN HYDROBROMIDE 1 TABLET: 30; 600 TABLET, EXTENDED RELEASE ORAL at 20:51

## 2023-05-19 RX ADMIN — Medication 5 MG: at 20:51

## 2023-05-19 RX ADMIN — METHYLPREDNISOLONE SODIUM SUCCINATE 40 MG: 40 INJECTION, POWDER, LYOPHILIZED, FOR SOLUTION INTRAMUSCULAR; INTRAVENOUS at 09:07

## 2023-05-19 RX ADMIN — BUDESONIDE INHALATION 1 MG: 0.5 SUSPENSION RESPIRATORY (INHALATION) at 06:55

## 2023-05-19 RX ADMIN — SODIUM CHLORIDE 30 MG/ML INHALATION SOLUTION 4 ML: 30 SOLUTION INHALANT at 06:55

## 2023-05-19 RX ADMIN — SENNOSIDES AND DOCUSATE SODIUM 2 TABLET: 50; 8.6 TABLET ORAL at 09:07

## 2023-05-19 RX ADMIN — MULTIPLE VITAMINS W/ MINERALS TAB 1 TABLET: TAB at 09:07

## 2023-05-19 RX ADMIN — ACETAMINOPHEN 650 MG: 325 TABLET, FILM COATED ORAL at 10:17

## 2023-05-19 RX ADMIN — ATENOLOL 25 MG: 25 TABLET ORAL at 09:07

## 2023-05-19 RX ADMIN — ACETAMINOPHEN 650 MG: 325 TABLET, FILM COATED ORAL at 20:51

## 2023-05-19 RX ADMIN — IPRATROPIUM BROMIDE AND ALBUTEROL SULFATE 3 ML: 2.5; .5 SOLUTION RESPIRATORY (INHALATION) at 06:55

## 2023-05-19 RX ADMIN — BUDESONIDE AND FORMOTEROL FUMARATE DIHYDRATE 2 PUFF: 160; 4.5 AEROSOL RESPIRATORY (INHALATION) at 06:56

## 2023-05-19 RX ADMIN — INSULIN ASPART 6 UNITS: 100 INJECTION, SOLUTION INTRAVENOUS; SUBCUTANEOUS at 16:53

## 2023-05-19 RX ADMIN — GUAIFENESIN 600 MG: 600 TABLET, EXTENDED RELEASE ORAL at 09:07

## 2023-05-19 RX ADMIN — BUDESONIDE AND FORMOTEROL FUMARATE DIHYDRATE 2 PUFF: 160; 4.5 AEROSOL RESPIRATORY (INHALATION) at 19:07

## 2023-05-19 RX ADMIN — INSULIN ASPART 4 UNITS: 100 INJECTION, SOLUTION INTRAVENOUS; SUBCUTANEOUS at 06:39

## 2023-05-19 RX ADMIN — IPRATROPIUM BROMIDE AND ALBUTEROL SULFATE 3 ML: 2.5; .5 SOLUTION RESPIRATORY (INHALATION) at 23:47

## 2023-05-19 RX ADMIN — Medication 3 ML: at 09:08

## 2023-05-19 RX ADMIN — SODIUM CHLORIDE 30 MG/ML INHALATION SOLUTION 4 ML: 30 SOLUTION INHALANT at 19:07

## 2023-05-19 RX ADMIN — ENOXAPARIN SODIUM 40 MG: 100 INJECTION SUBCUTANEOUS at 20:51

## 2023-05-19 RX ADMIN — IPRATROPIUM BROMIDE AND ALBUTEROL SULFATE 3 ML: 2.5; .5 SOLUTION RESPIRATORY (INHALATION) at 01:03

## 2023-05-19 RX ADMIN — AMLODIPINE BESYLATE 10 MG: 5 TABLET ORAL at 09:07

## 2023-05-19 RX ADMIN — IPRATROPIUM BROMIDE AND ALBUTEROL SULFATE 3 ML: 2.5; .5 SOLUTION RESPIRATORY (INHALATION) at 19:07

## 2023-05-19 RX ADMIN — IPRATROPIUM BROMIDE AND ALBUTEROL SULFATE 3 ML: 2.5; .5 SOLUTION RESPIRATORY (INHALATION) at 13:26

## 2023-05-19 RX ADMIN — ALPRAZOLAM 0.5 MG: 0.5 TABLET ORAL at 20:51

## 2023-05-19 NOTE — PROGRESS NOTES
[facility]    Name:  Carolin Toscano   Age:  77 y.o.  Sex:  female  :  1945  MRN:  [id number]   Visit Number:  [id number]  Admission Date:  2023  Date Of Service:  23  Primary Care Physician:  Jason Nicholson MD     LOS: 15 days :    Chief Complaint:      Shortness of air    Subjective:    : d/w patient and family updated them on [facility] and pending appeal which will likely be submitted Monday. Still having dyspnea but mucous is breaking loose today.  Still debating about bronchoscopy.  Interested in long-term acute care possibly in [place] versus [place].    Hospital Course:  : Daughter Regla is coming today.  Otherwise not much change still having dyspnea and hypoxemia.  Case management note reviewed.    Patient was admitted and treated with steroids.  Antibiotics were not indicated.  Pulmonology consulted and was moved to ICU on  due to worsening respiratory failure and tachypnea.  She initially had to be on Precedex drip which was discontinued and switched to Xanax due to anxiety. Patient was placed on AVAPS.  Patient continued on steroids and received Lasix.    23: d/w nsg intermittently on avaps and hi flow. Patient awake in bed. CXR from 5/15 reviewed. Medications reviewed.  Discussed with case management they are [facility].    2023 discussed with case management still working on [facility].  I am planning to do a peer to peer with them tomorrow.  Otherwise has persistent hypoxia.  Dr. Soriano's note reviewed considering bronchoscopy.     History of presenting illness:  Patient is a 77 years old female with a past medical history of COPD, chronic respiratory failure on 5 to 6 L per her previous discharge, on NIV machine at home and trelegy, hypertension, and ongoing tobacco use who presented to the ER from home by EMS with a chief complaint of shortness of breath.  Patient reporting  that she started having shortness of breath associated with productive cough since last night and has persisted and became worse that promoted her to call EMS.  EMS has found the patient to be at 65% saturation on her normal 6 L of oxygen. Patient reports compliance with her NIV machine at home.      On ER evaluation, Patient was found to be tachycardic with a heart rate of 130s, temperature of 100.2 and respirations of 30, /72.  Patient was placed on BiPAP with FiO2 of 40%.  Her ABG came back and showed pH of 7.296/PCO2 87/PO2 94/HCO3 42/saturation 97% on 5 L nasal cannula.  HS Troponin 44, proBNP WNL, glucose 192, CO2 of 40, otherwise CBC and CMP within acceptable range.  Lactate and Pro-Adi WNL.  Respiratory panel positive for parainfluenza virus.  Chest x-ray Mild interstitial disease  bilaterally is similar to prior. No area of consolidation is seen. Patient received Solu-Medrol and Aztreonam while in the ER.  Hospitalist consulted for admission.       Edited by: Primitivo Nuñez DO at 5/19/2023 8273     Review of Systems:     All systems were reviewed and negative except as mentioned in subjective, assessment and plan.    Vital Signs:    Temp:  [97.2 °F (36.2 °C)-98.6 °F (37 °C)] 98.6 °F (37 °C)  Heart Rate:  [61-83] 75  Resp:  [15-32] 23  BP: ()/(40-71) 115/63    Intake and output:    I/O last 3 completed shifts:  In: 650 [P.O.:400; I.V.:250]  Out: 1350 [Urine:1350]  No intake/output data recorded.    Physical Examination:    General Appearance:  Alert and cooperative.  Chronically ill-appearing. Sitting up in bed.   Head:  Atraumatic and normocephalic.   Eyes: Conjunctivae and sclerae normal, no icterus. No pallor.   Throat: No oral lesions, no thrush, oral mucosa moist.   Neck: Supple, trachea midline, no thyromegaly.   Lungs:   Breath sounds diminished bilaterally right > Left with crackles and wheeze.  On nasal cannula.   Heart:  Normal S1 and S2, no murmur, no gallop, no rub. No JVD.    Abdomen:   Normal bowel sounds, no masses, no organomegaly. Soft, nontender, nondistended, no rebound tenderness.   Extremities: Supple, no edema, no cyanosis, no clubbing.   Skin: No bleeding or rash.   Neurologic: Alert and oriented x 3. No facial asymmetry. Moves all four limbs. No tremors.  Generalized weakness noted.     Edited by: Primitivo Nuñez DO at 5/19/2023 1599     Laboratory results:    Results from last 7 days   Lab Units 05/19/23  0432 05/18/23  0618 05/17/23  0450   SODIUM mmol/L 137 140 144   POTASSIUM mmol/L 4.4 4.4 4.3   CHLORIDE mmol/L 90* 90* 91*   CO2 mmol/L 41.4* 43.3* 45.0*   BUN mg/dL 22 29* 34*   CREATININE mg/dL 0.28* 0.41* 0.39*   CALCIUM mg/dL 9.1 9.4 9.8   GLUCOSE mg/dL 242* 215* 173*     Results from last 7 days   Lab Units 05/19/23  0432 05/18/23  0618 05/17/23  0450   WBC 10*3/mm3 15.77* 20.83* 14.56*   HEMOGLOBIN g/dL 11.6* 12.0 12.4   HEMATOCRIT % 37.4 38.9 40.9   PLATELETS 10*3/mm3 191 212 194             Results from last 7 days   Lab Units 05/18/23  1101 05/18/23  1054   BLOODCX  No growth at 24 hours No growth at 24 hours     Recent Labs     05/15/23  0453 05/16/23  0519 05/18/23  0708   PHART 7.445 7.453* 7.451*   JYP4WVE 75.7* 76.4* 73.4*   PO2ART 56.8* 56.7* 64.0*   NIV4PNX 51.9* 53.4* 51.1*   BASEEXCESS 23.5* 24.7* 22.8*      I have reviewed the patient's laboratory results.    Radiology results:    XR Chest 1 View    Result Date: 5/18/2023  PROCEDURE: XR CHEST 1 VW-  HISTORY: Resp Failure.; J96.21-Acute and chronic respiratory failure with hypoxia; J96.22-Acute and chronic respiratory failure with hypercapnia; J44.1-Chronic obstructive pulmonary disease with (acute) exacerbation; B34.8-Other viral infections of unspecified site  COMPARISON: 05/15/2023.  FINDINGS: The heart is upper limits of normal.. Again noted is bilateral interstitial disease and bronchial wall thickening. More prominent markings noted in the lung bases, left greater than right. This is shown to  represent bronchiectasis with mucus plugging on recent CT. Findings are stable from prior. Aortic mural calcifications noted.. The mediastinum is unremarkable. There is no pneumothorax.  There are no acute osseous abnormalities.      Impression: Stable chest..    This report was signed and finalized on 5/18/2023 7:53 AM by Shraddha Wharton MD.    I have reviewed the patient's radiology reports.    Medication Review:     I have reviewed the patient's active and prn medications.     Problem List:      Acute on chronic respiratory failure with hypoxia and hypercapnia      Assessment/Plan:    Respiratory failure with hypoxia/hypercapnia, COPD exacerbation, parainfluenza  -patient on 5 to 6 L oxygen at baseline,  Continue BiPAP therapy.  Has NIV at home. Currently on 15 liters  -Recent parainfluenza virus infection met SIRS due to this  - Solu-Medrol taper as appropriate  -Bronchodilators, Mucinex MetaNeb and supportive care.  -Dr. Soriano note reviewed and appreciated  -s/p lasix  -Xanax and Precedex as needed  - add azithromycin, watching sputum culture and blood culture.     Elevated troponin  -Likely secondary to demand ischemia in settings of respiratory failure  -Patient denies any chest pain, low suspicion for ACS  -Troponin trended down and plateaued.     Hyperglycemia  -Will cover with sliding scale insulin while on steroids  -Hemoglobin A1c 6.7     Disposition: [facility] week    Prophylaxis: Lovenox    Edited by: Primitivo Nuñez DO at 5/19/2023 1639     DVT Prophylaxis: Lovenox  Code Status:   Code Status and Medical Interventions:   Ordered at: 05/04/23 1634     Code Status (Patient has no pulse and is not breathing):    CPR (Attempt to Resuscitate)     Medical Interventions (Patient has pulse or is breathing):    Full Support      Diet:   Dietary Orders (From admission, onward)     Start     Ordered    05/18/23 1800  Dietary Nutrition Supplements Magic Cup  2 Times Daily      Question:   [facility] Supplement:  Answer:  Magic Cup    05/18/23 1517    05/17/23 1038  Diet: Cardiac Diets; Healthy Heart (2-3 Na+); Texture: Regular Texture (IDDSI 7); Fluid Consistency: Thin (IDDSI 0)  Diet Effective Now        Comments: Pt prefers cottage cheese, bananas, oranges, peaches, watermelon.   References:    Diet Order [facility]   Question Answer Comment   Diets: Cardiac Diets    Cardiac Diet: Healthy Heart (2-3 Na+)    Texture: Regular Texture (IDDSI 7)    Fluid Consistency: Thin (IDDSI 0)        05/17/23 1039    05/15/23 1800  Dietary Nutrition Supplements Xu  2 Times Daily      Question:  [facility] Supplement:  Answer:  Xu    05/15/23 1445    05/15/23 1800  Dietary Nutrition Supplements Boost VHC  2 Times Daily      Question:  [facility] Supplement:  Answer:  Boost VHC    05/15/23 1446               Discharge Plan: Long-term acute care next week    Primitivo Nuñez DO  05/19/23  16:39 EDT    Dictated utilizing Dragon dictation.

## 2023-05-19 NOTE — PLAN OF CARE
Goal Outcome Evaluation:  Plan of Care Reviewed With: patient, daughter, son        Progress: improving  Outcome Evaluation: Pt received supine in bed on 15L high flow O2 with sats staying 93% with UB ex.  Pt performed AROM ex with BUE 10 reps each.  Pt was then able to tolerated 1# weighted ex for shoulder flexion, chest press and elbow flexion.  Pt noted to fatigue easily and needed rest breaks between ex.  Tolerated increased activity today. Cont OT per POC.

## 2023-05-19 NOTE — CASE MANAGEMENT/SOCIAL WORK
Sam advised by Dr Nuñez that P2P was not successful.     Pt alert, sitting up in bed wearing 15L HFNC during rounds. Cm spoke with pt, dtr Regla and JULIAN at bedside to discuss DCP. Cm discussed  LTACH and the purpose of transition is to provide care for pt with chronic illness  that are gradually improving, but still require more care than standard rehab facility can accommodate. Cm advised them that the P2P was unsuccessful, thus insurance has denied [facility] stay. Cm advised the plan is to send an appeal letter to see if they will approve. Sam has discussed this with Dr Nuñez, who agrees to sign letter when available. Dtr asks if there is a similar facility  located in [facility] due to pt other dtr not have reliable car that can commute long distances. Cm advised there are none located in [facility]. SAM will continue to follow.

## 2023-05-19 NOTE — PLAN OF CARE
Goal Outcome Evaluation:  Plan of Care Reviewed With: patient           Outcome Evaluation: VSS, family visited with patient and encouraged increased PO intake, patient with better sats on bipap, patient appears more alert and engaged with family, with productive cough

## 2023-05-19 NOTE — PHARMACY RECOMMENDATION
Pharmacy Antimicrobial Stewardship Recommendation:     Carolin Toscano is a 77 y.o. female is receiving azithromycin for the treatment of upper respiratory infection.    Microbiology Culture results  Microbiology Results (last 10 days)       Procedure Component Value - Date/Time    Respiratory Culture - Sputum, Cough [[id number]] Collected: 05/18/23 1812    Lab Status: Preliminary result Specimen: Sputum from Cough Updated: 05/18/23 1951     Gram Stain Moderate (3+) WBCs per low power field      Rare (1+) Epithelial cells per low power field      Moderate (3+) Gram positive cocci in pairs            Labs  Results from last 7 days   Lab Units 05/19/23  0432 05/18/23  0618 05/17/23  0450 05/16/23  0453 05/15/23  0413 05/14/23  0430   WBC 10*3/mm3 15.77* 20.83* 14.56* 17.14*   < > 16.85*   CREATININE mg/dL 0.28* 0.41* 0.39* 0.37*   < > 0.36*   PROCALCITONIN ng/mL  --   --   --  0.11  --  0.08    < > = values in this interval not displayed.      Estimated Creatinine Clearance: 154.6 mL/min (A) (by C-G formula based on SCr of 0.28 mg/dL (L)).  Temp Readings from Last 1 Encounters:   05/19/23 97.3 °F (36.3 °C) (Temporal)       Pharmacy Recommendation:    Recommend to discontinue azithromycin in the setting of normal procalcitonin and negative atypicals in the respiratory panel. Elevated WBC likely of viral origin; appears to trend downwards at this time.    Thanks,   Young Viera [facility]  5/19/2023 09:30 EDT

## 2023-05-19 NOTE — THERAPY TREATMENT NOTE
"Patient Name: Carolin Toscano  : 1945    MRN: [id number]                              Today's Date: 2023       Admit Date: 2023    Visit Dx:     ICD-10-CM ICD-9-CM   1. Acute on chronic respiratory failure with hypoxia and hypercapnia  J96.21 518.84    J96.22 786.09     799.02   2. COPD exacerbation  J44.1 491.21   3. Parainfluenza virus infection  B34.8 078.89     Patient Active Problem List   Diagnosis   • CAP (community acquired pneumonia)   • Cigarette nicotine dependence with nicotine-induced disorder   • Essential hypertension   • Dyslipidemia   • Blood glucose elevated   • Muscle ache   • COPD (chronic obstructive pulmonary disease)   • Nuclear sclerotic cataract of right eye   • Acute respiratory failure   • COPD exacerbation   • Acute on chronic respiratory failure with hypoxia and hypercapnia     Past Medical History:   Diagnosis Date   • Arthritis    • COPD (chronic obstructive pulmonary disease)    • Gall stones    • Goiter     \"inward\"   • Hypertension    • Hypertension    • Kidney infection    • Kidney stone    • Kidney stones    • Migraine headache    • Scarlet fever      Past Surgical History:   Procedure Laterality Date   • BLADDER SURGERY     • CATARACT EXTRACTION W/ INTRAOCULAR LENS IMPLANT Left 2022    Procedure: CATARACT PHACO EXTRACTION WITH INTRAOCULAR LENS IMPLANT LEFT;  Surgeon: Sathish Lopez MD;  Location: [facility];  Service: Ophthalmology;  Laterality: Left;   • CATARACT EXTRACTION W/ INTRAOCULAR LENS IMPLANT Right 2022    Procedure: CATARACT PHACO EXTRACTION WITH INTRAOCULAR LENS IMPLANT RIGHT;  Surgeon: Sathish Lopez MD;  Location: [facility];  Service: Ophthalmology;  Laterality: Right;   • CHOLECYSTECTOMY     • HYSTERECTOMY     • TONSILLECTOMY        General Information     Row Name 23 1329          OT Time and Intention    Document Type therapy note (daily note)  -     Mode of Treatment occupational therapy  -     Row Name 23 " 1329          General Information    Patient Profile Reviewed yes  -     Existing Precautions/Restrictions fall;oxygen therapy device and L/min  -           User Key  (r) = Recorded By, (t) = Taken By, (c) = Cosigned By    Initials Name Provider Type    Roselyn Pena Occupational Therapist                 Mobility/ADL's    No documentation.                Obj/Interventions     [facility] Name 05/19/23 1329          Shoulder (Therapeutic Exercise)    Shoulder (Therapeutic Exercise) strengthening exercise  -     Shoulder AROM (Therapeutic Exercise) bilateral;flexion;extension;horizontal aBduction/aDduction;10 repetitions;other (see comments)  B chest press  -     Shoulder Strengthening (Therapeutic Exercise) bilateral;flexion;extension;1 lb free weight;10 repetitions  -     Row Name 05/19/23 1329          Elbow/Forearm (Therapeutic Exercise)    Elbow/Forearm (Therapeutic Exercise) strengthening exercise  -     Elbow/Forearm AROM (Therapeutic Exercise) bilateral;flexion;extension;10 repetitions  -     Elbow/Forearm Strengthening (Therapeutic Exercise) bilateral;flexion;extension;1 lb free weight;10 repetitions  -     Row Name 05/19/23 1329          Motor Skills    Therapeutic Exercise shoulder;elbow/forearm  -           User Key  (r) = Recorded By, (t) = Taken By, (c) = Cosigned By    Initials Name Provider Type    Roselyn Pena Occupational Therapist               Goals/Plan    No documentation.                Clinical Impression     [facility] Name 05/19/23 1334          Pain Assessment    Pretreatment Pain Rating 8/10  -     Posttreatment Pain Rating 8/10  -     Pain Location - head  -     Pre/Posttreatment Pain Comment nursing gave her pain medication  -     Row Name 05/19/23 1334          Plan of Care Review    Plan of Care Reviewed With patient;daughter;son  -     Progress improving  -     Outcome Evaluation Pt received supine in bed on 15L high flow O2 with sats staying 93% with UB ex.   Pt performed AROM ex with BUE 10 reps each.  Pt was then able to tolerated 1# weighted ex for shoulder flexion, chest press and elbow flexion.  Pt noted to fatigue easily and needed rest breaks between ex.  Tolerated increased activity today. Cont OT per POC.  -     Row Name 05/19/23 1334          Vital Signs    Pre SpO2 (%) 93  -AH     O2 Delivery Pre Treatment hi-flow  15L  -AH     Intra SpO2 (%) 93  -AH     O2 Delivery Intra Treatment hi-flow  -AH     Post SpO2 (%) 93  -AH     O2 Delivery Post Treatment hi-flow  -AH     Pre Patient Position Supine  -AH     Intra Patient Position Supine  -AH     Post Patient Position Supine  -AH     Row Name 05/19/23 1334          Positioning and Restraints    Pre-Treatment Position in bed  -AH     Post Treatment Position bed  -AH     In Bed supine;call light within reach;encouraged to call for assist;patient within staff view;with family/caregiver  -           User Key  (r) = Recorded By, (t) = Taken By, (c) = Cosigned By    Initials Name Provider Type    Roselyn Pena Occupational Therapist               Outcome Measures     Row Name 05/19/23 4706          How much help from another is currently needed...    Putting on and taking off regular lower body clothing? 2  -AH     Bathing (including washing, rinsing, and drying) 2  -AH     Toileting (which includes using toilet bed pan or urinal) 2  -AH     Putting on and taking off regular upper body clothing 2  -AH     Taking care of personal grooming (such as brushing teeth) 2  -AH     Eating meals 3  -AH     AM-PAC 6 Clicks Score (OT) 13  -AH     Row Name 05/19/23 0814          How much help from another person do you currently need...    Turning from your back to your side while in flat bed without using bedrails? 2  -RP     Moving from lying on back to sitting on the side of a flat bed without bedrails? 2  -RP     Moving to and from a bed to a chair (including a wheelchair)? 2  -RP     Standing up from a chair using your  arms (e.g., wheelchair, bedside chair)? 2  -RP     Climbing 3-5 steps with a railing? 2  -RP     To walk in hospital room? 2  -RP     AM-PAC 6 Clicks Score (PT) 12  -RP     Highest level of mobility 4 --> Transferred to chair/commode  -RP           User Key  (r) = Recorded By, (t) = Taken By, (c) = Cosigned By    Initials Name Provider Type    Roselyn Pena Occupational Therapist    Marga Carlin RN Registered Nurse                Occupational Therapy Education     Title: PT OT SLP Therapies (In Progress)     Topic: Occupational Therapy (In Progress)     Point: ADL training (Done)     Description:   Instruct learner(s) on proper safety adaptation and remediation techniques during self care or transfers.   Instruct in proper use of assistive devices.              Learning Progress Summary           Patient Acceptance, E, VU by AM at 5/15/2023 0701    Acceptance, E,TB, VU by  at 5/12/2023 1507    Comment: purpose of re-evaluation    Acceptance, E,TB, VU by SD at 5/8/2023 1222    Comment: Safety during tf    Acceptance, E, VU by SP at 5/5/2023 1146    Comment: OT edcuated pt on purpose of IE and POC. Pt is agreeable.                   Point: Home exercise program (Done)     Description:   Instruct learner(s) on appropriate technique for monitoring, assisting and/or progressing therapeutic exercises/activities.              Learning Progress Summary           Patient Acceptance, E,TB, VU by  at 5/19/2023 1339    Comment: importance of activity    Acceptance, E,TB, VU by SD at 5/16/2023 1246    Comment: Importance of progressing activity.   Family Acceptance, E,TB, VU by  at 5/19/2023 1339    Comment: importance of activity                   Point: Precautions (Not Started)     Description:   Instruct learner(s) on prescribed precautions during self-care and functional transfers.              Learner Progress:  Not documented in this visit.          Point: Body mechanics (Not Started)     Description:    Instruct learner(s) on proper positioning and spine alignment during self-care, functional mobility activities and/or exercises.              Learner Progress:  Not documented in this visit.                      User Key     Initials Effective Dates Name Provider Type Discipline     06/16/21 -  Roselyn Donaldson Occupational Therapist OT    SD 06/16/21 -  Cecelia Knight OT Occupational Therapist OT    SP 09/08/22 -  January Chapa OT Occupational Therapist OT    AM 02/22/23 -  Rob Lu, RN Registered Nurse Nurse              OT Recommendation and Plan  Planned Therapy Interventions (OT): activity tolerance training, BADL retraining, patient/caregiver education/training, transfer/mobility retraining, strengthening exercise  Therapy Frequency (OT): 3 times/wk  Plan of Care Review  Plan of Care Reviewed With: patient, daughter, son  Progress: improving  Outcome Evaluation: Pt received supine in bed on 15L high flow O2 with sats staying 93% with UB ex.  Pt performed AROM ex with BUE 10 reps each.  Pt was then able to tolerated 1# weighted ex for shoulder flexion, chest press and elbow flexion.  Pt noted to fatigue easily and needed rest breaks between ex.  Tolerated increased activity today. Cont OT per POC.     Time Calculation:    Time Calculation- OT     Row Name 05/19/23 1340             Time Calculation- OT    OT Start Time 1014  -      OT Stop Time 1030  -      OT Time Calculation (min) 16 min  -      OT Received On 05/19/23  -      OT Goal Re-Cert Due Date 05/22/23  -         Timed Charges    76014 - OT Therapeutic Exercise Minutes 16  -         Total Minutes    Timed Charges Total Minutes 16  -       Total Minutes 16  -            User Key  (r) = Recorded By, (t) = Taken By, (c) = Cosigned By    Initials Name Provider Type     Roselyn Donaldson Occupational Therapist              Therapy Charges for Today     Code Description Service Date Service Provider Modifiers Qty    [id number]  OT  THER PROC EA 15 MIN 5/19/2023 Roselyn Donaldson 1               Roselyn Donaldson  5/19/2023

## 2023-05-19 NOTE — PROGRESS NOTES
"  CC: Acute Respiratory Failure.     S: Currently off NIV therapy.  Continues to require high flow nasal cannula when off the NIV/AVAPS.  Insists that she is feeling slightly stronger.  Continues to have cough.  Continues to admit that the cough is productive of extremely thick and occasionally difficult to expectorate sputum.  Daughter at the bedside.    ROS: Complains of cough, shortness of breath and mild weakness.  Also complains of mild anxiety.    O:Vital signs reviewed. FiO2: 12-15 L/min high flow nasal cannula.  BP 92/43 (BP Location: Right arm, Patient Position: Lying)   Pulse 61   Temp 97.3 °F (36.3 °C) (Temporal)   Resp 23   Ht 160 cm (63\")   Wt 64.7 kg (142 lb 10.2 oz)   SpO2 91%   BMI 25.27 kg/m²     Temp (24hrs), Av.8 °F (36.6 °C), Min:97.2 °F (36.2 °C), Max:98.3 °F (36.8 °C)      I & Os reviewed.   Intake/Output       23 0700 - 23 0659    Intake (ml) 650    Output (ml) 900    Net (ml) -250    Last Weight 64.7 kg (142 lb 10.2 oz)          Net IO Since Admission: -2,557 mL [23 1023]    General/Constitutional: Off NIV therapy. Appears to be in mild distress, especially when trying to talk in full sentences.  Eyes: EOMI  Neck: Supple without JVD. No obvious masses noted.   Cardiovascular: S1 + S2.  Regular at this time.  Lungs/Respiratory: Decreased bilateral breath sounds noted.  Bilateral, somewhat scattered, wheezing heard.  Bilateral basal crackles noted.  GI/Abdomen: Soft. Bowel sounds positive.  No obvious organomegaly  Musculoskeletal/Extremities: Trace edema noted. Gait could not be assessed at this time, as the patient was laying in bed.   Neurologic: Was able to respond appropriately and interact today.   Psych: Was able to follow simple commands.  Appeared to be mildly anxious on occasion.  Skin: Appeared somewhat dry.      Labs: Reviewed.   Results from last 7 days   Lab Units 23  0432 23  0618 23  0450 23  0453 05/15/23  0413 23  0430 "   WBC 10*3/mm3 15.77* 20.83* 14.56* 17.14* 16.64* 16.85*   HEMOGLOBIN g/dL 11.6* 12.0 12.4 12.4 12.2 12.0   HEMATOCRIT % 37.4 38.9 40.9 40.1 40.2 39.8   PLATELETS 10*3/mm3 191 212 194 204 187 187   NEUTROPHIL % %  --   --   --  89.5*  --  84.7*   NEUTROS ABS 10*3/mm3  --   --   --  15.32*  --  14.27*   EOSINOPHIL % %  --   --   --  0.0*  --  0.0*   EOS ABS 10*3/mm3  --   --   --  0.00  --  0.00   LYMPHOCYTE % %  --   --   --  5.1*  --  6.9*   LYMPHS ABS 10*3/mm3  --   --   --  0.88  --  1.17       Lab Results   Component Value Date    PROCALCITO 0.11 05/16/2023    PROCALCITO 0.08 05/14/2023    PROCALCITO 0.20 05/10/2023       No results found for: CRP    No results found for: SEDRATE    Lab Results   Component Value Date    PROBNP 572.7 05/14/2023    PROBNP 479.4 05/10/2023    PROBNP 234.4 05/04/2023       Results from last 7 days   Lab Units 05/19/23  0432 05/18/23  0618 05/17/23  0450   SODIUM mmol/L 137 140 144   POTASSIUM mmol/L 4.4 4.4 4.3   CHLORIDE mmol/L 90* 90* 91*   CO2 mmol/L 41.4* 43.3* 45.0*   BUN mg/dL 22 29* 34*   CREATININE mg/dL 0.28* 0.41* 0.39*   CALCIUM mg/dL 9.1 9.4 9.8   ANION GAP mmol/L 5.6 6.7 8.0   GLUCOSE mg/dL 242* 215* 173*                   No results found for: CKTOTAL    No components found for: HSTROPT    Lab Results   Component Value Date    TROPONINT 22 (H) 05/05/2023    TROPONINT 25 (H) 05/05/2023    TROPONINT 44 (H) 05/04/2023       No results found for: DDIMER    Brief Urine Lab Results  (Last result in the past 365 days)      Color   Clarity   Blood   Leuk Est   Nitrite   Protein   CREAT   Urine HCG        05/12/23 1738 Yellow   Cloudy   Negative   Trace   Negative   Trace                 Lab Results   Component Value Date    TSH 0.304 05/09/2023       No results found for: FREET4      Micro: As of May 19, 2023   Lab Results   Component Value Date    RESPCX Light growth (2+) Normal Respiratory Ana 10/02/2017     No results found for: BCIDPCR  Lab Results   Component Value Date     BLOODCX No growth at 5 days 05/04/2023    BLOODCX No growth at 5 days 05/04/2023    BLOODCX No growth at 5 days 04/16/2023    BLOODCX No growth at 5 days 04/16/2023     Lab Results   Component Value Date    URINECX Final report 02/07/2019    URINECX 20,000-30,000 CFU/mL Escherichia coli (A) 10/05/2018     No results found for: MRSACX  Lab Results   Component Value Date    MRSAPCR No MRSA Detected 04/15/2023     No results found for: URCX  No components found for: LOWRESPCF  No results found for: THROATCX  No results found for: CULTURES  No components found for: STREPBCX  No results found for: STREPPNEUAG  No results found for: LEGIONELLA  No results found for: MYCOPLASCX  No results found for: GCCX  No results found for: WOUNDCX  No results found for: BODYFLDCX    No results found for: FLU    Lab Results   Component Value Date    ADENOVIRUS Not Detected 05/04/2023     Lab Results   Component Value Date    QV935Q Not Detected 05/04/2023     Lab Results   Component Value Date    CVHKU1 Not Detected 05/04/2023     Lab Results   Component Value Date    CVNL63 Not Detected 05/04/2023     Lab Results   Component Value Date    CVOC43 Not Detected 05/04/2023     Lab Results   Component Value Date    HUMETPNEVS Not Detected 05/04/2023     Lab Results   Component Value Date    HURVEV Not Detected 05/04/2023     Lab Results   Component Value Date    FLUBPCR Not Detected 05/04/2023     Lab Results   Component Value Date    PARAINFLUE Not Detected 05/04/2023     Lab Results   Component Value Date    PARAFLUV2 Not Detected 05/04/2023     Lab Results   Component Value Date    PARAFLUV3 Detected (A) 05/04/2023     Lab Results   Component Value Date    PARAFLUV4 Not Detected 05/04/2023     Lab Results   Component Value Date    BPERTPCR Not Detected 05/04/2023     No results found for: [id number]  Lab Results   Component Value Date    CPNEUPCR Not Detected 05/04/2023     Lab Results   Component Value Date    MPNEUMO Not Detected  05/04/2023     Lab Results   Component Value Date    FLUAPCR Not Detected 05/04/2023     No results found for: FLUAH3  No results found for: FLUAH1  Lab Results   Component Value Date    RSV Not Detected 05/04/2023     Lab Results   Component Value Date    BPARAPCR Not Detected 05/04/2023       COVID 19:  Lab Results   Component Value Date    COVID19 Not Detected 05/04/2023         ABG: Reviewed   Recent Labs     05/15/23  0453 05/16/23  0519 05/18/23  0708   PHART 7.445 7.453* 7.451*   YHY8BUB 75.7* 76.4* 73.4*   PO2ART 56.8* 56.7* 64.0*   WFP4EEQ 51.9* 53.4* 51.1*   BASEEXCESS 23.5* 24.7* 22.8*       Lab Results   Component Value Date    LACTATE 1.3 05/04/2023    LACTATE 1.2 04/13/2023    LACTATE 2.7 (C) 04/13/2023         Echo: Results for orders placed during the hospital encounter of 04/13/23    Adult Transthoracic Echo Complete W/ Cont if Necessary Per Protocol    Interpretation Summary  •  Left ventricular diastolic function is consistent with (grade I) impaired relaxation.  •  Mild aortic valve stenosis is present.  •  Estimated right ventricular systolic pressure from tricuspid regurgitation is moderately elevated (45-55 mmHg).  •  Mild to moderate pulmonary hypertension is present.      Results for orders placed during the hospital encounter of 10/01/17    Adult Transthoracic Echo Complete W/ Cont if Necessary Per Protocol    Interpretation Summary  TEchnically difficult study  1) Borderline LVH with normal LV systolic function ( EF is over 55%)  2) Normal left atrial size with mild elevation in LVedp  3) Trace MR  4) Mild TR with normal PA pressures  5) Mild AI  6) Small RV with normal RV function        dexmedetomidine, 0.2-1.5 mcg/kg/hr, Last Rate: Stopped (05/12/23 1556)  Pharmacy to Dose enoxaparin (LOVENOX),           CXRay: Latest imaging study was reviewed personally.   Imaging Results (Last 48 Hours)     Procedure Component Value Units Date/Time    XR Chest 1 View [[id number]] Collected: 05/18/23  0751     Updated: 05/18/23 0755    Narrative:      PROCEDURE: XR CHEST 1 VW-     HISTORY: Resp Failure.; J96.21-Acute and chronic respiratory failure  with hypoxia; J96.22-Acute and chronic respiratory failure with  hypercapnia; J44.1-Chronic obstructive pulmonary disease with (acute)  exacerbation; B34.8-Other viral infections of unspecified site     COMPARISON: 05/15/2023.     FINDINGS: The heart is upper limits of normal.. Again noted is bilateral  interstitial disease and bronchial wall thickening. More prominent  markings noted in the lung bases, left greater than right. This is shown  to represent bronchiectasis with mucus plugging on recent CT. Findings  are stable from prior. Aortic mural calcifications noted.. The  mediastinum is unremarkable. There is no pneumothorax.  There are no  acute osseous abnormalities.       Impression:      Stable chest..           This report was signed and finalized on 5/18/2023 7:53 AM by Shraddha Wharton MD.            Assessment & Recommendations/Plan:   1.  Acute respiratory failure  Continue AVAPS at the current setting.  Continue using AVAPS 2 hours on and 2 hours off.  Currently on 12-15 L/min high flow nasal cannula.    Attempts to decrease FiO2 have been mostly unsuccessful as the patient becomes significantly hypoxic and is slow to recover.  Latest ABGs show compensated mild acute on chronic respiratory acidosis, with improving PCO2.  ABG as clinically indicated.    2.  COPD with acute exacerbation  Likely triggered by parainfluenza bronchitis  We will decrease Solu-Medrol to daily dosing, since the patient has been on Solu-Medrol since the day of admission.  Continue nebulized Pulmicort in the middle of the day.  Continue Spiriva and Symbicort  Will benefit from outpatient PFTs    3.  Abnormal CT of the chest   Once again had a long discussion with the family members at the bedside along with the patient regarding the finding of significant mucous  "plugging/atelectasis.  Continue MetaNeb treatments.  We will continue hypertonic saline to every 12 hourly.  We will add Mucinex DM.  I have spoken to patient's daughter, Nehal at [phone], on 5/16/2023.    I had a long discussion with patient's daughter from [place] along with her son and the patient, at the bedside.  I have made them aware of the likely need to consider bronchoscopy, especially since there has been no change in oxygen requirement for more than 14 days.  The patient and the daughter are not agreeable to proceed with bronchoscopy and they insist that the patient will \"get better on her own\".  I told the patient and her children at the bedside, that if she worsens any further she may end up on the ventilator.  I asked them to consider her CODE STATUS in view of her insistence on not having procedures performed for now since she may actually worsen and may require further intervention such as intubation.  The family is unsure at this time but will discuss it amongst themselves.  I also told the patient and her family members that although bronchoscopy is definitely the next up to consider, it is unclear if bronchoscopy will completely resolve her hypoxemia and/or if there will be any complication requiring mechanical ventilation during or after bronchoscopy.    4.  Chronic respiratory acidosis  Appears to be on NIV device at home.    5.  Pulmonary hypertension  May need outpatient work-up  Likely secondary to underlying likely severe COPD    6.  Anxiety  Has been on Precedex in the past  Continue other anxiolytics.  May need adjustment, as indicated.    7.  Smoking  Was advised to quit smoking    I had a long discussion with Dr. Nuñez at the bedside as well.    We have reviewed patient's current orders and changes, if any, have been suggested to primary care team. Plan was also discussed with nursing staff, as necessary.     This document was electronically signed by Arnaldo Soriano MD on 05/19/23 " at 10:23 EDT      Dictated utilizing Dragon dictation.

## 2023-05-19 NOTE — PLAN OF CARE
Problem: Skin Injury Risk Increased  Goal: Skin Health and Integrity  5/19/2023 1843 by Marga Torres RN  Outcome: Ongoing, Progressing  5/19/2023 1843 by Marga Torres RN  Outcome: Ongoing, Progressing  Intervention: Optimize Skin Protection  Recent Flowsheet Documentation  Taken 5/19/2023 1627 by Marga Torres RN  Head of Bed (HOB) Positioning: HOB elevated  Taken 5/19/2023 1400 by Marga Torres RN  Head of Bed (HOB) Positioning: HOB elevated  Taken 5/19/2023 1200 by Marga Torres RN  Head of Bed (HOB) Positioning: HOB elevated  Taken 5/19/2023 0814 by Marga Torres RN  Head of Bed (HOB) Positioning: HOB elevated     Problem: Fall Injury Risk  Goal: Absence of Fall and Fall-Related Injury  5/19/2023 1843 by Marga Torres RN  Outcome: Ongoing, Progressing  5/19/2023 1843 by Marga Torres RN  Outcome: Ongoing, Progressing  Intervention: Identify and Manage Contributors  Recent Flowsheet Documentation  Taken 5/19/2023 1627 by Marga Torres RN  Medication Review/Management: medications reviewed  Taken 5/19/2023 1400 by Marga Torres RN  Medication Review/Management: medications reviewed  Taken 5/19/2023 1200 by Marga Torres RN  Medication Review/Management: medications reviewed  Taken 5/19/2023 0814 by Marga Torres RN  Medication Review/Management: medications reviewed  Intervention: Promote Injury-Free Environment  Recent Flowsheet Documentation  Taken 5/19/2023 1627 by Marga Torres RN  Safety Promotion/Fall Prevention:   safety round/check completed   room organization consistent  Taken 5/19/2023 1400 by Marga Torres RN  Safety Promotion/Fall Prevention:   room organization consistent   safety round/check completed  Taken 5/19/2023 1200 by Marga Torres RN  Safety Promotion/Fall Prevention:   room organization consistent   safety round/check completed  Taken 5/19/2023 0814 by Marga Torres RN  Safety Promotion/Fall Prevention:   safety round/check  completed   room organization consistent   Goal Outcome Evaluation:

## 2023-05-20 LAB
ALBUMIN SERPL-MCNC: 2.5 G/DL (ref 3.5–5.2)
ALBUMIN/GLOB SERPL: 0.9 G/DL
ALP SERPL-CCNC: 72 U/L (ref 39–117)
ALT SERPL W P-5'-P-CCNC: 26 U/L (ref 1–33)
ANION GAP SERPL CALCULATED.3IONS-SCNC: 6.2 MMOL/L (ref 5–15)
AST SERPL-CCNC: 12 U/L (ref 1–32)
BILIRUB SERPL-MCNC: 0.3 MG/DL (ref 0–1.2)
BUN SERPL-MCNC: 25 MG/DL (ref 8–23)
BUN/CREAT SERPL: 89.3 (ref 7–25)
CALCIUM SPEC-SCNC: 8.9 MG/DL (ref 8.6–10.5)
CHLORIDE SERPL-SCNC: 94 MMOL/L (ref 98–107)
CO2 SERPL-SCNC: 38.8 MMOL/L (ref 22–29)
CREAT SERPL-MCNC: 0.28 MG/DL (ref 0.57–1)
DEPRECATED RDW RBC AUTO: 51.2 FL (ref 37–54)
EGFRCR SERPLBLD CKD-EPI 2021: 111.9 ML/MIN/1.73
ERYTHROCYTE [DISTWIDTH] IN BLOOD BY AUTOMATED COUNT: 15 % (ref 12.3–15.4)
GLOBULIN UR ELPH-MCNC: 2.9 GM/DL
GLUCOSE BLDC GLUCOMTR-MCNC: 116 MG/DL (ref 70–130)
GLUCOSE BLDC GLUCOMTR-MCNC: 124 MG/DL (ref 70–130)
GLUCOSE BLDC GLUCOMTR-MCNC: 340 MG/DL (ref 70–130)
GLUCOSE SERPL-MCNC: 123 MG/DL (ref 65–99)
HCT VFR BLD AUTO: 36.5 % (ref 34–46.6)
HGB BLD-MCNC: 11.3 G/DL (ref 12–15.9)
MCH RBC QN AUTO: 29.5 PG (ref 26.6–33)
MCHC RBC AUTO-ENTMCNC: 31 G/DL (ref 31.5–35.7)
MCV RBC AUTO: 95.3 FL (ref 79–97)
PLATELET # BLD AUTO: 209 10*3/MM3 (ref 140–450)
PMV BLD AUTO: 12.2 FL (ref 6–12)
POTASSIUM SERPL-SCNC: 3.8 MMOL/L (ref 3.5–5.2)
PROT SERPL-MCNC: 5.4 G/DL (ref 6–8.5)
RBC # BLD AUTO: 3.83 10*6/MM3 (ref 3.77–5.28)
SODIUM SERPL-SCNC: 139 MMOL/L (ref 136–145)
WBC NRBC COR # BLD: 15.22 10*3/MM3 (ref 3.4–10.8)

## 2023-05-20 PROCEDURE — 85027 COMPLETE CBC AUTOMATED: CPT | Performed by: INTERNAL MEDICINE

## 2023-05-20 PROCEDURE — 94664 DEMO&/EVAL PT USE INHALER: CPT

## 2023-05-20 PROCEDURE — 94799 UNLISTED PULMONARY SVC/PX: CPT

## 2023-05-20 PROCEDURE — 63710000001 INSULIN ASPART PER 5 UNITS: Performed by: FAMILY MEDICINE

## 2023-05-20 PROCEDURE — 25010000002 ENOXAPARIN PER 10 MG: Performed by: FAMILY MEDICINE

## 2023-05-20 PROCEDURE — 80053 COMPREHEN METABOLIC PANEL: CPT | Performed by: INTERNAL MEDICINE

## 2023-05-20 PROCEDURE — C1894 INTRO/SHEATH, NON-LASER: HCPCS

## 2023-05-20 PROCEDURE — 25010000002 AZITHROMYCIN PER 500 MG: Performed by: INTERNAL MEDICINE

## 2023-05-20 PROCEDURE — 25010000002 METHYLPREDNISOLONE PER 40 MG: Performed by: INTERNAL MEDICINE

## 2023-05-20 PROCEDURE — 25010000002 VANCOMYCIN 5 G RECONSTITUTED SOLUTION: Performed by: INTERNAL MEDICINE

## 2023-05-20 PROCEDURE — 94660 CPAP INITIATION&MGMT: CPT

## 2023-05-20 PROCEDURE — 94761 N-INVAS EAR/PLS OXIMETRY MLT: CPT

## 2023-05-20 PROCEDURE — 99233 SBSQ HOSP IP/OBS HIGH 50: CPT | Performed by: INTERNAL MEDICINE

## 2023-05-20 PROCEDURE — 25010000002 ONDANSETRON PER 1 MG: Performed by: FAMILY MEDICINE

## 2023-05-20 PROCEDURE — 02HV33Z INSERTION OF INFUSION DEVICE INTO SUPERIOR VENA CAVA, PERCUTANEOUS APPROACH: ICD-10-PCS | Performed by: INTERNAL MEDICINE

## 2023-05-20 PROCEDURE — 82948 REAGENT STRIP/BLOOD GLUCOSE: CPT

## 2023-05-20 PROCEDURE — C1751 CATH, INF, PER/CENT/MIDLINE: HCPCS

## 2023-05-20 PROCEDURE — 99232 SBSQ HOSP IP/OBS MODERATE 35: CPT | Performed by: INTERNAL MEDICINE

## 2023-05-20 RX ORDER — SODIUM CHLORIDE 0.9 % (FLUSH) 0.9 %
10 SYRINGE (ML) INJECTION EVERY 12 HOURS SCHEDULED
Status: DISCONTINUED | OUTPATIENT
Start: 2023-05-20 | End: 2023-06-01 | Stop reason: HOSPADM

## 2023-05-20 RX ORDER — SODIUM CHLORIDE 0.9 % (FLUSH) 0.9 %
20 SYRINGE (ML) INJECTION AS NEEDED
Status: DISCONTINUED | OUTPATIENT
Start: 2023-05-20 | End: 2023-06-01 | Stop reason: HOSPADM

## 2023-05-20 RX ORDER — SODIUM CHLORIDE 0.9 % (FLUSH) 0.9 %
20 SYRINGE (ML) INJECTION AS NEEDED
Status: DISCONTINUED | OUTPATIENT
Start: 2023-05-20 | End: 2023-05-25 | Stop reason: SDUPTHER

## 2023-05-20 RX ORDER — SODIUM CHLORIDE 0.9 % (FLUSH) 0.9 %
10 SYRINGE (ML) INJECTION AS NEEDED
Status: DISCONTINUED | OUTPATIENT
Start: 2023-05-20 | End: 2023-05-25 | Stop reason: SDUPTHER

## 2023-05-20 RX ORDER — SODIUM CHLORIDE 0.9 % (FLUSH) 0.9 %
10 SYRINGE (ML) INJECTION AS NEEDED
Status: DISCONTINUED | OUTPATIENT
Start: 2023-05-20 | End: 2023-06-01 | Stop reason: HOSPADM

## 2023-05-20 RX ADMIN — SODIUM CHLORIDE 30 MG/ML INHALATION SOLUTION 4 ML: 30 SOLUTION INHALANT at 18:59

## 2023-05-20 RX ADMIN — Medication 10 ML: at 21:48

## 2023-05-20 RX ADMIN — ATENOLOL 25 MG: 25 TABLET ORAL at 09:38

## 2023-05-20 RX ADMIN — ENOXAPARIN SODIUM 40 MG: 100 INJECTION SUBCUTANEOUS at 19:53

## 2023-05-20 RX ADMIN — IPRATROPIUM BROMIDE AND ALBUTEROL SULFATE 3 ML: 2.5; .5 SOLUTION RESPIRATORY (INHALATION) at 18:59

## 2023-05-20 RX ADMIN — Medication 3 ML: at 09:38

## 2023-05-20 RX ADMIN — ACETAMINOPHEN 650 MG: 325 TABLET, FILM COATED ORAL at 09:41

## 2023-05-20 RX ADMIN — BUDESONIDE AND FORMOTEROL FUMARATE DIHYDRATE 2 PUFF: 160; 4.5 AEROSOL RESPIRATORY (INHALATION) at 18:59

## 2023-05-20 RX ADMIN — ALPRAZOLAM 0.5 MG: 0.5 TABLET ORAL at 21:48

## 2023-05-20 RX ADMIN — METHYLPREDNISOLONE SODIUM SUCCINATE 40 MG: 40 INJECTION, POWDER, LYOPHILIZED, FOR SOLUTION INTRAMUSCULAR; INTRAVENOUS at 09:38

## 2023-05-20 RX ADMIN — SODIUM CHLORIDE 30 MG/ML INHALATION SOLUTION 4 ML: 30 SOLUTION INHALANT at 07:18

## 2023-05-20 RX ADMIN — SODIUM CHLORIDE 500 MG: 900 INJECTION, SOLUTION INTRAVENOUS at 15:27

## 2023-05-20 RX ADMIN — Medication 3 ML: at 21:49

## 2023-05-20 RX ADMIN — BUDESONIDE INHALATION 1 MG: 0.5 SUSPENSION RESPIRATORY (INHALATION) at 07:18

## 2023-05-20 RX ADMIN — AMLODIPINE BESYLATE 10 MG: 5 TABLET ORAL at 09:38

## 2023-05-20 RX ADMIN — Medication 5 MG: at 21:48

## 2023-05-20 RX ADMIN — BUDESONIDE AND FORMOTEROL FUMARATE DIHYDRATE 2 PUFF: 160; 4.5 AEROSOL RESPIRATORY (INHALATION) at 07:18

## 2023-05-20 RX ADMIN — IPRATROPIUM BROMIDE AND ALBUTEROL SULFATE 3 ML: 2.5; .5 SOLUTION RESPIRATORY (INHALATION) at 07:17

## 2023-05-20 RX ADMIN — ACETAMINOPHEN 650 MG: 325 TABLET, FILM COATED ORAL at 19:53

## 2023-05-20 RX ADMIN — Medication 10 ML: at 21:49

## 2023-05-20 RX ADMIN — GUAIFENESIN AND DEXTROMETHORPHAN HYDROBROMIDE 1 TABLET: 30; 600 TABLET, EXTENDED RELEASE ORAL at 21:48

## 2023-05-20 RX ADMIN — VANCOMYCIN HYDROCHLORIDE 1250 MG: 5 INJECTION, POWDER, LYOPHILIZED, FOR SOLUTION INTRAVENOUS at 15:27

## 2023-05-20 RX ADMIN — MULTIPLE VITAMINS W/ MINERALS TAB 1 TABLET: TAB at 09:37

## 2023-05-20 RX ADMIN — INSULIN ASPART 7 UNITS: 100 INJECTION, SOLUTION INTRAVENOUS; SUBCUTANEOUS at 16:57

## 2023-05-20 RX ADMIN — GUAIFENESIN AND DEXTROMETHORPHAN HYDROBROMIDE 1 TABLET: 30; 600 TABLET, EXTENDED RELEASE ORAL at 09:37

## 2023-05-20 RX ADMIN — ONDANSETRON 4 MG: 2 INJECTION INTRAMUSCULAR; INTRAVENOUS at 16:04

## 2023-05-20 NOTE — PROGRESS NOTES
"  CC: Acute Respiratory Failure.     S: Currently off NIV therapy.  Continues to require high flow nasal cannula when off the NIV/AVAPS.  Continues to mention that she coughs up clear but thick sputum on occasion.  Says that she feels that the sputum is not \"as sticky\" as it was before.  Also feels that she has been able to stay off the NIV device longer today than 2 to 3 days ago.    ROS: Complains of cough, shortness of breath and mild weakness.  Also complains of mild anxiety.    O:Vital signs reviewed. FiO2: 12-15 L/min high flow nasal cannula.  /49 (BP Location: Left arm, Patient Position: Sitting)   Pulse 78   Temp 98.1 °F (36.7 °C) (Temporal)   Resp 24   Ht 160 cm (63\")   Wt 65.6 kg (144 lb 10 oz)   SpO2 91%   BMI 25.62 kg/m²     Temp (24hrs), Av.6 °F (36.4 °C), Min:96.8 °F (36 °C), Max:98.2 °F (36.8 °C)      I & Os reviewed.   Intake/Output       23 0700 - 23 0659    Intake (ml) --    Output (ml) 700    Net (ml) -700    Last Weight 65.6 kg (144 lb 10 oz)          Net IO Since Admission: -3,257 mL [23 1651]    General/Constitutional: Off NIV therapy. Appears to be in mild distress, especially when trying to talk in full sentences.  Eyes: EOMI  Neck: Supple without JVD. No obvious masses noted.   Cardiovascular: S1 + S2.  Regular at this time.  Lungs/Respiratory: Decreased bilateral breath sounds noted.  Bilateral, somewhat scattered, wheezing heard.  Bilateral basal crackles noted.  GI/Abdomen: Soft. Bowel sounds positive.  No obvious organomegaly  Musculoskeletal/Extremities: Right-sided PICC line in place.  No edema noted. Gait could not be assessed at this time, as the patient was laying in bed.   Neurologic: Was able to respond appropriately and interact today.   Psych: Was able to follow simple commands.  Appeared to be mildly anxious on occasion.  Skin: Appeared somewhat dry.      Labs: Reviewed.   Results from last 7 days   Lab Units 23  0426 23  0432 " 05/18/23  0618 05/17/23  0450 05/16/23  0453 05/15/23  0413 05/14/23  0430   WBC 10*3/mm3 15.22* 15.77* 20.83* 14.56* 17.14*   < > 16.85*   HEMOGLOBIN g/dL 11.3* 11.6* 12.0 12.4 12.4   < > 12.0   HEMATOCRIT % 36.5 37.4 38.9 40.9 40.1   < > 39.8   PLATELETS 10*3/mm3 209 191 212 194 204   < > 187   NEUTROPHIL % %  --   --   --   --  89.5*  --  84.7*   NEUTROS ABS 10*3/mm3  --   --   --   --  15.32*  --  14.27*   EOSINOPHIL % %  --   --   --   --  0.0*  --  0.0*   EOS ABS 10*3/mm3  --   --   --   --  0.00  --  0.00   LYMPHOCYTE % %  --   --   --   --  5.1*  --  6.9*   LYMPHS ABS 10*3/mm3  --   --   --   --  0.88  --  1.17    < > = values in this interval not displayed.       Lab Results   Component Value Date    PROCALCITO 0.11 05/16/2023    PROCALCITO 0.08 05/14/2023    PROCALCITO 0.20 05/10/2023       No results found for: CRP    No results found for: SEDRATE    Lab Results   Component Value Date    PROBNP 572.7 05/14/2023    PROBNP 479.4 05/10/2023    PROBNP 234.4 05/04/2023       Results from last 7 days   Lab Units 05/20/23  0426 05/19/23 0432 05/18/23 0618   SODIUM mmol/L 139 137 140   POTASSIUM mmol/L 3.8 4.4 4.4   CHLORIDE mmol/L 94* 90* 90*   CO2 mmol/L 38.8* 41.4* 43.3*   BUN mg/dL 25* 22 29*   CREATININE mg/dL 0.28* 0.28* 0.41*   CALCIUM mg/dL 8.9 9.1 9.4   ANION GAP mmol/L 6.2 5.6 6.7   BILIRUBIN mg/dL 0.3  --   --    ALK PHOS U/L 72  --   --    ALT (SGPT) U/L 26  --   --    AST (SGOT) U/L 12  --   --    GLUCOSE mg/dL 123* 242* 215*   TOTAL PROTEIN g/dL 5.4*  --   --    ALBUMIN g/dL 2.5*  --   --                    No results found for: CKTOTAL    No components found for: HSTROPT    Lab Results   Component Value Date    TROPONINT 22 (H) 05/05/2023    TROPONINT 25 (H) 05/05/2023    TROPONINT 44 (H) 05/04/2023       No results found for: DDIMER    Brief Urine Lab Results  (Last result in the past 365 days)      Color   Clarity   Blood   Leuk Est   Nitrite   Protein   CREAT   Urine HCG        05/12/23 2035  Yellow   Cloudy   Negative   Trace   Negative   Trace                 Lab Results   Component Value Date    TSH 0.304 05/09/2023       No results found for: FREET4      Micro: As of May 20, 2023   Lab Results   Component Value Date    RESPCX Moderate growth (3+) Haemophilus influenzae (A) 05/18/2023    RESPCX Light growth (2+) Staphylococcus aureus (A) 05/18/2023    RESPCX No Normal Respiratory Ana (A) 05/18/2023     No results found for: BCIDPCR  Lab Results   Component Value Date    BLOODCX No growth at 2 days 05/18/2023    BLOODCX No growth at 2 days 05/18/2023    BLOODCX No growth at 5 days 05/04/2023    BLOODCX No growth at 5 days 05/04/2023     Lab Results   Component Value Date    URINECX Final report 02/07/2019    URINECX 20,000-30,000 CFU/mL Escherichia coli (A) 10/05/2018     No results found for: MRSACX  Lab Results   Component Value Date    MRSAPCR No MRSA Detected 04/15/2023     No results found for: URCX  No components found for: LOWRESPCF  No results found for: THROATCX  No results found for: CULTURES  No components found for: STREPBCX  No results found for: STREPPNEUAG  No results found for: LEGIONELLA  No results found for: MYCOPLASCX  No results found for: GCCX  No results found for: WOUNDCX  No results found for: BODYFLDCX    No results found for: FLU    Lab Results   Component Value Date    ADENOVIRUS Not Detected 05/04/2023     Lab Results   Component Value Date    AY987E Not Detected 05/04/2023     Lab Results   Component Value Date    CVHKU1 Not Detected 05/04/2023     Lab Results   Component Value Date    CVNL63 Not Detected 05/04/2023     Lab Results   Component Value Date    CVOC43 Not Detected 05/04/2023     Lab Results   Component Value Date    HUMETPNEVS Not Detected 05/04/2023     Lab Results   Component Value Date    HURVEV Not Detected 05/04/2023     Lab Results   Component Value Date    FLUBPCR Not Detected 05/04/2023     Lab Results   Component Value Date    PARAINFLUE Not  Detected 05/04/2023     Lab Results   Component Value Date    PARAFLUV2 Not Detected 05/04/2023     Lab Results   Component Value Date    PARAFLUV3 Detected (A) 05/04/2023     Lab Results   Component Value Date    PARAFLUV4 Not Detected 05/04/2023     Lab Results   Component Value Date    BPERTPCR Not Detected 05/04/2023     No results found for: [id number]  Lab Results   Component Value Date    CPNEUPCR Not Detected 05/04/2023     Lab Results   Component Value Date    MPNEUMO Not Detected 05/04/2023     Lab Results   Component Value Date    FLUAPCR Not Detected 05/04/2023     No results found for: FLUAH3  No results found for: FLUAH1  Lab Results   Component Value Date    RSV Not Detected 05/04/2023     Lab Results   Component Value Date    BPARAPCR Not Detected 05/04/2023       COVID 19:  Lab Results   Component Value Date    COVID19 Not Detected 05/04/2023         ABG: Reviewed   Recent Labs     05/15/23  0453 05/16/23  0519 05/18/23  0708   PHART 7.445 7.453* 7.451*   QEI5ZGG 75.7* 76.4* 73.4*   PO2ART 56.8* 56.7* 64.0*   UXL0SLX 51.9* 53.4* 51.1*   BASEEXCESS 23.5* 24.7* 22.8*       Lab Results   Component Value Date    LACTATE 1.3 05/04/2023    LACTATE 1.2 04/13/2023    LACTATE 2.7 (C) 04/13/2023         Echo: Results for orders placed during the hospital encounter of 04/13/23    Adult Transthoracic Echo Complete W/ Cont if Necessary Per Protocol    Interpretation Summary  •  Left ventricular diastolic function is consistent with (grade I) impaired relaxation.  •  Mild aortic valve stenosis is present.  •  Estimated right ventricular systolic pressure from tricuspid regurgitation is moderately elevated (45-55 mmHg).  •  Mild to moderate pulmonary hypertension is present.      Results for orders placed during the hospital encounter of 10/01/17    Adult Transthoracic Echo Complete W/ Cont if Necessary Per Protocol    Interpretation Summary  TEchnically difficult study  1) Borderline LVH with normal LV systolic  function ( EF is over 55%)  2) Normal left atrial size with mild elevation in LVedp  3) Trace MR  4) Mild TR with normal PA pressures  5) Mild AI  6) Small RV with normal RV function        dexmedetomidine, 0.2-1.5 mcg/kg/hr, Last Rate: Stopped (05/12/23 1556)  Pharmacy to Dose enoxaparin (LOVENOX),   Pharmacy to dose vancomycin,           CXRay: Latest imaging study was reviewed personally.   Imaging Results (Last 48 Hours)     ** No results found for the last 48 hours. **            Assessment & Recommendations/Plan:   1.  Acute respiratory failure  Continue AVAPS at the current setting.  Continue AVAPS 1 hour on and 3 hours off throughout the day and as needed otherwise.  We will also advised the patient to use AVAPS at night on a regular basis.  Continues to require high flow nasal cannula, between 12-15 L/min.    Attempts to decrease FiO2 continue to remain unsuccessful as the patient becomes significantly hypoxic and is slow to recover.  Latest ABGs show compensated mild acute on chronic respiratory acidosis, with improving PCO2.  We will repeat ABG in the morning     2.  COPD with acute exacerbation  Likely triggered by parainfluenza bronchitis  We will continue Solu-Medrol at 40 mg IV daily for now.  Continue nebulized Pulmicort in the middle of the day.  Continue Spiriva and Symbicort  Will benefit from outpatient PFTs    3.  Abnormal CT of the chest   Although unlikely that the patient has developed pneumonia, it appears that due to elevated white blood cell count, she was restarted on antibiotics in the form of vancomycin by hospitalist service.    Once again had a long discussion with the family members at the bedside along with the patient regarding the finding of significant mucous plugging/atelectasis.  Continue MetaNeb treatments.  We will continue hypertonic saline to every 12 hourly.  Continue Mucinex DM.  I have spoken to patient's daughter, Nehal at [phone], on 5/16/2023.    I had a long  "discussion with patient's daughter from [place], at the bedside.  I once again informed the patient and her daughter that based on her prolonged stay, significant COPD and development of likely deconditioning, if she worsens any further, she may need to be intubated.  When asked about her wishes regarding intubation, the patient says that she \"does not want to think about it\", but did not rule it out as an option.  The patient and her daughter continue to want to try noninvasive measures for now.    4.  Chronic respiratory acidosis  Appears to be on NIV device at home.    5.  Pulmonary hypertension  May need outpatient work-up  Likely secondary to underlying likely severe COPD    6.  Anxiety  Has been on Precedex in the past  Continue other anxiolytics.  May need adjustment, as indicated.    7.  Smoking  Was advised to quit smoking    8.  Deconditioning  Will definitely become an issue, the longer patient remains dependent on significantly high amounts of oxygen.        We have reviewed patient's current orders and changes, if any, have been suggested to primary care team. Plan was also discussed with nursing staff, as necessary.     This document was electronically signed by Arnaldo Soriano MD on 05/20/23 at 16:51 EDT      Dictated utilizing Dragon dictation.    "

## 2023-05-20 NOTE — PROGRESS NOTES
[facility]    [facility]    Name:  Carolin Toscano   Age:  77 y.o.  Sex:  female  :  1945  MRN:  [id number]   Visit Number:  [id number]  Admission Date:  2023  Date Of Service:  23  Primary Care Physician:  Jason Nicholson MD     LOS: 16 days :    Chief Complaint:      Shortness of air    Subjective:    May 20, 2023 discussed with patient as well as family at the bedside.  Also discussed with them regarding placing access which she was able to get a PICC line.  Also discussed with them the staph growing in her sputum and that I was going to add vancomycin for this.    Hospital Course:  : d/w patient and family updated them on [facility] and pending appeal which will likely be submitted Monday. Still having dyspnea but mucous is breaking loose today.  Still debating about bronchoscopy.  Interested in long-term acute care possibly in [place] versus [place].    : Daughter Regla is coming today.  Otherwise not much change still having dyspnea and hypoxemia.  Case management note reviewed.    Patient was admitted and treated with steroids.  Antibiotics were not indicated.  Pulmonology consulted and was moved to ICU on  due to worsening respiratory failure and tachypnea.  She initially had to be on Precedex drip which was discontinued and switched to Xanax due to anxiety. Patient was placed on AVAPS.  Patient continued on steroids and received Lasix.    23: d/w nsg intermittently on avaps and hi flow. Patient awake in bed. CXR from 5/15 reviewed. Medications reviewed.  Discussed with case management they are [facility].    2023 discussed with case management still working on [facility].  I am planning to do a peer to peer with them tomorrow.  Otherwise has persistent hypoxia.  Dr. Soriano's note reviewed considering bronchoscopy.     History of presenting illness:  Patient is a 77 years old female with a past  medical history of COPD, chronic respiratory failure on 5 to 6 L per her previous discharge, on NIV machine at home and trelegy, hypertension, and ongoing tobacco use who presented to the ER from home by EMS with a chief complaint of shortness of breath.  Patient reporting that she started having shortness of breath associated with productive cough since last night and has persisted and became worse that promoted her to call EMS.  EMS has found the patient to be at 65% saturation on her normal 6 L of oxygen. Patient reports compliance with her NIV machine at home.      On ER evaluation, Patient was found to be tachycardic with a heart rate of 130s, temperature of 100.2 and respirations of 30, /72.  Patient was placed on BiPAP with FiO2 of 40%.  Her ABG came back and showed pH of 7.296/PCO2 87/PO2 94/HCO3 42/saturation 97% on 5 L nasal cannula.  HS Troponin 44, proBNP WNL, glucose 192, CO2 of 40, otherwise CBC and CMP within acceptable range.  Lactate and Pro-Adi WNL.  Respiratory panel positive for parainfluenza virus.  Chest x-ray Mild interstitial disease  bilaterally is similar to prior. No area of consolidation is seen. Patient received Solu-Medrol and Aztreonam while in the ER.  Hospitalist consulted for admission.       Edited by: Primitivo Nuñez DO at 5/20/2023 7444     Review of Systems:     All systems were reviewed and negative except as mentioned in subjective, assessment and plan.    Vital Signs:    Temp:  [96.8 °F (36 °C)-98.2 °F (36.8 °C)] 98.1 °F (36.7 °C)  Heart Rate:  [56-84] 78  Resp:  [16-31] 24  BP: (100-130)/(43-66) 113/49    Intake and output:    I/O last 3 completed shifts:  In: -   Out: 1200 [Urine:1200]  No intake/output data recorded.    Physical Examination:    General Appearance:  Alert and cooperative.  Chronically ill-appearing. Sitting up in bed.   Head:  Atraumatic and normocephalic.   Eyes: Conjunctivae and sclerae normal, no icterus. No pallor.   Throat: No oral lesions,  no thrush, oral mucosa moist.   Neck: Supple, trachea midline, no thyromegaly.   Lungs:   Breath sounds slightly improved air entry with persistent diminished breath sounds end expiratory wheeze.  On nasal cannula.   Heart:  Normal S1 and S2, no murmur, no gallop, no rub. No JVD.   Abdomen:   Normal bowel sounds, no masses, no organomegaly. Soft, nontender, nondistended, no rebound tenderness.   Extremities: Supple, no edema, no cyanosis, no clubbing.   Skin: No bleeding or rash.   Neurologic: Alert and oriented x 3. No facial asymmetry. Moves all four limbs. No tremors.  Generalized weakness noted.     Edited by: Primitivo Nuñez,  at 5/20/2023 2345     Laboratory results:    Results from last 7 days   Lab Units 05/20/23  0426 05/19/23  0432 05/18/23  0618   SODIUM mmol/L 139 137 140   POTASSIUM mmol/L 3.8 4.4 4.4   CHLORIDE mmol/L 94* 90* 90*   CO2 mmol/L 38.8* 41.4* 43.3*   BUN mg/dL 25* 22 29*   CREATININE mg/dL 0.28* 0.28* 0.41*   CALCIUM mg/dL 8.9 9.1 9.4   BILIRUBIN mg/dL 0.3  --   --    ALK PHOS U/L 72  --   --    ALT (SGPT) U/L 26  --   --    AST (SGOT) U/L 12  --   --    GLUCOSE mg/dL 123* 242* 215*     Results from last 7 days   Lab Units 05/20/23  0426 05/19/23  0432 05/18/23  0618   WBC 10*3/mm3 15.22* 15.77* 20.83*   HEMOGLOBIN g/dL 11.3* 11.6* 12.0   HEMATOCRIT % 36.5 37.4 38.9   PLATELETS 10*3/mm3 209 191 212             Results from last 7 days   Lab Units 05/18/23  1101 05/18/23  1054   BLOODCX  No growth at 2 days No growth at 2 days     Recent Labs     05/15/23  0453 05/16/23  0519 05/18/23  0708   PHART 7.445 7.453* 7.451*   RBH3DQN 75.7* 76.4* 73.4*   PO2ART 56.8* 56.7* 64.0*   EGX5BVM 51.9* 53.4* 51.1*   BASEEXCESS 23.5* 24.7* 22.8*      I have reviewed the patient's laboratory results.    Radiology results:    No radiology results from the last 24 hrs  I have reviewed the patient's radiology reports.    Medication Review:     I have reviewed the patient's active and prn medications.      Problem List:      Acute on chronic respiratory failure with hypoxia and hypercapnia      Assessment/Plan:    Respiratory failure with hypoxia/hypercapnia, COPD exacerbation, parainfluenza  -patient on 5 to 6 L oxygen at baseline,  Continue BiPAP therapy.  Has NIV at home. Currently on 15 liters  -Recent parainfluenza virus infection met SIRS due to this  - Solu-Medrol taper as appropriate  -Bronchodilators, Mucinex MetaNeb and supportive care.  -Dr. Soriano note reviewed and appreciated  -s/p lasix  -Xanax and Precedex as needed  -Sputum growing Staph aureus and haemophilus on vancomycin and azithromycin     Elevated troponin  -Likely secondary to demand ischemia in settings of respiratory failure  -Patient denies any chest pain, low suspicion for ACS  -Troponin trended down and plateaued.     Hyperglycemia  -Will cover with sliding scale insulin while on steroids  -Hemoglobin A1c 6.7     Disposition: [facility]    Prophylaxis: Lovenox    Edited by: Primitivo Nuñez, DO at 5/20/2023 1655     DVT Prophylaxis: Lovenox  Code Status:   Code Status and Medical Interventions:   Ordered at: 05/04/23 1634     Code Status (Patient has no pulse and is not breathing):    CPR (Attempt to Resuscitate)     Medical Interventions (Patient has pulse or is breathing):    Full Support      Diet:   Dietary Orders (From admission, onward)     Start     Ordered    05/18/23 1800  Dietary Nutrition Supplements Magic Cup  2 Times Daily      Question:  [facility] Supplement:  Answer:  Magic Cup    05/18/23 1517    05/17/23 1038  Diet: Cardiac Diets; Healthy Heart (2-3 Na+); Texture: Regular Texture (IDDSI 7); Fluid Consistency: Thin (IDDSI 0)  Diet Effective Now        Comments: Pt prefers cottage cheese, bananas, oranges, peaches, watermelon.   References:    Diet Order [place]   Question Answer Comment   Diets: Cardiac Diets    Cardiac Diet: Healthy Heart (2-3 Na+)    Texture: Regular Texture (IDDSI 7)     Fluid Consistency: Thin (IDDSI 0)        05/17/23 1039    05/15/23 1800  Dietary Nutrition Supplements Xu  2 Times Daily      Question:  [facility] Supplement:  Answer:  Xu    05/15/23 1445    05/15/23 1800  Dietary Nutrition Supplements Boost VHC  2 Times Daily      Question:  [facility] Supplement:  Answer:  Boost VHC    05/15/23 1446               [facility]    Primitivo Nuñez DO  05/20/23  16:55 EDT    Dictated utilizing Dragon dictation.

## 2023-05-20 NOTE — PLAN OF CARE
Goal Outcome Evaluation:  Plan of Care Reviewed With: patient        Progress: improving  Outcome Evaluation: Patient on 15L HFNC during day and maintaining O2 >90%, improved appetite, family very supportive, tolerating bipap at night, other VSS

## 2023-05-20 NOTE — PHARMACY RECOMMENDATION
"Pharmacy Consult - Vancomycin Dosing    Pharmacy was consulted to dose vancomycin for  Carolin Toscano, a 77 y.o. female  160 cm (63\") 65.6 kg (144 lb 10 oz)    Indication: pneumonia  Consulting Provider: Dr. Nuñez    Goal AUC: 400-600 mg/L*hr.      Labs  Results from last 7 days   Lab Units 05/20/23  0426 05/19/23  0432 05/18/23  0618   WBC 10*3/mm3 15.22* 15.77* 20.83*   CREATININE mg/dL 0.28* 0.28* 0.41*      Estimated Creatinine Clearance: 155.7 mL/min (A) (by C-G formula based on SCr of 0.28 mg/dL (L)).  Temp Readings from Last 1 Encounters:   05/20/23 98.2 °F (36.8 °C) (Temporal)       Other Antimicrobials    Azithromycin 500 mg IV every 24 hours    InsightRX AUC Calculation    New dose: 1250 mg IV every 12 hours   Predicted Steady State AUC on New Dose: 461 mg/L*hr    Assessment/Plan    Initiate maintenance dose of vancomycin 1250 mg IV every 12 hours   Assess clearance by vancomycin level on 5/22 @ 0600.  Pharmacy will continue to monitor renal function, cultures and sensitivities, and clinical status to adjust regimen as necessary.      Thank you,  Iesha Singh, PharmD, BCPS  05/20/23 12:07 EDT    "

## 2023-05-20 NOTE — PLAN OF CARE
Goal Outcome Evaluation:      Patient still requiring 15L High Flow NC to maintain O2 sats above 90%. Poor food intake. PO fluids encouraged to thin secretions. Family at bedside. No new orders.

## 2023-05-21 ENCOUNTER — APPOINTMENT (OUTPATIENT)
Dept: GENERAL RADIOLOGY | Facility: HOSPITAL | Age: 78
DRG: 190 | End: 2023-05-21
Payer: MEDICARE

## 2023-05-21 LAB
A-A DO2: 243.9 MMHG
ALBUMIN SERPL-MCNC: 2.6 G/DL (ref 3.5–5.2)
ALBUMIN/GLOB SERPL: 1 G/DL
ALP SERPL-CCNC: 77 U/L (ref 39–117)
ALT SERPL W P-5'-P-CCNC: 35 U/L (ref 1–33)
ANION GAP SERPL CALCULATED.3IONS-SCNC: 5.3 MMOL/L (ref 5–15)
ARTERIAL PATENCY WRIST A: ABNORMAL
AST SERPL-CCNC: 19 U/L (ref 1–32)
ATMOSPHERIC PRESS: 737 MMHG
BACTERIA SPEC RESP CULT: ABNORMAL
BASE EXCESS BLDA CALC-SCNC: 15.1 MMOL/L (ref 0–2)
BDY SITE: ABNORMAL
BILIRUB SERPL-MCNC: 0.2 MG/DL (ref 0–1.2)
BUN SERPL-MCNC: 24 MG/DL (ref 8–23)
BUN/CREAT SERPL: 75 (ref 7–25)
CALCIUM SPEC-SCNC: 8.5 MG/DL (ref 8.6–10.5)
CHLORIDE SERPL-SCNC: 98 MMOL/L (ref 98–107)
CO2 SERPL-SCNC: 36.7 MMOL/L (ref 22–29)
COHGB MFR BLD: 0.7 % (ref 0–2)
CREAT SERPL-MCNC: 0.32 MG/DL (ref 0.57–1)
DEPRECATED RDW RBC AUTO: 50.8 FL (ref 37–54)
EGFRCR SERPLBLD CKD-EPI 2021: 108.4 ML/MIN/1.73
ERYTHROCYTE [DISTWIDTH] IN BLOOD BY AUTOMATED COUNT: 15.1 % (ref 12.3–15.4)
GLOBULIN UR ELPH-MCNC: 2.5 GM/DL
GLUCOSE BLDC GLUCOMTR-MCNC: 163 MG/DL (ref 70–130)
GLUCOSE BLDC GLUCOMTR-MCNC: 276 MG/DL (ref 70–130)
GLUCOSE SERPL-MCNC: 120 MG/DL (ref 65–99)
GRAM STN SPEC: ABNORMAL
HCO3 BLDA-SCNC: 42.8 MMOL/L (ref 22–28)
HCT VFR BLD AUTO: 33.8 % (ref 34–46.6)
HCT VFR BLD CALC: 33.7 %
HGB BLD-MCNC: 11 G/DL (ref 12–15.9)
INHALED O2 CONCENTRATION: 60 %
Lab: ABNORMAL
Lab: ABNORMAL
MCH RBC QN AUTO: 30.4 PG (ref 26.6–33)
MCHC RBC AUTO-ENTMCNC: 32.5 G/DL (ref 31.5–35.7)
MCV RBC AUTO: 93.4 FL (ref 79–97)
METHGB BLD QL: 0.6 % (ref 0–1.5)
MODALITY: ABNORMAL
NOTE: ABNORMAL
NOTIFIED BY: ABNORMAL
NOTIFIED WHO: ABNORMAL
OXYHGB MFR BLDV: 96.4 % (ref 94–99)
PCO2 BLDA: 70.2 MM HG (ref 35–45)
PCO2 TEMP ADJ BLD: ABNORMAL MM[HG]
PH BLDA: 7.39 PH UNITS (ref 7.35–7.45)
PH, TEMP CORRECTED: ABNORMAL
PLATELET # BLD AUTO: 212 10*3/MM3 (ref 140–450)
PMV BLD AUTO: 12.5 FL (ref 6–12)
PO2 BLDA: 95.2 MM HG (ref 75–100)
PO2 TEMP ADJ BLD: ABNORMAL MM[HG]
POTASSIUM SERPL-SCNC: 4.7 MMOL/L (ref 3.5–5.2)
PROCALCITONIN SERPL-MCNC: 0.17 NG/ML (ref 0–0.25)
PROT SERPL-MCNC: 5.1 G/DL (ref 6–8.5)
RBC # BLD AUTO: 3.62 10*6/MM3 (ref 3.77–5.28)
SAO2 % BLDCOA: 97.7 % (ref 94–100)
SODIUM SERPL-SCNC: 140 MMOL/L (ref 136–145)
VENTILATOR MODE: ABNORMAL
WBC NRBC COR # BLD: 13.1 10*3/MM3 (ref 3.4–10.8)

## 2023-05-21 PROCEDURE — 25010000002 VANCOMYCIN 5 G RECONSTITUTED SOLUTION: Performed by: INTERNAL MEDICINE

## 2023-05-21 PROCEDURE — 94761 N-INVAS EAR/PLS OXIMETRY MLT: CPT

## 2023-05-21 PROCEDURE — 71045 X-RAY EXAM CHEST 1 VIEW: CPT

## 2023-05-21 PROCEDURE — 94799 UNLISTED PULMONARY SVC/PX: CPT

## 2023-05-21 PROCEDURE — 36600 WITHDRAWAL OF ARTERIAL BLOOD: CPT

## 2023-05-21 PROCEDURE — 99232 SBSQ HOSP IP/OBS MODERATE 35: CPT | Performed by: INTERNAL MEDICINE

## 2023-05-21 PROCEDURE — 94660 CPAP INITIATION&MGMT: CPT

## 2023-05-21 PROCEDURE — 83050 HGB METHEMOGLOBIN QUAN: CPT

## 2023-05-21 PROCEDURE — 97530 THERAPEUTIC ACTIVITIES: CPT

## 2023-05-21 PROCEDURE — 80053 COMPREHEN METABOLIC PANEL: CPT | Performed by: INTERNAL MEDICINE

## 2023-05-21 PROCEDURE — 85027 COMPLETE CBC AUTOMATED: CPT | Performed by: INTERNAL MEDICINE

## 2023-05-21 PROCEDURE — 84145 PROCALCITONIN (PCT): CPT | Performed by: INTERNAL MEDICINE

## 2023-05-21 PROCEDURE — 25010000002 METHYLPREDNISOLONE PER 40 MG: Performed by: INTERNAL MEDICINE

## 2023-05-21 PROCEDURE — 63710000001 INSULIN ASPART PER 5 UNITS: Performed by: FAMILY MEDICINE

## 2023-05-21 PROCEDURE — 25010000002 ENOXAPARIN PER 10 MG: Performed by: FAMILY MEDICINE

## 2023-05-21 PROCEDURE — 82375 ASSAY CARBOXYHB QUANT: CPT

## 2023-05-21 PROCEDURE — 99233 SBSQ HOSP IP/OBS HIGH 50: CPT | Performed by: INTERNAL MEDICINE

## 2023-05-21 PROCEDURE — 82948 REAGENT STRIP/BLOOD GLUCOSE: CPT

## 2023-05-21 PROCEDURE — 82805 BLOOD GASES W/O2 SATURATION: CPT

## 2023-05-21 PROCEDURE — 97110 THERAPEUTIC EXERCISES: CPT

## 2023-05-21 RX ORDER — CETIRIZINE HYDROCHLORIDE 10 MG/1
10 TABLET ORAL DAILY
Status: DISCONTINUED | OUTPATIENT
Start: 2023-05-21 | End: 2023-06-01 | Stop reason: HOSPADM

## 2023-05-21 RX ORDER — HYDROXYZINE HYDROCHLORIDE 25 MG/1
25 TABLET, FILM COATED ORAL
Status: DISCONTINUED | OUTPATIENT
Start: 2023-05-21 | End: 2023-05-24

## 2023-05-21 RX ORDER — FLUTICASONE PROPIONATE 50 MCG
2 SPRAY, SUSPENSION (ML) NASAL 2 TIMES DAILY
Status: DISCONTINUED | OUTPATIENT
Start: 2023-05-21 | End: 2023-06-01 | Stop reason: HOSPADM

## 2023-05-21 RX ADMIN — ENOXAPARIN SODIUM 40 MG: 100 INJECTION SUBCUTANEOUS at 20:28

## 2023-05-21 RX ADMIN — ACETAMINOPHEN 650 MG: 325 TABLET, FILM COATED ORAL at 20:29

## 2023-05-21 RX ADMIN — ALPRAZOLAM 0.5 MG: 0.5 TABLET ORAL at 20:29

## 2023-05-21 RX ADMIN — VANCOMYCIN HYDROCHLORIDE 1250 MG: 5 INJECTION, POWDER, LYOPHILIZED, FOR SOLUTION INTRAVENOUS at 13:06

## 2023-05-21 RX ADMIN — Medication 3 ML: at 09:01

## 2023-05-21 RX ADMIN — SODIUM CHLORIDE 30 MG/ML INHALATION SOLUTION 4 ML: 30 SOLUTION INHALANT at 19:53

## 2023-05-21 RX ADMIN — Medication 10 ML: at 20:36

## 2023-05-21 RX ADMIN — Medication 10 ML: at 20:35

## 2023-05-21 RX ADMIN — BUDESONIDE AND FORMOTEROL FUMARATE DIHYDRATE 2 PUFF: 160; 4.5 AEROSOL RESPIRATORY (INHALATION) at 07:02

## 2023-05-21 RX ADMIN — VANCOMYCIN HYDROCHLORIDE 1250 MG: 5 INJECTION, POWDER, LYOPHILIZED, FOR SOLUTION INTRAVENOUS at 00:06

## 2023-05-21 RX ADMIN — HYDROXYZINE HYDROCHLORIDE 25 MG: 25 TABLET ORAL at 20:29

## 2023-05-21 RX ADMIN — Medication 10 ML: at 09:01

## 2023-05-21 RX ADMIN — HYDROXYZINE HYDROCHLORIDE 25 MG: 25 TABLET ORAL at 09:00

## 2023-05-21 RX ADMIN — SENNOSIDES AND DOCUSATE SODIUM 2 TABLET: 50; 8.6 TABLET ORAL at 20:29

## 2023-05-21 RX ADMIN — IPRATROPIUM BROMIDE AND ALBUTEROL SULFATE 3 ML: 2.5; .5 SOLUTION RESPIRATORY (INHALATION) at 00:04

## 2023-05-21 RX ADMIN — IPRATROPIUM BROMIDE AND ALBUTEROL SULFATE 3 ML: 2.5; .5 SOLUTION RESPIRATORY (INHALATION) at 12:37

## 2023-05-21 RX ADMIN — IPRATROPIUM BROMIDE AND ALBUTEROL SULFATE 3 ML: 2.5; .5 SOLUTION RESPIRATORY (INHALATION) at 07:01

## 2023-05-21 RX ADMIN — Medication 10 ML: at 08:54

## 2023-05-21 RX ADMIN — MULTIPLE VITAMINS W/ MINERALS TAB 1 TABLET: TAB at 08:53

## 2023-05-21 RX ADMIN — INSULIN ASPART 2 UNITS: 100 INJECTION, SOLUTION INTRAVENOUS; SUBCUTANEOUS at 11:35

## 2023-05-21 RX ADMIN — AMLODIPINE BESYLATE 10 MG: 5 TABLET ORAL at 08:53

## 2023-05-21 RX ADMIN — GUAIFENESIN AND DEXTROMETHORPHAN 10 ML: 100; 10 SYRUP ORAL at 20:29

## 2023-05-21 RX ADMIN — GUAIFENESIN AND DEXTROMETHORPHAN HYDROBROMIDE 1 TABLET: 30; 600 TABLET, EXTENDED RELEASE ORAL at 08:53

## 2023-05-21 RX ADMIN — BUDESONIDE AND FORMOTEROL FUMARATE DIHYDRATE 2 PUFF: 160; 4.5 AEROSOL RESPIRATORY (INHALATION) at 19:54

## 2023-05-21 RX ADMIN — BUDESONIDE INHALATION 1 MG: 0.5 SUSPENSION RESPIRATORY (INHALATION) at 07:01

## 2023-05-21 RX ADMIN — FLUTICASONE PROPIONATE 2 SPRAY: 50 SPRAY, METERED NASAL at 09:25

## 2023-05-21 RX ADMIN — FLUTICASONE PROPIONATE 2 SPRAY: 50 SPRAY, METERED NASAL at 20:29

## 2023-05-21 RX ADMIN — Medication 5 MG: at 20:35

## 2023-05-21 RX ADMIN — METHYLPREDNISOLONE SODIUM SUCCINATE 40 MG: 40 INJECTION, POWDER, LYOPHILIZED, FOR SOLUTION INTRAMUSCULAR; INTRAVENOUS at 08:53

## 2023-05-21 RX ADMIN — HYDROXYZINE HYDROCHLORIDE 25 MG: 25 TABLET ORAL at 13:47

## 2023-05-21 RX ADMIN — IPRATROPIUM BROMIDE AND ALBUTEROL SULFATE 3 ML: 2.5; .5 SOLUTION RESPIRATORY (INHALATION) at 19:53

## 2023-05-21 RX ADMIN — CETIRIZINE HYDROCHLORIDE 10 MG: 10 TABLET, FILM COATED ORAL at 09:00

## 2023-05-21 RX ADMIN — GUAIFENESIN AND DEXTROMETHORPHAN HYDROBROMIDE 1 TABLET: 30; 600 TABLET, EXTENDED RELEASE ORAL at 20:29

## 2023-05-21 RX ADMIN — INSULIN ASPART 6 UNITS: 100 INJECTION, SOLUTION INTRAVENOUS; SUBCUTANEOUS at 17:33

## 2023-05-21 RX ADMIN — ATENOLOL 25 MG: 25 TABLET ORAL at 08:53

## 2023-05-21 RX ADMIN — Medication 3 ML: at 20:36

## 2023-05-21 NOTE — PROGRESS NOTES
"  CC: Acute Respiratory Failure.     S: Currently off NIV therapy.  Continues to require high flow nasal cannula when off the NIV/AVAPS.  Continues to mention that she coughs up clear sputum on occasion.  Feels that she is \"getting stronger\".  Also feels that she is not struggling to breathe as much throughout the day \"anymore\".    ROS: Complains of cough, shortness of breath and mild weakness.  Also complains of mild anxiety.    O:Vital signs reviewed. FiO2: 12-15 L/min high flow nasal cannula.  /43   Pulse 53   Temp 97.4 °F (36.3 °C) (Temporal)   Resp 18   Ht 160 cm (63\")   Wt 66.7 kg (147 lb 0.8 oz)   SpO2 95%   BMI 26.05 kg/m²     Temp (24hrs), Av.9 °F (36.6 °C), Min:97.2 °F (36.2 °C), Max:98.5 °F (36.9 °C)      I & Os reviewed.   Intake/Output       23 0700 - 23 0659    Intake (ml) 750    Output (ml) 1350    Net (ml) -600    Last Weight 66.7 kg (147 lb 0.8 oz)          Net IO Since Admission: -3,857 mL [23 0900]    General/Constitutional: Off NIV therapy. Appears to be in mild distress, especially when trying to talk in full sentences.  Eyes: PERRL. EOMI  Neck: Supple without JVD. No obvious masses noted.   Cardiovascular: S1 + S2.  Regular at this time.  Lungs/Respiratory: Decreased bilateral breath sounds noted.  Bilateral, somewhat scattered, wheezing heard.  Left greater than right basal crackles noted.  GI/Abdomen: Soft. Bowel sounds positive.  No obvious organomegaly  Musculoskeletal/Extremities: Right-sided PICC line in place.  No edema noted. Gait could not be assessed at this time, as the patient was laying in bed.   Neurologic: Was able to respond appropriately and interact today.   Psych: Was able to follow simple commands.  Appeared to be mildly anxious on occasion.  Skin: Appeared somewhat dry.      Labs: Reviewed.   Results from last 7 days   Lab Units 23  0453 23  0426 23  0432 23  0618 23  0450 23  0453   WBC 10*3/mm3 13.10* " 15.22* 15.77* 20.83* 14.56* 17.14*   HEMOGLOBIN g/dL 11.0* 11.3* 11.6* 12.0 12.4 12.4   HEMATOCRIT % 33.8* 36.5 37.4 38.9 40.9 40.1   PLATELETS 10*3/mm3 212 209 191 212 194 204   NEUTROPHIL % %  --   --   --   --   --  89.5*   NEUTROS ABS 10*3/mm3  --   --   --   --   --  15.32*   EOSINOPHIL % %  --   --   --   --   --  0.0*   EOS ABS 10*3/mm3  --   --   --   --   --  0.00   LYMPHOCYTE % %  --   --   --   --   --  5.1*   LYMPHS ABS 10*3/mm3  --   --   --   --   --  0.88       Lab Results   Component Value Date    PROCALCITO 0.17 05/21/2023    PROCALCITO 0.11 05/16/2023    PROCALCITO 0.08 05/14/2023       No results found for: CRP    No results found for: SEDRATE    Lab Results   Component Value Date    PROBNP 572.7 05/14/2023    PROBNP 479.4 05/10/2023    PROBNP 234.4 05/04/2023       Results from last 7 days   Lab Units 05/21/23  0453 05/20/23  0426 05/19/23  0432   SODIUM mmol/L 140 139 137   POTASSIUM mmol/L 4.7 3.8 4.4   CHLORIDE mmol/L 98 94* 90*   CO2 mmol/L 36.7* 38.8* 41.4*   BUN mg/dL 24* 25* 22   CREATININE mg/dL 0.32* 0.28* 0.28*   CALCIUM mg/dL 8.5* 8.9 9.1   ANION GAP mmol/L 5.3 6.2 5.6   BILIRUBIN mg/dL 0.2 0.3  --    ALK PHOS U/L 77 72  --    ALT (SGPT) U/L 35* 26  --    AST (SGOT) U/L 19 12  --    GLUCOSE mg/dL 120* 123* 242*   TOTAL PROTEIN g/dL 5.1* 5.4*  --    ALBUMIN g/dL 2.6* 2.5*  --                    No results found for: CKTOTAL    No components found for: HSTROPT    Lab Results   Component Value Date    TROPONINT 22 (H) 05/05/2023    TROPONINT 25 (H) 05/05/2023    TROPONINT 44 (H) 05/04/2023       No results found for: DDIMER    Brief Urine Lab Results  (Last result in the past 365 days)      Color   Clarity   Blood   Leuk Est   Nitrite   Protein   CREAT   Urine HCG        05/12/23 1738 Yellow   Cloudy   Negative   Trace   Negative   Trace                 Lab Results   Component Value Date    TSH 0.304 05/09/2023       No results found for: FREET4      Micro: As of May 21, 2023   Lab  Results   Component Value Date    RESPCX Moderate growth (3+) Haemophilus influenzae (A) 05/18/2023    RESPCX Light growth (2+) Staphylococcus aureus (A) 05/18/2023    RESPCX No Normal Respiratory Ana (A) 05/18/2023     No results found for: BCIDPCR  Lab Results   Component Value Date    BLOODCX No growth at 2 days 05/18/2023    BLOODCX No growth at 2 days 05/18/2023    BLOODCX No growth at 5 days 05/04/2023    BLOODCX No growth at 5 days 05/04/2023     Lab Results   Component Value Date    URINECX Final report 02/07/2019    URINECX 20,000-30,000 CFU/mL Escherichia coli (A) 10/05/2018     No results found for: MRSACX  Lab Results   Component Value Date    MRSAPCR No MRSA Detected 04/15/2023     No results found for: URCX  No components found for: LOWRESPCF  No results found for: THROATCX  No results found for: CULTURES  No components found for: STREPBCX  No results found for: STREPPNEUAG  No results found for: LEGIONELLA  No results found for: MYCOPLASCX  No results found for: GCCX  No results found for: WOUNDCX  No results found for: BODYFLDCX    No results found for: FLU    Lab Results   Component Value Date    ADENOVIRUS Not Detected 05/04/2023     Lab Results   Component Value Date    EG440P Not Detected 05/04/2023     Lab Results   Component Value Date    CVHKU1 Not Detected 05/04/2023     Lab Results   Component Value Date    CVNL63 Not Detected 05/04/2023     Lab Results   Component Value Date    CVOC43 Not Detected 05/04/2023     Lab Results   Component Value Date    HUMETPNEVS Not Detected 05/04/2023     Lab Results   Component Value Date    HURVEV Not Detected 05/04/2023     Lab Results   Component Value Date    FLUBPCR Not Detected 05/04/2023     Lab Results   Component Value Date    PARAINFLUE Not Detected 05/04/2023     Lab Results   Component Value Date    PARAFLUV2 Not Detected 05/04/2023     Lab Results   Component Value Date    PARAFLUV3 Detected (A) 05/04/2023     Lab Results   Component Value  Date    PARAFLUV4 Not Detected 05/04/2023     Lab Results   Component Value Date    BPERTPCR Not Detected 05/04/2023     No results found for: [id number]  Lab Results   Component Value Date    CPNEUPCR Not Detected 05/04/2023     Lab Results   Component Value Date    MPNEUMO Not Detected 05/04/2023     Lab Results   Component Value Date    FLUAPCR Not Detected 05/04/2023     No results found for: FLUAH3  No results found for: FLUAH1  Lab Results   Component Value Date    RSV Not Detected 05/04/2023     Lab Results   Component Value Date    BPARAPCR Not Detected 05/04/2023       COVID 19:  Lab Results   Component Value Date    COVID19 Not Detected 05/04/2023         ABG: Reviewed   Recent Labs     05/16/23  0519 05/18/23  0708 05/21/23  0714   PHART 7.453* 7.451* 7.394   HDQ1AIE 76.4* 73.4* 70.2*   PO2ART 56.7* 64.0* 95.2   YXY8RXG 53.4* 51.1* 42.8*   BASEEXCESS 24.7* 22.8* 15.1*       Lab Results   Component Value Date    LACTATE 1.3 05/04/2023    LACTATE 1.2 04/13/2023    LACTATE 2.7 (C) 04/13/2023         Echo: Results for orders placed during the hospital encounter of 04/13/23    Adult Transthoracic Echo Complete W/ Cont if Necessary Per Protocol    Interpretation Summary  •  Left ventricular diastolic function is consistent with (grade I) impaired relaxation.  •  Mild aortic valve stenosis is present.  •  Estimated right ventricular systolic pressure from tricuspid regurgitation is moderately elevated (45-55 mmHg).  •  Mild to moderate pulmonary hypertension is present.      Results for orders placed during the hospital encounter of 10/01/17    Adult Transthoracic Echo Complete W/ Cont if Necessary Per Protocol    Interpretation Summary  TEchnically difficult study  1) Borderline LVH with normal LV systolic function ( EF is over 55%)  2) Normal left atrial size with mild elevation in LVedp  3) Trace MR  4) Mild TR with normal PA pressures  5) Mild AI  6) Small RV with normal RV function        dexmedetomidine,  0.2-1.5 mcg/kg/hr, Last Rate: Stopped (05/12/23 0418)  Pharmacy to Dose enoxaparin (LOVENOX),   Pharmacy to dose vancomycin,           CXRay: Latest imaging study was reviewed personally.   Imaging Results (Last 48 Hours)     Procedure Component Value Units Date/Time    XR Chest 1 View [[id number]] Collected: 05/21/23 0720     Updated: 05/21/23 0724    Narrative:      PORTABLE CHEST    5/21/2023 6:19 AM      HISTORY: Respiratory failure.     COMPARISON: May 18, 2023.     FINDINGS: Consolidation at the left lung base has slightly worsened.  Background of bronchitis is noted. No other consolidation. No effusion  or pneumothorax. New right upper shimmy PICC, tip in SVC.       Impression:      Left base pneumonia, slightly worsened from previous.     This report was signed and finalized on 5/21/2023 7:22 AM by Dr Danyel Mckinnon DO.            Assessment & Recommendations/Plan:   1.  Acute respiratory failure  Today's chest x-ray suggested slightly worsening left basal disease, likely from atelectasis. See # 3.  Continue AVAPS at the current setting.  Continue AVAPS 1 hour on and 3 hours off throughout the day and as needed otherwise.  We will also advise the patient to use AVAPS at night on a regular basis.  Continues to require high flow nasal cannula, between 10-15 L/min.    Over the past 24 hours, the patient appears to have tolerated 12 L/min high flow nasal cannula, and although this is only a mild decrease, it is definitely moving the right direction compared to 4-5 days ago.  Latest ABGs show compensated chronic respiratory acidosis, with improving PCO2.  We will repeat ABG, as clinically indicated    2.  COPD with acute exacerbation  Likely triggered by parainfluenza bronchitis  Latest cultures positive for haemophilus influenza and MSSA.  3+ WBCs noted in the sputum cultures.  Given significant antibiotic allergies, for now would suggest continuation of vancomycin for total of 3-5 days.  She has been on  azithromycin which was started on the 18th, and I would suggest 3 days of therapy  We will continue Solu-Medrol at 40 mg IV daily for now.  Continue nebulized Pulmicort in the middle of the day.  Continue Spiriva and Symbicort  Will benefit from outpatient PFTs    3.  Abnormal CT of the chest/atelectasis  Although unlikely that the patient has developed pneumonia, it appears that due to elevated white blood cell count, she was restarted on antibiotics in the form of vancomycin by hospitalist service.    Once again had a long discussion with the family members at the bedside along with the patient regarding the finding of significant mucous plugging/atelectasis.  Continue MetaNeb treatments.  We will continue hypertonic saline to every 12 hourly.  Continue Mucinex DM.  I have spoken to patient's daughter, Nehal at [phone], on 5/16/2023.    I have had a long discussion with patient's daughter from [place], at the bedside.  I once again informed the patient and her daughter that based on her prolonged stay, significant COPD and development of likely deconditioning, if she worsens any further, she may need to be intubated.    4.  Chronic respiratory acidosis  Appears to be on NIV device at home.    5.  Pulmonary hypertension  May need outpatient work-up  Likely secondary to underlying likely severe COPD    6.  Anxiety  Has been on Precedex in the past  Continue other anxiolytics.  May need adjustment, as indicated.    7.  Smoking  Was advised to quit smoking    8.  Deconditioning  We will try to have the patient sit in a recliner or have her bed made in to a chair today, as tolerated.  Will definitely become an issue, the longer patient remains dependent on significantly high amounts of oxygen.    Discussed with Dr. Nuñez at the bedside.    We have reviewed patient's current orders and changes, if any, have been suggested to primary care team. Plan was also discussed with nursing staff, as necessary.     This document  was electronically signed by Arnaldo Soriano MD on 05/21/23 at 09:00 EDT      Dictated utilizing Dragon dictation.

## 2023-05-21 NOTE — PLAN OF CARE
Goal Outcome Evaluation:  Plan of Care Reviewed With: patient        Progress: improving  Outcome Evaluation: Patietn more talkitive, doesn't desat as quickly or as low, still on 15L HFNC and bipap at night, patient ate most of her dinner of grilled cheese last night, family supportive, other VSS,

## 2023-05-21 NOTE — THERAPY TREATMENT NOTE
"Patient Name: Carolin Toscano  : 1945    MRN: [id number]                              Today's Date: 2023       Admit Date: 2023    Visit Dx:     ICD-10-CM ICD-9-CM   1. Acute on chronic respiratory failure with hypoxia and hypercapnia  J96.21 518.84    J96.22 786.09     799.02   2. COPD exacerbation  J44.1 491.21   3. Parainfluenza virus infection  B34.8 078.89     Patient Active Problem List   Diagnosis   • CAP (community acquired pneumonia)   • Cigarette nicotine dependence with nicotine-induced disorder   • Essential hypertension   • Dyslipidemia   • Blood glucose elevated   • Muscle ache   • COPD (chronic obstructive pulmonary disease)   • Nuclear sclerotic cataract of right eye   • Acute respiratory failure   • COPD exacerbation   • Acute on chronic respiratory failure with hypoxia and hypercapnia     Past Medical History:   Diagnosis Date   • Arthritis    • COPD (chronic obstructive pulmonary disease)    • Gall stones    • Goiter     \"inward\"   • Hypertension    • Hypertension    • Kidney infection    • Kidney stone    • Kidney stones    • Migraine headache    • Scarlet fever      Past Surgical History:   Procedure Laterality Date   • BLADDER SURGERY     • CATARACT EXTRACTION W/ INTRAOCULAR LENS IMPLANT Left 2022    Procedure: CATARACT PHACO EXTRACTION WITH INTRAOCULAR LENS IMPLANT LEFT;  Surgeon: Sathish Lopez MD;  Location: [facility];  Service: Ophthalmology;  Laterality: Left;   • CATARACT EXTRACTION W/ INTRAOCULAR LENS IMPLANT Right 2022    Procedure: CATARACT PHACO EXTRACTION WITH INTRAOCULAR LENS IMPLANT RIGHT;  Surgeon: Sathish Lopez MD;  Location: [facility];  Service: Ophthalmology;  Laterality: Right;   • CHOLECYSTECTOMY     • HYSTERECTOMY     • TONSILLECTOMY        General Information     Row Name 23 1204          Physical Therapy Time and Intention    Document Type therapy note (daily note)  -TW     Mode of Treatment physical therapy  -TW     Row Name " 05/21/23 1205          General Information    Patient Profile Reviewed yes  -TW     Existing Precautions/Restrictions fall;oxygen therapy device and L/min  -TW     Barriers to Rehab medically complex;previous functional deficit  -TW     Row Name 05/21/23 1205          Cognition    Orientation Status (Cognition) oriented x 4  -TW     Row Name 05/21/23 1205          Safety Issues, Functional Mobility    Safety Issues Affecting Function (Mobility) friction/shear risk;insight into deficits/self-awareness;safety precaution awareness;safety precautions follow-through/compliance  -TW     Impairments Affecting Function (Mobility) balance;endurance/activity tolerance;shortness of breath;strength;pain  -TW           User Key  (r) = Recorded By, (t) = Taken By, (c) = Cosigned By    Initials Name Provider Type    TW Deysi Goodwin, PT Physical Therapist               Mobility     Row Name 05/21/23 1205          Bed Mobility    Bed Mobility supine-sit;sit-supine;scooting/bridging;rolling left  -TW     Rolling Left Daw[place] (Bed Mobility) minimum assist (75% patient effort);verbal cues  -TW     Scooting/Bridging [place] (Bed Mobility) moderate assist (50% patient effort);verbal cues  -TW     Supine-Sit [place] (Bed Mobility) minimum assist (75% patient effort)  -TW     Sit-Supine [place] (Bed Mobility) minimum assist (75% patient effort);verbal cues  -TW     Assistive Device (Bed Mobility) bed rails;draw sheet;head of bed elevated  -TW     Comment, (Bed Mobility) Pt was able to sit on EOB x 5 minutes with close SBA to CGA with encouragement to remain up since her HR and O2 sat was doing well. O2 sat remaining above 92% on 12 L  -TW     Row Name 05/21/23 1205          Bed-Chair Transfer    Bed-Chair [place] (Transfers) not tested  -TW     Row Name 05/21/23 1205          Sit-Stand Transfer    Sit-Stand [place] (Transfers) not tested  -TW     Row Name 05/21/23 1205          Gait/Stairs (Locomotion)     Admire Level (Gait) not tested  -TW           User Key  (r) = Recorded By, (t) = Taken By, (c) = Cosigned By    Initials Name Provider Type    Deysi Addison PT Physical Therapist               Obj/Interventions     Row Name 05/21/23 1205          Motor Skills    Therapeutic Exercise hip;knee;ankle  B guided heel slides, hip ab/add, and active ankle pumps x 10 each side  -TW     Row Name 05/21/23 1204          Balance    Balance Assessment sitting static balance;sitting dynamic balance  -TW     Static Sitting Balance contact guard;standby assist  -TW     Dynamic Sitting Balance contact guard;standby assist  -TW     Position, Sitting Balance supported;unsupported;sitting edge of bed  -TW           User Key  (r) = Recorded By, (t) = Taken By, (c) = Cosigned By    Initials Name Provider Type    Deysi Addison PT Physical Therapist               Goals/Plan    No documentation.                Clinical Impression     Row Name 05/21/23 1200          Pain    Pretreatment Pain Rating 8/10  -TW     Posttreatment Pain Rating 8/10  -TW     Pain Location - Side/Orientation Bilateral  -TW     Pain Location - head  -TW     Pre/Posttreatment Pain Comment pt's headache did not change during PT session.  -TW     Pain Intervention(s) Repositioned  -TW     Row Name 05/21/23 1204          Plan of Care Review    Plan of Care Reviewed With patient;daughter  -TW     Outcome Evaluation PT treatment completed this am with pt presenting supine on 12 L HFNC at 94% and HR 58. Pt performed BLE ther ex per flowsheet. She then was assisted with min assist to sit on EOB and remained x 5 min with close SBA to CGA and O2 sat above 92%. Pt expressed fatigue at 4 min but did remain up for one more minute with encouragement. Pt positioned in chair position post treatment and nursing setting up lunch tray for pt. Pt's O2 sat at 94% and pt's HA pain remained at 8/10 throughout session. Cont current PT POC.  -TW     Row Name 05/21/23 1209           Vital Signs    Pre SpO2 (%) 94  -TW     O2 Delivery Pre Treatment hi-flow  12 L  -TW     Intra SpO2 (%) 92  -TW     O2 Delivery Intra Treatment hi-flow  12 L  -TW     Post SpO2 (%) 94  -TW     O2 Delivery Post Treatment hi-flow  12 L  -TW     Pre Patient Position Supine  -TW     Intra Patient Position Sitting  -TW     Post Patient Position Sitting  -TW     Row Name 05/21/23 1205          Positioning and Restraints    Pre-Treatment Position in bed  -TW     Post Treatment Position bed  -TW     In Bed fowlers;with nsg;encouraged to call for assist;call light within reach  -TW           User Key  (r) = Recorded By, (t) = Taken By, (c) = Cosigned By    Initials Name Provider Type    Deysi Addison PT Physical Therapist               Outcome Measures     Row Name 05/21/23 1205 05/21/23 0747       How much help from another person do you currently need...    Turning from your back to your side while in flat bed without using bedrails? 3  -TW 2  -RP    Moving from lying on back to sitting on the side of a flat bed without bedrails? 2  -TW 2  -RP    Moving to and from a bed to a chair (including a wheelchair)? 2  -TW 2  -RP    Standing up from a chair using your arms (e.g., wheelchair, bedside chair)? 2  -TW 2  -RP    Climbing 3-5 steps with a railing? 1  -TW 2  -RP    To walk in hospital room? 2  -TW 2  -RP    AM-PAC 6 Clicks Score (PT) 12  -TW 12  -RP    Highest level of mobility 4 --> Transferred to chair/commode  -TW 4 --> Transferred to chair/commode  -RP    Row Name 05/21/23 1205          Functional Assessment    Outcome Measure Options AM-PAC 6 Clicks Basic Mobility (PT)  -TW           User Key  (r) = Recorded By, (t) = Taken By, (c) = Cosigned By    Initials Name Provider Type    Deysi Addison PT Physical Therapist    Marga Carlin, RN Registered Nurse                             Physical Therapy Education     Title: PT OT SLP Therapies (In Progress)     Topic: Physical Therapy (In Progress)      Point: Mobility training (Done)     Learning Progress Summary           Patient Acceptance, E, VU by MS at 5/16/2023 1403    Comment: importance of mobility    Acceptance, E, VU by AM at 5/15/2023 0701    Acceptance, E, VU by MS at 5/12/2023 1458    Comment: importance of mobility    Acceptance, E,TB,D, VU,NR by  at 5/8/2023 1250    Comment: Importance of activity and exercise techniques    Acceptance, E, VU by MS at 5/5/2023 1136    Comment: importance of mobility                   Point: Home exercise program (Done)     Learning Progress Summary           Patient Acceptance, E,D, VU,DU by TW at 5/21/2023 1205    Comment: Pt education for improving BLE ther ex technique    Acceptance, E, VU by MS at 5/16/2023 1403    Comment: importance of mobility    Acceptance, E, VU by AM at 5/15/2023 0701    Acceptance, E,TB, VU,NR by CC at 5/14/2023 1310    Comment: Importance of increasing movement of B LE    Acceptance, E,TB,D, VU,NR by  at 5/8/2023 1250    Comment: Importance of activity and exercise techniques    Acceptance, E, VU by MS at 5/5/2023 1136    Comment: importance of mobility                   Point: Body mechanics (Done)     Learning Progress Summary           Patient Acceptance, E,TB, VU by  at 5/17/2023 1846    Comment: upright posture                   Point: Precautions (Not Started)     Learner Progress:  Not documented in this visit.                      User Key     Initials Effective Dates Name Provider Type Discipline     03/23/22 -  Kavitha Tavarez, PT Physical Therapist PT    CC 06/16/21 -  Nasra Quiñones, PTA Physical Therapist Assistant PT     06/16/21 -  Ronak Cruz, PTA Physical Therapist Assistant PT    TW 06/16/21 -  Deysi Goodwin, PT Physical Therapist PT    MS 07/01/22 -  Martín Ortiz, PT Physical Therapist PT    AM 02/22/23 -  Rob Lu, RN Registered Nurse Nurse              PT Recommendation and Plan     Plan of Care Reviewed With: patient, daughter  Outcome  Evaluation: PT treatment completed this am with pt presenting supine on 12 L HFNC at 94% and HR 58. Pt performed BLE ther ex per flowsheet. She then was assisted with min assist to sit on EOB and remained x 5 min with close SBA to CGA and O2 sat above 92%. Pt expressed fatigue at 4 min but did remain up for one more minute with encouragement. Pt positioned in chair position post treatment and nursing setting up lunch tray for pt. Pt's O2 sat at 94% and pt's HA pain remained at 8/10 throughout session. Cont current PT POC.     Time Calculation:    PT Charges     Row Name 05/21/23 1205             Time Calculation    Start Time 1142  -TW      Stop Time 1205  -TW      Time Calculation (min) 23 min  -TW      PT Received On 05/21/23  -TW         Timed Charges    16550 - PT Therapeutic Exercise Minutes 10  -TW      89735 - PT Therapeutic Activity Minutes 13  -TW         Total Minutes    Timed Charges Total Minutes 23  -TW       Total Minutes 23  -TW            User Key  (r) = Recorded By, (t) = Taken By, (c) = Cosigned By    Initials Name Provider Type    TW Deysi Goodwin PT Physical Therapist              Therapy Charges for Today     Code Description Service Date Service Provider Modifiers Qty    19447954563 HC PT THER PROC EA 15 MIN 5/21/2023 Deysi Goodwin, PT GP 1    96804166642 HC PT THERAPEUTIC ACT EA 15 MIN 5/21/2023 Deysi Goodwin PT GP 1          PT G-Codes  Outcome Measure Options: AM-PAC 6 Clicks Basic Mobility (PT)  AM-PAC 6 Clicks Score (PT): 12  AM-PAC 6 Clicks Score (OT): 13       Deysi Goodwin PT  5/21/2023

## 2023-05-21 NOTE — PLAN OF CARE
Goal Outcome Evaluation:  Plan of Care Reviewed With: patient, daughter           Outcome Evaluation: PT treatment completed this am with pt presenting supine on 12 L HFNC at 94% and HR 58. Pt performed BLE ther ex per flowsheet. She then was assisted with min assist to sit on EOB and remained x 5 min with close SBA to CGA and O2 sat above 92%. Pt expressed fatigue at 4 min but did remain up for one more minute with encouragement. Pt positioned in chair position post treatment and nursing setting up lunch tray for pt. Pt's O2 sat at 94% and pt's HA pain remained at 8/10 throughout session. Cont current PT POC.

## 2023-05-21 NOTE — PROGRESS NOTES
[facility]    Name:  Carolin Toscano   Age:  77 y.o.  Sex:  female  :  1945  MRN:  [id number]   Visit Number:  [id number]  Admission Date:  2023  Date Of Service:  23  Primary Care Physician:  Jason Nicholson MD     LOS: 17 days :    Chief Complaint:      headache    Subjective:    : c/o ha suspect is due to mucous. Agreeable to flonase, zyrtec and atarax. Labs reviewed.    Hospital Course:  May 20, 2023 discussed with patient as well as family at the bedside.  Also discussed with them regarding placing access which she was able to get a PICC line.  Also discussed with them the staph growing in her sputum and that I was going to add vancomycin for this.  : d/w patient and family updated them on [facility] and pending appeal which will likely be submitted Monday. Still having dyspnea but mucous is breaking loose today.  Still debating about bronchoscopy.  Interested in long-term acute care possibly in [place] versus [place].    : Daughter Regla is coming today.  Otherwise not much change still having dyspnea and hypoxemia.  Case management note reviewed.    Patient was admitted and treated with steroids.  Antibiotics were not indicated.  Pulmonology consulted and was moved to ICU on  due to worsening respiratory failure and tachypnea.  She initially had to be on Precedex drip which was discontinued and switched to Xanax due to anxiety. Patient was placed on AVAPS.  Patient continued on steroids and received Lasix.    23: d/w nsg intermittently on avaps and hi flow. Patient awake in bed. CXR from 5/15 reviewed. Medications reviewed.  Discussed with case management they are [facility].    2023 discussed with case management still working on [facility].  I am planning to do a peer to peer with them tomorrow.  Otherwise has persistent hypoxia.  Dr. Soriano's note reviewed considering  bronchoscopy.     History of presenting illness:  Patient is a 77 years old female with a past medical history of COPD, chronic respiratory failure on 5 to 6 L per her previous discharge, on NIV machine at home and trelegy, hypertension, and ongoing tobacco use who presented to the ER from home by EMS with a chief complaint of shortness of breath.  Patient reporting that she started having shortness of breath associated with productive cough since last night and has persisted and became worse that promoted her to call EMS.  EMS has found the patient to be at 65% saturation on her normal 6 L of oxygen. Patient reports compliance with her NIV machine at home.      On ER evaluation, Patient was found to be tachycardic with a heart rate of 130s, temperature of 100.2 and respirations of 30, /72.  Patient was placed on BiPAP with FiO2 of 40%.  Her ABG came back and showed pH of 7.296/PCO2 87/PO2 94/HCO3 42/saturation 97% on 5 L nasal cannula.  HS Troponin 44, proBNP WNL, glucose 192, CO2 of 40, otherwise CBC and CMP within acceptable range.  Lactate and Pro-Adi WNL.  Respiratory panel positive for parainfluenza virus.  Chest x-ray Mild interstitial disease  bilaterally is similar to prior. No area of consolidation is seen. Patient received Solu-Medrol and Aztreonam while in the ER.  Hospitalist consulted for admission.       Edited by: Primitivo Nuñez DO at 5/21/2023 0849     Review of Systems:     All systems were reviewed and negative except as mentioned in subjective, assessment and plan.    Vital Signs:    Temp:  [97.2 °F (36.2 °C)-98.9 °F (37.2 °C)] 98.9 °F (37.2 °C)  Heart Rate:  [53-84] 61  Resp:  [16-26] 18  BP: (104-128)/(42-64) 112/64    Intake and output:    I/O last 3 completed shifts:  In: 750 [I.V.:250; IV Piggyback:500]  Out: 2050 [Urine:2050]  No intake/output data recorded.    Physical Examination:    General Appearance:  Alert and cooperative.  Chronically ill-appearing. Sitting up in bed.    Head:  Atraumatic and normocephalic.   Eyes: Conjunctivae and sclerae normal, no icterus. No pallor.   Throat: No oral lesions, no thrush, oral mucosa moist.   Neck: Supple, trachea midline, no thyromegaly.   Lungs:   improved air entry with persistent diminished breath sounds end expiratory wheeze.  On nasal cannula.   Heart:  Normal S1 and S2, no murmur, no gallop, no rub. No JVD.   Abdomen:   Normal bowel sounds, no masses, no organomegaly. Soft, nontender, nondistended, no rebound tenderness.   Extremities: Supple, no edema, no cyanosis, no clubbing.   Skin: No bleeding or rash.   Neurologic: Alert and oriented x 3. No facial asymmetry. Moves all four limbs. No tremors.  Generalized weakness noted.     Edited by: Primitivo Nuñez DO at 5/21/2023 4406     Laboratory results:    Results from last 7 days   Lab Units 05/21/23  0453 05/20/23  0426 05/19/23  0432   SODIUM mmol/L 140 139 137   POTASSIUM mmol/L 4.7 3.8 4.4   CHLORIDE mmol/L 98 94* 90*   CO2 mmol/L 36.7* 38.8* 41.4*   BUN mg/dL 24* 25* 22   CREATININE mg/dL 0.32* 0.28* 0.28*   CALCIUM mg/dL 8.5* 8.9 9.1   BILIRUBIN mg/dL 0.2 0.3  --    ALK PHOS U/L 77 72  --    ALT (SGPT) U/L 35* 26  --    AST (SGOT) U/L 19 12  --    GLUCOSE mg/dL 120* 123* 242*     Results from last 7 days   Lab Units 05/21/23  0453 05/20/23  0426 05/19/23  0432   WBC 10*3/mm3 13.10* 15.22* 15.77*   HEMOGLOBIN g/dL 11.0* 11.3* 11.6*   HEMATOCRIT % 33.8* 36.5 37.4   PLATELETS 10*3/mm3 212 209 191             Results from last 7 days   Lab Units 05/18/23  1101 05/18/23  1054   BLOODCX  No growth at 3 days No growth at 3 days     Recent Labs     05/16/23  0519 05/18/23  0708 05/21/23  0714   PHART 7.453* 7.451* 7.394   BJA6WPN 76.4* 73.4* 70.2*   PO2ART 56.7* 64.0* 95.2   PNK9TGW 53.4* 51.1* 42.8*   BASEEXCESS 24.7* 22.8* 15.1*      I have reviewed the patient's laboratory results.    Radiology results:    XR Chest 1 View    Result Date: 5/21/2023  PORTABLE CHEST    5/21/2023 6:19 AM   HISTORY: Respiratory failure.  COMPARISON: May 18, 2023.  FINDINGS: Consolidation at the left lung base has slightly worsened. Background of bronchitis is noted. No other consolidation. No effusion or pneumothorax. New right upper shimmy PICC, tip in SVC.      Impression: Left base pneumonia, slightly worsened from previous.  This report was signed and finalized on 5/21/2023 7:22 AM by Dr Danyel Mckinnon DO.    I have reviewed the patient's radiology reports.    Medication Review:     I have reviewed the patient's active and prn medications.     Problem List:      Acute on chronic respiratory failure with hypoxia and hypercapnia      Assessment/Plan:    Respiratory failure with hypoxia/hypercapnia, COPD exacerbation, parainfluenza  -patient on 5 to 6 L oxygen at baseline,  Continue BiPAP therapy.  Has NIV at home. Currently on 10 liters  -Recent parainfluenza virus infection met SIRS due to this  - Solu-Medrol taper as appropriate  -Bronchodilators, Mucinex MetaNeb and supportive care.  -Dr. Soriano note reviewed and appreciated  -s/p lasix  -Xanax and Precedex as needed  -Sputum growing Staph aureus and haemophilus on vancomycin and azithromycin  -will add flonase, zyrtec, and atarax for upper airway congestion     Elevated troponin  -Likely secondary to demand ischemia in settings of respiratory failure  -Patient denies any chest pain, low suspicion for ACS  -Troponin trended down and plateaued.     Hyperglycemia  -Will cover with sliding scale insulin while on steroids  -Hemoglobin A1c 6.7     Disposition: [facility] week    Prophylaxis: Lovenox    Edited by: Primitivo Nuñez DO at 5/21/2023 0849     DVT Prophylaxis: Lovenox  Code Status:   Code Status and Medical Interventions:   Ordered at: 05/04/23 1634     Code Status (Patient has no pulse and is not breathing):    CPR (Attempt to Resuscitate)     Medical Interventions (Patient has pulse or is breathing):    Full Support      Diet:    Dietary Orders (From admission, onward)     Start     Ordered    05/18/23 1800  Dietary Nutrition Supplements Magic Cup  2 Times Daily      Question:  [facility] Supplement:  Answer:  Magic Cup    05/18/23 1517    05/17/23 1038  Diet: Cardiac Diets; Healthy Heart (2-3 Na+); Texture: Regular Texture (IDDSI 7); Fluid Consistency: Thin (IDDSI 0)  Diet Effective Now        Comments: Pt prefers cottage cheese, bananas, oranges, peaches, watermelon.   References:    Diet Order [facility]   Question Answer Comment   Diets: Cardiac Diets    Cardiac Diet: Healthy Heart (2-3 Na+)    Texture: Regular Texture (IDDSI 7)    Fluid Consistency: Thin (IDDSI 0)        05/17/23 1039    05/15/23 1800  Dietary Nutrition Supplements Xu  2 Times Daily      Question:  [facility] Supplement:  Answer:  Xu    05/15/23 1445    05/15/23 1800  Dietary Nutrition Supplements Boost VHC  2 Times Daily      Question:  [facility] Supplement:  Answer:  Boost VHC    05/15/23 1446               Discharge Plan: Long-term acute care 5 days    Primitivo Nuñez DO  05/21/23  12:43 EDT    Dictated utilizing Dragon dictation.

## 2023-05-22 LAB
A-A DO2: 34.3 MMHG
ALBUMIN SERPL-MCNC: 2.5 G/DL (ref 3.5–5.2)
ALBUMIN/GLOB SERPL: 1 G/DL
ALP SERPL-CCNC: 74 U/L (ref 39–117)
ALT SERPL W P-5'-P-CCNC: 35 U/L (ref 1–33)
ANION GAP SERPL CALCULATED.3IONS-SCNC: 6.5 MMOL/L (ref 5–15)
ARTERIAL PATENCY WRIST A: ABNORMAL
AST SERPL-CCNC: 14 U/L (ref 1–32)
ATMOSPHERIC PRESS: 736 MMHG
BASE EXCESS BLDA CALC-SCNC: 11.1 MMOL/L (ref 0–2)
BDY SITE: ABNORMAL
BILIRUB SERPL-MCNC: 0.2 MG/DL (ref 0–1.2)
BUN SERPL-MCNC: 22 MG/DL (ref 8–23)
BUN/CREAT SERPL: 84.6 (ref 7–25)
CALCIUM SPEC-SCNC: 8.8 MG/DL (ref 8.6–10.5)
CHLORIDE SERPL-SCNC: 100 MMOL/L (ref 98–107)
CO2 SERPL-SCNC: 31.5 MMOL/L (ref 22–29)
COHGB MFR BLD: 1 % (ref 0–2)
CREAT SERPL-MCNC: 0.26 MG/DL (ref 0.57–1)
DEPRECATED RDW RBC AUTO: 53.2 FL (ref 37–54)
EGFRCR SERPLBLD CKD-EPI 2021: 114 ML/MIN/1.73
ERYTHROCYTE [DISTWIDTH] IN BLOOD BY AUTOMATED COUNT: 15.5 % (ref 12.3–15.4)
GAS FLOW AIRWAY: 8 LPM
GLOBULIN UR ELPH-MCNC: 2.6 GM/DL
GLUCOSE BLDC GLUCOMTR-MCNC: 122 MG/DL (ref 70–130)
GLUCOSE BLDC GLUCOMTR-MCNC: 147 MG/DL (ref 70–130)
GLUCOSE SERPL-MCNC: 111 MG/DL (ref 65–99)
HCO3 BLDA-SCNC: 36.8 MMOL/L (ref 22–28)
HCT VFR BLD AUTO: 35.6 % (ref 34–46.6)
HCT VFR BLD CALC: 34.8 %
HGB BLD-MCNC: 11 G/DL (ref 12–15.9)
Lab: ABNORMAL
Lab: ABNORMAL
MCH RBC QN AUTO: 30.1 PG (ref 26.6–33)
MCHC RBC AUTO-ENTMCNC: 30.9 G/DL (ref 31.5–35.7)
MCV RBC AUTO: 97.3 FL (ref 79–97)
METHGB BLD QL: 0.5 % (ref 0–1.5)
MODALITY: ABNORMAL
NOTE: ABNORMAL
NOTIFIED BY: ABNORMAL
NOTIFIED WHO: ABNORMAL
OXYHGB MFR BLDV: 86.8 % (ref 94–99)
PCO2 BLDA: 53.3 MM HG (ref 35–45)
PCO2 TEMP ADJ BLD: ABNORMAL MM[HG]
PH BLDA: 7.45 PH UNITS (ref 7.35–7.45)
PH, TEMP CORRECTED: ABNORMAL
PLATELET # BLD AUTO: 211 10*3/MM3 (ref 140–450)
PMV BLD AUTO: 11.9 FL (ref 6–12)
PO2 BLDA: 50.2 MM HG (ref 75–100)
PO2 TEMP ADJ BLD: ABNORMAL MM[HG]
POTASSIUM SERPL-SCNC: 4.5 MMOL/L (ref 3.5–5.2)
PROT SERPL-MCNC: 5.1 G/DL (ref 6–8.5)
RBC # BLD AUTO: 3.66 10*6/MM3 (ref 3.77–5.28)
SAO2 % BLDCOA: 88.1 % (ref 94–100)
SODIUM SERPL-SCNC: 138 MMOL/L (ref 136–145)
VANCOMYCIN SERPL-MCNC: 22.4 MCG/ML (ref 5–40)
VENTILATOR MODE: ABNORMAL
WBC NRBC COR # BLD: 11.59 10*3/MM3 (ref 3.4–10.8)

## 2023-05-22 PROCEDURE — 82375 ASSAY CARBOXYHB QUANT: CPT

## 2023-05-22 PROCEDURE — 94799 UNLISTED PULMONARY SVC/PX: CPT

## 2023-05-22 PROCEDURE — 94660 CPAP INITIATION&MGMT: CPT

## 2023-05-22 PROCEDURE — 25010000002 VANCOMYCIN 5 G RECONSTITUTED SOLUTION: Performed by: INTERNAL MEDICINE

## 2023-05-22 PROCEDURE — 83050 HGB METHEMOGLOBIN QUAN: CPT

## 2023-05-22 PROCEDURE — 99232 SBSQ HOSP IP/OBS MODERATE 35: CPT | Performed by: INTERNAL MEDICINE

## 2023-05-22 PROCEDURE — 80202 ASSAY OF VANCOMYCIN: CPT | Performed by: INTERNAL MEDICINE

## 2023-05-22 PROCEDURE — 82948 REAGENT STRIP/BLOOD GLUCOSE: CPT

## 2023-05-22 PROCEDURE — 99233 SBSQ HOSP IP/OBS HIGH 50: CPT | Performed by: INTERNAL MEDICINE

## 2023-05-22 PROCEDURE — 97110 THERAPEUTIC EXERCISES: CPT

## 2023-05-22 PROCEDURE — 25010000002 METHYLPREDNISOLONE PER 80 MG: Performed by: INTERNAL MEDICINE

## 2023-05-22 PROCEDURE — 80053 COMPREHEN METABOLIC PANEL: CPT | Performed by: INTERNAL MEDICINE

## 2023-05-22 PROCEDURE — 25010000002 METHYLPREDNISOLONE PER 40 MG: Performed by: INTERNAL MEDICINE

## 2023-05-22 PROCEDURE — 94664 DEMO&/EVAL PT USE INHALER: CPT

## 2023-05-22 PROCEDURE — 94761 N-INVAS EAR/PLS OXIMETRY MLT: CPT

## 2023-05-22 PROCEDURE — 25010000002 ENOXAPARIN PER 10 MG: Performed by: FAMILY MEDICINE

## 2023-05-22 PROCEDURE — 97530 THERAPEUTIC ACTIVITIES: CPT

## 2023-05-22 PROCEDURE — 85027 COMPLETE CBC AUTOMATED: CPT | Performed by: INTERNAL MEDICINE

## 2023-05-22 PROCEDURE — 82805 BLOOD GASES W/O2 SATURATION: CPT

## 2023-05-22 PROCEDURE — 36600 WITHDRAWAL OF ARTERIAL BLOOD: CPT

## 2023-05-22 RX ORDER — METHYLPREDNISOLONE ACETATE 80 MG/ML
80 INJECTION, SUSPENSION INTRA-ARTICULAR; INTRALESIONAL; INTRAMUSCULAR; SOFT TISSUE ONCE
Status: COMPLETED | OUTPATIENT
Start: 2023-05-22 | End: 2023-05-22

## 2023-05-22 RX ORDER — PREDNISONE 20 MG/1
40 TABLET ORAL
Status: DISCONTINUED | OUTPATIENT
Start: 2023-05-23 | End: 2023-05-25

## 2023-05-22 RX ADMIN — AMLODIPINE BESYLATE 10 MG: 5 TABLET ORAL at 08:41

## 2023-05-22 RX ADMIN — Medication 3 ML: at 08:41

## 2023-05-22 RX ADMIN — IPRATROPIUM BROMIDE AND ALBUTEROL SULFATE 3 ML: 2.5; .5 SOLUTION RESPIRATORY (INHALATION) at 13:04

## 2023-05-22 RX ADMIN — GUAIFENESIN AND DEXTROMETHORPHAN HYDROBROMIDE 1 TABLET: 30; 600 TABLET, EXTENDED RELEASE ORAL at 21:59

## 2023-05-22 RX ADMIN — ACETAMINOPHEN 650 MG: 325 TABLET, FILM COATED ORAL at 13:26

## 2023-05-22 RX ADMIN — BUDESONIDE AND FORMOTEROL FUMARATE DIHYDRATE 2 PUFF: 160; 4.5 AEROSOL RESPIRATORY (INHALATION) at 20:07

## 2023-05-22 RX ADMIN — MULTIPLE VITAMINS W/ MINERALS TAB 1 TABLET: TAB at 08:41

## 2023-05-22 RX ADMIN — METHYLPREDNISOLONE ACETATE 80 MG: 80 INJECTION, SUSPENSION INTRA-ARTICULAR; INTRALESIONAL; INTRAMUSCULAR; SOFT TISSUE at 17:46

## 2023-05-22 RX ADMIN — FLUTICASONE PROPIONATE 2 SPRAY: 50 SPRAY, METERED NASAL at 08:42

## 2023-05-22 RX ADMIN — Medication 10 ML: at 22:00

## 2023-05-22 RX ADMIN — METHYLPREDNISOLONE SODIUM SUCCINATE 40 MG: 40 INJECTION, POWDER, LYOPHILIZED, FOR SOLUTION INTRAMUSCULAR; INTRAVENOUS at 08:41

## 2023-05-22 RX ADMIN — VANCOMYCIN HYDROCHLORIDE 1250 MG: 5 INJECTION, POWDER, LYOPHILIZED, FOR SOLUTION INTRAVENOUS at 00:14

## 2023-05-22 RX ADMIN — SODIUM CHLORIDE 30 MG/ML INHALATION SOLUTION 4 ML: 30 SOLUTION INHALANT at 20:07

## 2023-05-22 RX ADMIN — Medication 10 ML: at 21:59

## 2023-05-22 RX ADMIN — Medication 3 ML: at 22:00

## 2023-05-22 RX ADMIN — Medication 10 ML: at 08:42

## 2023-05-22 RX ADMIN — HYDROXYZINE HYDROCHLORIDE 25 MG: 25 TABLET ORAL at 20:53

## 2023-05-22 RX ADMIN — ATENOLOL 25 MG: 25 TABLET ORAL at 08:41

## 2023-05-22 RX ADMIN — FLUTICASONE PROPIONATE 2 SPRAY: 50 SPRAY, METERED NASAL at 22:00

## 2023-05-22 RX ADMIN — CETIRIZINE HYDROCHLORIDE 10 MG: 10 TABLET, FILM COATED ORAL at 08:41

## 2023-05-22 RX ADMIN — ENOXAPARIN SODIUM 40 MG: 100 INJECTION SUBCUTANEOUS at 20:52

## 2023-05-22 RX ADMIN — IPRATROPIUM BROMIDE AND ALBUTEROL SULFATE 3 ML: 2.5; .5 SOLUTION RESPIRATORY (INHALATION) at 20:07

## 2023-05-22 RX ADMIN — BUDESONIDE INHALATION 1 MG: 0.5 SUSPENSION RESPIRATORY (INHALATION) at 07:01

## 2023-05-22 RX ADMIN — HYDROXYZINE HYDROCHLORIDE 25 MG: 25 TABLET ORAL at 13:27

## 2023-05-22 RX ADMIN — GUAIFENESIN AND DEXTROMETHORPHAN HYDROBROMIDE 1 TABLET: 30; 600 TABLET, EXTENDED RELEASE ORAL at 08:41

## 2023-05-22 RX ADMIN — BUDESONIDE AND FORMOTEROL FUMARATE DIHYDRATE 2 PUFF: 160; 4.5 AEROSOL RESPIRATORY (INHALATION) at 07:01

## 2023-05-22 RX ADMIN — VANCOMYCIN HYDROCHLORIDE 1250 MG: 5 INJECTION, POWDER, LYOPHILIZED, FOR SOLUTION INTRAVENOUS at 13:27

## 2023-05-22 RX ADMIN — IPRATROPIUM BROMIDE AND ALBUTEROL SULFATE 3 ML: 2.5; .5 SOLUTION RESPIRATORY (INHALATION) at 07:01

## 2023-05-22 RX ADMIN — SODIUM CHLORIDE 30 MG/ML INHALATION SOLUTION 4 ML: 30 SOLUTION INHALANT at 07:01

## 2023-05-22 RX ADMIN — HYDROXYZINE HYDROCHLORIDE 25 MG: 25 TABLET ORAL at 08:41

## 2023-05-22 RX ADMIN — IPRATROPIUM BROMIDE AND ALBUTEROL SULFATE 3 ML: 2.5; .5 SOLUTION RESPIRATORY (INHALATION) at 01:11

## 2023-05-22 NOTE — PLAN OF CARE
Goal Outcome Evaluation:  Plan of Care Reviewed With: patient, daughter        Progress: improving  Outcome Evaluation: Pt received supine in bed on 9L O2 via high flow nc.  Pt satting 88% upon arrival.  Pt participated in UB AROM ex as per flow sheet, sats dropped to 86%, but rebounded to 88% after about 30 seconds.  Pt fatigues easily and reports she feels tired today.  Cont OT per POC>

## 2023-05-22 NOTE — PLAN OF CARE
Goal Outcome Evaluation:  Plan of Care Reviewed With: patient        Progress: improving  Outcome Evaluation: Pt  supine in bed and willing to participate with treatment. Pt transfered to and from EOB with min a.  Pt was able to tolerate sitting EOB for approx 10 minutes.  Pt performed B LE TE siting EOB.  Pt left supine in bed with bed placed in  chair position. Pt with 9L O2 dropped no lower than 88% with todays activity.  See flowsheet for details.

## 2023-05-22 NOTE — CASE MANAGEMENT/SOCIAL WORK
"1240 Cm spoke with pt and brodyr/Regla at bedside to discuss DCp. Cm advised them that we are awaiting an appeal letter to be sent from [facility] for MD to sign and forward back to insurance co. Cm discussed alternative options,such as [facility]. Cm discussed concerns regarding pt being unable to ambulate with PT due to high O2 requirement and concern for falls if she returns home. Pt indecisive regarding [facility] at this time. States \" I want to stay here.\" Cm discussed the need to transition from acute care (as she improves) into a more appropriate rehab environment that can meet her medical needs. Cm agrees to follow up with her this afternoon.     Cm spoke with Spike Mosquera who confirms pt is ordered 6L O2 at baseline. She is unsure if she has a 10L concentrator or 2-5L concentrator bled in together to meet her O2 need, up to 10L. She states they do have ppl on 15L O2 at baseline, that use 2-10L concentrators bled in together.    1875 Cm spoke with pt and Regla silverio at bedside to further discuss dc disposition. Cm clarified questions they had regarding questions they have about services [facility] provides. Cm also discussed home health options. Pt remains undecided about dc disposition. Cm spoke with Dr Nuñez regarding appeal letter and agrees to monitor pt clinical status to determine most appropriate disposition.  Spoke with [facility] who states she received referral and will discuss with Dr Lopez to determine max L flow they will accept. She also verbalizes need for OT notes when pt is able to participate.   "

## 2023-05-22 NOTE — PLAN OF CARE
Goal Outcome Evaluation:      Pt remained on HFNC this shift. Refused bipap. Able to feed self with encouragement. CM began discussion of placement on a swing bed unit.

## 2023-05-22 NOTE — DISCHARGE PLACEMENT REQUEST
" STR    Carolin Toscano (77 y.o. Female)     Date of Birth   1945    Social Security Number       Address   [address] [facility] [address]    Home Phone   [phone]    MRN   [id number]       Pentecostal   Nazarene    Marital Status                               Admission Date   5/4/23    Admission Type   Emergency    Admitting Provider       Attending Provider   Primitivo Nuñez DO    Department, Room/Bed   [facility] INTENSIVE [facility], I07/1       Discharge Date       Discharge Disposition       Discharge Destination                               Attending Provider: Primitivo Nuñez DO    Allergies: Contrast Dye (Echo Or Unknown Ct/mr), Ciprofloxacin, Keflex [Cephalexin], Penicillins, Sulfa Antibiotics, Terramycin [Oxytetracycline], Prednisone, Latex, Percocet [Oxycodone-acetaminophen], Xyzal [Levocetirizine]    Isolation: None   Infection: None   Code Status: CPR    Ht: 160 cm (63\")   Wt: 67.2 kg (148 lb 2.4 oz)    Admission Cmt: None   Principal Problem: Acute on chronic respiratory failure with hypoxia and hypercapnia [J96.21,J96.22]                 Active Insurance as of 5/4/2023     Primary Coverage     Payor Plan Insurance Group Employer/Plan Group    HUMANA MEDICARE REPLACEMENT HUMANA MEDICARE REPLACEMENT [id number]     Payor Plan Address Payor Plan Phone Number Payor Plan Fax Number Effective Dates    [address] [address] [phone]  1/1/2018 - None Entered    [facility] [id number]       Subscriber Name Subscriber Birth Date Member CAROLIN MCCOY 1945 [id number]           Secondary Coverage     Payor Plan Insurance Group Employer/Plan Group    [place]      Payor Plan Address Payor Plan Phone Number Payor Plan Fax Number Effective Dates    [address] [phone]  10/11/2021 - None Entered    [facility] [id number]       Subscriber Name Subscriber Birth Date Member CAROLIN MCCOY 1945 [id number]                 Emergency " Contacts      (Rel.) Home Phone Work Phone Mobile Phone    Nehal Hoffmann (Daughter) [phone] -- --              Current Facility-Administered Medications   Medication Dose Route Frequency Provider Last Rate Last Admin   • acetaminophen (TYLENOL) tablet 650 mg  650 mg Oral Q4H PRN Lolis Vital MD   650 mg at 05/22/23 1326    Or   • acetaminophen (TYLENOL) 160 MG/5ML solution 650 mg  650 mg Oral Q4H PRN Lolis Vital MD        Or   • acetaminophen (TYLENOL) suppository 650 mg  650 mg Rectal Q4H PRN Lolis Vital MD   650 mg at 05/04/23 2005   • ALPRAZolam (XANAX) tablet 0.5 mg  0.5 mg Oral BID PRN Primitivo Nuñez DO   0.5 mg at 05/21/23 2029   • amLODIPine (NORVASC) tablet 10 mg  10 mg Oral Daily Lolis Vital MD   10 mg at 05/22/23 0841   • atenolol (TENORMIN) tablet 25 mg  25 mg Oral Daily Lolis Vital MD   25 mg at 05/22/23 0841   • sennosides-docusate (PERICOLACE) 8.6-50 MG per tablet 2 tablet  2 tablet Oral BID Lolis Vital MD   2 tablet at 05/21/23 2029    And   • polyethylene glycol (MIRALAX) packet 17 g  17 g Oral Daily PRN Lolis Vital MD        And   • bisacodyl (DULCOLAX) EC tablet 5 mg  5 mg Oral Daily PRN Lolis Vital MD        And   • bisacodyl (DULCOLAX) suppository 10 mg  10 mg Rectal Daily PRN Lolis Vital MD       • budesonide (PULMICORT) nebulizer solution 1 mg  1 mg Nebulization Daily - RT Arnaldo Soriano MD   1 mg at 05/22/23 0701   • budesonide-formoterol (SYMBICORT) 160-4.5 MCG/ACT inhaler 2 puff  2 puff Inhalation BID - RT Lolis Vital MD   2 puff at 05/22/23 0701    And   • tiotropium (SPIRIVA RESPIMAT) 2.5 mcg/act aerosol solution inhaler  2 puff Inhalation Daily - RT Lolis Vital MD   2 puff at 05/18/23 0714   • cetirizine (zyrTEC) tablet 10 mg  10 mg Oral Daily Primitivo Nuñez DO   10 mg at 05/22/23 0841   • dexmedetomidine (PRECEDEX) 400 mcg in 100 mL NS infusion  0.2-1.5 mcg/kg/hr Intravenous Titrated Lolis Vital  MD   Stopped at 05/12/23 1556   • dextrose (D50W) (25 g/50 mL) IV injection 25 g  25 g Intravenous Q15 Min PRN Lolis Vital MD       • dextrose (GLUTOSE) oral gel 15 g  15 g Oral Q15 Min PRN Lolis Vital MD       • Enoxaparin Sodium (LOVENOX) syringe 40 mg  40 mg Subcutaneous Q24H Lolis Vital MD   40 mg at 05/21/23 2028   • fluticasone (FLONASE) 50 MCG/ACT nasal spray 2 spray  2 spray Each Nare BID Primitivo Nuñez DO   2 spray at 05/22/23 0842   • glucagon (GLUCAGEN) injection 1 mg  1 mg Intramuscular Q15 Min PRN Lolis Vital MD       • guaifenesin-dextromethorphan (MUCINEX DM) tablet 1 tablet  1 tablet Oral BID Arnaldo Soriano MD   1 tablet at 05/22/23 0841   • guaiFENesin-dextromethorphan (ROBITUSSIN DM) 100-10 MG/5ML syrup 10 mL  10 mL Oral Q4H PRN Lolis Vital MD   10 mL at 05/21/23 2029   • hydrOXYzine (ATARAX) tablet 25 mg  25 mg Oral TID - RT Primitivo Nuñez DO   25 mg at 05/22/23 1327   • Insulin Aspart (novoLOG) injection 0-9 Units  0-9 Units Subcutaneous TID AC Lolis Vital MD   6 Units at 05/21/23 1733   • ipratropium-albuterol (DUO-NEB) nebulizer solution 3 mL  3 mL Nebulization Q6H - RT Arnaldo Soriano MD   3 mL at 05/22/23 1304   • melatonin tablet 5 mg  5 mg Oral Nightly PRN Lolis Vital MD   5 mg at 05/21/23 2035   • methylPREDNISolone sodium succinate (SOLU-Medrol) injection 40 mg  40 mg Intravenous Q24H Arnaldo Soriano MD   40 mg at 05/22/23 0841   • multivitamin with minerals 1 tablet  1 tablet Oral Daily Lolis Vital MD   1 tablet at 05/22/23 0841   • ondansetron (ZOFRAN) injection 4 mg  4 mg Intravenous Q6H PRN Lolis iVtal MD   4 mg at 05/20/23 0432   • Pharmacy to Dose enoxaparin (LOVENOX)   Does not apply Continuous PRN Lolis Vital MD       • Pharmacy to dose vancomycin   Does not apply Continuous PRN Primitivo Nuñez DO       • sodium chloride 0.9 % flush 10 mL  10 mL Intravenous PRN Lolis Vital MD       • sodium chloride  0.9 % flush 10 mL  10 mL Intravenous Q12H Primitivo Nuñez Michael, DO   10 mL at 23 0842   • sodium chloride 0.9 % flush 10 mL  10 mL Intravenous Q12H Primitivo Nuñez Michael, DO   10 mL at 23 0842   • sodium chloride 0.9 % flush 10 mL  10 mL Intravenous PRN Primitivo Nuñezith, DO       • sodium chloride 0.9 % flush 10 mL  10 mL Intravenous Q12H Primitivo Nuñez Michael, DO   10 mL at 23 0842   • sodium chloride 0.9 % flush 10 mL  10 mL Intravenous PRN Primitivo Nuñez Michael, DO       • sodium chloride 0.9 % flush 20 mL  20 mL Intravenous PRN Primitivo Nuñez Michael, DO       • sodium chloride 0.9 % flush 20 mL  20 mL Intravenous PRN Primitivo Nuñez Michael, DO       • sodium chloride 0.9 % flush 3 mL  3 mL Intravenous Q12H Lolis Vital MD   3 mL at 23 0841   • sodium chloride 0.9 % flush 3-10 mL  3-10 mL Intravenous PRN Lolis Vital MD       • sodium chloride 0.9 % infusion 40 mL  40 mL Intravenous PRN Lolis Vital MD       • sodium chloride 3 % nebulizer solution 4 mL  4 mL Nebulization Q12H Arnaldo Soriano MD   4 mL at 23 0701   • vancomycin 1250 mg/250 mL 0.9% NS IVPB ([facility])  1,250 mg Intravenous Q12H Primitivo Nuñez, DO   1,250 mg at 23 1327        Physician Progress Notes (most recent note)      Primitivo Nuñez, DO at 23 1243              [facility]    [facility]ESS [facility]    Name:  Carolin Toscano   Age:  77 y.o.  Sex:  female  :  1945  MRN:  [id number]   Visit Number:  [id number]  Admission Date:  2023  Date Of Service:  23  Primary Care Physician:  Jason Nicholson MD     LOS: 17 days :    Chief Complaint:      headache    Subjective:    : c/o ha suspect is due to mucous. Agreeable to flonase, zyrtec and atarax. Labs reviewed.    Hospital Course:  May 20, 2023 discussed with patient as well as family at the bedside.  Also discussed with them regarding placing access which she was able to get a PICC line.  Also discussed with  them the staph growing in her sputum and that I was going to add vancomycin for this.  5/19: d/w patient and family updated them on [facility] and pending appeal which will likely be submitted Monday. Still having dyspnea but mucous is breaking loose today.  Still debating about bronchoscopy.  Interested in long-term acute care possibly in [place] versus [place].    5/18: Daughter Regla is coming today.  Otherwise not much change still having dyspnea and hypoxemia.  Case management note reviewed.    Patient was admitted and treated with steroids.  Antibiotics were not indicated.  Pulmonology consulted and was moved to ICU on 5/9 due to worsening respiratory failure and tachypnea.  She initially had to be on Precedex drip which was discontinued and switched to Xanax due to anxiety. Patient was placed on AVAPS.  Patient continued on steroids and received Lasix.    5/16/23: d/w nsg intermittently on avaps and hi flow. Patient awake in bed. CXR from 5/15 reviewed. Medications reviewed.  Discussed with case management they are [facility]    5/17/2023 discussed with case management still working on [facility].  I am planning to do a peer to peer with them tomorrow.  Otherwise has persistent hypoxia.  Dr. Soriano's note reviewed considering bronchoscopy.     History of presenting illness:  Patient is a 77 years old female with a past medical history of COPD, chronic respiratory failure on 5 to 6 L per her previous discharge, on NIV machine at home and [facility], hypertension, and ongoing tobacco use who presented to the ER from home by EMS with a chief complaint of shortness of breath.  Patient reporting that she started having shortness of breath associated with productive cough since last night and has persisted and became worse that promoted her to call EMS.  EMS has found the patient to be at 65% saturation on her normal 6 L of oxygen. Patient reports compliance with her NIV machine at  home.      On ER evaluation, Patient was found to be tachycardic with a heart rate of 130s, temperature of 100.2 and respirations of 30, /72.  Patient was placed on BiPAP with FiO2 of 40%.  Her ABG came back and showed pH of 7.296/PCO2 87/PO2 94/HCO3 42/saturation 97% on 5 L nasal cannula.  HS Troponin 44, proBNP WNL, glucose 192, CO2 of 40, otherwise CBC and CMP within acceptable range.  Lactate and Pro-Adi WNL.  Respiratory panel positive for parainfluenza virus.  Chest x-ray Mild interstitial disease  bilaterally is similar to prior. No area of consolidation is seen. Patient received Solu-Medrol and Aztreonam while in the ER.  Hospitalist consulted for admission.       Edited by: Primitivo Nuñez DO at 5/21/2023 0849     Review of Systems:     All systems were reviewed and negative except as mentioned in subjective, assessment and plan.    Vital Signs:    Temp:  [97.2 °F (36.2 °C)-98.9 °F (37.2 °C)] 98.9 °F (37.2 °C)  Heart Rate:  [53-84] 61  Resp:  [16-26] 18  BP: (104-128)/(42-64) 112/64    Intake and output:    I/O last 3 completed shifts:  In: 750 [I.V.:250; IV Piggyback:500]  Out: 2050 [Urine:2050]  No intake/output data recorded.    Physical Examination:    General Appearance:  Alert and cooperative.  Chronically ill-appearing. Sitting up in bed.   Head:  Atraumatic and normocephalic.   Eyes: Conjunctivae and sclerae normal, no icterus. No pallor.   Throat: No oral lesions, no thrush, oral mucosa moist.   Neck: Supple, trachea midline, no thyromegaly.   Lungs:   improved air entry with persistent diminished breath sounds end expiratory wheeze.  On nasal cannula.   Heart:  Normal S1 and S2, no murmur, no gallop, no rub. No JVD.   Abdomen:   Normal bowel sounds, no masses, no organomegaly. Soft, nontender, nondistended, no rebound tenderness.   Extremities: Supple, no edema, no cyanosis, no clubbing.   Skin: No bleeding or rash.   Neurologic: Alert and oriented x 3. No facial asymmetry. Moves all  four limbs. No tremors.  Generalized weakness noted.     Edited by: Primitivo Nuñez DO at 5/21/2023 1234     Laboratory results:    Results from last 7 days   Lab Units 05/21/23  0453 05/20/23  0426 05/19/23  0432   SODIUM mmol/L 140 139 137   POTASSIUM mmol/L 4.7 3.8 4.4   CHLORIDE mmol/L 98 94* 90*   CO2 mmol/L 36.7* 38.8* 41.4*   BUN mg/dL 24* 25* 22   CREATININE mg/dL 0.32* 0.28* 0.28*   CALCIUM mg/dL 8.5* 8.9 9.1   BILIRUBIN mg/dL 0.2 0.3  --    ALK PHOS U/L 77 72  --    ALT (SGPT) U/L 35* 26  --    AST (SGOT) U/L 19 12  --    GLUCOSE mg/dL 120* 123* 242*     Results from last 7 days   Lab Units 05/21/23  0453 05/20/23  0426 05/19/23  0432   WBC 10*3/mm3 13.10* 15.22* 15.77*   HEMOGLOBIN g/dL 11.0* 11.3* 11.6*   HEMATOCRIT % 33.8* 36.5 37.4   PLATELETS 10*3/mm3 212 209 191             Results from last 7 days   Lab Units 05/18/23  1101 05/18/23  1054   BLOODCX  No growth at 3 days No growth at 3 days     Recent Labs     05/16/23  0519 05/18/23  0708 05/21/23  0714   PHART 7.453* 7.451* 7.394   KKY7HCW 76.4* 73.4* 70.2*   PO2ART 56.7* 64.0* 95.2   YGB1CIZ 53.4* 51.1* 42.8*   BASEEXCESS 24.7* 22.8* 15.1*      I have reviewed the patient's laboratory results.    Radiology results:    XR Chest 1 View    Result Date: 5/21/2023  PORTABLE CHEST    5/21/2023 6:19 AM  HISTORY: Respiratory failure.  COMPARISON: May 18, 2023.  FINDINGS: Consolidation at the left lung base has slightly worsened. Background of bronchitis is noted. No other consolidation. No effusion or pneumothorax. New right upper shimmy PICC, tip in SVC.      Impression: Left base pneumonia, slightly worsened from previous.  This report was signed and finalized on 5/21/2023 7:22 AM by Dr Danyel Mckinnon DO.    I have reviewed the patient's radiology reports.    Medication Review:     I have reviewed the patient's active and prn medications.     Problem List:      Acute on chronic respiratory failure with hypoxia and  hypercapnia      Assessment/Plan:    Respiratory failure with hypoxia/hypercapnia, COPD exacerbation, parainfluenza  -patient on 5 to 6 L oxygen at baseline,  Continue BiPAP therapy.  Has NIV at home. Currently on 10 liters  -Recent parainfluenza virus infection met SIRS due to this  - Solu-Medrol taper as appropriate  -Bronchodilators, Mucinex MetaNeb and supportive care.  -Dr. Soriano note reviewed and appreciated  -s/p lasix  -Xanax and Precedex as needed  -Sputum growing Staph aureus and haemophilus on vancomycin and azithromycin  -will add flonase, zyrtec, and atarax for upper airway congestion     Elevated troponin  -Likely secondary to demand ischemia in settings of respiratory failure  -Patient denies any chest pain, low suspicion for ACS  -Troponin trended down and plateaued.     Hyperglycemia  -Will cover with sliding scale insulin while on steroids  -Hemoglobin A1c 6.7     [facility]: [facility]    Prophylaxis: Lovenox    Edited by: Primitivo Nuñez,  at 5/21/2023 0849     DVT Prophylaxis: Lovenox  Code Status:   Code Status and Medical Interventions:   Ordered at: 05/04/23 1634     Code Status (Patient has no pulse and is not breathing):    CPR (Attempt to Resuscitate)     Medical Interventions (Patient has pulse or is breathing):    Full Support      Diet:   Dietary Orders (From admission, onward)     Start     Ordered    05/18/23 1800  Dietary Nutrition Supplements Magic Cup  2 Times Daily      Question:  [facility] Supplement:  Answer:  Magic Cup    05/18/23 1517    05/17/23 1038  Diet: Cardiac Diets; Healthy Heart (2-3 Na+); Texture: Regular Texture (IDDSI 7); Fluid Consistency: Thin (IDDSI 0)  Diet Effective Now        Comments: Pt prefers cottage cheese, bananas, oranges, peaches, watermelon.   References:    Diet Order [place]   Question Answer Comment   Diets: Cardiac Diets    Cardiac Diet: Healthy Heart (2-3 Na+)    Texture: Regular Texture (IDDSI 7)    Fluid  "Consistency: Thin (IDDSI 0)        05/17/23 1039    05/15/23 1800  Dietary Nutrition Supplements [facility]  2 Times Daily      Question:  [facility] Supplement:  Answer:  Xu    05/15/23 1445    05/15/23 1800  Dietary Nutrition Supplements Boost VHC  2 Times Daily      Question:  [facility] Supplement:  Answer:  Boost VHC    05/15/23 1446               Discharge Plan: Long-term acute care 5 days    Primitivo Nuñez DO  05/21/23  12:43 EDT    Dictated utilizing Dragon dictation.        Electronically signed by Primitivo Nuñez DO at 05/21/23 1244          Consult Notes (most recent note)      Arnaldo Soriano MD at 05/08/23 0942          Date of admission:  5/4/2023    Date of consultation:   May 8, 2023    Requested by:   Hospitalist Service.     PCP: Jason Nicholson MD    Reason:  Acute hypoxic respiratory failure.    History of Present Illness:  77 y.o. female with past medical history significant for hypertension, heavy smoking history and COPD who was discharged about 15 days ago from the hospital and actually was seen by her primary care provider a few days ago, who once again started having symptoms of shortness of breath, cough and phlegm production for the past few days.  Patient is somewhat sleepy and history is limited from her but she was able to mention increasing cough and phlegm production along with half a day of \"feeling warm\".  She denied any chills.      she mentions that her grandchildren at home who may have been sick.    she was using her medications regularly at home, although stopped using Trelegy after a few days of discharge.      The patient says that she also started using rescue inhaler more frequently without any significant improvement.    The patient says that she used to smoke 1 pack/day for about 55 years or so, but currently smokes half a pack per day or less.    Upon questioning the patient was not able to answer the question with regards to NIV/BiPAP device.  She was unsure if she was " "on NIV device or BiPAP at home?.  She did however mention using \"a mask at night\".    The patient's symptoms continued to worsen and she was admitted to the hospital and pulmonary Consultation was requested for further recommendations.       Review of System: All other review of systems negative except indicated in HPI     Past Medical History: Pertinent history reviewed, as appropriate. Negative, except noted below or in HPI.  If this history could not be obtained due to a reason, the reason is listed in the HPI.  Past Medical History:   Diagnosis Date   • Arthritis    • COPD (chronic obstructive pulmonary disease)    • Gall stones    • Goiter     \"inward\"   • Hypertension    • Hypertension    • Kidney infection    • Kidney stone    • Kidney stones    • Migraine headache    • Scarlet fever          Past Surgical History: Pertinent history reviewed, as appropriate. Negative, except noted below or in HPI. If this history could not be obtained due to a reason, the reason is listed in the HPI.  Past Surgical History:   Procedure Laterality Date   • BLADDER SURGERY     • CATARACT EXTRACTION W/ INTRAOCULAR LENS IMPLANT Left 8/11/2022    Procedure: CATARACT PHACO EXTRACTION WITH INTRAOCULAR LENS IMPLANT LEFT;  Surgeon: Sathish Lopez MD;  [facility];  Service: Ophthalmology;  Laterality: Left;   • CATARACT EXTRACTION W/ INTRAOCULAR LENS IMPLANT Right 8/25/2022    Procedure: CATARACT PHACO EXTRACTION WITH INTRAOCULAR LENS IMPLANT RIGHT;  Surgeon: Sathish Lopez MD;  Location: [facility];  Service: Ophthalmology;  Laterality: Right;   • CHOLECYSTECTOMY     • HYSTERECTOMY     • TONSILLECTOMY           Family History: Pertinent history reviewed, as appropriate. Negative, except noted below or in HPI. If this history could not be obtained due to a reason, the reason is listed in the HPI.  Family History   Problem Relation Age of Onset   • Heart disease Mother    • Arthritis Mother    • Diabetes Mother    • " "Hypertension Mother    • Colon cancer Father    • Arthritis Father    • Diabetes Father    • Hypertension Father    • Migraines Sister          Social History: Pertinent history reviewed, as appropriate. Negative, except noted below or in HPI. If this history could not be obtained due to a reason, the reason is listed in the HPI.  Social History     Socioeconomic History   • Marital status:    Tobacco Use   • Smoking status: Every Day     Packs/day: 1.00     Types: Cigarettes     Start date: 1960   • Smokeless tobacco: Former     Quit date: 9/24/2017   Vaping Use   • Vaping Use: Never used   Substance and Sexual Activity   • Alcohol use: No   • Drug use: No   • Sexual activity: Defer             Physical Exam:  /62 (BP Location: Left arm, Patient Position: Lying)   Pulse 57   Temp 98.5 °F (36.9 °C) (Axillary)   Resp (!) 38   Ht 160 cm (63\")   Wt 68.2 kg (150 lb 5.7 oz)   SpO2 98%   BMI 26.63 kg/m²     Constitutional:            Vital signs reviewed                     General: Moderate respiratory distress noted.    Eyes:            Extraocular movement was intact.            Pupils appeared equal            Conjunctiva: Pink    ENT:             Hearing was intact              No nasal erythema noted.              Oropharynx was dry.  No lesions noted.     Neck:             Supple. No JVD noted.              Thyroid gland did not seem to be enlarged    Cardiovascular:              S1 + S2. Regular     Lungs/Respiratory:            Respiratory effort was labored            Hyperresonance to percussion.            Decreased Air Entry Bilaterally with mild to moderate wheezing.    Abdomen/GI:            Soft.  Bowel sounds were positive.  No obvious organomegaly.    Musculoskeletal/Extremities:            No edema noted.            Gait could not be assessed at this time.    Neurologic:             Awake, alert and oriented x 3.             Able to follow simple commands.    Psychiatric:            "  Affect appeared fair.             Awake, alert and oriented x 3.    Skin:             Warm and dry      Labs: Reviewed. Pertinent labs were noted.     Results from last 7 days   Lab Units 05/07/23  1003 05/06/23  0611 05/05/23  0525 05/04/23  1447   WBC 10*3/mm3 10.60 10.46 4.57 8.04   HEMOGLOBIN g/dL 12.6 12.5 12.1 13.3   HEMATOCRIT % 42.4 41.3 40.7 44.5   PLATELETS 10*3/mm3 138* 140 115* 149   NEUTROPHIL % %  --  88.7*  --  91.6*   NEUTROS ABS 10*3/mm3  --  9.28*  --  7.36*   EOSINOPHIL % %  --  0.0*  --  0.0*   EOS ABS 10*3/mm3  --  0.00  --  0.00   LYMPHOCYTE % %  --  5.9*  --  3.6*   LYMPHS ABS 10*3/mm3  --  0.62*  --  0.29*       Lab Results   Component Value Date    PROCALCITO 0.14 05/07/2023    PROCALCITO 0.14 05/04/2023    PROCALCITO 0.24 04/18/2023       No results found for: CRP    No results found for: SEDRATE    Lab Results   Component Value Date    PROBNP 234.4 05/04/2023    PROBNP 272.8 04/13/2023    PROBNP 315.0 (C) 10/02/2017       Results from last 7 days   Lab Units 05/07/23  1003 05/06/23  0611 05/05/23  0524 05/04/23  1447   SODIUM mmol/L 144 144 142 140   POTASSIUM mmol/L 4.4 4.9 4.6 4.4   CHLORIDE mmol/L 98 100 99 95*   CO2 mmol/L 40.3* 40.1* 34.6* 40.2*   BUN mg/dL 16 19 10 10   CREATININE mg/dL 0.34* 0.28* 0.29* 0.38*   CALCIUM mg/dL 9.2 9.0 8.4* 9.1   ANION GAP mmol/L 5.7 3.9* 8.4 4.8*   BILIRUBIN mg/dL  --   --   --  0.4   ALK PHOS U/L  --   --   --  87   ALT (SGPT) U/L  --   --   --  30   AST (SGOT) U/L  --   --   --  23   GLUCOSE mg/dL 195* 178* 155* 192*   TOTAL PROTEIN g/dL  --   --   --  6.9   ALBUMIN g/dL  --   --   --  3.8             No results found for: TSH    No results found for: FREET4    No results found for: INR    No results found for: CKTOTAL    No components found for: HSTROPT    Lab Results   Component Value Date    TROPONINT 22 (H) 05/05/2023    TROPONINT 25 (H) 05/05/2023    TROPONINT 44 (H) 05/04/2023       No results found for: DDIMER    Brief Urine Lab Results   (Last result in the past 365 days)      Color   Clarity   Blood   Leuk Est   Nitrite   Protein   CREAT   Urine HCG        05/04/23 1611 Yellow   Clear   Small (1+)   Negative   Negative   30 mg/dL (1+)                   Micro: As of May 8, 2023   Lab Results   Component Value Date    RESPCX Light growth (2+) Normal Respiratory Ana 10/02/2017     No results found for: BCIDPCR  Lab Results   Component Value Date    BLOODCX No growth at 3 days 05/04/2023    BLOODCX No growth at 3 days 05/04/2023    BLOODCX No growth at 5 days 04/16/2023    BLOODCX No growth at 5 days 04/16/2023     Lab Results   Component Value Date    URINECX Final report 02/07/2019    URINECX 20,000-30,000 CFU/mL Escherichia coli (A) 10/05/2018     No results found for: MRSACX  Lab Results   Component Value Date    MRSAPCR No MRSA Detected 04/15/2023     No results found for: URCX  No components found for: LOWRESPCF  No results found for: THROATCX  No results found for: CULTURES  No components found for: STREPBCX  No results found for: STREPPNEUAG  No results found for: LEGIONELLA  No results found for: MYCOPLASCX  No results found for: GCCX  No results found for: WOUNDCX  No results found for: BODYFLDCX    No results found for: FLU    Lab Results   Component Value Date    ADENOVIRUS Not Detected 05/04/2023     Lab Results   Component Value Date    IB362X Not Detected 05/04/2023     Lab Results   Component Value Date    CVHKU1 Not Detected 05/04/2023     Lab Results   Component Value Date    CVNL63 Not Detected 05/04/2023     Lab Results   Component Value Date    CVOC43 Not Detected 05/04/2023     Lab Results   Component Value Date    HUMETPNEVS Not Detected 05/04/2023     Lab Results   Component Value Date    HURVEV Not Detected 05/04/2023     Lab Results   Component Value Date    FLUBPCR Not Detected 05/04/2023     Lab Results   Component Value Date    PARAINFLUE Not Detected 05/04/2023     Lab Results   Component Value Date    PARAFLUV2 Not  Detected 05/04/2023     Lab Results   Component Value Date    PARAFLUV3 Detected (A) 05/04/2023     Lab Results   Component Value Date    PARAFLUV4 Not Detected 05/04/2023     Lab Results   Component Value Date    BPERTPCR Not Detected 05/04/2023     No results found for: [id number]  Lab Results   Component Value Date    CPNEUPCR Not Detected 05/04/2023     Lab Results   Component Value Date    MPNEUMO Not Detected 05/04/2023     Lab Results   Component Value Date    FLUAPCR Not Detected 05/04/2023     No results found for: FLUAH3  No results found for: FLUAH1  Lab Results   Component Value Date    RSV Not Detected 05/04/2023     Lab Results   Component Value Date    BPARAPCR Not Detected 05/04/2023       COVID 19:  Lab Results   Component Value Date    COVID19 Not Detected 05/04/2023           No results found for: THCURSCR  No results found for: PCPUR  No results found for: COCAINEUR  No results found for: METAMPSCNUR  No results found for: LABOPIASCN  No results found for: AMPHETSCREEN  No results found for: LABBENZSCN  No results found for: TRICYCLICSCN  No results found for: LABMETHSCN  No results found for: BARBITSCNUR  No results found for: OXYCODONESCN  No results found for: PROPOXSCN  No results found for: BUPRENORSCNU  No results found for: ETHANOLMGDL  No results found for: ETOHPCT      ABG: Reviewed  Recent Labs     05/05/23 2009 05/06/23 0521 05/07/23  1013   PHART 7.369 7.339* 7.386   ACO9EDM 75.6* 81.4* 74.8*   PO2ART 77.8 97.2 69.7*   YGW1NWW 43.6* 43.7* 44.8*   BASEEXCESS 14.8* 14.4* 16.0*       Lab Results   Component Value Date    LACTATE 1.3 05/04/2023    LACTATE 1.2 04/13/2023    LACTATE 2.7 (C) 04/13/2023         Imaging Study: Latest imaging studies was reviewed personally.   Imaging Results (Last 72 Hours)     Procedure Component Value Units Date/Time    XR Chest 1 View [[id number]] Resulted: 05/08/23 0852     Updated: 05/08/23 0906    XR Chest 1 View [[id number]] Collected: 05/06/23 0922      Updated: 05/06/23 0925    Narrative:      PROCEDURE: XR CHEST 1 VW-     HISTORY: Respiratory failure follow-up; J96.21-Acute and chronic  respiratory failure with hypoxia; J96.22-Acute and chronic respiratory  failure with hypercapnia; J44.1-Chronic obstructive pulmonary disease  with (acute) exacerbation; B34.8-Other viral infections of unspecified  site     COMPARISON: May 4, 2023.     FINDINGS: The heart is upper limits of normal in size. Aortic mural  calcifications noted.. There is mild bilateral interstitial disease,  stable from prior exam. Slight blunting of the left costophrenic angle  is stable.. The mediastinum is unremarkable. There is no pneumothorax.   There are no acute osseous abnormalities.       Impression:      Stable chest..           This report was signed and finalized on 5/6/2023 9:23 AM by Shraddha Wharton MD.            ECHO:  Results for orders placed during the hospital encounter of 04/13/23    Adult Transthoracic Echo Complete W/ Cont if Necessary Per Protocol    Interpretation Summary  •  Left ventricular diastolic function is consistent with (grade I) impaired relaxation.  •  Mild aortic valve stenosis is present.  •  Estimated right ventricular systolic pressure from tricuspid regurgitation is moderately elevated (45-55 mmHg).  •  Mild to moderate pulmonary hypertension is present.      Results for orders placed during the hospital encounter of 10/01/17    Adult Transthoracic Echo Complete W/ Cont if Necessary Per Protocol    Interpretation Summary  TEchnically difficult study  1) Borderline LVH with normal LV systolic function ( EF is over 55%)  2) Normal left atrial size with mild elevation in LVedp  3) Trace MR  4) Mild TR with normal PA pressures  5) Mild AI  6) Small RV with normal RV function        Pharmacy to Dose enoxaparin (LOVENOX),           Assessment:  1.  Acute respiratory failure   2.  Acute exacerbation of COPD   3.  Chronic respiratory acidosis  4.  Smoking  5.   "Pulmonary hypertension    Discussion/Recommendations:   The patient acute respiratory failure is once again likely secondary to exacerbation of underlying, what appears to be severe, COPD.  She unfortunately stopped using her Trelegy, as per the primary care provider's last office note that I reviewed from May 1, 2023.    Given the likelihood of severe COPD, Spiriva alone is not appropriate especially given her recent hospitalization for COPD exacerbation.    I have asked the nursing staff to decrease FiO2 as tolerated.    Given her continued respiratory acidosis, albeit better compared to before, I have asked the nursing staff to make sure that the patient uses BiPAP 2 hours on and 2 hours off throughout the day along with overnight use.    I was adjusted the nebulized treatments as appropriate, and started her on hypertonic saline.     I have also adjusted the dose of steroids, as appropriate.    ABG will be repeated, in the morning.    Chest x-ray will be repeated, as clinically indicated.    The patient will definitely need to quit smoking and this was discussed with the patient in great detail.    Her pulmonary hypertension is likely secondary to significant COPD.    We will consider further work-up if necessary, on an outpatient basis.    Patient will need outpatient work-up including PFTs.  This will be discussed before discharge.    I had a long discussion with the patient regarding the need to be compliant with recommendations.  Since the patient could not specify any significant issues with regards to Trelegy other than the fact that it was \"powder\", we may consider discharging her on combination of medications or Breztri.    The plan was discussed with the patient.  I have also discussed the case with the nursing staff.    Recommendations were also discussed with the referring provider.     I would like to thank you for the opportunity to participate in the care of this patient.  We will communicate " "changes and recommendations, if and when necessary.      This document was electronically signed by Arnaldo Soriano MD on 23 at 09:42 EDT      Dictated utilizing Dragon dictation.          Electronically signed by Arnaldo Soriano MD at 23 1311          Physical Therapy Notes (most recent note)      Deysi Goodwin, PT at 23 1205  Version 1 of 1         Patient Name: Carolin Toscano  : 1945    MRN: [id number]                              Today's Date: 2023       Admit Date: 2023    Visit Dx:     ICD-10-CM ICD-9-CM   1. Acute on chronic respiratory failure with hypoxia and hypercapnia  J96.21 518.84    J96.22 786.09     799.02   2. COPD exacerbation  J44.1 491.21   3. Parainfluenza virus infection  B34.8 078.89     Patient Active Problem List   Diagnosis   • CAP (community acquired pneumonia)   • Cigarette nicotine dependence with nicotine-induced disorder   • Essential hypertension   • Dyslipidemia   • Blood glucose elevated   • Muscle ache   • COPD (chronic obstructive pulmonary disease)   • Nuclear sclerotic cataract of right eye   • Acute respiratory failure   • COPD exacerbation   • Acute on chronic respiratory failure with hypoxia and hypercapnia     Past Medical History:   Diagnosis Date   • Arthritis    • COPD (chronic obstructive pulmonary disease)    • Gall stones    • Goiter     \"inward\"   • Hypertension    • Hypertension    • Kidney infection    • Kidney stone    • Kidney stones    • Migraine headache    • Scarlet fever      Past Surgical History:   Procedure Laterality Date   • BLADDER SURGERY     • CATARACT EXTRACTION W/ INTRAOCULAR LENS IMPLANT Left 2022    Procedure: CATARACT PHACO EXTRACTION WITH INTRAOCULAR LENS IMPLANT LEFT;  Surgeon: Sathish Lopez MD;  Location: [facility];  Service: Ophthalmology;  Laterality: Left;   • CATARACT EXTRACTION W/ INTRAOCULAR LENS IMPLANT Right 2022    Procedure: CATARACT PHACO EXTRACTION WITH INTRAOCULAR LENS " IMPLANT RIGHT;  Surgeon: Sathish Lopez MD;  Location: [facility];  Service: Ophthalmology;  Laterality: Right;   • CHOLECYSTECTOMY     • HYSTERECTOMY     • TONSILLECTOMY        General Information     Row Name 05/21/23 1205          Physical Therapy Time and Intention    Document Type therapy note (daily note)  -TW     Mode of Treatment physical therapy  -TW     Row Name 05/21/23 1205          General Information    Patient Profile Reviewed yes  -TW     Existing Precautions/Restrictions fall;oxygen therapy device and L/min  -TW     Barriers to Rehab medically complex;previous functional deficit  -TW     Row Name 05/21/23 1205          Cognition    Orientation Status (Cognition) oriented x 4  -TW     Row Name 05/21/23 1205          Safety Issues, Functional Mobility    Safety Issues Affecting Function (Mobility) friction/shear risk;insight into deficits/self-awareness;safety precaution awareness;safety precautions follow-through/compliance  -TW     Impairments Affecting Function (Mobility) balance;endurance/activity tolerance;shortness of breath;strength;pain  -TW           User Key  (r) = Recorded By, (t) = Taken By, (c) = Cosigned By    Initials Name Provider Type    TW Deysi Goodwin PT Physical Therapist               Mobility     Row Name 05/21/23 1205          Bed Mobility    Bed Mobility supine-sit;sit-supine;scooting/bridging;rolling left  -TW     Rolling Left [place] (Bed Mobility) minimum assist (75% patient effort);verbal cues  -TW     Scooting/Bridging [place] (Bed Mobility) moderate assist (50% patient effort);verbal cues  -TW     Supine-Sit [place] (Bed Mobility) minimum assist (75% patient effort)  -TW     Sit-Supine [place] (Bed Mobility) minimum assist (75% patient effort);verbal cues  -TW     Assistive Device (Bed Mobility) bed rails;draw sheet;head of bed elevated  -TW     Comment, (Bed Mobility) Pt was able to sit on EOB x 5 minutes with close SBA to CGA with  encouragement to remain up since her HR and O2 sat was doing well. O2 sat remaining above 92% on 12 L  -TW     [facility] Name 05/21/23 [facility]5          Bed-Chair Transfer    Bed-Chair [place] (Transfers) not tested  -TW     Row Name 05/21/23 [facility]5          Sit-Stand Transfer    Sit-Stand [place] (Transfers) not tested  -[facility] Name 05/21/23 [facility]5          Gait/Stairs (Locomotion)    [place] Level (Gait) not tested  -           User Key  (r) = Recorded By, (t) = Taken By, (c) = Cosigned By    Initials Name Provider Type    TW Deysi Goodwin, ROB Physical Therapist               Obj/Interventions     [facility] Name 05/21/23 [facility]5          Motor Skills    Therapeutic Exercise hip;knee;ankle  B guided heel slides, hip ab/add, and active ankle pumps x 10 each side  -TW     [facility] Name 05/21/23 [facility]5          Balance    Balance Assessment sitting static balance;sitting dynamic balance  -TW     Static Sitting Balance contact guard;standby assist  -TW     Dynamic Sitting Balance contact guard;standby assist  -TW     Position, Sitting Balance supported;unsupported;sitting edge of bed  -TW           User Key  (r) = Recorded By, (t) = Taken By, (c) = Cosigned By    Initials Name Provider Type    TW Deysi Goodwin, PT Physical Therapist               Goals/Plan    No documentation.                Clinical Impression     [facility] Name 05/21/23 [facility]5          Pain    Pretreatment Pain Rating 8/10  -TW     Posttreatment Pain Rating 8/10  -TW     Pain Location - Side/Orientation Bilateral  -TW     Pain Location - head  -TW     Pre/Posttreatment Pain Comment pt's headache did not change during PT session.  -TW     Pain Intervention(s) Repositioned  -TW     Row Name 05/21/23 1205          Plan of Care Review    Plan of Care Reviewed With patient;daughter  -TW     Outcome Evaluation PT treatment completed this am with pt presenting supine on 12 L HFNC at 94% and HR 58. Pt performed BLE ther ex per flowsheet. She then was assisted with min  assist to sit on EOB and remained x 5 min with close SBA to CGA and O2 sat above 92%. Pt expressed fatigue at 4 min but did remain up for one more minute with encouragement. Pt positioned in chair position post treatment and nursing setting up lunch tray for pt. Pt's O2 sat at 94% and pt's HA pain remained at 8/10 throughout session. Cont current PT POC.  -TW     Row Name 05/21/23 1205          Vital Signs    Pre SpO2 (%) 94  -TW     O2 Delivery Pre Treatment hi-flow  12 L  -TW     Intra SpO2 (%) 92  -TW     O2 Delivery Intra Treatment hi-flow  12 L  -TW     Post SpO2 (%) 94  -TW     O2 Delivery Post Treatment hi-flow  12 L  -TW     Pre Patient Position Supine  -TW     Intra Patient Position Sitting  -TW     Post Patient Position Sitting  -TW     Row Name 05/21/23 1205          Positioning and Restraints    Pre-Treatment Position in bed  -TW     Post Treatment Position bed  -TW     In Bed fowlers;with nsg;encouraged to call for assist;call light within reach  -TW           User Key  (r) = Recorded By, (t) = Taken By, (c) = Cosigned By    Initials Name Provider Type    TW Deysi Goodwin, PT Physical Therapist               Outcome Measures     Row Name 05/21/23 1205 05/21/23 0747       How much help from another person do you currently need...    Turning from your back to your side while in flat bed without using bedrails? 3  -TW 2  -RP    Moving from lying on back to sitting on the side of a flat bed without bedrails? 2  -TW 2  -RP    Moving to and from a bed to a chair (including a wheelchair)? 2  -TW 2  -RP    Standing up from a chair using your arms (e.g., wheelchair, bedside chair)? 2  -TW 2  -RP    Climbing 3-5 steps with a railing? 1  -TW 2  -RP    To walk in hospital room? 2  -TW 2  -RP    AM-PAC 6 Clicks Score (PT) 12  -TW 12  -RP    Highest level of mobility 4 --> Transferred to chair/commode  -TW 4 --> Transferred to chair/commode  -RP    Row Name 05/21/23 1205          Functional Assessment    Outcome  Measure Options AM-PAC 6 Clicks Basic Mobility (PT)  -           User Key  (r) = Recorded By, (t) = Taken By, (c) = Cosigned By    Initials Name Provider Type    TW Deysi Goodwin, PT Physical Therapist    Marga Carlin, RN Registered Nurse                             Physical Therapy Education     Title: PT OT SLP Therapies (In Progress)     Topic: Physical Therapy (In Progress)     Point: Mobility training (Done)     Learning Progress Summary           Patient Acceptance, E, VU by MS at 5/16/2023 1403    Comment: importance of mobility    Acceptance, E, VU by AM at 5/15/2023 0701    Acceptance, E, VU by MS at 5/12/2023 1458    Comment: importance of mobility    Acceptance, E,TB,D, VU,NR by  at 5/8/2023 1250    Comment: Importance of activity and exercise techniques    Acceptance, E, VU by MS at 5/5/2023 1136    Comment: importance of mobility                   Point: Home exercise program (Done)     Learning Progress Summary           Patient Acceptance, E,D, VU,DU by  at 5/21/2023 1205    Comment: Pt education for improving BLE ther ex technique    Acceptance, E, VU by MS at 5/16/2023 1403    Comment: importance of mobility    Acceptance, E, VU by AM at 5/15/2023 0701    Acceptance, E,TB, VU,NR by  at 5/14/2023 1310    Comment: Importance of increasing movement of B LE    Acceptance, E,TB,D, VU,NR by  at 5/8/2023 1250    Comment: Importance of activity and exercise techniques    Acceptance, E, VU by MS at 5/5/2023 1136    Comment: importance of mobility                   Point: Body mechanics (Done)     Learning Progress Summary           Patient Acceptance, E,TB, VU by  at 5/17/2023 1846    Comment: upright posture                   Point: Precautions (Not Started)     Learner Progress:  Not documented in this visit.                      User Key     Initials Effective Dates Name Provider Type Discipline     03/23/22 -  Kavitha Tavarez, PT Physical Therapist PT    CC 06/16/21 -  Nasra Quiñones  ALEXIS, PTA Physical Therapist Assistant PT    RM 06/16/21 -  Ronak Cruz, PTA Physical Therapist Assistant PT    TW 06/16/21 -  Deysi Goodwin, ROB Physical Therapist PT    MS 07/01/22 -  Martín Ortiz, PT Physical Therapist PT    AM 02/22/23 -  Rob Lu, RN Registered Nurse Nurse              PT Recommendation and Plan     Plan of Care Reviewed With: patient, daughter  Outcome Evaluation: PT treatment completed this am with pt presenting supine on 12 L HFNC at 94% and HR 58. Pt performed BLE ther ex per flowsheet. She then was assisted with min assist to sit on EOB and remained x 5 min with close SBA to CGA and O2 sat above 92%. Pt expressed fatigue at 4 min but did remain up for one more minute with encouragement. Pt positioned in chair position post treatment and nursing setting up lunch tray for pt. Pt's O2 sat at 94% and pt's HA pain remained at 8/10 throughout session. Cont current PT POC.     Time Calculation:    PT Charges     Row Name 05/21/23 1205             Time Calculation    Start Time 1142  -TW      Stop Time 1205  -TW      Time Calculation (min) 23 min  -TW      PT Received On 05/21/23  -TW         Timed Charges    35379 - PT Therapeutic Exercise Minutes 10  -TW      42734 - PT Therapeutic Activity Minutes 13  -TW         Total Minutes    Timed Charges Total Minutes 23  -TW       Total Minutes 23  -TW            User Key  (r) = Recorded By, (t) = Taken By, (c) = Cosigned By    Initials Name Provider Type    TW Deysi Goodwin, PT Physical Therapist              Therapy Charges for Today     Code Description Service Date Service Provider Modifiers Qty    97579276942 HC PT THER PROC EA 15 MIN 5/21/2023 Deysi Goodwin, PT GP 1    43798808334 HC PT THERAPEUTIC ACT EA 15 MIN 5/21/2023 Deysi Goodwin, PT GP 1          PT G-Codes  Outcome Measure Options: AM-PAC 6 Clicks Basic Mobility (PT)  AM-PAC 6 Clicks Score (PT): 12  AM-PAC 6 Clicks Score (OT): 13       Deysi Goodwin PT  5/21/2023      Electronically  "signed by Deysi Goodwin PT at 23 1328          Occupational Therapy Notes (most recent note)      Roselyn Donaldson at 23 1340          Patient Name: Carolin Toscano  : 1945    MRN: [id number]                              Today's Date: 2023       Admit Date: 2023    Visit Dx:     ICD-10-CM ICD-9-CM   1. Acute on chronic respiratory failure with hypoxia and hypercapnia  J96.21 518.84    J96.22 786.09     799.02   2. COPD exacerbation  J44.1 491.21   3. Parainfluenza virus infection  B34.8 078.89     Patient Active Problem List   Diagnosis   • CAP (community acquired pneumonia)   • Cigarette nicotine dependence with nicotine-induced disorder   • Essential hypertension   • Dyslipidemia   • Blood glucose elevated   • Muscle ache   • COPD (chronic obstructive pulmonary disease)   • Nuclear sclerotic cataract of right eye   • Acute respiratory failure   • COPD exacerbation   • Acute on chronic respiratory failure with hypoxia and hypercapnia     Past Medical History:   Diagnosis Date   • Arthritis    • COPD (chronic obstructive pulmonary disease)    • Gall stones    • Goiter     \"inward\"   • Hypertension    • Hypertension    • Kidney infection    • Kidney stone    • Kidney stones    • Migraine headache    • Scarlet fever      Past Surgical History:   Procedure Laterality Date   • BLADDER SURGERY     • CATARACT EXTRACTION W/ INTRAOCULAR LENS IMPLANT Left 2022    Procedure: CATARACT PHACO EXTRACTION WITH INTRAOCULAR LENS IMPLANT LEFT;  Surgeon: Sathish Lopez MD;  Location: [facility];  Service: Ophthalmology;  Laterality: Left;   • CATARACT EXTRACTION W/ INTRAOCULAR LENS IMPLANT Right 2022    Procedure: CATARACT PHACO EXTRACTION WITH INTRAOCULAR LENS IMPLANT RIGHT;  Surgeon: Sathish Lopez MD;  Location: [facility];  Service: Ophthalmology;  Laterality: Right;   • CHOLECYSTECTOMY     • HYSTERECTOMY     • TONSILLECTOMY        General Information     Row Name 23 5929       "    OT Time and Intention    Document Type therapy note (daily note)  -     Mode of Treatment occupational therapy  -[facility] Name 05/19/23 1329          General Information    Patient Profile Reviewed yes  -     Existing Precautions/Restrictions fall;oxygen therapy device and L/min  -           User Key  (r) = Recorded By, (t) = Taken By, (c) = Cosigned By    Initials Name Provider Type     Roselyn Donaldson Occupational Therapist                 Mobility/ADL's    No documentation.                Obj/Interventions     [facility] Name 05/19/23 1329          Shoulder (Therapeutic Exercise)    Shoulder (Therapeutic Exercise) strengthening exercise  -     Shoulder AROM (Therapeutic Exercise) bilateral;flexion;extension;horizontal aBduction/aDduction;10 repetitions;other (see comments)  B chest press  -     Shoulder Strengthening (Therapeutic Exercise) bilateral;flexion;extension;1 lb free weight;10 repetitions  -[facility] Name 05/19/23 1329          Elbow/Forearm (Therapeutic Exercise)    Elbow/Forearm (Therapeutic Exercise) strengthening exercise  -     Elbow/Forearm AROM (Therapeutic Exercise) bilateral;flexion;extension;10 repetitions  -     Elbow/Forearm Strengthening (Therapeutic Exercise) bilateral;flexion;extension;1 lb free weight;10 repetitions  -[facility] Name 05/19/23 1329          Motor Skills    Therapeutic Exercise shoulder;elbow/forearm  -           User Key  (r) = Recorded By, (t) = Taken By, (c) = Cosigned By    Initials Name Provider Type     Roselyn Donaldson Occupational Therapist               Goals/Plan    No documentation.                Clinical Impression     [facility] Name 05/19/23 3506          Pain Assessment    Pretreatment Pain Rating 8/10  -     Posttreatment Pain Rating 8/10  -     Pain Location - head  -     Pre/Posttreatment Pain Comment nursing gave her pain medication  -AH     Row Name 05/19/23 8555          Plan of Care Review    Plan of Care Reviewed With  patient;daughter;son  -     Progress improving  -     Outcome Evaluation Pt received supine in bed on 15L high flow O2 with sats staying 93% with UB ex.  Pt performed AROM ex with BUE 10 reps each.  Pt was then able to tolerated 1# weighted ex for shoulder flexion, chest press and elbow flexion.  Pt noted to fatigue easily and needed rest breaks between ex.  Tolerated increased activity today. Cont OT per POC.  -     Row Name 05/19/23 1334          Vital Signs    Pre SpO2 (%) 93  -AH     O2 Delivery Pre Treatment hi-flow  15L  -AH     Intra SpO2 (%) 93  -AH     O2 Delivery Intra Treatment hi-flow  -AH     Post SpO2 (%) 93  -AH     O2 Delivery Post Treatment hi-flow  -AH     Pre Patient Position Supine  -     Intra Patient Position Supine  -AH     Post Patient Position Supine  -     Row Name 05/19/23 1334          Positioning and Restraints    Pre-Treatment Position in bed  -     Post Treatment Position bed  -     In Bed supine;call light within reach;encouraged to call for assist;patient within staff view;with family/caregiver  -           User Key  (r) = Recorded By, (t) = Taken By, (c) = Cosigned By    Initials Name Provider Type    Roselyn Pena Occupational Therapist               Outcome Measures     Row Name 05/19/23 8634          How much help from another is currently needed...    Putting on and taking off regular lower body clothing? 2  -AH     Bathing (including washing, rinsing, and drying) 2  -AH     Toileting (which includes using toilet bed pan or urinal) 2  -AH     Putting on and taking off regular upper body clothing 2  -AH     Taking care of personal grooming (such as brushing teeth) 2  -AH     Eating meals 3  -AH     AM-PAC 6 Clicks Score (OT) 13  -     Row Name 05/19/23 0814          How much help from another person do you currently need...    Turning from your back to your side while in flat bed without using bedrails? 2  -RP     Moving from lying on back to sitting on the  side of a flat bed without bedrails? 2  -RP     Moving to and from a bed to a chair (including a wheelchair)? 2  -RP     Standing up from a chair using your arms (e.g., wheelchair, bedside chair)? 2  -RP     Climbing 3-5 steps with a railing? 2  -RP     To walk in hospital room? 2  -RP     AM-PAC 6 Clicks Score (PT) 12  -RP     Highest level of mobility 4 --> Transferred to chair/commode  -RP           User Key  (r) = Recorded By, (t) = Taken By, (c) = Cosigned By    Initials Name Provider Type    Roselyn Pena Occupational Therapist    RP Marga Torres RN Registered Nurse                Occupational Therapy Education     Title: PT OT SLP Therapies (In Progress)     Topic: Occupational Therapy (In Progress)     Point: ADL training (Done)     Description:   Instruct learner(s) on proper safety adaptation and remediation techniques during self care or transfers.   Instruct in proper use of assistive devices.              Learning Progress Summary           Patient Acceptance, E, VU by AM at 5/15/2023 0701    Acceptance, E,TB, VU by  at 5/12/2023 1507    Comment: purpose of re-evaluation    Acceptance, E,TB, VU by SD at 5/8/2023 1222    Comment: Safety during tf    Acceptance, E, VU by SP at 5/5/2023 1146    Comment: OT edcuated pt on purpose of IE and POC. Pt is agreeable.                   Point: Home exercise program (Done)     Description:   Instruct learner(s) on appropriate technique for monitoring, assisting and/or progressing therapeutic exercises/activities.              Learning Progress Summary           Patient Acceptance, E,TB, VU by  at 5/19/2023 1339    Comment: importance of activity    Acceptance, E,TB, VU by SD at 5/16/2023 1246    Comment: Importance of progressing activity.   Family Acceptance, E,TB, VU by  at 5/19/2023 1339    Comment: importance of activity                   Point: Precautions (Not Started)     Description:   Instruct learner(s) on prescribed precautions during  self-care and functional transfers.              Learner Progress:  Not documented in this visit.          Point: Body mechanics (Not Started)     Description:   Instruct learner(s) on proper positioning and spine alignment during self-care, functional mobility activities and/or exercises.              Learner Progress:  Not documented in this visit.                      User Key     Initials Effective Dates Name Provider Type [facility] 06/16/21 -  Roselyn Donaldson Occupational Therapist OT    SD 06/16/21 -  Cecelia Knight OT Occupational Therapist OT    SP 09/08/22 -  January Chapa OT Occupational Therapist OT    AM 02/22/23 -  Rob Lu, RN Registered Nurse Nurse              OT Recommendation and Plan  Planned Therapy Interventions (OT): activity tolerance training, BADL retraining, patient/caregiver education/training, transfer/mobility retraining, strengthening exercise  Therapy Frequency (OT): 3 times/wk  Plan of Care Review  Plan of Care Reviewed With: patient, daughter, son  Progress: improving  Outcome Evaluation: Pt received supine in bed on 15L high flow O2 with sats staying 93% with UB ex.  Pt performed AROM ex with BUE 10 reps each.  Pt was then able to tolerated 1# weighted ex for shoulder flexion, chest press and elbow flexion.  Pt noted to fatigue easily and needed rest breaks between ex.  Tolerated increased activity today. Cont OT per POC.     Time Calculation:    Time Calculation- OT     Row Name 05/19/23 1340             Time Calculation- OT    OT Start Time 1014  -      OT Stop Time 1030  -      OT Time Calculation (min) 16 min  -      OT Received On 05/19/23  -      OT Goal Re-Cert Due Date 05/22/23  -         Timed Charges    78038 - OT Therapeutic Exercise Minutes 16  -         Total Minutes    Timed Charges Total Minutes 16  -       Total Minutes 16  -            User Key  (r) = Recorded By, (t) = Taken By, (c) = Cosigned By    Initials Name Provider Type    AH  Roselyn Donaldson Occupational Therapist              Therapy Charges for Today     Code Description Service Date Service Provider Modifiers Qty    75392439520 HC OT THER PROC EA 15 MIN 5/19/2023 Roselyn Donaldson GO 1               Roselyn Donaldson  5/19/2023    Electronically signed by Roselyn Donaldson at 05/19/23 2849

## 2023-05-22 NOTE — PROGRESS NOTES
[facility]    Name:  Carolin Toscano   Age:  77 y.o.  Sex:  female  :  1945  MRN:  [id number]   Visit Number:  [id number]  Admission Date:  2023  Date Of Service:  23  Primary Care Physician:  Jason Nicholson MD     LOS: 18 days :    Chief Complaint:      Shortness of air    Subjective:    : doing well. Oxygen requirements decreasing. Stable for dc out of ICU. Spoke with patient and family at bedside. D/w [facility] [place] [facility] pending.    Hospital Course:    Patient was admitted and treated with steroids.  Antibiotics were not indicated.  Pulmonology consulted and was moved to ICU on  due to worsening respiratory failure and tachypnea.  She initially had to be on Precedex drip which was discontinued and switched to Xanax due to anxiety. Patient was placed on AVAPS.  Patient continued on steroids and received Lasix.    23: d/w nsg intermittently on avaps and hi flow. Patient awake in bed. CXR from 5/15 reviewed. Medications reviewed.  Discussed with case management they are [facility]    2023 discussed with case management still working on [facility].  I am planning to do a peer to peer with them tomorrow.  Otherwise has persistent hypoxia.  Dr. Soriano's note reviewed considering bronchoscopy.    : Daughter Regla is coming today.  Otherwise not much change still having dyspnea and hypoxemia.  Case management note reviewed.    : d/w patient and family updated them on [facility] and pending appeal which will likely be submitted Monday. Still having dyspnea but mucous is breaking loose today.  Still debating about bronchoscopy.  Interested in long-term acute care possibly in [place] versus [place].    May 20, 2023 discussed with patient as well as family at the bedside.  Also discussed with them regarding placing access which she was able to get a PICC line.  Also discussed with them the [facility]  growing in her sputum and that I was going to add vancomycin for this.    5/21: c/o [facility] suspect is due to mucous. Agreeable to flonase, zyrtec and atarax. Labs reviewed.     History of presenting illness:  Patient is a 77 years old female with a past medical history of COPD, chronic respiratory failure on 5 to 6 L per her previous discharge, on NIV machine at home and trelegy, hypertension, and ongoing tobacco use who presented to the ER from home by EMS with a chief complaint of shortness of breath.  Patient reporting that she started having shortness of breath associated with productive cough since last night and has persisted and became worse that promoted her to call EMS.  EMS has found the patient to be at 65% saturation on her normal 6 L of oxygen. Patient reports compliance with her NIV machine at home.      On ER evaluation, Patient was found to be tachycardic with a heart rate of 130s, temperature of 100.2 and respirations of 30, /72.  Patient was placed on BiPAP with FiO2 of 40%.  Her ABG came back and showed pH of 7.296/PCO2 87/PO2 94/HCO3 42/saturation 97% on 5 L nasal cannula.  HS Troponin 44, proBNP WNL, glucose 192, CO2 of 40, otherwise CBC and CMP within acceptable range.  Lactate and Pro-Adi WNL.  Respiratory panel positive for parainfluenza virus.  Chest x-ray Mild interstitial disease  bilaterally is similar to prior. No area of consolidation is seen. Patient received Solu-Medrol and Aztreonam while in the ER.  Hospitalist consulted for admission.       Edited by: Primitivo Nuñez DO at 5/22/2023 1710     Review of Systems:     All systems were reviewed and negative except as mentioned in subjective, assessment and plan.    Vital Signs:    Temp:  [97.2 °F (36.2 °C)-99 °F (37.2 °C)] 99 °F (37.2 °C)  Heart Rate:  [53-77] 65  Resp:  [13-18] 16  BP: (107-144)/() 107/45    Intake and output:    I/O last 3 completed shifts:  In: 1000 [I.V.:250; IV Piggyback:750]  Out: 2150  [Urine:2150]  I/O this shift:  In: 580 [P.O.:580]  Out: -     Physical Examination:    General Appearance:  Alert and cooperative.  Chronically ill-appearing. Sitting up in bed.   Head:  Atraumatic and normocephalic.   Eyes: Conjunctivae and sclerae normal, no icterus. No pallor.   Throat: No oral lesions, no thrush, oral mucosa moist.   Neck: Supple, trachea midline, no thyromegaly.   Lungs:   persistent diminished breath sounds end expiratory wheeze.  On nasal cannula.   Heart:  Normal S1 and S2, no murmur, no gallop, no rub. No JVD.   Abdomen:   Normal bowel sounds, no masses, no organomegaly. Soft, nontender, nondistended, no rebound tenderness.   Extremities: Supple, no edema, no cyanosis, no clubbing.   Skin: No bleeding or rash.   Neurologic: Alert and oriented x 3. No facial asymmetry. Moves all four limbs. No tremors.  Generalized weakness noted.     Edited by: Primitivo Nuñez DO at 5/22/2023 1710     Laboratory results:    Results from last 7 days   Lab Units 05/22/23 0515 05/21/23 0453 05/20/23  0426   SODIUM mmol/L 138 140 139   POTASSIUM mmol/L 4.5 4.7 3.8   CHLORIDE mmol/L 100 98 94*   CO2 mmol/L 31.5* 36.7* 38.8*   BUN mg/dL 22 24* 25*   CREATININE mg/dL 0.26* 0.32* 0.28*   CALCIUM mg/dL 8.8 8.5* 8.9   BILIRUBIN mg/dL 0.2 0.2 0.3   ALK PHOS U/L 74 77 72   ALT (SGPT) U/L 35* 35* 26   AST (SGOT) U/L 14 19 12   GLUCOSE mg/dL 111* 120* 123*     Results from last 7 days   Lab Units 05/22/23  0515 05/21/23  0453 05/20/23  0426   WBC 10*3/mm3 11.59* 13.10* 15.22*   HEMOGLOBIN g/dL 11.0* 11.0* 11.3*   HEMATOCRIT % 35.6 33.8* 36.5   PLATELETS 10*3/mm3 211 212 209             Results from last 7 days   Lab Units 05/18/23  1101 05/18/23  1054   BLOODCX  No growth at 4 days No growth at 4 days     Recent Labs     05/18/23  0708 05/21/23  0714 05/22/23  1307   PHART 7.451* 7.394 7.447   KWG0XEO 73.4* 70.2* 53.3*   PO2ART 64.0* 95.2 50.2*   JKR9HHS 51.1* 42.8* 36.8*   BASEEXCESS 22.8* 15.1* 11.1*      I have  reviewed the patient's laboratory results.    Radiology results:    XR Chest 1 View    Result Date: 5/21/2023  PORTABLE CHEST    5/21/2023 6:19 AM  HISTORY: Respiratory failure.  COMPARISON: May 18, 2023.  FINDINGS: Consolidation at the left lung base has slightly worsened. Background of bronchitis is noted. No other consolidation. No effusion or pneumothorax. New right upper shimmy PICC, tip in SVC.      Impression: Left base pneumonia, slightly worsened from previous.  This report was signed and finalized on 5/21/2023 7:22 AM by Dr Danyel Mckinnon DO.    I have reviewed the patient's radiology reports.    Medication Review:     I have reviewed the patient's active and prn medications.     Problem List:      Acute on chronic respiratory failure with hypoxia and hypercapnia      Assessment/Plan:    Respiratory failure with hypoxia/hypercapnia, COPD exacerbation, parainfluenza  -patient on 5 to 6 L oxygen at baseline,  Continue BiPAP therapy.  Has NIV at home. Currently on 9 liters  -Recent parainfluenza virus infection met SIRS due to this  - Solu-Medrol taper as appropriate  -Bronchodilators, Mucinex MetaNeb and supportive care.  -Dr. Soriano note reviewed and appreciated  -s/p lasix  -Xanax and Precedex as needed  -Sputum growing Staph aureus and haemophilus on vancomycin and azithromycin  - flonase, zyrtec, and atarax for upper airway congestion     Elevated troponin  -Likely secondary to demand ischemia in settings of respiratory failure  -Patient denies any chest pain, low suspicion for ACS  -Troponin trended down and plateaued.     Hyperglycemia  -Will cover with sliding scale insulin while on steroids  -Hemoglobin A1c 6.7     Disposition: [place] [facility] bed in 2-3 days    Prophylaxis: Lovenox    Edited by: Primitivo Nuñez DO at 5/22/2023 1710     DVT Prophylaxis: lovenox  Code Status:   Code Status and Medical Interventions:   Ordered at: 05/04/23 1634     Code Status (Patient has no pulse and is not breathing):     CPR (Attempt to Resuscitate)     Medical Interventions (Patient has pulse or is breathing):    Full Support      Diet:   Dietary Orders (From admission, onward)     Start     Ordered    05/18/23 1800  Dietary Nutrition Supplements Magic Cup  2 Times Daily      Question:  Select Supplement:  Answer:  Magic Cup    05/18/23 1517    05/17/23 1038  Diet: Cardiac Diets; Healthy Heart (2-3 Na+); Texture: Regular Texture (IDDSI 7); Fluid Consistency: Thin (IDDSI 0)  Diet Effective Now        Comments: Pt prefers cottage cheese, bananas, oranges, peaches, watermelon.   References:    Diet Order [place]   Question Answer Comment   Diets: Cardiac Diets    Cardiac Diet: Healthy Heart (2-3 Na+)    Texture: Regular Texture (IDDSI 7)    Fluid Consistency: Thin (IDDSI 0)        05/17/23 1039    05/15/23 1800  Dietary Nutrition Supplements Xu  2 Times Daily      Question:  Select Supplement:  Answer:  Xu    05/15/23 1445    05/15/23 1800  Dietary Nutrition Supplements Boost VHC  2 Times Daily      Question:  Select Supplement:  Answer:  Boost VHC    05/15/23 1446               Discharge Plan: [place] [facility] in 2-3 days      Primitivo Nuñez DO  05/22/23  17:11 EDT    Dictated utilizing Dragon dictation.

## 2023-05-22 NOTE — THERAPY TREATMENT NOTE
"Patient Name: Carolin Toscano  : 1945    MRN: [id number]                              Today's Date: 2023       Admit Date: 2023    Visit Dx:     ICD-10-CM ICD-9-CM   1. Acute on chronic respiratory failure with hypoxia and hypercapnia  J96.21 518.84    J96.22 786.09     799.02   2. COPD exacerbation  J44.1 491.21   3. Parainfluenza virus infection  B34.8 078.89     Patient Active Problem List   Diagnosis   • CAP (community acquired pneumonia)   • Cigarette nicotine dependence with nicotine-induced disorder   • Essential hypertension   • Dyslipidemia   • Blood glucose elevated   • Muscle ache   • COPD (chronic obstructive pulmonary disease)   • Nuclear sclerotic cataract of right eye   • Acute respiratory failure   • COPD exacerbation   • Acute on chronic respiratory failure with hypoxia and hypercapnia     Past Medical History:   Diagnosis Date   • Arthritis    • COPD (chronic obstructive pulmonary disease)    • Gall stones    • Goiter     \"inward\"   • Hypertension    • Hypertension    • Kidney infection    • Kidney stone    • Kidney stones    • Migraine headache    • Scarlet fever      Past Surgical History:   Procedure Laterality Date   • BLADDER SURGERY     • CATARACT EXTRACTION W/ INTRAOCULAR LENS IMPLANT Left 2022    Procedure: CATARACT PHACO EXTRACTION WITH INTRAOCULAR LENS IMPLANT LEFT;  Surgeon: Sathish Lopez MD;  Location: [facility];  Service: Ophthalmology;  Laterality: Left;   • CATARACT EXTRACTION W/ INTRAOCULAR LENS IMPLANT Right 2022    Procedure: CATARACT PHACO EXTRACTION WITH INTRAOCULAR LENS IMPLANT RIGHT;  Surgeon: Sathish Lopez MD;  Location: [facility];  Service: Ophthalmology;  Laterality: Right;   • CHOLECYSTECTOMY     • HYSTERECTOMY     • TONSILLECTOMY        General Information     Row Name 23 1543          OT Time and Intention    Document Type therapy note (daily note)  -     Mode of Treatment occupational therapy  -     Row Name 23 " 1543          General Information    Patient Profile Reviewed yes  -     Existing Precautions/Restrictions fall;oxygen therapy device and L/min  -           User Key  (r) = Recorded By, (t) = Taken By, (c) = Cosigned By    Initials Name Provider Type    Roselyn Pena Occupational Therapist                 Mobility/ADL's    No documentation.                Obj/Interventions     [facility] Name 05/22/23 1544          Shoulder (Therapeutic Exercise)    Shoulder AROM (Therapeutic Exercise) bilateral;flexion;extension;horizontal aBduction/aDduction;10 repetitions;other (see comments)  B chest press  -[facility] Name 05/22/23 1544          Elbow/Forearm (Therapeutic Exercise)    Elbow/Forearm AROM (Therapeutic Exercise) --  pt declined elbow ex d/t pain in R elbow with flexion (IV site)  -[facility] Name 05/22/23 1544          Wrist (Therapeutic Exercise)    Wrist AROM (Therapeutic Exercise) bilateral;flexion;extension;10 repetitions  -[facility] Name 05/22/23 1544          Hand (Therapeutic Exercise)    Hand AROM/AAROM (Therapeutic Exercise) bilateral;AROM (active range of motion);finger flexion;finger extension;10 repetitions  -           User Key  (r) = Recorded By, (t) = Taken By, (c) = Cosigned By    Initials Name Provider Type    Roselyn Pena Occupational Therapist               Goals/Plan    No documentation.                Clinical Impression     [facility] Name 05/22/23 1546          Pain Assessment    Pretreatment Pain Rating 8/10  -     Posttreatment Pain Rating 8/10  -     Pain Location - head  -[facility] Name 05/22/23 1546          Plan of Care Review    Plan of Care Reviewed With patient;daughter  -     Progress improving  -     Outcome Evaluation Pt received supine in bed on 9L O2 via high flow nc.  Pt satting 88% upon arrival.  Pt participated in UB AROM ex as per flow sheet, sats dropped to 86%, but rebounded to 88% after about 30 seconds.  Pt fatigues easily and reports she feels tired today.   Cont OT per POC>  -AH     Row Name 05/22/23 1546          Vital Signs    Pre SpO2 (%) 88  -AH     O2 Delivery Pre Treatment hi-flow  9L  -AH     Intra SpO2 (%) 86  -AH     O2 Delivery Intra Treatment hi-flow  -AH     Post SpO2 (%) 87  -AH     O2 Delivery Post Treatment hi-flow  -AH     Row Name 05/22/23 1546          Positioning and Restraints    Pre-Treatment Position in bed  -AH     Post Treatment Position bed  -AH     In Bed supine;call light within reach;encouraged to call for assist;patient within staff view;with family/caregiver  -           User Key  (r) = Recorded By, (t) = Taken By, (c) = Cosigned By    Initials Name Provider Type    Roselyn Pena Occupational Therapist               Outcome Measures     Row Name 05/22/23 1549          How much help from another is currently needed...    Putting on and taking off regular lower body clothing? 2  -AH     Bathing (including washing, rinsing, and drying) 2  -AH     Toileting (which includes using toilet bed pan or urinal) 2  -AH     Putting on and taking off regular upper body clothing 2  -AH     Taking care of personal grooming (such as brushing teeth) 2  -AH     Eating meals 3  -AH     AM-PAC 6 Clicks Score (OT) 13  -AH     Row Name 05/22/23 1358          How much help from another person do you currently need...    Turning from your back to your side while in flat bed without using bedrails? 3  -RM     Moving from lying on back to sitting on the side of a flat bed without bedrails? 3  -RM     Moving to and from a bed to a chair (including a wheelchair)? 2  -RM     Standing up from a chair using your arms (e.g., wheelchair, bedside chair)? 2  -RM     Climbing 3-5 steps with a railing? 1  -RM     To walk in hospital room? 2  -RM     AM-PAC 6 Clicks Score (PT) 13  -RM     Highest level of mobility 4 --> Transferred to chair/commode  -RM     Row Name 05/22/23 1358          Functional Assessment    Outcome Measure Options AM-PAC 6 Clicks Basic Mobility (PT)   -RM           User Key  (r) = Recorded By, (t) = Taken By, (c) = Cosigned By    Initials Name Provider Type    Roselyn Pena Occupational Therapist    Ronak Jimenez, PTA Physical Therapist Assistant                Occupational Therapy Education     Title: PT OT SLP Therapies (In Progress)     Topic: Occupational Therapy (In Progress)     Point: ADL training (Done)     Description:   Instruct learner(s) on proper safety adaptation and remediation techniques during self care or transfers.   Instruct in proper use of assistive devices.              Learning Progress Summary           Patient Acceptance, E, VU by AM at 5/15/2023 0701    Acceptance, E,TB, VU by  at 5/12/2023 1507    Comment: purpose of re-evaluation    Acceptance, E,TB, VU by SD at 5/8/2023 1222    Comment: Safety during tf    Acceptance, E, VU by SP at 5/5/2023 1146    Comment: OT edcuated pt on purpose of IE and POC. Pt is agreeable.                   Point: Home exercise program (Done)     Description:   Instruct learner(s) on appropriate technique for monitoring, assisting and/or progressing therapeutic exercises/activities.              Learning Progress Summary           Patient Acceptance, E,TB, VU by  at 5/22/2023 1549    Comment: benefit of do UB ex to improve functional strength    Acceptance, E,TB, VU by  at 5/19/2023 1339    Comment: importance of activity    Acceptance, E,TB, VU by SD at 5/16/2023 1246    Comment: Importance of progressing activity.   Family Acceptance, E,TB, VU by  at 5/22/2023 1549    Comment: benefit of do UB ex to improve functional strength    Acceptance, E,TB, VU by  at 5/19/2023 1339    Comment: importance of activity                   Point: Precautions (Not Started)     Description:   Instruct learner(s) on prescribed precautions during self-care and functional transfers.              Learner Progress:  Not documented in this visit.          Point: Body mechanics (Not Started)     Description:    Instruct learner(s) on proper positioning and spine alignment during self-care, functional mobility activities and/or exercises.              Learner Progress:  Not documented in this visit.                      User Key     Initials Effective Dates Name Provider Type Discipline     06/16/21 -  Roselyn Donaldson Occupational Therapist OT    SD 06/16/21 -  Cecelia Knight OT Occupational Therapist OT    SP 09/08/22 -  January Chapa OT Occupational Therapist OT    AM 02/22/23 -  Rob uL, RN Registered Nurse Nurse              OT Recommendation and Plan  Planned Therapy Interventions (OT): activity tolerance training, BADL retraining, patient/caregiver education/training, transfer/mobility retraining, strengthening exercise  Therapy Frequency (OT): 3 times/wk  Plan of Care Review  Plan of Care Reviewed With: patient, daughter  Progress: improving  Outcome Evaluation: Pt received supine in bed on 9L O2 via high flow nc.  Pt satting 88% upon arrival.  Pt participated in UB AROM ex as per flow sheet, sats dropped to 86%, but rebounded to 88% after about 30 seconds.  Pt fatigues easily and reports she feels tired today.  Cont OT per POC>     Time Calculation:    Time Calculation- OT     Row Name 05/22/23 1550             Time Calculation- OT    OT Start Time 1524  -      OT Stop Time 1542  -      OT Time Calculation (min) 18 min  -      OT Received On 05/22/23  -      OT Goal Re-Cert Due Date 05/26/23  -         Timed Charges    56404 - OT Therapeutic Exercise Minutes 18  -AH         Total Minutes    Timed Charges Total Minutes 18  -AH       Total Minutes 18  -AH            User Key  (r) = Recorded By, (t) = Taken By, (c) = Cosigned By    Initials Name Provider Type     Roselyn Donaldson Occupational Therapist              Therapy Charges for Today     Code Description Service Date Service Provider Modifiers Qty    65449165752 HC OT THER PROC EA 15 MIN 5/22/2023 Roselyn Donaldson GO 1               Roselyn   [place]  5/22/2023

## 2023-05-22 NOTE — THERAPY TREATMENT NOTE
"Patient Name: Carolin Toscano  : 1945    MRN: [id number]                              Today's Date: 2023       Admit Date: 2023    Visit Dx:     ICD-10-CM ICD-9-CM   1. Acute on chronic respiratory failure with hypoxia and hypercapnia  J96.21 518.84    J96.22 786.09     799.02   2. COPD exacerbation  J44.1 491.21   3. Parainfluenza virus infection  B34.8 078.89     Patient Active Problem List   Diagnosis   • CAP (community acquired pneumonia)   • Cigarette nicotine dependence with nicotine-induced disorder   • Essential hypertension   • Dyslipidemia   • Blood glucose elevated   • Muscle ache   • COPD (chronic obstructive pulmonary disease)   • Nuclear sclerotic cataract of right eye   • Acute respiratory failure   • COPD exacerbation   • Acute on chronic respiratory failure with hypoxia and hypercapnia     Past Medical History:   Diagnosis Date   • Arthritis    • COPD (chronic obstructive pulmonary disease)    • Gall stones    • Goiter     \"inward\"   • Hypertension    • Hypertension    • Kidney infection    • Kidney stone    • Kidney stones    • Migraine headache    • Scarlet fever      Past Surgical History:   Procedure Laterality Date   • BLADDER SURGERY     • CATARACT EXTRACTION W/ INTRAOCULAR LENS IMPLANT Left 2022    Procedure: CATARACT PHACO EXTRACTION WITH INTRAOCULAR LENS IMPLANT LEFT;  Surgeon: Sathish Lopez MD;  Location: [facility];  Service: Ophthalmology;  Laterality: Left;   • CATARACT EXTRACTION W/ INTRAOCULAR LENS IMPLANT Right 2022    Procedure: CATARACT PHACO EXTRACTION WITH INTRAOCULAR LENS IMPLANT RIGHT;  Surgeon: Sathish Lopez MD;  Location: [facility];  Service: Ophthalmology;  Laterality: Right;   • CHOLECYSTECTOMY     • HYSTERECTOMY     • TONSILLECTOMY        General Information     Row Name 23 [id number]          Physical Therapy Time and Intention    Document Type therapy note (daily note)  -RM     Mode of Treatment physical therapy  -RM     Row Name " 05/22/23 1350          General Information    Patient Profile Reviewed yes  -RM     Existing Precautions/Restrictions fall;oxygen therapy device and L/min  -RM     Row Name 05/22/23 1350          Cognition    Orientation Status (Cognition) oriented x 4  -RM     Row Name 05/22/23 1350          Safety Issues, Functional Mobility    Safety Issues Affecting Function (Mobility) friction/shear risk;safety precaution awareness;safety precautions follow-through/compliance;sequencing abilities  -RM     Impairments Affecting Function (Mobility) balance;endurance/activity tolerance;shortness of breath;strength  -RM           User Key  (r) = Recorded By, (t) = Taken By, (c) = Cosigned By    Initials Name Provider Type    Ronak Jimenez, ELVIRA Physical Therapist Assistant               Mobility     Row Name 05/22/23 1352          Bed Mobility    Supine-Sit [place] (Bed Mobility) minimum assist (75% patient effort)  -RM     Sit-Supine [place] (Bed Mobility) minimum assist (75% patient effort);verbal cues  -RM     Assistive Device (Bed Mobility) bed rails;draw sheet;head of bed elevated  -RM     Comment, (Bed Mobility) EOB approx 10 minutes  -RM     Row Name 05/22/23 1352          Bed-Chair Transfer    Bed-Chair [place] (Transfers) not tested  -RM     Row Name 05/22/23 1352          Sit-Stand Transfer    Sit-Stand [place] (Transfers) not tested  -RM     Row Name 05/22/23 1352          Gait/Stairs (Locomotion)    [place] Level (Gait) not tested  -RM           User Key  (r) = Recorded By, (t) = Taken By, (c) = Cosigned By    Initials Name Provider Type    Ronak Jimenez PTA Physical Therapist Assistant               Obj/Interventions     Row Name 05/22/23 1353          Motor Skills    Therapeutic Exercise hip;knee;ankle  -RM     Row Name 05/22/23 1353          Hip (Therapeutic Exercise)    Hip Isometrics (Therapeutic Exercise) bilateral;gluteal sets;10 repetitions  -RM     Hip Strengthening  (Therapeutic Exercise) marching while seated;bilateral;10 repetitions  -RM     Row Name 05/22/23 1356          Knee (Therapeutic Exercise)    Knee Strengthening (Therapeutic Exercise) LAQ (long arc quad);bilateral;10 repetitions  -RM     Row Name 05/22/23 1353          Ankle (Therapeutic Exercise)    Ankle AROM (Therapeutic Exercise) bilateral;dorsiflexion;plantarflexion;10 repetitions  -RM           User Key  (r) = Recorded By, (t) = Taken By, (c) = Cosigned By    Initials Name Provider Type    Ronak Jimenez, PTA Physical Therapist Assistant               Goals/Plan    No documentation.                Clinical Impression     Row Name 05/22/23 1350          Pain    Pretreatment Pain Rating 0/10 - no pain  -RM     Posttreatment Pain Rating 0/10 - no pain  -RM     Row Name 05/22/23 0342          Plan of Care Review    Plan of Care Reviewed With patient  -RM     Progress improving  -RM     Outcome Evaluation Pt  supine in bed and willing to participate with treatment. Pt transfered to and from EOB with min a.  Pt was able to tolerate sitting EOB for approx 10 minutes.  Pt performed B LE TE siting EOB.  Pt left supine in bed with bed placed in  chair position. Pt with 9L O2 dropped no lower than 88% with todays activity.  See flowsheet for details.  -     Row Name 05/22/23 1441          Vital Signs    Pre SpO2 (%) 90  -RM     O2 Delivery Pre Treatment supplemental O2  9  -RM     Intra SpO2 (%) 88  -RM     O2 Delivery Intra Treatment supplemental O2  -RM     Post SpO2 (%) 88  -RM     O2 Delivery Post Treatment supplemental O2  -RM     Pre Patient Position Supine  -RM     Intra Patient Position Standing  -RM     Post Patient Position Sitting  -RM           User Key  (r) = Recorded By, (t) = Taken By, (c) = Cosigned By    Initials Name Provider Type    Ronak Jimenez, PTA Physical Therapist Assistant               Outcome Measures     Row Name 05/22/23 1556          How much help from another person do you  currently need...    Turning from your back to your side while in flat bed without using bedrails? 3  -RM     Moving from lying on back to sitting on the side of a flat bed without bedrails? 3  -RM     Moving to and from a bed to a chair (including a wheelchair)? 2  -RM     Standing up from a chair using your arms (e.g., wheelchair, bedside chair)? 2  -RM     Climbing 3-5 steps with a railing? 1  -RM     To walk in hospital room? 2  -RM     AM-PAC 6 Clicks Score (PT) 13  -RM     Highest level of mobility 4 --> Transferred to chair/commode  -RM     Row Name 05/22/23 1358          Functional Assessment    Outcome Measure Options AM-PAC 6 Clicks Basic Mobility (PT)  -RM           User Key  (r) = Recorded By, (t) = Taken By, (c) = Cosigned By    Initials Name Provider Type    RM Ronak Cruz, PTA Physical Therapist Assistant                             Physical Therapy Education     Title: PT OT SLP Therapies (In Progress)     Topic: Physical Therapy (In Progress)     Point: Mobility training (Done)     Learning Progress Summary           Patient Acceptance, E,TB,D, VU,NR by  at 5/22/2023 1358    Comment: Exercise tech and importance of dily activity    Acceptance, E, VU by MS at 5/16/2023 1403    Comment: importance of mobility    Acceptance, E, VU by AM at 5/15/2023 0701    Acceptance, E, VU by MS at 5/12/2023 1458    Comment: importance of mobility    Acceptance, E,TB,D, VU,NR by  at 5/8/2023 1250    Comment: Importance of activity and exercise techniques    Acceptance, E, VU by MS at 5/5/2023 1136    Comment: importance of mobility                   Point: Home exercise program (Done)     Learning Progress Summary           Patient Acceptance, E,TB,D, VU,NR by  at 5/22/2023 1358    Comment: Exercise tech and importance of dily activity    Acceptance, E,D, VU,DU by  at 5/21/2023 1205    Comment: Pt education for improving BLE ther ex technique    Acceptance, E, VU by MS at 5/16/2023 1403    Comment:  importance of mobility    Acceptance, E, VU by AM at 5/15/2023 0701    Acceptance, E,TB, VU,NR by CC at 5/14/2023 1310    Comment: Importance of increasing movement of B LE    Acceptance, E,TB,D, VU,NR by  at 5/8/2023 1250    Comment: Importance of activity and exercise techniques    Acceptance, E, VU by MS at 5/5/2023 1136    Comment: importance of mobility                   Point: Body mechanics (Done)     Learning Progress Summary           Patient Acceptance, E,TB, VU by JR at 5/17/2023 1846    Comment: upright posture                   Point: Precautions (Not Started)     Learner Progress:  Not documented in this visit.                      User Key     Initials Effective Dates Name Provider Type Discipline    JR 03/23/22 -  Kavitha Tavarez, PT Physical Therapist PT    CC 06/16/21 -  Nasra Quiñones, PTA Physical Therapist Assistant PT     06/16/21 -  Ronak Cruz, PTA Physical Therapist Assistant PT    TW 06/16/21 -  Deysi Goodwin, PT Physical Therapist PT    MS 07/01/22 -  Martín Ortiz, PT Physical Therapist PT    AM 02/22/23 -  Rob Lu, RN Registered Nurse Nurse              PT Recommendation and Plan     Plan of Care Reviewed With: patient  Progress: improving  Outcome Evaluation: Pt  supine in bed and willing to participate with treatment. Pt transfered to and from EOB with min a.  Pt was able to tolerate sitting EOB for approx 10 minutes.  Pt performed B LE TE siting EOB.  Pt left supine in bed with bed placed in  chair position. Pt with 9L O2 dropped no lower than 88% with todays activity.  See flowsheet for details.     Time Calculation:    PT Charges     Row Name 05/22/23 1359             Time Calculation    Start Time 1025  -RM      Stop Time 1052  -RM      Time Calculation (min) 27 min  -RM      PT Received On 05/22/23  -RM      PT Goal Re-Cert Due Date 05/23/23  -RM         Time Calculation- PT    Total Timed Code Minutes- PT 27 minute(s)  -RM         Timed Charges    08168 - PT  Therapeutic Exercise Minutes 10  -RM      15860 - PT Therapeutic Activity Minutes 17  -RM         Total Minutes    Timed Charges Total Minutes 27  -RM       Total Minutes 27  -RM            User Key  (r) = Recorded By, (t) = Taken By, (c) = Cosigned By    Initials Name Provider Type    Ronak Jimenez, PTA Physical Therapist Assistant              Therapy Charges for Today     Code Description Service Date Service Provider Modifiers Qty    74269724017 HC PT THER PROC EA 15 MIN 5/22/2023 Ronak Cruz, PTA GP 1    13749255019 HC PT THERAPEUTIC ACT EA 15 MIN 5/22/2023 Ronak Cruz, PTA GP 1          PT G-Codes  Outcome Measure Options: AM-PAC 6 Clicks Basic Mobility (PT)  AM-PAC 6 Clicks Score (PT): 13  AM-PAC 6 Clicks Score (OT): 13       Ronak Cruz PTA  5/22/2023

## 2023-05-22 NOTE — PAYOR COMM NOTE
"To:  [employer]  From: Lucia Burgos RN  Phone: [phone]  Fax: [phone]  NPI: [id number]  TIN: [id number]  Member ID: [id number]  MRN: [id number]    Carolin Toscano (77 y.o. Female)       Date of Birth   1945    Social Security Number       Address   [address] [facility] [address]    Home Phone   [phone]    MRN   [id number]       Restorationism   Nazarene    Marital Status                               Admission Date   5/4/23    Admission Type   Emergency    Admitting Provider       Attending Provider   Primitivo Nuñez DO    Department, Room/Bed   [facility] INTENSIVE [facility], I07/1       Discharge Date       Discharge Disposition       Discharge Destination                                 Attending Provider: Primitivo Nuñez DO    Allergies: Contrast Dye (Echo Or Unknown Ct/mr), Ciprofloxacin, Keflex [Cephalexin], Penicillins, Sulfa Antibiotics, Terramycin [Oxytetracycline], Prednisone, Latex, Percocet [Oxycodone-acetaminophen], Xyzal [Levocetirizine]    Isolation: None   Infection: None   Code Status: CPR    Ht: 160 cm (63\")   Wt: 67.2 kg (148 lb 2.4 oz)    Admission Cmt: None   Principal Problem: Acute on chronic respiratory failure with hypoxia and hypercapnia [J96.21,J96.22]                   Active Insurance as of 5/4/2023       Primary Coverage       Payor Plan Insurance Group Employer/Plan Group    HUMANA MEDICARE REPLACEMENT HUMANA MEDICARE REPLACEMENT [id number]       Payor Plan Address Payor Plan Phone Number Payor Plan Fax Number Effective Dates    [address] [address] [phone]  1/1/2018 - None Entered    [facility] [address]         Subscriber Name Subscriber Birth Date Member ID       CAROLIN TOSCANO 1945 [id number]               Secondary Coverage       Payor Plan Insurance Group Employer/Plan Group    [place]        Payor Plan Address Payor Plan Phone Number Payor Plan Fax Number Effective Dates    [address] [phone]  10/11/2021 " - None Entered    [facility] [id number]         Subscriber Name Subscriber Birth Date Member ID       DAMIAN DYE 1945 [id number]                     Emergency Contacts        (Rel.) Home Phone Work Phone Mobile Phone    Nehal Hoffmann (Daughter) [phone] -- --              Vital Signs (last day)       Date/Time Temp Temp src Pulse Resp BP Patient Position SpO2    05/22/23 1112 97.2 (36.2) Temporal 65 -- -- -- 89    05/22/23 1000 -- -- 72 -- -- -- 89    05/22/23 0900 -- -- 74 -- 128/49 -- 87    05/22/23 0804 97.2 (36.2) Temporal -- -- -- -- --    05/22/23 0800 -- -- 71 -- 110/53 -- 90    05/22/23 0701 -- -- 70 -- 110/56 -- 96    05/22/23 0600 -- -- 53 -- 110/42 -- 94    05/22/23 0500 -- -- 57 -- 144/49 -- 95    05/22/23 0400 -- -- 61 -- 130/47 -- 95    05/22/23 0300 97.2 (36.2) Temporal 61 -- 121/49 -- 93    05/22/23 0200 -- -- 72 -- 133/111 -- 93    05/22/23 0111 -- -- 69 16 -- -- 99    05/22/23 0100 -- -- 77 -- 138/47 -- 97    05/22/23 0000 -- -- 67 13 134/44 -- 97    05/21/23 2300 97.8 (36.6) Temporal 67 -- 137/61 -- 94    05/21/23 2200 -- -- 68 -- 141/43 -- 96    05/21/23 2000 -- -- 77 18 137/61 Lying 98    05/21/23 1953 -- -- 66 18 -- -- 96    05/21/23 1900 97.9 (36.6) Temporal -- -- -- -- --    05/21/23 1634 97.6 (36.4) Temporal -- -- -- -- --    05/21/23 1600 -- -- 75 -- 113/56 -- 94    05/21/23 1237 -- -- -- 18 -- -- --    05/21/23 1200 -- -- 61 -- 112/64 -- 94    05/21/23 1119 98.9 (37.2) Oral -- -- -- -- --    05/21/23 0701 -- -- -- 18 -- -- --    05/21/23 0700 -- -- 53 -- 106/43 -- 95    05/21/23 0600 -- -- 69 18 128/45 Lying 96    05/21/23 0500 -- -- 57 -- 119/49 -- 95    05/21/23 0400 -- -- 54 18 118/57 Lying 96    05/21/23 0300 97.4 (36.3) Temporal 62 -- 112/48 -- 95    05/21/23 0200 -- -- 56 17 112/46 Lying 96    05/21/23 0100 -- -- 65 -- 108/42 -- 95    05/21/23 0012 -- -- 55 19 -- -- --    05/21/23 0004 -- -- 57 16 -- -- 96    05/21/23 0000 -- -- 57 16 107/42 Lying 96           Current Facility-Administered Medications   Medication Dose Route Frequency Provider Last Rate Last Admin    acetaminophen (TYLENOL) tablet 650 mg  650 mg Oral Q4H PRN Lolis Vital MD   650 mg at 05/21/23 2029    Or    acetaminophen (TYLENOL) 160 MG/5ML solution 650 mg  650 mg Oral Q4H PRN Lolis Vital MD        Or    acetaminophen (TYLENOL) suppository 650 mg  650 mg Rectal Q4H PRN Lolis Vital MD   650 mg at 05/04/23 2005    ALPRAZolam (XANAX) tablet 0.5 mg  0.5 mg Oral BID PRN Primitivo Nuñez DO   0.5 mg at 05/21/23 2029    amLODIPine (NORVASC) tablet 10 mg  10 mg Oral Daily Lolis Vital MD   10 mg at 05/22/23 0841    atenolol (TENORMIN) tablet 25 mg  25 mg Oral Daily Lolis Vital MD   25 mg at 05/22/23 0841    sennosides-docusate (PERICOLACE) 8.6-50 MG per tablet 2 tablet  2 tablet Oral BID Lolis Vital MD   2 tablet at 05/21/23 2029    And    polyethylene glycol (MIRALAX) packet 17 g  17 g Oral Daily PRN Lolis Vital MD        And    bisacodyl (DULCOLAX) EC tablet 5 mg  5 mg Oral Daily PRN Lolis Vital MD        And    bisacodyl (DULCOLAX) suppository 10 mg  10 mg Rectal Daily PRN Lolis Vital MD        budesonide (PULMICORT) nebulizer solution 1 mg  1 mg Nebulization Daily - RT Arnaldo Soriano MD   1 mg at 05/22/23 0701    budesonide-formoterol (SYMBICORT) 160-4.5 MCG/ACT inhaler 2 puff  2 puff Inhalation BID - RT Lolis Vital MD   2 puff at 05/22/23 0701    And    tiotropium (SPIRIVA RESPIMAT) 2.5 mcg/act aerosol solution inhaler  2 puff Inhalation Daily - RT Lolis Vital MD   2 puff at 05/18/23 0714    cetirizine (zyrTEC) tablet 10 mg  10 mg Oral Daily Primitivo Nuñez DO   10 mg at 05/22/23 0841    dexmedetomidine (PRECEDEX) 400 mcg in 100 mL NS infusion  0.2-1.5 mcg/kg/hr Intravenous Titrated Lolis Vital MD   Stopped at 05/12/23 1556    dextrose (D50W) (25 g/50 mL) IV injection 25 g  25 g Intravenous Q15 Min PRN Lolis Vital MD         dextrose (GLUTOSE) oral gel 15 g  15 g Oral Q15 Min PRN Lolis Vital MD        Enoxaparin Sodium (LOVENOX) syringe 40 mg  40 mg Subcutaneous Q24H Lolis Vital MD   40 mg at 05/21/23 2028    fluticasone (FLONASE) 50 MCG/ACT nasal spray 2 spray  2 spray Each Nare BID Primitivo Nuñez,    2 spray at 05/22/23 0842    glucagon (GLUCAGEN) injection 1 mg  1 mg Intramuscular Q15 Min PRN Lolis Vital MD        guaifenesin-dextromethorphan (MUCINEX DM) tablet 1 tablet  1 tablet Oral BID Arnaldo Soriano MD   1 tablet at 05/22/23 0841    guaiFENesin-dextromethorphan (ROBITUSSIN DM) 100-10 MG/5ML syrup 10 mL  10 mL Oral Q4H PRN Lolis Vital MD   10 mL at 05/21/23 2029    hydrOXYzine (ATARAX) tablet 25 mg  25 mg Oral TID - RT Primitivo Nuñez, DO   25 mg at 05/22/23 0841    Insulin Aspart (novoLOG) injection 0-9 Units  0-9 Units Subcutaneous TID AC Lolis Vital MD   6 Units at 05/21/23 1733    ipratropium-albuterol (DUO-NEB) nebulizer solution 3 mL  3 mL Nebulization Q6H - RT Arnaldo Soriano MD   3 mL at 05/22/23 0701    melatonin tablet 5 mg  5 mg Oral Nightly PRN Lolis Vital MD   5 mg at 05/21/23 2035    methylPREDNISolone sodium succinate (SOLU-Medrol) injection 40 mg  40 mg Intravenous Q24H Arnaldo Soriano MD   40 mg at 05/22/23 0841    multivitamin with minerals 1 tablet  1 tablet Oral Daily Lolis Vital MD   1 tablet at 05/22/23 0841    ondansetron (ZOFRAN) injection 4 mg  4 mg Intravenous Q6H PRN Lolis Vital MD   4 mg at 05/20/23 1604    Pharmacy to Dose enoxaparin (LOVENOX)   Does not apply Continuous PRN Lolis Vital MD        Pharmacy to dose vancomycin   Does not apply Continuous PRN Primitivo Nuñez,         sodium chloride 0.9 % flush 10 mL  10 mL Intravenous PRN Lolis Vital MD        sodium chloride 0.9 % flush 10 mL  10 mL Intravenous Q12H Primitivo Nuñez,    10 mL at 05/22/23 0842    sodium chloride 0.9 % flush 10 mL  10 mL Intravenous Q12H  [place], Primitivo Michael, DO   10 mL at 05/22/23 0842    sodium chloride 0.9 % flush 10 mL  10 mL Intravenous PRN [place], Primitivo Ledezmaith, DO        sodium chloride 0.9 % flush 10 mL  10 mL Intravenous Q12H [place], Primitivo Ledezmaith, DO   10 mL at 05/22/23 0842    sodium chloride 0.9 % flush 10 mL  10 mL Intravenous PRN [place], Primitivo Michael, DO        sodium chloride 0.9 % flush 20 mL  20 mL Intravenous PRN [place], Primitivo Michael, DO        sodium chloride 0.9 % flush 20 mL  20 mL Intravenous PRN [place], Primitivo Ledezmaith, DO        sodium chloride 0.9 % flush 3 mL  3 mL Intravenous Q12H Lolis Vital MD   3 mL at 05/22/23 0841    sodium chloride 0.9 % flush 3-10 mL  3-10 mL Intravenous PRN Lolis Vital MD        sodium chloride 0.9 % infusion 40 mL  40 mL Intravenous PRN Lolis Vital MD        sodium chloride 3 % nebulizer solution 4 mL  4 mL Nebulization Q12H Arnaldo Soriano MD   4 mL at 05/22/23 0701    vancomycin 1250 mg/250 mL 0.9% NS IVPB ([facility])  1,250 mg Intravenous Q12H Primitivo Nuñez, DO   1,250 mg at 05/22/23 0014     Lab Results (last 24 hours)       Procedure Component Value Units Date/Time    Blood Culture - Blood, Arm, Right [[id number]]  (Normal) Collected: 05/18/23 1054    Specimen: Blood from Arm, Right Updated: 05/22/23 1116     Blood Culture No growth at 4 days    Blood Culture - Blood, Hand, Right [[id number]]  (Normal) Collected: 05/18/23 1101    Specimen: Blood from Hand, Right Updated: 05/22/23 1116     Blood Culture No growth at 4 days    Vancomycin, Random [[id number]]  (Normal) Collected: 05/22/23 0515    Specimen: Blood Updated: 05/22/23 0555     Vancomycin Random 22.40 mcg/mL     Narrative:      Therapeutic Ranges for Vancomycin    Vancomycin Random   5.0-40.0 mcg/mL  Vancomycin Trough   5.0-20.0 mcg/mL  Vancomycin Peak     20.0-40.0 mcg/mL    Comprehensive Metabolic Panel [[id number]]  (Abnormal) Collected: 05/22/23 0515    Specimen: Blood Updated: 05/22/23 0555     Glucose 111 mg/dL      BUN 22  mg/dL      Creatinine 0.26 mg/dL      Sodium 138 mmol/L      Potassium 4.5 mmol/L      Chloride 100 mmol/L      CO2 31.5 mmol/L      Calcium 8.8 mg/dL      Total Protein 5.1 g/dL      Albumin 2.5 g/dL      ALT (SGPT) 35 U/L      AST (SGOT) 14 U/L      Alkaline Phosphatase 74 U/L      Total Bilirubin 0.2 mg/dL      Globulin 2.6 gm/dL      A/G Ratio 1.0 g/dL      BUN/Creatinine Ratio 84.6     Anion Gap 6.5 mmol/L      eGFR 114.0 mL/min/1.73     Narrative:      GFR Normal >60  Chronic Kidney Disease <60  Kidney Failure <15    The GFR formula is only valid for adults with stable renal function between ages 18 and 70.    CBC (No Diff) [[id number]]  (Abnormal) Collected: 05/22/23 0515    Specimen: Blood Updated: 05/22/23 0547     WBC 11.59 10*3/mm3      RBC 3.66 10*6/mm3      Hemoglobin 11.0 g/dL      Hematocrit 35.6 %      MCV 97.3 fL      MCH 30.1 pg      MCHC 30.9 g/dL      RDW 15.5 %      RDW-SD 53.2 fl      MPV 11.9 fL      Platelets 211 10*3/mm3     POC Glucose Once [[id number]]  (Abnormal) Collected: 05/21/23 1615    Specimen: Blood Updated: 05/21/23 1626     Glucose 276 mg/dL      Comment: Serial Number: [id number]:  [id number]             Imaging Results (Last 24 Hours)       ** No results found for the last 24 hours. **          Orders (last 24 hrs)        Start     Ordered    05/22/23 0600  Vancomycin, Random  Morning Draw         05/20/23 1207    05/22/23 0600  CBC (No Diff)  Morning Draw         05/21/23 1326    05/22/23 0600  Comprehensive Metabolic Panel  Morning Draw         05/21/23 1326    05/21/23 1627  POC Glucose Once  PROCEDURE ONCE         05/21/23 1615    05/21/23 0945  hydrOXYzine (ATARAX) tablet 25 mg  3 Times Daily - RT         05/21/23 0845    05/21/23 0930  fluticasone (FLONASE) 50 MCG/ACT nasal spray 2 spray  2 Times Daily         05/21/23 0843    05/21/23 0930  cetirizine (zyrTEC) tablet 10 mg  Daily         05/21/23 0843    05/21/23 0600  PICC Site Care  Weekly      Comments: Dressing  Changes to PICC Site Every 7 Days and As Needed    05/20/23 1455    05/20/23 2100  sodium chloride 0.9 % flush 10 mL  Every 12 Hours Scheduled         05/20/23 1903    05/20/23 2100  sodium chloride 0.9 % flush 10 mL  Every 12 Hours Scheduled         05/20/23 1903    05/20/23 2100  sodium chloride 0.9 % flush 10 mL  Every 12 Hours Scheduled         05/20/23 1455    05/20/23 1903  sodium chloride 0.9 % flush 10 mL  As Needed         05/20/23 1903    05/20/23 1903  sodium chloride 0.9 % flush 20 mL  As Needed         05/20/23 1903    05/20/23 1455  sodium chloride 0.9 % flush 20 mL  As Needed         05/20/23 1455    05/20/23 1455  sodium chloride 0.9 % flush 10 mL  As Needed         05/20/23 1455    05/20/23 1300  vancomycin 1250 mg/250 mL 0.9% NS IVPB ([facility])  Every 12 Hours         05/20/23 1207    05/20/23 1201  Pharmacy to dose vancomycin  Continuous PRN         05/20/23 1201    05/20/23 0907  methylPREDNISolone sodium succinate (SOLU-Medrol) injection 40 mg  Every 24 Hours         05/19/23 1248    05/19/23 2100  guaifenesin-dextromethorphan (MUCINEX DM) tablet 1 tablet  2 Times Daily         05/19/23 1252    05/18/23 1800  Dietary Nutrition Supplements Magic Cup  2 Times Daily       05/18/23 1517    05/17/23 1900  sodium chloride 3 % nebulizer solution 4 mL  Every 12 Hours         05/17/23 1255    05/17/23 1300  Oscillating Positive Expiratory Pressure (OPEP)  Every 6 Hours - RT       05/17/23 1255    05/16/23 1900  Chest Physiotherapy (PD & P)  Every 6 Hours - RT      Comments: Use [employer].    05/16/23 1601    05/15/23 1800  Dietary Nutrition Supplements Xu  2 Times Daily       05/15/23 1445    05/15/23 1800  Dietary Nutrition Supplements Boost VHC  2 Times Daily       05/15/23 1446    05/12/23 1510  ALPRAZolam (XANAX) tablet 0.5 mg  2 Times Daily PRN         05/12/23 1510    05/11/23 1759  Apply Moisture Barrier to All Bony Prominences  Every Shift       05/11/23 1800    05/11/23 1700  POC Glucose TID AC  3  Times Daily Before Meals       05/11/23 1537    05/10/23 1345  budesonide (PULMICORT) nebulizer solution 1 mg  Daily - RT         05/10/23 1245    05/09/23 2245  dexmedetomidine (PRECEDEX) 400 mcg in 100 mL NS infusion  Titrated         05/09/23 2157    05/05/23 1300  ipratropium-albuterol (DUO-NEB) nebulizer solution 3 mL  Every 6 Hours - RT         05/05/23 0700    05/05/23 0933  Skin Care  Daily       05/05/23 0932    05/05/23 0800  Oral Care  2 Times Daily       05/04/23 1839 05/04/23 2200  Incentive Spirometry  Every 4 Hours While Awake       05/04/23 1839 05/04/23 2130  budesonide-formoterol (SYMBICORT) 160-4.5 MCG/ACT inhaler 2 puff  2 Times Daily - RT        See Hyperspace for full Linked Orders Report.    05/04/23 1839 05/04/23 2100  sodium chloride 0.9 % flush 3 mL  Every 12 Hours Scheduled         05/04/23 1839 05/04/23 2100  sennosides-docusate (PERICOLACE) 8.6-50 MG per tablet 2 tablet  2 Times Daily        See Hyperspace for full Linked Orders Report.    05/04/23 1839 05/04/23 2000  Enoxaparin Sodium (LOVENOX) syringe 40 mg  Every 24 Hours         05/04/23 1849 05/04/23 1930  amLODIPine (NORVASC) tablet 10 mg  Daily         05/04/23 1839 05/04/23 1930  atenolol (TENORMIN) tablet 25 mg  Daily         05/04/23 1839 05/04/23 1930  tiotropium (SPIRIVA RESPIMAT) 2.5 mcg/act aerosol solution inhaler  Daily - RT        See Hyperspace for full Linked Orders Report.    05/04/23 1839 05/04/23 1930  multivitamin with minerals 1 tablet  Daily         05/04/23 1839 05/04/23 1840  Intake & Output  Every Shift       05/04/23 1839 05/04/23 1840  Ambulate Patient  Every Shift       05/04/23 1839 05/04/23 1839  guaiFENesin-dextromethorphan (ROBITUSSIN DM) 100-10 MG/5ML syrup 10 mL  Every 4 Hours PRN         05/04/23 1839    05/04/23 1839  sodium chloride 0.9 % flush 3-10 mL  As Needed         05/04/23 1839 05/04/23 1839  sodium chloride 0.9 % infusion 40 mL  As Needed          05/04/23 1839    05/04/23 1839  acetaminophen (TYLENOL) tablet 650 mg  Every 4 Hours PRN        See Hyperspace for full Linked Orders Report.    05/04/23 1839    05/04/23 1839  acetaminophen (TYLENOL) 160 MG/5ML solution 650 mg  Every 4 Hours PRN        See Hyperspace for full Linked Orders Report.    05/04/23 1839    05/04/23 1839  acetaminophen (TYLENOL) suppository 650 mg  Every 4 Hours PRN        See Hyperspace for full Linked Orders Report.    05/04/23 1839    05/04/23 1839  polyethylene glycol (MIRALAX) packet 17 g  Daily PRN        See Hyperspace for full Linked Orders Report.    05/04/23 1839    05/04/23 1839  bisacodyl (DULCOLAX) EC tablet 5 mg  Daily PRN        See Hyperspace for full Linked Orders Report.    05/04/23 1839    05/04/23 1839  bisacodyl (DULCOLAX) suppository 10 mg  Daily PRN        See Hyperspace for full Linked Orders Report.    05/04/23 1839    05/04/23 1839  ondansetron (ZOFRAN) injection 4 mg  Every 6 Hours PRN         05/04/23 1839    05/04/23 1839  Pharmacy to Dose enoxaparin (LOVENOX)  Continuous PRN         05/04/23 1839    05/04/23 1839  melatonin tablet 5 mg  Nightly PRN         05/04/23 1839    05/04/23 1730  Insulin Aspart (novoLOG) injection 0-9 Units  3 Times Daily Before Meals         05/04/23 1643    05/04/23 1643  dextrose (GLUTOSE) oral gel 15 g  Every 15 Minutes PRN         05/04/23 1643    05/04/23 1643  dextrose (D50W) (25 g/50 mL) IV injection 25 g  Every 15 Minutes PRN         05/04/23 1643    05/04/23 1643  glucagon (GLUCAGEN) injection 1 mg  Every 15 Minutes PRN         05/04/23 1643    05/04/23 1447  sodium chloride 0.9 % flush 10 mL  As Needed         05/04/23 1447    Unscheduled  Up With Assistance  As Needed       05/04/23 1839    Unscheduled  Follow Hypoglycemia Standing Orders For Blood Glucose <70 & Notify Provider of Treatment  As Needed      Comments: Follow Hypoglycemia Orders As Outlined in Process Instructions (Open Order Report to View Full  Instructions)  Notify Provider Any Time Hypoglycemia Treatment is Administered    05/04/23 1643    Unscheduled  Perineal Dermatitis Care PRN  As Needed      Comments: - Gently Cleanse Area With Perineal Foam Cleanser or Gentle Perineal Cleanser  - Pat Dry  - Gently Apply Protective Moisture Barrier BID & After Each Incontinence Episode  - Notify Wound Care if No Improvement After 3 Days    05/04/23 1838    Unscheduled  Bladder Scan if Patient Unable to Void 4-6 Hours After Catheter Removal  As Needed         05/06/23 1021    Unscheduled  If Bladder Scan Volume is Less Than 500mL & Patient is Without Symptoms of Bladder Discomfort / Distention Monitor Every 1-2 Hours for Spontaneous Void  As Needed       05/06/23 1021    Unscheduled  Straight Cath Every 4-6 Hours As Needed If Patient is Unable to Void After 4-6 Hours, Bladder Scan Volume is Greater Than 500mL & Patient Has Symptoms of Bladder Discomfort / Distention  As Needed       05/06/23 1021    Unscheduled  Schedule / Prompt Voiding For Patients With Urinary Incontinence  As Needed       05/06/23 1021    Unscheduled  Apply Moisture Barrier After Any Incontinence  As Needed       05/11/23 1800    Unscheduled  Wound Care  As Needed       05/11/23 1800    Unscheduled  Deep Tissue Injury Care PRN  As Needed      Comments: - Gently Cleanse With Normal Saline  - Cover With Silicone Border Dressing (If Indicated)  - For Frequent Incontinence - Apply Skin Protective Barrier Cream Rather Than Silicone Border Dressing    05/13/23 1217    Unscheduled  PICC LINE CARE - Change Dressing As Needed When Damp, Loose or Soiled  As Needed       05/20/23 1903    Unscheduled  PICC LINE CARE - Change Needleless Connectors  As Needed      Comments: Per [facility] Policy    05/20/23 1903    Unscheduled  Remove PICC Cap for CT  As Needed       05/20/23 1455    --  SCANNED - TELEMETRY           05/04/23 0000    --  SCANNED - TELEMETRY           05/04/23 0000    --  SCANNED - TELEMETRY            05/04/23 0000    --  SCANNED - TELEMETRY           05/04/23 0000    --  multivitamin with minerals tablet tablet  Every Other Day         05/05/23 0949    --  saccharomyces boulardii (FLORASTOR) 250 MG capsule  Every Other Day         05/05/23 0949    --  acetaminophen (TYLENOL) 325 MG tablet  Every 6 Hours PRN         05/05/23 0949    --  SCANNED - TELEMETRY           05/04/23 0000    --  SCANNED - TELEMETRY           05/04/23 0000    --  SCANNED - TELEMETRY           05/04/23 0000    --  SCANNED - TELEMETRY           05/04/23 0000    --  SCANNED - TELEMETRY           05/04/23 0000    --  SCANNED - TELEMETRY           05/04/23 0000    --  SCANNED - TELEMETRY           05/04/23 0000    --  SCANNED - TELEMETRY           05/04/23 0000    --  SCANNED - TELEMETRY           05/04/23 0000    --  SCANNED - TELEMETRY           05/04/23 0000    --  SCANNED - TELEMETRY           05/04/23 0000    --  SCANNED - TELEMETRY           05/04/23 0000    --  SCANNED - TELEMETRY           05/04/23 0000    --  SCANNED - TELEMETRY           05/04/23 0000    --  SCANNED - TELEMETRY           05/04/23 0000    --  SCANNED - TELEMETRY           05/04/23 0000    --  SCANNED - TELEMETRY           05/04/23 0000    --  SCANNED - TELEMETRY           05/04/23 0000    --  SCANNED - TELEMETRY           05/04/23 0000    --  SCANNED - TELEMETRY           05/04/23 0000    --  SCANNED - TELEMETRY           05/04/23 0000    --  SCANNED - TELEMETRY           05/04/23 0000    --  SCANNED - TELEMETRY           05/04/23 0000    --  SCANNED - TELEMETRY           05/04/23 0000    --  SCANNED - TELEMETRY           05/04/23 0000    --  SCANNED - TELEMETRY           05/04/23 0000    --  SCANNED - TELEMETRY           05/04/23 0000    --  SCANNED - TELEMETRY           05/04/23 0000    --  SCANNED - TELEMETRY           05/04/23 0000    --  SCANNED - TELEMETRY           05/04/23 0000                     Physician Progress Notes (last 24 hours)        Primitivo Nuñez  DO Michael at 23 1243              [facility]    PROGRESS NOTE    Name:  Carolin Toscano   Age:  77 y.o.  Sex:  female  :  1945  MRN:  [id number]   Visit Number:  [id number]  Admission Date:  2023  Date Of Service:  23  Primary Care Physician:  Jason Nicholson MD     LOS: 17 days :    Chief Complaint:      headache    Subjective:    : c/o ha suspect is due to mucous. Agreeable to flonase, zyrtec and atarax. Labs reviewed.    Hospital Course:  May 20, 2023 discussed with patient as well as family at the bedside.  Also discussed with them regarding placing access which she was able to get a PICC line.  Also discussed with them the staph growing in her sputum and that I was going to add vancomycin for this.  : d/w patient and family updated them on [facility] and pending appeal which will likely be submitted Monday. Still having dyspnea but mucous is breaking loose today.  Still debating about bronchoscopy.  Interested in long-term acute care possibly in [place] versus [place].    : Daughter Regla is coming today.  Otherwise not much change still having dyspnea and hypoxemia.  Case management note reviewed.    Patient was admitted and treated with steroids.  Antibiotics were not indicated.  Pulmonology consulted and was moved to ICU on  due to worsening respiratory failure and tachypnea.  She initially had to be on Precedex drip which was discontinued and switched to Xanax due to anxiety. Patient was placed on AVAPS.  Patient continued on steroids and received Lasix.    23: d/w nsg intermittently on avaps and hi flow. Patient awake in bed. CXR from 5/15 reviewed. Medications reviewed.  Discussed with case management they are [facility].    2023 discussed with case management still working on [facility].  I am planning to do a peer to peer with them tomorrow.  Otherwise has persistent hypoxia.  Dr. Soriano's  note reviewed considering bronchoscopy.     History of presenting illness:  Patient is a 77 years old female with a past medical history of COPD, chronic respiratory failure on 5 to 6 L per her previous discharge, on NIV machine at home and trelegy, hypertension, and ongoing tobacco use who presented to the ER from home by EMS with a chief complaint of shortness of breath.  Patient reporting that she started having shortness of breath associated with productive cough since last night and has persisted and became worse that promoted her to call EMS.  EMS has found the patient to be at 65% saturation on her normal 6 L of oxygen. Patient reports compliance with her NIV machine at home.      On ER evaluation, Patient was found to be tachycardic with a heart rate of 130s, temperature of 100.2 and respirations of 30, /72.  Patient was placed on BiPAP with FiO2 of 40%.  Her ABG came back and showed pH of 7.296/PCO2 87/PO2 94/HCO3 42/saturation 97% on 5 L nasal cannula.  HS Troponin 44, proBNP WNL, glucose 192, CO2 of 40, otherwise CBC and CMP within acceptable range.  Lactate and Pro-Adi WNL.  Respiratory panel positive for parainfluenza virus.  Chest x-ray Mild interstitial disease  bilaterally is similar to prior. No area of consolidation is seen. Patient received Solu-Medrol and Aztreonam while in the ER.  Hospitalist consulted for admission.       Edited by: Primitivo Nuñez DO at 5/21/2023 0849     Review of Systems:     All systems were reviewed and negative except as mentioned in subjective, assessment and plan.    Vital Signs:    Temp:  [97.2 °F (36.2 °C)-98.9 °F (37.2 °C)] 98.9 °F (37.2 °C)  Heart Rate:  [53-84] 61  Resp:  [16-26] 18  BP: (104-128)/(42-64) 112/64    Intake and output:    I/O last 3 completed shifts:  In: 750 [I.V.:250; IV Piggyback:500]  Out: 2050 [Urine:2050]  No intake/output data recorded.    Physical Examination:    General Appearance:  Alert and cooperative.  Chronically  ill-appearing. Sitting up in bed.   Head:  Atraumatic and normocephalic.   Eyes: Conjunctivae and sclerae normal, no icterus. No pallor.   Throat: No oral lesions, no thrush, oral mucosa moist.   Neck: Supple, trachea midline, no thyromegaly.   Lungs:   improved air entry with persistent diminished breath sounds end expiratory wheeze.  On nasal cannula.   Heart:  Normal S1 and S2, no murmur, no gallop, no rub. No JVD.   Abdomen:   Normal bowel sounds, no masses, no organomegaly. Soft, nontender, nondistended, no rebound tenderness.   Extremities: Supple, no edema, no cyanosis, no clubbing.   Skin: No bleeding or rash.   Neurologic: Alert and oriented x 3. No facial asymmetry. Moves all four limbs. No tremors.  Generalized weakness noted.     Edited by: Primitivo Nuñez DO at 5/21/2023 3005     Laboratory results:    Results from last 7 days   Lab Units 05/21/23  0453 05/20/23  0426 05/19/23  0432   SODIUM mmol/L 140 139 137   POTASSIUM mmol/L 4.7 3.8 4.4   CHLORIDE mmol/L 98 94* 90*   CO2 mmol/L 36.7* 38.8* 41.4*   BUN mg/dL 24* 25* 22   CREATININE mg/dL 0.32* 0.28* 0.28*   CALCIUM mg/dL 8.5* 8.9 9.1   BILIRUBIN mg/dL 0.2 0.3  --    ALK PHOS U/L 77 72  --    ALT (SGPT) U/L 35* 26  --    AST (SGOT) U/L 19 12  --    GLUCOSE mg/dL 120* 123* 242*     Results from last 7 days   Lab Units 05/21/23  0453 05/20/23  0426 05/19/23  0432   WBC 10*3/mm3 13.10* 15.22* 15.77*   HEMOGLOBIN g/dL 11.0* 11.3* 11.6*   HEMATOCRIT % 33.8* 36.5 37.4   PLATELETS 10*3/mm3 212 209 191             Results from last 7 days   Lab Units 05/18/23  1101 05/18/23  1054   BLOODCX  No growth at 3 days No growth at 3 days     Recent Labs     05/16/23  0519 05/18/23  0708 05/21/23  0714   PHART 7.453* 7.451* 7.394   IBX1OND 76.4* 73.4* 70.2*   PO2ART 56.7* 64.0* 95.2   NBU6SDJ 53.4* 51.1* 42.8*   BASEEXCESS 24.7* 22.8* 15.1*      I have reviewed the patient's laboratory results.    Radiology results:    XR Chest 1 View    Result Date:  5/21/2023  PORTABLE CHEST    5/21/2023 6:19 AM  HISTORY: Respiratory failure.  COMPARISON: May 18, 2023.  FINDINGS: Consolidation at the left lung base has slightly worsened. Background of bronchitis is noted. No other consolidation. No effusion or pneumothorax. New right upper shimmy PICC, tip in SVC.      Impression: Left base pneumonia, slightly worsened from previous.  This report was signed and finalized on 5/21/2023 7:22 AM by Dr Danyel Mckinnon DO.    I have reviewed the patient's radiology reports.    Medication Review:     I have reviewed the patient's active and prn medications.     Problem List:      Acute on chronic respiratory failure with hypoxia and hypercapnia      Assessment/Plan:    Respiratory failure with hypoxia/hypercapnia, COPD exacerbation, parainfluenza  -patient on 5 to 6 L oxygen at baseline,  Continue BiPAP therapy.  Has NIV at home. Currently on 10 liters  -Recent parainfluenza virus infection met SIRS due to this  - Solu-Medrol taper as appropriate  -Bronchodilators, Mucinex MetaNeb and supportive care.  -Dr. Soriano note reviewed and appreciated  -s/p lasix  -Xanax and Precedex as needed  -Sputum growing Staph aureus and haemophilus on vancomycin and azithromycin  -will add flonase, zyrtec, and atarax for upper airway congestion     Elevated troponin  -Likely secondary to demand ischemia in settings of respiratory failure  -Patient denies any chest pain, low suspicion for ACS  -Troponin trended down and plateaued.     Hyperglycemia  -Will cover with sliding scale insulin while on steroids  -Hemoglobin A1c 6.7     Disposition: Looking at [facility] in 1 week    Prophylaxis: Lovenox    Edited by: Primitivo Nuñez DO at 5/21/2023 0849     DVT Prophylaxis: Lovenox  Code Status:   Code Status and Medical Interventions:   Ordered at: 05/04/23 4214     Code Status (Patient has no pulse and is not breathing):    CPR (Attempt to Resuscitate)     Medical Interventions (Patient has pulse or  is breathing):    Full Support      Diet:   Dietary Orders (From admission, onward)       Start     Ordered    05/18/23 1800  Dietary Nutrition Supplements Magic Cup  2 Times Daily      Question:  Select Supplement:  Answer:  Magic Cup    05/18/23 1517    05/17/23 1038  Diet: Cardiac Diets; Healthy Heart (2-3 Na+); Texture: Regular Texture (IDDSI 7); Fluid Consistency: Thin (IDDSI 0)  Diet Effective Now        Comments: Pt prefers cottage cheese, bananas, oranges, peaches, watermelon.   References:    Diet Order [facility]   Question Answer Comment   Diets: Cardiac Diets    Cardiac Diet: Healthy Heart (2-3 Na+)    Texture: Regular Texture (IDDSI 7)    Fluid Consistency: Thin (IDDSI 0)        05/17/23 1039    05/15/23 1800  Dietary Nutrition Supplements Xu  2 Times Daily      Question:  Select Supplement:  Answer:  Xu    05/15/23 1445    05/15/23 1800  Dietary Nutrition Supplements Boost VHC  2 Times Daily      Question:  Select Supplement:  Answer:  Boost VHC    05/15/23 1446                   Discharge Plan: Long-term acute care 5 days    Primitivo Nuñez DO  05/21/23  12:43 EDT    Dictated utilizing Dragon dictation.        Electronically signed by Primitivo Nuñez DO at 05/21/23 1244       Consult Notes (last 24 hours)  Notes from 05/21/23 1213 through 05/22/23 1213   No notes of this type exist for this encounter.

## 2023-05-22 NOTE — PROGRESS NOTES
"  CC: Acute Respiratory Failure.     S: Continues to require high flow nasal cannula when off the NIV/AVAPS.  Continues to mention that she coughs up clear sputum on occasion.  Feels that she is \"getting stronger\".  Also feels that she is not struggling to breathe as much throughout the day \"anymore\".    ROS: Complains of cough, shortness of breath and mild weakness.  Also complains of mild anxiety.    O:Vital signs reviewed. FiO2: 8-10 L/min high flow nasal cannula.  /53   Pulse 71   Temp 97.2 °F (36.2 °C) (Temporal)   Resp 16   Ht 160 cm (63\")   Wt 67.2 kg (148 lb 2.4 oz)   SpO2 90%   BMI 26.24 kg/m²     Temp (24hrs), Av.8 °F (36.6 °C), Min:97.2 °F (36.2 °C), Max:98.9 °F (37.2 °C)      I & Os reviewed.   Intake/Output       23 0700 - 23 0659 23 0700 - 23 0659    Intake (ml) 250 100    Output (ml) 1100 --    Net (ml) -850 100    Last Weight 67.2 kg (148 lb 2.4 oz) --          Net IO Since Admission: -4,607 mL [23 1055]    General/Constitutional: Off NIV therapy. Appears to be in no significant distress, at this time, although did appear to be somewhat sleepy.  Eyes: PERRL. EOMI  Neck: Supple without JVD. No obvious masses noted.   Cardiovascular: S1 + S2.  Regular at this time.  Lungs/Respiratory: Decreased bilateral breath sounds noted.  Bilateral, somewhat scattered, wheezing heard.  Bibasilar crackles noted.  GI/Abdomen: Soft. Bowel sounds positive.  No obvious organomegaly  Musculoskeletal/Extremities: Right-sided PICC line in place.  No edema noted. Gait could not be assessed at this time, as the patient was laying in bed.   Neurologic: Was able to respond appropriately and interact today.   Psych: Was able to follow simple commands.  Appeared to be mildly anxious on occasion.  Skin: Appeared somewhat dry.      Labs: Reviewed.   Results from last 7 days   Lab Units 23  0515 23  0453 23  0426 23  0432 23  0618 23  0450 " 05/16/23 0453   WBC 10*3/mm3 11.59* 13.10* 15.22* 15.77* 20.83*   < > 17.14*   HEMOGLOBIN g/dL 11.0* 11.0* 11.3* 11.6* 12.0   < > 12.4   HEMATOCRIT % 35.6 33.8* 36.5 37.4 38.9   < > 40.1   PLATELETS 10*3/mm3 211 212 209 191 212   < > 204   NEUTROPHIL % %  --   --   --   --   --   --  89.5*   NEUTROS ABS 10*3/mm3  --   --   --   --   --   --  15.32*   EOSINOPHIL % %  --   --   --   --   --   --  0.0*   EOS ABS 10*3/mm3  --   --   --   --   --   --  0.00   LYMPHOCYTE % %  --   --   --   --   --   --  5.1*   LYMPHS ABS 10*3/mm3  --   --   --   --   --   --  0.88    < > = values in this interval not displayed.       Lab Results   Component Value Date    PROCALCITO 0.17 05/21/2023    PROCALCITO 0.11 05/16/2023    PROCALCITO 0.08 05/14/2023       No results found for: CRP    No results found for: SEDRATE    Lab Results   Component Value Date    PROBNP 572.7 05/14/2023    PROBNP 479.4 05/10/2023    PROBNP 234.4 05/04/2023       Results from last 7 days   Lab Units 05/22/23  0515 05/21/23  0453 05/20/23  0426   SODIUM mmol/L 138 140 139   POTASSIUM mmol/L 4.5 4.7 3.8   CHLORIDE mmol/L 100 98 94*   CO2 mmol/L 31.5* 36.7* 38.8*   BUN mg/dL 22 24* 25*   CREATININE mg/dL 0.26* 0.32* 0.28*   CALCIUM mg/dL 8.8 8.5* 8.9   ANION GAP mmol/L 6.5 5.3 6.2   BILIRUBIN mg/dL 0.2 0.2 0.3   ALK PHOS U/L 74 77 72   ALT (SGPT) U/L 35* 35* 26   AST (SGOT) U/L 14 19 12   GLUCOSE mg/dL 111* 120* 123*   TOTAL PROTEIN g/dL 5.1* 5.1* 5.4*   ALBUMIN g/dL 2.5* 2.6* 2.5*                   No results found for: CKTOTAL    No components found for: HSTROPT    Lab Results   Component Value Date    TROPONINT 22 (H) 05/05/2023    TROPONINT 25 (H) 05/05/2023    TROPONINT 44 (H) 05/04/2023       No results found for: DDIMER    Brief Urine Lab Results  (Last result in the past 365 days)      Color   Clarity   Blood   Leuk Est   Nitrite   Protein   CREAT   Urine HCG        05/12/23 1738 Yellow   Cloudy   Negative   Trace   Negative   Trace                 Lab  Results   Component Value Date    TSH 0.304 05/09/2023       No results found for: FREET4      Micro: As of May 22, 2023   Lab Results   Component Value Date    RESPCX Moderate growth (3+) Haemophilus influenzae (A) 05/18/2023    RESPCX Light growth (2+) Staphylococcus aureus (A) 05/18/2023    RESPCX No Normal Respiratory Ana (A) 05/18/2023     No results found for: BCIDPCR  Lab Results   Component Value Date    BLOODCX No growth at 3 days 05/18/2023    BLOODCX No growth at 3 days 05/18/2023    BLOODCX No growth at 5 days 05/04/2023    BLOODCX No growth at 5 days 05/04/2023     Lab Results   Component Value Date    URINECX Final report 02/07/2019    URINECX 20,000-30,000 CFU/mL Escherichia coli (A) 10/05/2018     No results found for: MRSACX  Lab Results   Component Value Date    MRSAPCR No MRSA Detected 04/15/2023     No results found for: URCX  No components found for: LOWRESPCF  No results found for: THROATCX  No results found for: CULTURES  No components found for: STREPBCX  No results found for: STREPPNEUAG  No results found for: LEGIONELLA  No results found for: MYCOPLASCX  No results found for: GCCX  No results found for: WOUNDCX  No results found for: BODYFLDCX    No results found for: FLU    Lab Results   Component Value Date    ADENOVIRUS Not Detected 05/04/2023     Lab Results   Component Value Date    ZQ983Y Not Detected 05/04/2023     Lab Results   Component Value Date    CVHKU1 Not Detected 05/04/2023     Lab Results   Component Value Date    CVNL63 Not Detected 05/04/2023     Lab Results   Component Value Date    CVOC43 Not Detected 05/04/2023     Lab Results   Component Value Date    HUMETPNEVS Not Detected 05/04/2023     Lab Results   Component Value Date    HURVEV Not Detected 05/04/2023     Lab Results   Component Value Date    FLUBPCR Not Detected 05/04/2023     Lab Results   Component Value Date    PARAINFLUE Not Detected 05/04/2023     Lab Results   Component Value Date    PARAFLUV2 Not  Detected 05/04/2023     Lab Results   Component Value Date    PARAFLUV3 Detected (A) 05/04/2023     Lab Results   Component Value Date    PARAFLUV4 Not Detected 05/04/2023     Lab Results   Component Value Date    BPERTPCR Not Detected 05/04/2023     No results found for: [id number]  Lab Results   Component Value Date    CPNEUPCR Not Detected 05/04/2023     Lab Results   Component Value Date    MPNEUMO Not Detected 05/04/2023     Lab Results   Component Value Date    FLUAPCR Not Detected 05/04/2023     No results found for: FLUAH3  No results found for: FLUAH1  Lab Results   Component Value Date    RSV Not Detected 05/04/2023     Lab Results   Component Value Date    BPARAPCR Not Detected 05/04/2023       COVID 19:  Lab Results   Component Value Date    COVID19 Not Detected 05/04/2023         ABG: Reviewed   Recent Labs     05/16/23  0519 05/18/23  0708 05/21/23  0714   PHART 7.453* 7.451* 7.394   YVZ7BPH 76.4* 73.4* 70.2*   PO2ART 56.7* 64.0* 95.2   JAU8SCG 53.4* 51.1* 42.8*   BASEEXCESS 24.7* 22.8* 15.1*       Lab Results   Component Value Date    LACTATE 1.3 05/04/2023    LACTATE 1.2 04/13/2023    LACTATE 2.7 (C) 04/13/2023         Echo: Results for orders placed during the hospital encounter of 04/13/23    Adult Transthoracic Echo Complete W/ Cont if Necessary Per Protocol    Interpretation Summary  •  Left ventricular diastolic function is consistent with (grade I) impaired relaxation.  •  Mild aortic valve stenosis is present.  •  Estimated right ventricular systolic pressure from tricuspid regurgitation is moderately elevated (45-55 mmHg).  •  Mild to moderate pulmonary hypertension is present.      Results for orders placed during the hospital encounter of 10/01/17    Adult Transthoracic Echo Complete W/ Cont if Necessary Per Protocol    Interpretation Summary  TEchnically difficult study  1) Borderline LVH with normal LV systolic function ( EF is over 55%)  2) Normal left atrial size with mild elevation in  LVedp  3) Trace MR  4) Mild TR with normal PA pressures  5) Mild AI  6) Small RV with normal RV function        dexmedetomidine, 0.2-1.5 mcg/kg/hr, Last Rate: Stopped (05/12/23 1556)  Pharmacy to Dose enoxaparin (LOVENOX),   Pharmacy to dose vancomycin,           CXRay: Latest imaging study was reviewed personally.   Imaging Results (Last 48 Hours)     Procedure Component Value Units Date/Time    XR Chest 1 View [[id number]] Collected: 05/21/23 0720     Updated: 05/21/23 0724    Narrative:      PORTABLE CHEST    5/21/2023 6:19 AM      HISTORY: Respiratory failure.     COMPARISON: May 18, 2023.     FINDINGS: Consolidation at the left lung base has slightly worsened.  Background of bronchitis is noted. No other consolidation. No effusion  or pneumothorax. New right upper shimmy PICC, tip in SVC.       Impression:      Left base pneumonia, slightly worsened from previous.     This report was signed and finalized on 5/21/2023 7:22 AM by Dr Danyel Mckinnon DO.            Assessment & Recommendations/Plan:   1.  Acute respiratory failure  Latest chest x-ray suggested slightly worsening left basal disease, likely from atelectasis.   Continue AVAPS 1 hour on and 3 hours off throughout the day and as needed otherwise.  Continues to require high flow nasal cannula, currently between 6-12 L/min.    Latest ABGs show compensated chronic respiratory acidosis, with improving PCO2.  We will repeat ABG, as clinically indicated  Will repeat chest x-ray in the morning.    2.  COPD with acute exacerbation  Likely triggered by parainfluenza bronchitis  Latest cultures positive for haemophilus influenza and MSSA.  3+ WBCs noted in the sputum cultures.  Given significant antibiotic allergies, for now would suggest continuation of vancomycin for total of 3-5 days.  She has been on azithromycin which was started on the 18th, and I would suggest 3 days of therapy  Will administer Depo-Medrol and discontinue IV Solu-Medrol.  Continue nebulized  Pulmicort in the middle of the day.  Continue Spiriva and Symbicort  Will benefit from outpatient PFTs    3.  Abnormal CT of the chest/atelectasis  Although unlikely that the patient has developed pneumonia, it appears that due to elevated white blood cell count, she was restarted on antibiotics in the form of vancomycin by hospitalist service.    Once again had a long discussion with the family members at the bedside along with the patient regarding the finding of significant mucous plugging/atelectasis.  Continue MetaNeb treatments.  We will continue hypertonic saline to every 12 hourly.  Continue Mucinex DM.  I have spoken to patient's daughter, Nehal at [phone], on 5/16/2023.    I have had a long discussion with patient's daughter from [place], at the bedside.  I once again informed the patient and her daughter that based on her prolonged stay, significant COPD and development of likely deconditioning, if she worsens any further, she may need to be intubated.    4.  Chronic respiratory acidosis  Appears to be on NIV device at home.    5.  Pulmonary hypertension  May need outpatient work-up  Likely secondary to underlying likely severe COPD    6.  Anxiety  Has been on Precedex in the past  Continue other anxiolytics.  May need adjustment, as indicated.    7.  Smoking  Was advised to quit smoking    8.  Deconditioning  We will try to have the patient sit in a recliner or have her bed made in to a chair today, as tolerated.  Will definitely become an issue, the longer patient remains dependent on significantly high amounts of oxygen.    Discussed with Dr. Nuñez at the bedside.    We have reviewed patient's current orders and changes, if any, have been suggested to primary care team. Plan was also discussed with nursing staff, as necessary.     This document was electronically signed by Arnaldo Soriano MD on 05/22/23 at 10:55 EDT      Dictated utilizing Dragon dictation.

## 2023-05-22 NOTE — PLAN OF CARE
Goal Outcome Evaluation:  Plan of Care Reviewed With: patient                Slept well, pt did not want to wear BIPAP this evening.

## 2023-05-23 ENCOUNTER — APPOINTMENT (OUTPATIENT)
Dept: GENERAL RADIOLOGY | Facility: HOSPITAL | Age: 78
DRG: 190 | End: 2023-05-23
Payer: MEDICARE

## 2023-05-23 LAB
ANION GAP SERPL CALCULATED.3IONS-SCNC: 7.7 MMOL/L (ref 5–15)
BACTERIA SPEC AEROBE CULT: NORMAL
BACTERIA SPEC AEROBE CULT: NORMAL
BUN SERPL-MCNC: 14 MG/DL (ref 8–23)
BUN/CREAT SERPL: 51.9 (ref 7–25)
CALCIUM SPEC-SCNC: 8.9 MG/DL (ref 8.6–10.5)
CHLORIDE SERPL-SCNC: 100 MMOL/L (ref 98–107)
CO2 SERPL-SCNC: 30.3 MMOL/L (ref 22–29)
CREAT SERPL-MCNC: 0.27 MG/DL (ref 0.57–1)
DEPRECATED RDW RBC AUTO: 52.8 FL (ref 37–54)
EGFRCR SERPLBLD CKD-EPI 2021: 112.9 ML/MIN/1.73
ERYTHROCYTE [DISTWIDTH] IN BLOOD BY AUTOMATED COUNT: 15.9 % (ref 12.3–15.4)
GLUCOSE BLDC GLUCOMTR-MCNC: 134 MG/DL (ref 70–130)
GLUCOSE BLDC GLUCOMTR-MCNC: 147 MG/DL (ref 70–130)
GLUCOSE BLDC GLUCOMTR-MCNC: 328 MG/DL (ref 70–130)
GLUCOSE SERPL-MCNC: 138 MG/DL (ref 65–99)
HCT VFR BLD AUTO: 33.3 % (ref 34–46.6)
HGB BLD-MCNC: 10.5 G/DL (ref 12–15.9)
MCH RBC QN AUTO: 30 PG (ref 26.6–33)
MCHC RBC AUTO-ENTMCNC: 31.5 G/DL (ref 31.5–35.7)
MCV RBC AUTO: 95.1 FL (ref 79–97)
PLATELET # BLD AUTO: 215 10*3/MM3 (ref 140–450)
PMV BLD AUTO: 11.7 FL (ref 6–12)
POTASSIUM SERPL-SCNC: 4.6 MMOL/L (ref 3.5–5.2)
RBC # BLD AUTO: 3.5 10*6/MM3 (ref 3.77–5.28)
SODIUM SERPL-SCNC: 138 MMOL/L (ref 136–145)
WBC NRBC COR # BLD: 9.97 10*3/MM3 (ref 3.4–10.8)

## 2023-05-23 PROCEDURE — 99232 SBSQ HOSP IP/OBS MODERATE 35: CPT | Performed by: FAMILY MEDICINE

## 2023-05-23 PROCEDURE — 94761 N-INVAS EAR/PLS OXIMETRY MLT: CPT

## 2023-05-23 PROCEDURE — 97110 THERAPEUTIC EXERCISES: CPT

## 2023-05-23 PROCEDURE — 63710000001 INSULIN ASPART PER 5 UNITS: Performed by: FAMILY MEDICINE

## 2023-05-23 PROCEDURE — 85027 COMPLETE CBC AUTOMATED: CPT | Performed by: INTERNAL MEDICINE

## 2023-05-23 PROCEDURE — 97535 SELF CARE MNGMENT TRAINING: CPT

## 2023-05-23 PROCEDURE — 25010000002 ONDANSETRON PER 1 MG: Performed by: FAMILY MEDICINE

## 2023-05-23 PROCEDURE — 80048 BASIC METABOLIC PNL TOTAL CA: CPT | Performed by: INTERNAL MEDICINE

## 2023-05-23 PROCEDURE — 63710000001 PREDNISONE PER 1 MG: Performed by: INTERNAL MEDICINE

## 2023-05-23 PROCEDURE — 25010000002 VANCOMYCIN 5 G RECONSTITUTED SOLUTION: Performed by: INTERNAL MEDICINE

## 2023-05-23 PROCEDURE — 25010000002 ENOXAPARIN PER 10 MG: Performed by: FAMILY MEDICINE

## 2023-05-23 PROCEDURE — 94799 UNLISTED PULMONARY SVC/PX: CPT

## 2023-05-23 PROCEDURE — 94664 DEMO&/EVAL PT USE INHALER: CPT

## 2023-05-23 PROCEDURE — 71045 X-RAY EXAM CHEST 1 VIEW: CPT

## 2023-05-23 PROCEDURE — 99233 SBSQ HOSP IP/OBS HIGH 50: CPT | Performed by: INTERNAL MEDICINE

## 2023-05-23 PROCEDURE — 97530 THERAPEUTIC ACTIVITIES: CPT

## 2023-05-23 PROCEDURE — 94660 CPAP INITIATION&MGMT: CPT

## 2023-05-23 PROCEDURE — 82948 REAGENT STRIP/BLOOD GLUCOSE: CPT

## 2023-05-23 PROCEDURE — 94668 MNPJ CHEST WALL SBSQ: CPT

## 2023-05-23 RX ADMIN — ONDANSETRON 4 MG: 2 INJECTION INTRAMUSCULAR; INTRAVENOUS at 19:24

## 2023-05-23 RX ADMIN — Medication 10 ML: at 00:40

## 2023-05-23 RX ADMIN — BUDESONIDE AND FORMOTEROL FUMARATE DIHYDRATE 2 PUFF: 160; 4.5 AEROSOL RESPIRATORY (INHALATION) at 19:00

## 2023-05-23 RX ADMIN — ATENOLOL 25 MG: 25 TABLET ORAL at 08:15

## 2023-05-23 RX ADMIN — Medication 10 ML: at 08:19

## 2023-05-23 RX ADMIN — AMLODIPINE BESYLATE 10 MG: 5 TABLET ORAL at 08:15

## 2023-05-23 RX ADMIN — FLUTICASONE PROPIONATE 2 SPRAY: 50 SPRAY, METERED NASAL at 21:02

## 2023-05-23 RX ADMIN — CETIRIZINE HYDROCHLORIDE 10 MG: 10 TABLET, FILM COATED ORAL at 08:16

## 2023-05-23 RX ADMIN — IPRATROPIUM BROMIDE AND ALBUTEROL SULFATE 3 ML: 2.5; .5 SOLUTION RESPIRATORY (INHALATION) at 01:08

## 2023-05-23 RX ADMIN — HYDROXYZINE HYDROCHLORIDE 25 MG: 25 TABLET ORAL at 15:09

## 2023-05-23 RX ADMIN — MULTIPLE VITAMINS W/ MINERALS TAB 1 TABLET: TAB at 08:15

## 2023-05-23 RX ADMIN — Medication 10 ML: at 08:16

## 2023-05-23 RX ADMIN — VANCOMYCIN HYDROCHLORIDE 1250 MG: 5 INJECTION, POWDER, LYOPHILIZED, FOR SOLUTION INTRAVENOUS at 00:39

## 2023-05-23 RX ADMIN — SODIUM CHLORIDE 30 MG/ML INHALATION SOLUTION 4 ML: 30 SOLUTION INHALANT at 18:58

## 2023-05-23 RX ADMIN — VANCOMYCIN HYDROCHLORIDE 1250 MG: 5 INJECTION, POWDER, LYOPHILIZED, FOR SOLUTION INTRAVENOUS at 12:55

## 2023-05-23 RX ADMIN — GUAIFENESIN AND DEXTROMETHORPHAN HYDROBROMIDE 1 TABLET: 30; 600 TABLET, EXTENDED RELEASE ORAL at 21:01

## 2023-05-23 RX ADMIN — HYDROXYZINE HYDROCHLORIDE 25 MG: 25 TABLET ORAL at 08:16

## 2023-05-23 RX ADMIN — ACETAMINOPHEN 650 MG: 325 TABLET, FILM COATED ORAL at 15:14

## 2023-05-23 RX ADMIN — ALPRAZOLAM 0.5 MG: 0.5 TABLET ORAL at 21:01

## 2023-05-23 RX ADMIN — ACETAMINOPHEN 650 MG: 325 TABLET, FILM COATED ORAL at 02:14

## 2023-05-23 RX ADMIN — PREDNISONE 40 MG: 20 TABLET ORAL at 08:15

## 2023-05-23 RX ADMIN — IPRATROPIUM BROMIDE AND ALBUTEROL SULFATE 3 ML: 2.5; .5 SOLUTION RESPIRATORY (INHALATION) at 18:58

## 2023-05-23 RX ADMIN — BUDESONIDE AND FORMOTEROL FUMARATE DIHYDRATE 2 PUFF: 160; 4.5 AEROSOL RESPIRATORY (INHALATION) at 06:58

## 2023-05-23 RX ADMIN — ENOXAPARIN SODIUM 40 MG: 100 INJECTION SUBCUTANEOUS at 21:01

## 2023-05-23 RX ADMIN — FLUTICASONE PROPIONATE 2 SPRAY: 50 SPRAY, METERED NASAL at 08:16

## 2023-05-23 RX ADMIN — INSULIN ASPART 7 UNITS: 100 INJECTION, SOLUTION INTRAVENOUS; SUBCUTANEOUS at 17:02

## 2023-05-23 RX ADMIN — HYDROXYZINE HYDROCHLORIDE 25 MG: 25 TABLET ORAL at 21:02

## 2023-05-23 RX ADMIN — SENNOSIDES AND DOCUSATE SODIUM 2 TABLET: 50; 8.6 TABLET ORAL at 08:15

## 2023-05-23 RX ADMIN — IPRATROPIUM BROMIDE AND ALBUTEROL SULFATE 3 ML: 2.5; .5 SOLUTION RESPIRATORY (INHALATION) at 06:58

## 2023-05-23 RX ADMIN — GUAIFENESIN AND DEXTROMETHORPHAN HYDROBROMIDE 1 TABLET: 30; 600 TABLET, EXTENDED RELEASE ORAL at 08:15

## 2023-05-23 RX ADMIN — Medication 3 ML: at 08:19

## 2023-05-23 RX ADMIN — IPRATROPIUM BROMIDE AND ALBUTEROL SULFATE 3 ML: 2.5; .5 SOLUTION RESPIRATORY (INHALATION) at 23:31

## 2023-05-23 RX ADMIN — SODIUM CHLORIDE 30 MG/ML INHALATION SOLUTION 4 ML: 30 SOLUTION INHALANT at 06:58

## 2023-05-23 RX ADMIN — IPRATROPIUM BROMIDE AND ALBUTEROL SULFATE 3 ML: 2.5; .5 SOLUTION RESPIRATORY (INHALATION) at 13:14

## 2023-05-23 RX ADMIN — BUDESONIDE INHALATION 1 MG: 0.5 SUSPENSION RESPIRATORY (INHALATION) at 06:58

## 2023-05-23 NOTE — PLAN OF CARE
Goal Outcome Evaluation:  Plan of Care Reviewed With: patient        Progress: improving  Outcome Evaluation: OT tx completed. Patient supine in bed, on 13L HFNC satting 90%. PT is present at bedside. Patient performed rolling left to right with min A for removal of purewick and brief change. Patient moved to EOB with min A and desatted to 85%, increased to 15L. Patient performed transfer to chair with PT assisting. Patient requested BSC, required mod A x 2 for sit to stand and tf to BSC, required max A for perineal hygiene and clothing management. Patient's knees buckling during tf. Sitting in chair able to wash underarms and apply deodorant as well as wash face and clean mouth using toothette. Patient then performed BUE AAROM/ strengthening x10. Satting 89% following activity. Continue OT POC

## 2023-05-23 NOTE — DISCHARGE PLACEMENT REQUEST
"Damian Toscano (77 y.o. Female)     Date of Birth   1945    Social Security Number       Address   [address] [facility] [address]    Home Phone   [phone]    MRN   [id number]       Ep[employer]opalian   [place]    Marital Status                               Admission Date   5/4/23    Admission Type   Emergency    Admitting Provider   Primitivo Nuñez DO    Attending Provider   Lolis Vital MD    Department, Room/Bed   [facility] TELEMETRY 3, 308/1       Discharge Date       Discharge Disposition       Discharge Destination                               Attending Provider: Lolis Vital MD    Allergies: Contrast Dye (Echo Or Unknown Ct/mr), Ciprofloxacin, Keflex [Cephalexin], Penicillins, Sulfa Antibiotics, Terramycin [Oxytetracycline], Prednisone, Latex, Percocet [Oxycodone-acetaminophen], Xyzal [Levocetirizine]    Isolation: None   Infection: None   Code Status: CPR    Ht: 160 cm (63\")   Wt: 67.2 kg (148 lb 2.4 oz)    Admission Cmt: None   Principal Problem: Acute on chronic respiratory failure with hypoxia and hypercapnia [J96.21,J96.22]                 Active Insurance as of 5/4/2023     Primary Coverage     Payor Plan Insurance Group Employer/Plan Group    HUMANA MEDICARE REPLACEMENT HUMANA MEDICARE REPLACEMENT [id number]     Payor Plan Address Payor Plan Phone Number Payor Plan Fax Number Effective Dates    [address] [address] [phone]  1/1/2018 - None Entered    [facility] [id number]       Subscriber Name Subscriber Birth Date Member ID       DAMIAN TOSCANO 1945 [id number]           Secondary Coverage     Payor Plan Insurance Group Employer/Plan Group    [place]      Payor Plan Address Payor Plan Phone Number Payor Plan Fax Number Effective Dates    [address] [phone]  10/11/2021 - None Entered    [facility] [id number]       Subscriber Name Subscriber Birth Date Member ID       DAMIAN TOSCANO 1945 [id number]                 Emergency " Contacts      (Rel.) Home Phone Work Phone Mobile Phone    Nehal Hoffmann (Daughter) [phone] -- --               History & Physical      Lolis Vital MD at 23 1550            [facility]   HISTORY AND PHYSICAL      Name:  Carolin Toscano   Age:  77 y.o.  Sex:  female  :  1945  MRN:  [id number]   Visit Number:  [id number]  Admission Date:  2023  Date Of Service:  23  Primary Care Physician:  Jason Nicholson MD    Chief Complaint:     Shortness of breath    History Of Presenting Illness:      Patient is a 77 years old female with a past medical history of COPD, chronic respiratory failure on 5 to 6 L per her previous discharge, on NIV machine at home and trelegy, hypertension, and ongoing tobacco use who presented to the ER from home by EMS with a chief complaint of shortness of breath.  Patient reporting that she started having shortness of breath associated with productive cough since last night and has persisted and became worse that promoted her to call EMS.  EMS has found the patient to be at 65% saturation on her normal 6 L of oxygen.  She received a breathing treatment in route.  Patient was recently admitted to the hospital on  and discharged on 2023 with similar clinical picture of Respiratory failure, COPD exacerbation and pneumonia.  Patient was discharged on 6 L previously.  Patient reports compliance with her NIV machine at home.  Denies any sick contacts that she is aware of.  Patient denies any chest pain or abdominal pain.  Denies any other complaints.    On ER evaluation, Patient was found to be tachycardic with a heart rate of 130s, temperature of 100.2 and respirations of 30, /72.  Patient was placed on BiPAP with FiO2 of 40%.  Her ABG came back and showed pH of 7.296/PCO2 87/PO2 94/HCO3 42/saturation 97% on 5 L nasal cannula.  HS Troponin 44, proBNP WNL, glucose 192, CO2 of 40, otherwise CBC and CMP within  acceptable range.  Lactate and Pro-Adi WNL.  Respiratory panel positive for parainfluenza virus.  Chest x-ray Mild interstitial disease  bilaterally is similar to prior. No area of consolidation is seen.  Urinalysis negative for nitrates, leukocytes, WBC or bacteria with squamous epithelial cells.  Patient received Solu-Medrol, normal saline bolus of 2124 mL, magnesium and Aztreonam while in the ER.  Hospitalist consulted for admission, further evaluation and treatment.    Review Of Systems:    All systems were reviewed and negative except as mentioned in history of presenting illness, assessment and plan.    Past Medical History: Patient  has a past medical history of Arthritis, COPD (chronic obstructive pulmonary disease), Gall stones, Goiter, Hypertension, Hypertension, Kidney infection, Kidney stone, Kidney stones, Migraine headache, and Scarlet fever.    Past Surgical History: Patient  has a past surgical history that includes Cholecystectomy; Bladder surgery; Tonsillectomy; Hysterectomy; Cataract extraction w/ intraocular lens implant (Left, 8/11/2022); and Cataract extraction w/ intraocular lens implant (Right, 8/25/2022).    Social History: Patient  reports that she has been smoking cigarettes. She started smoking about 63 years ago. She has been smoking an average of 1 pack per day. She quit smokeless tobacco use about 5 years ago. She reports that she does not drink alcohol and does not use drugs.    Family History:  Patient's family history has been reviewed and found to be noncontributory.     Allergies:      Contrast dye (echo or unknown ct/mr), Ciprofloxacin, Keflex [cephalexin], Penicillins, Sulfa antibiotics, Terramycin [oxytetracycline], Prednisone, Latex, Percocet [oxycodone-acetaminophen], and Xyzal [levocetirizine]    Home Medications:    Prior to Admission Medications     Prescriptions Last Dose Informant Patient Reported? Taking?    albuterol (PROVENTIL) (2.5 MG/3ML) 0.083% nebulizer solution    "No No    Inhale contents of 1 vial (3 mL) by nebulization Every 6 (Six) Hours As Needed for Wheezing for up to 30 days.    amLODIPine (NORVASC) 10 MG tablet   No No    Take 1 tablet by mouth Daily.    atenolol (TENORMIN) 25 MG tablet   No No    Take 1 tablet by mouth Daily.    Fluticasone-Umeclidin-Vilant (TRELEGY) 100-62.5-25 MCG/ACT inhaler   No No    Inhale 1 puff  by mouth Daily    Patient not taking:  Reported on 5/1/2023    multivitamins-minerals (PRESERVISION AREDS 2) capsule capsule  Self Yes No    Take 1 capsule by mouth Daily.    tiotropium (Spiriva HandiHaler) 18 MCG per inhalation capsule   No No    Place 1 capsule into inhaler and inhale Daily.        ED Medications:    Medications   sodium chloride 0.9 % flush 10 mL (has no administration in time range)   sodium chloride 0.9 % bolus 2,124 mL (2,124 mL Intravenous New Bag 5/4/23 1517)   methylPREDNISolone sodium succinate (SOLU-Medrol) injection 125 mg (125 mg Intravenous Given 5/4/23 1516)   magnesium sulfate 2g/50 mL (PREMIX) infusion (0 g Intravenous Stopped 5/4/23 1543)   aztreonam (AZACTAM) 2 g/100 mL 0.9% NS (mbp) (2 g Intravenous New Bag 5/4/23 1516)     Vital Signs:  Temp:  [100.2 °F (37.9 °C)] 100.2 °F (37.9 °C)  Heart Rate:  [133] 133  Resp:  [29-30] 29  BP: (185)/(72) 185/72        05/04/23  1444   Weight: 70.8 kg (156 lb)     Body mass index is 27.63 kg/m².    Physical Exam:     Most recent vital Signs: BP (!) 185/72 (Patient Position: Sitting)   Pulse (!) 133   Temp 100.2 °F (37.9 °C) (Oral)   Resp (!) 29   Ht 160 cm (63\")   Wt 70.8 kg (156 lb)   SpO2 96%   BMI 27.63 kg/m²     Physical Exam  Vitals and nursing note reviewed.   Constitutional:       General: She is in acute distress.      Appearance: She is ill-appearing.      Comments: Chronically ill-appearing.   LUCI:      Head: Normocephalic and atraumatic.      Right Ear: External ear normal.      Left Ear: External ear normal.      Nose: Nose normal.      Mouth/Throat:      " Mouth: Mucous membranes are dry.   Eyes:      Extraocular Movements: Extraocular movements intact.      Conjunctiva/sclera: Conjunctivae normal.      Pupils: Pupils are equal, round, and reactive to light.   Cardiovascular:      Rate and Rhythm: Regular rhythm. Tachycardia present.      Pulses: Normal pulses.      Heart sounds: Normal heart sounds.   Pulmonary:      Effort: Tachypnea, accessory muscle usage, prolonged expiration and respiratory distress present.      Breath sounds: Decreased air movement present. Wheezing present. No rhonchi.      Comments: Diffuse bilateral expiratory and inspiratory wheezing heard.  Abdominal:      General: Bowel sounds are normal. There is no distension.      Palpations: Abdomen is soft.      Tenderness: There is no abdominal tenderness.   Musculoskeletal:         General: No tenderness. Normal range of motion.      Cervical back: Normal range of motion and neck supple.      Right lower leg: No edema.      Left lower leg: No edema.   Skin:     General: Skin is warm and dry.      Findings: No rash.   Neurological:      General: No focal deficit present.      Mental Status: She is alert and oriented to person, place, and time. Mental status is at baseline.      Motor: No weakness.   Psychiatric:         Mood and Affect: Mood normal.         Behavior: Behavior normal.         Thought Content: Thought content normal.         Laboratory data:    I have reviewed the labs done in the emergency room.    Results from last 7 days   Lab Units 05/04/23  1447   SODIUM mmol/L 140   POTASSIUM mmol/L 4.4   CHLORIDE mmol/L 95*   CO2 mmol/L 40.2*   BUN mg/dL 10   CREATININE mg/dL 0.38*   CALCIUM mg/dL 9.1   BILIRUBIN mg/dL 0.4   ALK PHOS U/L 87   ALT (SGPT) U/L 30   AST (SGOT) U/L 23   GLUCOSE mg/dL 192*     Results from last 7 days   Lab Units 05/04/23  1447   WBC 10*3/mm3 8.04   HEMOGLOBIN g/dL 13.3   HEMATOCRIT % 44.5   PLATELETS 10*3/mm3 149         Results from last 7 days   Lab Units  05/04/23  1447   HSTROP T ng/L 44*     Results from last 7 days   Lab Units 05/04/23  1447   PROBNP pg/mL 234.4             Results from last 7 days   Lab Units 05/04/23  1448   PH, ARTERIAL pH units 7.296*   PO2 ART mm Hg 94.2   PCO2, ARTERIAL mm Hg 87.4*   HCO3 ART mmol/L 42.6*           Invalid input(s): USDES,  BLOODU, NITRITITE, BACT, EP    Pain Management Panel          View : No data to display.                      EKG:      Sinus tachycardia, heart rate 135, nonspecific ST/T wave changes.    Radiology:    XR Chest 1 View    Result Date: 5/4/2023  PROCEDURE: XR CHEST 1 VW-  HISTORY: SOA Triage Protocol  COMPARISON: 04/18/2023.  FINDINGS: The heart is normal in size. Mild interstitial disease bilaterally is similar to prior. No area of consolidation is seen. Aortic mural calcifications noted.. The mediastinum is unremarkable. There is no pneumothorax.  There are no acute osseous abnormalities.      Stable chest..    This report was signed and finalized on 5/4/2023 3:31 PM by Shraddha Wharton MD.      Assessment:    Acute on chronic respiratory failure with hypoxia and hypercapnia, likely secondary to #2 and 3, POA  Acute COPD exacerbation, POA  Parainfluenza infection, POA  Elevated troponin, likely secondary to demand ischemia, POA  Hypertension  Chronic tobacco abuse  Pulmonary hypertension  Arthritis    Plan:    Patient is admitted to telemetry for further evaluation and treatment.    Respiratory failure with hypoxia/hypercapnia, COPD exacerbation, parainfluenza  -Continue supplemental oxygen to keep saturation above 90%, patient on 5 to 6 L at baseline, continue to titrate down as able to.  Continue BiPAP therapy.  -Patient met SIRS criteria on arrival, likely secondary to influenza infection and respiratory failure. Received Aztreonam and sepsis protocol IV fluids boluses.  -Procalcitonin WNL, WBC WNL, bacterial infection less likely, will hold off on antibiotics for now.  -On Trelegy at home, Spiriva and  budesonide while here.  -We will continue patient on Solu-Medrol 60 mg every 8 hours  -Bronchodilators, Mucinex and supportive care.  -Will continue Macias catheter for now while on BiPAP  -Patient was seen by pulmonology on previous admission, will need close follow-up with pulmonology on discharge.    Elevated troponin  -Likely secondary to demand ischemia in settings of respiratory failure  -Patient denies any chest pain, low suspicion for ACS  -Trending troponins, recheck in a.m.    Hyperglycemia  -Will cover with sliding scale insulin while on steroids  -Will check hemoglobin A1c    Further orders as indicated per clinical course.    Risk Assessment: High  DVT Prophylaxis: Lovenox prophylaxis (benefit> risk)  Code Status: Full  Diet: Cardiac    Advance Care Planning   ACP discussion was held with the patient during this visit. Patient does not have an advance directive, information provided.      Lolis Vital MD  05/04/23  15:50 EDT    Dictated utilizing Dragon dictation.    Electronically signed by Lolis Vital MD at 05/04/23 1656         Current Facility-Administered Medications   Medication Dose Route Frequency Provider Last Rate Last Admin   • acetaminophen (TYLENOL) tablet 650 mg  650 mg Oral Q4H PRN Lolis Vital MD   650 mg at 05/23/23 0214    Or   • acetaminophen (TYLENOL) 160 MG/5ML solution 650 mg  650 mg Oral Q4H PRN Lolis Vital MD        Or   • acetaminophen (TYLENOL) suppository 650 mg  650 mg Rectal Q4H PRN Lolis Vital MD   650 mg at 05/04/23 2005   • ALPRAZolam (XANAX) tablet 0.5 mg  0.5 mg Oral BID PRN Primitivo Nuñez DO   0.5 mg at 05/21/23 2029   • amLODIPine (NORVASC) tablet 10 mg  10 mg Oral Daily Lolis Vital MD   10 mg at 05/23/23 0815   • atenolol (TENORMIN) tablet 25 mg  25 mg Oral Daily Lolis Vital MD   25 mg at 05/23/23 0815   • sennosides-docusate (PERICOLACE) 8.6-50 MG per tablet 2 tablet  2 tablet Oral BID Lolis Vital MD   2 tablet at 05/23/23  0815    And   • polyethylene glycol (MIRALAX) packet 17 g  17 g Oral Daily PRN Lolis Vital MD        And   • bisacodyl (DULCOLAX) EC tablet 5 mg  5 mg Oral Daily PRN Lolis Vital MD        And   • bisacodyl (DULCOLAX) suppository 10 mg  10 mg Rectal Daily PRN Lolis Vital MD       • budesonide (PULMICORT) nebulizer solution 1 mg  1 mg Nebulization Daily - RT Arnaldo Soriano MD   1 mg at 05/23/23 0658   • budesonide-formoterol (SYMBICORT) 160-4.5 MCG/ACT inhaler 2 puff  2 puff Inhalation BID - RT Lolis Vital MD   2 puff at 05/23/23 0658    And   • tiotropium (SPIRIVA RESPIMAT) 2.5 mcg/act aerosol solution inhaler  2 puff Inhalation Daily - RT Lolis Vital MD   2 puff at 05/18/23 0714   • cetirizine (zyrTEC) tablet 10 mg  10 mg Oral Daily Primitivo Nuñez, DO   10 mg at 05/23/23 0816   • dextrose (D50W) (25 g/50 mL) IV injection 25 g  25 g Intravenous Q15 Min PRN Lolis Vital MD       • dextrose (GLUTOSE) oral gel 15 g  15 g Oral Q15 Min PRN Lolis Vital MD       • Enoxaparin Sodium (LOVENOX) syringe 40 mg  40 mg Subcutaneous Q24H Lolis Vital MD   40 mg at 05/22/23 2052   • fluticasone (FLONASE) 50 MCG/ACT nasal spray 2 spray  2 spray Each Nare BID Primitivo Nuñez DO   2 spray at 05/23/23 0816   • glucagon (GLUCAGEN) injection 1 mg  1 mg Intramuscular Q15 Min PRN Lolis Vital MD       • guaifenesin-dextromethorphan (MUCINEX DM) tablet 1 tablet  1 tablet Oral BID Arnaldo Soriano MD   1 tablet at 05/23/23 0815   • guaiFENesin-dextromethorphan (ROBITUSSIN DM) 100-10 MG/5ML syrup 10 mL  10 mL Oral Q4H PRN Lolis Vital MD   10 mL at 05/21/23 2029   • hydrOXYzine (ATARAX) tablet 25 mg  25 mg Oral TID - RT Primitivo Nuñez DO   25 mg at 05/23/23 0816   • Insulin Aspart (novoLOG) injection 0-9 Units  0-9 Units Subcutaneous TID AC Lolis Vital MD   6 Units at 05/21/23 1733   • ipratropium-albuterol (DUO-NEB) nebulizer solution 3 mL  3 mL Nebulization Q6H - RT  Arnaldo Soriano MD   3 mL at 05/23/23 1314   • melatonin tablet 5 mg  5 mg Oral Nightly PRN Lolis Vital MD   5 mg at 05/21/23 2035   • multivitamin with minerals 1 tablet  1 tablet Oral Daily Lolis Vital MD   1 tablet at 05/23/23 0815   • ondansetron (ZOFRAN) injection 4 mg  4 mg Intravenous Q6H PRN Lolis Vital MD   4 mg at 05/20/23 1604   • Pharmacy to Dose enoxaparin (LOVENOX)   Does not apply Continuous PRN Lolis Vital MD       • Pharmacy to dose vancomycin   Does not apply Continuous PRN Primitivo Nuñez DO       • predniSONE (DELTASONE) tablet 40 mg  40 mg Oral Daily With Breakfast Arnaldo Soriano MD   40 mg at 05/23/23 0815   • sodium chloride 0.9 % flush 10 mL  10 mL Intravenous PRN Lolis Vital MD       • sodium chloride 0.9 % flush 10 mL  10 mL Intravenous Q12H Primitivo Nuñez, DO   10 mL at 05/23/23 0819   • sodium chloride 0.9 % flush 10 mL  10 mL Intravenous Q12H Primitivo Nuñez, DO   10 mL at 05/23/23 0819   • sodium chloride 0.9 % flush 10 mL  10 mL Intravenous PRN Primitivo Nuñez, DO       • sodium chloride 0.9 % flush 10 mL  10 mL Intravenous Q12H Primitivo Nuñez, DO   10 mL at 05/23/23 0816   • sodium chloride 0.9 % flush 10 mL  10 mL Intravenous PRN Primitivo Nuñez, DO       • sodium chloride 0.9 % flush 20 mL  20 mL Intravenous PRN Primitivo Nuñez, DO       • sodium chloride 0.9 % flush 20 mL  20 mL Intravenous PRN Primitivo Nuñez, DO       • sodium chloride 0.9 % flush 3 mL  3 mL Intravenous Q12H Lolis Vital MD   3 mL at 05/23/23 0819   • sodium chloride 0.9 % flush 3-10 mL  3-10 mL Intravenous PRN Lolis Vital MD   10 mL at 05/23/23 0040   • sodium chloride 0.9 % infusion 40 mL  40 mL Intravenous PRN Lolis Vital MD       • sodium chloride 3 % nebulizer solution 4 mL  4 mL Nebulization Q12H Arnaldo Soriano MD   4 mL at 05/23/23 0658   • vancomycin 1250 mg/250 mL 0.9% NS IVPB ([facility])  1,250 mg Intravenous Q12H [place]  Primitivo Rodriguez DO   1,250 mg at 23 1255        Physician Progress Notes (last 24 hours)      Primitivo Nuñez DO at 23 1711              [facility]    [facility]    Name:  Carolin Toscano   Age:  77 y.o.  Sex:  female  :  1945  MRN:  [id number]   Visit Number:  [id number]  Admission Date:  2023  Date Of Service:  23  Primary Care Physician:  Jason Nicholson MD     LOS: 18 days :    Chief Complaint:      Shortness of air    Subjective:    : doing well. Oxygen requirements decreasing. Stable for dc out of ICU. Spoke with patient and family at bedside. D/w CM [facility] bed pending.    Hospital Course:    Patient was admitted and treated with steroids.  Antibiotics were not indicated.  Pulmonology consulted and was moved to ICU on  due to worsening respiratory failure and tachypnea.  She initially had to be on Precedex drip which was discontinued and switched to Xanax due to anxiety. Patient was placed on AVAPS.  Patient continued on steroids and received Lasix.    23: d/w nsg intermittently on avaps and hi flow. Patient awake in bed. CXR from 5/15 reviewed. Medications reviewed.  Discussed with case management they are [facility].    2023 discussed with case management still working on [facility].  I am planning to do a peer to peer with them tomorrow.  Otherwise has persistent hypoxia.  Dr. Soriano's note reviewed considering bronchoscopy.    : Daughter Regla is coming today.  Otherwise not much change still having dyspnea and hypoxemia.  Case management note reviewed.    : d/w patient and family updated them on [facility] and pending appeal which will likely be submitted Monday. Still having dyspnea but mucous is breaking loose today.  Still debating about bronchoscopy.  Interested in long-term acute care possibly in [place] versus [place].    May 20, 2023 discussed with patient as well as  family at the bedside.  Also discussed with them regarding placing access which she was able to get a PICC line.  Also discussed with them the staph growing in her sputum and that I was going to add vancomycin for this.    5/21: c/o [facility] suspect is due to mucous. Agreeable to flonase, zyrtec and atarax. Labs reviewed.     History of presenting illness:  Patient is a 77 years old female with a past medical history of COPD, chronic respiratory failure on 5 to 6 L per her previous discharge, on NIV machine at home and trelegy, hypertension, and ongoing tobacco use who presented to the ER from home by EMS with a chief complaint of shortness of breath.  Patient reporting that she started having shortness of breath associated with productive cough since last night and has persisted and became worse that promoted her to call EMS.  EMS has found the patient to be at 65% saturation on her normal 6 L of oxygen. Patient reports compliance with her NIV machine at home.      On ER evaluation, Patient was found to be tachycardic with a heart rate of 130s, temperature of 100.2 and respirations of 30, /72.  Patient was placed on BiPAP with FiO2 of 40%.  Her ABG came back and showed pH of 7.296/PCO2 87/PO2 94/HCO3 42/saturation 97% on 5 L nasal cannula.  HS Troponin 44, proBNP WNL, glucose 192, CO2 of 40, otherwise CBC and CMP within acceptable range.  Lactate and Pro-Adi WNL.  Respiratory panel positive for parainfluenza virus.  Chest x-ray Mild interstitial disease  bilaterally is similar to prior. No area of consolidation is seen. Patient received Solu-Medrol and Aztreonam while in the ER.  Hospitalist consulted for admission.       Edited by: Primitivo Nuñez DO at 5/22/2023 8684     Review of Systems:     All systems were reviewed and negative except as mentioned in subjective, assessment and plan.    Vital Signs:    Temp:  [97.2 °F (36.2 °C)-99 °F (37.2 °C)] 99 °F (37.2 °C)  Heart Rate:  [53-77] 65  Resp:  [13-18]  16  BP: (107-144)/() 107/45    Intake and output:    I/O last 3 completed shifts:  In: 1000 [I.V.:250; IV Piggyback:750]  Out: 2150 [Urine:2150]  I/O this shift:  In: 580 [P.O.:580]  Out: -     Physical Examination:    General Appearance:  Alert and cooperative.  Chronically ill-appearing. Sitting up in bed.   Head:  Atraumatic and normocephalic.   Eyes: Conjunctivae and sclerae normal, no icterus. No pallor.   Throat: No oral lesions, no thrush, oral mucosa moist.   Neck: Supple, trachea midline, no thyromegaly.   Lungs:   persistent diminished breath sounds end expiratory wheeze.  On nasal cannula.   Heart:  Normal S1 and S2, no murmur, no gallop, no rub. No JVD.   Abdomen:   Normal bowel sounds, no masses, no organomegaly. Soft, nontender, nondistended, no rebound tenderness.   Extremities: Supple, no edema, no cyanosis, no clubbing.   Skin: No bleeding or rash.   Neurologic: Alert and oriented x 3. No facial asymmetry. Moves all four limbs. No tremors.  Generalized weakness noted.     Edited by: Primitivo Nuñez,  at 5/22/2023 1710     Laboratory results:    Results from last 7 days   Lab Units 05/22/23 0515 05/21/23 0453 05/20/23  0426   SODIUM mmol/L 138 140 139   POTASSIUM mmol/L 4.5 4.7 3.8   CHLORIDE mmol/L 100 98 94*   CO2 mmol/L 31.5* 36.7* 38.8*   BUN mg/dL 22 24* 25*   CREATININE mg/dL 0.26* 0.32* 0.28*   CALCIUM mg/dL 8.8 8.5* 8.9   BILIRUBIN mg/dL 0.2 0.2 0.3   ALK PHOS U/L 74 77 72   ALT (SGPT) U/L 35* 35* 26   AST (SGOT) U/L 14 19 12   GLUCOSE mg/dL 111* 120* 123*     Results from last 7 days   Lab Units 05/22/23 0515 05/21/23 0453 05/20/23  0426   WBC 10*3/mm3 11.59* 13.10* 15.22*   HEMOGLOBIN g/dL 11.0* 11.0* 11.3*   HEMATOCRIT % 35.6 33.8* 36.5   PLATELETS 10*3/mm3 211 212 209             Results from last 7 days   Lab Units 05/18/23  1101 05/18/23  1054   BLOODCX  No growth at 4 days No growth at 4 days     Recent Labs     05/18/23  0708 05/21/23  0714 05/22/23  1307   PHART  7.451* 7.394 7.447   TUC6DJR 73.4* 70.2* 53.3*   PO2ART 64.0* 95.2 50.2*   KUR8HCI 51.1* 42.8* 36.8*   BASEEXCESS 22.8* 15.1* 11.1*      I have reviewed the patient's laboratory results.    Radiology results:    XR Chest 1 View    Result Date: 5/21/2023  PORTABLE CHEST    5/21/2023 6:19 AM  HISTORY: Respiratory failure.  COMPARISON: May 18, 2023.  FINDINGS: Consolidation at the left lung base has slightly worsened. Background of bronchitis is noted. No other consolidation. No effusion or pneumothorax. New right upper shimmy PICC, tip in SVC.      Impression: Left base pneumonia, slightly worsened from previous.  This report was signed and finalized on 5/21/2023 7:22 AM by Dr Danyel Mckinnon DO.    I have reviewed the patient's radiology reports.    Medication Review:     I have reviewed the patient's active and prn medications.     Problem List:      Acute on chronic respiratory failure with hypoxia and hypercapnia      Assessment/Plan:    Respiratory failure with hypoxia/hypercapnia, COPD exacerbation, parainfluenza  -patient on 5 to 6 L oxygen at baseline,  Continue BiPAP therapy.  Has NIV at home. Currently on 9 liters  -Recent parainfluenza virus infection met SIRS due to this  - Solu-Medrol taper as appropriate  -Bronchodilators, Mucinex MetaNeb and supportive care.  -Dr. Soriano note reviewed and appreciated  -s/p lasix  -Xanax and Precedex as needed  -Sputum growing Staph aureus and haemophilus on vancomycin and azithromycin  - flonase, zyrtec, and atarax for upper airway congestion     Elevated troponin  -Likely secondary to demand ischemia in settings of respiratory failure  -Patient denies any chest pain, low suspicion for ACS  -Troponin trended down and plateaued.     Hyperglycemia  -Will cover with sliding scale insulin while on steroids  -Hemoglobin A1c 6.7     Disposition: [facility] in 2-3 days    Prophylaxis: Lovenox    Edited by: Primitivo Nuñez DO at 5/22/2023 3232     DVT Prophylaxis:  lovenox  Code Status:   Code Status and Medical Interventions:   Ordered at: 05/04/23 1634     Code Status (Patient has no pulse and is not breathing):    CPR (Attempt to Resuscitate)     Medical Interventions (Patient has pulse or is breathing):    Full Support      Diet:   Dietary Orders (From admission, onward)     Start     Ordered    05/18/23 1800  Dietary Nutrition Supplements Magic Cup  2 Times Daily      Question:  Select Supplement:  Answer:  Magic Cup    05/18/23 1517    05/17/23 1038  Diet: Cardiac Diets; Healthy Heart (2-3 Na+); Texture: Regular Texture (IDDSI 7); Fluid Consistency: Thin (IDDSI 0)  Diet Effective Now        Comments: Pt prefers cottage cheese, bananas, oranges, peaches, watermelon.   References:    Diet Order [place]   Question Answer Comment   Diets: Cardiac Diets    Cardiac Diet: Healthy Heart (2-3 Na+)    Texture: Regular Texture (IDDSI 7)    Fluid Consistency: Thin (IDDSI 0)        05/17/23 1039    05/15/23 1800  Dietary Nutrition Supplements Xu  2 Times Daily      Question:  Select Supplement:  Answer:  Xu    05/15/23 1445    05/15/23 1800  Dietary Nutrition Supplements Boost VHC  2 Times Daily      Question:  Select Supplement:  Answer:  Boost VHC    05/15/23 1446               Discharge Plan: [facility] in 2-3 days      Primitivo Nuñez DO  05/22/23  17:11 EDT    Dictated utilizing Dragon dictation.        Electronically signed by Primitivo Nuñez DO at 05/22/23 1713          Physical Therapy Notes (last 48 hours)      Ronak Cruz, PTA at 05/22/23 135  Version 1 of 1       Goal Outcome Evaluation:  Plan of Care Reviewed With: patient        Progress: improving  Outcome Evaluation: Pt  supine in bed and willing to participate with treatment. Pt transfered to and from EOB with min a.  Pt was able to tolerate sitting EOB for approx 10 minutes.  Pt performed B LE TE siting EOB.  Pt left supine in bed with bed placed in  chair position. Pt with 9L O2 dropped no  "lower than 88% with todays activity.  See flowsheet for details.           Electronically signed by Ronak Cruz, PTA at 23 1353     Ronak Cruz, PTA at 23 1401  Version 1 of 1         Patient Name: Carolin Toscano  : 1945    MRN: [id number]                              Today's Date: 2023       Admit Date: 2023    Visit Dx:     ICD-10-CM ICD-9-CM   1. Acute on chronic respiratory failure with hypoxia and hypercapnia  J96.21 518.84    J96.22 786.09     799.02   2. COPD exacerbation  J44.1 491.21   3. Parainfluenza virus infection  B34.8 078.89     Patient Active Problem List   Diagnosis   • CAP (community acquired pneumonia)   • Cigarette nicotine dependence with nicotine-induced disorder   • Essential hypertension   • Dyslipidemia   • Blood glucose elevated   • Muscle ache   • COPD (chronic obstructive pulmonary disease)   • Nuclear sclerotic cataract of right eye   • Acute respiratory failure   • COPD exacerbation   • Acute on chronic respiratory failure with hypoxia and hypercapnia     Past Medical History:   Diagnosis Date   • Arthritis    • COPD (chronic obstructive pulmonary disease)    • Gall stones    • Goiter     \"inward\"   • Hypertension    • Hypertension    • Kidney infection    • Kidney stone    • Kidney stones    • Migraine headache    • Scarlet fever      Past Surgical History:   Procedure Laterality Date   • BLADDER SURGERY     • CATARACT EXTRACTION W/ INTRAOCULAR LENS IMPLANT Left 2022    Procedure: CATARACT PHACO EXTRACTION WITH INTRAOCULAR LENS IMPLANT LEFT;  Surgeon: Sathish Lopez MD;  Location: [facility] OR;  Service: Ophthalmology;  Laterality: Left;   • CATARACT EXTRACTION W/ INTRAOCULAR LENS IMPLANT Right 2022    Procedure: CATARACT PHACO EXTRACTION WITH INTRAOCULAR LENS IMPLANT RIGHT;  Surgeon: Sathish Lopez MD;  Location: [facility] OR;  Service: Ophthalmology;  Laterality: Right;   • CHOLECYSTECTOMY     • HYSTERECTOMY     • " TONSILLECTOMY        General Information     Row Name 05/22/23 1350          Physical Therapy Time and Intention    Document Type therapy note (daily note)  -RM     Mode of Treatment physical therapy  -RM     Row Name 05/22/23 1350          General Information    Patient Profile Reviewed yes  -RM     Existing Precautions/Restrictions fall;oxygen therapy device and L/min  -RM     Row Name 05/22/23 1350          Cognition    Orientation Status (Cognition) oriented x 4  -RM     Row Name 05/22/23 1350          Safety Issues, Functional Mobility    Safety Issues Affecting Function (Mobility) friction/shear risk;safety precaution awareness;safety precautions follow-through/compliance;sequencing abilities  -RM     Impairments Affecting Function (Mobility) balance;endurance/activity tolerance;shortness of breath;strength  -RM           User Key  (r) = Recorded By, (t) = Taken By, (c) = Cosigned By    Initials Name Provider Type    Ronak Jimenez, ELVIRA Physical Therapist Assistant               Mobility     Row Name 05/22/23 1352          Bed Mobility    Supine-Sit [place] (Bed Mobility) minimum assist (75% patient effort)  -RM     Sit-Supine [place] (Bed Mobility) minimum assist (75% patient effort);verbal cues  -RM     Assistive Device (Bed Mobility) bed rails;draw sheet;head of bed elevated  -RM     Comment, (Bed Mobility) EOB approx 10 minutes  -RM     Row Name 05/22/23 1352          Bed-Chair Transfer    Bed-Chair [place] (Transfers) not tested  -RM     Row Name 05/22/23 1352          Sit-Stand Transfer    Sit-Stand [place] (Transfers) not tested  -RM     Row Name 05/22/23 1352          Gait/Stairs (Locomotion)    [place] Level (Gait) not tested  -RM           User Key  (r) = Recorded By, (t) = Taken By, (c) = Cosigned By    Initials Name Provider Type    Ronak Jimenez, ELVIRA Physical Therapist Assistant               Obj/Interventions     Row Name 05/22/23 1353          Motor Skills     Therapeutic Exercise hip;knee;ankle  -RM     Row Name 05/22/23 [facility]          Hip (Therapeutic Exercise)    Hip Isometrics (Therapeutic Exercise) bilateral;gluteal sets;10 repetitions  -RM     Hip Strengthening (Therapeutic Exercise) marching while seated;bilateral;10 repetitions  -RM     Row Name 05/22/23 [facility]          Knee (Therapeutic Exercise)    Knee Strengthening (Therapeutic Exercise) LAQ (long arc quad);bilateral;10 repetitions  -RM     Row Name 05/22/23 [facility]          Ankle (Therapeutic Exercise)    Ankle AROM (Therapeutic Exercise) bilateral;dorsiflexion;plantarflexion;10 repetitions  -RM           User Key  (r) = Recorded By, (t) = Taken By, (c) = Cosigned By    Initials Name Provider Type    Ronak Jimenez, PTA Physical Therapist Assistant               Goals/Plan    No documentation.                Clinical Impression     Row Name 05/22/23 [facility]          Pain    Pretreatment Pain Rating 0/10 - no pain  -RM     Posttreatment Pain Rating 0/10 - no pain  -RM     Row Name 05/22/23 [facility]          Plan of Care Review    Plan of Care Reviewed With patient  -RM     Progress improving  -RM     Outcome Evaluation Pt  supine in bed and willing to participate with treatment. Pt transfered to and from EOB with min a.  Pt was able to tolerate sitting EOB for approx 10 minutes.  Pt performed B LE TE siting EOB.  Pt left supine in bed with bed placed in  chair position. Pt with 9L O2 dropped no lower than 88% with todays activity.  See flowsheet for details.  -RM     Row Name 05/22/23 135          Vital Signs    Pre SpO2 (%) 90  -RM     O2 Delivery Pre Treatment supplemental O2  9  -RM     Intra SpO2 (%) 88  -RM     O2 Delivery Intra Treatment supplemental O2  -RM     Post SpO2 (%) 88  -RM     O2 Delivery Post Treatment supplemental O2  -RM     Pre Patient Position Supine  -RM     Intra Patient Position Standing  -RM     Post Patient Position Sitting  -RM           User Key  (r) = Recorded By, (t) = Taken By, (c)  = Cosigned By    Initials Name Provider Type    Ronak Jimenez, ELVIRA Physical Therapist Assistant               Outcome Measures     Row Name 05/22/23 1358          How much help from another person do you currently need...    Turning from your back to your side while in flat bed without using bedrails? 3  -RM     Moving from lying on back to sitting on the side of a flat bed without bedrails? 3  -RM     Moving to and from a bed to a chair (including a wheelchair)? 2  -RM     Standing up from a chair using your arms (e.g., wheelchair, bedside chair)? 2  -RM     Climbing 3-5 steps with a railing? 1  -RM     To walk in hospital room? 2  -RM     AM-PAC 6 Clicks Score (PT) 13  -RM     Highest level of mobility 4 --> Transferred to chair/commode  -RM     Row Name 05/22/23 1352          Functional Assessment    Outcome Measure Options AM-PAC 6 Clicks Basic Mobility (PT)  -RM           User Key  (r) = Recorded By, (t) = Taken By, (c) = Cosigned By    Initials Name Provider Type    Ronak Jimenez, ELVIRA Physical Therapist Assistant                             Physical Therapy Education     Title: PT OT SLP Therapies (In Progress)     Topic: Physical Therapy (In Progress)     Point: Mobility training (Done)     Learning Progress Summary           Patient Acceptance, E,TB,D, VU,NR by  at 5/22/2023 1358    Comment: Exercise tech and importance of dily activity    Acceptance, E, VU by MS at 5/16/2023 1403    Comment: importance of mobility    Acceptance, E, VU by AM at 5/15/2023 0701    Acceptance, E, VU by MS at 5/12/2023 1458    Comment: importance of mobility    Acceptance, E,TB,D, VU,NR by  at 5/8/2023 1250    Comment: Importance of activity and exercise techniques    Acceptance, E, VU by MS at 5/5/2023 1136    Comment: importance of mobility                   Point: Home exercise program (Done)     Learning Progress Summary           Patient Acceptance, E,TB,D, VU,NR by  at 5/22/2023 1358    Comment:  Exercise tech and importance of dily activity    Acceptance, E,D, VU,DU by TW at 5/21/2023 1205    Comment: Pt education for improving BLE ther ex technique    Acceptance, E, VU by MS at 5/16/2023 1403    Comment: importance of mobility    Acceptance, E, VU by AM at 5/15/2023 0701    Acceptance, E,TB, VU,NR by CC at 5/14/2023 1310    Comment: Importance of increasing movement of B LE    Acceptance, E,TB,D, VU,NR by  at 5/8/2023 1250    Comment: Importance of activity and exercise techniques    Acceptance, E, VU by MS at 5/5/2023 1136    Comment: importance of mobility                   Point: Body mechanics (Done)     Learning Progress Summary           Patient Acceptance, E,TB, VU by  at 5/17/2023 1846    Comment: upright posture                   Point: Precautions (Not Started)     Learner Progress:  Not documented in this visit.                      User Key     Initials Effective Dates Name Provider Type Discipline     03/23/22 -  Kavitha Tavarez, PT Physical Therapist PT    CC 06/16/21 -  Nasra Quiñones PTA Physical Therapist Assistant PT     06/16/21 -  Ronak Cruz, PTA Physical Therapist Assistant PT     06/16/21 -  Deysi Goodwin, PT Physical Therapist PT    MS 07/01/22 -  Martín Ortiz, PT Physical Therapist PT    AM 02/22/23 -  Rob Lu, RN Registered Nurse Nurse              PT Recommendation and Plan     Plan of Care Reviewed With: patient  Progress: improving  Outcome Evaluation: Pt  supine in bed and willing to participate with treatment. Pt transfered to and from EOB with min a.  Pt was able to tolerate sitting EOB for approx 10 minutes.  Pt performed B LE TE siting EOB.  Pt left supine in bed with bed placed in  chair position. Pt with 9L O2 dropped no lower than 88% with todays activity.  See flowsheet for details.     Time Calculation:    PT Charges     Row Name 05/22/23 7735             Time Calculation    Start Time 1025  -RM      Stop Time 1052  -RM      Time Calculation  (min) 27 min  -RM      PT Received On 23  -RM      PT Goal Re-Cert Due Date 23  -RM         Time Calculation- PT    Total Timed Code Minutes- PT 27 minute(s)  -RM         Timed Charges    78709 - PT Therapeutic Exercise Minutes 10  -RM      70847 - PT Therapeutic Activity Minutes 17  -RM         Total Minutes    Timed Charges Total Minutes 27  -RM       Total Minutes 27  -RM            User Key  (r) = Recorded By, (t) = Taken By, (c) = Cosigned By    Initials Name Provider Type    Ronak Jimenez PTA Physical Therapist Assistant              Therapy Charges for Today     Code Description Service Date Service Provider Modifiers Qty    95160799055 HC PT THER PROC EA 15 MIN 2023 Ronak Cruz, ELVIRA GP 1    60500933987 HC PT THERAPEUTIC ACT EA 15 MIN 2023 Ronak Cruz, ELVIRA GP 1          PT G-Codes  Outcome Measure Options: AM-PAC 6 Clicks Basic Mobility (PT)  AM-PAC 6 Clicks Score (PT): 13  AM-PAC 6 Clicks Score (OT): 13       Ronak Cruz PTA  2023      Electronically signed by Ronak Cruz PTA at 23 1401     Ronak Cruz PTA at 23  Version 1 of        Goal Outcome Evaluation:  Plan of Care Reviewed With: patient        Progress: improving  Outcome Evaluation: Pt supine in bed and willing to particiapte with treatment. Pt performed rolling with min a  and vc's.  Pt was min a to transfer to EOB and was noted to drop to 85% on 13L.  Pt was titrated to 15L.  Pt after recovery period if approx 3 minuted transfered to bedside recliner with min/mod a and gaitbelt. See flowsheet for details           Electronically signed by Ronak Cruz PTA at 23 1317     Ronak Cruz PTA at 23  Version 1 of          Patient Name: Carolin Toscano  : 1945    MRN: [id number]                              Today's Date: 2023       Admit Date: 2023    Visit Dx:     ICD-10-CM ICD-9-CM   1. Acute on chronic respiratory failure  "with hypoxia and hypercapnia  J96.21 518.84    J96.22 786.09     799.02   2. COPD exacerbation  J44.1 491.21   3. Parainfluenza virus infection  B34.8 078.89     Patient Active Problem List   Diagnosis   • CAP (community acquired pneumonia)   • Cigarette nicotine dependence with nicotine-induced disorder   • Essential hypertension   • Dyslipidemia   • Blood glucose elevated   • Muscle ache   • COPD (chronic obstructive pulmonary disease)   • Nuclear sclerotic cataract of right eye   • Acute respiratory failure   • COPD exacerbation   • Acute on chronic respiratory failure with hypoxia and hypercapnia     Past Medical History:   Diagnosis Date   • Arthritis    • COPD (chronic obstructive pulmonary disease)    • Gall stones    • Goiter     \"inward\"   • Hypertension    • Hypertension    • Kidney infection    • Kidney stone    • Kidney stones    • Migraine headache    • Scarlet fever      Past Surgical History:   Procedure Laterality Date   • BLADDER SURGERY     • CATARACT EXTRACTION W/ INTRAOCULAR LENS IMPLANT Left 8/11/2022    Procedure: CATARACT PHACO EXTRACTION WITH INTRAOCULAR LENS IMPLANT LEFT;  Surgeon: Sathish Lopez MD;  Location: [facility];  Service: Ophthalmology;  Laterality: Left;   • CATARACT EXTRACTION W/ INTRAOCULAR LENS IMPLANT Right 8/25/2022    Procedure: CATARACT PHACO EXTRACTION WITH INTRAOCULAR LENS IMPLANT RIGHT;  Surgeon: Sathish Lopez MD;  Location: [facility];  Service: Ophthalmology;  Laterality: Right;   • CHOLECYSTECTOMY     • HYSTERECTOMY     • TONSILLECTOMY        General Information     Row Name 05/23/23 1311          Physical Therapy Time and Intention    Document Type therapy note (daily note)  -RM     Mode of Treatment physical therapy  -RM     Row Name 05/23/23 1311          General Information    Patient Profile Reviewed yes  -RM     Existing Precautions/Restrictions fall;oxygen therapy device and L/min  13-15 HFNC  -RM     Row Name 05/23/23 1311          Cognition    " Orientation Status (Cognition) oriented x 4  -RM     Row Name 05/23/23 1311          Safety Issues, Functional Mobility    Safety Issues Affecting Function (Mobility) safety precautions follow-through/compliance;safety precaution awareness;sequencing abilities  -RM     Impairments Affecting Function (Mobility) balance;endurance/activity tolerance;shortness of breath;strength  -RM           User Key  (r) = Recorded By, (t) = Taken By, (c) = Cosigned By    Initials Name Provider Type    Ronak Jimenez, ELVIRA Physical Therapist Assistant               Mobility     Row Name 05/23/23 1312          Bed Mobility    Rolling Left [place] (Bed Mobility) minimum assist (75% patient effort)  -RM     Rolling Right [place] (Bed Mobility) minimum assist (75% patient effort)  -RM     Supine-Sit [place] (Bed Mobility) minimum assist (75% patient effort)  -RM     Assistive Device (Bed Mobility) bed rails;draw sheet;head of bed elevated  -RM     Row Name 05/23/23 1312          Bed-Chair Transfer    Bed-Chair [place] (Transfers) minimum assist (75% patient effort);moderate assist (50% patient effort);verbal cues;nonverbal cues (demo/gesture)  -RM     Assistive Device (Bed-Chair Transfers) other (see comments)  gaitbelt  -RM     Row Name 05/23/23 1312          Sit-Stand Transfer    Sit-Stand [place] (Transfers) verbal cues;nonverbal cues (demo/gesture)  -RM     Assistive Device (Sit-Stand Transfers) other (see comments)  gaitbelt  -RM     Row Name 05/23/23 1312          Gait/Stairs (Locomotion)    [place] Level (Gait) not tested  -RM           User Key  (r) = Recorded By, (t) = Taken By, (c) = Cosigned By    Initials Name Provider Type    Ronak Jimenez, ELVIRA Physical Therapist Assistant               Obj/Interventions    No documentation.                Goals/Plan    No documentation.                Clinical Impression     Row Name 05/23/23 1313          Pain    Pretreatment Pain Rating 0/10 - no  pain  -RM     Posttreatment Pain Rating 0/10 - no pain  -RM     Row Name 05/23/23 1313          Plan of Care Review    Plan of Care Reviewed With patient  -RM     Progress improving  -RM     Outcome Evaluation Pt supine in bed and willing to particiapte with treatment. Pt performed rolling with min a  and vc's.  Pt was min a to transfer to EOB and was noted to drop to 85% on 13L.  Pt was titrated to 15L.  Pt after recovery period if approx 3 minuted transfered to bedside recliner with min/mod a and gaitbelt. See flowsheet for details  -RM     Row Name 05/23/23 1313          Vital Signs    Pre SpO2 (%) 91  -RM     O2 Delivery Pre Treatment supplemental O2  13  -RM     Intra SpO2 (%) 85  -RM     O2 Delivery Intra Treatment --  15  -RM     Post SpO2 (%) 90  -RM     O2 Delivery Post Treatment supplemental O2  13  -RM     Pre Patient Position Supine  -RM     Intra Patient Position Standing  -RM     Post Patient Position Sitting  -RM     Row Name 05/23/23 1313          Positioning and Restraints    Pre-Treatment Position in bed  -RM     Post Treatment Position chair  -RM     In Chair reclined;call light within reach;encouraged to call for assist;exit alarm on;with OT;notified ns  -           User Key  (r) = Recorded By, (t) = Taken By, (c) = Cosigned By    Initials Name Provider Type    RM Ronak Cruz, PTA Physical Therapist Assistant               Outcome Measures     Row Name 05/23/23 1314          How much help from another person do you currently need...    Turning from your back to your side while in flat bed without using bedrails? 3  -RM     Moving from lying on back to sitting on the side of a flat bed without bedrails? 3  -RM     Moving to and from a bed to a chair (including a wheelchair)? 2  -RM     Standing up from a chair using your arms (e.g., wheelchair, bedside chair)? 2  -RM     Climbing 3-5 steps with a railing? 1  -RM     To walk in hospital room? 2  -RM     AM-PAC 6 Clicks Score (PT) 13  -RM      Highest level of mobility 4 --> Transferred to chair/commode  -     Row Name 05/23/23 1316 05/23/23 1308       Functional Assessment    Outcome Measure Options AM-PAC 6 Clicks Basic Mobility (PT)  -RM AM-PAC 6 Clicks Daily Activity (OT)  -SD          User Key  (r) = Recorded By, (t) = Taken By, (c) = Cosigned By    Initials Name Provider Type     Ronak Cruz, PTA Physical Therapist Assistant    Cecelia Johnson, OT Occupational Therapist                             Physical Therapy Education     Title: PT OT SLP Therapies (In Progress)     Topic: Physical Therapy (In Progress)     Point: Mobility training (Done)     Learning Progress Summary           Patient Acceptance, E,TB,D, VU,NR by  at 5/23/2023 1316    Comment: proper breathing techniques with mobility    Acceptance, E,TB,D, VU,NR by  at 5/22/2023 1358    Comment: Exercise tech and importance of dily activity    Acceptance, E, VU by MS at 5/16/2023 1403    Comment: importance of mobility    Acceptance, E, VU by AM at 5/15/2023 0701    Acceptance, E, VU by MS at 5/12/2023 1458    Comment: importance of mobility    Acceptance, E,TB,D, VU,NR by RM at 5/8/2023 1250    Comment: Importance of activity and exercise techniques    Acceptance, E, VU by MS at 5/5/2023 1136    Comment: importance of mobility                   Point: Home exercise program (Done)     Learning Progress Summary           Patient Acceptance, E,TB,D, VU,NR by RM at 5/22/2023 1358    Comment: Exercise tech and importance of dily activity    Acceptance, E,D, VU,DU by  at 5/21/2023 1205    Comment: Pt education for improving BLE ther ex technique    Acceptance, E, VU by MS at 5/16/2023 1403    Comment: importance of mobility    Acceptance, E, VU by AM at 5/15/2023 0701    Acceptance, E,TB, VU,NR by CC at 5/14/2023 1310    Comment: Importance of increasing movement of B LE    Acceptance, E,TB,D, VU,NR by RM at 5/8/2023 1250    Comment: Importance of activity and exercise  techniques    Acceptance, E, VU by MS at 5/5/2023 1136    Comment: importance of mobility                   Point: Body mechanics (Done)     Learning Progress Summary           Patient Acceptance, E,TB, VU by JR at 5/17/2023 2136    Comment: upright posture                   Point: Precautions (Not Started)     Learner Progress:  Not documented in this visit.                      User Key     Initials Effective Dates Name Provider Type Discipline     03/23/22 -  Kavitha Tavarez, PT Physical Therapist PT    CC 06/16/21 -  Nasra Quiñones, PTA Physical Therapist Assistant PT    RM 06/16/21 -  Ronak Cruz, PTA Physical Therapist Assistant PT    TW 06/16/21 -  Deysi Goodwin, PT Physical Therapist PT    MS 07/01/22 -  Martín Ortiz, PT Physical Therapist PT    AM 02/22/23 -  Rob Lu RN Registered Nurse Nurse              PT Recommendation and Plan     Plan of Care Reviewed With: patient  Progress: improving  Outcome Evaluation: Pt supine in bed and willing to particiapte with treatment. Pt performed rolling with min a  and vc's.  Pt was min a to transfer to EOB and was noted to drop to 85% on 13L.  Pt was titrated to 15L.  Pt after recovery period if approx 3 minuted transfered to bedside recliner with min/mod a and gaitbelt. See flowsheet for details     Time Calculation:    PT Charges     Row Name 05/23/23 1317             Time Calculation    Start Time 1041  -RM      Stop Time 1111  -RM      Time Calculation (min) 30 min  -RM      PT Received On 05/23/23  -RM      PT Goal Re-Cert Due Date 05/24/23  -RM         Time Calculation- PT    Total Timed Code Minutes- PT 30 minute(s)  -RM         Timed Charges    81261 - PT Therapeutic Exercise Minutes 15  -RM      00264 - PT Therapeutic Activity Minutes 15  -RM         Total Minutes    Timed Charges Total Minutes 30  -RM       Total Minutes 30  -RM            User Key  (r) = Recorded By, (t) = Taken By, (c) = Cosigned By    Initials Name Provider Type    RM  Ronak Cruz, ELVIRA Physical Therapist Assistant              Therapy Charges for Today     Code Description Service Date Service Provider Modifiers Qty    20977938072 HC PT THER PROC EA 15 MIN 5/22/2023 Ronak Cruz, PTA GP 1    40010977198 HC PT THERAPEUTIC ACT EA 15 MIN 5/22/2023 Ronak Cruz, PTA GP 1    31459510901 HC PT THER PROC EA 15 MIN 5/23/2023 Ronak Cruz, PTA GP 1    15322842323 HC PT THERAPEUTIC ACT EA 15 MIN 5/23/2023 Ronak Cruz, PTA GP 1          PT G-Codes  Outcome Measure Options: AM-PAC 6 Clicks Basic Mobility (PT)  AM-PAC 6 Clicks Score (PT): 13  AM-PAC 6 Clicks Score (OT): 13       Ronak Cruz PTA  5/23/2023      Electronically signed by Ronak Cruz PTA at 05/23/23 4642

## 2023-05-23 NOTE — PROGRESS NOTES
Name:  Carolin Toscano   Age:   77 y.o.    Sex:  female   :  1945   MRN:  [id number]   Admission Date:   2023  2:45 PM   Date Of Service:   23    Primary Care Physician:  Jason Nicholson MD       LOS:  19      Chief Complaint:      Shortness of air       Subjective:      Patient seen and examined in bed. Patient does not complain of anything, patient remains on 10L O2. [place] swing bed pending placement. LTAC      History Of Presenting Illness:      Patient was admitted and treated with steroids.  Antibiotics were not indicated.  Pulmonology consulted and was moved to ICU on  due to worsening respiratory failure and tachypnea.  She initially had to be on Precedex drip which was discontinued and switched to Xanax due to anxiety. Patient was placed on AVAPS.  Patient continued on steroids and received Lasix.     23: d/w nsg intermittently on avaps and hi flow. Patient awake in bed. CXR from 5/15 reviewed. Medications reviewed.  Discussed with case management they are [facility].     2023 discussed with case management still working on [facility].  I am planning to do a peer to peer with them tomorrow.  Otherwise has persistent hypoxia.  Dr. Soriano's note reviewed considering bronchoscopy.     : Daughter Regla is coming today.  Otherwise not much change still having dyspnea and hypoxemia.  Case management note reviewed.     : d/w patient and family updated them on [facility] and pending appeal which will likely be submitted Monday. Still having dyspnea but mucous is breaking loose today.  Still debating about bronchoscopy.  Interested in long-term acute care possibly in [place] versus [place].     May 20, 2023 discussed with patient as well as family at the bedside.  Also discussed with them regarding placing access which she was able to get a PICC line.  Also discussed with them the staph growing in her sputum and that I was going to add  vancomycin for this.     5/21: c/o [facility] suspect is due to mucous. Agreeable to flonase, zyrtec and atarax. Labs reviewed.    5/22: doing well. Oxygen requirements decreasing. Stable for dc out of ICU. Spoke with patient and family at bedside. D/w [facility]ing bed pending.       Review of Systems:      All systems were reviewed and negative except as mentioned in subjective, assessment and plan.     Vital Signs:  Vitals:    05/23/23 0403 05/23/23 0700 05/23/23 0745 05/23/23 1112   BP:   110/50 108/48   BP Location:   Left arm Left arm   Patient Position:   Lying Sitting   Pulse:   78 68   Resp:  16 16 20   Temp:   98.4 °F (36.9 °C) 97.9 °F (36.6 °C)   TempSrc:   Oral Oral   SpO2: 90%  91% 90%   Weight:       Height:             Intake and output:    Intake/Output Summary (Last 24 hours) at 5/23/2023 1254  Last data filed at 5/23/2023 1223  Gross per 24 hour   Intake 995.67 ml   Output 2650 ml   Net -1654.33 ml         Physical Examination:  Physical Exam  HENT:      Head: Atraumatic.   Eyes:      Extraocular Movements: Extraocular movements intact.      Pupils: Pupils are equal, round, and reactive to light.   Cardiovascular:      Rate and Rhythm: Normal rate and regular rhythm.      Heart sounds: Normal heart sounds.   Pulmonary:      Breath sounds: Wheezing present.   Abdominal:      Palpations: Abdomen is soft.      Tenderness: There is no abdominal tenderness.   Skin:     General: Skin is warm and dry.   Neurological:      Mental Status: She is alert.           Radiology results:  CT Chest Without Contrast Diagnostic    Result Date: 5/16/2023  Mucous plugging, bilateral lower lobe pneumonia, and bronchiolitis. 8-12 week follow-up exam is recommended to document stability.  193.74 mGy.cm   This study was performed with techniques to keep radiation doses as low as reasonably achievable (ALARA). Individualized dose reduction techniques using automated exposure control or adjustment of mA and/or kV according to the  patient size were employed.     Images were reviewed, interpreted, and dictated by Dr. Shraddha Wharton MD Transcribed by Ade Solis PA-C.  This report was signed and finalized on 5/16/2023 1:38 PM by Shraddha Wharton MD.    XR Chest 1 View    Result Date: 5/23/2023  Mild bibasilar airspace disease/pneumonia, left greater than right..   Images were reviewed, interpreted, and dictated by SHIRA Larkin Transcribed by Ade Solis PA-C.  This report was signed and finalized on 5/23/2023 12:05 PM by SHIRA Villa.    XR Chest 1 View    Result Date: 5/21/2023  Left base pneumonia, slightly worsened from previous.  This report was signed and finalized on 5/21/2023 7:22 AM by Dr Danyel Mckinnon DO.    XR Chest 1 View    Result Date: 5/18/2023  Stable chest..    This report was signed and finalized on 5/18/2023 7:53 AM by Shraddha Wharton MD.       I have reviewed the patient's radiology reports.     Medication Review:      I have reviewed the patient's active and prn medications.      Problem List:     Respiratory failure with hypoxia and hypercapnia  Acute on chronic COPD exacerbation secondary to parainfluenza virus  Pulmonary hypertension  Deconditioning       Assessment/Plan:    Respiratory failure with hypoxia and hypercapnia  Acute on chronic COPD exacerbation secondary to parainfluenza virus  Pulmonary hypertension  -Continue vancomycin in the setting of positive sputum culture sensitive to vancomycin  -Patient still requiring 10L on O2  -Pulmonology following  -Repeat CXR showing mild bibasilar pneumonia  -Agree with depomedrol, pulmicort, spiriva, symbicort. Mucinex, metaneb  -Followup outpatient for PFT      Deconditioning  -Patient had been sitting in recliner today, continue as tolerated. Deconditioning will progress as O2 requirements remain high. Patient will benefit from LTAC.      Andrea Camejo, Medical Student

## 2023-05-23 NOTE — DISCHARGE PLACEMENT REQUEST
"Damian Toscano (77 y.o. Female)     Date of Birth   1945    Social Security Number       Address   [address] [facility] [address]    Home Phone   [phone]    MRN   [id number]       Advent   [place]    Marital Status                               Admission Date   5/4/23    Admission Type   Emergency    Admitting Provider   Primitivo Nuñez DO    Attending Provider   Lolis Vital MD    Department, Room/Bed   Psychiatric TELEMETRY 3, 308/1       Discharge Date       Discharge Disposition       Discharge Destination                               Attending Provider: Lolis Vital MD    Allergies: Contrast Dye (Echo Or Unknown Ct/mr), Ciprofloxacin, Keflex [Cephalexin], Penicillins, Sulfa Antibiotics, Terramycin [Oxytetracycline], Prednisone, Latex, Percocet [Oxycodone-acetaminophen], Xyzal [Levocetirizine]    Isolation: None   Infection: None   Code Status: CPR    Ht: 160 cm (63\")   Wt: 67.2 kg (148 lb 2.4 oz)    Admission Cmt: None   Principal Problem: Acute on chronic respiratory failure with hypoxia and hypercapnia [J96.21,J96.22]                 Active Insurance as of 5/4/2023     Primary Coverage     Payor Plan Insurance Group Employer/Plan Group    HUMANA MEDICARE REPLACEMENT HUMANA MEDICARE REPLACEMENT [id number]     Payor Plan Address Payor Plan Phone Number Payor Plan Fax Number Effective Dates    [address] [address] [phone]  1/1/2018 - None Entered    [facility] [id number]       Subscriber Name Subscriber Birth Date Member ID       DAMIAN TOSCANO 1945 [id number]           Secondary Coverage     Payor Plan Insurance Group Employer/Plan Group    [place]      Payor Plan Address Payor Plan Phone Number Payor Plan Fax Number Effective Dates    [address] [phone]  10/11/2021 - None Entered    [facility] [id number]       Subscriber Name Subscriber Birth Date Member ID       DAMIAN TOSCANO 1945 [id number]                 Emergency " Contacts      (Rel.) Home Phone Work Phone Mobile Phone    Nehal Hoffmann (Daughter) [phone] -- --            Oxygen Therapy (last day)     Date/Time SpO2 Device (Oxygen Therapy) Flow (L/min) Oxygen Concentration (%) ETCO2 (mmHg)    05/23/23 0755 -- high-flow nasal cannula 8 -- --    05/23/23 0745 91 nasal cannula -- -- --    05/23/23 0700 -- high-flow nasal cannula 10 -- --    05/23/23 0519 -- humidified;high-flow nasal cannula 10 -- --    05/23/23 0403 90 NPPV/NIV -- 60 --    05/23/23 0346 88 -- -- -- --    05/23/23 0328 93 -- -- -- --    05/23/23 0208 -- humidified;high-flow nasal cannula 10 -- --    05/23/23 0108 92 high-flow nasal cannula -- -- --    05/23/23 0024 -- humidified;high-flow nasal cannula 10 -- --    05/22/23 2327 94 -- -- -- --    05/22/23 2007 90 high-flow nasal cannula 10 -- --    05/22/23 1903 90 -- -- -- --    05/22/23 1800 89 -- -- -- --    05/22/23 1700 87 -- -- -- --    05/22/23 1600 90 high-flow nasal cannula 10 -- --    05/22/23 1500 83 -- -- -- --    05/22/23 1400 90 -- -- -- --    05/22/23 1304 91 high-flow nasal cannula 8 -- --    05/22/23 1301 89 -- -- -- --    05/22/23 1300 89 -- -- -- --    05/22/23 1200 90 high-flow nasal cannula 10 -- --    05/22/23 1112 89 -- -- -- --    05/22/23 1100 90 -- -- -- --    05/22/23 1000 89 -- -- -- --    05/22/23 0900 87 -- -- -- --    05/22/23 0800 90 high-flow nasal cannula 10 -- --    05/22/23 0701 96 high-flow nasal cannula 10 -- --    05/22/23 0700 97 -- -- -- --    05/22/23 0600 94 high-flow nasal cannula 10 -- --    05/22/23 0500 95 -- -- -- --    05/22/23 0400 95 high-flow nasal cannula 10 -- --    05/22/23 0300 93 -- -- -- --    05/22/23 0200 93 high-flow nasal cannula 10 -- --    05/22/23 0111 99 high-flow nasal cannula 10 -- --    05/22/23 0100 97 -- -- -- --    05/22/23 0000 97 high-flow nasal cannula 10 -- --          Current Facility-Administered Medications   Medication Dose Route Frequency Provider Last Rate Last  Admin   • acetaminophen (TYLENOL) tablet 650 mg  650 mg Oral Q4H PRN Lolis Vital MD   650 mg at 05/23/23 0214    Or   • acetaminophen (TYLENOL) 160 MG/5ML solution 650 mg  650 mg Oral Q4H PRN Lolis Vital MD        Or   • acetaminophen (TYLENOL) suppository 650 mg  650 mg Rectal Q4H PRN Lolis Vital MD   650 mg at 05/04/23 2005   • ALPRAZolam (XANAX) tablet 0.5 mg  0.5 mg Oral BID PRN Primitivo Nuñez DO   0.5 mg at 05/21/23 2029   • amLODIPine (NORVASC) tablet 10 mg  10 mg Oral Daily Lolis Vital MD   10 mg at 05/23/23 0815   • atenolol (TENORMIN) tablet 25 mg  25 mg Oral Daily Lolis Vital MD   25 mg at 05/23/23 0815   • sennosides-docusate (PERICOLACE) 8.6-50 MG per tablet 2 tablet  2 tablet Oral BID Lolis Vital MD   2 tablet at 05/23/23 0815    And   • polyethylene glycol (MIRALAX) packet 17 g  17 g Oral Daily PRN Lolis Vital MD        And   • bisacodyl (DULCOLAX) EC tablet 5 mg  5 mg Oral Daily PRN Lolis Vital MD        And   • bisacodyl (DULCOLAX) suppository 10 mg  10 mg Rectal Daily PRN Lolis Vital MD       • budesonide (PULMICORT) nebulizer solution 1 mg  1 mg Nebulization Daily - RT Arnaldo Soriano MD   1 mg at 05/23/23 0658   • budesonide-formoterol (SYMBICORT) 160-4.5 MCG/ACT inhaler 2 puff  2 puff Inhalation BID - RT Lolis Vital MD   2 puff at 05/23/23 0658    And   • tiotropium (SPIRIVA RESPIMAT) 2.5 mcg/act aerosol solution inhaler  2 puff Inhalation Daily - RT Lolis Vital MD   2 puff at 05/18/23 0714   • cetirizine (zyrTEC) tablet 10 mg  10 mg Oral Daily Primitivo Nuñez DO   10 mg at 05/23/23 0816   • dextrose (D50W) (25 g/50 mL) IV injection 25 g  25 g Intravenous Q15 Min PRN Lolis Vital MD       • dextrose (GLUTOSE) oral gel 15 g  15 g Oral Q15 Min PRN Lolis Vital MD       • Enoxaparin Sodium (LOVENOX) syringe 40 mg  40 mg Subcutaneous Q24H Lolis Vital MD   40 mg at 05/22/23 2052   • fluticasone (FLONASE) 50 MCG/ACT nasal  spray 2 spray  2 spray Each Nare BID Primitivo Nuñez DO   2 spray at 05/23/23 0816   • glucagon (GLUCAGEN) injection 1 mg  1 mg Intramuscular Q15 Min PRN Lolis Vital MD       • guaifenesin-dextromethorphan (MUCINEX DM) tablet 1 tablet  1 tablet Oral BID Arnaldo Soriano MD   1 tablet at 05/23/23 0815   • guaiFENesin-dextromethorphan (ROBITUSSIN DM) 100-10 MG/5ML syrup 10 mL  10 mL Oral Q4H PRN Lolis Vital MD   10 mL at 05/21/23 2029   • hydrOXYzine (ATARAX) tablet 25 mg  25 mg Oral TID - RT Primitivo Nuñez DO   25 mg at 05/23/23 0816   • Insulin Aspart (novoLOG) injection 0-9 Units  0-9 Units Subcutaneous TID AC Lolis Vital MD   6 Units at 05/21/23 1733   • ipratropium-albuterol (DUO-NEB) nebulizer solution 3 mL  3 mL Nebulization Q6H - RT Arnaldo Soriano MD   3 mL at 05/23/23 0658   • melatonin tablet 5 mg  5 mg Oral Nightly PRN Lolis Vital MD   5 mg at 05/21/23 2035   • multivitamin with minerals 1 tablet  1 tablet Oral Daily Lolis Vital MD   1 tablet at 05/23/23 0815   • ondansetron (ZOFRAN) injection 4 mg  4 mg Intravenous Q6H PRN Lolis Vital MD   4 mg at 05/20/23 1604   • Pharmacy to Dose enoxaparin (LOVENOX)   Does not apply Continuous PRN Lolis Vital MD       • Pharmacy to dose vancomycin   Does not apply Continuous PRN Primitivo Nuñez DO       • predniSONE (DELTASONE) tablet 40 mg  40 mg Oral Daily With Breakfast Arnaldo Soriano MD   40 mg at 05/23/23 0815   • sodium chloride 0.9 % flush 10 mL  10 mL Intravenous PRN Lolis Vital MD       • sodium chloride 0.9 % flush 10 mL  10 mL Intravenous Q12H Primitivo Nuñez DO   10 mL at 05/23/23 0819   • sodium chloride 0.9 % flush 10 mL  10 mL Intravenous Q12H Primitivo Nuñez, DO   10 mL at 05/23/23 0819   • sodium chloride 0.9 % flush 10 mL  10 mL Intravenous PRN Primitivo Nuñez, DO       • sodium chloride 0.9 % flush 10 mL  10 mL Intravenous Q12H Primitivo Nuñez, DO   10 mL at 05/23/23  0816   • sodium chloride 0.9 % flush 10 mL  10 mL Intravenous PRN Primitivo Nuñez DO       • sodium chloride 0.9 % flush 20 mL  20 mL Intravenous PRN Primitivo Nuñez DO       • sodium chloride 0.9 % flush 20 mL  20 mL Intravenous PRN Primitivo Nuñez DO       • sodium chloride 0.9 % flush 3 mL  3 mL Intravenous Q12H Lolis Vital MD   3 mL at 23 0819   • sodium chloride 0.9 % flush 3-10 mL  3-10 mL Intravenous PRN Lolis Vital MD   10 mL at 23 0040   • sodium chloride 0.9 % infusion 40 mL  40 mL Intravenous PRN Lolis Vital MD       • sodium chloride 3 % nebulizer solution 4 mL  4 mL Nebulization Q12H Arnaldo Soriano MD   4 mL at 23 0658   • vancomycin 1250 mg/250 mL 0.9% NS IVPB ([facility])  1,250 mg Intravenous Q12H Primitivo Nuñez DO   1,250 mg at 23 0039        Physical Therapy Notes (last 48 hours)      Deysi Goodwin PT at 23 1205  Version 1 of 1       Goal Outcome Evaluation:  Plan of Care Reviewed With: patient, daughter           Outcome Evaluation: PT treatment completed this am with pt presenting supine on 12 L HFNC at 94% and HR 58. Pt performed BLE ther ex per flowsheet. She then was assisted with min assist to sit on EOB and remained x 5 min with close SBA to CGA and O2 sat above 92%. Pt expressed fatigue at 4 min but did remain up for one more minute with encouragement. Pt positioned in chair position post treatment and nursing setting up lunch tray for pt. Pt's O2 sat at 94% and pt's HA pain remained at 8/10 throughout session. Cont current PT POC.           Electronically signed by Deysi Goodwin PT at 23 1326     Deysi Goodwin PT at 23 1208  Version 1 of 1         Patient Name: Carolin Toscano  : 1945    MRN: [id number]                              Today's Date: 2023       Admit Date: 2023    Visit Dx:     ICD-10-CM ICD-9-CM   1. Acute on chronic respiratory failure with hypoxia and hypercapnia  J96.21 518.84     "J96.22 786.09     799.02   2. COPD exacerbation  J44.1 491.21   3. Parainfluenza virus infection  B34.8 078.89     Patient Active Problem List   Diagnosis   • CAP (community acquired pneumonia)   • Cigarette nicotine dependence with nicotine-induced disorder   • Essential hypertension   • Dyslipidemia   • Blood glucose elevated   • Muscle ache   • COPD (chronic obstructive pulmonary disease)   • Nuclear sclerotic cataract of right eye   • Acute respiratory failure   • COPD exacerbation   • Acute on chronic respiratory failure with hypoxia and hypercapnia     Past Medical History:   Diagnosis Date   • Arthritis    • COPD (chronic obstructive pulmonary disease)    • Gall stones    • Goiter     \"inward\"   • Hypertension    • Hypertension    • Kidney infection    • Kidney stone    • Kidney stones    • Migraine headache    • Scarlet fever      Past Surgical History:   Procedure Laterality Date   • BLADDER SURGERY     • CATARACT EXTRACTION W/ INTRAOCULAR LENS IMPLANT Left 8/11/2022    Procedure: CATARACT PHACO EXTRACTION WITH INTRAOCULAR LENS IMPLANT LEFT;  Surgeon: Sathish Lopez MD;  Location: [facility];  Service: Ophthalmology;  Laterality: Left;   • CATARACT EXTRACTION W/ INTRAOCULAR LENS IMPLANT Right 8/25/2022    Procedure: CATARACT PHACO EXTRACTION WITH INTRAOCULAR LENS IMPLANT RIGHT;  Surgeon: Sathish Lopez MD;  Location: [facility];  Service: Ophthalmology;  Laterality: Right;   • CHOLECYSTECTOMY     • HYSTERECTOMY     • TONSILLECTOMY        General Information     Row Name 05/21/23 7014          Physical Therapy Time and Intention    Document Type therapy note (daily note)  -TW     Mode of Treatment physical therapy  -TW     Row Name 05/21/23 1208          General Information    Patient Profile Reviewed yes  -TW     Existing Precautions/Restrictions fall;oxygen therapy device and L/min  -TW     Barriers to Rehab medically complex;previous functional deficit  -TW     Row Name 05/21/23 1201          " Cognition    Orientation Status (Cognition) oriented x 4  -TW     Row Name 05/21/23 1205          Safety Issues, Functional Mobility    Safety Issues Affecting Function (Mobility) friction/shear risk;insight into deficits/self-awareness;safety precaution awareness;safety precautions follow-through/compliance  -TW     Impairments Affecting Function (Mobility) balance;endurance/activity tolerance;shortness of breath;strength;pain  -TW           User Key  (r) = Recorded By, (t) = Taken By, (c) = Cosigned By    Initials Name Provider Type    TW Deysi Goodwin PT Physical Therapist               Mobility     Row Name 05/21/23 1205          Bed Mobility    Bed Mobility supine-sit;sit-supine;scooting/bridging;rolling left  -TW     Rolling Left [place] (Bed Mobility) minimum assist (75% patient effort);verbal cues  -TW     Scooting/Bridging [place] (Bed Mobility) moderate assist (50% patient effort);verbal cues  -TW     Supine-Sit [place] (Bed Mobility) minimum assist (75% patient effort)  -TW     Sit-Supine [place] (Bed Mobility) minimum assist (75% patient effort);verbal cues  -TW     Assistive Device (Bed Mobility) bed rails;draw sheet;head of bed elevated  -TW     Comment, (Bed Mobility) Pt was able to sit on EOB x 5 minutes with close SBA to CGA with encouragement to remain up since her HR and O2 sat was doing well. O2 sat remaining above 92% on 12 L  -     Row Name 05/21/23 1205          Bed-Chair Transfer    Bed-Chair [place] (Transfers) not tested  -     Row Name 05/21/23 1205          Sit-Stand Transfer    Sit-Stand [place] (Transfers) not tested  -     Row Name 05/21/23 1205          Gait/Stairs (Locomotion)    [place] Level (Gait) not tested  -           User Key  (r) = Recorded By, (t) = Taken By, (c) = Cosigned By    Initials Name Provider Type    TW Deysi Goodwin PT Physical Therapist               Obj/Interventions     Row Name 05/21/23 1205          Motor Skills     Therapeutic Exercise hip;knee;ankle  B guided heel slides, hip ab/add, and active ankle pumps x 10 each side  -TW     Row Name 05/21/23 1205          Balance    Balance Assessment sitting static balance;sitting dynamic balance  -TW     Static Sitting Balance contact guard;standby assist  -TW     Dynamic Sitting Balance contact guard;standby assist  -TW     Position, Sitting Balance supported;unsupported;sitting edge of bed  -TW           User Key  (r) = Recorded By, (t) = Taken By, (c) = Cosigned By    Initials Name Provider Type    TW Deysi Goodwin, PT Physical Therapist               Goals/Plan    No documentation.                Clinical Impression     Row Name 05/21/23 1205          Pain    Pretreatment Pain Rating 8/10  -TW     Posttreatment Pain Rating 8/10  -TW     Pain Location - Side/Orientation Bilateral  -TW     Pain Location - head  -TW     Pre/Posttreatment Pain Comment pt's headache did not change during PT session.  -TW     Pain Intervention(s) Repositioned  -TW     Row Name 05/21/23 1205          Plan of Care Review    Plan of Care Reviewed With patient;daughter  -TW     Outcome Evaluation PT treatment completed this am with pt presenting supine on 12 L HFNC at 94% and HR 58. Pt performed BLE ther ex per flowsheet. She then was assisted with min assist to sit on EOB and remained x 5 min with close SBA to CGA and O2 sat above 92%. Pt expressed fatigue at 4 min but did remain up for one more minute with encouragement. Pt positioned in chair position post treatment and nursing setting up lunch tray for pt. Pt's O2 sat at 94% and pt's HA pain remained at 8/10 throughout session. Cont current PT POC.  -TW     Row Name 05/21/23 1205          Vital Signs    Pre SpO2 (%) 94  -TW     O2 Delivery Pre Treatment hi-flow  12 L  -TW     Intra SpO2 (%) 92  -TW     O2 Delivery Intra Treatment hi-flow  12 L  -TW     Post SpO2 (%) 94  -TW     O2 Delivery Post Treatment hi-flow  12 L  -TW     Pre Patient Position  Supine  -TW     Intra Patient Position Sitting  -TW     Post Patient Position Sitting  -TW     Row Name 05/21/23 1205          Positioning and Restraints    Pre-Treatment Position in bed  -TW     Post Treatment Position bed  -TW     In Bed fowlers;with nsg;encouraged to call for assist;call light within reach  -TW           User Key  (r) = Recorded By, (t) = Taken By, (c) = Cosigned By    Initials Name Provider Type    Deysi Addison PT Physical Therapist               Outcome Measures     Row Name 05/21/23 1205 05/21/23 0747       How much help from another person do you currently need...    Turning from your back to your side while in flat bed without using bedrails? 3  -TW 2  -RP    Moving from lying on back to sitting on the side of a flat bed without bedrails? 2  -TW 2  -RP    Moving to and from a bed to a chair (including a wheelchair)? 2  -TW 2  -RP    Standing up from a chair using your arms (e.g., wheelchair, bedside chair)? 2  -TW 2  -RP    Climbing 3-5 steps with a railing? 1  -TW 2  -RP    To walk in hospital room? 2  -TW 2  -RP    AM-PAC 6 Clicks Score (PT) 12  -TW 12  -RP    Highest level of mobility 4 --> Transferred to chair/commode  -TW 4 --> Transferred to chair/commode  -RP    Row Name 05/21/23 1205          Functional Assessment    Outcome Measure Options AM-PAC 6 Clicks Basic Mobility (PT)  -TW           User Key  (r) = Recorded By, (t) = Taken By, (c) = Cosigned By    Initials Name Provider Type    Deysi Addsion PT Physical Therapist    Marga Carlin, RN Registered Nurse                             Physical Therapy Education     Title: PT OT SLP Therapies (In Progress)     Topic: Physical Therapy (In Progress)     Point: Mobility training (Done)     Learning Progress Summary           Patient Acceptance, E, VU by MS at 5/16/2023 1403    Comment: importance of mobility    Acceptance, E, VU by AM at 5/15/2023 0701    Acceptance, E, VU by MS at 5/12/2023 1458    Comment: importance of  mobility    Acceptance, E,TB,D, VU,NR by  at 5/8/2023 1250    Comment: Importance of activity and exercise techniques    Acceptance, E, VU by MS at 5/5/2023 1136    Comment: importance of mobility                   Point: Home exercise program (Done)     Learning Progress Summary           Patient Acceptance, E,D, VU,DU by TW at 5/21/2023 1205    Comment: Pt education for improving BLE ther ex technique    Acceptance, E, VU by MS at 5/16/2023 1403    Comment: importance of mobility    Acceptance, E, VU by AM at 5/15/2023 0701    Acceptance, E,TB, VU,NR by CC at 5/14/2023 1310    Comment: Importance of increasing movement of B LE    Acceptance, E,TB,D, VU,NR by  at 5/8/2023 1250    Comment: Importance of activity and exercise techniques    Acceptance, E, VU by MS at 5/5/2023 1136    Comment: importance of mobility                   Point: Body mechanics (Done)     Learning Progress Summary           Patient Acceptance, E,TB, VU by  at 5/17/2023 1846    Comment: upright posture                   Point: Precautions (Not Started)     Learner Progress:  Not documented in this visit.                      User Key     Initials Effective Dates Name Provider Type Discipline     03/23/22 -  Kavitha Tavarez, PT Physical Therapist PT    CC 06/16/21 -  Nasra Quiñones, PTA Physical Therapist Assistant PT     06/16/21 -  Ronak Cruz, PTA Physical Therapist Assistant PT    TW 06/16/21 -  Deysi Goodwin, PT Physical Therapist PT    MS 07/01/22 -  Martín Ortiz, PT Physical Therapist PT    AM 02/22/23 -  Rob Lu, RN Registered Nurse Nurse              PT Recommendation and Plan     Plan of Care Reviewed With: patient, daughter  Outcome Evaluation: PT treatment completed this am with pt presenting supine on 12 L HFNC at 94% and HR 58. Pt performed BLE ther ex per flowsheet. She then was assisted with min assist to sit on EOB and remained x 5 min with close SBA to CGA and O2 sat above 92%. Pt expressed fatigue at 4  min but did remain up for one more minute with encouragement. Pt positioned in chair position post treatment and nursing setting up lunch tray for pt. Pt's O2 sat at 94% and pt's HA pain remained at 8/10 throughout session. Cont current PT POC.     Time Calculation:    PT Charges     Row Name 05/21/23 1205             Time Calculation    Start Time 1142  -TW      Stop Time 1205  -TW      Time Calculation (min) 23 min  -TW      PT Received On 05/21/23  -TW         Timed Charges    85674 - PT Therapeutic Exercise Minutes 10  -TW      54234 - PT Therapeutic Activity Minutes 13  -TW         Total Minutes    Timed Charges Total Minutes 23  -TW       Total Minutes 23  -TW            User Key  (r) = Recorded By, (t) = Taken By, (c) = Cosigned By    Initials Name Provider Type    TW Deysi Goowdin PT Physical Therapist              Therapy Charges for Today     Code Description Service Date Service Provider Modifiers Qty    29083819218 HC PT THER PROC EA 15 MIN 5/21/2023 Deysi Goodwin PT GP 1    63621026947 HC PT THERAPEUTIC ACT EA 15 MIN 5/21/2023 Deysi Goodwin PT GP 1          PT G-Codes  Outcome Measure Options: AM-PAC 6 Clicks Basic Mobility (PT)  AM-PAC 6 Clicks Score (PT): 12  AM-PAC 6 Clicks Score (OT): 13       Deysi Goodwin PT  5/21/2023      Electronically signed by Deysi Goodwin PT at 05/21/23 1328     Ronak Cruz, PTA at 05/22/23 1354  Version 1 of 1       Goal Outcome Evaluation:  Plan of Care Reviewed With: patient        Progress: improving  Outcome Evaluation: Pt  supine in bed and willing to participate with treatment. Pt transfered to and from EOB with min a.  Pt was able to tolerate sitting EOB for approx 10 minutes.  Pt performed B LE TE siting EOB.  Pt left supine in bed with bed placed in  chair position. Pt with 9L O2 dropped no lower than 88% with todays activity.  See flowsheet for details.           Electronically signed by Ronak Cruz, PTA at 05/22/23 9995     oRnak Cruz, PTA  "at 23 1401  Version 1 of 1         Patient Name: Carolin Toscano  : 1945    MRN: [id number]                              Today's Date: 2023       Admit Date: 2023    Visit Dx:     ICD-10-CM ICD-9-CM   1. Acute on chronic respiratory failure with hypoxia and hypercapnia  J96.21 518.84    J96.22 786.09     799.02   2. COPD exacerbation  J44.1 491.21   3. Parainfluenza virus infection  B34.8 078.89     Patient Active Problem List   Diagnosis   • CAP (community acquired pneumonia)   • Cigarette nicotine dependence with nicotine-induced disorder   • Essential hypertension   • Dyslipidemia   • Blood glucose elevated   • Muscle ache   • COPD (chronic obstructive pulmonary disease)   • Nuclear sclerotic cataract of right eye   • Acute respiratory failure   • COPD exacerbation   • Acute on chronic respiratory failure with hypoxia and hypercapnia     Past Medical History:   Diagnosis Date   • Arthritis    • COPD (chronic obstructive pulmonary disease)    • Gall stones    • Goiter     \"inward\"   • Hypertension    • Hypertension    • Kidney infection    • Kidney stone    • Kidney stones    • Migraine headache    • Scarlet fever      Past Surgical History:   Procedure Laterality Date   • BLADDER SURGERY     • CATARACT EXTRACTION W/ INTRAOCULAR LENS IMPLANT Left 2022    Procedure: CATARACT PHACO EXTRACTION WITH INTRAOCULAR LENS IMPLANT LEFT;  Surgeon: Sathish Lopez MD;  Location: [facility];  Service: Ophthalmology;  Laterality: Left;   • CATARACT EXTRACTION W/ INTRAOCULAR LENS IMPLANT Right 2022    Procedure: CATARACT PHACO EXTRACTION WITH INTRAOCULAR LENS IMPLANT RIGHT;  Surgeon: Sathish Lopez MD;  Location: [facility];  Service: Ophthalmology;  Laterality: Right;   • CHOLECYSTECTOMY     • HYSTERECTOMY     • TONSILLECTOMY        General Information     Row Name 23 1350          Physical Therapy Time and Intention    Document Type therapy note (daily note)  -RM     Mode of " Treatment physical therapy  -RM     Row Name 05/22/23 1350          General Information    Patient Profile Reviewed yes  -RM     Existing Precautions/Restrictions fall;oxygen therapy device and L/min  -RM     Row Name 05/22/23 1350          Cognition    Orientation Status (Cognition) oriented x 4  -RM     Row Name 05/22/23 1350          Safety Issues, Functional Mobility    Safety Issues Affecting Function (Mobility) friction/shear risk;safety precaution awareness;safety precautions follow-through/compliance;sequencing abilities  -RM     Impairments Affecting Function (Mobility) balance;endurance/activity tolerance;shortness of breath;strength  -RM           User Key  (r) = Recorded By, (t) = Taken By, (c) = Cosigned By    Initials Name Provider Type    Ronak Jimenez PTA Physical Therapist Assistant               Mobility     Row Name 05/22/23 1352          Bed Mobility    Supine-Sit [place] (Bed Mobility) minimum assist (75% patient effort)  -RM     Sit-Supine [place] (Bed Mobility) minimum assist (75% patient effort);verbal cues  -RM     Assistive Device (Bed Mobility) bed rails;draw sheet;head of bed elevated  -RM     Comment, (Bed Mobility) EOB approx 10 minutes  -RM     Row Name 05/22/23 1352          Bed-Chair Transfer    Bed-Chair [place] (Transfers) not tested  -RM     Row Name 05/22/23 1352          Sit-Stand Transfer    Sit-Stand [place] (Transfers) not tested  -RM     Row Name 05/22/23 [facility]2          Gait/Stairs (Locomotion)    [place] Level (Gait) not tested  -RM           User Key  (r) = Recorded By, (t) = Taken By, (c) = Cosigned By    Initials Name Provider Type    Ronak Jimenez PTA Physical Therapist Assistant               Obj/Interventions     Row Name 05/22/23 1353          Motor Skills    Therapeutic Exercise hip;knee;ankle  -RM     Row Name 05/22/23 1353          Hip (Therapeutic Exercise)    Hip Isometrics (Therapeutic Exercise) bilateral;gluteal sets;10  repetitions  -RM     Hip Strengthening (Therapeutic Exercise) marching while seated;bilateral;10 repetitions  -RM     Row Name 05/22/23 1358          Knee (Therapeutic Exercise)    Knee Strengthening (Therapeutic Exercise) LAQ (long arc quad);bilateral;10 repetitions  -RM     Row Name 05/22/23 1353          Ankle (Therapeutic Exercise)    Ankle AROM (Therapeutic Exercise) bilateral;dorsiflexion;plantarflexion;10 repetitions  -RM           User Key  (r) = Recorded By, (t) = Taken By, (c) = Cosigned By    Initials Name Provider Type    Ronak Jimenez, PTA Physical Therapist Assistant               Goals/Plan    No documentation.                Clinical Impression     Row Name 05/22/23 1358          Pain    Pretreatment Pain Rating 0/10 - no pain  -RM     Posttreatment Pain Rating 0/10 - no pain  -RM     Row Name 05/22/23 1351          Plan of Care Review    Plan of Care Reviewed With patient  -RM     Progress improving  -RM     Outcome Evaluation Pt  supine in bed and willing to participate with treatment. Pt transfered to and from EOB with min a.  Pt was able to tolerate sitting EOB for approx 10 minutes.  Pt performed B LE TE siting EOB.  Pt left supine in bed with bed placed in  chair position. Pt with 9L O2 dropped no lower than 88% with todays activity.  See flowsheet for details.  -     Row Name 05/22/23 1356          Vital Signs    Pre SpO2 (%) 90  -RM     O2 Delivery Pre Treatment supplemental O2  9  -RM     Intra SpO2 (%) 88  -RM     O2 Delivery Intra Treatment supplemental O2  -RM     Post SpO2 (%) 88  -RM     O2 Delivery Post Treatment supplemental O2  -RM     Pre Patient Position Supine  -RM     Intra Patient Position Standing  -RM     Post Patient Position Sitting  -RM           User Key  (r) = Recorded By, (t) = Taken By, (c) = Cosigned By    Initials Name Provider Type    Ronak Jimenez, PTA Physical Therapist Assistant               Outcome Measures     Row Name 05/22/23 5839          How  much help from another person do you currently need...    Turning from your back to your side while in flat bed without using bedrails? 3  -RM     Moving from lying on back to sitting on the side of a flat bed without bedrails? 3  -RM     Moving to and from a bed to a chair (including a wheelchair)? 2  -RM     Standing up from a chair using your arms (e.g., wheelchair, bedside chair)? 2  -RM     Climbing 3-5 steps with a railing? 1  -RM     To walk in hospital room? 2  -RM     AM-PAC 6 Clicks Score (PT) 13  -RM     Highest level of mobility 4 --> Transferred to chair/commode  -RM     Row Name 05/22/23 1358          Functional Assessment    Outcome Measure Options AM-PAC 6 Clicks Basic Mobility (PT)  -RM           User Key  (r) = Recorded By, (t) = Taken By, (c) = Cosigned By    Initials Name Provider Type     Ronak Cruz, PTA Physical Therapist Assistant                             Physical Therapy Education     Title: PT OT SLP Therapies (In Progress)     Topic: Physical Therapy (In Progress)     Point: Mobility training (Done)     Learning Progress Summary           Patient Acceptance, E,TB,D, VU,NR by  at 5/22/2023 1358    Comment: Exercise tech and importance of dily activity    Acceptance, E, VU by MS at 5/16/2023 1403    Comment: importance of mobility    Acceptance, E, VU by AM at 5/15/2023 0701    Acceptance, E, VU by MS at 5/12/2023 1458    Comment: importance of mobility    Acceptance, E,TB,D, VU,NR by  at 5/8/2023 1250    Comment: Importance of activity and exercise techniques    Acceptance, E, VU by MS at 5/5/2023 1136    Comment: importance of mobility                   Point: Home exercise program (Done)     Learning Progress Summary           Patient Acceptance, E,TB,D, VU,NR by  at 5/22/2023 1358    Comment: Exercise tech and importance of dily activity    Acceptance, E,D, VU,DU by  at 5/21/2023 1205    Comment: Pt education for improving BLE ther ex technique    Acceptance, E, VU by  MS at 5/16/2023 1403    Comment: importance of mobility    Acceptance, E, VU by AM at 5/15/2023 0701    Acceptance, E,TB, VU,NR by CC at 5/14/2023 1310    Comment: Importance of increasing movement of B LE    Acceptance, E,TB,D, VU,NR by  at 5/8/2023 1250    Comment: Importance of activity and exercise techniques    Acceptance, E, VU by MS at 5/5/2023 1136    Comment: importance of mobility                   Point: Body mechanics (Done)     Learning Progress Summary           Patient Acceptance, E,TB, VU by JR at 5/17/2023 1846    Comment: upright posture                   Point: Precautions (Not Started)     Learner Progress:  Not documented in this visit.                      User Key     Initials Effective Dates Name Provider Type Discipline     03/23/22 -  Kavitha Tavarez, PT Physical Therapist PT    CC 06/16/21 -  Nasra Quiñones, PTA Physical Therapist Assistant PT     06/16/21 -  Ronak Cruz, PTA Physical Therapist Assistant PT    TW 06/16/21 -  Deysi Goodwin, PT Physical Therapist PT    MS 07/01/22 -  Martín Ortiz, PT Physical Therapist PT    AM 02/22/23 -  Rob Lu, RN Registered Nurse Nurse              PT Recommendation and Plan     Plan of Care Reviewed With: patient  Progress: improving  Outcome Evaluation: Pt  supine in bed and willing to participate with treatment. Pt transfered to and from EOB with min a.  Pt was able to tolerate sitting EOB for approx 10 minutes.  Pt performed B LE TE siting EOB.  Pt left supine in bed with bed placed in  chair position. Pt with 9L O2 dropped no lower than 88% with todays activity.  See flowsheet for details.     Time Calculation:    PT Charges     Row Name 05/22/23 8119             Time Calculation    Start Time 1025  -RM      Stop Time 1052  -RM      Time Calculation (min) 27 min  -RM      PT Received On 05/22/23  -RM      PT Goal Re-Cert Due Date 05/23/23  -RM         Time Calculation- PT    Total Timed Code Minutes- PT 27 minute(s)  -RM          Timed Charges    33701 - PT Therapeutic Exercise Minutes 10  -RM      45153 - PT Therapeutic Activity Minutes 17  -RM         Total Minutes    Timed Charges Total Minutes 27  -RM       Total Minutes 27  -RM            User Key  (r) = Recorded By, (t) = Taken By, (c) = Cosigned By    Initials Name Provider Type    Ronak Jimenez PTA Physical Therapist Assistant              Therapy Charges for Today     Code Description Service Date Service Provider Modifiers Qty    46715953098 HC PT THER PROC EA 15 MIN 2023 Ronak Cruz, ELVIRA GP 1    01062968424 HC PT THERAPEUTIC ACT EA 15 MIN 2023 Ronak Cruz, ELVIRA GP 1          PT G-Codes  Outcome Measure Options: AM-PAC 6 Clicks Basic Mobility (PT)  AM-PAC 6 Clicks Score (PT): 13  AM-PAC 6 Clicks Score (OT): 13       Ronak Cruz PTA  2023      Electronically signed by Ronak Cruz PTA at 23 1401          Occupational Therapy Notes (last 48 hours)      Roselyn Donaldson at 23 1550          Patient Name: Carolin Toscano  : 1945    MRN: [id number]                              Today's Date: 2023       Admit Date: 2023    Visit Dx:     ICD-10-CM ICD-9-CM   1. Acute on chronic respiratory failure with hypoxia and hypercapnia  J96.21 518.84    J96.22 786.09     799.02   2. COPD exacerbation  J44.1 491.21   3. Parainfluenza virus infection  B34.8 078.89     Patient Active Problem List   Diagnosis   • CAP (community acquired pneumonia)   • Cigarette nicotine dependence with nicotine-induced disorder   • Essential hypertension   • Dyslipidemia   • Blood glucose elevated   • Muscle ache   • COPD (chronic obstructive pulmonary disease)   • Nuclear sclerotic cataract of right eye   • Acute respiratory failure   • COPD exacerbation   • Acute on chronic respiratory failure with hypoxia and hypercapnia     Past Medical History:   Diagnosis Date   • Arthritis    • COPD (chronic obstructive pulmonary disease)    • Gall stones    •  "Goiter     \"inward\"   • Hypertension    • Hypertension    • Kidney infection    • Kidney stone    • Kidney stones    • Migraine headache    • Scarlet fever      Past Surgical History:   Procedure Laterality Date   • BLADDER SURGERY     • CATARACT EXTRACTION W/ INTRAOCULAR LENS IMPLANT Left 8/11/2022    Procedure: CATARACT PHACO EXTRACTION WITH INTRAOCULAR LENS IMPLANT LEFT;  Surgeon: Sathish Lopez MD;  Location: [facility];  Service: Ophthalmology;  Laterality: Left;   • CATARACT EXTRACTION W/ INTRAOCULAR LENS IMPLANT Right 8/25/2022    Procedure: CATARACT PHACO EXTRACTION WITH INTRAOCULAR LENS IMPLANT RIGHT;  Surgeon: Sathish Lopez MD;  Location: [facility] [place];  Service: Ophthalmology;  Laterality: Right;   • CHOLECYSTECTOMY     • HYSTERECTOMY     • TONSILLECTOMY        General Information     Row Name 05/22/23 1543          OT Time and Intention    Document Type therapy note (daily note)  -     Mode of Treatment occupational therapy  -     Row Name 05/22/23 1543          General Information    Patient Profile Reviewed yes  -     Existing Precautions/Restrictions fall;oxygen therapy device and L/min  -           User Key  (r) = Recorded By, (t) = Taken By, (c) = Cosigned By    Initials Name Provider Type     Roselyn Donaldson Occupational Therapist                 Mobility/ADL's    No documentation.                Obj/Interventions     Row Name 05/22/23 1544          Shoulder (Therapeutic Exercise)    Shoulder AROM (Therapeutic Exercise) bilateral;flexion;extension;horizontal aBduction/aDduction;10 repetitions;other (see comments)  B chest press  -     Row Name 05/22/23 1544          Elbow/Forearm (Therapeutic Exercise)    Elbow/Forearm AROM (Therapeutic Exercise) --  pt declined elbow ex d/t pain in R elbow with flexion (IV site)  -     Row Name 05/22/23 1544          Wrist (Therapeutic Exercise)    Wrist AROM (Therapeutic Exercise) bilateral;flexion;extension;10 repetitions  -     Row Name " 05/22/23 1544          Hand (Therapeutic Exercise)    Hand AROM/AAROM (Therapeutic Exercise) bilateral;AROM (active range of motion);finger flexion;finger extension;10 repetitions  -           User Key  (r) = Recorded By, (t) = Taken By, (c) = Cosigned By    Initials Name Provider Type    Roselyn Pena Occupational Therapist               Goals/Plan    No documentation.                Clinical Impression     Row Name 05/22/23 1546          Pain Assessment    Pretreatment Pain Rating 8/10  -AH     Posttreatment Pain Rating 8/10  -AH     Pain Location - head  -     Row Name 05/22/23 1546          Plan of Care Review    Plan of Care Reviewed With patient;daughter  -     Progress improving  -     Outcome Evaluation Pt received supine in bed on 9L O2 via high flow nc.  Pt satting 88% upon arrival.  Pt participated in UB AROM ex as per flow sheet, sats dropped to 86%, but rebounded to 88% after about 30 seconds.  Pt fatigues easily and reports she feels tired today.  Cont OT per POC>  -     Row Name 05/22/23 1546          Vital Signs    Pre SpO2 (%) 88  -AH     O2 Delivery Pre Treatment hi-flow  9L  -AH     Intra SpO2 (%) 86  -AH     O2 Delivery Intra Treatment hi-flow  -AH     Post SpO2 (%) 87  -AH     O2 Delivery Post Treatment hi-flow  -AH     Row Name 05/22/23 1546          Positioning and Restraints    Pre-Treatment Position in bed  -     Post Treatment Position bed  -     In Bed supine;call light within reach;encouraged to call for assist;patient within staff view;with family/caregiver  -           User Key  (r) = Recorded By, (t) = Taken By, (c) = Cosigned By    Initials Name Provider Type    Roselyn Pena Occupational Therapist               Outcome Measures     Row Name 05/22/23 1549          How much help from another is currently needed...    Putting on and taking off regular lower body clothing? 2  -AH     Bathing (including washing, rinsing, and drying) 2  -AH     Toileting (which  includes using toilet bed pan or urinal) 2  -AH     Putting on and taking off regular upper body clothing 2  -AH     Taking care of personal grooming (such as brushing teeth) 2  -AH     Eating meals 3  -AH     AM-PAC 6 Clicks Score (OT) 13  -AH     Row Name 05/22/23 1358          How much help from another person do you currently need...    Turning from your back to your side while in flat bed without using bedrails? 3  -RM     Moving from lying on back to sitting on the side of a flat bed without bedrails? 3  -RM     Moving to and from a bed to a chair (including a wheelchair)? 2  -RM     Standing up from a chair using your arms (e.g., wheelchair, bedside chair)? 2  -RM     Climbing 3-5 steps with a railing? 1  -RM     To walk in hospital room? 2  -RM     AM-PAC 6 Clicks Score (PT) 13  -RM     Highest level of mobility 4 --> Transferred to chair/commode  -RM     Row Name 05/22/23 1355          Functional Assessment    Outcome Measure Options AM-PAC 6 Clicks Basic Mobility (PT)  -RM           User Key  (r) = Recorded By, (t) = Taken By, (c) = Cosigned By    Initials Name Provider Type     Roselyn Donaldson Occupational Therapist    Ronak Jimenez, PTA Physical Therapist Assistant                Occupational Therapy Education     Title: PT OT SLP Therapies (In Progress)     Topic: Occupational Therapy (In Progress)     Point: ADL training (Done)     Description:   Instruct learner(s) on proper safety adaptation and remediation techniques during self care or transfers.   Instruct in proper use of assistive devices.              Learning Progress Summary           Patient Acceptance, E, VU by AM at 5/15/2023 0701    Acceptance, E,TB, VU by  at 5/12/2023 1507    Comment: purpose of re-evaluation    Acceptance, E,TB, VU by SD at 5/8/2023 1222    Comment: Safety during tf    Acceptance, E, VU by SP at 5/5/2023 1146    Comment: OT edcuated pt on purpose of IE and POC. Pt is agreeable.                   Point: Home  exercise program (Done)     Description:   Instruct learner(s) on appropriate technique for monitoring, assisting and/or progressing therapeutic exercises/activities.              Learning Progress Summary           Patient Acceptance, E,TB, VU by  at 5/22/2023 1549    Comment: benefit of do UB ex to improve functional strength    Acceptance, E,TB, VU by  at 5/19/2023 1339    Comment: importance of activity    Acceptance, E,TB, VU by SD at 5/16/2023 1246    Comment: Importance of progressing activity.   Family Acceptance, E,TB, VU by  at 5/22/2023 1549    Comment: benefit of do UB ex to improve functional strength    Acceptance, E,TB, VU by  at 5/19/2023 1339    Comment: importance of activity                   Point: Precautions (Not Started)     Description:   Instruct learner(s) on prescribed precautions during self-care and functional transfers.              Learner Progress:  Not documented in this visit.          Point: Body mechanics (Not Started)     Description:   Instruct learner(s) on proper positioning and spine alignment during self-care, functional mobility activities and/or exercises.              Learner Progress:  Not documented in this visit.                      User Key     Initials Effective Dates Name Provider Type Discipline     06/16/21 -  Roselyn Donaldson Occupational Therapist OT    SD 06/16/21 -  Cecelia Knight OT Occupational Therapist OT    SP 09/08/22 -  January Chapa OT Occupational Therapist OT    AM 02/22/23 -  Rob Lu, LASHAE Registered Nurse Nurse              OT Recommendation and Plan  Planned Therapy Interventions (OT): activity tolerance training, BADL retraining, patient/caregiver education/training, transfer/mobility retraining, strengthening exercise  Therapy Frequency (OT): 3 times/wk  Plan of Care Review  Plan of Care Reviewed With: patient, daughter  Progress: improving  Outcome Evaluation: Pt received supine in bed on 9L O2 via high flow nc.  Pt satting 88%  upon arrival.  Pt participated in UB AROM ex as per flow sheet, sats dropped to 86%, but rebounded to 88% after about 30 seconds.  Pt fatigues easily and reports she feels tired today.  Cont OT per POC>     Time Calculation:    Time Calculation- OT     Row Name 05/22/23 1550             Time Calculation- OT    OT Start Time 1524  -      OT Stop Time 1542  -      OT Time Calculation (min) 18 min  -      OT Received On 05/22/23  -      OT Goal Re-Cert Due Date 05/26/23  -         Timed Charges    49439 - OT Therapeutic Exercise Minutes 18  -AH         Total Minutes    Timed Charges Total Minutes 18  -AH       Total Minutes 18  -AH            User Key  (r) = Recorded By, (t) = Taken By, (c) = Cosigned By    Initials Name Provider Type    Roselyn Pena Occupational Therapist              Therapy Charges for Today     Code Description Service Date Service Provider Modifiers Qty    95021076130 HC OT THER PROC EA 15 MIN 5/22/2023 Roselyn Donaldson GO 1               Roselyn Donaldson  5/22/2023    Electronically signed by Roselyn Donaldson at 05/22/23 1550     Roselyn Donaldson at 05/22/23 1550        Goal Outcome Evaluation:  Plan of Care Reviewed With: patient, daughter        Progress: improving  Outcome Evaluation: Pt received supine in bed on 9L O2 via high flow nc.  Pt satting 88% upon arrival.  Pt participated in UB AROM ex as per flow sheet, sats dropped to 86%, but rebounded to 88% after about 30 seconds.  Pt fatigues easily and reports she feels tired today.  Cont OT per POC>           Electronically signed by Roselyn Donaldson at 05/22/23 1550

## 2023-05-23 NOTE — PLAN OF CARE
Goal Outcome Evaluation:  Plan of Care Reviewed With: patient        Progress: improving  Outcome Evaluation: Pt supine in bed and willing to particiapte with treatment. Pt performed rolling with min a  and vc's.  Pt was min a to transfer to EOB and was noted to drop to 85% on 13L.  Pt was titrated to 15L.  Pt after recovery period if approx 3 minuted transfered to bedside recliner with min/mod a and gaitbelt. See flowsheet for details

## 2023-05-23 NOTE — PROGRESS NOTES
"  CC: Acute Respiratory Failure.     S: Continues to require high flow nasal cannula when off the NIV/AVAPS.  The nursing staff mentions that the patient desaturated in the morning and had to be placed on 15 L high flow nasal cannula. According to the nursing staff this occurred even while the patient was in the bed and while she was attempting to sit up to eat breakfast.  The patient continues to feel weak but insists that she is still strong enough to cough mucus from her lungs.    ROS: Complains of cough, shortness of breath and mild weakness.  Also complains of mild anxiety.    O:Vital signs reviewed. FiO2: 12-15 L/min high flow nasal cannula.  /50 (BP Location: Left arm, Patient Position: Lying)   Pulse 78   Temp 98.4 °F (36.9 °C) (Oral)   Resp 16   Ht 160 cm (63\")   Wt 67.2 kg (148 lb 2.4 oz)   SpO2 91%   BMI 26.24 kg/m²     Temp (24hrs), Av.4 °F (36.9 °C), Min:97.2 °F (36.2 °C), Max:99 °F (37.2 °C)      I & Os reviewed.   Intake/Output       23 0700 - 23 0659 23 0700 - 23 0659    Intake (ml) 855.7 240    Output (ml) 1600 --    Net (ml) -744.3 240          Net IO Since Admission: -5,211.33 mL [23 1044]    General/Constitutional: Off NIV therapy. Appears to be in mild distress, at this time, especially when trying to talk in full sentences.  Eyes: PERRL. EOMI  Neck: Supple without JVD. No obvious masses noted.   Cardiovascular: S1 + S2.  Regular at this time.  Lungs/Respiratory: Decreased bilateral basal breath sounds noted.  Bilateral, somewhat scattered, wheezing heard.  Bibasilar crackles noted.  GI/Abdomen: Soft. Bowel sounds positive.  No obvious organomegaly  Musculoskeletal/Extremities: Right-sided PICC line in place.  No edema noted. Gait could not be assessed at this time, as the patient was laying in bed.   Neurologic: Was able to respond appropriately and interact appropriately.  Psych: Was able to follow simple commands.    Skin: Appeared somewhat " dry.      Labs: Reviewed.   Results from last 7 days   Lab Units 05/23/23  0609 05/22/23  0515 05/21/23  0453 05/20/23  0426 05/19/23  0432   WBC 10*3/mm3 9.97 11.59* 13.10* 15.22* 15.77*   HEMOGLOBIN g/dL 10.5* 11.0* 11.0* 11.3* 11.6*   HEMATOCRIT % 33.3* 35.6 33.8* 36.5 37.4   PLATELETS 10*3/mm3 215 211 212 209 191       Lab Results   Component Value Date    PROCALCITO 0.17 05/21/2023    PROCALCITO 0.11 05/16/2023    PROCALCITO 0.08 05/14/2023       No results found for: CRP    No results found for: SEDRATE    Lab Results   Component Value Date    PROBNP 572.7 05/14/2023    PROBNP 479.4 05/10/2023    PROBNP 234.4 05/04/2023       Results from last 7 days   Lab Units 05/23/23  0609 05/22/23  0515 05/21/23 0453 05/20/23 0426   SODIUM mmol/L 138 138 140 139   POTASSIUM mmol/L 4.6 4.5 4.7 3.8   CHLORIDE mmol/L 100 100 98 94*   CO2 mmol/L 30.3* 31.5* 36.7* 38.8*   BUN mg/dL 14 22 24* 25*   CREATININE mg/dL 0.27* 0.26* 0.32* 0.28*   CALCIUM mg/dL 8.9 8.8 8.5* 8.9   ANION GAP mmol/L 7.7 6.5 5.3 6.2   BILIRUBIN mg/dL  --  0.2 0.2 0.3   ALK PHOS U/L  --  74 77 72   ALT (SGPT) U/L  --  35* 35* 26   AST (SGOT) U/L  --  14 19 12   GLUCOSE mg/dL 138* 111* 120* 123*   TOTAL PROTEIN g/dL  --  5.1* 5.1* 5.4*   ALBUMIN g/dL  --  2.5* 2.6* 2.5*                   No results found for: CKTOTAL    No components found for: HSTROPT    Lab Results   Component Value Date    TROPONINT 22 (H) 05/05/2023    TROPONINT 25 (H) 05/05/2023    TROPONINT 44 (H) 05/04/2023       No results found for: DDIMER    Brief Urine Lab Results  (Last result in the past 365 days)      Color   Clarity   Blood   Leuk Est   Nitrite   Protein   CREAT   Urine HCG        05/12/23 1738 Yellow   Cloudy   Negative   Trace   Negative   Trace                 Lab Results   Component Value Date    TSH 0.304 05/09/2023       No results found for: FREET4      Micro: As of May 23, 2023   Lab Results   Component Value Date    RESPCX Moderate growth (3+) Haemophilus  influenzae (A) 05/18/2023    RESPCX Light growth (2+) Staphylococcus aureus (A) 05/18/2023    RESPCX No Normal Respiratory Ana (A) 05/18/2023     No results found for: BCIDPCR  Lab Results   Component Value Date    BLOODCX No growth at 4 days 05/18/2023    BLOODCX No growth at 4 days 05/18/2023    BLOODCX No growth at 5 days 05/04/2023    BLOODCX No growth at 5 days 05/04/2023     Lab Results   Component Value Date    URINECX Final report 02/07/2019    URINECX 20,000-30,000 CFU/mL Escherichia coli (A) 10/05/2018     No results found for: MRSACX  Lab Results   Component Value Date    MRSAPCR No MRSA Detected 04/15/2023     No results found for: URCX  No components found for: LOWRESPCF  No results found for: THROATCX  No results found for: CULTURES  No components found for: STREPBCX  No results found for: STREPPNEUAG  No results found for: LEGIONELLA  No results found for: MYCOPLASCX  No results found for: GCCX  No results found for: WOUNDCX  No results found for: BODYFLDCX    No results found for: FLU    Lab Results   Component Value Date    ADENOVIRUS Not Detected 05/04/2023     Lab Results   Component Value Date    EG187Y Not Detected 05/04/2023     Lab Results   Component Value Date    CVHKU1 Not Detected 05/04/2023     Lab Results   Component Value Date    CVNL63 Not Detected 05/04/2023     Lab Results   Component Value Date    CVOC43 Not Detected 05/04/2023     Lab Results   Component Value Date    HUMETPNEVS Not Detected 05/04/2023     Lab Results   Component Value Date    HURVEV Not Detected 05/04/2023     Lab Results   Component Value Date    FLUBPCR Not Detected 05/04/2023     Lab Results   Component Value Date    PARAINFLUE Not Detected 05/04/2023     Lab Results   Component Value Date    PARAFLUV2 Not Detected 05/04/2023     Lab Results   Component Value Date    PARAFLUV3 Detected (A) 05/04/2023     Lab Results   Component Value Date    PARAFLUV4 Not Detected 05/04/2023     Lab Results   Component Value  Date    BPERTPCR Not Detected 05/04/2023     No results found for: [id number]  Lab Results   Component Value Date    CPNEUPCR Not Detected 05/04/2023     Lab Results   Component Value Date    MPNEUMO Not Detected 05/04/2023     Lab Results   Component Value Date    FLUAPCR Not Detected 05/04/2023     No results found for: FLUAH3  No results found for: FLUAH1  Lab Results   Component Value Date    RSV Not Detected 05/04/2023     Lab Results   Component Value Date    BPARAPCR Not Detected 05/04/2023       COVID 19:  Lab Results   Component Value Date    COVID19 Not Detected 05/04/2023         ABG: Reviewed   Recent Labs     05/18/23  0708 05/21/23  0714 05/22/23  1307   PHART 7.451* 7.394 7.447   EOA7PUE 73.4* 70.2* 53.3*   PO2ART 64.0* 95.2 50.2*   CVH4IDY 51.1* 42.8* 36.8*   BASEEXCESS 22.8* 15.1* 11.1*       Lab Results   Component Value Date    LACTATE 1.3 05/04/2023    LACTATE 1.2 04/13/2023    LACTATE 2.7 (C) 04/13/2023         Echo: Results for orders placed during the hospital encounter of 04/13/23    Adult Transthoracic Echo Complete W/ Cont if Necessary Per Protocol    Interpretation Summary  •  Left ventricular diastolic function is consistent with (grade I) impaired relaxation.  •  Mild aortic valve stenosis is present.  •  Estimated right ventricular systolic pressure from tricuspid regurgitation is moderately elevated (45-55 mmHg).  •  Mild to moderate pulmonary hypertension is present.      Results for orders placed during the hospital encounter of 10/01/17    Adult Transthoracic Echo Complete W/ Cont if Necessary Per Protocol    Interpretation Summary  TEchnically difficult study  1) Borderline LVH with normal LV systolic function ( EF is over 55%)  2) Normal left atrial size with mild elevation in LVedp  3) Trace MR  4) Mild TR with normal PA pressures  5) Mild AI  6) Small RV with normal RV function        Pharmacy to Dose enoxaparin (LOVENOX),   Pharmacy to dose vancomycin,           CXRay: Latest  imaging study was reviewed personally.   Imaging Results (Last 48 Hours)     Procedure Component Value Units Date/Time    XR Chest 1 View [[id number]] Resulted: 05/23/23 0526     Updated: 05/23/23 0526            Assessment & Recommendations/Plan:   1.  Acute respiratory failure  Latest chest x-ray suggested slightly continued left basal disease, likely from atelectasis.   Continue AVAPS 1 hour on and 3 hours off throughout the day and as needed otherwise.  Will humidify AVAPS.    Settings were adjusted  Unfortunately the patient's respiratory status continues to remain extremely tenuous and she once again has worsened over the past 12 hours?  Latest ABGs show compensated chronic respiratory acidosis, with improving PCO2.  Will repeat ABG in the morning.  We will repeat chest x-ray, as clinically indicated    2.  COPD with acute exacerbation  Likely triggered by parainfluenza bronchitis  Latest cultures positive for haemophilus influenza and MSSA.  3+ WBCs noted in the sputum cultures.  Given significant antibiotic allergies, for now would suggest continuation of vancomycin for total of 3-5 days.  She has been on azithromycin which was started on the 18th, and I would suggest 3 days of therapy  Patient was given Depo-Medrol on 5/22/2023.  Currently on oral prednisone 40 mg.  Continue nebulized Pulmicort in the middle of the day.  Continue Spiriva and Symbicort  Will benefit from outpatient PFTs    3.  Abnormal CT of the chest/atelectasis  Although unlikely that the patient has developed pneumonia, it appears that due to elevated white blood cell count, she was restarted on antibiotics in the form of vancomycin by hospitalist service.    Once again had a long discussion with the family members at the bedside along with the patient regarding the finding of significant mucous plugging/atelectasis.  Continue MetaNeb treatments.  We will continue hypertonic saline every 12 hourly.  Continue Mucinex DM.  I have spoken to  "patient's daughter, Nehal at [phone], on 5/16/2023 and also on 5/23/2023.    I once again informed the patient and her daughter that based on her prolonged stay, significant COPD and development of likely deconditioning, if she worsens any further, she may need to be intubated.  The patient and her daughter understood the concerns regarding continued presence of mucous plugging and significant hypoxemia but they continue to decline bronchoscopy and actually the patient today says that she will not have bronchoscopy \"no matter what\"    4.  Chronic respiratory acidosis  Appears to be on NIV device at home.    5.  Pulmonary hypertension  May need outpatient work-up  Likely secondary to underlying likely severe COPD    6.  Anxiety  Has been on Precedex in the past  Continue other anxiolytics.  May need adjustment, as indicated.    7.  Smoking  Was advised to quit smoking    8.  Deconditioning  I have once again asked the nursing staff to try to have the patient sit in recliner.  Will definitely become an issue, the longer patient remains dependent on significantly high amounts of oxygen.  I also informed the patient and her daughter that deconditioning may become a major issue in the near future, given her significant dependence on oxygen without sustained improvement in oxygenation and prolonged hospital stay.    9.  Miscellaneous  I have explained to the patient and her daughter that all possible noninvasive interventions are being undertaken and will continue these measures.  The daughter understood and said that the patient does not want to consider bronchoscopy \"at all\".    We have reviewed patient's current orders and changes, if any, have been suggested to primary care team. Plan was also discussed with nursing staff, as necessary.     This document was electronically signed by Arnaldo Soriano MD on 05/23/23 at 10:44 EDT      Dictated utilizing Dragon dictation.    "

## 2023-05-23 NOTE — CASE MANAGEMENT/SOCIAL WORK
Discharge Planning Assessment  [facility]     Patient Name: Carolin Toscano  MRN: [id number]  Today's Date: 5/23/2023    Admit Date: 5/4/2023    Plan: DCP   Discharge Needs Assessment    No documentation.                Discharge Plan     Row Name 05/23/23 1408       Plan    Plan Comments 05/23/23 Sophie Pérez swing out of office today  will return  05/24.  Sent to [facility]  Sybil said she would look at it  but the  minium  oxygen they can take is at 6 and that includes with PT.              Continued Care and Services - Admitted Since 5/4/2023     Destination     Service Provider Request Status Selected Services Address Phone Fax Patient Preferred    [facility] Accepted N/A [address] [facility] [id number] [phone] --    [facility] [place] [facility] Pending - Request Sent N/A [address] [facility] [address] [phone] --    [facility] Pending - Request Sent N/A [address] [phone] --    [facility] Pending - Request Sent N/A [address] [phone] [phone] --    [facility] Pending - Request Sent N/A [address] [facility] [phone] [phone] --    [facility] Pending - Request Sent N/A [address] [facility] [phone] [phone] --    [facility] Pending - Request Sent N/A [address] [facility] [address] [phone] --            Selected Continued Care - Prior Encounters Includes continued care and service providers with selected services from prior encounters from 2/3/2023 to 5/23/2023    Discharged on 4/21/2023 Admission date: 4/13/2023 - Discharge disposition: Home or Self Care    Durable Medical Equipment     Service Provider Selected Services Address Phone Fax Patient  Preferred    [facility] [place] [facility] [address] [address] [phone] --       Internal Comment last updated by Merle Torres 4/21/2023 1530    [employer]eb[employer]/Home o2               [facility] Durable Medical Equipment -- [phone] --       Internal Comment last updated by Merle Torres 4/21/2023 1530    [facility]     Service Provider Selected Services Address Phone Fax Patient Preferred    [facility] [address], [address], [facility] [address] [phone] --                    Expected Discharge Date and Time     Expected Discharge Date Expected Discharge Time    May 26, 2023          Demographic Summary    No documentation.                Functional Status    No documentation.                Psychosocial    No documentation.                Abuse/Neglect    No documentation.                Legal    No documentation.                Substance Abuse    No documentation.                Patient Forms    No documentation.                   Ekaterina Cason RN

## 2023-05-23 NOTE — PROGRESS NOTES
[facility]    Name:  Carolin Toscano   Age:  77 y.o.  Sex:  female  :  1945  MRN:  [id number]   Visit Number:  [id number]  Admission Date:  2023  Date Of Service:  23  Primary Care Physician:  Jason Nicholson MD     LOS: 19 days :    Chief Complaint:      Shortness of breath    Subjective:    Patient was seen and examined today.  Patient was seen sitting up in the chair with the help of physical therapy.  Patient appears more energetic compared to the last time I have seen her while in the ICU.  Patient is fully AAOx3.  Patient reports improvement on her respiratory status and denies shortness of breath at rest.  Patient denies any pain or discomfort.  She is continuing to require high flow 10 L nasal cannula supplemental oxygen, otherwise afebrile and hemodynamically stable.    Hospital Course:    Patient is a 77 years old female with a past medical history of COPD, chronic respiratory failure on 5 to 6 L per her previous discharge, on NIV machine at home and trelegy, hypertension, and ongoing tobacco use who presented to the ER from home by EMS with a chief complaint of shortness of breath.  Patient reporting that she started having shortness of breath associated with productive cough since last night and has persisted and became worse that promoted her to call EMS.  EMS has found the patient to be at 65% saturation on her normal 6 L of oxygen. Patient reports compliance with her NIV machine at home.      On ER evaluation, Patient was found to be tachycardic with a heart rate of 130s, temperature of 100.2 and respirations of 30, /72.  Patient was placed on BiPAP with FiO2 of 40%.  Her ABG came back and showed pH of 7.296/PCO2 87/PO2 94/HCO3 42/saturation 97% on 5 L nasal cannula.  HS Troponin 44, proBNP WNL, glucose 192, CO2 of 40, otherwise CBC and CMP within acceptable range.  Lactate and Pro-Adi WNL.  Respiratory panel positive for  parainfluenza virus.  Chest x-ray Mild interstitial disease  bilaterally is similar to prior. No area of consolidation is seen. Patient received Solu-Medrol and Aztreonam while in the ER.  Hospitalist consulted for admission.     Patient was admitted and treated with steroids.  Antibiotics were not indicated.  Pulmonology consulted and was moved to ICU on 5/9 due to worsening respiratory failure and tachypnea.  She initially had to be on Precedex drip which was discontinued and switched to Xanax due to anxiety. Patient was placed on AVAPS.  Patient continued on steroids and received Lasix.    5/16/23: d/w nsg intermittently on avaps and hi flow. Patient awake in bed. CXR from 5/15 reviewed. Medications reviewed.  Discussed with case management [facility]     5/17/2023 discussed with case management still working on [facility].  I am planning to do a peer to peer with them tomorrow.  Otherwise has persistent hypoxia.  Dr. Soriano's note reviewed considering bronchoscopy.     5/18: Daughter Regla is coming today.  Otherwise not much change still having dyspnea and hypoxemia.  Case management note reviewed.     5/19: d/w patient and family updated them on [facility] and pending appeal which will likely be submitted Monday. Still having dyspnea but mucous is breaking loose today.  Still debating about bronchoscopy.  Interested in long-term acute care possibly in [place] versus [place].     5/20 discussed with patient as well as family at the bedside.  Also discussed with them regarding placing access which she was able to get a PICC line.  Also discussed with them the staph growing in her sputum and that I was going to add vancomycin for this.     5/21: c/o garrison suspect is due to mucous. Agreeable to flonase, zyrtec and atarax. Labs reviewed.    Review of Systems:     All systems were reviewed and negative except as mentioned in subjective, assessment and plan.    Vital  Signs:    Temp:  [97.2 °F (36.2 °C)-99 °F (37.2 °C)] 98.4 °F (36.9 °C)  Heart Rate:  [65-83] 78  Resp:  [16-20] 16  BP: ()/(34-60) 110/50    Intake and output:    I/O last 3 completed shifts:  In: 1105.7 [P.O.:605; I.V.:250.7; IV Piggyback:250]  Out: 2700 [Urine:2700]  I/O this shift:  In: 240 [P.O.:240]  Out: -     Physical Examination:    General Appearance:  Alert and cooperative.  Chronically ill-appearing.  Frail.   Head:  Atraumatic and normocephalic.   Eyes: Conjunctivae and sclerae normal, no icterus. No pallor.   Throat: No oral lesions, no thrush, oral mucosa moist.   Neck: Supple, trachea midline, no thyromegaly.   Lungs:   Breath sounds heard bilaterally equally.  Expiratory wheezing heard. Decreased air movement bilaterally.  Nontachypneic on supplemental oxygen.    Heart:  Normal S1 and S2, no murmur, no gallop, no rub. No JVD.   Abdomen:   Normal bowel sounds, no masses, no organomegaly. Soft, nontender, nondistended, no rebound tenderness.   Extremities: Supple, no edema, no cyanosis, no clubbing.   Skin: No bleeding or rash.   Neurologic: Alert and oriented x 3. No facial asymmetry. Moves all four limbs. No tremors.  Generalized weakness noted.     Laboratory results:    Results from last 7 days   Lab Units 05/23/23  0609 05/22/23  0515 05/21/23  0453 05/20/23  0426   SODIUM mmol/L 138 138 140 139   POTASSIUM mmol/L 4.6 4.5 4.7 3.8   CHLORIDE mmol/L 100 100 98 94*   CO2 mmol/L 30.3* 31.5* 36.7* 38.8*   BUN mg/dL 14 22 24* 25*   CREATININE mg/dL 0.27* 0.26* 0.32* 0.28*   CALCIUM mg/dL 8.9 8.8 8.5* 8.9   BILIRUBIN mg/dL  --  0.2 0.2 0.3   ALK PHOS U/L  --  74 77 72   ALT (SGPT) U/L  --  35* 35* 26   AST (SGOT) U/L  --  14 19 12   GLUCOSE mg/dL 138* 111* 120* 123*     Results from last 7 days   Lab Units 05/23/23  0609 05/22/23  0515 05/21/23  0453   WBC 10*3/mm3 9.97 11.59* 13.10*   HEMOGLOBIN g/dL 10.5* 11.0* 11.0*   HEMATOCRIT % 33.3* 35.6 33.8*   PLATELETS 10*3/mm3 215 211 212              Results from last 7 days   Lab Units 05/18/23  1101 05/18/23  1054   BLOODCX  No growth at 4 days No growth at 4 days     Recent Labs     05/18/23  0708 05/21/23  0714 05/22/23  1307   PHART 7.451* 7.394 7.447   WBQ3HEL 73.4* 70.2* 53.3*   PO2ART 64.0* 95.2 50.2*   SUN1GYP 51.1* 42.8* 36.8*   BASEEXCESS 22.8* 15.1* 11.1*      I have reviewed the patient's laboratory results.    Radiology results:    No radiology results from the last 24 hrs  I have reviewed the patient's radiology reports.    Medication Review:     I have reviewed the patient's active and prn medications.     Problem List:      Acute on chronic respiratory failure with hypoxia and hypercapnia      Assessment:    Acute on chronic respiratory failure with hypoxia and hypercapnia, likely secondary to #2 and 3, POA  Acute COPD exacerbation, POA  Parainfluenza infection, POA  Elevated troponin, likely secondary to demand ischemia, POA  Hypertension  Chronic tobacco abuse  Pulmonary hypertension  Arthritis  Deconditioning    Plan:    Respiratory failure with hypoxia/hypercapnia, COPD exacerbation, parainfluenza  -Continue supplemental oxygen to keep saturation above 90%, patient on 5 to 6 L at baseline, continue to titrate down as able to.  Continue BiPAP therapy.  -Has NIV at home. Currently on 10 liters  -Recent parainfluenza virus infection met SIRS due to this  -Tapered down to po prednisone  -Bronchodilators, Mucinex MetaNeb and supportive care.  -Dr. Soriano following, appreciate his recommendations  -s/p lasix  -Xanax prn  -Sputum growing Staph aureus and haemophilus, on vancomycin and azithromycin  - flonase, zyrtec, and atarax for upper airway congestion  -Continue PT/OT.     Hyperglycemia  -sliding scale insulin   -Hemoglobin A1c 6.7     Further orders as indicated per clinical course.     PT/ OT consulted, case management consulted, appreciate their help.  Will consult with palliative care, appreciate their assistance.     DVT  Prophylaxis: Lovenox prophylaxis (benefit> risk)  Code Status: Full  Diet: Cardiac  Discharge Plan:  likely needs 2 to 3 days in the hospital. Interested in long-term acute care possibly in [place] versus [place].    Lolis Vital MD  05/23/23  10:41 EDT    Dictated utilizing Dragon dictation.

## 2023-05-23 NOTE — PLAN OF CARE
Goal Outcome Evaluation:           Progress: improving  Outcome Evaluation: VSS on 10L O2 via humidified, high-flow nasal cannula with ordered intermittent Bipap.  Oriented x4.  Ordered PRN medication (see MAR) for complaints of pain.  SR noted on telemetry.  Incentive spirometer use encouraged.  Intake and output monitored, with good urine output noted.  No acute events noted during shift.  Will continue to monitor patient.

## 2023-05-23 NOTE — THERAPY TREATMENT NOTE
"Patient Name: Carolin Toscano  : 1945    MRN: [id number]                              Today's Date: 2023       Admit Date: 2023    Visit Dx:     ICD-10-CM ICD-9-CM   1. Acute on chronic respiratory failure with hypoxia and hypercapnia  J96.21 518.84    J96.22 786.09     799.02   2. COPD exacerbation  J44.1 491.21   3. Parainfluenza virus infection  B34.8 078.89     Patient Active Problem List   Diagnosis   • CAP (community acquired pneumonia)   • Cigarette nicotine dependence with nicotine-induced disorder   • Essential hypertension   • Dyslipidemia   • Blood glucose elevated   • Muscle ache   • COPD (chronic obstructive pulmonary disease)   • Nuclear sclerotic cataract of right eye   • Acute respiratory failure   • COPD exacerbation   • Acute on chronic respiratory failure with hypoxia and hypercapnia     Past Medical History:   Diagnosis Date   • Arthritis    • COPD (chronic obstructive pulmonary disease)    • Gall stones    • Goiter     \"inward\"   • Hypertension    • Hypertension    • Kidney infection    • Kidney stone    • Kidney stones    • Migraine headache    • Scarlet fever      Past Surgical History:   Procedure Laterality Date   • BLADDER SURGERY     • CATARACT EXTRACTION W/ INTRAOCULAR LENS IMPLANT Left 2022    Procedure: CATARACT PHACO EXTRACTION WITH INTRAOCULAR LENS IMPLANT LEFT;  Surgeon: Sathish Lopez MD;  Location: [facility];  Service: Ophthalmology;  Laterality: Left;   • CATARACT EXTRACTION W/ INTRAOCULAR LENS IMPLANT Right 2022    Procedure: CATARACT PHACO EXTRACTION WITH INTRAOCULAR LENS IMPLANT RIGHT;  Surgeon: Sathish Lopez MD;  Location: [facility];  Service: Ophthalmology;  Laterality: Right;   • CHOLECYSTECTOMY     • HYSTERECTOMY     • TONSILLECTOMY        General Information     Row Name 23 1311          Physical Therapy Time and Intention    Document Type therapy note (daily note)  -RM     Mode of Treatment physical therapy  -RM     Row Name " 05/23/23 1311          General Information    Patient Profile Reviewed yes  -RM     Existing Precautions/Restrictions fall;oxygen therapy device and L/min  13-15 HFNC  -RM     Row Name 05/23/23 1311          Cognition    Orientation Status (Cognition) oriented x 4  -RM     Row Name 05/23/23 1311          Safety Issues, Functional Mobility    Safety Issues Affecting Function (Mobility) safety precautions follow-through/compliance;safety precaution awareness;sequencing abilities  -RM     Impairments Affecting Function (Mobility) balance;endurance/activity tolerance;shortness of breath;strength  -RM           User Key  (r) = Recorded By, (t) = Taken By, (c) = Cosigned By    Initials Name Provider Type    Ronak Jimenez, ELVIRA Physical Therapist Assistant               Mobility     Row Name 05/23/23 1312          Bed Mobility    Rolling Left [place] (Bed Mobility) minimum assist (75% patient effort)  -RM     Rolling Right [place] (Bed Mobility) minimum assist (75% patient effort)  -RM     Supine-Sit [place] (Bed Mobility) minimum assist (75% patient effort)  -RM     Assistive Device (Bed Mobility) bed rails;draw sheet;head of bed elevated  -RM     Row Name 05/23/23 1312          Bed-Chair Transfer    Bed-Chair [place] (Transfers) minimum assist (75% patient effort);moderate assist (50% patient effort);verbal cues;nonverbal cues (demo/gesture)  -RM     Assistive Device (Bed-Chair Transfers) other (see comments)  gaitbelt  -RM     Row Name 05/23/23 1312          Sit-Stand Transfer    Sit-Stand [place] (Transfers) verbal cues;nonverbal cues (demo/gesture)  -RM     Assistive Device (Sit-Stand Transfers) other (see comments)  gaitbelt  -RM     Row Name 05/23/23 1312          Gait/Stairs (Locomotion)    [place] Level (Gait) not tested  -RM           User Key  (r) = Recorded By, (t) = Taken By, (c) = Cosigned By    Initials Name Provider Type    Ronak Jimenez PTA Physical Therapist  Assistant               Obj/Interventions    No documentation.                Goals/Plan    No documentation.                Clinical Impression     Row Name 05/23/23 1313          Pain    Pretreatment Pain Rating 0/10 - no pain  -RM     Posttreatment Pain Rating 0/10 - no pain  -RM     Row Name 05/23/23 1313          Plan of Care Review    Plan of Care Reviewed With patient  -RM     Progress improving  -RM     Outcome Evaluation Pt supine in bed and willing to particiapte with treatment. Pt performed rolling with min a  and vc's.  Pt was min a to transfer to EOB and was noted to drop to 85% on 13L.  Pt was titrated to 15L.  Pt after recovery period if approx 3 minuted transfered to bedside recliner with min/mod a and gaitbelt. See flowsheet for details  -     Row Name 05/23/23 1313          Vital Signs    Pre SpO2 (%) 91  -RM     O2 Delivery Pre Treatment supplemental O2  13  -RM     Intra SpO2 (%) 85  -RM     O2 Delivery Intra Treatment --  15  -RM     Post SpO2 (%) 90  -RM     O2 Delivery Post Treatment supplemental O2  13  -RM     Pre Patient Position Supine  -RM     Intra Patient Position Standing  -RM     Post Patient Position Sitting  -RM     Row Name 05/23/23 1313          Positioning and Restraints    Pre-Treatment Position in bed  -RM     Post Treatment Position chair  -RM     In Chair reclined;call light within reach;encouraged to call for assist;exit alarm on;with OT;notified ns  -           User Key  (r) = Recorded By, (t) = Taken By, (c) = Cosigned By    Initials Name Provider Type    RM Ronak Cruz, PTA Physical Therapist Assistant               Outcome Measures     Row Name 05/23/23 1316          How much help from another person do you currently need...    Turning from your back to your side while in flat bed without using bedrails? 3  -RM     Moving from lying on back to sitting on the side of a flat bed without bedrails? 3  -RM     Moving to and from a bed to a chair (including a  wheelchair)? 2  -RM     Standing up from a chair using your arms (e.g., wheelchair, bedside chair)? 2  -RM     Climbing 3-5 steps with a railing? 1  -RM     To walk in hospital room? 2  -RM     AM-PAC 6 Clicks Score (PT) 13  -RM     Highest level of mobility 4 --> Transferred to chair/commode  -RM     Row Name 05/23/23 1316 05/23/23 1308       Functional Assessment    Outcome Measure Options AM-PAC 6 Clicks Basic Mobility (PT)  -RM AM-PAC 6 Clicks Daily Activity (OT)  -SD          User Key  (r) = Recorded By, (t) = Taken By, (c) = Cosigned By    Initials Name Provider Type    RM Ronak Cruz, PTA Physical Therapist Assistant    Cecelia Johnson, JAMILA Occupational Therapist                             Physical Therapy Education     Title: PT OT SLP Therapies (In Progress)     Topic: Physical Therapy (In Progress)     Point: Mobility training (Done)     Learning Progress Summary           Patient Acceptance, E,TB,D, VU,NR by  at 5/23/2023 1316    Comment: proper breathing techniques with mobility    Acceptance, E,TB,D, VU,NR by  at 5/22/2023 1358    Comment: Exercise tech and importance of dily activity    Acceptance, E, VU by MS at 5/16/2023 1403    Comment: importance of mobility    Acceptance, E, VU by AM at 5/15/2023 0701    Acceptance, E, VU by MS at 5/12/2023 1458    Comment: importance of mobility    Acceptance, E,TB,D, VU,NR by  at 5/8/2023 1250    Comment: Importance of activity and exercise techniques    Acceptance, E, VU by MS at 5/5/2023 1136    Comment: importance of mobility                   Point: Home exercise program (Done)     Learning Progress Summary           Patient Acceptance, E,TB,D, VU,NR by  at 5/22/2023 1358    Comment: Exercise tech and importance of dily activity    Acceptance, E,D, VU,DU by  at 5/21/2023 1205    Comment: Pt education for improving BLE ther ex technique    Acceptance, E, VU by MS at 5/16/2023 1403    Comment: importance of mobility    Acceptance, E, VU by  AM at 5/15/2023 0701    Acceptance, E,TB, VU,NR by CC at 5/14/2023 1310    Comment: Importance of increasing movement of B LE    Acceptance, E,TB,D, VU,NR by  at 5/8/2023 1250    Comment: Importance of activity and exercise techniques    Acceptance, E, VU by MS at 5/5/2023 1136    Comment: importance of mobility                   Point: Body mechanics (Done)     Learning Progress Summary           Patient Acceptance, E,TB, VU by JR at 5/17/2023 1846    Comment: upright posture                   Point: Precautions (Not Started)     Learner Progress:  Not documented in this visit.                      User Key     Initials Effective Dates Name Provider Type Discipline     03/23/22 -  Kavitha Tavarez, PT Physical Therapist PT    CC 06/16/21 -  Nasra Quiñones, PTA Physical Therapist Assistant PT     06/16/21 -  Ronak Cruz, PTA Physical Therapist Assistant PT    TW 06/16/21 -  Deysi Goodwin, PT Physical Therapist PT    MS 07/01/22 -  Martín Ortiz, PT Physical Therapist PT    AM 02/22/23 -  Rob Lu, RN Registered Nurse Nurse              PT Recommendation and Plan     Plan of Care Reviewed With: patient  Progress: improving  Outcome Evaluation: Pt supine in bed and willing to particiapte with treatment. Pt performed rolling with min a  and vc's.  Pt was min a to transfer to EOB and was noted to drop to 85% on 13L.  Pt was titrated to 15L.  Pt after recovery period if approx 3 minuted transfered to bedside recliner with min/mod a and gaitbelt. See flowsheet for details     Time Calculation:    PT Charges     Row Name 05/23/23 1317             Time Calculation    Start Time 1041  -RM      Stop Time 1111  -RM      Time Calculation (min) 30 min  -RM      PT Received On 05/23/23  -RM      PT Goal Re-Cert Due Date 05/24/23  -RM         Time Calculation- PT    Total Timed Code Minutes- PT 30 minute(s)  -RM         Timed Charges    69826 - PT Therapeutic Exercise Minutes 15  -RM      99981 - PT Therapeutic  Activity Minutes 15  -RM         Total Minutes    Timed Charges Total Minutes 30  -RM       Total Minutes 30  -RM            User Key  (r) = Recorded By, (t) = Taken By, (c) = Cosigned By    Initials Name Provider Type    Ronak Jimenez, ELVIRA Physical Therapist Assistant              Therapy Charges for Today     Code Description Service Date Service Provider Modifiers Qty    66304568764 HC PT THER PROC EA 15 MIN 5/22/2023 Ronak Cruz, PTA GP 1    43331895241 HC PT THERAPEUTIC ACT EA 15 MIN 5/22/2023 Ronak Cruz, PTA GP 1    26421355121 HC PT THER PROC EA 15 MIN 5/23/2023 Ronak Cruz, PTA GP 1    53429940808 HC PT THERAPEUTIC ACT EA 15 MIN 5/23/2023 Ronak Cruz, PTA GP 1          PT G-Codes  Outcome Measure Options: AM-PAC 6 Clicks Basic Mobility (PT)  AM-PAC 6 Clicks Score (PT): 13  AM-PAC 6 Clicks Score (OT): 13       Ronak Cruz PTA  5/23/2023

## 2023-05-23 NOTE — PLAN OF CARE
Problem: Skin Injury Risk Increased  Goal: Skin Health and Integrity  Outcome: Ongoing, Progressing  Intervention: Optimize Skin Protection  Recent Flowsheet Documentation  Taken 5/23/2023 1422 by Oralia Reese RN  Head of Bed (HOB) Positioning: HOB elevated  Taken 5/23/2023 1223 by Oralia Reese RN  Head of Bed (HOB) Positioning: HOB elevated  Taken 5/23/2023 1058 by Oralia Reese RN  Head of Bed (HOB) Positioning: HOB at 30-45 degrees  Taken 5/23/2023 0755 by Oralia Reese RN  Head of Bed (HOB) Positioning: HOB at 45 degrees     Problem: Fall Injury Risk  Goal: Absence of Fall and Fall-Related Injury  Outcome: Ongoing, Progressing  Intervention: Identify and Manage Contributors  Recent Flowsheet Documentation  Taken 5/23/2023 1422 by Oralia Reese RN  Medication Review/Management: medications reviewed  Taken 5/23/2023 1223 by Oralia Reese RN  Medication Review/Management: medications reviewed  Taken 5/23/2023 1058 by Oralia Reese RN  Medication Review/Management: medications reviewed  Taken 5/23/2023 0755 by Oralia Reese RN  Medication Review/Management: medications reviewed  Intervention: Promote Injury-Free Environment  Recent Flowsheet Documentation  Taken 5/23/2023 1422 by Oralia Reese RN  Safety Promotion/Fall Prevention:   activity supervised   assistive device/personal items within reach   clutter free environment maintained   fall prevention program maintained   nonskid shoes/slippers when out of bed   safety round/check completed   room organization consistent  Taken 5/23/2023 1223 by Oralia Reese RN  Safety Promotion/Fall Prevention:   activity supervised   assistive device/personal items within reach   clutter free environment maintained   fall prevention program maintained   nonskid shoes/slippers when out of bed   room organization consistent   safety round/check completed  Taken 5/23/2023 1058 by Oralia Reese RN  Safety Promotion/Fall Prevention:   activity supervised   assistive  device/personal items within reach   clutter free environment maintained   fall prevention program maintained   nonskid shoes/slippers when out of bed   room organization consistent   safety round/check completed  Taken 5/23/2023 0755 by Oralia Reese RN  Safety Promotion/Fall Prevention:   activity supervised   assistive device/personal items within reach   clutter free environment maintained   fall prevention program maintained   muscle strengthening facilitated   nonskid shoes/slippers when out of bed   safety round/check completed     Problem: Adult Inpatient Plan of Care  Goal: Plan of Care Review  Outcome: Ongoing, Progressing  Goal: Patient-Specific Goal (Individualized)  Outcome: Ongoing, Progressing  Goal: Absence of Hospital-Acquired Illness or Injury  Outcome: Ongoing, Progressing  Intervention: Identify and Manage Fall Risk  Recent Flowsheet Documentation  Taken 5/23/2023 1422 by Oralia Reese RN  Safety Promotion/Fall Prevention:   activity supervised   assistive device/personal items within reach   clutter free environment maintained   fall prevention program maintained   nonskid shoes/slippers when out of bed   safety round/check completed   room organization consistent  Taken 5/23/2023 1223 by Oralia Reese RN  Safety Promotion/Fall Prevention:   activity supervised   assistive device/personal items within reach   clutter free environment maintained   fall prevention program maintained   nonskid shoes/slippers when out of bed   room organization consistent   safety round/check completed  Taken 5/23/2023 1058 by Oralia Reese RN  Safety Promotion/Fall Prevention:   activity supervised   assistive device/personal items within reach   clutter free environment maintained   fall prevention program maintained   nonskid shoes/slippers when out of bed   room organization consistent   safety round/check completed  Taken 5/23/2023 0755 by Oralia Reese RN  Safety Promotion/Fall Prevention:   activity  supervised   assistive device/personal items within reach   clutter free environment maintained   fall prevention program maintained   muscle strengthening facilitated   nonskid shoes/slippers when out of bed   safety round/check completed  Intervention: Prevent Skin Injury  Recent Flowsheet Documentation  Taken 5/23/2023 1422 by Oralia Reese RN  Body Position: legs elevated  Taken 5/23/2023 1223 by Oralia Reese RN  Body Position: legs elevated  Taken 5/23/2023 1058 by Oralia Reese RN  Body Position: sitting up in bed  Taken 5/23/2023 0755 by Oralia Reese RN  Body Position:   legs elevated   sitting up in bed  Intervention: Prevent and Manage VTE (Venous Thromboembolism) Risk  Recent Flowsheet Documentation  Taken 5/23/2023 1422 by Oralia Reese RN  Activity Management: up in chair  Taken 5/23/2023 1223 by Oralia Reese RN  Activity Management: up in chair  Taken 5/23/2023 1058 by Oralia Reese RN  Activity Management: activity encouraged  Taken 5/23/2023 0755 by Oralia Reese RN  Activity Management: activity encouraged  VTE Prevention/Management:   bilateral   sequential compression devices on  Intervention: Prevent Infection  Recent Flowsheet Documentation  Taken 5/23/2023 1422 by Oralia Reese RN  Infection Prevention:   rest/sleep promoted   single patient room provided  Taken 5/23/2023 1223 by Oralia Reese RN  Infection Prevention:   single patient room provided   rest/sleep promoted  Taken 5/23/2023 0755 by Oralia Reese RN  Infection Prevention:   rest/sleep promoted   single patient room provided  Goal: Optimal Comfort and Wellbeing  Outcome: Ongoing, Progressing  Intervention: Monitor Pain and Promote Comfort  Recent Flowsheet Documentation  Taken 5/23/2023 0755 by Oralia Reese RN  Pain Management Interventions:   diversional activity provided   care clustered   position adjusted   pillow support provided  Intervention: Provide Person-Centered Care  Recent Flowsheet Documentation  Taken  5/23/2023 0755 by Oralia Reese, RN  Trust Relationship/Rapport:   empathic listening provided   questions encouraged   thoughts/feelings acknowledged   reassurance provided   questions answered   emotional support provided   choices provided   care explained  Goal: Readiness for Transition of Care  Outcome: Ongoing, Progressing   Goal Outcome Evaluation:         Pt on 10L high flow nasal cannula, then bipap per md order. With activity PT reports patient requiring 15L high flow. Pt NSR on telemety.

## 2023-05-23 NOTE — THERAPY TREATMENT NOTE
"Patient Name: Carolin Toscano  : 1945    MRN: [id number]                              Today's Date: 2023       Admit Date: 2023    Visit Dx:     ICD-10-CM ICD-9-CM   1. Acute on chronic respiratory failure with hypoxia and hypercapnia  J96.21 518.84    J96.22 786.09     799.02   2. COPD exacerbation  J44.1 491.21   3. Parainfluenza virus infection  B34.8 078.89     Patient Active Problem List   Diagnosis   • CAP (community acquired pneumonia)   • Cigarette nicotine dependence with nicotine-induced disorder   • Essential hypertension   • Dyslipidemia   • Blood glucose elevated   • Muscle ache   • COPD (chronic obstructive pulmonary disease)   • Nuclear sclerotic cataract of right eye   • Acute respiratory failure   • COPD exacerbation   • Acute on chronic respiratory failure with hypoxia and hypercapnia     Past Medical History:   Diagnosis Date   • Arthritis    • COPD (chronic obstructive pulmonary disease)    • Gall stones    • Goiter     \"inward\"   • Hypertension    • Hypertension    • Kidney infection    • Kidney stone    • Kidney stones    • Migraine headache    • Scarlet fever      Past Surgical History:   Procedure Laterality Date   • BLADDER SURGERY     • CATARACT EXTRACTION W/ INTRAOCULAR LENS IMPLANT Left 2022    Procedure: CATARACT PHACO EXTRACTION WITH INTRAOCULAR LENS IMPLANT LEFT;  Surgeon: Sathish Lopez MD;  Location: [facility];  Service: Ophthalmology;  Laterality: Left;   • CATARACT EXTRACTION W/ INTRAOCULAR LENS IMPLANT Right 2022    Procedure: CATARACT PHACO EXTRACTION WITH INTRAOCULAR LENS IMPLANT RIGHT;  Surgeon: Sathish Lopez MD;  Location: [facility];  Service: Ophthalmology;  Laterality: Right;   • CHOLECYSTECTOMY     • HYSTERECTOMY     • TONSILLECTOMY        General Information     Row Name 23 1300          OT Time and Intention    Document Type therapy note (daily note)  -SD     Mode of Treatment occupational therapy  -SD     Row Name 23 " 1300          General Information    Patient Profile Reviewed yes  -SD           User Key  (r) = Recorded By, (t) = Taken By, (c) = Cosigned By    Initials Name Provider Type    Cecelia Johnson OT Occupational Therapist                 Mobility/ADL's     Row Name 05/23/23 1300          Bed Mobility    Bed Mobility rolling left;rolling right;supine-sit  -SD     Rolling Left [place] (Bed Mobility) minimum assist (75% patient effort)  -SD     Rolling Right [place] (Bed Mobility) minimum assist (75% patient effort)  -SD     Supine-Sit [place] (Bed Mobility) minimum assist (75% patient effort)  -SD     Assistive Device (Bed Mobility) bed rails;draw sheet;head of bed elevated  -SD     Row Name 05/23/23 1300          Transfers    Transfers sit-stand transfer;bed-chair transfer;toilet transfer  -SD     Row Name 05/23/23 1300          Sit-Stand Transfer    Sit-Stand [place] (Transfers) moderate assist (50% patient effort);2 person assist  -SD     Row Name 05/23/23 1300          Toilet Transfer    Type (Toilet Transfer) sit-stand;stand-sit  -SD     [place] Level (Toilet Transfer) moderate assist (50% patient effort);2 person assist  -SD     Assistive Device (Toilet Transfer) commode, bedside without drop arms  -SD     Comment, (Toilet Transfer) gait belt, x 2 assist with knees buckling  -SD     Row Name 05/23/23 1300          Bathing Assessment/Intervention    [place] Level (Bathing) upper body;upper extremities;moderate assist (50% patient effort)  -SD     Row Name 05/23/23 1300          Grooming Assessment/Training    [place] Level (Grooming) oral care regimen;wash face, hands;standby assist  -SD     Row Name 05/23/23 1300          Toileting Assessment/Training    [place] Level (Toileting) adjust/manage clothing;change pad/brief;perform perineal hygiene;maximum assist (25% patient effort)  -SD     Assistive Devices (Toileting) commode, bedside without drop arms  -SD            User Key  (r) = Recorded By, (t) = Taken By, (c) = Cosigned By    Initials Name Provider Type    Cecelia Johnson OT Occupational Therapist               Obj/Interventions     Row Name 05/23/23 1302          Motor Skills    Therapeutic Exercise shoulder;elbow/forearm;wrist;hand  BUE AAROM, hand  x 10  -SD           User Key  (r) = Recorded By, (t) = Taken By, (c) = Cosigned By    Initials Name Provider Type    Cecelia Johnson OT Occupational Therapist               Goals/Plan    No documentation.                Clinical Impression     Row Name 05/23/23 1302          Pain Assessment    Pretreatment Pain Rating 0/10 - no pain  -SD     Posttreatment Pain Rating 0/10 - no pain  -SD     Row Name 05/23/23 1302          Plan of Care Review    Plan of Care Reviewed With patient  -SD     Progress improving  -SD     Outcome Evaluation OT tx completed. Patient supine in bed, on 13L HFNC satting 90%. PT is present at bedside. Patient performed rolling left to right with min A for removal of purewick and brief change. Patient moved to EOB with min A and desatted to 85%, increased to 15L. Patient performed transfer to chair with PT assisting. Patient requested BSC, required mod A x 2 for sit to stand and tf to BSC, required max A for perineal hygiene and clothing management. Patient's knees buckling during tf. Sitting in chair able to wash underarms and apply deodorant as well as wash face and clean mouth using toothette. Patient then performed BUE AAROM/ strengthening x10. Satting 89% following activity. Continue OT POC  -SD     Row Name 05/23/23 1302          Vital Signs    Pre SpO2 (%) 90  -SD     O2 Delivery Pre Treatment hi-flow  13L  -SD     Intra SpO2 (%) 85  -SD     O2 Delivery Intra Treatment hi-flow  15L  -SD     Post SpO2 (%) 89  -SD     O2 Delivery Post Treatment hi-flow  13L  -SD     Row Name 05/23/23 1302          Positioning and Restraints    Pre-Treatment Position in bed  -SD     Post Treatment  Position chair  -SD     In Chair sitting;call light within reach;encouraged to call for assist;exit alarm on  -SD           User Key  (r) = Recorded By, (t) = Taken By, (c) = Cosigned By    Initials Name Provider Type    Cecelia Johnson OT Occupational Therapist               Outcome Measures     Row Name 05/23/23 1308          How much help from another is currently needed...    Putting on and taking off regular lower body clothing? 2  -SD     Bathing (including washing, rinsing, and drying) 2  -SD     Toileting (which includes using toilet bed pan or urinal) 2  -SD     Putting on and taking off regular upper body clothing 2  -SD     Taking care of personal grooming (such as brushing teeth) 2  -SD     Eating meals 3  -SD     AM-PAC 6 Clicks Score (OT) 13  -SD     Row Name 05/23/23 1308          Functional Assessment    Outcome Measure Options AM-PAC 6 Clicks Daily Activity (OT)  -SD           User Key  (r) = Recorded By, (t) = Taken By, (c) = Cosigned By    Initials Name Provider Type    Cecelia Johnson OT Occupational Therapist                Occupational Therapy Education     Title: PT OT SLP Therapies (In Progress)     Topic: Occupational Therapy (In Progress)     Point: ADL training (Done)     Description:   Instruct learner(s) on proper safety adaptation and remediation techniques during self care or transfers.   Instruct in proper use of assistive devices.              Learning Progress Summary           Patient Acceptance, E,TB, VU by SD at 5/23/2023 1309    Comment: Safety and sequencing during toileting task.    Acceptance, E, VU by AM at 5/15/2023 0701    Acceptance, E,TB, VU by  at 5/12/2023 1507    Comment: purpose of re-evaluation    Acceptance, E,TB, VU by SD at 5/8/2023 1222    Comment: Safety during tf    Acceptance, E, VU by SP at 5/5/2023 1146    Comment: OT edcuated pt on purpose of IE and POC. Pt is agreeable.                   Point: Home exercise program (Done)     Description:    Instruct learner(s) on appropriate technique for monitoring, assisting and/or progressing therapeutic exercises/activities.              Learning Progress Summary           Patient Acceptance, E,TB, VU by  at 5/22/2023 1549    Comment: benefit of do UB ex to improve functional strength    Acceptance, E,TB, VU by  at 5/19/2023 1339    Comment: importance of activity    Acceptance, E,TB, VU by SD at 5/16/2023 1246    Comment: Importance of progressing activity.   Family Acceptance, E,TB, VU by  at 5/22/2023 1549    Comment: benefit of do UB ex to improve functional strength    Acceptance, E,TB, VU by  at 5/19/2023 1339    Comment: importance of activity                   Point: Precautions (Not Started)     Description:   Instruct learner(s) on prescribed precautions during self-care and functional transfers.              Learner Progress:  Not documented in this visit.          Point: Body mechanics (Not Started)     Description:   Instruct learner(s) on proper positioning and spine alignment during self-care, functional mobility activities and/or exercises.              Learner Progress:  Not documented in this visit.                      User Key     Initials Effective Dates Name Provider Type Discipline     06/16/21 -  Roselyn Donaldson Occupational Therapist OT    SD 06/16/21 -  Cecelia Knight OT Occupational Therapist OT    SP 09/08/22 -  January Chapa OT Occupational Therapist OT    AM 02/22/23 -  Rob Lu, RN Registered Nurse Nurse              OT Recommendation and Plan     Plan of Care Review  Plan of Care Reviewed With: patient  Progress: improving  Outcome Evaluation: OT tx completed. Patient supine in bed, on 13L HFNC satting 90%. PT is present at bedside. Patient performed rolling left to right with min A for removal of purewick and brief change. Patient moved to EOB with min A and desatted to 85%, increased to 15L. Patient performed transfer to chair with PT assisting. Patient requested  BSC, required mod A x 2 for sit to stand and tf to BSC, required max A for perineal hygiene and clothing management. Patient's knees buckling during tf. Sitting in chair able to wash underarms and apply deodorant as well as wash face and clean mouth using toothette. Patient then performed BUE AAROM/ strengthening x10. Satting 89% following activity. Continue OT POC     Time Calculation:    Time Calculation- OT     Row Name 05/23/23 1309             Time Calculation- OT    OT Start Time 1050  -SD      OT Stop Time 1129  -SD      OT Time Calculation (min) 39 min  -SD      OT Received On 05/23/23  -SD      OT Goal Re-Cert Due Date 05/26/23  -SD         Timed Charges    34753 - OT Therapeutic Exercise Minutes 8  -SD      14603 - OT Therapeutic Activity Minutes 7  -SD      26807 - OT Self Care/Mgmt Minutes 15  -SD         Total Minutes    Timed Charges Total Minutes 30  -SD       Total Minutes 30  -SD            User Key  (r) = Recorded By, (t) = Taken By, (c) = Cosigned By    Initials Name Provider Type    SD Cecelia Knight OT Occupational Therapist              Therapy Charges for Today     Code Description Service Date Service Provider Modifiers Qty    11030162438  OT THER PROC EA 15 MIN 5/23/2023 Cecelia Knight OT GO 1    51054802242 HC OT SELF CARE/MGMT/TRAIN EA 15 MIN 5/23/2023 Cecelia Knight OT GO 1               Cecelia Knight OT  5/23/2023

## 2023-05-24 LAB
A-A DO2: 267.6 MMHG
ANION GAP SERPL CALCULATED.3IONS-SCNC: 5.2 MMOL/L (ref 5–15)
ARTERIAL PATENCY WRIST A: ABNORMAL
ATMOSPHERIC PRESS: 735 MMHG
BASE EXCESS BLDA CALC-SCNC: 9.6 MMOL/L (ref 0–2)
BDY SITE: ABNORMAL
BUN SERPL-MCNC: 18 MG/DL (ref 8–23)
BUN/CREAT SERPL: 69.2 (ref 7–25)
CALCIUM SPEC-SCNC: 9.3 MG/DL (ref 8.6–10.5)
CHLORIDE SERPL-SCNC: 102 MMOL/L (ref 98–107)
CO2 SERPL-SCNC: 31.8 MMOL/L (ref 22–29)
COHGB MFR BLD: 0.8 % (ref 0–2)
CREAT SERPL-MCNC: 0.26 MG/DL (ref 0.57–1)
DEPRECATED RDW RBC AUTO: 53.4 FL (ref 37–54)
EGFRCR SERPLBLD CKD-EPI 2021: 114 ML/MIN/1.73
ERYTHROCYTE [DISTWIDTH] IN BLOOD BY AUTOMATED COUNT: 16.3 % (ref 12.3–15.4)
GLUCOSE BLDC GLUCOMTR-MCNC: 121 MG/DL (ref 70–130)
GLUCOSE BLDC GLUCOMTR-MCNC: 169 MG/DL (ref 70–130)
GLUCOSE BLDC GLUCOMTR-MCNC: 256 MG/DL (ref 70–130)
GLUCOSE BLDC GLUCOMTR-MCNC: 286 MG/DL (ref 70–130)
GLUCOSE SERPL-MCNC: 155 MG/DL (ref 65–99)
HCO3 BLDA-SCNC: 36.2 MMOL/L (ref 22–28)
HCT VFR BLD AUTO: 33.1 % (ref 34–46.6)
HCT VFR BLD CALC: 32.1 %
HGB BLD-MCNC: 10.3 G/DL (ref 12–15.9)
INHALED O2 CONCENTRATION: 60 %
MCH RBC QN AUTO: 29.5 PG (ref 26.6–33)
MCHC RBC AUTO-ENTMCNC: 31.1 G/DL (ref 31.5–35.7)
MCV RBC AUTO: 94.8 FL (ref 79–97)
METHGB BLD QL: 0.6 % (ref 0–1.5)
MODALITY: ABNORMAL
NOTE: ABNORMAL
OXYHGB MFR BLDV: 95.1 % (ref 94–99)
PCO2 BLDA: 59.3 MM HG (ref 35–45)
PCO2 TEMP ADJ BLD: ABNORMAL MM[HG]
PH BLDA: 7.39 PH UNITS (ref 7.35–7.45)
PH, TEMP CORRECTED: ABNORMAL
PLATELET # BLD AUTO: 212 10*3/MM3 (ref 140–450)
PMV BLD AUTO: 11.7 FL (ref 6–12)
PO2 BLDA: 82.1 MM HG (ref 75–100)
PO2 TEMP ADJ BLD: ABNORMAL MM[HG]
POTASSIUM SERPL-SCNC: 4.5 MMOL/L (ref 3.5–5.2)
RBC # BLD AUTO: 3.49 10*6/MM3 (ref 3.77–5.28)
SAO2 % BLDCOA: 96.5 % (ref 94–100)
SODIUM SERPL-SCNC: 139 MMOL/L (ref 136–145)
VENTILATOR MODE: ABNORMAL
WBC NRBC COR # BLD: 9.6 10*3/MM3 (ref 3.4–10.8)

## 2023-05-24 PROCEDURE — 80048 BASIC METABOLIC PNL TOTAL CA: CPT | Performed by: FAMILY MEDICINE

## 2023-05-24 PROCEDURE — 94761 N-INVAS EAR/PLS OXIMETRY MLT: CPT

## 2023-05-24 PROCEDURE — 25010000002 ONDANSETRON PER 1 MG: Performed by: FAMILY MEDICINE

## 2023-05-24 PROCEDURE — 94799 UNLISTED PULMONARY SVC/PX: CPT

## 2023-05-24 PROCEDURE — 82375 ASSAY CARBOXYHB QUANT: CPT

## 2023-05-24 PROCEDURE — 82805 BLOOD GASES W/O2 SATURATION: CPT

## 2023-05-24 PROCEDURE — 97535 SELF CARE MNGMENT TRAINING: CPT

## 2023-05-24 PROCEDURE — 97164 PT RE-EVAL EST PLAN CARE: CPT

## 2023-05-24 PROCEDURE — 99233 SBSQ HOSP IP/OBS HIGH 50: CPT | Performed by: INTERNAL MEDICINE

## 2023-05-24 PROCEDURE — 25010000002 VANCOMYCIN 5 G RECONSTITUTED SOLUTION: Performed by: INTERNAL MEDICINE

## 2023-05-24 PROCEDURE — 82948 REAGENT STRIP/BLOOD GLUCOSE: CPT

## 2023-05-24 PROCEDURE — 97530 THERAPEUTIC ACTIVITIES: CPT

## 2023-05-24 PROCEDURE — 94668 MNPJ CHEST WALL SBSQ: CPT

## 2023-05-24 PROCEDURE — 36600 WITHDRAWAL OF ARTERIAL BLOOD: CPT

## 2023-05-24 PROCEDURE — 85027 COMPLETE CBC AUTOMATED: CPT | Performed by: FAMILY MEDICINE

## 2023-05-24 PROCEDURE — 63710000001 INSULIN ASPART PER 5 UNITS: Performed by: FAMILY MEDICINE

## 2023-05-24 PROCEDURE — 97116 GAIT TRAINING THERAPY: CPT

## 2023-05-24 PROCEDURE — 94664 DEMO&/EVAL PT USE INHALER: CPT

## 2023-05-24 PROCEDURE — 99232 SBSQ HOSP IP/OBS MODERATE 35: CPT | Performed by: FAMILY MEDICINE

## 2023-05-24 PROCEDURE — 83050 HGB METHEMOGLOBIN QUAN: CPT

## 2023-05-24 PROCEDURE — 25010000002 ENOXAPARIN PER 10 MG: Performed by: FAMILY MEDICINE

## 2023-05-24 PROCEDURE — 63710000001 PREDNISONE PER 1 MG: Performed by: INTERNAL MEDICINE

## 2023-05-24 RX ORDER — HYDROXYZINE HYDROCHLORIDE 25 MG/1
25 TABLET, FILM COATED ORAL 3 TIMES DAILY
Status: DISCONTINUED | OUTPATIENT
Start: 2023-05-24 | End: 2023-06-01 | Stop reason: HOSPADM

## 2023-05-24 RX ADMIN — HYDROXYZINE HYDROCHLORIDE 25 MG: 25 TABLET ORAL at 20:16

## 2023-05-24 RX ADMIN — MULTIPLE VITAMINS W/ MINERALS TAB 1 TABLET: TAB at 08:31

## 2023-05-24 RX ADMIN — SODIUM CHLORIDE 30 MG/ML INHALATION SOLUTION 4 ML: 30 SOLUTION INHALANT at 07:09

## 2023-05-24 RX ADMIN — CETIRIZINE HYDROCHLORIDE 10 MG: 10 TABLET, FILM COATED ORAL at 08:31

## 2023-05-24 RX ADMIN — HYDROXYZINE HYDROCHLORIDE 25 MG: 25 TABLET ORAL at 17:16

## 2023-05-24 RX ADMIN — ACETAMINOPHEN 650 MG: 325 TABLET, FILM COATED ORAL at 08:42

## 2023-05-24 RX ADMIN — Medication 10 ML: at 08:33

## 2023-05-24 RX ADMIN — Medication 10 ML: at 08:32

## 2023-05-24 RX ADMIN — FLUTICASONE PROPIONATE 2 SPRAY: 50 SPRAY, METERED NASAL at 20:18

## 2023-05-24 RX ADMIN — SENNOSIDES AND DOCUSATE SODIUM 2 TABLET: 50; 8.6 TABLET ORAL at 20:16

## 2023-05-24 RX ADMIN — BUDESONIDE AND FORMOTEROL FUMARATE DIHYDRATE 2 PUFF: 160; 4.5 AEROSOL RESPIRATORY (INHALATION) at 19:03

## 2023-05-24 RX ADMIN — HYDROXYZINE HYDROCHLORIDE 25 MG: 25 TABLET ORAL at 08:31

## 2023-05-24 RX ADMIN — IPRATROPIUM BROMIDE AND ALBUTEROL SULFATE 3 ML: 2.5; .5 SOLUTION RESPIRATORY (INHALATION) at 19:02

## 2023-05-24 RX ADMIN — BUDESONIDE AND FORMOTEROL FUMARATE DIHYDRATE 2 PUFF: 160; 4.5 AEROSOL RESPIRATORY (INHALATION) at 07:10

## 2023-05-24 RX ADMIN — Medication 3 ML: at 20:19

## 2023-05-24 RX ADMIN — BUDESONIDE INHALATION 1 MG: 0.5 SUSPENSION RESPIRATORY (INHALATION) at 07:10

## 2023-05-24 RX ADMIN — SODIUM CHLORIDE 30 MG/ML INHALATION SOLUTION 4 ML: 30 SOLUTION INHALANT at 19:02

## 2023-05-24 RX ADMIN — ONDANSETRON 4 MG: 2 INJECTION INTRAMUSCULAR; INTRAVENOUS at 21:25

## 2023-05-24 RX ADMIN — ATENOLOL 25 MG: 25 TABLET ORAL at 08:31

## 2023-05-24 RX ADMIN — IPRATROPIUM BROMIDE AND ALBUTEROL SULFATE 3 ML: 2.5; .5 SOLUTION RESPIRATORY (INHALATION) at 07:09

## 2023-05-24 RX ADMIN — Medication 10 ML: at 20:18

## 2023-05-24 RX ADMIN — INSULIN ASPART 2 UNITS: 100 INJECTION, SOLUTION INTRAVENOUS; SUBCUTANEOUS at 12:24

## 2023-05-24 RX ADMIN — IPRATROPIUM BROMIDE AND ALBUTEROL SULFATE 3 ML: 2.5; .5 SOLUTION RESPIRATORY (INHALATION) at 12:37

## 2023-05-24 RX ADMIN — ENOXAPARIN SODIUM 40 MG: 100 INJECTION SUBCUTANEOUS at 20:17

## 2023-05-24 RX ADMIN — SENNOSIDES AND DOCUSATE SODIUM 2 TABLET: 50; 8.6 TABLET ORAL at 08:33

## 2023-05-24 RX ADMIN — AMLODIPINE BESYLATE 10 MG: 5 TABLET ORAL at 08:31

## 2023-05-24 RX ADMIN — VANCOMYCIN HYDROCHLORIDE 1250 MG: 5 INJECTION, POWDER, LYOPHILIZED, FOR SOLUTION INTRAVENOUS at 01:38

## 2023-05-24 RX ADMIN — INSULIN ASPART 6 UNITS: 100 INJECTION, SOLUTION INTRAVENOUS; SUBCUTANEOUS at 17:15

## 2023-05-24 RX ADMIN — Medication 3 ML: at 08:33

## 2023-05-24 RX ADMIN — GUAIFENESIN AND DEXTROMETHORPHAN HYDROBROMIDE 1 TABLET: 30; 600 TABLET, EXTENDED RELEASE ORAL at 08:31

## 2023-05-24 RX ADMIN — VANCOMYCIN HYDROCHLORIDE 1250 MG: 5 INJECTION, POWDER, LYOPHILIZED, FOR SOLUTION INTRAVENOUS at 12:24

## 2023-05-24 RX ADMIN — PREDNISONE 40 MG: 20 TABLET ORAL at 08:31

## 2023-05-24 RX ADMIN — FLUTICASONE PROPIONATE 2 SPRAY: 50 SPRAY, METERED NASAL at 08:33

## 2023-05-24 RX ADMIN — BISACODYL 5 MG: 5 TABLET, COATED ORAL at 17:17

## 2023-05-24 RX ADMIN — GUAIFENESIN AND DEXTROMETHORPHAN HYDROBROMIDE 1 TABLET: 30; 600 TABLET, EXTENDED RELEASE ORAL at 20:16

## 2023-05-24 RX ADMIN — ACETAMINOPHEN 650 MG: 325 TABLET, FILM COATED ORAL at 20:15

## 2023-05-24 NOTE — CASE MANAGEMENT/SOCIAL WORK
Case Management/Social Work    Patient Name:  Carolin Toscano  YOB: 1945  MRN: [id number]  Admit Date:  5/4/2023        SW attempted to meet with pt and family, per request. Paula/IRMA RN was in the room at time of visit.    SW attempted to meet with pt again but therapy was in room at time of visit. SW will try again at a later time.        Electronically signed by:  NAOMI Fairbanks  05/24/23 15:21 EDT

## 2023-05-24 NOTE — PLAN OF CARE
Goal Outcome Evaluation:  Plan of Care Reviewed With: patient        Progress: improving  Outcome Evaluation: OT tx completed. Patient supine in bed, daughter present at bedside. Patient is on 8L HFNC satting 92%. Patient moved to EOB with min A, sitting EOB performed UBB mod A, grooming tasks with SBA, max A to tatum pull up. Patient performed bilateral elbow flex/ext. x 10. Patient performed sit to stand min A x 2 using RW, urinary incontinence noted, required max A for perineal hygiene and to complete donning of pull up, standing in total approx 2 mins. After resting patient able to perform additional sit to stand and transfer to chair; lowest O2 was 86%, had increased to 91% following activity.

## 2023-05-24 NOTE — PLAN OF CARE
Goal Outcome Evaluation:      PC Team consult received for Goals of Care Discussion/ACP    Pt is currently Code Status of CPR, Full Support    Pt does not have an Advance Directive.    Admitted via ED with EMS report of O2 sat 65% on 6 L O2.  Pt had been admitted 4/13/23--4/21/23 with Resp Failure, COPD Exac, and PN.  Was discharged on 6L O2.  Reports non-compliance with NIV.    Admission Assessment:  Acute on Chronic Resp Failure with Hypoxia and Hypercapnia, Acute COPD exac, Parainfluenza infection, Elevated troponin (likely due to demand ischemia), HTN, Pulmonary HTN.    PMHx:  COPD, Chronic Resp failure, home O2 5-6 L, NIV machine/Trilogy, HTN, ongoing tobacco use, Arthritis.    Pt condition worsened 5/10/23 at which time pt was transferred to CCU.    As condition improved pt was transferred to Telemetry 5/22/2023.  Today, pt is on O2 at 9L.    Visited pt who was awake and alert.  Pt's daughter, Carmen Arauz, at bedside.  Explained to pt her Dr requested I visit to provide information re: Goals of Care and ACP.  I explained I was with the PC Team and we visit pt's, mostly those with chronic illnesses, to make sure they are comfortable and that we are following their wishes--not doing more and not doing less than what they would want.    When asked pt reported feeling a little better.  She is aware of needing more O2 at this time.  She did report not being able to walk except a couple of steps and didn't realize how weak she had become.  Reminded her she has been in the hospital for several days.  I mentioned being told she was seeking rehab.  Pt/daughter inquired about the LTAC.  Explained her insurance had denied it.  I did informed them that a couple of referrals had been sent to short term rehab facilities.  I added that rehab facilities can only take pts that are on 6L or less of O2 and preferrable 5L or less.  Pt and daughter reported feeling pt could possibly tolerated a little less O2 if the decrease was  "gradual.  I informed them I felt [facility] would be attempting to \"wean\" her down on the O2.      I brought up the topic of resuscitation.  Pt confirmed wanting CPR but when asked about intubation, pt and daughter were reported she \"might not want the tube down the throat\".   I explained typically if a person's heart stops or they stop breathing, resuscitation can be effective if the pt has some \"reserve\"--clarifying if a person does not have multiple chronic illnesses.  If not, it is very difficult for them to survive resuscitation and if they do, the final outcome would not be the same person.  I encouraged daughter and pt to talk about this while pt is improving and not when it is a critical period when a decision needs to be made immediately.      I informed them, if pt wishes, we can assist with a Living Will in which pt can name someone to speak for her should she not be able to speak for herself.  I informed them was can assist with the completion of a Living Will while she is in the hospital (at not charge) and have it notarized.  I encourged them to discuss this as well as pt's wishes.   Informed them pt will need to be on a lower O2 liter flow before she can go to a rehab at [place].    PC will continue to follow.                  "

## 2023-05-24 NOTE — PLAN OF CARE
Goal Outcome Evaluation:  Plan of Care Reviewed With: patient, family      No acute events this shift.  NSR on telemetry.  Reported HA relieved by   Tylenol.

## 2023-05-24 NOTE — PROGRESS NOTES
Adult Nutrition  Assessment/PES    Patient Name:  Carolin Toscano  YOB: 1945  MRN: [id number]  Admit Date:  5/4/2023    Assessment Date:  5/24/2023    Comments:      Pt w/ improved PO intake and averaging 75% over the past two meals. Continue to encourage PO and supplement intake. RD to follow-up and available PRN.      Reason for Assessment     Row Name 05/24/23 1041          Reason for Assessment    Reason For Assessment follow-up protocol                  Labs/Tests/Procedures/Meds     Row Name 05/24/23 1041          Labs/Procedures/Meds    Lab Results Reviewed reviewed, pertinent     Lab Results Comments High: ALT Low: Cr        Medications    Pertinent Medications Reviewed reviewed, pertinent     Pertinent Medications Comments insulin, MVI w/ minerals, lovenox, insulin, prednisone, docusate                Physical Findings     Row Name 05/24/23 1042          Physical Findings    Overall Physical Appearance normal wt for age, nose pressure injury                  Nutrition Prescription Ordered     Row Name 05/24/23 1042          Nutrition Prescription PO    Current PO Diet Regular     Fluid Consistency Thin     Supplement Xu;Magic Cup  Boost Very High Calorie     Supplement Frequency 2 times a day     Common Modifiers Cardiac                Evaluation of Received Nutrient/Fluid Intake     Row Name 05/24/23 1042          Intake Assessment    Energy/Calorie Requirement Assessment meeting needs     Protein Requirement Assessment meeting needs        PO Evaluation    Number of Days PO Intake Evaluated 1 day     Number of Meals 2     % PO Intake 75                   Problem/Interventions:   Problem 1     Row Name 05/24/23 1043          Nutrition Diagnoses Problem 1    Problem 1 Increased Nutrient Needs     Macronutrient Kcal;Protein     Etiology (related to) Medical Diagnosis     Pulmonary/Critical Care A/C respiratory failure     Signs/Symptoms (evidenced by) Other (comment)  need for oral  nutrition supplement to meet increased estimated needs                Problem 2     Row Name 05/24/23 1043          Nutrition Diagnoses Problem 2    Problem 2 Predicted Suboptimal Intake     Etiology (related to) Factors Affecting Nutrition     Signs/Symptoms (evidenced by) Report of Minimal PO Intake  per flowsheets                    Intervention Goal     Row Name 05/24/23 1241 05/24/23 1043       Intervention Goal    General Maintain nutrition;Improved nutrition related lab(s);Reduce/improve symptoms;Meet nutritional needs for age/condition;Disease management/therapy Maintain nutrition;Improved nutrition related lab(s);Reduce/improve symptoms;Meet nutritional needs for age/condition;Disease management/therapy    PO Establish PO;Tolerate PO;Increase intake;Maintain intake;Meet estimated needs Establish PO;Tolerate PO;Increase intake;Maintain intake;Meet estimated needs    Weight Maintain weight Maintain weight               Nutrition Intervention     Row Name 05/24/23 1242          Nutrition Intervention    RD/Tech Action Follow Tx progress;Care plan reviewd;Await begin PO;Supplement provided;Encourage intake                Nutrition Prescription     Row Name 05/24/23 1242          Nutrition Prescription PO    New PO Prescription Ordered? No, recommended        Other Orders    Obtain Weight Daily     Obtain Weight Ordered? No, recommended     Supplement Vitamin mineral supplement     Supplement Ordered? No, recommended     Labs Mg++;Na+;Phos;K+     Labs Ordered? No, recommended                Education/Evaluation     Row Name 05/24/23 1242          Education    Education Will Instruct as appropriate        Monitor/Evaluation    Monitor Per protocol;Supplement intake;PO intake;Pertinent labs;Weight;Skin status;Symptoms                 Electronically signed by:  Cassidy Tello RD  05/24/23 12:43 EDT

## 2023-05-24 NOTE — PLAN OF CARE
Goal Outcome Evaluation:  Plan of Care Reviewed With: patient, daughter        Progress: improving  Outcome Evaluation: Patient participated well in PT evaluation and demonstrates good improvements in all areas of mobility.  Patient performs supine to sit with minimal assistance and sits on the edge of the be 20 minutes with SBA.  She is able to perform sit to stand transfers with minimal assistance of 2 with RW.  She is able to stand at the walker for 2 minutes with cues and encouragement.  She performed gait x 2 feet to her left toward the head of the bed.  She is fearful of falling and agrees to walk to the chair holding onto PT, rather than the walker.  She  has several episodes of knee buckling that she independently recovers to knee extension.  She is left in the recliner with her daughter in the room.   She is expected to benefit from additional PT services per POC.

## 2023-05-24 NOTE — PROGRESS NOTES
"  CC: Acute Respiratory Failure.     S: Continues to require high flow nasal cannula.  Says that she has been coughing up thick white sputum since vest percussion was started on 2023.    ROS: Complains of cough, shortness of breath and mild weakness.  Also complains of mild anxiety.    O:Vital signs reviewed. FiO2: 10-12 L/min high flow nasal cannula.  /52 (BP Location: Left arm, Patient Position: Lying)   Pulse 76   Temp 97.5 °F (36.4 °C) (Oral)   Resp 21   Ht 160 cm (63\")   Wt 66.5 kg (146 lb 9.7 oz)   SpO2 95%   BMI 25.97 kg/m²     Temp (24hrs), Av.8 °F (36.6 °C), Min:97.5 °F (36.4 °C), Max:98.4 °F (36.9 °C)      I & Os reviewed.   Intake/Output       23 0700 - 23 0659 23 0700 - 23 0659    Intake (ml) 610 360    Output (ml) 1700 --    Net (ml) -1090 360    Last Weight 66.5 kg (146 lb 9.7 oz) --          Net IO Since Admission: -6,181.33 mL [23 0943]    General/Constitutional: Off NIV therapy.  Continues to be in mild distress with minimal exertion even in bed.  Eyes: PERRL. EOMI  Neck: Supple without JVD. No obvious masses noted.   Cardiovascular: S1 + S2.  Regular at this time.  Lungs/Respiratory: Slightly improved bibasilar breath sounds noted.  Bilateral, somewhat scattered, wheezing heard.  Bibasilar crackles noted.  GI/Abdomen: Soft. Bowel sounds positive.  No obvious organomegaly  Musculoskeletal/Extremities: Right-sided PICC line in place.  No edema noted. Gait could not be assessed at this time, as the patient was laying in bed.   Neurologic: Appropriate in her responses.  Psych: AAO x 3. Was able to follow simple commands.    Skin: Appeared somewhat dry.      Labs: Reviewed.   Results from last 7 days   Lab Units 23  0611 23  0609 23  0515 23  0453 23  0426   WBC 10*3/mm3 9.60 9.97 11.59* 13.10* 15.22*   HEMOGLOBIN g/dL 10.3* 10.5* 11.0* 11.0* 11.3*   HEMATOCRIT % 33.1* 33.3* 35.6 33.8* 36.5   PLATELETS 10*3/mm3 212 215 211 " 212 209       Lab Results   Component Value Date    PROCALCITO 0.17 05/21/2023    PROCALCITO 0.11 05/16/2023    PROCALCITO 0.08 05/14/2023       No results found for: CRP    No results found for: SEDRATE    Lab Results   Component Value Date    PROBNP 572.7 05/14/2023    PROBNP 479.4 05/10/2023    PROBNP 234.4 05/04/2023       Results from last 7 days   Lab Units 05/24/23  0611 05/23/23  0609 05/22/23  0515 05/21/23  0453 05/20/23  0426   SODIUM mmol/L 139 138 138 140 139   POTASSIUM mmol/L 4.5 4.6 4.5 4.7 3.8   CHLORIDE mmol/L 102 100 100 98 94*   CO2 mmol/L 31.8* 30.3* 31.5* 36.7* 38.8*   BUN mg/dL 18 14 22 24* 25*   CREATININE mg/dL 0.26* 0.27* 0.26* 0.32* 0.28*   CALCIUM mg/dL 9.3 8.9 8.8 8.5* 8.9   ANION GAP mmol/L 5.2 7.7 6.5 5.3 6.2   BILIRUBIN mg/dL  --   --  0.2 0.2 0.3   ALK PHOS U/L  --   --  74 77 72   ALT (SGPT) U/L  --   --  35* 35* 26   AST (SGOT) U/L  --   --  14 19 12   GLUCOSE mg/dL 155* 138* 111* 120* 123*   TOTAL PROTEIN g/dL  --   --  5.1* 5.1* 5.4*   ALBUMIN g/dL  --   --  2.5* 2.6* 2.5*                   No results found for: CKTOTAL    No components found for: HSTROPT    Lab Results   Component Value Date    TROPONINT 22 (H) 05/05/2023    TROPONINT 25 (H) 05/05/2023    TROPONINT 44 (H) 05/04/2023       No results found for: DDIMER    Brief Urine Lab Results  (Last result in the past 365 days)      Color   Clarity   Blood   Leuk Est   Nitrite   Protein   CREAT   Urine HCG        05/12/23 1738 Yellow   Cloudy   Negative   Trace   Negative   Trace                 Lab Results   Component Value Date    TSH 0.304 05/09/2023       No results found for: FREET4      Micro: As of May 24, 2023   Lab Results   Component Value Date    RESPCX Moderate growth (3+) Haemophilus influenzae (A) 05/18/2023    RESPCX Light growth (2+) Staphylococcus aureus (A) 05/18/2023    RESPCX No Normal Respiratory Ana (A) 05/18/2023     No results found for: BCIDPCR  Lab Results   Component Value Date    BLOODCX No growth  at 5 days 05/18/2023    BLOODCX No growth at 5 days 05/18/2023    BLOODCX No growth at 5 days 05/04/2023    BLOODCX No growth at 5 days 05/04/2023     Lab Results   Component Value Date    URINECX Final report 02/07/2019    URINECX 20,000-30,000 CFU/mL Escherichia coli (A) 10/05/2018     No results found for: MRSACX  Lab Results   Component Value Date    MRSAPCR No MRSA Detected 04/15/2023     No results found for: URCX  No components found for: LOWRESPCF  No results found for: THROATCX  No results found for: CULTURES  No components found for: STREPBCX  No results found for: STREPPNEUAG  No results found for: LEGIONELLA  No results found for: MYCOPLASCX  No results found for: GCCX  No results found for: WOUNDCX  No results found for: BODYFLDCX    No results found for: FLU    Lab Results   Component Value Date    ADENOVIRUS Not Detected 05/04/2023     Lab Results   Component Value Date    KN552L Not Detected 05/04/2023     Lab Results   Component Value Date    CVHKU1 Not Detected 05/04/2023     Lab Results   Component Value Date    CVNL63 Not Detected 05/04/2023     Lab Results   Component Value Date    CVOC43 Not Detected 05/04/2023     Lab Results   Component Value Date    HUMETPNEVS Not Detected 05/04/2023     Lab Results   Component Value Date    HURVEV Not Detected 05/04/2023     Lab Results   Component Value Date    FLUBPCR Not Detected 05/04/2023     Lab Results   Component Value Date    PARAINFLUE Not Detected 05/04/2023     Lab Results   Component Value Date    PARAFLUV2 Not Detected 05/04/2023     Lab Results   Component Value Date    PARAFLUV3 Detected (A) 05/04/2023     Lab Results   Component Value Date    PARAFLUV4 Not Detected 05/04/2023     Lab Results   Component Value Date    BPERTPCR Not Detected 05/04/2023     No results found for: [id number]  Lab Results   Component Value Date    CPNEUPCR Not Detected 05/04/2023     Lab Results   Component Value Date    MPNEUMO Not Detected 05/04/2023     Lab  Results   Component Value Date    FLUAPCR Not Detected 05/04/2023     No results found for: FLUAH3  No results found for: FLUAH1  Lab Results   Component Value Date    RSV Not Detected 05/04/2023     Lab Results   Component Value Date    BPARAPCR Not Detected 05/04/2023       COVID 19:  Lab Results   Component Value Date    COVID19 Not Detected 05/04/2023         ABG: Reviewed   Recent Labs     05/21/23  0714 05/22/23  1307 05/24/23  0719   PHART 7.394 7.447 7.394   QVA7EDH 70.2* 53.3* 59.3*   PO2ART 95.2 50.2* 82.1   WSD4WZC 42.8* 36.8* 36.2*   BASEEXCESS 15.1* 11.1* 9.6*       Lab Results   Component Value Date    LACTATE 1.3 05/04/2023    LACTATE 1.2 04/13/2023    LACTATE 2.7 (C) 04/13/2023         Echo: Results for orders placed during the hospital encounter of 04/13/23    Adult Transthoracic Echo Complete W/ Cont if Necessary Per Protocol    Interpretation Summary  •  Left ventricular diastolic function is consistent with (grade I) impaired relaxation.  •  Mild aortic valve stenosis is present.  •  Estimated right ventricular systolic pressure from tricuspid regurgitation is moderately elevated (45-55 mmHg).  •  Mild to moderate pulmonary hypertension is present.      Results for orders placed during the hospital encounter of 10/01/17    Adult Transthoracic Echo Complete W/ Cont if Necessary Per Protocol    Interpretation Summary  TEchnically difficult study  1) Borderline LVH with normal LV systolic function ( EF is over 55%)  2) Normal left atrial size with mild elevation in LVedp  3) Trace MR  4) Mild TR with normal PA pressures  5) Mild AI  6) Small RV with normal RV function        Pharmacy to Dose enoxaparin (LOVENOX),   Pharmacy to dose vancomycin,           CXRay: Latest imaging study was reviewed personally.   Imaging Results (Last 48 Hours)     Procedure Component Value Units Date/Time    XR Chest 1 View [[id number]] Collected: 05/23/23 1146     Updated: 05/23/23 1207    Narrative:      PROCEDURE: XR  CHEST 1 VW-        HISTORY: Resp Failure.; J96.21-Acute and chronic respiratory failure  with hypoxia; J96.22-Acute and chronic respiratory failure with  hypercapnia; J44.1-Chronic obstructive pulmonary disease with (acute)  exacerbation; B34.8-Other viral infections of unspecified site     COMPARISON: May 21, 2023.     FINDINGS: The heart is normal in size. The mediastinum is unremarkable.  There is mild interval worsening of left basilar opacity. A right  basilar opacity is grossly unchanged. There are trace bilateral pleural  effusions. There is emphysema. There is chronic prominence of the  interstitium. There is no pneumothorax. There are no acute osseous  abnormalities. Right-sided PICC line is in place.       Impression:      Mild bibasilar airspace disease/pneumonia, left greater than  right..        Images were reviewed, interpreted, and dictated by SHIRA Larkin  Transcribed by Ade Solis PA-C.     This report was signed and finalized on 5/23/2023 12:05 PM by SHIRA Villa.            Assessment & Recommendations/Plan:   1.  Acute respiratory failure  Latest chest x-ray suggested slightly continued left basal disease, likely from atelectasis.   We will repeat chest x-ray in the morning.  Continue humidified AVAPS 1 hour on and 3 hours off throughout the day and as needed otherwise.  Settings were adjusted  Unfortunately the patient's respiratory status continues to remain extremely tenuous and she once again has worsened over the past 24 hours?  Today's ABG show compensated chronic respiratory acidosis.  Will repeat ABG, as clinically indicated.    2.  COPD with acute exacerbation  Likely triggered by parainfluenza bronchitis  Latest cultures positive for haemophilus influenza and MSSA.  3+ WBCs noted in the sputum cultures.  Given significant antibiotic allergies, for now would suggest continuation of vancomycin for total of 3-5 days.  She has been on azithromycin which was  "started on the 18th, and I would suggest 3 days of therapy  Patient was given Depo-Medrol on 5/22/2023.  Currently on oral prednisone 40 mg.  Continue nebulized Pulmicort in the middle of the day.  Continue Spiriva and Symbicort  Will benefit from outpatient PFTs    3.  Abnormal CT of the chest/atelectasis  I have had multiple separate discussions with the family and the patient regarding likelihood of mucous plugging/atelectasis causing significant and refractory hypoxemia.  The patient and her daughters have understood the concerns regarding continued presence of mucous plugging and significant hypoxemia but continue to decline bronchoscopy.  The patient actually told me today that she \"does not want it\".  Continue MetaNeb treatments.  We will continue hypertonic saline every 12 hourly for another 24-48 hours.  Continue Mucinex DM.  I have spoken to patient's daughter, Nehal at [phone], on 5/16/2023 and also on 5/23/2023.    I once again informed the patient and her daughter that based on her prolonged stay, significant COPD and development of likely deconditioning, if she worsens any further, she may need to be intubated.    4.  Chronic respiratory acidosis  Appears to be compliant with NIV device at home.    5.  Pulmonary hypertension  May need outpatient work-up.  Likely secondary to underlying likely severe COPD    6.  Anxiety  Continue other anxiolytics.  May need adjustment, as indicated.    7.  Smoking  Was advised to quit smoking    8.  Deconditioning  May benefit from PT/OT.  Will definitely become an issue, the longer patient remains dependent on significantly high amounts of oxygen.  I also informed the patient and her daughter that deconditioning may become a major issue in the future, given her significant dependence on oxygen without sustained improvement in oxygenation and prolonged hospital stay.    9.  Miscellaneous  I have explained to the patient and her daughter that all possible noninvasive " interventions are being undertaken and will continue these measures.    We have reviewed patient's current orders and changes, if any, have been suggested to primary care team. Plan was also discussed with nursing staff, as necessary.     This document was electronically signed by Arnaldo Soriano MD on 05/24/23 at 09:43 EDT      Dictated utilizing Dragon dictation.

## 2023-05-24 NOTE — THERAPY RE-EVALUATION
"Patient Name: Carolin Toscano  : 1945    MRN: [id number]                              Today's Date: 2023       Admit Date: 2023    Visit Dx:     ICD-10-CM ICD-9-CM   1. Acute on chronic respiratory failure with hypoxia and hypercapnia  J96.21 518.84    J96.22 786.09     799.02   2. COPD exacerbation  J44.1 491.21   3. Parainfluenza virus infection  B34.8 078.89     Patient Active Problem List   Diagnosis   • CAP (community acquired pneumonia)   • Cigarette nicotine dependence with nicotine-induced disorder   • Essential hypertension   • Dyslipidemia   • Blood glucose elevated   • Muscle ache   • COPD (chronic obstructive pulmonary disease)   • Nuclear sclerotic cataract of right eye   • Acute respiratory failure   • COPD exacerbation   • Acute on chronic respiratory failure with hypoxia and hypercapnia     Past Medical History:   Diagnosis Date   • Arthritis    • COPD (chronic obstructive pulmonary disease)    • Gall stones    • Goiter     \"inward\"   • Hypertension    • Hypertension    • Kidney infection    • Kidney stone    • Kidney stones    • Migraine headache    • Scarlet fever      Past Surgical History:   Procedure Laterality Date   • BLADDER SURGERY     • CATARACT EXTRACTION W/ INTRAOCULAR LENS IMPLANT Left 2022    Procedure: CATARACT PHACO EXTRACTION WITH INTRAOCULAR LENS IMPLANT LEFT;  Surgeon: Sathish Lopez MD;  Location: [facility];  Service: Ophthalmology;  Laterality: Left;   • CATARACT EXTRACTION W/ INTRAOCULAR LENS IMPLANT Right 2022    Procedure: CATARACT PHACO EXTRACTION WITH INTRAOCULAR LENS IMPLANT RIGHT;  Surgeon: Sathish Lopez MD;  Location: [facility];  Service: Ophthalmology;  Laterality: Right;   • CHOLECYSTECTOMY     • HYSTERECTOMY     • TONSILLECTOMY        General Information     Row Name 23 1442          Physical Therapy Time and Intention    Document Type re-evaluation  -JR     Mode of Treatment physical therapy  -JR     Row Name 23 1442 "          General Information    Patient Profile Reviewed yes  -JR     Prior Level of Function independent:;all household mobility  -JR     Existing Precautions/Restrictions fall;oxygen therapy device and L/min  -JR     Barriers to Rehab medically complex  -     Row Name 05/24/23 1442          Living Environment    People in Home sibling(s)  -JR     Row Name 05/24/23 1442          Home Main Entrance    Number of Stairs, Main Entrance none  -JR     Row Name 05/24/23 1442          Stairs Within Home, Primary    Number of Stairs, Within Home, Primary none  -JR     Row Name 05/24/23 1442          Cognition    Orientation Status (Cognition) oriented x 4  -JR     Row Name 05/24/23 1442          Safety Issues, Functional Mobility    Safety Issues Affecting Function (Mobility) safety precautions follow-through/compliance;awareness of need for assistance;insight into deficits/self-awareness;positioning of assistive device  -     Impairments Affecting Function (Mobility) strength;balance;endurance/activity tolerance;shortness of breath;postural/trunk control  -           User Key  (r) = Recorded By, (t) = Taken By, (c) = Cosigned By    Initials Name Provider Type    JR Kavitha Tavarez, PT Physical Therapist               Mobility     Row Name 05/24/23 1442          Bed Mobility    Bed Mobility supine-sit  -JR     Supine-Sit [place] (Bed Mobility) minimum assist (75% patient effort)  -     Assistive Device (Bed Mobility) bed rails;head of bed elevated  -JR     Comment, (Bed Mobility) Patient sat EOB x 20 minutes with SBA and cues  -[facility] Name 05/24/23 1442          Bed-Chair Transfer    Bed-Chair [place] (Transfers) minimum assist (75% patient effort);2 person assist  -     Assistive Device (Bed-Chair Transfers) walker, front-wheeled  -     Row Name 05/24/23 1442          Sit-Stand Transfer    Sit-Stand [place] (Transfers) minimum assist (75% patient effort);2 person assist  -     Assistive Device  (Sit-Stand Transfers) walker, front-wheeled  -     Comment, (Sit-Stand Transfer) Able to stand x 2 minutes  -     Row Name 05/24/23 1442          Gait/Stairs (Locomotion)    [place] Level (Gait) minimum assist (75% patient effort)  -     Assistive Device (Gait) other (see comments);walker, front-wheeled  gait belt and PT  -JR     Distance in Feet (Gait) 2 feet to her left toward the head of the bed using the RW  & 3 feet to the chair holding onto the PT, due to fear of falling with RW.  -JR     Deviations/Abnormal Patterns (Gait) weight shifting decreased;gilberto decreased;festinating/shuffling;base of support, narrow  -JR     Bilateral Gait Deviations forward flexed posture;heel strike decreased;knee buckling bilaterally  -JR           User Key  (r) = Recorded By, (t) = Taken By, (c) = Cosigned By    Initials Name Provider Type    Kavitha Michael PT Physical Therapist               Obj/Interventions     Row Name 05/24/23 1442          Balance    Balance Assessment sitting static balance;sitting dynamic balance;sit to stand dynamic balance;standing static balance  -JR     Static Sitting Balance verbal cues;standby assist  -JR     Dynamic Sitting Balance standby assist;verbal cues  -JR     Position, Sitting Balance unsupported;sitting edge of bed  -     Sit to Stand Dynamic Balance minimal assist;2-person assist  -JR     Static Standing Balance minimal assist;2-person assist  -JR     Dynamic Standing Balance minimal assist;2-person assist  -JR     Position/Device Used, Standing Balance walker, front-wheeled  -           User Key  (r) = Recorded By, (t) = Taken By, (c) = Cosigned By    Initials Name Provider Type    Kavitha Michael PT Physical Therapist               Goals/Plan     Row Name 05/24/23 1442          Bed Mobility Goal 1 (PT)    Activity/Assistive Device (Bed Mobility Goal 1, PT) bed mobility activities, all  -JR     [place] Level/Cues Needed (Bed Mobility Goal 1, PT) standby assist   -JR     Time Frame (Bed Mobility Goal 1, PT) long term goal (LTG);2 weeks  -JR     Strategies/Barriers (Bed Mobility Goal 1, PT) maintaining oxygen saturation levels at least 90%  -JR     Progress/Outcomes (Bed Mobility Goal 1, PT) goal revised this date  -JR     Row Name 05/24/23 1442          Transfer Goal 1 (PT)    Activity/Assistive Device (Transfer Goal 1, PT) transfers, all;sit-to-stand/stand-to-sit  -JR     [place] Level/Cues Needed (Transfer Goal 1, PT) contact guard required  -JR     Time Frame (Transfer Goal 1, PT) long term goal (LTG);2 weeks  -JR     Strategies/Barriers (Transfers Goal 1, PT) maintaining oxygen saturation levels at least 92%  -JR     Progress/Outcome (Transfer Goal 1, PT) goal revised this date  -JR     Row Name 05/24/23 1442          Gait Training Goal 1 (PT)    Activity/Assistive Device (Gait Training Goal 1, PT) gait (walking locomotion);assistive device use  -JR     [place] Level (Gait Training Goal 1, PT) contact guard required  -JR     Distance (Gait Training Goal 1, PT) 50  -JR     Time Frame (Gait Training Goal 1, PT) long term goal (LTG);2 weeks  -JR     Strategies/Barriers (Gait Training Goal 1, PT) maintaining oxygen saturation levels at least 90%  -JR     Progress/Outcome (Gait Training Goal 1, PT) goal revised this date  -JR     Row Name 05/24/23 1442          Patient Education Goal (PT)    Activity (Patient Education Goal, PT) Perform seated BLE exercises with good breathing coordination x 10  -JR     [place]/Cues/Accuracy (Memory Goal 2, PT) demonstrates adequately  -JR     Time Frame (Patient Education Goal, PT) short term goal (STG);5 days  -JR     Barriers (Patient Education Goal, PT) maintaining oxygen saturation levels at least 90%  -JR     Progress/Outcome (Patient Education Goal, PT) goal ongoing  -JR     Row Name 05/24/23 1442          Therapy Assessment/Plan (PT)    Planned Therapy Interventions (PT) strengthening;balance training;bed mobility  training;transfer training;gait training;home exercise program;patient/family education;other (see comments)  breathing coordination and pacing  -           User Key  (r) = Recorded By, (t) = Taken By, (c) = Cosigned By    Initials Name Provider Type    Kavitha Michael, PT Physical Therapist               Clinical Impression     Row Name 05/24/23 1442          Pain    Pretreatment Pain Rating 0/10 - no pain  -JR     Posttreatment Pain Rating 0/10 - no pain  -JR     Row Name 05/24/23 1442          Plan of Care Review    Plan of Care Reviewed With patient;daughter  -     Progress improving  -     Outcome Evaluation Patient participated well in PT evaluation and demonstrates good improvements in all areas of mobility.  Patient performs supine to sit with minimal assistance and sits on the edge of the be 20 minutes with SBA.  She is able to perform sit to stand transfers with minimal assistance of 2 with RW.  She is able to stand at the walker for 2 minutes with cues and encouragement.  She performed gait x 2 feet to her left toward the head of the bed.  She is fearful of falling and agrees to walk to the chair holding onto PT, rather than the walker.  She  has several episodes of knee buckling that she independently recovers to knee extension.  She is left in the recliner with her daughter in the room.   She is expected to benefit from additional PT services per POC.  -     Row Name 05/24/23 1442          Therapy Assessment/Plan (PT)    Patient/Family Therapy Goals Statement (PT) Patient is eager to get stronger  -     Rehab Potential (PT) good, to achieve stated therapy goals  -     Criteria for Skilled Interventions Met (PT) yes;meets criteria;skilled treatment is necessary  -     Therapy Frequency (PT) 5 times/wk  -JR     Row Name 05/24/23 1442          Vital Signs    Pre SpO2 (%) 92  -JR     O2 Delivery Pre Treatment hi-flow  8 LPM  -JR     Intra SpO2 (%) 86  -JR     O2 Delivery Intra Treatment  hi-flow  8 LPM  -JR     Post SpO2 (%) 91  -JR     O2 Delivery Post Treatment hi-flow  8 LPM  -JR     Pre Patient Position Supine  -JR     Intra Patient Position Standing  -JR     Post Patient Position Sitting  -JR     Row Name 05/24/23 1442          Positioning and Restraints    Pre-Treatment Position in bed  -JR     Post Treatment Position chair  -JR     In Chair sitting;call light within reach;encouraged to call for assist;with family/caregiver  -JR           User Key  (r) = Recorded By, (t) = Taken By, (c) = Cosigned By    Initials Name Provider Type    Kavitha Michael, PT Physical Therapist               Outcome Measures     Row Name 05/24/23 1442 05/24/23 0800       How much help from another person do you currently need...    Turning from your back to your side while in flat bed without using bedrails? 3  -JR 3  -PK    Moving from lying on back to sitting on the side of a flat bed without bedrails? 3  -JR 3  -PK    Moving to and from a bed to a chair (including a wheelchair)? 2  -JR 2  -PK    Standing up from a chair using your arms (e.g., wheelchair, bedside chair)? 2  -JR 2  -PK    Climbing 3-5 steps with a railing? 1  -JR 1  -PK    To walk in hospital room? 2  -JR 2  -PK    AM-PAC 6 Clicks Score (PT) 13  -JR 13  -PK    Highest level of mobility 4 --> Transferred to chair/commode  -JR 4 --> Transferred to chair/commode  -PK    Row Name 05/24/23 1636 05/24/23 1442       Functional Assessment    Outcome Measure Options AM-PAC 6 Clicks Daily Activity (OT)  -SD AM-PAC 6 Clicks Basic Mobility (PT)  -JR          User Key  (r) = Recorded By, (t) = Taken By, (c) = Cosigned By    Initials Name Provider Type    Kavitha Michael, PT Physical Therapist    Cecelia Johnson OT Occupational Therapist    Rhonda Oates, RN Registered Nurse                             Physical Therapy Education     Title: PT OT SLP Therapies (In Progress)     Topic: Physical Therapy (Done)     Point: Mobility training (Done)     Learning  Progress Summary           Patient Acceptance, E,TB, VU by JR at 5/24/2023 1749    Comment: Advancement of POC    Acceptance, E,TB,D, VU,NR by RM at 5/23/2023 1316    Comment: proper breathing techniques with mobility    Acceptance, E,TB,D, VU,NR by RM at 5/22/2023 1358    Comment: Exercise tech and importance of dily activity    Acceptance, E, VU by MS at 5/16/2023 1403    Comment: importance of mobility    Acceptance, E, VU by AM at 5/15/2023 0701    Acceptance, E, VU by MS at 5/12/2023 1458    Comment: importance of mobility    Acceptance, E,TB,D, VU,NR by RM at 5/8/2023 1250    Comment: Importance of activity and exercise techniques    Acceptance, E, VU by MS at 5/5/2023 1136    Comment: importance of mobility                   Point: Home exercise program (Done)     Learning Progress Summary           Patient Acceptance, E,TB,D, VU,NR by RM at 5/22/2023 1358    Comment: Exercise tech and importance of dily activity    Acceptance, E,D, VU,DU by TW at 5/21/2023 1205    Comment: Pt education for improving BLE ther ex technique    Acceptance, E, VU by MS at 5/16/2023 1403    Comment: importance of mobility    Acceptance, E, VU by AM at 5/15/2023 0701    Acceptance, E,TB, VU,NR by CC at 5/14/2023 1310    Comment: Importance of increasing movement of B LE    Acceptance, E,TB,D, VU,NR by RM at 5/8/2023 1250    Comment: Importance of activity and exercise techniques    Acceptance, E, VU by MS at 5/5/2023 1136    Comment: importance of mobility                   Point: Body mechanics (Done)     Learning Progress Summary           Patient Acceptance, E,TB, VU by JR at 5/17/2023 1846    Comment: upright posture                   Point: Precautions (Done)     Learning Progress Summary           Patient Acceptance, E,TB, VU by JR at 5/24/2023 1749    Comment: Importance of breathing coordination and pacing to recovery                               User Key     Initials Effective Dates Name Provider Type Discipline      03/23/22 -  Kavitha Tavarez, PT Physical Therapist PT    CC 06/16/21 -  Nasra Quiñones, PTA Physical Therapist Assistant PT    RM 06/16/21 -  Ronak Cruz, PTA Physical Therapist Assistant PT    TW 06/16/21 -  Deysi Goodwin, PT Physical Therapist PT    MS 07/01/22 -  Martín Ortiz, PT Physical Therapist PT    AM 02/22/23 -  Rob Lu, RN Registered Nurse Nurse              PT Recommendation and Plan  Planned Therapy Interventions (PT): strengthening, balance training, bed mobility training, transfer training, gait training, home exercise program, patient/family education, other (see comments) (breathing coordination and pacing)  Plan of Care Reviewed With: patient, daughter  Progress: improving  Outcome Evaluation: Patient participated well in PT evaluation and demonstrates good improvements in all areas of mobility.  Patient performs supine to sit with minimal assistance and sits on the edge of the be 20 minutes with SBA.  She is able to perform sit to stand transfers with minimal assistance of 2 with RW.  She is able to stand at the walker for 2 minutes with cues and encouragement.  She performed gait x 2 feet to her left toward the head of the bed.  She is fearful of falling and agrees to walk to the chair holding onto PT, rather than the walker.  She  has several episodes of knee buckling that she independently recovers to knee extension.  She is left in the recliner with her daughter in the room.   She is expected to benefit from additional PT services per POC.     Time Calculation:    PT Charges     Row Name 05/24/23 1750             Time Calculation    Start Time 1442  -JR      Stop Time 1531  -JR      Time Calculation (min) 49 min  -JR      PT Received On 05/24/23  -JR      PT Goal Re-Cert Due Date 06/03/23  -JR         Time Calculation- PT    Total Timed Code Minutes- PT 30 minute(s)  -JR         Timed Charges    97433 - Gait Training Minutes  15  -JR         Untimed Charges    PT Eval/Re-eval  Minutes 30  -JR         Total Minutes    Timed Charges Total Minutes 15  -JR      Untimed Charges Total Minutes 30  -JR       Total Minutes 45  -JR            User Key  (r) = Recorded By, (t) = Taken By, (c) = Cosigned By    Initials Name Provider Type    Kavitha Michael, PT Physical Therapist              Therapy Charges for Today     Code Description Service Date Service Provider Modifiers Qty    [id number]  GAIT TRAINING EA 15 MIN 5/24/2023 Kavitha Tavarez, PT GP 1    [id number]  PT RE-EVAL ESTABLISHED PLAN 2 5/24/2023 Kavitha Tavarez, PT GP 1          PT G-Codes  Outcome Measure Options: AM-PAC 6 Clicks Daily Activity (OT)  AM-PAC 6 Clicks Score (PT): 13  AM-PAC 6 Clicks Score (OT): 15  PT Discharge Summary  Anticipated Discharge Disposition (PT): inpatient rehabilitation facility, LT (long-term care hospital), skilled nursing facility    Kavitha Tavarez, PT  5/24/2023

## 2023-05-24 NOTE — PROGRESS NOTES
[facility]    Name:  Carolin Toscano   Age:  77 y.o.  Sex:  female  :  1945  MRN:  [id number]   Visit Number:  [id number]  Admission Date:  2023  Date Of Service:  23  Primary Care Physician:  Jason Nicholson MD     LOS: 20 days :    Chief Complaint:      Shortness of breath    Subjective:    Patient was seen and examined today.  Patient was seen sitting up in the bed with no distress.  Patient appears generally weak but denies any pain or complaints today.  Continues to report overall improvement today.  Patient seems to have agreed to go to [place] for LTAC however her insurance has declined.  Currently requiring 9 L through nasal cannula.  Otherwise hemodynamically stable and afebrile.      Hospital Course:    Patient is a 77 years old female with a past medical history of COPD, chronic respiratory failure on 5 to 6 L per her previous discharge, on NIV machine at home and trelegy, hypertension, and ongoing tobacco use who presented to the ER from home by EMS with a chief complaint of shortness of breath.  Patient reporting that she started having shortness of breath associated with productive cough since last night and has persisted and became worse that promoted her to call EMS.  EMS has found the patient to be at 65% saturation on her normal 6 L of oxygen. Patient reports compliance with her NIV machine at home.      On ER evaluation, Patient was found to be tachycardic with a heart rate of 130s, temperature of 100.2 and respirations of 30, /72.  Patient was placed on BiPAP with FiO2 of 40%.  Her ABG came back and showed pH of 7.296/PCO2 87/PO2 94/HCO3 42/saturation 97% on 5 L nasal cannula.  HS Troponin 44, proBNP WNL, glucose 192, CO2 of 40, otherwise CBC and CMP within acceptable range.  Lactate and Pro-Adi WNL.  Respiratory panel positive for parainfluenza virus.  Chest x-ray Mild interstitial disease  bilaterally is similar to prior.  No area of consolidation is seen. Patient received Solu-Medrol and Aztreonam while in the ER.  Hospitalist consulted for admission.     Patient was admitted and treated with steroids.  Antibiotics were not indicated.  Pulmonology consulted and was moved to ICU on 5/9 due to worsening respiratory failure and tachypnea.  She initially had to be on Precedex drip which was discontinued and switched to Xanax due to anxiety. Patient was placed on AVAPS.  Patient continued on steroids and received Lasix.    5/16/23: d/w nsg intermittently on avaps and hi flow. Patient awake in bed. CXR from 5/15 reviewed. Medications reviewed.  Discussed with case management they are [facility].     5/17/2023 discussed with case management still working on [facility].  I am planning to do a peer to peer with them tomorrow.  Otherwise has persistent hypoxia.  Dr. Soriano's note reviewed considering bronchoscopy.     5/18: Daughter Regla is coming today.  Otherwise not much change still having dyspnea and hypoxemia.  Case management note reviewed.     5/19: d/w patient and family updated them on [facility] and pending appeal which will likely be submitted Monday. Still having dyspnea but mucous is breaking loose today.  Still debating about bronchoscopy.  Interested in long-term acute care possibly in [place] versus [place].     5/20 discussed with patient as well as family at the bedside.  Also discussed with them regarding placing access which she was able to get a PICC line.  Also discussed with them the staph growing in her sputum and that I was going to add vancomycin for this.     5/21: c/o garrison suspect is due to mucous. Agreeable to flonase, zyrtec and atarax. Labs reviewed.    Review of Systems:     All systems were reviewed and negative except as mentioned in subjective, assessment and plan.    Vital Signs:    Temp:  [97.5 °F (36.4 °C)-98.4 °F (36.9 °C)] 97.5 °F (36.4 °C)  Heart Rate:  [53-84]  76  Resp:  [16-21] 21  BP: (108-120)/(48-58) 113/52    Intake and output:    I/O last 3 completed shifts:  In: 885.7 [P.O.:385; I.V.:250.7; IV Piggyback:250]  Out: 2700 [Urine:2700]  I/O this shift:  In: 360 [P.O.:360]  Out: -     Physical Examination:    General Appearance:  Alert and cooperative.  Chronically ill-appearing.  Frail.   Head:  Atraumatic and normocephalic.   Eyes: Conjunctivae and sclerae normal, no icterus. No pallor.   Throat: No oral lesions, no thrush, oral mucosa moist.   Neck: Supple, trachea midline, no thyromegaly.   Lungs:   Breath sounds heard bilaterally equally.  Expiratory wheezing heard. Decreased air movement bilaterally.  Nontachypneic on supplemental oxygen.    Heart:  Normal S1 and S2, no murmur, no gallop, no rub. No JVD.   Abdomen:   Normal bowel sounds, no masses, no organomegaly. Soft, nontender, nondistended, no rebound tenderness.   Extremities: Supple, no edema, no cyanosis, no clubbing.   Skin: No bleeding or rash.   Neurologic: Alert and oriented x 3. No facial asymmetry. Moves all four limbs. No tremors.  Generalized weakness noted.     Laboratory results:    Results from last 7 days   Lab Units 05/24/23  0611 05/23/23  0609 05/22/23  0515 05/21/23  0453 05/20/23  0426   SODIUM mmol/L 139 138 138 140 139   POTASSIUM mmol/L 4.5 4.6 4.5 4.7 3.8   CHLORIDE mmol/L 102 100 100 98 94*   CO2 mmol/L 31.8* 30.3* 31.5* 36.7* 38.8*   BUN mg/dL 18 14 22 24* 25*   CREATININE mg/dL 0.26* 0.27* 0.26* 0.32* 0.28*   CALCIUM mg/dL 9.3 8.9 8.8 8.5* 8.9   BILIRUBIN mg/dL  --   --  0.2 0.2 0.3   ALK PHOS U/L  --   --  74 77 72   ALT (SGPT) U/L  --   --  35* 35* 26   AST (SGOT) U/L  --   --  14 19 12   GLUCOSE mg/dL 155* 138* 111* 120* 123*     Results from last 7 days   Lab Units 05/24/23  0611 05/23/23  0609 05/22/23  0515   WBC 10*3/mm3 9.60 9.97 11.59*   HEMOGLOBIN g/dL 10.3* 10.5* 11.0*   HEMATOCRIT % 33.1* 33.3* 35.6   PLATELETS 10*3/mm3 212 215 211             Results from last 7 days    Lab Units 05/18/23  1101 05/18/23  1054   BLOODCX  No growth at 5 days No growth at 5 days     Recent Labs     05/21/23  0714 05/22/23  1307 05/24/23  0719   PHART 7.394 7.447 7.394   BTG2RYS 70.2* 53.3* 59.3*   PO2ART 95.2 50.2* 82.1   UDT9RAA 42.8* 36.8* 36.2*   BASEEXCESS 15.1* 11.1* 9.6*      I have reviewed the patient's laboratory results.    Radiology results:    XR Chest 1 View    Result Date: 5/23/2023  PROCEDURE: XR CHEST 1 VW-    HISTORY: Resp Failure.; J96.21-Acute and chronic respiratory failure with hypoxia; J96.22-Acute and chronic respiratory failure with hypercapnia; J44.1-Chronic obstructive pulmonary disease with (acute) exacerbation; B34.8-Other viral infections of unspecified site  COMPARISON: May 21, 2023.  FINDINGS: The heart is normal in size. The mediastinum is unremarkable. There is mild interval worsening of left basilar opacity. A right basilar opacity is grossly unchanged. There are trace bilateral pleural effusions. There is emphysema. There is chronic prominence of the interstitium. There is no pneumothorax. There are no acute osseous abnormalities. Right-sided PICC line is in place.      Impression: Mild bibasilar airspace disease/pneumonia, left greater than right..   Images were reviewed, interpreted, and dictated by SHIRA Larkin Transcribed by Ade Solis PA-C.  This report was signed and finalized on 5/23/2023 12:05 PM by SHIRA Villa.    I have reviewed the patient's radiology reports.    Medication Review:     I have reviewed the patient's active and prn medications.     Problem List:      Acute on chronic respiratory failure with hypoxia and hypercapnia      Assessment:    Acute on chronic respiratory failure with hypoxia and hypercapnia, likely secondary to #2 and 3, POA  Acute COPD exacerbation, POA  Parainfluenza infection, POA  Elevated troponin, likely secondary to demand ischemia, POA  Hypertension  Chronic tobacco abuse  Pulmonary  hypertension  Arthritis  Deconditioning    Plan:    Respiratory failure with hypoxia/hypercapnia, COPD exacerbation, parainfluenza  -Continue supplemental oxygen to keep saturation above 90%, patient on 5 to 6 L at baseline, continue to titrate down as able to.  Continue BiPAP therapy.  -Has NIV at home. Currently on 10 liters  -Recent parainfluenza virus infection met SIRS due to this  -Tapered down to po prednisone  -Bronchodilators, Mucinex MetaNeb and supportive care.  -Dr. Soriano following, appreciate his recommendations  -s/p lasix  -Xanax prn  -Sputum growing Staph aureus and haemophilus, on vancomycin and azithromycin  - flonase, zyrtec, and atarax for upper airway congestion  -Continue PT/OT.  -I personally discussed with Dr. Soriano today, patient appears at her new baseline, will finish antibiotics and possibly discharge to LTAC.  -Patient will benefit from close pulmonology follow-up and outpatient PFTs.      Hyperglycemia  -sliding scale insulin   -Hemoglobin A1c 6.7     Further orders as indicated per clinical course.     PT/ OT consulted, case management consulted, appreciate their help.  Will consult with palliative care, appreciate their assistance.     DVT Prophylaxis: Lovenox prophylaxis (benefit> risk)  Code Status: Full  Diet: Cardiac  Discharge Plan: Patient should be ready for LTAC, seems to have agreed to go to [place] and was accepted there but was declined by her insurance.     Lolis Vital MD  05/24/23  09:51 EDT    Dictated utilizing Dragon dictation.

## 2023-05-24 NOTE — THERAPY TREATMENT NOTE
"Patient Name: Carolin Toscano  : 1945    MRN: [id number]                              Today's Date: 2023       Admit Date: 2023    Visit Dx:     ICD-10-CM ICD-9-CM   1. Acute on chronic respiratory failure with hypoxia and hypercapnia  J96.21 518.84    J96.22 786.09     799.02   2. COPD exacerbation  J44.1 491.21   3. Parainfluenza virus infection  B34.8 078.89     Patient Active Problem List   Diagnosis   • CAP (community acquired pneumonia)   • Cigarette nicotine dependence with nicotine-induced disorder   • Essential hypertension   • Dyslipidemia   • Blood glucose elevated   • Muscle ache   • COPD (chronic obstructive pulmonary disease)   • Nuclear sclerotic cataract of right eye   • Acute respiratory failure   • COPD exacerbation   • Acute on chronic respiratory failure with hypoxia and hypercapnia     Past Medical History:   Diagnosis Date   • Arthritis    • COPD (chronic obstructive pulmonary disease)    • Gall stones    • Goiter     \"inward\"   • Hypertension    • Hypertension    • Kidney infection    • Kidney stone    • Kidney stones    • Migraine headache    • Scarlet fever      Past Surgical History:   Procedure Laterality Date   • BLADDER SURGERY     • CATARACT EXTRACTION W/ INTRAOCULAR LENS IMPLANT Left 2022    Procedure: CATARACT PHACO EXTRACTION WITH INTRAOCULAR LENS IMPLANT LEFT;  Surgeon: Sathish Lopez MD;  Location: [facility];  Service: Ophthalmology;  Laterality: Left;   • CATARACT EXTRACTION W/ INTRAOCULAR LENS IMPLANT Right 2022    Procedure: CATARACT PHACO EXTRACTION WITH INTRAOCULAR LENS IMPLANT RIGHT;  Surgeon: Sathish Lopez MD;  Location: [facility];  Service: Ophthalmology;  Laterality: Right;   • CHOLECYSTECTOMY     • HYSTERECTOMY     • TONSILLECTOMY        General Information     Row Name 23 1629          OT Time and Intention    Document Type therapy note (daily note)  -SD     Mode of Treatment occupational therapy  -SD     Row Name 23 " 1629          General Information    Patient Profile Reviewed yes  -SD           User Key  (r) = Recorded By, (t) = Taken By, (c) = Cosigned By    Initials Name Provider Type    SD Cecelia Knight OT Occupational Therapist                 Mobility/ADL's     Row Name 05/24/23 1629          Bed Mobility    Bed Mobility supine-sit  -SD     Supine-Sit Tripp (Bed Mobility) minimum assist (75% patient effort)  -SD     Assistive Device (Bed Mobility) bed rails;head of bed elevated  -SD     Row Name 05/24/23 1629          Transfers    Transfers sit-stand transfer;bed-chair transfer  -SD     Row Name 05/24/23 1629          Bed-Chair Transfer    Bed-Chair Tripp (Transfers) minimum assist (75% patient effort);2 person assist  -SD     Assistive Device (Bed-Chair Transfers) walker, front-wheeled  -SD     Row Name 05/24/23 1629          Sit-Stand Transfer    Sit-Stand Tripp (Transfers) minimum assist (75% patient effort);2 person assist  -SD     Assistive Device (Sit-Stand Transfers) walker, front-wheeled  -SD     Row Name 05/24/23 1629          Functional Mobility    Functional Mobility- Ind. Level minimum assist (75% patient effort);2 person assist required  -SD     Functional Mobility- Device walker, front-wheeled  -SD     Functional Mobility-Distance (Feet) 2  3  -SD     Functional Mobility- Safety Issues supplemental O2;balance decreased during turns;sequencing ability decreased;step length decreased  -SD     Row Name 05/24/23 1629          Bathing Assessment/Intervention    Tripp Level (Bathing) moderate assist (50% patient effort)  -SD     Position (Bathing) edge of bed sitting  -SD     Row Name 05/24/23 1629          Lower Body Dressing Assessment/Training    Tripp Level (Lower Body Dressing) don;pants/bottoms;socks;maximum assist (25% patient effort)  -SD     Position (Lower Body Dressing) edge of bed sitting;supported standing  -SD     Row Name 05/24/23 1629          Grooming  Assessment/Training    [place] Level (Grooming) wash face, hands;standby assist  -SD     Position (Grooming) edge of bed sitting  -SD     Row Name 05/24/23 1629          Toileting Assessment/Training    [place] Level (Toileting) adjust/manage clothing;change pad/brief;perform perineal hygiene;maximum assist (25% patient effort)  -SD           User Key  (r) = Recorded By, (t) = Taken By, (c) = Cosigned By    Initials Name Provider Type    Cecelia Johnson OT Occupational Therapist               Obj/Interventions     Row Name 05/24/23 1630          Elbow/Forearm (Therapeutic Exercise)    Elbow/Forearm (Therapeutic Exercise) AROM (active range of motion)  -SD     Elbow/Forearm AROM (Therapeutic Exercise) bilateral;flexion;extension;10 repetitions  -SD     Row Name 05/24/23 1630          Motor Skills    Therapeutic Exercise elbow/forearm  -SD           User Key  (r) = Recorded By, (t) = Taken By, (c) = Cosigned By    Initials Name Provider Type    Cecelia Johnson OT Occupational Therapist               Goals/Plan    No documentation.                Clinical Impression     Row Name 05/24/23 1630          Pain Assessment    Pretreatment Pain Rating 0/10 - no pain  -SD     Posttreatment Pain Rating 0/10 - no pain  -SD     Row Name 05/24/23 1630          Plan of Care Review    Plan of Care Reviewed With patient  -SD     Progress improving  -SD     Outcome Evaluation OT tx completed. Patient supine in bed, daughter present at bedside. Patient is on 8L HFNC satting 92%. Patient moved to EOB with min A, sitting EOB performed UBB mod A, grooming tasks with SBA, max A to tatum pull up. Patient performed bilateral elbow flex/ext. x 10. Patient performed sit to stand min A x 2 using RW, urinary incontinence noted, required max A for perineal hygiene and to complete donning of pull up, standing in total approx 2 mins. After resting patient able to perform additional sit to stand and transfer to chair; lowest O2  was 86%, had increased to 91% following activity.  -SD     Row Name 05/24/23 1630          Vital Signs    Pre SpO2 (%) 92  -SD     O2 Delivery Pre Treatment hi-flow  8L  -SD     Intra SpO2 (%) 86  -SD     O2 Delivery Intra Treatment hi-flow  -SD     Post SpO2 (%) 91  -SD     O2 Delivery Post Treatment hi-flow  -SD     Row Name 05/24/23 1630          Positioning and Restraints    Pre-Treatment Position in bed  -SD     Post Treatment Position chair  -SD     In Chair sitting;call light within reach;encouraged to call for assist;with family/caregiver  -SD           User Key  (r) = Recorded By, (t) = Taken By, (c) = Cosigned By    Initials Name Provider Type    Cecelia Johnson OT Occupational Therapist               Outcome Measures     Row Name 05/24/23 1636          How much help from another is currently needed...    Putting on and taking off regular lower body clothing? 2  -SD     Bathing (including washing, rinsing, and drying) 2  -SD     Toileting (which includes using toilet bed pan or urinal) 2  -SD     Putting on and taking off regular upper body clothing 3  -SD     Taking care of personal grooming (such as brushing teeth) 3  -SD     Eating meals 3  -SD     AM-PAC 6 Clicks Score (OT) 15  -SD     Row Name 05/24/23 0800          How much help from another person do you currently need...    Turning from your back to your side while in flat bed without using bedrails? 3  -PK     Moving from lying on back to sitting on the side of a flat bed without bedrails? 3  -PK     Moving to and from a bed to a chair (including a wheelchair)? 2  -PK     Standing up from a chair using your arms (e.g., wheelchair, bedside chair)? 2  -PK     Climbing 3-5 steps with a railing? 1  -PK     To walk in hospital room? 2  -PK     AM-PAC 6 Clicks Score (PT) 13  -PK     Highest level of mobility 4 --> Transferred to chair/commode  -PK     Row Name 05/24/23 1636          Functional Assessment    Outcome Measure Options AM-PAC 6 Clicks  Daily Activity (OT)  -SD           User Key  (r) = Recorded By, (t) = Taken By, (c) = Cosigned By    Initials Name Provider Type    Cecelia Johnson OT Occupational Therapist    Rhonda Oates, RN Registered Nurse                Occupational Therapy Education     Title: PT OT SLP Therapies (In Progress)     Topic: Occupational Therapy (In Progress)     Point: ADL training (Done)     Description:   Instruct learner(s) on proper safety adaptation and remediation techniques during self care or transfers.   Instruct in proper use of assistive devices.              Learning Progress Summary           Patient Acceptance, E,TB, VU by SD at 5/24/2023 1637    Comment: EC during ADLs.    Acceptance, E,TB, VU by SD at 5/23/2023 1309    Comment: Safety and sequencing during toileting task.    Acceptance, E, VU by AM at 5/15/2023 0701    Acceptance, E,TB, VU by  at 5/12/2023 1507    Comment: purpose of re-evaluation    Acceptance, E,TB, VU by SD at 5/8/2023 1222    Comment: Safety during tf    Acceptance, E, VU by SP at 5/5/2023 1146    Comment: OT edcuated pt on purpose of IE and POC. Pt is agreeable.                   Point: Home exercise program (Done)     Description:   Instruct learner(s) on appropriate technique for monitoring, assisting and/or progressing therapeutic exercises/activities.              Learning Progress Summary           Patient Acceptance, E,TB, VU by  at 5/22/2023 1549    Comment: benefit of do UB ex to improve functional strength    Acceptance, E,TB, VU by  at 5/19/2023 1339    Comment: importance of activity    Acceptance, E,TB, VU by SD at 5/16/2023 1246    Comment: Importance of progressing activity.   Family Acceptance, E,TB, VU by  at 5/22/2023 1549    Comment: benefit of do UB ex to improve functional strength    Acceptance, E,TB, VU by  at 5/19/2023 1339    Comment: importance of activity                   Point: Precautions (Not Started)     Description:   Instruct learner(s) on  prescribed precautions during self-care and functional transfers.              Learner Progress:  Not documented in this visit.          Point: Body mechanics (Not Started)     Description:   Instruct learner(s) on proper positioning and spine alignment during self-care, functional mobility activities and/or exercises.              Learner Progress:  Not documented in this visit.                      User Key     Initials Effective Dates Name Provider Type Discipline     06/16/21 -  Roselyn Donaldson Occupational Therapist OT    SD 06/16/21 -  Cecelia Knight OT Occupational Therapist OT    SP 09/08/22 -  January Chapa OT Occupational Therapist OT    AM 02/22/23 -  Rob Lu, RN Registered Nurse Nurse              OT Recommendation and Plan     Plan of Care Review  Plan of Care Reviewed With: patient  Progress: improving  Outcome Evaluation: OT tx completed. Patient supine in bed, daughter present at bedside. Patient is on 8L HFNC satting 92%. Patient moved to EOB with min A, sitting EOB performed UBB mod A, grooming tasks with SBA, max A to tatum pull up. Patient performed bilateral elbow flex/ext. x 10. Patient performed sit to stand min A x 2 using RW, urinary incontinence noted, required max A for perineal hygiene and to complete donning of pull up, standing in total approx 2 mins. After resting patient able to perform additional sit to stand and transfer to chair; lowest O2 was 86%, had increased to 91% following activity.     Time Calculation:    Time Calculation- OT     Row Name 05/24/23 1637             Time Calculation- OT    OT Start Time 1444  -SD      OT Stop Time 1529  -SD      OT Time Calculation (min) 45 min  -SD      OT Received On 05/24/23  -SD      OT Goal Re-Cert Due Date 05/26/23  -SD         Timed Charges    47662 - OT Therapeutic Activity Minutes 15  -SD      90265 - OT Self Care/Mgmt Minutes 15  -SD         Total Minutes    Timed Charges Total Minutes 30  -SD       Total Minutes 30  -SD             User Key  (r) = Recorded By, (t) = Taken By, (c) = Cosigned By    Initials Name Provider Type    Cecelia Johnson OT Occupational Therapist              Therapy Charges for Today     Code Description Service Date Service Provider Modifiers Qty    [id number]  OT THER PROC EA 15 MIN 5/23/2023 Cecelia Knight OT GO 1    [id number]  OT SELF CARE/MGMT/TRAIN EA 15 MIN 5/23/2023 Cecelia Knight OT GO 1    [id number]  OT THERAPEUTIC ACT EA 15 MIN 5/24/2023 Cecelia Knight OT GO 1    [id number]  OT SELF CARE/MGMT/TRAIN EA 15 MIN 5/24/2023 Cecelia Knight OT GO 1               Cecelia Knight OT  5/24/2023

## 2023-05-25 ENCOUNTER — APPOINTMENT (OUTPATIENT)
Dept: GENERAL RADIOLOGY | Facility: HOSPITAL | Age: 78
DRG: 190 | End: 2023-05-25
Payer: MEDICARE

## 2023-05-25 LAB
GLUCOSE BLDC GLUCOMTR-MCNC: 119 MG/DL (ref 70–130)
GLUCOSE BLDC GLUCOMTR-MCNC: 136 MG/DL (ref 70–130)
GLUCOSE BLDC GLUCOMTR-MCNC: 206 MG/DL (ref 70–130)
GLUCOSE BLDC GLUCOMTR-MCNC: 242 MG/DL (ref 70–130)

## 2023-05-25 PROCEDURE — 94799 UNLISTED PULMONARY SVC/PX: CPT

## 2023-05-25 PROCEDURE — 97110 THERAPEUTIC EXERCISES: CPT

## 2023-05-25 PROCEDURE — 94660 CPAP INITIATION&MGMT: CPT

## 2023-05-25 PROCEDURE — 97530 THERAPEUTIC ACTIVITIES: CPT

## 2023-05-25 PROCEDURE — 25010000002 VANCOMYCIN 5 G RECONSTITUTED SOLUTION: Performed by: INTERNAL MEDICINE

## 2023-05-25 PROCEDURE — 63710000001 PREDNISONE PER 1 MG: Performed by: INTERNAL MEDICINE

## 2023-05-25 PROCEDURE — 99232 SBSQ HOSP IP/OBS MODERATE 35: CPT | Performed by: INTERNAL MEDICINE

## 2023-05-25 PROCEDURE — 71045 X-RAY EXAM CHEST 1 VIEW: CPT

## 2023-05-25 PROCEDURE — 99232 SBSQ HOSP IP/OBS MODERATE 35: CPT | Performed by: FAMILY MEDICINE

## 2023-05-25 PROCEDURE — 94668 MNPJ CHEST WALL SBSQ: CPT

## 2023-05-25 PROCEDURE — 94664 DEMO&/EVAL PT USE INHALER: CPT

## 2023-05-25 PROCEDURE — 25010000002 ENOXAPARIN PER 10 MG: Performed by: FAMILY MEDICINE

## 2023-05-25 PROCEDURE — 82948 REAGENT STRIP/BLOOD GLUCOSE: CPT

## 2023-05-25 PROCEDURE — 94761 N-INVAS EAR/PLS OXIMETRY MLT: CPT

## 2023-05-25 PROCEDURE — 25010000002 ONDANSETRON PER 1 MG: Performed by: FAMILY MEDICINE

## 2023-05-25 PROCEDURE — 63710000001 INSULIN ASPART PER 5 UNITS: Performed by: FAMILY MEDICINE

## 2023-05-25 PROCEDURE — 97535 SELF CARE MNGMENT TRAINING: CPT

## 2023-05-25 RX ORDER — PREDNISONE 20 MG/1
40 TABLET ORAL
Status: DISCONTINUED | OUTPATIENT
Start: 2023-05-26 | End: 2023-05-26

## 2023-05-25 RX ADMIN — IPRATROPIUM BROMIDE AND ALBUTEROL SULFATE 3 ML: 2.5; .5 SOLUTION RESPIRATORY (INHALATION) at 00:01

## 2023-05-25 RX ADMIN — Medication 3 ML: at 20:08

## 2023-05-25 RX ADMIN — SENNOSIDES AND DOCUSATE SODIUM 2 TABLET: 50; 8.6 TABLET ORAL at 20:07

## 2023-05-25 RX ADMIN — AMLODIPINE BESYLATE 10 MG: 5 TABLET ORAL at 09:48

## 2023-05-25 RX ADMIN — SODIUM CHLORIDE 30 MG/ML INHALATION SOLUTION 4 ML: 30 SOLUTION INHALANT at 19:45

## 2023-05-25 RX ADMIN — Medication 10 ML: at 20:08

## 2023-05-25 RX ADMIN — IPRATROPIUM BROMIDE AND ALBUTEROL SULFATE 3 ML: 2.5; .5 SOLUTION RESPIRATORY (INHALATION) at 12:40

## 2023-05-25 RX ADMIN — IPRATROPIUM BROMIDE AND ALBUTEROL SULFATE 3 ML: 2.5; .5 SOLUTION RESPIRATORY (INHALATION) at 07:03

## 2023-05-25 RX ADMIN — Medication 10 ML: at 09:50

## 2023-05-25 RX ADMIN — INSULIN ASPART 4 UNITS: 100 INJECTION, SOLUTION INTRAVENOUS; SUBCUTANEOUS at 17:21

## 2023-05-25 RX ADMIN — ONDANSETRON 4 MG: 2 INJECTION INTRAMUSCULAR; INTRAVENOUS at 13:14

## 2023-05-25 RX ADMIN — Medication 3 ML: at 09:50

## 2023-05-25 RX ADMIN — HYDROXYZINE HYDROCHLORIDE 25 MG: 25 TABLET ORAL at 15:52

## 2023-05-25 RX ADMIN — IPRATROPIUM BROMIDE AND ALBUTEROL SULFATE 3 ML: 2.5; .5 SOLUTION RESPIRATORY (INHALATION) at 19:45

## 2023-05-25 RX ADMIN — BUDESONIDE INHALATION 1 MG: 0.5 SUSPENSION RESPIRATORY (INHALATION) at 07:03

## 2023-05-25 RX ADMIN — ENOXAPARIN SODIUM 40 MG: 100 INJECTION SUBCUTANEOUS at 20:06

## 2023-05-25 RX ADMIN — GUAIFENESIN AND DEXTROMETHORPHAN HYDROBROMIDE 1 TABLET: 30; 600 TABLET, EXTENDED RELEASE ORAL at 20:07

## 2023-05-25 RX ADMIN — BUDESONIDE AND FORMOTEROL FUMARATE DIHYDRATE 2 PUFF: 160; 4.5 AEROSOL RESPIRATORY (INHALATION) at 07:03

## 2023-05-25 RX ADMIN — GUAIFENESIN AND DEXTROMETHORPHAN HYDROBROMIDE 1 TABLET: 30; 600 TABLET, EXTENDED RELEASE ORAL at 09:49

## 2023-05-25 RX ADMIN — MULTIPLE VITAMINS W/ MINERALS TAB 1 TABLET: TAB at 09:50

## 2023-05-25 RX ADMIN — FLUTICASONE PROPIONATE 2 SPRAY: 50 SPRAY, METERED NASAL at 09:51

## 2023-05-25 RX ADMIN — BUDESONIDE AND FORMOTEROL FUMARATE DIHYDRATE 2 PUFF: 160; 4.5 AEROSOL RESPIRATORY (INHALATION) at 19:45

## 2023-05-25 RX ADMIN — HYDROXYZINE HYDROCHLORIDE 25 MG: 25 TABLET ORAL at 09:49

## 2023-05-25 RX ADMIN — Medication 10 ML: at 09:51

## 2023-05-25 RX ADMIN — VANCOMYCIN HYDROCHLORIDE 1250 MG: 5 INJECTION, POWDER, LYOPHILIZED, FOR SOLUTION INTRAVENOUS at 00:22

## 2023-05-25 RX ADMIN — ATENOLOL 25 MG: 25 TABLET ORAL at 09:49

## 2023-05-25 RX ADMIN — PREDNISONE 40 MG: 20 TABLET ORAL at 09:49

## 2023-05-25 RX ADMIN — CETIRIZINE HYDROCHLORIDE 10 MG: 10 TABLET, FILM COATED ORAL at 09:49

## 2023-05-25 RX ADMIN — HYDROXYZINE HYDROCHLORIDE 25 MG: 25 TABLET ORAL at 20:07

## 2023-05-25 RX ADMIN — FLUTICASONE PROPIONATE 2 SPRAY: 50 SPRAY, METERED NASAL at 20:06

## 2023-05-25 RX ADMIN — ACETAMINOPHEN 650 MG: 325 TABLET, FILM COATED ORAL at 11:59

## 2023-05-25 NOTE — PLAN OF CARE
Problem: Skin Injury Risk Increased  Goal: Skin Health and Integrity  Outcome: Ongoing, Progressing  Intervention: Optimize Skin Protection  Recent Flowsheet Documentation  Taken 5/25/2023 1555 by Renzo Donato RN  Head of Bed (HOB) Positioning: HOB at 30-45 degrees  Taken 5/25/2023 1400 by Renzo Donato RN  Head of Bed (HOB) Positioning: HOB at 30-45 degrees  Taken 5/25/2023 1159 by Renzo Donato RN  Head of Bed (HOB) Positioning: HOB at 45 degrees  Taken 5/25/2023 0948 by Renzo Donato RN  Pressure Reduction Techniques:   frequent weight shift encouraged   heels elevated off bed   pressure points protected  Head of Bed (HOB) Positioning: HOB at 45 degrees  Pressure Reduction Devices:   pressure-redistributing mattress utilized   positioning supports utilized   heel offloading device utilized  Skin Protection:   adhesive use limited   incontinence pads utilized   protective footwear used   skin sealant/moisture barrier applied     Problem: Fall Injury Risk  Goal: Absence of Fall and Fall-Related Injury  Outcome: Ongoing, Progressing  Intervention: Identify and Manage Contributors  Recent Flowsheet Documentation  Taken 5/25/2023 0948 by Renzo Donato RN  Medication Review/Management: medications reviewed  Intervention: Promote Injury-Free Environment  Recent Flowsheet Documentation  Taken 5/25/2023 1555 by Renzo Donato RN  Safety Promotion/Fall Prevention:   activity supervised   assistive device/personal items within reach   clutter free environment maintained   fall prevention program maintained   nonskid shoes/slippers when out of bed   safety round/check completed  Taken 5/25/2023 1400 by Renzo Donato RN  Safety Promotion/Fall Prevention:   activity supervised   assistive device/personal items within reach   clutter free environment maintained   fall prevention program maintained   nonskid shoes/slippers when out of bed   safety round/check completed  Taken 5/25/2023 1159 by Renzo Donato RN  Safety  Promotion/Fall Prevention:   activity supervised   assistive device/personal items within reach   clutter free environment maintained   fall prevention program maintained   nonskid shoes/slippers when out of bed   safety round/check completed  Taken 5/25/2023 0948 by Renzo Donato RN  Safety Promotion/Fall Prevention:   activity supervised   assistive device/personal items within reach   clutter free environment maintained   fall prevention program maintained   nonskid shoes/slippers when out of bed   safety round/check completed     Problem: Adult Inpatient Plan of Care  Goal: Plan of Care Review  Outcome: Ongoing, Progressing  Flowsheets (Taken 5/25/2023 1557)  Progress: improving  Plan of Care Reviewed With:   patient   family  Outcome Evaluation: Vital signs stable. O2 titrated down to 6L NC with O2 sats>90%. Pain managed with PRN medications. Therapy worked with patient to improve strength and mobility. Nausea after chest physiotherapy managed with PRN medications. FSBG managed with SSI. Discharge pending to LTAC. No s/s of acute distress noted at this time.  Goal: Patient-Specific Goal (Individualized)  Outcome: Ongoing, Progressing  Goal: Absence of Hospital-Acquired Illness or Injury  Outcome: Ongoing, Progressing  Intervention: Identify and Manage Fall Risk  Recent Flowsheet Documentation  Taken 5/25/2023 1555 by Renzo Donato RN  Safety Promotion/Fall Prevention:   activity supervised   assistive device/personal items within reach   clutter free environment maintained   fall prevention program maintained   nonskid shoes/slippers when out of bed   safety round/check completed  Taken 5/25/2023 1400 by Renzo Donato RN  Safety Promotion/Fall Prevention:   activity supervised   assistive device/personal items within reach   clutter free environment maintained   fall prevention program maintained   nonskid shoes/slippers when out of bed   safety round/check completed  Taken 5/25/2023 1159 by Renzo Donato  RN  Safety Promotion/Fall Prevention:   activity supervised   assistive device/personal items within reach   clutter free environment maintained   fall prevention program maintained   nonskid shoes/slippers when out of bed   safety round/check completed  Taken 5/25/2023 0948 by Renzo Donato RN  Safety Promotion/Fall Prevention:   activity supervised   assistive device/personal items within reach   clutter free environment maintained   fall prevention program maintained   nonskid shoes/slippers when out of bed   safety round/check completed  Intervention: Prevent Skin Injury  Recent Flowsheet Documentation  Taken 5/25/2023 1555 by Renzo Donato RN  Body Position: sitting up in bed  Taken 5/25/2023 1400 by Renzo Donato RN  Body Position:   turned   left   head facing, left   legs elevated  Taken 5/25/2023 1159 by Renzo Donato RN  Body Position: sitting up in bed  Taken 5/25/2023 0948 by Renzo Donato RN  Body Position: sitting up in bed  Skin Protection:   adhesive use limited   incontinence pads utilized   protective footwear used   skin sealant/moisture barrier applied  Intervention: Prevent and Manage VTE (Venous Thromboembolism) Risk  Recent Flowsheet Documentation  Taken 5/25/2023 1555 by Renzo Donato RN  Activity Management: activity encouraged  Taken 5/25/2023 1400 by Renzo Donato RN  Activity Management: activity encouraged  Taken 5/25/2023 1159 by Renzo Donato RN  Activity Management: activity encouraged  Taken 5/25/2023 0948 by Renzo Donato RN  Activity Management: activity encouraged  VTE Prevention/Management: (subQ Lovenox) other (see comments)  Range of Motion: active ROM (range of motion) encouraged  Intervention: Prevent Infection  Recent Flowsheet Documentation  Taken 5/25/2023 1555 by Renzo Donato RN  Infection Prevention:   environmental surveillance performed   hand hygiene promoted   single patient room provided  Taken 5/25/2023 1400 by Renzo Donato RN  Infection Prevention:    environmental surveillance performed   hand hygiene promoted   single patient room provided  Taken 5/25/2023 1159 by Renzo Donato RN  Infection Prevention:   environmental surveillance performed   hand hygiene promoted   single patient room provided  Taken 5/25/2023 0948 by Renzo Donato RN  Infection Prevention:   environmental surveillance performed   hand hygiene promoted   single patient room provided  Goal: Optimal Comfort and Wellbeing  Outcome: Ongoing, Progressing  Intervention: Monitor Pain and Promote Comfort  Recent Flowsheet Documentation  Taken 5/25/2023 1159 by Renzo Donato RN  Pain Management Interventions:   position adjusted   pain management plan reviewed with patient/caregiver   see MAR  Intervention: Provide Person-Centered Care  Recent Flowsheet Documentation  Taken 5/25/2023 0948 by Renzo Donato RN  Trust Relationship/Rapport:   care explained   choices provided   reassurance provided  Goal: Readiness for Transition of Care  Outcome: Ongoing, Progressing     Problem: COPD (Chronic Obstructive Pulmonary Disease) Comorbidity  Goal: Maintenance of COPD Symptom Control  Outcome: Ongoing, Progressing  Intervention: Maintain COPD-Symptom Control  Recent Flowsheet Documentation  Taken 5/25/2023 0948 by Renzo Donato RN  Medication Review/Management: medications reviewed     Problem: Diabetes Comorbidity  Goal: Blood Glucose Level Within Targeted Range  Outcome: Ongoing, Progressing  Intervention: Monitor and Manage Glycemia  Recent Flowsheet Documentation  Taken 5/25/2023 0948 by Renzo Donato RN  Glycemic Management: blood glucose monitored     Problem: Hypertension Comorbidity  Goal: Blood Pressure in Desired Range  Outcome: Ongoing, Progressing  Intervention: Maintain Blood Pressure Management  Recent Flowsheet Documentation  Taken 5/25/2023 0948 by Renzo Donato RN  Medication Review/Management: medications reviewed     Problem: Noninvasive Ventilation Acute  Goal: Effective Unassisted  Ventilation and Oxygenation  Outcome: Ongoing, Progressing     Problem: Gas Exchange Impaired  Goal: Optimal Gas Exchange  Outcome: Ongoing, Progressing  Intervention: Optimize Oxygenation and Ventilation  Recent Flowsheet Documentation  Taken 5/25/2023 1555 by Renzo Donato RN  Head of Bed ([facility] [place]) Positioning: HOB at 30-45 degrees  Taken 5/25/2023 1400 by Renzo Donato RN  Head of Bed (HOB) Positioning: HOB at 30-45 degrees  Taken 5/25/2023 1159 by Renzo Donato RN  Head of Bed (HOB) Positioning: HOB at 45 degrees  Taken 5/25/2023 0948 by Renzo Donato RN  Head of Bed (HOB) Positioning: HOB at 45 degrees     Problem: Impaired Wound Healing  Goal: Optimal Wound Healing  Outcome: Ongoing, Progressing  Intervention: Promote Wound Healing  Recent Flowsheet Documentation  Taken 5/25/2023 1555 by Renzo Donato RN  Activity Management: activity encouraged  Taken 5/25/2023 1400 by Renzo Donato RN  Activity Management: activity encouraged  Taken 5/25/2023 1159 by Rezno Donato RN  Activity Management: activity encouraged  Pain Management Interventions:   position adjusted   pain management plan reviewed with patient/caregiver   see MAR  Taken 5/25/2023 0948 by Renzo Donato RN  Activity Management: activity encouraged     Problem: Adjustment to Illness (Sepsis/Septic Shock)  Goal: Optimal Coping  Outcome: Ongoing, Progressing     Problem: Bleeding (Sepsis/Septic Shock)  Goal: Absence of Bleeding  Outcome: Ongoing, Progressing  Intervention: Monitor and Manage Bleeding  Recent Flowsheet Documentation  Taken 5/25/2023 0948 by Renzo Donato RN  Bleeding Management: dressing monitored     Problem: Glycemic Control Impaired (Sepsis/Septic Shock)  Goal: Blood Glucose Level Within Desired Range  Outcome: Ongoing, Progressing  Intervention: Optimize Glycemic Control  Recent Flowsheet Documentation  Taken 5/25/2023 0948 by Renzo Donato RN  Glycemic Management: blood glucose monitored     Problem: Infection Progression (Sepsis/Septic  Shock)  Goal: Absence of Infection Signs and Symptoms  Outcome: Ongoing, Progressing  Intervention: Initiate Sepsis Management  Recent Flowsheet Documentation  Taken 5/25/2023 1555 by Renzo Donato RN  Infection Prevention:   environmental surveillance performed   hand hygiene promoted   single patient room provided  Taken 5/25/2023 1400 by Renzo Donato RN  Infection Prevention:   environmental surveillance performed   hand hygiene promoted   single patient room provided  Taken 5/25/2023 1159 by Renzo Donato RN  Infection Prevention:   environmental surveillance performed   hand hygiene promoted   single patient room provided  Taken 5/25/2023 0948 by Renzo Donato RN  Infection Prevention:   environmental surveillance performed   hand hygiene promoted   single patient room provided  Intervention: Promote Recovery  Recent Flowsheet Documentation  Taken 5/25/2023 1555 by Renzo Donato RN  Activity Management: activity encouraged  Taken 5/25/2023 1400 by Renzo Donato RN  Activity Management: activity encouraged  Taken 5/25/2023 1159 by Renzo Donato RN  Activity Management: activity encouraged  Taken 5/25/2023 0948 by Renzo Donato RN  Activity Management: activity encouraged  Intervention: Promote Stabilization  Recent Flowsheet Documentation  Taken 5/25/2023 0948 by Renzo Donato RN  Fluid/Electrolyte Management: fluids provided     Problem: Nutrition Impaired (Sepsis/Septic Shock)  Goal: Optimal Nutrition Intake  Outcome: Ongoing, Progressing   Goal Outcome Evaluation:  Plan of Care Reviewed With: patient, family        Progress: improving  Outcome Evaluation: Vital signs stable. O2 titrated down to 6L NC with O2 sats>90%. Pain managed with PRN medications. Therapy worked with patient to improve strength and mobility. Nausea after chest physiotherapy managed with PRN medications. FSBG managed with SSI. Discharge pending to LTAC. No s/s of acute distress noted at this time.

## 2023-05-25 NOTE — PLAN OF CARE
Goal Outcome Evaluation:      I spoke with pt at her bedside.  Pt requested information about a Living Will, which I provided.  I explained the decisions to be made, and how we document the decisions.  She wanted to have more time to decide who she would designate as her decision maker.  I left the form for her to read, but will not be here over the weekend.I offered to meet with her next week if she is here, or for her to call and make an appointment for me to help her after she is discharged.

## 2023-05-25 NOTE — PROGRESS NOTES
[facility]    Name:  Carolin Toscano   Age:  77 y.o.  Sex:  female  :  1945  MRN:  [id number]   Visit Number:  [id number]  Admission Date:  2023  Date Of Service:  23  Primary Care Physician:  Jason Nicholson MD     LOS: 21 days :    Chief Complaint:      Shortness of breath    Subjective:    Patient was seen and examined today.  Patient sitting up in bed and getting ready to participating with therapy.  Patient titrated down on her oxygen to 6 L, other vitals are stable.  Patient denies any pain or discomfort.  Patient denies shortness of breath at rest.  No other concerns or acute events overnight.  Patient seems to have agreed to go to [place] for LTAC.     Hospital Course:    Patient is a 77 years old female with a past medical history of COPD, chronic respiratory failure on 5 to 6 L per her previous discharge, on NIV machine at home and trelegy, hypertension, and ongoing tobacco use who presented to the ER from home by EMS with a chief complaint of shortness of breath.  Patient reporting that she started having shortness of breath associated with productive cough since last night and has persisted and became worse that promoted her to call EMS.  EMS has found the patient to be at 65% saturation on her normal 6 L of oxygen. Patient reports compliance with her NIV machine at home.      On ER evaluation, Patient was found to be tachycardic with a heart rate of 130s, temperature of 100.2 and respirations of 30, /72.  Patient was placed on BiPAP with FiO2 of 40%.  Her ABG came back and showed pH of 7.296/PCO2 87/PO2 94/HCO3 42/saturation 97% on 5 L nasal cannula.  HS Troponin 44, proBNP WNL, glucose 192, CO2 of 40, otherwise CBC and CMP within acceptable range.  Lactate and Pro-Adi WNL.  Respiratory panel positive for parainfluenza virus.  Chest x-ray Mild interstitial disease  bilaterally is similar to prior. No area of consolidation is seen.  Patient received Solu-Medrol and Aztreonam while in the ER.  Hospitalist consulted for admission.     Patient was admitted and treated with steroids.  Antibiotics were not indicated.  Pulmonology consulted and was moved to ICU on 5/9 due to worsening respiratory failure and tachypnea.  She initially had to be on Precedex drip which was discontinued and switched to Xanax due to anxiety. Patient was placed on AVAPS.  Patient continued on steroids and received Lasix.    5/16/23: d/w nsg intermittently on avaps and hi flow. Patient awake in bed. CXR from 5/15 reviewed. Medications reviewed.  Discussed with case management they are [facility].     5/17/2023 discussed with case management still working on [facility].  I am planning to do a peer to peer with them tomorrow.  Otherwise has persistent hypoxia.  Dr. Soriano's note reviewed considering bronchoscopy.     5/18: Daughter Regla is coming today.  Otherwise not much change still having dyspnea and hypoxemia.  Case management note reviewed.     5/19: d/w patient and family updated them on [facility] and pending appeal which will likely be submitted Monday. Still having dyspnea but mucous is breaking loose today.  Still debating about bronchoscopy.  Interested in long-term acute care possibly in [place] versus [place].     5/20 discussed with patient as well as family at the bedside.  Also discussed with them regarding placing access which she was able to get a PICC line.  Also discussed with them the staph growing in her sputum and that I was going to add vancomycin for this.     5/21: c/o [facility] suspect is due to mucous. Agreeable to flonase, zyrtec and atarax. Labs reviewed.    Review of Systems:     All systems were reviewed and negative except as mentioned in subjective, assessment and plan.    Vital Signs:    Temp:  [97.3 °F (36.3 °C)-98.7 °F (37.1 °C)] 98.2 °F (36.8 °C)  Heart Rate:  [55-85] 73  Resp:  [17-23] 22  BP:  (104-122)/(49-58) 113/50    Intake and output:    I/O last 3 completed shifts:  In: 1580 [P.O.:1080; IV Piggyback:500]  Out: 1500 [Urine:1500]  I/O this shift:  In: 360 [P.O.:360]  Out: -     Physical Examination:    General Appearance:  Alert and cooperative.  Chronically ill-appearing.  Frail.   Head:  Atraumatic and normocephalic.   Eyes: Conjunctivae and sclerae normal, no icterus. No pallor.   Throat: No oral lesions, no thrush, oral mucosa moist.   Neck: Supple, trachea midline, no thyromegaly.   Lungs:   Breath sounds heard bilaterally equally.  Expiratory wheezing heard. Decreased air movement bilaterally.  Nontachypneic on supplemental oxygen.    Heart:  Normal S1 and S2, no murmur, no gallop, no rub. No JVD.   Abdomen:   Normal bowel sounds, no masses, no organomegaly. Soft, nontender, nondistended, no rebound tenderness.   Extremities: Supple, no edema, no cyanosis, no clubbing.   Skin: No bleeding or rash.   Neurologic: Alert and oriented x 3. No facial asymmetry. Moves all four limbs. No tremors.  Generalized weakness noted.     Laboratory results:    Results from last 7 days   Lab Units 05/24/23  0611 05/23/23  0609 05/22/23  0515 05/21/23  0453 05/20/23  0426   SODIUM mmol/L 139 138 138 140 139   POTASSIUM mmol/L 4.5 4.6 4.5 4.7 3.8   CHLORIDE mmol/L 102 100 100 98 94*   CO2 mmol/L 31.8* 30.3* 31.5* 36.7* 38.8*   BUN mg/dL 18 14 22 24* 25*   CREATININE mg/dL 0.26* 0.27* 0.26* 0.32* 0.28*   CALCIUM mg/dL 9.3 8.9 8.8 8.5* 8.9   BILIRUBIN mg/dL  --   --  0.2 0.2 0.3   ALK PHOS U/L  --   --  74 77 72   ALT (SGPT) U/L  --   --  35* 35* 26   AST (SGOT) U/L  --   --  14 19 12   GLUCOSE mg/dL 155* 138* 111* 120* 123*     Results from last 7 days   Lab Units 05/24/23  0611 05/23/23  0609 05/22/23  0515   WBC 10*3/mm3 9.60 9.97 11.59*   HEMOGLOBIN g/dL 10.3* 10.5* 11.0*   HEMATOCRIT % 33.1* 33.3* 35.6   PLATELETS 10*3/mm3 212 215 211             Results from last 7 days   Lab Units 05/18/23  1101 05/18/23  1054    BLOODCX  No growth at 5 days No growth at 5 days     Recent Labs     05/21/23  0714 05/22/23  1307 05/24/23  0719   PHART 7.394 7.447 7.394   VYA5LCA 70.2* 53.3* 59.3*   PO2ART 95.2 50.2* 82.1   XCT5FNP 42.8* 36.8* 36.2*   BASEEXCESS 15.1* 11.1* 9.6*      I have reviewed the patient's laboratory results.    Radiology results:    No radiology results from the last 24 hrs  I have reviewed the patient's radiology reports.    Medication Review:     I have reviewed the patient's active and prn medications.     Problem List:      Acute on chronic respiratory failure with hypoxia and hypercapnia      Assessment:    Acute on chronic respiratory failure with hypoxia and hypercapnia, likely secondary to #2 and 3, POA  Acute COPD exacerbation, POA  Parainfluenza infection, POA  Elevated troponin, likely secondary to demand ischemia, POA  Hypertension  Chronic tobacco abuse  Pulmonary hypertension  Arthritis  Deconditioning    Plan:    Respiratory failure with hypoxia/hypercapnia, COPD exacerbation, parainfluenza  -Continue supplemental oxygen to keep saturation above 90%, patient on 5 to 6 L at baseline, continue to titrate down as able to.  Continue BiPAP therapy.  -Has NIV at home. Currently on 10 liters  -Recent parainfluenza virus infection met SIRS due to this  -Tapered down to po prednisone, will do a longer taper on discharge.  -Bronchodilators, Mucinex MetaNeb and supportive care.  -Dr. Soriano following, appreciate his recommendations  -s/p lasix  -Xanax prn  -Sputum growing Staph aureus and haemophilus, finished vancomycin and azithromycin.  - flonase, zyrtec, and atarax for upper airway congestion  -Continue PT/OT.  -I personally discussed with Dr. Cline today, patient appears at her new baseline.  -Patient will benefit from close pulmonology follow-up and outpatient PFTs.      Hyperglycemia  -sliding scale insulin   -Hemoglobin A1c 6.7     Further orders as indicated per clinical course.     PT/ OT consulted,  case management consulted, appreciate their help.  Will consult with palliative care, appreciate their assistance.     DVT Prophylaxis: Lovenox prophylaxis (benefit> risk)  Code Status: Full  Diet: Cardiac  Discharge Plan: Patient should be ready for LTAC versus rehab, seems to have agreed to go to [place] and was accepted there but was declined by her insurance.  Case management following.    Lolis Vital MD  05/25/23  10:08 EDT    Dictated utilizing Dragon dictation.

## 2023-05-25 NOTE — PLAN OF CARE
Goal Outcome Evaluation:  Plan of Care Reviewed With: patient        Progress: no change  Outcome Evaluation: Patient reports nausea and does not feel she can get up or out of bed at this time.  She is able to perform supine bed exercises as above and is instructed to perform the exercises again several times this afternoon and evening.  Patient is expected to benefit from continued PT per POC.

## 2023-05-25 NOTE — PLAN OF CARE
Goal Outcome Evaluation:  Plan of Care Reviewed With: patient        Progress: no change  Outcome Evaluation: VSS. No acute changed this shift. No distress noted at this time. Patient pain controled with medication. Patient rested well this shift.

## 2023-05-25 NOTE — THERAPY TREATMENT NOTE
"Patient Name: Carolin Toscano  : 1945    MRN: [id number]                              Today's Date: 2023       Admit Date: 2023    Visit Dx:     ICD-10-CM ICD-9-CM   1. Acute on chronic respiratory failure with hypoxia and hypercapnia  J96.21 518.84    J96.22 786.09     799.02   2. COPD exacerbation  J44.1 491.21   3. Parainfluenza virus infection  B34.8 078.89     Patient Active Problem List   Diagnosis   • CAP (community acquired pneumonia)   • Cigarette nicotine dependence with nicotine-induced disorder   • Essential hypertension   • Dyslipidemia   • Blood glucose elevated   • Muscle ache   • COPD (chronic obstructive pulmonary disease)   • Nuclear sclerotic cataract of right eye   • Acute respiratory failure   • COPD exacerbation   • Acute on chronic respiratory failure with hypoxia and hypercapnia     Past Medical History:   Diagnosis Date   • Arthritis    • COPD (chronic obstructive pulmonary disease)    • Gall stones    • Goiter     \"inward\"   • Hypertension    • Hypertension    • Kidney infection    • Kidney stone    • Kidney stones    • Migraine headache    • Scarlet fever      Past Surgical History:   Procedure Laterality Date   • BLADDER SURGERY     • CATARACT EXTRACTION W/ INTRAOCULAR LENS IMPLANT Left 2022    Procedure: CATARACT PHACO EXTRACTION WITH INTRAOCULAR LENS IMPLANT LEFT;  Surgeon: Sathish Lopez MD;  Location: [facility];  Service: Ophthalmology;  Laterality: Left;   • CATARACT EXTRACTION W/ INTRAOCULAR LENS IMPLANT Right 2022    Procedure: CATARACT PHACO EXTRACTION WITH INTRAOCULAR LENS IMPLANT RIGHT;  Surgeon: Sathish Lopez MD;  Location: [facility];  Service: Ophthalmology;  Laterality: Right;   • CHOLECYSTECTOMY     • HYSTERECTOMY     • TONSILLECTOMY        General Information     Row Name 23 1302          Physical Therapy Time and Intention    Document Type therapy note (daily note)  -JR     Mode of Treatment physical therapy  -     Row Name " 05/25/23 1302          General Information    Patient Profile Reviewed yes  -           User Key  (r) = Recorded By, (t) = Taken By, (c) = Cosigned By    Initials Name Provider Type    Kavitha Michael PT Physical Therapist               Mobility     Row Name 05/25/23 1302          Transfers    Comment, (Transfers) Not performed today due to patient c/o nausea  -           User Key  (r) = Recorded By, (t) = Taken By, (c) = Cosigned By    Initials Name Provider Type    Kavitha Michael PT Physical Therapist               Obj/Interventions     Row Name 05/25/23 1302          Motor Skills    Therapeutic Exercise hip;knee;ankle  -     Row Name 05/25/23 1302          Hip (Therapeutic Exercise)    Hip (Therapeutic Exercise) strengthening exercise  -     Hip Strengthening (Therapeutic Exercise) bilateral;heel slides;supine;10 repetitions;5 second hold  -     Row Name 05/25/23 1302          Knee (Therapeutic Exercise)    Knee (Therapeutic Exercise) strengthening exercise  -     Knee Strengthening (Therapeutic Exercise) bilateral;SAQ (short arc quad);SLR (straight leg raise);supine;10 repetitions;5 second hold  -     Row Name 05/25/23 1302          Ankle (Therapeutic Exercise)    Ankle (Therapeutic Exercise) strengthening exercise  -     Ankle Strengthening (Therapeutic Exercise) bilateral;dorsiflexion;plantarflexion;inversion;eversion;supine;10 repetitions;5 second hold  -JR           User Key  (r) = Recorded By, (t) = Taken By, (c) = Cosigned By    Initials Name Provider Type    Kavitha Michael PT Physical Therapist               Goals/Plan    No documentation.                Clinical Impression     Row Name 05/25/23 1302          Pain    Pre/Posttreatment Pain Comment Headache, getting better since they gave her tylenol  -[facility] Name 05/25/23 1302          Plan of Care Review    Plan of Care Reviewed With patient  -     Progress no change  -     Outcome Evaluation Patient reports nausea and does  not feel she can get up or out of bed at this time.  She is able to perform supine bed exercises as above and is instructed to perform the exercises again several times this afternoon and evening.  Patient is expected to benefit from continued PT per POC.  -     Row Name 05/25/23 1302          Positioning and Restraints    Pre-Treatment Position in bed  -JR     Post Treatment Position bed  -JR     In Bed fowlers;call light within reach;encouraged to call for assist  -JR           User Key  (r) = Recorded By, (t) = Taken By, (c) = Cosigned By    Initials Name Provider Type    Kavitha Michael, PT Physical Therapist               Outcome Measures     Row Name 05/25/23 1302 05/25/23 0948       How much help from another person do you currently need...    Turning from your back to your side while in flat bed without using bedrails? 3  -JR 3  -ENRIQUETA    Moving from lying on back to sitting on the side of a flat bed without bedrails? 3  -JR 3  -ENRIQUETA    Moving to and from a bed to a chair (including a wheelchair)? 2  -JR 2  -ENRIQUETA    Standing up from a chair using your arms (e.g., wheelchair, bedside chair)? 2  -JR 2  -ENRIQUETA    Climbing 3-5 steps with a railing? 2  -JR 2  -ENRIQUETA    To walk in hospital room? 2  -JR 2  -ENRIQUETA    AM-PAC 6 Clicks Score (PT) 14  -JR 14  -ENRIQUETA    Highest level of mobility 4 --> Transferred to chair/commode  -JR 4 --> Transferred to chair/commode  -ENRIQUETA    Row Name 05/25/23 1302 05/25/23 1249       Functional Assessment    Outcome Measure Options AM-PAC 6 Clicks Basic Mobility (PT)  -JR AM-PAC 6 Clicks Daily Activity (OT)  -SD          User Key  (r) = Recorded By, (t) = Taken By, (c) = Cosigned By    Initials Name Provider Type    Kavitha Michael, PT Physical Therapist    Cecelia Johnson OT Occupational Therapist    Renzo Sesay, RN Registered Nurse                             Physical Therapy Education     Title: PT OT SLP Therapies (In Progress)     Topic: Physical Therapy (Done)     Point: Mobility  training (Done)     Learning Progress Summary           Patient Acceptance, E,TB, VU by JR at 5/24/2023 1749    Comment: Advancement of POC    Acceptance, E,TB,D, VU,NR by RM at 5/23/2023 1316    Comment: proper breathing techniques with mobility    Acceptance, E,TB,D, VU,NR by RM at 5/22/2023 1358    Comment: Exercise tech and importance of dily activity    Acceptance, E, VU by MS at 5/16/2023 1403    Comment: importance of mobility    Acceptance, E, VU by AM at 5/15/2023 0701    Acceptance, E, VU by MS at 5/12/2023 1458    Comment: importance of mobility    Acceptance, E,TB,D, VU,NR by RM at 5/8/2023 1250    Comment: Importance of activity and exercise techniques    Acceptance, E, VU by MS at 5/5/2023 1136    Comment: importance of mobility                   Point: Home exercise program (Done)     Learning Progress Summary           Patient Acceptance, E,TB, VU by JR at 5/25/2023 1515    Comment: Importance of BLE exercises to improve mobility and confidence with mobility    Acceptance, E,TB,D, VU,NR by RM at 5/22/2023 1358    Comment: Exercise tech and importance of dily activity    Acceptance, E,D, VU,DU by TW at 5/21/2023 1205    Comment: Pt education for improving BLE ther ex technique    Acceptance, E, VU by MS at 5/16/2023 1403    Comment: importance of mobility    Acceptance, E, VU by AM at 5/15/2023 0701    Acceptance, E,TB, VU,NR by CC at 5/14/2023 1310    Comment: Importance of increasing movement of B LE    Acceptance, E,TB,D, VU,NR by RM at 5/8/2023 1250    Comment: Importance of activity and exercise techniques    Acceptance, E, VU by MS at 5/5/2023 1136    Comment: importance of mobility                   Point: Body mechanics (Done)     Learning Progress Summary           Patient Acceptance, E,TB, VU by JR at 5/17/2023 1846    Comment: upright posture                   Point: Precautions (Done)     Learning Progress Summary           Patient Acceptance, E,TB, VU by JR at 5/24/2023 1749    Comment:  Importance of breathing coordination and pacing to recovery                               User Key     Initials Effective Dates Name Provider Type Discipline    JR 03/23/22 -  Kavitha Tavarez, PT Physical Therapist PT    CC 06/16/21 -  Nasra Quiñones PTA Physical Therapist Assistant PT    RM 06/16/21 -  Ronak Cruz PTA Physical Therapist Assistant PT    TW 06/16/21 -  Deysi Goodwin, ROB Physical Therapist PT    MS 07/01/22 -  Martín Ortiz, PT Physical Therapist PT    AM 02/22/23 -  Rob Lu, RN Registered Nurse Nurse              PT Recommendation and Plan  Planned Therapy Interventions (PT): strengthening, balance training, bed mobility training, transfer training, gait training, home exercise program, patient/family education, other (see comments) (breathing coordination and pacing)  Plan of Care Reviewed With: patient  Progress: no change  Outcome Evaluation: Patient reports nausea and does not feel she can get up or out of bed at this time.  She is able to perform supine bed exercises as above and is instructed to perform the exercises again several times this afternoon and evening.  Patient is expected to benefit from continued PT per POC.     Time Calculation:    PT Charges     Row Name 05/25/23 1517             Time Calculation    Start Time 1302  -JR      Stop Time 1325  -JR      Time Calculation (min) 23 min  -JR      PT Received On 05/25/23  -      PT Goal Re-Cert Due Date 06/03/23  -         Time Calculation- PT    Total Timed Code Minutes- PT 23 minute(s)  -JR         Timed Charges    37628 - PT Therapeutic Exercise Minutes 23  -JR         Total Minutes    Timed Charges Total Minutes 23  -JR       Total Minutes 23  -JR            User Key  (r) = Recorded By, (t) = Taken By, (c) = Cosigned By    Initials Name Provider Type     Kavitha Tavarez, PT Physical Therapist              Therapy Charges for Today     Code Description Service Date Service Provider Modifiers Qty    62020706994 HC GAIT  TRAINING EA 15 MIN 5/24/2023 Kavitha Tavarez, PT GP 1    51106634407 HC PT RE-EVAL ESTABLISHED PLAN 2 5/24/2023 Kavitha Tavarez, PT GP 1    47112833195 HC PT THER PROC EA 15 MIN 5/25/2023 Kavitha Tavarez, PT GP 2          PT G-Codes  Outcome Measure Options: AM-PAC 6 Clicks Basic Mobility (PT)  AM-PAC 6 Clicks Score (PT): 14  AM-PAC 6 Clicks Score (OT): 15  PT Discharge Summary  Anticipated Discharge Disposition (PT): inpatient rehabilitation facility, LTCH (long-term care hospital), skilled nursing facility    Kavitha Tavarez, PT  5/25/2023

## 2023-05-25 NOTE — PROGRESS NOTES
"  CC: Acute Respiratory Failure.     S: Overall, feeling a little bit better.  Oxygen weaned down to 6 L nasal cannula.  States her appetite is slowly improving.  Still with cough and coughing up secretions, but denies any fevers or chills.    ROS: Complains of cough generalized weakness.     O:Vital signs reviewed. FiO2: 6 L nasal cannula  /48 (BP Location: Left arm, Patient Position: Sitting)   Pulse 75   Temp 97.8 °F (36.6 °C) (Oral)   Resp 18   Ht 160 cm (63\")   Wt 69.1 kg (152 lb 5.4 oz)   SpO2 94%   BMI 26.99 kg/m²     Temp (24hrs), Av.1 °F (36.7 °C), Min:97.8 °F (36.6 °C), Max:98.7 °F (37.1 °C)      I & Os reviewed.   Intake/Output       23 0700 - 23 0659 23 0700 - 23 0659    Intake (ml) 1330 720    Output (ml) 1250 --    Net (ml) 80 720    Last Weight 69.1 kg (152 lb 5.4 oz) --          Net IO Since Admission: -5,741.33 mL [23 1443]    General/Constitutional: Off NIV therapy.  Appears chronically ill and deconditioned  Eyes: PERRL. EOMI  Neck: Supple without JVD. No obvious masses noted.   Cardiovascular: S1 + S2.  Regular rate at this time.  Lungs/Respiratory: Bibasilar crackles and scattered wheezing   GI/Abdomen: Soft. Bowel sounds positive.  No obvious tenderness to palpation   musculoskeletal/Extremities: Right-sided PICC line in place.  Sarcopenia and deconditioning noted  Neurologic: Appropriate in her responses.  Moving all 4 extremities spontaneously, answering questions appropriately  Psych: AAO x 3.  Did not appear anxious during exam   skin: No obvious rashes on exposed skin    Labs: Reviewed.   Results from last 7 days   Lab Units 23  0611 23  0609 23  0515 23  0453 23  0426   WBC 10*3/mm3 9.60 9.97 11.59* 13.10* 15.22*   HEMOGLOBIN g/dL 10.3* 10.5* 11.0* 11.0* 11.3*   HEMATOCRIT % 33.1* 33.3* 35.6 33.8* 36.5   PLATELETS 10*3/mm3 212 215 211 212 209       Lab Results   Component Value Date    PROCALCITO 0.17 2023 "    PROCALCITO 0.11 05/16/2023    PROCALCITO 0.08 05/14/2023       No results found for: CRP    No results found for: SEDRATE    Lab Results   Component Value Date    PROBNP 572.7 05/14/2023    PROBNP 479.4 05/10/2023    PROBNP 234.4 05/04/2023       Results from last 7 days   Lab Units 05/24/23  0611 05/23/23  0609 05/22/23  0515 05/21/23  0453 05/20/23  0426   SODIUM mmol/L 139 138 138 140 139   POTASSIUM mmol/L 4.5 4.6 4.5 4.7 3.8   CHLORIDE mmol/L 102 100 100 98 94*   CO2 mmol/L 31.8* 30.3* 31.5* 36.7* 38.8*   BUN mg/dL 18 14 22 24* 25*   CREATININE mg/dL 0.26* 0.27* 0.26* 0.32* 0.28*   CALCIUM mg/dL 9.3 8.9 8.8 8.5* 8.9   ANION GAP mmol/L 5.2 7.7 6.5 5.3 6.2   BILIRUBIN mg/dL  --   --  0.2 0.2 0.3   ALK PHOS U/L  --   --  74 77 72   ALT (SGPT) U/L  --   --  35* 35* 26   AST (SGOT) U/L  --   --  14 19 12   GLUCOSE mg/dL 155* 138* 111* 120* 123*   TOTAL PROTEIN g/dL  --   --  5.1* 5.1* 5.4*   ALBUMIN g/dL  --   --  2.5* 2.6* 2.5*                   No results found for: CKTOTAL    No components found for: HSTROPT    Lab Results   Component Value Date    TROPONINT 22 (H) 05/05/2023    TROPONINT 25 (H) 05/05/2023    TROPONINT 44 (H) 05/04/2023       No results found for: DDIMER    Brief Urine Lab Results  (Last result in the past 365 days)      Color   Clarity   Blood   Leuk Est   Nitrite   Protein   CREAT   Urine HCG        05/12/23 1738 Yellow   Cloudy   Negative   Trace   Negative   Trace                 Lab Results   Component Value Date    TSH 0.304 05/09/2023       No results found for: FREET4      Micro: As of May 25, 2023   Lab Results   Component Value Date    RESPCX Moderate growth (3+) Haemophilus influenzae (A) 05/18/2023    RESPCX Light growth (2+) Staphylococcus aureus (A) 05/18/2023    RESPCX No Normal Respiratory Ana (A) 05/18/2023     No results found for: BCIDPCR  Lab Results   Component Value Date    BLOODCX No growth at 5 days 05/18/2023    BLOODCX No growth at 5 days 05/18/2023    BLOODCX No  growth at 5 days 05/04/2023    BLOODCX No growth at 5 days 05/04/2023     Lab Results   Component Value Date    URINECX Final report 02/07/2019    URINECX 20,000-30,000 CFU/mL Escherichia coli (A) 10/05/2018     No results found for: MRSACX  Lab Results   Component Value Date    MRSAPCR No MRSA Detected 04/15/2023     No results found for: URCX  No components found for: LOWRESPCF  No results found for: THROATCX  No results found for: CULTURES  No components found for: STREPBCX  No results found for: STREPPNEUAG  No results found for: LEGIONELLA  No results found for: MYCOPLASCX  No results found for: GCCX  No results found for: WOUNDCX  No results found for: BODYFLDCX    No results found for: FLU    Lab Results   Component Value Date    ADENOVIRUS Not Detected 05/04/2023     Lab Results   Component Value Date    HO792M Not Detected 05/04/2023     Lab Results   Component Value Date    CVHKU1 Not Detected 05/04/2023     Lab Results   Component Value Date    CVNL63 Not Detected 05/04/2023     Lab Results   Component Value Date    CVOC43 Not Detected 05/04/2023     Lab Results   Component Value Date    HUMETPNEVS Not Detected 05/04/2023     Lab Results   Component Value Date    HURVEV Not Detected 05/04/2023     Lab Results   Component Value Date    FLUBPCR Not Detected 05/04/2023     Lab Results   Component Value Date    PARAINFLUE Not Detected 05/04/2023     Lab Results   Component Value Date    PARAFLUV2 Not Detected 05/04/2023     Lab Results   Component Value Date    PARAFLUV3 Detected (A) 05/04/2023     Lab Results   Component Value Date    PARAFLUV4 Not Detected 05/04/2023     Lab Results   Component Value Date    BPERTPCR Not Detected 05/04/2023     No results found for: [id number]  Lab Results   Component Value Date    CPNEUPCR Not Detected 05/04/2023     Lab Results   Component Value Date    MPNEUMO Not Detected 05/04/2023     Lab Results   Component Value Date    FLUAPCR Not Detected 05/04/2023     No results  found for: FLUAH3  No results found for: FLUAH1  Lab Results   Component Value Date    RSV Not Detected 05/04/2023     Lab Results   Component Value Date    BPARAPCR Not Detected 05/04/2023       COVID 19:  Lab Results   Component Value Date    COVID19 Not Detected 05/04/2023         ABG: Reviewed   Recent Labs     05/21/23  0714 05/22/23  1307 05/24/23  0719   PHART 7.394 7.447 7.394   BKQ8YIV 70.2* 53.3* 59.3*   PO2ART 95.2 50.2* 82.1   AEK6RBK 42.8* 36.8* 36.2*   BASEEXCESS 15.1* 11.1* 9.6*       Lab Results   Component Value Date    LACTATE 1.3 05/04/2023    LACTATE 1.2 04/13/2023    LACTATE 2.7 (C) 04/13/2023         Echo: Results for orders placed during the hospital encounter of 04/13/23    Adult Transthoracic Echo Complete W/ Cont if Necessary Per Protocol    Interpretation Summary  •  Left ventricular diastolic function is consistent with (grade I) impaired relaxation.  •  Mild aortic valve stenosis is present.  •  Estimated right ventricular systolic pressure from tricuspid regurgitation is moderately elevated (45-55 mmHg).  •  Mild to moderate pulmonary hypertension is present.      Results for orders placed during the hospital encounter of 10/01/17    Adult Transthoracic Echo Complete W/ Cont if Necessary Per Protocol    Interpretation Summary  TEchnically difficult study  1) Borderline LVH with normal LV systolic function ( EF is over 55%)  2) Normal left atrial size with mild elevation in LVedp  3) Trace MR  4) Mild TR with normal PA pressures  5) Mild AI  6) Small RV with normal RV function        Pharmacy to Dose enoxaparin (LOVENOX),           CXRay: Latest imaging study was reviewed personally.   Imaging Results (Last 48 Hours)     Procedure Component Value Units Date/Time    XR Chest 1 View [[id number]] Resulted: 05/25/23 0519     Updated: 05/25/23 0519            Assessment & Recommendations/Plan:   1.  Acute respiratory failure  AM chest x-ray has final read pending, but mildly improved aeration  noted in left lung base on my review   continue humidified AVAPS 1 hour on and 3 hours off throughout the day and as needed otherwise.  Continue with NIV as tolerated  Will repeat ABG, as clinically indicated.  Continue to wean oxygen as tolerated    2.  COPD with acute exacerbation  Likely triggered by parainfluenza bronchitis  Last cultures positive for haemophilus influenza and MSSA.  Plan to continue vancomycin for total of 3-5 days.  Completed azithromycin.  Currently on oral prednisone 40 mg.  We will discontinue nebulized Pulmicort in the middle of the day as her oxygen requirements are improving  Continue Spiriva and Symbicort  Will benefit from outpatient PFTs    3.  Abnormal CT of the chest/atelectasis  Most likely related to atelectasis/mucous plugging.  Clinically improving.  Continue MetaNeb treatments.  We will finish course of hypertonic saline nebs  Continue Mucinex DM.    4.  Chronic respiratory acidosis  Appears to be compliant with NIV device at home.    5.  Pulmonary hypertension  May need outpatient work-up.  Likely secondary to underlying likely severe COPD    6.  Anxiety  Controlled with current regimen    7.  Smoking  Was advised to quit smoking    8.  Deconditioning  Significantly impacts all aspects of her care.   Appreciate PT and OT input.    9.  Miscellaneous  I have explained to the patient and her daughter that all possible noninvasive interventions are being undertaken and will continue these measures.    We have reviewed patient's current orders and changes, if any, have been suggested to primary care team. Plan was also discussed with nursing staff, as necessary.     This document was electronically signed by Miranda Cline MD on 05/25/23 at 14:43 EDT      Dictated utilizing Dragon dictation.

## 2023-05-25 NOTE — PLAN OF CARE
Goal Outcome Evaluation:  Plan of Care Reviewed With: patient        Progress: improving  Outcome Evaluation: OT tx completed. Patient on 6L HFNC satting 91%. Patient requesting bedpan d/t unable to quickly access BSC. Patient performed rolling right with CGA. Required max A for perineal hygiene following toileting. Patient moved to EOB CGA. Sitting EOB performed UBD with CGA, grooming tasks with SBA and UB ther ex using yellow theraband x 10 reps. Lowest O2 reading 87%. Patient assisted back to supine with CGA-Min A and placed in chair position. Continue OT POC

## 2023-05-25 NOTE — CASE MANAGEMENT/SOCIAL WORK
Case Management/Social Work    Patient Name:  Carolin Toscano  YOB: 1945  MRN: [id number]  Admit Date:  5/4/2023        SW attempted to contact [facility], no answer left message.     [facility] following/looking at minimum O2 they can take is 6L O2 working with therapy. Pt currently on 9L O2.     SW received message stating [facility] is reviewing referral and going to review at their facility meeting and will update this SW before lunch. SW following.       10:26 EDT SW received message from BRAYDON/[facility] in [place] who states they are working with insurance company regarding appeal and will update when able.       15:47 EDT SW received message from Lian/No who states insurance never received appeal letter from MD Nuñez. Lian sent over updated appeal letter for MD Vital to sign, who has now signed and completed. TIFFANIE sent appeal letter back over to Lian. Lian will update when able.         Electronically signed by:  NAOMI Fairbanks  05/25/23 10:01 EDT

## 2023-05-25 NOTE — THERAPY TREATMENT NOTE
"Patient Name: Carolin Toscano  : 1945    MRN: [id number]                              Today's Date: 2023       Admit Date: 2023    Visit Dx:     ICD-10-CM ICD-9-CM   1. Acute on chronic respiratory failure with hypoxia and hypercapnia  J96.21 518.84    J96.22 786.09     799.02   2. COPD exacerbation  J44.1 491.21   3. Parainfluenza virus infection  B34.8 078.89     Patient Active Problem List   Diagnosis   • CAP (community acquired pneumonia)   • Cigarette nicotine dependence with nicotine-induced disorder   • Essential hypertension   • Dyslipidemia   • Blood glucose elevated   • Muscle ache   • COPD (chronic obstructive pulmonary disease)   • Nuclear sclerotic cataract of right eye   • Acute respiratory failure   • COPD exacerbation   • Acute on chronic respiratory failure with hypoxia and hypercapnia     Past Medical History:   Diagnosis Date   • Arthritis    • COPD (chronic obstructive pulmonary disease)    • Gall stones    • Goiter     \"inward\"   • Hypertension    • Hypertension    • Kidney infection    • Kidney stone    • Kidney stones    • Migraine headache    • Scarlet fever      Past Surgical History:   Procedure Laterality Date   • BLADDER SURGERY     • CATARACT EXTRACTION W/ INTRAOCULAR LENS IMPLANT Left 2022    Procedure: CATARACT PHACO EXTRACTION WITH INTRAOCULAR LENS IMPLANT LEFT;  Surgeon: Sathish Lopez MD;  Location: [facility];  Service: Ophthalmology;  Laterality: Left;   • CATARACT EXTRACTION W/ INTRAOCULAR LENS IMPLANT Right 2022    Procedure: CATARACT PHACO EXTRACTION WITH INTRAOCULAR LENS IMPLANT RIGHT;  Surgeon: Sathish Lopez MD;  Location: [facility];  Service: Ophthalmology;  Laterality: Right;   • CHOLECYSTECTOMY     • HYSTERECTOMY     • TONSILLECTOMY        General Information     Row Name 23 1242          OT Time and Intention    Document Type therapy note (daily note)  -SD     Mode of Treatment occupational therapy  -SD     Row Name 23 " 1242          General Information    Patient Profile Reviewed yes  -SD           User Key  (r) = Recorded By, (t) = Taken By, (c) = Cosigned By    Initials Name Provider Type    Cecelia Johnson OT Occupational Therapist                 Mobility/ADL's     Row Name 05/25/23 1242          Bed Mobility    Bed Mobility supine-sit;sit-supine;rolling left;rolling right  -SD     Rolling Left Egan (Bed Mobility) contact guard  -SD     Rolling Right Egan (Bed Mobility) contact guard  -SD     Supine-Sit Egan (Bed Mobility) contact guard  -SD     Sit-Supine Egan (Bed Mobility) contact guard;minimum assist (75% patient effort)  -SD     Assistive Device (Bed Mobility) bed rails;head of bed elevated  -SD     Row Name 05/25/23 1242          Upper Body Dressing Assessment/Training    Egan Level (Upper Body Dressing) don;contact guard assist  -SD     Position (Upper Body Dressing) edge of bed sitting  -SD     Row Name 05/25/23 1242          Grooming Assessment/Training    Egan Level (Grooming) hair care, combing/brushing;wash face, hands;standby assist  -SD     Position (Grooming) edge of bed sitting  -SD     Row Name 05/25/23 1242          Toileting Assessment/Training    Egan Level (Toileting) perform perineal hygiene;maximum assist (25% patient effort)  -SD     Assistive Devices (Toileting) bedpan  -SD           User Key  (r) = Recorded By, (t) = Taken By, (c) = Cosigned By    Initials Name Provider Type    Cecelia Johnson OT Occupational Therapist               Obj/Interventions     Row Name 05/25/23 1244          Shoulder (Therapeutic Exercise)    Shoulder Strengthening (Therapeutic Exercise) bilateral;flexion;extension;10 repetitions  -SD     Row Name 05/25/23 1244          Elbow/Forearm (Therapeutic Exercise)    Elbow/Forearm AROM (Therapeutic Exercise) bilateral;flexion;10 repetitions;extension  -SD     Row Name 05/25/23 1244          Motor Skills     Therapeutic Exercise shoulder;elbow/forearm  -SD           User Key  (r) = Recorded By, (t) = Taken By, (c) = Cosigned By    Initials Name Provider Type    Cecelia Johnson OT Occupational Therapist               Goals/Plan    No documentation.                Clinical Impression     Row Name 05/25/23 1246          Pain Assessment    Pretreatment Pain Rating 0/10 - no pain  -SD     Posttreatment Pain Rating 0/10 - no pain  -SD     Row Name 05/25/23 1246          Plan of Care Review    Plan of Care Reviewed With patient  -SD     Progress improving  -SD     Outcome Evaluation OT tx completed. Patient on 6L HFNC satting 91%. Patient requesting bedpan d/t unable to quickly access BSC. Patient performed rolling right with CGA. Required max A for perineal hygiene following toileting. Patient moved to EOB CGA. Sitting EOB performed UBD with CGA, grooming tasks with SBA and UB ther ex using yellow theraband x 10 reps. Lowest O2 reading 87%. Patient assisted back to supine with CGA-Min A and placed in chair position. Continue OT POC  -SD     Row Name 05/25/23 1246          Vital Signs    Pre SpO2 (%) 90  -SD     O2 Delivery Pre Treatment hi-flow  -SD     Intra SpO2 (%) 87  -SD     O2 Delivery Intra Treatment hi-flow  -SD     Post SpO2 (%) 91  -SD     O2 Delivery Post Treatment hi-flow  -SD     Row Name 05/25/23 1246          Positioning and Restraints    Pre-Treatment Position in bed  -SD     Post Treatment Position bed  -SD     In Bed fowlers;call light within reach;encouraged to call for assist  -SD           User Key  (r) = Recorded By, (t) = Taken By, (c) = Cosigned By    Initials Name Provider Type    Cecelia Johnson OT Occupational Therapist               Outcome Measures     Row Name 05/25/23 1247          How much help from another is currently needed...    Putting on and taking off regular lower body clothing? 2  -SD     Bathing (including washing, rinsing, and drying) 2  -SD     Toileting (which includes  using toilet bed pan or urinal) 2  -SD     Putting on and taking off regular upper body clothing 3  -SD     Taking care of personal grooming (such as brushing teeth) 3  -SD     Eating meals 3  -SD     AM-PAC 6 Clicks Score (OT) 15  -SD     Row Name 05/25/23 0948          How much help from another person do you currently need...    Turning from your back to your side while in flat bed without using bedrails? 3  -ENRIQUETA     Moving from lying on back to sitting on the side of a flat bed without bedrails? 3  -ENRIQUETA     Moving to and from a bed to a chair (including a wheelchair)? 2  -ENRIQUETA     Standing up from a chair using your arms (e.g., wheelchair, bedside chair)? 2  -ENRIQUETA     Climbing 3-5 steps with a railing? 2  -ENRIQUETA     To walk in hospital room? 2  -ENRIQUETA     AM-PAC 6 Clicks Score (PT) 14  -ENRIQUETA     Highest level of mobility 4 --> Transferred to chair/commode  -ENRIQUETA     Row Name 05/25/23 1249          Functional Assessment    Outcome Measure Options AM-PAC 6 Clicks Daily Activity (OT)  -SD           User Key  (r) = Recorded By, (t) = Taken By, (c) = Cosigned By    Initials Name Provider Type    Cecelia Johnson OT Occupational Therapist    Renzo Sesay, RN Registered Nurse                Occupational Therapy Education     Title: PT OT SLP Therapies (In Progress)     Topic: Occupational Therapy (In Progress)     Point: ADL training (Done)     Description:   Instruct learner(s) on proper safety adaptation and remediation techniques during self care or transfers.   Instruct in proper use of assistive devices.              Learning Progress Summary           Patient Acceptance, E,TB, VU by SD at 5/24/2023 1637    Comment: EC during ADLs.    Acceptance, E,TB, VU by SD at 5/23/2023 1309    Comment: Safety and sequencing during toileting task.    Acceptance, E, VU by AM at 5/15/2023 0701    Acceptance, E,TB, VU by  at 5/12/2023 1507    Comment: purpose of re-evaluation    Acceptance, E,TB, VU by SD at 5/8/2023 1222    Comment:  Safety during tf    Acceptance, E, VU by  at 5/5/2023 1146    Comment: OT edcuated pt on purpose of IE and POC. Pt is agreeable.                   Point: Home exercise program (Done)     Description:   Instruct learner(s) on appropriate technique for monitoring, assisting and/or progressing therapeutic exercises/activities.              Learning Progress Summary           Patient Acceptance, E,TB, VU by SD at 5/25/2023 1249    Comment: Activity pacing during ther ex    Acceptance, E,TB, VU by  at 5/22/2023 1549    Comment: benefit of do UB ex to improve functional strength    Acceptance, E,TB, VU by  at 5/19/2023 1339    Comment: importance of activity    Acceptance, E,TB, VU by SD at 5/16/2023 1246    Comment: Importance of progressing activity.   Family Acceptance, E,TB, VU by  at 5/22/2023 1549    Comment: benefit of do UB ex to improve functional strength    Acceptance, E,TB, VU by  at 5/19/2023 1339    Comment: importance of activity                   Point: Precautions (Not Started)     Description:   Instruct learner(s) on prescribed precautions during self-care and functional transfers.              Learner Progress:  Not documented in this visit.          Point: Body mechanics (Not Started)     Description:   Instruct learner(s) on proper positioning and spine alignment during self-care, functional mobility activities and/or exercises.              Learner Progress:  Not documented in this visit.                      User Key     Initials Effective Dates Name Provider Type Discipline     06/16/21 -  Roselyn Donaldson Occupational Therapist OT    SD 06/16/21 -  Cecelia Knight, OT Occupational Therapist OT    SP 09/08/22 -  January Chapa OT Occupational Therapist OT    AM 02/22/23 -  Rob Lu, RN Registered Nurse Nurse              OT Recommendation and Plan     Plan of Care Review  Plan of Care Reviewed With: patient  Progress: improving  Outcome Evaluation: OT tx completed. Patient on 6L HFNC  satting 91%. Patient requesting bedpan d/t unable to quickly access BSC. Patient performed rolling right with CGA. Required max A for perineal hygiene following toileting. Patient moved to EOB CGA. Sitting EOB performed UBD with CGA, grooming tasks with SBA and UB ther ex using yellow theraband x 10 reps. Lowest O2 reading 87%. Patient assisted back to supine with CGA-Min A and placed in chair position. Continue OT POC     Time Calculation:    Time Calculation- OT     Row Name 05/25/23 1250             Time Calculation- OT    OT Start Time 1054  -SD      OT Stop Time 1130  -SD      OT Time Calculation (min) 36 min  -SD      OT Received On 05/25/23  -SD      OT Goal Re-Cert Due Date 05/26/23  -SD         Timed Charges    28311 - OT Therapeutic Exercise Minutes 9  -SD      01733 - OT Therapeutic Activity Minutes 15  -SD      20088 - OT Self Care/Mgmt Minutes 12  -SD         Total Minutes    Timed Charges Total Minutes 36  -SD       Total Minutes 36  -SD            User Key  (r) = Recorded By, (t) = Taken By, (c) = Cosigned By    Initials Name Provider Type    SD Cecelia Knight, JAMILA Occupational Therapist              Therapy Charges for Today     Code Description Service Date Service Provider Modifiers Qty    46378940892 HC OT THERAPEUTIC ACT EA 15 MIN 5/24/2023 Cecelia Knight OT GO 1    23082595112 HC OT SELF CARE/MGMT/TRAIN EA 15 MIN 5/24/2023 [place] OT GO 1    66713842981 HC OT THERAPEUTIC ACT EA 15 MIN 5/25/2023 Cecelia Knight OT GO 1    83083122664  OT SELF CARE/MGMT/TRAIN EA 15 MIN 5/25/2023 Cecelia Knight OT GO 1               Cecelia Knight OT  5/25/2023

## 2023-05-26 LAB
GLUCOSE BLDC GLUCOMTR-MCNC: 115 MG/DL (ref 70–130)
GLUCOSE BLDC GLUCOMTR-MCNC: 125 MG/DL (ref 70–130)
GLUCOSE BLDC GLUCOMTR-MCNC: 251 MG/DL (ref 70–130)

## 2023-05-26 PROCEDURE — 25010000002 ONDANSETRON PER 1 MG: Performed by: FAMILY MEDICINE

## 2023-05-26 PROCEDURE — 25010000002 ENOXAPARIN PER 10 MG: Performed by: FAMILY MEDICINE

## 2023-05-26 PROCEDURE — 94761 N-INVAS EAR/PLS OXIMETRY MLT: CPT

## 2023-05-26 PROCEDURE — 63710000001 INSULIN ASPART PER 5 UNITS: Performed by: FAMILY MEDICINE

## 2023-05-26 PROCEDURE — 99232 SBSQ HOSP IP/OBS MODERATE 35: CPT | Performed by: FAMILY MEDICINE

## 2023-05-26 PROCEDURE — 94799 UNLISTED PULMONARY SVC/PX: CPT

## 2023-05-26 PROCEDURE — 63710000001 PREDNISONE PER 1 MG: Performed by: FAMILY MEDICINE

## 2023-05-26 PROCEDURE — 94660 CPAP INITIATION&MGMT: CPT

## 2023-05-26 PROCEDURE — 99232 SBSQ HOSP IP/OBS MODERATE 35: CPT | Performed by: INTERNAL MEDICINE

## 2023-05-26 PROCEDURE — 94664 DEMO&/EVAL PT USE INHALER: CPT

## 2023-05-26 PROCEDURE — 82948 REAGENT STRIP/BLOOD GLUCOSE: CPT

## 2023-05-26 RX ORDER — PREDNISONE 10 MG/1
10 TABLET ORAL DAILY
Status: COMPLETED | OUTPATIENT
Start: 2023-05-30 | End: 2023-06-01

## 2023-05-26 RX ORDER — PREDNISONE 20 MG/1
20 TABLET ORAL DAILY
Status: COMPLETED | OUTPATIENT
Start: 2023-05-27 | End: 2023-05-29

## 2023-05-26 RX ADMIN — HYDROXYZINE HYDROCHLORIDE 25 MG: 25 TABLET ORAL at 15:25

## 2023-05-26 RX ADMIN — ACETAMINOPHEN 650 MG: 325 TABLET, FILM COATED ORAL at 20:58

## 2023-05-26 RX ADMIN — ATENOLOL 25 MG: 25 TABLET ORAL at 08:43

## 2023-05-26 RX ADMIN — PREDNISONE 40 MG: 20 TABLET ORAL at 08:42

## 2023-05-26 RX ADMIN — IPRATROPIUM BROMIDE AND ALBUTEROL SULFATE 3 ML: 2.5; .5 SOLUTION RESPIRATORY (INHALATION) at 19:34

## 2023-05-26 RX ADMIN — IPRATROPIUM BROMIDE AND ALBUTEROL SULFATE 3 ML: 2.5; .5 SOLUTION RESPIRATORY (INHALATION) at 07:03

## 2023-05-26 RX ADMIN — Medication 10 ML: at 08:43

## 2023-05-26 RX ADMIN — BUDESONIDE AND FORMOTEROL FUMARATE DIHYDRATE 2 PUFF: 160; 4.5 AEROSOL RESPIRATORY (INHALATION) at 07:04

## 2023-05-26 RX ADMIN — HYDROXYZINE HYDROCHLORIDE 25 MG: 25 TABLET ORAL at 20:51

## 2023-05-26 RX ADMIN — IPRATROPIUM BROMIDE AND ALBUTEROL SULFATE 3 ML: 2.5; .5 SOLUTION RESPIRATORY (INHALATION) at 00:37

## 2023-05-26 RX ADMIN — Medication 10 ML: at 08:46

## 2023-05-26 RX ADMIN — Medication 3 ML: at 08:46

## 2023-05-26 RX ADMIN — CETIRIZINE HYDROCHLORIDE 10 MG: 10 TABLET, FILM COATED ORAL at 08:43

## 2023-05-26 RX ADMIN — SENNOSIDES AND DOCUSATE SODIUM 2 TABLET: 50; 8.6 TABLET ORAL at 08:43

## 2023-05-26 RX ADMIN — Medication 10 ML: at 20:52

## 2023-05-26 RX ADMIN — HYDROXYZINE HYDROCHLORIDE 25 MG: 25 TABLET ORAL at 08:43

## 2023-05-26 RX ADMIN — FLUTICASONE PROPIONATE 2 SPRAY: 50 SPRAY, METERED NASAL at 08:44

## 2023-05-26 RX ADMIN — SENNOSIDES AND DOCUSATE SODIUM 2 TABLET: 50; 8.6 TABLET ORAL at 20:50

## 2023-05-26 RX ADMIN — ONDANSETRON 4 MG: 2 INJECTION INTRAMUSCULAR; INTRAVENOUS at 20:58

## 2023-05-26 RX ADMIN — MULTIPLE VITAMINS W/ MINERALS TAB 1 TABLET: TAB at 08:43

## 2023-05-26 RX ADMIN — SODIUM CHLORIDE 30 MG/ML INHALATION SOLUTION 4 ML: 30 SOLUTION INHALANT at 07:03

## 2023-05-26 RX ADMIN — ENOXAPARIN SODIUM 40 MG: 100 INJECTION SUBCUTANEOUS at 20:51

## 2023-05-26 RX ADMIN — GUAIFENESIN AND DEXTROMETHORPHAN HYDROBROMIDE 1 TABLET: 30; 600 TABLET, EXTENDED RELEASE ORAL at 08:43

## 2023-05-26 RX ADMIN — ACETAMINOPHEN 650 MG: 325 TABLET, FILM COATED ORAL at 08:43

## 2023-05-26 RX ADMIN — FLUTICASONE PROPIONATE 2 SPRAY: 50 SPRAY, METERED NASAL at 20:51

## 2023-05-26 RX ADMIN — Medication 3 ML: at 20:50

## 2023-05-26 RX ADMIN — AMLODIPINE BESYLATE 10 MG: 5 TABLET ORAL at 08:42

## 2023-05-26 RX ADMIN — INSULIN ASPART 6 UNITS: 100 INJECTION, SOLUTION INTRAVENOUS; SUBCUTANEOUS at 16:57

## 2023-05-26 RX ADMIN — IPRATROPIUM BROMIDE AND ALBUTEROL SULFATE 3 ML: 2.5; .5 SOLUTION RESPIRATORY (INHALATION) at 13:13

## 2023-05-26 NOTE — PLAN OF CARE
Goal Outcome Evaluation:  Plan of Care Reviewed With: patient        Progress: no change  Outcome Evaluation: VSS. No acute changes this shift. No distress noted at this time. Patient rested well this shift.

## 2023-05-26 NOTE — THERAPY TREATMENT NOTE
Pt checked on twice this date and reports being unable d/t bowel issues. Will f/u with pt tomorrow.

## 2023-05-26 NOTE — PROGRESS NOTES
[facility]    Name:  Carolin Toscano   Age:  77 y.o.  Sex:  female  :  1945  MRN:  [id number]   Visit Number:  [id number]  Admission Date:  2023  Date Of Service:  23  Primary Care Physician:  Jason Nicholson MD     LOS: 22 days :    Chief Complaint:      Shortness of breath    Subjective:    Patient was seen and examined today.  Patient sitting up in bed and appearing more energetic today.  Continues to have generalized weakness but appears to be improving slowly.  Patient on 6 L of oxygen through nasal cannula.  Continues to report doing good today and denies any complaints or pain.  Otherwise hemodynamically stable.      Hospital Course:    Patient is a 77 years old female with a past medical history of COPD, chronic respiratory failure on 5 to 6 L per her previous discharge, on NIV machine at home and trelegy, hypertension, and ongoing tobacco use who presented to the ER from home by EMS with a chief complaint of shortness of breath.  Patient reporting that she started having shortness of breath associated with productive cough since last night and has persisted and became worse that promoted her to call EMS.  EMS has found the patient to be at 65% saturation on her normal 6 L of oxygen. Patient reports compliance with her NIV machine at home.      On ER evaluation, Patient was found to be tachycardic with a heart rate of 130s, temperature of 100.2 and respirations of 30, /72.  Patient was placed on BiPAP with FiO2 of 40%.  Her ABG came back and showed pH of 7.296/PCO2 87/PO2 94/HCO3 42/saturation 97% on 5 L nasal cannula.  HS Troponin 44, proBNP WNL, glucose 192, CO2 of 40, otherwise CBC and CMP within acceptable range.  Lactate and Pro-Adi WNL.  Respiratory panel positive for parainfluenza virus.  Chest x-ray Mild interstitial disease  bilaterally is similar to prior. No area of consolidation is seen. Patient received Solu-Medrol and  Aztreonam while in the ER.  Hospitalist consulted for admission.     Patient was admitted and treated with steroids.  Antibiotics were not indicated.  Pulmonology consulted and was moved to ICU on 5/9 due to worsening respiratory failure and tachypnea.  She initially had to be on Precedex drip which was discontinued and switched to Xanax due to anxiety. Patient was placed on AVAPS.  Patient continued on steroids and received Lasix.    5/16/23: d/w nsg intermittently on avaps and hi flow. Patient awake in bed. CXR from 5/15 reviewed. Medications reviewed.  Discussed with case management they are looking at a bed [facility].     5/17/2023 discussed with case management still working on [facility].  I am planning to do a peer to peer with them tomorrow.  Otherwise has persistent hypoxia.  Dr. Soriano's note reviewed considering bronchoscopy.     5/18: Daughter Regla is coming today.  Otherwise not much change still having dyspnea and hypoxemia.  Case management note reviewed.     5/19: d/w patient and family updated them on [facility] and pending appeal which will likely be submitted Monday. Still having dyspnea but mucous is breaking loose today.  Still debating about bronchoscopy.  Interested in long-term acute care possibly in [place] versus [place].     5/20 discussed with patient as well as family at the bedside.  Also discussed with them regarding placing access which she was able to get a PICC line.  Also discussed with them the staph growing in her sputum and that I was going to add vancomycin for this.     5/21: c/o garrison suspect is due to mucous. Agreeable to flonase, zyrtec and atarax. Labs reviewed.    Review of Systems:     All systems were reviewed and negative except as mentioned in subjective, assessment and plan.    Vital Signs:    Temp:  [97.8 °F (36.6 °C)-98.9 °F (37.2 °C)] 98.9 °F (37.2 °C)  Heart Rate:  [62-88] 64  Resp:  [18-23] 22  BP: (103-126)/(47-60) 117/53    Intake and  output:    I/O last 3 completed shifts:  In: 1440 [P.O.:1440]  Out: 2200 [Urine:2200]  No intake/output data recorded.    Physical Examination:    General Appearance:  Alert and cooperative.  Chronically ill-appearing.  Frail.   Head:  Atraumatic and normocephalic.   Eyes: Conjunctivae and sclerae normal, no icterus. No pallor.   Throat: No oral lesions, no thrush, oral mucosa moist.   Neck: Supple, trachea midline, no thyromegaly.   Lungs:   Breath sounds heard bilaterally equally.  Expiratory wheezing heard. Decreased air movement bilaterally.  Nontachypneic on supplemental oxygen.    Heart:  Normal S1 and S2, no murmur, no gallop, no rub. No JVD.   Abdomen:   Normal bowel sounds, no masses, no organomegaly. Soft, nontender, nondistended, no rebound tenderness.   Extremities: Supple, no edema, no cyanosis, no clubbing.   Skin: No bleeding or rash.   Neurologic: Alert and oriented x 3. No facial asymmetry. Moves all four limbs. No tremors.  Generalized weakness noted.     Laboratory results:    Results from last 7 days   Lab Units 05/24/23  0611 05/23/23  0609 05/22/23  0515 05/21/23  0453 05/20/23  0426   SODIUM mmol/L 139 138 138 140 139   POTASSIUM mmol/L 4.5 4.6 4.5 4.7 3.8   CHLORIDE mmol/L 102 100 100 98 94*   CO2 mmol/L 31.8* 30.3* 31.5* 36.7* 38.8*   BUN mg/dL 18 14 22 24* 25*   CREATININE mg/dL 0.26* 0.27* 0.26* 0.32* 0.28*   CALCIUM mg/dL 9.3 8.9 8.8 8.5* 8.9   BILIRUBIN mg/dL  --   --  0.2 0.2 0.3   ALK PHOS U/L  --   --  74 77 72   ALT (SGPT) U/L  --   --  35* 35* 26   AST (SGOT) U/L  --   --  14 19 12   GLUCOSE mg/dL 155* 138* 111* 120* 123*     Results from last 7 days   Lab Units 05/24/23  0611 05/23/23  0609 05/22/23  0515   WBC 10*3/mm3 9.60 9.97 11.59*   HEMOGLOBIN g/dL 10.3* 10.5* 11.0*   HEMATOCRIT % 33.1* 33.3* 35.6   PLATELETS 10*3/mm3 212 215 211                 Recent Labs     05/21/23  0714 05/22/23  1307 05/24/23  0719   PHART 7.394 7.447 7.394   KOD6EKX 70.2* 53.3* 59.3*   PO2ART 95.2  50.2* 82.1   QGP0SIJ 42.8* 36.8* 36.2*   BASEEXCESS 15.1* 11.1* 9.6*      I have reviewed the patient's laboratory results.    Radiology results:    XR Chest 1 View    Result Date: 5/26/2023  CLINICAL INDICATION:  Respiratory Failure.; J96.21-Acute and chronic respiratory failure with hypoxia; J96.22-Acute and chronic respiratory failure with hypercapnia; J44.1-Chronic obstructive pulmonary disease with (acute) exacerbation; B34.8-Other viral infections of unspecified site  EXAMINATION TECHNIQUE: XR CHEST 1 VW-  COMPARISON: Radiographs 05/23/2023.  FINDINGS: Right upper extremity PICC line in place. Stable cardiomediastinal silhouette. Mildly decreased bibasilar airspace disease, left greater than right. No pneumothorax. Mildly improved aeration. Probable trace bilateral pleural effusions.      Impression: Mild interval improvement.  Images personally reviewed, interpreted and dictated by SHIRA Villa.       This report was signed and finalized on 5/26/2023 8:43 AM by SHIRA Villa.    I have reviewed the patient's radiology reports.    Medication Review:     I have reviewed the patient's active and prn medications.     Problem List:      Acute on chronic respiratory failure with hypoxia and hypercapnia      Assessment:    Acute on chronic respiratory failure with hypoxia and hypercapnia, likely secondary to #2 and 3, POA  Acute COPD exacerbation, POA  Parainfluenza infection, POA  Elevated troponin, likely secondary to demand ischemia, POA  Hypertension  Chronic tobacco abuse  Pulmonary hypertension  Arthritis  Deconditioning    Plan:    Respiratory failure with hypoxia/hypercapnia, COPD exacerbation, parainfluenza  -Continue supplemental oxygen to keep saturation above 90%, patient on 5 to 6 L at baseline, continue to titrate down as able to.  Continue BiPAP therapy.  -Has NIV at home. Currently on 10 liters  -Recent parainfluenza virus infection met SIRS due to this  -Tapered down to po  prednisone, will taper slowly.  -Bronchodilators, Mucinex MetaNeb and supportive care.  -Dr. Soriano following, appreciate his recommendations  -s/p lasix  -Xanax prn  -Sputum growing Staph aureus and haemophilus, finished vancomycin and azithromycin.  - flonase, zyrtec, and atarax for upper airway congestion  -Continue PT/OT.  -I discussed with pulmonology, patient likely at her baseline currently.  -Patient will benefit from close pulmonology follow-up and outpatient PFTs.      Hyperglycemia  -sliding scale insulin   -Hemoglobin A1c 6.7     Further orders as indicated per clinical course.     PT/ OT consulted, case management consulted, appreciate their help.  Will consult with palliative care, appreciate their assistance.     DVT Prophylaxis: Lovenox prophylaxis (benefit> risk)  Code Status: Full  Diet: Cardiac  Discharge Plan: Patient should be ready for LTAC versus rehab, seems to have agreed to go to [facility] and was accepted there but was declined by her insurance.  Case management following.    Lolis Vital MD  05/26/23  08:57 EDT    Dictated utilizing Dragon dictation.

## 2023-05-26 NOTE — PROGRESS NOTES
"  CC: Acute Respiratory Failure.     S: Continues to require high flow nasal cannula, currently at 6 L/min.  Is complaining of some weakness and also \"upset stomach\"    ROS: Complains of cough, shortness of breath and mild weakness.  Also complains of mild anxiety.    O:Vital signs reviewed.   /53 (BP Location: Left arm, Patient Position: Lying)   Pulse 64   Temp 98.9 °F (37.2 °C) (Axillary)   Resp 22   Ht 160 cm (63\")   Wt 68.7 kg (151 lb 7.3 oz)   SpO2 96%   BMI 26.83 kg/m²     Temp (24hrs), Av.2 °F (36.8 °C), Min:97.8 °F (36.6 °C), Max:98.9 °F (37.2 °C)      I & Os reviewed.   Intake/Output       23 0700 - 23 0659 23 0700 - 23 0659    Intake (ml) 1200 360    Output (ml) 2200 --    Net (ml) -1000 360    Last Weight 68.7 kg (151 lb 7.3 oz) --          Net IO Since Admission: -7,101.33 mL [23 0951]    General/Constitutional: Off NIV therapy.  Does not appear to be in any significant distress at this time.  Eyes: PERRL. EOMI  Neck: Supple without JVD. No obvious masses noted.   Cardiovascular: S1 + S2.  Regular at this time.  Lungs/Respiratory: Slightly improved bibasilar breath sounds noted.  Bilateral, somewhat scattered, wheezing heard.  Bibasilar crackles noted.  GI/Abdomen: Soft. Bowel sounds positive.  No obvious organomegaly  Musculoskeletal/Extremities: Right-sided PICC line in place.  No edema noted. Gait could not be assessed at this time, as the patient was laying in bed.   Neurologic: Appropriate in her responses.  Psych: AAO x 3. Was able to follow simple commands.    Skin: Appeared somewhat dry.      Labs: Reviewed.   Results from last 7 days   Lab Units 23  0611 23  0609 23  0515 23  0453 23  0426   WBC 10*3/mm3 9.60 9.97 11.59* 13.10* 15.22*   HEMOGLOBIN g/dL 10.3* 10.5* 11.0* 11.0* 11.3*   HEMATOCRIT % 33.1* 33.3* 35.6 33.8* 36.5   PLATELETS 10*3/mm3 212 215 211 212 209       Lab Results   Component Value Date    PROCALCITO " 0.17 05/21/2023    PROCALCITO 0.11 05/16/2023    PROCALCITO 0.08 05/14/2023       No results found for: CRP    No results found for: SEDRATE    Lab Results   Component Value Date    PROBNP 572.7 05/14/2023    PROBNP 479.4 05/10/2023    PROBNP 234.4 05/04/2023       Results from last 7 days   Lab Units 05/24/23  0611 05/23/23  0609 05/22/23  0515 05/21/23  0453 05/20/23  0426   SODIUM mmol/L 139 138 138 140 139   POTASSIUM mmol/L 4.5 4.6 4.5 4.7 3.8   CHLORIDE mmol/L 102 100 100 98 94*   CO2 mmol/L 31.8* 30.3* 31.5* 36.7* 38.8*   BUN mg/dL 18 14 22 24* 25*   CREATININE mg/dL 0.26* 0.27* 0.26* 0.32* 0.28*   CALCIUM mg/dL 9.3 8.9 8.8 8.5* 8.9   ANION GAP mmol/L 5.2 7.7 6.5 5.3 6.2   BILIRUBIN mg/dL  --   --  0.2 0.2 0.3   ALK PHOS U/L  --   --  74 77 72   ALT (SGPT) U/L  --   --  35* 35* 26   AST (SGOT) U/L  --   --  14 19 12   GLUCOSE mg/dL 155* 138* 111* 120* 123*   TOTAL PROTEIN g/dL  --   --  5.1* 5.1* 5.4*   ALBUMIN g/dL  --   --  2.5* 2.6* 2.5*                   No results found for: CKTOTAL    No components found for: HSTROPT    Lab Results   Component Value Date    TROPONINT 22 (H) 05/05/2023    TROPONINT 25 (H) 05/05/2023    TROPONINT 44 (H) 05/04/2023       No results found for: DDIMER    Brief Urine Lab Results  (Last result in the past 365 days)      Color   Clarity   Blood   Leuk Est   Nitrite   Protein   CREAT   Urine HCG        05/12/23 1738 Yellow   Cloudy   Negative   Trace   Negative   Trace                 Lab Results   Component Value Date    TSH 0.304 05/09/2023       No results found for: FREET4      Micro: As of May 26, 2023   Lab Results   Component Value Date    RESPCX Moderate growth (3+) Haemophilus influenzae (A) 05/18/2023    RESPCX Light growth (2+) Staphylococcus aureus (A) 05/18/2023    RESPCX No Normal Respiratory Ana (A) 05/18/2023     No results found for: BCIDPCR  Lab Results   Component Value Date    BLOODCX No growth at 5 days 05/18/2023    BLOODCX No growth at 5 days 05/18/2023     BLOODCX No growth at 5 days 05/04/2023    BLOODCX No growth at 5 days 05/04/2023     Lab Results   Component Value Date    URINECX Final report 02/07/2019    URINECX 20,000-30,000 CFU/mL Escherichia coli (A) 10/05/2018     No results found for: MRSACX  Lab Results   Component Value Date    MRSAPCR No MRSA Detected 04/15/2023     No results found for: URCX  No components found for: LOWRESPCF  No results found for: THROATCX  No results found for: CULTURES  No components found for: STREPBCX  No results found for: STREPPNEUAG  No results found for: LEGIONELLA  No results found for: MYCOPLASCX  No results found for: GCCX  No results found for: WOUNDCX  No results found for: BODYFLDCX    No results found for: FLU    Lab Results   Component Value Date    ADENOVIRUS Not Detected 05/04/2023     Lab Results   Component Value Date    PI866K Not Detected 05/04/2023     Lab Results   Component Value Date    CVHKU1 Not Detected 05/04/2023     Lab Results   Component Value Date    CVNL63 Not Detected 05/04/2023     Lab Results   Component Value Date    CVOC43 Not Detected 05/04/2023     Lab Results   Component Value Date    HUMETPNEVS Not Detected 05/04/2023     Lab Results   Component Value Date    HURVEV Not Detected 05/04/2023     Lab Results   Component Value Date    FLUBPCR Not Detected 05/04/2023     Lab Results   Component Value Date    PARAINFLUE Not Detected 05/04/2023     Lab Results   Component Value Date    PARAFLUV2 Not Detected 05/04/2023     Lab Results   Component Value Date    PARAFLUV3 Detected (A) 05/04/2023     Lab Results   Component Value Date    PARAFLUV4 Not Detected 05/04/2023     Lab Results   Component Value Date    BPERTPCR Not Detected 05/04/2023     No results found for: [id number]  Lab Results   Component Value Date    CPNEUPCR Not Detected 05/04/2023     Lab Results   Component Value Date    MPNEUMO Not Detected 05/04/2023     Lab Results   Component Value Date    FLUAPCR Not Detected 05/04/2023      No results found for: FLUAH3  No results found for: FLUAH1  Lab Results   Component Value Date    RSV Not Detected 05/04/2023     Lab Results   Component Value Date    BPARAPCR Not Detected 05/04/2023       COVID 19:  Lab Results   Component Value Date    COVID19 Not Detected 05/04/2023         ABG: Reviewed   Recent Labs     05/21/23  0714 05/22/23  1307 05/24/23  0719   PHART 7.394 7.447 7.394   ITI9VHU 70.2* 53.3* 59.3*   PO2ART 95.2 50.2* 82.1   STC7OSK 42.8* 36.8* 36.2*   BASEEXCESS 15.1* 11.1* 9.6*       Lab Results   Component Value Date    LACTATE 1.3 05/04/2023    LACTATE 1.2 04/13/2023    LACTATE 2.7 (C) 04/13/2023         Echo: Results for orders placed during the hospital encounter of 04/13/23    Adult Transthoracic Echo Complete W/ Cont if Necessary Per Protocol    Interpretation Summary  •  Left ventricular diastolic function is consistent with (grade I) impaired relaxation.  •  Mild aortic valve stenosis is present.  •  Estimated right ventricular systolic pressure from tricuspid regurgitation is moderately elevated (45-55 mmHg).  •  Mild to moderate pulmonary hypertension is present.      Results for orders placed during the hospital encounter of 10/01/17    Adult Transthoracic Echo Complete W/ Cont if Necessary Per Protocol    Interpretation Summary  TEchnically difficult study  1) Borderline LVH with normal LV systolic function ( EF is over 55%)  2) Normal left atrial size with mild elevation in LVedp  3) Trace MR  4) Mild TR with normal PA pressures  5) Mild AI  6) Small RV with normal RV function        Pharmacy to Dose enoxaparin (LOVENOX),           CXRay: Latest imaging study was reviewed personally.   Imaging Results (Last 48 Hours)     Procedure Component Value Units Date/Time    XR Chest 1 View [[id number]] Collected: 05/26/23 0741     Updated: 05/26/23 0845    Narrative:      CLINICAL INDICATION:    Respiratory Failure.; J96.21-Acute and chronic respiratory failure with  hypoxia;  J96.22-Acute and chronic respiratory failure with hypercapnia;  J44.1-Chronic obstructive pulmonary disease with (acute) exacerbation;  B34.8-Other viral infections of unspecified site     EXAMINATION TECHNIQUE:   XR CHEST 1 VW-     COMPARISON:  Radiographs 05/23/2023.     FINDINGS:  Right upper extremity PICC line in place. Stable cardiomediastinal  silhouette. Mildly decreased bibasilar airspace disease, left greater  than right. No pneumothorax. Mildly improved aeration. Probable trace  bilateral pleural effusions.       Impression:      Mild interval improvement.     Images personally reviewed, interpreted and dictated by SHIRA Villa.                This report was signed and finalized on 5/26/2023 8:43 AM by SHIRA Villa.            Assessment & Recommendations/Plan:   1.  Acute respiratory failure  Latest chest x-ray suggested slight improvement in findings consistent with mildly improved atelectasis.  We will repeat chest x-ray, clinically indicated  Continue humidified AVAPS. We will change to as needed use.    Latest ABG showed compensated chronic respiratory acidosis.  Will repeat ABG, as clinically indicated.    2.  COPD with acute exacerbation  Likely triggered by parainfluenza bronchitis  Latest cultures positive for haemophilus influenza and MSSA.  3+ WBCs noted in the sputum cultures.  Given significant antibiotic allergies, for now would suggest continuation of vancomycin for total of 3-5 days.  She has been on azithromycin which was started on the 18th, and I would suggest 3 days of therapy  Patient was given Depo-Medrol on 5/22/2023.  Currently on oral prednisone 40 mg.  We will discontinue nebulized Pulmicort and continue Spiriva and Symbicort  Will benefit from outpatient PFTs    3.  Abnormal CT of the chest/atelectasis  Patient has refused bronchoscopy.  Continue MetaNeb treatments.  We will discontinue hypertonic saline, since she has been on it for more than 10  days.  Continue Mucinex DM.  I have spoken to patient's daughter, Nehal at [phone], on 5/16/2023 and also on 5/23/2023.      4.  Chronic respiratory acidosis  Appears to be compliant with NIV device at home.    5.  Pulmonary hypertension  May need outpatient work-up.  Likely secondary to underlying likely severe COPD    6.  Anxiety  Continue other anxiolytics.  May need adjustment, as indicated.    7.  Smoking  Was advised to quit smoking    8.  Deconditioning  May benefit from PT/OT.    9. Discharge disposition/recommendations  Follow-up with [facility] in 14-21 days.  Already ordered  Follow-up with Dr. Soriano or CLAUDIA Neil in 4 months.  Already ordered  Prednisone 40 mg for 5 days.  Will need to be ordered by discharging provider.  Maintenance inhalers. Already ordered.   PFTs on the day of follow-up in clinic.  Already ordered    We have updated the admitting attending and nursing staff, as appropriate, on the patient's current status and plan. I will be going off shift tonight and will be unavailable. Please contact oncall pulmonologist, if available.      We have reviewed patient's current orders and changes, if any, have been suggested to primary care team. Plan was also discussed with nursing staff, as necessary.     This document was electronically signed by Arnaldo Soriano MD on 05/26/23 at 09:51 EDT      Dictated utilizing Dragon dictation.

## 2023-05-26 NOTE — PLAN OF CARE
Goal Outcome Evaluation:      VS noted. O2 sats >90% on 6L HFNC. No other acute events to report. Discharge pending

## 2023-05-26 NOTE — PLAN OF CARE
Visited pt's room; her nephew was at bedside.  The pt was awaking sitting in sanches's position eating her lunch.  She reported feeling better.  She expressed being slightly frustrated as she did not know what was going on with her plan at discharge.  She denied any complaints at this time.    Palliative services discussed briefly.  Pt did not commit as she was unsure what her discharge plan would be at this time.  Contact information given to pt.    Code status discussed by palliative in earlier conversations.  What happens during CPR info was given to pt.

## 2023-05-27 LAB
GLUCOSE BLDC GLUCOMTR-MCNC: 124 MG/DL (ref 70–130)
GLUCOSE BLDC GLUCOMTR-MCNC: 182 MG/DL (ref 70–130)
GLUCOSE BLDC GLUCOMTR-MCNC: 207 MG/DL (ref 70–130)
GLUCOSE BLDC GLUCOMTR-MCNC: 97 MG/DL (ref 70–130)

## 2023-05-27 PROCEDURE — 82948 REAGENT STRIP/BLOOD GLUCOSE: CPT

## 2023-05-27 PROCEDURE — 99232 SBSQ HOSP IP/OBS MODERATE 35: CPT | Performed by: FAMILY MEDICINE

## 2023-05-27 PROCEDURE — 94664 DEMO&/EVAL PT USE INHALER: CPT

## 2023-05-27 PROCEDURE — 94660 CPAP INITIATION&MGMT: CPT

## 2023-05-27 PROCEDURE — 25010000002 ENOXAPARIN PER 10 MG: Performed by: FAMILY MEDICINE

## 2023-05-27 PROCEDURE — 97110 THERAPEUTIC EXERCISES: CPT

## 2023-05-27 PROCEDURE — 63710000001 PREDNISONE PER 1 MG: Performed by: FAMILY MEDICINE

## 2023-05-27 PROCEDURE — 94799 UNLISTED PULMONARY SVC/PX: CPT

## 2023-05-27 PROCEDURE — 94761 N-INVAS EAR/PLS OXIMETRY MLT: CPT

## 2023-05-27 PROCEDURE — 97530 THERAPEUTIC ACTIVITIES: CPT

## 2023-05-27 PROCEDURE — 63710000001 INSULIN ASPART PER 5 UNITS: Performed by: FAMILY MEDICINE

## 2023-05-27 RX ORDER — CARBOXYMETHYLCELLULOSE SODIUM 5 MG/ML
2 SOLUTION/ DROPS OPHTHALMIC 3 TIMES DAILY PRN
Status: DISCONTINUED | OUTPATIENT
Start: 2023-05-27 | End: 2023-06-01 | Stop reason: HOSPADM

## 2023-05-27 RX ADMIN — FLUTICASONE PROPIONATE 2 SPRAY: 50 SPRAY, METERED NASAL at 08:52

## 2023-05-27 RX ADMIN — IPRATROPIUM BROMIDE AND ALBUTEROL SULFATE 3 ML: 2.5; .5 SOLUTION RESPIRATORY (INHALATION) at 20:26

## 2023-05-27 RX ADMIN — ATENOLOL 25 MG: 25 TABLET ORAL at 08:51

## 2023-05-27 RX ADMIN — PREDNISONE 20 MG: 20 TABLET ORAL at 08:51

## 2023-05-27 RX ADMIN — HYDROXYZINE HYDROCHLORIDE 25 MG: 25 TABLET ORAL at 21:01

## 2023-05-27 RX ADMIN — FLUTICASONE PROPIONATE 2 SPRAY: 50 SPRAY, METERED NASAL at 21:01

## 2023-05-27 RX ADMIN — Medication 10 ML: at 21:02

## 2023-05-27 RX ADMIN — SENNOSIDES AND DOCUSATE SODIUM 2 TABLET: 50; 8.6 TABLET ORAL at 21:00

## 2023-05-27 RX ADMIN — BUDESONIDE AND FORMOTEROL FUMARATE DIHYDRATE 2 PUFF: 160; 4.5 AEROSOL RESPIRATORY (INHALATION) at 07:21

## 2023-05-27 RX ADMIN — Medication 3 ML: at 21:01

## 2023-05-27 RX ADMIN — TIOTROPIUM BROMIDE INHALATION SPRAY 2 PUFF: 3.12 SPRAY, METERED RESPIRATORY (INHALATION) at 07:21

## 2023-05-27 RX ADMIN — AMLODIPINE BESYLATE 10 MG: 5 TABLET ORAL at 08:51

## 2023-05-27 RX ADMIN — IPRATROPIUM BROMIDE AND ALBUTEROL SULFATE 3 ML: 2.5; .5 SOLUTION RESPIRATORY (INHALATION) at 07:21

## 2023-05-27 RX ADMIN — Medication 10 ML: at 08:52

## 2023-05-27 RX ADMIN — ENOXAPARIN SODIUM 40 MG: 100 INJECTION SUBCUTANEOUS at 21:00

## 2023-05-27 RX ADMIN — INSULIN ASPART 2 UNITS: 100 INJECTION, SOLUTION INTRAVENOUS; SUBCUTANEOUS at 18:06

## 2023-05-27 RX ADMIN — ACETAMINOPHEN 650 MG: 325 TABLET, FILM COATED ORAL at 19:37

## 2023-05-27 RX ADMIN — CARBOXYMETHYLCELLULOSE SODIUM 2 DROP: 5 SOLUTION/ DROPS OPHTHALMIC at 21:01

## 2023-05-27 RX ADMIN — BUDESONIDE AND FORMOTEROL FUMARATE DIHYDRATE 2 PUFF: 160; 4.5 AEROSOL RESPIRATORY (INHALATION) at 20:27

## 2023-05-27 RX ADMIN — HYDROXYZINE HYDROCHLORIDE 25 MG: 25 TABLET ORAL at 08:51

## 2023-05-27 RX ADMIN — ACETAMINOPHEN 650 MG: 325 TABLET, FILM COATED ORAL at 14:18

## 2023-05-27 RX ADMIN — IPRATROPIUM BROMIDE AND ALBUTEROL SULFATE 3 ML: 2.5; .5 SOLUTION RESPIRATORY (INHALATION) at 00:32

## 2023-05-27 RX ADMIN — Medication 3 ML: at 08:52

## 2023-05-27 RX ADMIN — MULTIPLE VITAMINS W/ MINERALS TAB 1 TABLET: TAB at 08:51

## 2023-05-27 RX ADMIN — CETIRIZINE HYDROCHLORIDE 10 MG: 10 TABLET, FILM COATED ORAL at 08:51

## 2023-05-27 RX ADMIN — HYDROXYZINE HYDROCHLORIDE 25 MG: 25 TABLET ORAL at 18:06

## 2023-05-27 RX ADMIN — Medication 10 ML: at 21:03

## 2023-05-27 RX ADMIN — SENNOSIDES AND DOCUSATE SODIUM 2 TABLET: 50; 8.6 TABLET ORAL at 08:51

## 2023-05-27 NOTE — PLAN OF CARE
Goal Outcome Evaluation:  Plan of Care Reviewed With: patient        Progress: improving  Outcome Evaluation: Pt agreeable to therapy. Performed supine to sit with min A, scooting to EOB min/CGA, sit <->stand with min A and VC for hand placement and performed step pivot transfer with min A , RW and VC for sequencing. Performed B LE AP, heelslides in supine 15 reps each and sitting LAQ 1x10 reps. Pt voiced fear of falling with PTA reassuring pt that everything will be done to prevent that from happening. Pt required A x1 for bed mob, transfers and to transition from EOB to chair. Con't with PT POC and progress as tolerated

## 2023-05-27 NOTE — PLAN OF CARE
Goal Outcome Evaluation:      VS noted. O2 sats >90% on 6L HFNC. PRN tylenol given for pain. PICC dressing CDI, dressing changed per order.

## 2023-05-27 NOTE — PROGRESS NOTES
[facility]    Name:  Carolin Toscano   Age:  77 y.o.  Sex:  female  :  1945  MRN:  [id number]   Visit Number:  [id number]  Admission Date:  2023  Date Of Service:  23  Primary Care Physician:  Jason Nicholson MD     LOS: 23 days :    Chief Complaint:      Shortness of breath    Subjective:    Patient was seen and examined today.  Patient sitting up in bed and appears comfortable with no distress.  Patient unlabored and nontachypneic on 6 L of oxygen nasal cannula.  Patient appears at baseline currently and denies any acute complaints or pain.  Otherwise hemodynamically stable.  Patient pending placement.    Hospital Course:    Patient is a 77 years old female with a past medical history of COPD, chronic respiratory failure on 5 to 6 L per her previous discharge, on NIV machine at home and trelegy, hypertension, and ongoing tobacco use who presented to the ER from home by EMS with a chief complaint of shortness of breath.  Patient reporting that she started having shortness of breath associated with productive cough since last night and has persisted and became worse that promoted her to call EMS.  EMS has found the patient to be at 65% saturation on her normal 6 L of oxygen. Patient reports compliance with her NIV machine at home.      On ER evaluation, Patient was found to be tachycardic with a heart rate of 130s, temperature of 100.2 and respirations of 30, /72.  Patient was placed on BiPAP with FiO2 of 40%.  Her ABG came back and showed pH of 7.296/PCO2 87/PO2 94/HCO3 42/saturation 97% on 5 L nasal cannula.  HS Troponin 44, proBNP WNL, glucose 192, CO2 of 40, otherwise CBC and CMP within acceptable range.  Lactate and Pro-Adi WNL.  Respiratory panel positive for parainfluenza virus.  Chest x-ray Mild interstitial disease  bilaterally is similar to prior. No area of consolidation is seen. Patient received Solu-Medrol and Aztreonam while in the  ER.  Hospitalist consulted for admission.     Patient was admitted and treated with steroids.  Antibiotics were not indicated.  Pulmonology consulted and was moved to ICU on 5/9 due to worsening respiratory failure and tachypnea.  She initially had to be on Precedex drip which was discontinued and switched to Xanax due to anxiety. Patient was placed on AVAPS.  Patient continued on steroids and received Lasix.    5/16/23: d/w nsg intermittently on avaps and hi flow. Patient awake in bed. CXR from 5/15 reviewed. Medications reviewed.  Discussed with case management they are looking [facility].     5/17/2023 discussed with case management still working on [facility].  I am planning to do a peer to peer with them tomorrow.  Otherwise has persistent hypoxia.  Dr. Soriano's note reviewed considering bronchoscopy.     5/18: Daughter Regla is coming today.  Otherwise not much change still having dyspnea and hypoxemia.  Case management note reviewed.     5/19: d/w patient and family updated them on [facility] and pending appeal which will likely be submitted Monday. Still having dyspnea but mucous is breaking loose today.  Still debating about bronchoscopy.  Interested in long-term acute care possibly in [place] versus [place].     5/20 discussed with patient as well as family at the bedside.  Also discussed with them regarding placing access which she was able to get a PICC line.  Also discussed with them the staph growing in her sputum and that I was going to add vancomycin for this.     5/21: c/o [facility] suspect is due to mucous. Agreeable to flonase, zyrtec and atarax. Labs reviewed.    Review of Systems:     All systems were reviewed and negative except as mentioned in subjective, assessment and plan.    Vital Signs:    Temp:  [98 °F (36.7 °C)-98.7 °F (37.1 °C)] 98.2 °F (36.8 °C)  Heart Rate:  [55-80] 71  Resp:  [18-24] 20  BP: (112-123)/(50-61) 122/56    Intake and output:    I/O last 3 completed  shifts:  In: 840 [P.O.:840]  Out: 2300 [Urine:2300]  I/O this shift:  In: 240 [P.O.:240]  Out: 200 [Urine:200]    Physical Examination:    General Appearance:  Alert and cooperative.  Chronically ill-appearing.  Frail.   Head:  Atraumatic and normocephalic.   Eyes: Conjunctivae and sclerae normal, no icterus. No pallor.   Throat: No oral lesions, no thrush, oral mucosa moist.   Neck: Supple, trachea midline, no thyromegaly.   Lungs:   Breath sounds heard bilaterally equally.  Expiratory wheezing heard. Decreased air movement bilaterally.  Nontachypneic on supplemental oxygen.    Heart:  Normal S1 and S2, no murmur, no gallop, no rub. No JVD.   Abdomen:   Normal bowel sounds, no masses, no organomegaly. Soft, nontender, nondistended, no rebound tenderness.   Extremities: Supple, no edema, no cyanosis, no clubbing.   Skin: No bleeding or rash.   Neurologic: Alert and oriented x 3. No facial asymmetry. Moves all four limbs. No tremors.  Generalized weakness noted.     Laboratory results:    Results from last 7 days   Lab Units 05/24/23  0611 05/23/23  0609 05/22/23  0515 05/21/23  0453   SODIUM mmol/L 139 138 138 140   POTASSIUM mmol/L 4.5 4.6 4.5 4.7   CHLORIDE mmol/L 102 100 100 98   CO2 mmol/L 31.8* 30.3* 31.5* 36.7*   BUN mg/dL 18 14 22 24*   CREATININE mg/dL 0.26* 0.27* 0.26* 0.32*   CALCIUM mg/dL 9.3 8.9 8.8 8.5*   BILIRUBIN mg/dL  --   --  0.2 0.2   ALK PHOS U/L  --   --  74 77   ALT (SGPT) U/L  --   --  35* 35*   AST (SGOT) U/L  --   --  14 19   GLUCOSE mg/dL 155* 138* 111* 120*     Results from last 7 days   Lab Units 05/24/23  0611 05/23/23  0609 05/22/23  0515   WBC 10*3/mm3 9.60 9.97 11.59*   HEMOGLOBIN g/dL 10.3* 10.5* 11.0*   HEMATOCRIT % 33.1* 33.3* 35.6   PLATELETS 10*3/mm3 212 215 211                 Recent Labs     05/21/23  0714 05/22/23  1307 05/24/23  0719   PHART 7.394 7.447 7.394   VUK0WGT 70.2* 53.3* 59.3*   PO2ART 95.2 50.2* 82.1   CRM4QXU 42.8* 36.8* 36.2*   BASEEXCESS 15.1* 11.1* 9.6*      I  have reviewed the patient's laboratory results.    Radiology results:    No radiology results from the last 24 hrs  I have reviewed the patient's radiology reports.    Medication Review:     I have reviewed the patient's active and prn medications.     Problem List:      Acute on chronic respiratory failure with hypoxia and hypercapnia      Assessment:    Acute on chronic respiratory failure with hypoxia and hypercapnia, likely secondary to #2 and 3, POA  Acute COPD exacerbation, POA  Parainfluenza infection, POA  Elevated troponin, likely secondary to demand ischemia, POA  Hypertension  Chronic tobacco abuse  Pulmonary hypertension  Arthritis  Deconditioning    Plan:    Respiratory failure with hypoxia/hypercapnia, COPD exacerbation, parainfluenza  -Continue supplemental oxygen to keep saturation above 90%, patient on 5 to 6 L at baseline, continue to titrate down as able to.  Continue BiPAP therapy.  -Has NIV at home. Currently on 6 liters which is near her baseline.  -Recent parainfluenza virus infection met SIRS due to this  -Tapered down to po prednisone, will taper slowly.  -Bronchodilators, Mucinex MetaNeb and supportive care.  -Dr. Soriano following, appreciate his recommendations  -s/p lasix  -Xanax prn  -Sputum growing Staph aureus and haemophilus, finished vancomycin and azithromycin.  - flonase, zyrtec, and atarax for upper airway congestion  -Continue PT/OT.  -I discussed with pulmonology, patient likely at her baseline currently.  -Patient will benefit from close pulmonology follow-up and outpatient PFTs.       Hyperglycemia  -sliding scale insulin   -Hemoglobin A1c 6.7     Further orders as indicated per clinical course.     PT/ OT consulted, case management consulted, appreciate their help.  Will consult with palliative care, appreciate their assistance.    Pending placement, case management following.     DVT Prophylaxis: Lovenox prophylaxis (benefit> risk)  Code Status: Full  Diet: Cardiac  Discharge  Plan: Patient should be ready for LTAC versus rehab, seems to have agreed to go to [facility] and was accepted there but was declined by her insurance.  Case management following.    Lolis Vital MD  05/27/23  10:03 EDT    Dictated utilizing Dragon dictation.

## 2023-05-27 NOTE — PLAN OF CARE
Problem: Noninvasive Ventilation Acute  Goal: Effective Unassisted Ventilation and Oxygenation  Outcome: Ongoing, Progressing     Problem: Noninvasive Ventilation Acute  Goal: Effective Unassisted Ventilation and Oxygenation  Intervention: Monitor and Manage Noninvasive Ventilation  Flowsheets (Taken 5/27/2023 0256)  Airway/Ventilation Management:   airway patency maintained   pulmonary hygiene promoted   Goal Outcome Evaluation:

## 2023-05-27 NOTE — THERAPY TREATMENT NOTE
"Patient Name: Carolin Toscano  : 1945    MRN: [id number]                              Today's Date: 2023       Admit Date: 2023    Visit Dx:     ICD-10-CM ICD-9-CM   1. Acute on chronic respiratory failure with hypoxia and hypercapnia  J96.21 518.84    J96.22 786.09     799.02   2. COPD exacerbation  J44.1 491.21   3. Parainfluenza virus infection  B34.8 078.89   4. Simple chronic bronchitis  J41.0 491.0     Patient Active Problem List   Diagnosis   • CAP (community acquired pneumonia)   • Cigarette nicotine dependence with nicotine-induced disorder   • Essential hypertension   • Dyslipidemia   • Blood glucose elevated   • Muscle ache   • COPD (chronic obstructive pulmonary disease)   • Nuclear sclerotic cataract of right eye   • Acute respiratory failure   • COPD exacerbation   • Acute on chronic respiratory failure with hypoxia and hypercapnia     Past Medical History:   Diagnosis Date   • Arthritis    • COPD (chronic obstructive pulmonary disease)    • Gall stones    • Goiter     \"inward\"   • Hypertension    • Hypertension    • Kidney infection    • Kidney stone    • Kidney stones    • Migraine headache    • Scarlet fever      Past Surgical History:   Procedure Laterality Date   • BLADDER SURGERY     • CATARACT EXTRACTION W/ INTRAOCULAR LENS IMPLANT Left 2022    Procedure: CATARACT PHACO EXTRACTION WITH INTRAOCULAR LENS IMPLANT LEFT;  Surgeon: Sathish Lopez MD;  Location: [facility];  Service: Ophthalmology;  Laterality: Left;   • CATARACT EXTRACTION W/ INTRAOCULAR LENS IMPLANT Right 2022    Procedure: CATARACT PHACO EXTRACTION WITH INTRAOCULAR LENS IMPLANT RIGHT;  Surgeon: Sathish Lopez MD;  Location: [facility];  Service: Ophthalmology;  Laterality: Right;   • CHOLECYSTECTOMY     • HYSTERECTOMY     • TONSILLECTOMY        General Information     Row Name 23 1722          Physical Therapy Time and Intention    Document Type therapy note (daily note)  -CC     Mode of " Treatment physical therapy  -     Row Name 05/27/23 1722          General Information    Patient Profile Reviewed yes  -CC     Existing Precautions/Restrictions fall;oxygen therapy device and L/min  -CC     Row Name 05/27/23 1722          Safety Issues, Functional Mobility    Safety Issues Affecting Function (Mobility) awareness of need for assistance;insight into deficits/self-awareness;positioning of assistive device;safety precautions follow-through/compliance  -     Impairments Affecting Function (Mobility) strength;balance;endurance/activity tolerance;shortness of breath;postural/trunk control  -           User Key  (r) = Recorded By, (t) = Taken By, (c) = Cosigned By    Initials Name Provider Type     Nasra Quiñones PTA Physical Therapist Assistant               Mobility     Row Name 05/27/23 1723          Bed Mobility    Bed Mobility supine-sit  -     Supine-Sit [place] (Bed Mobility) contact guard  -     Assistive Device (Bed Mobility) bed rails;head of bed elevated  -     Row Name 05/27/23 1723          Bed-Chair Transfer    Bed-Chair [place] (Transfers) minimum assist (75% patient effort);1 person assist  -CC     Assistive Device (Bed-Chair Transfers) walker, front-wheeled  -CC     Row Name 05/27/23 1723          Sit-Stand Transfer    Sit-Stand [place] (Transfers) minimum assist (75% patient effort);1 person assist  -CC     Assistive Device (Sit-Stand Transfers) walker, front-wheeled  -           User Key  (r) = Recorded By, (t) = Taken By, (c) = Cosigned By    Initials Name Provider Type    Nasra Calixto PTA Physical Therapist Assistant               Obj/Interventions    No documentation.                Goals/Plan    No documentation.                Clinical Impression     Row Name 05/27/23 1724          Pain    Pretreatment Pain Rating 0/10 - no pain  -CC     Posttreatment Pain Rating 0/10 - no pain  -     Additional Documentation Pain Scale: Numbers  Pre/Post-Treatment (Group)  -CC     Row Name 05/27/23 1724          Plan of Care Review    Plan of Care Reviewed With patient  -CC     Progress improving  -CC     Outcome Evaluation Pt agreeable to therapy. Performed supine to sit with min A, scooting to EOB min/CGA, sit <->stand with min A and VC for hand placement and performed step pivot transfer with min A , RW and VC for sequencing. Performed B LE AP, heelslides in supine 15 reps each and sitting LAQ 1x10 reps. Pt voiced fear of falling with PTA reassuring pt that everything will be done to prevent that from happening. Pt required A x1 for bed mob, transfers and to transition from EOB to chair. Con't with PT POC and progress as tolerated  -CC     Row Name 05/27/23 1724          Positioning and Restraints    Pre-Treatment Position in bed  -CC     Post Treatment Position chair  -CC     In Chair reclined;call light within reach;encouraged to call for assist;exit alarm on;notified nsg  -CC           User Key  (r) = Recorded By, (t) = Taken By, (c) = Cosigned By    Initials Name Provider Type    CC Nasra Quiñones, PTA Physical Therapist Assistant               Outcome Measures     Row Name 05/27/23 1729 05/27/23 0800       How much help from another person do you currently need...    Turning from your back to your side while in flat bed without using bedrails? 3  -CC 3  -CS    Moving from lying on back to sitting on the side of a flat bed without bedrails? 3  -CC 3  -CS    Moving to and from a bed to a chair (including a wheelchair)? 3  -CC 2  -CS    Standing up from a chair using your arms (e.g., wheelchair, bedside chair)? 3  -CC 2  -CS    Climbing 3-5 steps with a railing? 1  -CC 2  -CS    To walk in hospital room? 2  -CC 2  -CS    AM-PAC 6 Clicks Score (PT) 15  -CC 14  -CS    Highest level of mobility 4 --> Transferred to chair/commode  -CC 4 --> Transferred to chair/commode  -CS    Row Name 05/27/23 1726          Functional Assessment    Outcome Measure  Options AM-PAC 6 Clicks Basic Mobility (PT)  -CC           User Key  (r) = Recorded By, (t) = Taken By, (c) = Cosigned By    Initials Name Provider Type    Nasra Calixto, PTA Physical Therapist Assistant    Grace Rice, LPN Licensed Nurse                             Physical Therapy Education     Title: PT OT SLP Therapies (In Progress)     Topic: Physical Therapy (Done)     Point: Mobility training (Done)     Learning Progress Summary           Patient Acceptance, E,TB, VU by CC at 5/27/2023 1730    Comment: Importance of increasing mobility    Acceptance, E,TB, VU by JR at 5/24/2023 1749    Comment: Advancement of POC    Acceptance, E,TB,D, VU,NR by RM at 5/23/2023 1316    Comment: proper breathing techniques with mobility    Acceptance, E,TB,D, VU,NR by RM at 5/22/2023 1358    Comment: Exercise tech and importance of dily activity    Acceptance, E, VU by MS at 5/16/2023 1403    Comment: importance of mobility    Acceptance, E, VU by AM at 5/15/2023 0701    Acceptance, E, VU by MS at 5/12/2023 1458    Comment: importance of mobility    Acceptance, E,TB,D, VU,NR by RM at 5/8/2023 1250    Comment: Importance of activity and exercise techniques    Acceptance, E, VU by MS at 5/5/2023 1136    Comment: importance of mobility                   Point: Home exercise program (Done)     Learning Progress Summary           Patient Acceptance, E,TB, VU by JR at 5/25/2023 1515    Comment: Importance of BLE exercises to improve mobility and confidence with mobility    Acceptance, E,TB,D, VU,NR by RM at 5/22/2023 1358    Comment: Exercise tech and importance of dily activity    Acceptance, E,D, VU,DU by  at 5/21/2023 1205    Comment: Pt education for improving BLE ther ex technique    Acceptance, E, VU by MS at 5/16/2023 1403    Comment: importance of mobility    Acceptance, E, VU by AM at 5/15/2023 0701    Acceptance, E,TB, VU,NR by CC at 5/14/2023 1310    Comment: Importance of increasing movement of B LE     Acceptance, E,TB,D, VU,NR by  at 5/8/2023 1250    Comment: Importance of activity and exercise techniques    Acceptance, E, VU by MS at 5/5/2023 1136    Comment: importance of mobility                   Point: Body mechanics (Done)     Learning Progress Summary           Patient Acceptance, E,TB, VU by JR at 5/17/2023 1846    Comment: upright posture                   Point: Precautions (Done)     Learning Progress Summary           Patient Acceptance, E,TB, VU by JR at 5/24/2023 1749    Comment: Importance of breathing coordination and pacing to recovery                               User Key     Initials Effective Dates Name Provider Type Discipline     03/23/22 -  Kavitha Tavarez, PT Physical Therapist PT    CC 06/16/21 -  Nasra Quiñones, PTA Physical Therapist Assistant PT    RM 06/16/21 -  Ronak Cruz, PTA Physical Therapist Assistant PT    TW 06/16/21 -  Deysi Goodwin, PT Physical Therapist PT    MS 07/01/22 -  Martín Ortiz, PT Physical Therapist PT    AM 02/22/23 -  Rob Lu, RN Registered Nurse Nurse              PT Recommendation and Plan     Plan of Care Reviewed With: patient  Progress: improving  Outcome Evaluation: Pt agreeable to therapy. Performed supine to sit with min A, scooting to EOB min/CGA, sit <->stand with min A and VC for hand placement and performed step pivot transfer with min A , RW and VC for sequencing. Performed B LE AP, heelslides in supine 15 reps each and sitting LAQ 1x10 reps. Pt voiced fear of falling with PTA reassuring pt that everything will be done to prevent that from happening. Pt required A x1 for bed mob, transfers and to transition from EOB to chair. Con't with PT POC and progress as tolerated     Time Calculation:    PT Charges     Row Name 05/27/23 8935             Time Calculation    PT Received On 05/27/23  -CC      PT Goal Re-Cert Due Date 06/03/23  -CC         Time Calculation- PT    Total Timed Code Minutes- PT 41 minute(s)  -CC         Timed  Charges    40095 - PT Therapeutic Exercise Minutes 15  -CC      66809 - PT Therapeutic Activity Minutes 25  -CC         Total Minutes    Timed Charges Total Minutes 40  -CC       Total Minutes 40  -CC            User Key  (r) = Recorded By, (t) = Taken By, (c) = Cosigned By    Initials Name Provider Type    CC Nasra Quiñones PTA Physical Therapist Assistant              Therapy Charges for Today     Code Description Service Date Service Provider Modifiers Qty    02531628641 HC PT THER PROC EA 15 MIN 5/27/2023 Nasra Quiñones PTA GP 1    80278210549 HC PT THERAPEUTIC ACT EA 15 MIN 5/27/2023 Nasra Quiñones PTA GP 2          PT G-Codes  Outcome Measure Options: AM-PAC 6 Clicks Basic Mobility (PT)  AM-PAC 6 Clicks Score (PT): 15  AM-PAC 6 Clicks Score (OT): 15       Nasra Quiñones PTA  5/27/2023

## 2023-05-28 LAB
GLUCOSE BLDC GLUCOMTR-MCNC: 105 MG/DL (ref 70–130)
GLUCOSE BLDC GLUCOMTR-MCNC: 144 MG/DL (ref 70–130)
GLUCOSE BLDC GLUCOMTR-MCNC: 145 MG/DL (ref 70–130)
GLUCOSE BLDC GLUCOMTR-MCNC: 250 MG/DL (ref 70–130)

## 2023-05-28 PROCEDURE — 97116 GAIT TRAINING THERAPY: CPT

## 2023-05-28 PROCEDURE — 94799 UNLISTED PULMONARY SVC/PX: CPT

## 2023-05-28 PROCEDURE — 25010000002 ENOXAPARIN PER 10 MG: Performed by: FAMILY MEDICINE

## 2023-05-28 PROCEDURE — 63710000001 PREDNISONE PER 1 MG: Performed by: FAMILY MEDICINE

## 2023-05-28 PROCEDURE — 97530 THERAPEUTIC ACTIVITIES: CPT

## 2023-05-28 PROCEDURE — 94761 N-INVAS EAR/PLS OXIMETRY MLT: CPT

## 2023-05-28 PROCEDURE — 99232 SBSQ HOSP IP/OBS MODERATE 35: CPT | Performed by: FAMILY MEDICINE

## 2023-05-28 PROCEDURE — 94664 DEMO&/EVAL PT USE INHALER: CPT

## 2023-05-28 PROCEDURE — 82948 REAGENT STRIP/BLOOD GLUCOSE: CPT

## 2023-05-28 PROCEDURE — 63710000001 INSULIN ASPART PER 5 UNITS: Performed by: FAMILY MEDICINE

## 2023-05-28 PROCEDURE — 94660 CPAP INITIATION&MGMT: CPT

## 2023-05-28 PROCEDURE — 97112 NEUROMUSCULAR REEDUCATION: CPT

## 2023-05-28 RX ADMIN — Medication 10 ML: at 22:23

## 2023-05-28 RX ADMIN — IPRATROPIUM BROMIDE AND ALBUTEROL SULFATE 3 ML: 2.5; .5 SOLUTION RESPIRATORY (INHALATION) at 20:14

## 2023-05-28 RX ADMIN — HYDROXYZINE HYDROCHLORIDE 25 MG: 25 TABLET ORAL at 16:06

## 2023-05-28 RX ADMIN — FLUTICASONE PROPIONATE 2 SPRAY: 50 SPRAY, METERED NASAL at 09:21

## 2023-05-28 RX ADMIN — Medication 10 ML: at 09:20

## 2023-05-28 RX ADMIN — HYDROXYZINE HYDROCHLORIDE 25 MG: 25 TABLET ORAL at 22:21

## 2023-05-28 RX ADMIN — Medication 10 ML: at 09:22

## 2023-05-28 RX ADMIN — AMLODIPINE BESYLATE 10 MG: 5 TABLET ORAL at 09:21

## 2023-05-28 RX ADMIN — HYDROXYZINE HYDROCHLORIDE 25 MG: 25 TABLET ORAL at 09:20

## 2023-05-28 RX ADMIN — ACETAMINOPHEN 650 MG: 325 TABLET, FILM COATED ORAL at 16:06

## 2023-05-28 RX ADMIN — IPRATROPIUM BROMIDE AND ALBUTEROL SULFATE 3 ML: 2.5; .5 SOLUTION RESPIRATORY (INHALATION) at 13:00

## 2023-05-28 RX ADMIN — ENOXAPARIN SODIUM 40 MG: 100 INJECTION SUBCUTANEOUS at 22:21

## 2023-05-28 RX ADMIN — Medication 5 MG: at 22:21

## 2023-05-28 RX ADMIN — ATENOLOL 25 MG: 25 TABLET ORAL at 09:20

## 2023-05-28 RX ADMIN — IPRATROPIUM BROMIDE AND ALBUTEROL SULFATE 3 ML: 2.5; .5 SOLUTION RESPIRATORY (INHALATION) at 01:08

## 2023-05-28 RX ADMIN — CETIRIZINE HYDROCHLORIDE 10 MG: 10 TABLET, FILM COATED ORAL at 09:19

## 2023-05-28 RX ADMIN — INSULIN ASPART 6 UNITS: 100 INJECTION, SOLUTION INTRAVENOUS; SUBCUTANEOUS at 16:47

## 2023-05-28 RX ADMIN — Medication 3 ML: at 09:22

## 2023-05-28 RX ADMIN — MULTIPLE VITAMINS W/ MINERALS TAB 1 TABLET: TAB at 09:19

## 2023-05-28 RX ADMIN — BUDESONIDE AND FORMOTEROL FUMARATE DIHYDRATE 2 PUFF: 160; 4.5 AEROSOL RESPIRATORY (INHALATION) at 20:15

## 2023-05-28 RX ADMIN — TIOTROPIUM BROMIDE INHALATION SPRAY 2 PUFF: 3.12 SPRAY, METERED RESPIRATORY (INHALATION) at 07:05

## 2023-05-28 RX ADMIN — IPRATROPIUM BROMIDE AND ALBUTEROL SULFATE 3 ML: 2.5; .5 SOLUTION RESPIRATORY (INHALATION) at 07:05

## 2023-05-28 RX ADMIN — BUDESONIDE AND FORMOTEROL FUMARATE DIHYDRATE 2 PUFF: 160; 4.5 AEROSOL RESPIRATORY (INHALATION) at 07:05

## 2023-05-28 RX ADMIN — Medication 10 ML: at 22:22

## 2023-05-28 RX ADMIN — SENNOSIDES AND DOCUSATE SODIUM 2 TABLET: 50; 8.6 TABLET ORAL at 09:20

## 2023-05-28 RX ADMIN — Medication 3 ML: at 22:23

## 2023-05-28 RX ADMIN — PREDNISONE 20 MG: 20 TABLET ORAL at 09:19

## 2023-05-28 RX ADMIN — DICLOFENAC SODIUM 2 G: 10 GEL TOPICAL at 16:06

## 2023-05-28 RX ADMIN — CARBOXYMETHYLCELLULOSE SODIUM 2 DROP: 5 SOLUTION/ DROPS OPHTHALMIC at 22:22

## 2023-05-28 NOTE — PLAN OF CARE
Goal Outcome Evaluation:           Progress: improving  Outcome Evaluation: VSS on 6L O2 via humidified, high-flow nasal cannula and Bipap for part of night while sleeping.  Oriented x4.  Ordered PRN Tylenol (see MAR) administered for complaint of pain.  Incentive spirometer use encouraged.  SR noted on telemetry.  No acute events noted during shift.  Will continue to monitor patient.

## 2023-05-28 NOTE — PLAN OF CARE
Goal Outcome Evaluation:  Plan of Care Reviewed With: patient        Progress: improving  Outcome Evaluation: Pt agreeable to physical therapy. Performed supine to sit with min/CGA and VC , sit <->stand min A and RW with VC for hand placement step pivot EOB to BSC, amb 5 feet BSC to recliner with 1 turn with min A and VC for sequencing.Pt stood x1 min for radha care performed by PCT, with VC for upright posture vs flexed. Con't with PT POC and progress as tolerated.

## 2023-05-28 NOTE — PLAN OF CARE
Goal Outcome Evaluation:  Plan of Care Reviewed With: patient        Progress: improving     VSS and pt NSR on telemetry throughout the shift at the time of note. Pt maintaining o2 >90% on 6 L humidified high flow NC. Medications administered per MAR. FSBS monitored per orders. C/o pain managed with PRN medications. Discharge is pending.

## 2023-05-28 NOTE — THERAPY TREATMENT NOTE
"Patient Name: Carolin Toscano  : 1945    MRN: [id number]                              Today's Date: 2023       Admit Date: 2023    Visit Dx:     ICD-10-CM ICD-9-CM   1. Acute on chronic respiratory failure with hypoxia and hypercapnia  J96.21 518.84    J96.22 786.09     799.02   2. COPD exacerbation  J44.1 491.21   3. Parainfluenza virus infection  B34.8 078.89   4. Simple chronic bronchitis  J41.0 491.0     Patient Active Problem List   Diagnosis   • CAP (community acquired pneumonia)   • Cigarette nicotine dependence with nicotine-induced disorder   • Essential hypertension   • Dyslipidemia   • Blood glucose elevated   • Muscle ache   • COPD (chronic obstructive pulmonary disease)   • Nuclear sclerotic cataract of right eye   • Acute respiratory failure   • COPD exacerbation   • Acute on chronic respiratory failure with hypoxia and hypercapnia     Past Medical History:   Diagnosis Date   • Arthritis    • COPD (chronic obstructive pulmonary disease)    • Gall stones    • Goiter     \"inward\"   • Hypertension    • Hypertension    • Kidney infection    • Kidney stone    • Kidney stones    • Migraine headache    • Scarlet fever      Past Surgical History:   Procedure Laterality Date   • BLADDER SURGERY     • CATARACT EXTRACTION W/ INTRAOCULAR LENS IMPLANT Left 2022    Procedure: CATARACT PHACO EXTRACTION WITH INTRAOCULAR LENS IMPLANT LEFT;  Surgeon: Sathish Lopez MD;  Location: [facility];  Service: Ophthalmology;  Laterality: Left;   • CATARACT EXTRACTION W/ INTRAOCULAR LENS IMPLANT Right 2022    Procedure: CATARACT PHACO EXTRACTION WITH INTRAOCULAR LENS IMPLANT RIGHT;  Surgeon: Sathish Lopez MD;  Location: [facility];  Service: Ophthalmology;  Laterality: Right;   • CHOLECYSTECTOMY     • HYSTERECTOMY     • TONSILLECTOMY        General Information     Row Name 23 1654          Physical Therapy Time and Intention    Document Type therapy note (daily note)  -CC     Mode of " Treatment physical therapy  -     Row Name 05/28/23 1654          General Information    Patient Profile Reviewed yes  -     Existing Precautions/Restrictions fall;oxygen therapy device and L/min  -     Row Name 05/28/23 1654          Safety Issues, Functional Mobility    Safety Issues Affecting Function (Mobility) awareness of need for assistance;insight into deficits/self-awareness;positioning of assistive device;safety precautions follow-through/compliance  -     Impairments Affecting Function (Mobility) strength;balance;endurance/activity tolerance;shortness of breath  -           User Key  (r) = Recorded By, (t) = Taken By, (c) = Cosigned By    Initials Name Provider Type     Nasra Quiñones, PTA Physical Therapist Assistant               Mobility     Row Name 05/28/23 1655          Bed Mobility    Bed Mobility supine-sit  -     Supine-Sit [place] (Bed Mobility) contact guard;verbal cues  -     Assistive Device (Bed Mobility) bed rails;head of bed elevated  -     Row Name 05/28/23 1655          Bed-Chair Transfer    Bed-Chair [place] (Transfers) minimum assist (75% patient effort);1 person assist  -     Comment, (Bed-Chair Transfer) bed to BSC  -     Row Name 05/28/23 1655          Sit-Stand Transfer    Sit-Stand [place] (Transfers) minimum assist (75% patient effort);1 person assist  -     Assistive Device (Sit-Stand Transfers) walker, front-wheeled  -     Row Name 05/28/23 1655          Gait/Stairs (Locomotion)    [place] Level (Gait) minimum assist (75% patient effort)  -     Assistive Device (Gait) walker, front-wheeled  -     Distance in Feet (Gait) 5  -CC     Deviations/Abnormal Patterns (Gait) weight shifting decreased;gilberto decreased;base of support, narrow  -CC     Bilateral Gait Deviations forward flexed posture;heel strike decreased  -           User Key  (r) = Recorded By, (t) = Taken By, (c) = Cosigned By    Initials Name Provider Type      Nasra Quiñones PTA Physical Therapist Assistant               Obj/Interventions    No documentation.                Goals/Plan    No documentation.                Clinical Impression     Row Name 05/28/23 1700          Pain    Pretreatment Pain Rating 0/10 - no pain  -CC     Posttreatment Pain Rating 0/10 - no pain  -CC     Additional Documentation Pain Scale: Numbers Pre/Post-Treatment (Group)  -CC     Row Name 05/28/23 1700          Plan of Care Review    Plan of Care Reviewed With patient  -CC     Progress improving  -CC     Outcome Evaluation Pt agreeable to physical therapy. Performed supine to sit with min/CGA and VC , sit <->stand min A and RW with VC for hand placement step pivot EOB to BSC, amb 5 feet BSC to recliner with 1 turn with min A and VC for sequencing.Pt stood x1 min for radha care performed by PCT, with VC for upright posture vs flexed. Con't with PT POC and progress as tolerated.  -CC     Row Name 05/28/23 1700          Positioning and Restraints    Pre-Treatment Position in bed  -CC     Post Treatment Position chair  -CC     In Chair reclined;call light within reach;encouraged to call for assist;with family/caregiver;notified nsg  -CC           User Key  (r) = Recorded By, (t) = Taken By, (c) = Cosigned By    Initials Name Provider Type    CC Nasra Quiñones, ELVIRA Physical Therapist Assistant               Outcome Measures     Row Name 05/28/23 1706 05/28/23 0906       How much help from another person do you currently need...    Turning from your back to your side while in flat bed without using bedrails? 3  -CC 3  -KJ    Moving from lying on back to sitting on the side of a flat bed without bedrails? 3  -CC 3  -KJ    Moving to and from a bed to a chair (including a wheelchair)? 3  -CC 3  -KJ    Standing up from a chair using your arms (e.g., wheelchair, bedside chair)? 3  -CC 2  -KJ    Climbing 3-5 steps with a railing? 2  -CC 2  -KJ    To walk in hospital room? 3  -CC 2  -KJ    AM-PAC 6  Clicks Score (PT) 17  -CC 15  -KJ    Highest level of mobility 5 --> Static standing  -CC 4 --> Transferred to chair/commode  -KJ    Row Name 05/28/23 1706          Functional Assessment    Outcome Measure Options AM-PAC 6 Clicks Basic Mobility (PT)  -CC           User Key  (r) = Recorded By, (t) = Taken By, (c) = Cosigned By    Initials Name Provider Type     Nasra Quiñones PTA Physical Therapist Assistant    Arielle Christianson RN Registered Nurse                             Physical Therapy Education     Title: PT OT SLP Therapies (In Progress)     Topic: Physical Therapy (Done)     Point: Mobility training (Done)     Learning Progress Summary           Patient Acceptance, E,TB, VU by CC at 5/27/2023 1730    Comment: Importance of increasing mobility    Acceptance, E,TB, VU by JR at 5/24/2023 1749    Comment: Advancement of POC    Acceptance, E,TB,D, VU,NR by RM at 5/23/2023 1316    Comment: proper breathing techniques with mobility    Acceptance, E,TB,D, VU,NR by RM at 5/22/2023 1358    Comment: Exercise tech and importance of dily activity    Acceptance, E, VU by MS at 5/16/2023 1403    Comment: importance of mobility    Acceptance, E, VU by AM at 5/15/2023 0701    Acceptance, E, VU by MS at 5/12/2023 1458    Comment: importance of mobility    Acceptance, E,TB,D, VU,NR by RM at 5/8/2023 1250    Comment: Importance of activity and exercise techniques    Acceptance, E, VU by MS at 5/5/2023 1136    Comment: importance of mobility                   Point: Home exercise program (Done)     Learning Progress Summary           Patient Acceptance, E,TB, VU by JR at 5/25/2023 1515    Comment: Importance of BLE exercises to improve mobility and confidence with mobility    Acceptance, E,TB,D, VU,NR by RM at 5/22/2023 1358    Comment: Exercise tech and importance of dily activity    Acceptance, E,D, VU,DU by  at 5/21/2023 1205    Comment: Pt education for improving BLE ther ex technique    Acceptance, E, VU by MS at  5/16/2023 1403    Comment: importance of mobility    Acceptance, E, VU by AM at 5/15/2023 0701    Acceptance, E,TB, VU,NR by CC at 5/14/2023 1310    Comment: Importance of increasing movement of B LE    Acceptance, E,TB,D, VU,NR by  at 5/8/2023 1250    Comment: Importance of activity and exercise techniques    Acceptance, E, VU by MS at 5/5/2023 1136    Comment: importance of mobility                   Point: Body mechanics (Done)     Learning Progress Summary           Patient Acceptance, E,TB, VU,NR by CC at 5/28/2023 1707    Comment: Upright posture in standing    Acceptance, E,TB, VU by  at 5/17/2023 1846    Comment: upright posture                   Point: Precautions (Done)     Learning Progress Summary           Patient Acceptance, E,TB, VU by  at 5/24/2023 1749    Comment: Importance of breathing coordination and pacing to recovery                               User Key     Initials Effective Dates Name Provider Type Discipline     03/23/22 -  Kavitha Tavarez, PT Physical Therapist PT    CC 06/16/21 -  Nasra Quiñones PTA Physical Therapist Assistant PT     06/16/21 -  Ronak Cruz, PTA Physical Therapist Assistant PT     06/16/21 -  Deysi Goodwin, PT Physical Therapist PT    MS 07/01/22 -  Martín Ortiz, PT Physical Therapist PT    AM 02/22/23 -  Rob Lu, RN Registered Nurse Nurse              PT Recommendation and Plan     Plan of Care Reviewed With: patient  Progress: improving  Outcome Evaluation: Pt agreeable to physical therapy. Performed supine to sit with min/CGA and VC , sit <->stand min A and RW with VC for hand placement step pivot EOB to BSC, amb 5 feet BSC to recliner with 1 turn with min A and VC for sequencing.Pt stood x1 min for radha care performed by PCT, with VC for upright posture vs flexed. Con't with PT POC and progress as tolerated.     Time Calculation:    PT Charges     Row Name 05/28/23 7864             Time Calculation    PT Received On 05/28/23  -CC      PT  Goal Re-Cert Due Date 06/03/23  -CC         Time Calculation- PT    Total Timed Code Minutes- PT 48 minute(s)  -CC         Timed Charges    26485 -  PT Neuromuscular Reeducation Minutes 11  -CC      30045 - Gait Training Minutes  15  -CC      08532 - PT Therapeutic Activity Minutes 22  -CC         Total Minutes    Timed Charges Total Minutes 48  -CC       Total Minutes 48  -CC            User Key  (r) = Recorded By, (t) = Taken By, (c) = Cosigned By    Initials Name Provider Type    CC Nasra Quiñones, ELVIRA Physical Therapist Assistant              Therapy Charges for Today     Code Description Service Date Service Provider Modifiers Qty    12014090812 HC PT THER PROC EA 15 MIN 5/27/2023 Nasra Quiñones, PTA GP 1    82045180827 HC PT THERAPEUTIC ACT EA 15 MIN 5/27/2023 Nasra Quiñones, ELVIRA GP 2    87649933555 HC PT NEUROMUSC RE EDUCATION EA 15 MIN 5/28/2023 Nasra Quiñones, PTA GP 1    30972146908 HC GAIT TRAINING EA 15 MIN 5/28/2023 Nsara Quiñones, PTA GP 1    68592616918 HC PT THERAPEUTIC ACT EA 15 MIN 5/28/2023 Nasra Quiñones, ELVIRA GP 1          PT G-Codes  Outcome Measure Options: AM-PAC 6 Clicks Basic Mobility (PT)  AM-PAC 6 Clicks Score (PT): 17  AM-PAC 6 Clicks Score (OT): 15       Nasra Quiñones PTA  5/28/2023

## 2023-05-29 LAB
GLUCOSE BLDC GLUCOMTR-MCNC: 138 MG/DL (ref 70–130)
GLUCOSE BLDC GLUCOMTR-MCNC: 290 MG/DL (ref 70–130)
GLUCOSE BLDC GLUCOMTR-MCNC: 95 MG/DL (ref 70–130)

## 2023-05-29 PROCEDURE — 94660 CPAP INITIATION&MGMT: CPT

## 2023-05-29 PROCEDURE — 97110 THERAPEUTIC EXERCISES: CPT

## 2023-05-29 PROCEDURE — 25010000002 ENOXAPARIN PER 10 MG: Performed by: FAMILY MEDICINE

## 2023-05-29 PROCEDURE — 94799 UNLISTED PULMONARY SVC/PX: CPT

## 2023-05-29 PROCEDURE — 63710000001 PREDNISONE PER 1 MG: Performed by: FAMILY MEDICINE

## 2023-05-29 PROCEDURE — 99232 SBSQ HOSP IP/OBS MODERATE 35: CPT | Performed by: FAMILY MEDICINE

## 2023-05-29 PROCEDURE — 82948 REAGENT STRIP/BLOOD GLUCOSE: CPT

## 2023-05-29 PROCEDURE — 63710000001 INSULIN ASPART PER 5 UNITS: Performed by: FAMILY MEDICINE

## 2023-05-29 PROCEDURE — 94761 N-INVAS EAR/PLS OXIMETRY MLT: CPT

## 2023-05-29 PROCEDURE — 97530 THERAPEUTIC ACTIVITIES: CPT

## 2023-05-29 PROCEDURE — 97116 GAIT TRAINING THERAPY: CPT

## 2023-05-29 PROCEDURE — 94664 DEMO&/EVAL PT USE INHALER: CPT

## 2023-05-29 RX ORDER — MUPIROCIN CALCIUM 20 MG/G
1 CREAM TOPICAL EVERY 12 HOURS SCHEDULED
Status: DISCONTINUED | OUTPATIENT
Start: 2023-05-29 | End: 2023-06-01 | Stop reason: HOSPADM

## 2023-05-29 RX ADMIN — ATENOLOL 25 MG: 25 TABLET ORAL at 09:37

## 2023-05-29 RX ADMIN — PREDNISONE 20 MG: 20 TABLET ORAL at 09:37

## 2023-05-29 RX ADMIN — Medication 10 ML: at 21:03

## 2023-05-29 RX ADMIN — MUPIROCIN 1 APPLICATION: 2 CREAM TOPICAL at 21:04

## 2023-05-29 RX ADMIN — CETIRIZINE HYDROCHLORIDE 10 MG: 10 TABLET, FILM COATED ORAL at 09:36

## 2023-05-29 RX ADMIN — CARBOXYMETHYLCELLULOSE SODIUM 2 DROP: 5 SOLUTION/ DROPS OPHTHALMIC at 15:49

## 2023-05-29 RX ADMIN — BUDESONIDE AND FORMOTEROL FUMARATE DIHYDRATE 2 PUFF: 160; 4.5 AEROSOL RESPIRATORY (INHALATION) at 07:08

## 2023-05-29 RX ADMIN — TIOTROPIUM BROMIDE INHALATION SPRAY 2 PUFF: 3.12 SPRAY, METERED RESPIRATORY (INHALATION) at 07:08

## 2023-05-29 RX ADMIN — ACETAMINOPHEN 650 MG: 325 TABLET, FILM COATED ORAL at 09:38

## 2023-05-29 RX ADMIN — IPRATROPIUM BROMIDE AND ALBUTEROL SULFATE 3 ML: 2.5; .5 SOLUTION RESPIRATORY (INHALATION) at 01:41

## 2023-05-29 RX ADMIN — HYDROXYZINE HYDROCHLORIDE 25 MG: 25 TABLET ORAL at 16:48

## 2023-05-29 RX ADMIN — IPRATROPIUM BROMIDE AND ALBUTEROL SULFATE 3 ML: 2.5; .5 SOLUTION RESPIRATORY (INHALATION) at 12:53

## 2023-05-29 RX ADMIN — MUPIROCIN 1 APPLICATION: 2 CREAM TOPICAL at 11:25

## 2023-05-29 RX ADMIN — Medication 10 ML: at 09:38

## 2023-05-29 RX ADMIN — ACETAMINOPHEN 650 MG: 325 TABLET, FILM COATED ORAL at 15:26

## 2023-05-29 RX ADMIN — IPRATROPIUM BROMIDE AND ALBUTEROL SULFATE 3 ML: 2.5; .5 SOLUTION RESPIRATORY (INHALATION) at 07:08

## 2023-05-29 RX ADMIN — BUDESONIDE AND FORMOTEROL FUMARATE DIHYDRATE 2 PUFF: 160; 4.5 AEROSOL RESPIRATORY (INHALATION) at 19:22

## 2023-05-29 RX ADMIN — MULTIPLE VITAMINS W/ MINERALS TAB 1 TABLET: TAB at 09:36

## 2023-05-29 RX ADMIN — ENOXAPARIN SODIUM 40 MG: 100 INJECTION SUBCUTANEOUS at 21:03

## 2023-05-29 RX ADMIN — HYDROXYZINE HYDROCHLORIDE 25 MG: 25 TABLET ORAL at 21:02

## 2023-05-29 RX ADMIN — HYDROXYZINE HYDROCHLORIDE 25 MG: 25 TABLET ORAL at 09:36

## 2023-05-29 RX ADMIN — AMLODIPINE BESYLATE 10 MG: 5 TABLET ORAL at 09:36

## 2023-05-29 RX ADMIN — IPRATROPIUM BROMIDE AND ALBUTEROL SULFATE 3 ML: 2.5; .5 SOLUTION RESPIRATORY (INHALATION) at 19:22

## 2023-05-29 RX ADMIN — INSULIN ASPART 6 UNITS: 100 INJECTION, SOLUTION INTRAVENOUS; SUBCUTANEOUS at 16:48

## 2023-05-29 NOTE — PLAN OF CARE
Goal Outcome Evaluation:  Plan of Care Reviewed With: patient        Progress: improving  Outcome Evaluation: no acute events during shift. VSS, SR on tele. 6L HFNC. no other changes in pt condition. will continue to monitor.

## 2023-05-29 NOTE — PROGRESS NOTES
[facility] HOSPITALIST    PROGRESS NOTE    Name:  Carolin Toscano   Age:  77 y.o.  Sex:  female  :  1945  MRN:  [id number]   Visit Number:  [id number]  Admission Date:  2023  Date Of Service:  2023  Primary Care Physician:  Jason Nicholson MD    Chief Complaint:      Shortness of breath    Subjective:    Patient was seen and examined today.  Patient sitting up in bed and appears comfortable with no distress.  Patient unlabored and nontachypneic on 6 L of oxygen nasal cannula.  Patient appears at baseline currently and denies any acute complaints or pain.  Otherwise hemodynamically stable.    No family at bedside.    Hospital Course:    Patient is a 77 years old female with a past medical history of COPD, chronic respiratory failure on 5 to 6 L per her previous discharge, on NIV machine at home and trelegy, hypertension, and ongoing tobacco use who presented to the ER from home by EMS with a chief complaint of shortness of breath.  Patient reporting that she started having shortness of breath associated with productive cough since last night and has persisted and became worse that promoted her to call EMS.  EMS has found the patient to be at 65% saturation on her normal 6 L of oxygen. Patient reports compliance with her NIV machine at home.      On ER evaluation, Patient was found to be tachycardic with a heart rate of 130s, temperature of 100.2 and respirations of 30, /72.  Patient was placed on BiPAP with FiO2 of 40%.  Her ABG came back and showed pH of 7.296/PCO2 87/PO2 94/HCO3 42/saturation 97% on 5 L nasal cannula.  HS Troponin 44, proBNP WNL, glucose 192, CO2 of 40, otherwise CBC and CMP within acceptable range.  Lactate and Pro-Adi WNL.  Respiratory panel positive for parainfluenza virus.  Chest x-ray Mild interstitial disease  bilaterally is similar to prior. No area of consolidation is seen. Patient received Solu-Medrol and Aztreonam while in the ER.  Hospitalist  consulted for admission.     Patient was admitted and treated with steroids.  Antibiotics were not indicated.  Pulmonology consulted and was moved to ICU on 5/9 due to worsening respiratory failure and tachypnea.  She initially had to be on Precedex drip which was discontinued and switched to Xanax due to anxiety. Patient was placed on AVAPS.  Patient continued on steroids and received Lasix.     5/16/23: d/w nsg intermittently on avaps and hi flow. Patient awake in bed. CXR from 5/15 reviewed. Medications reviewed.  Discussed with case management they are looking at a [facility].     5/17/2023 discussed with case management still working on [facility].  I am planning to do a peer to peer with them tomorrow.  Otherwise has persistent hypoxia.  Dr. Soriano's note reviewed considering bronchoscopy.     5/18: Daughter Regla is coming today.  Otherwise not much change still having dyspnea and hypoxemia.  Case management note reviewed.     5/19: d/w patient and family updated them on [facility] and pending appeal which will likely be submitted Monday. Still having dyspnea but mucous is breaking loose today.  Still debating about bronchoscopy.  Interested in long-term acute care possibly in [place] versus [place].     5/20 discussed with patient as well as family at the bedside.  Also discussed with them regarding placing access which she was able to get a PICC line.  Also discussed with them the staph growing in her sputum and that I was going to add vancomycin for this.     5/21: c/o [facility] suspect is due to mucous. Agreeable to flonase, zyrtec and atarax. Labs reviewed.     Review of Systems:     All systems were reviewed and negative except as mentioned in subjective, assessment and plan.    Vital Signs:    Temp:  [97.5 °F (36.4 °C)-98.9 °F (37.2 °C)] 97.8 °F (36.6 °C)  Heart Rate:  [60-84] 77  Resp:  [18-25] 20  BP: (115-134)/(45-64) 120/53    Intake and output:    I/O last 3 completed shifts:  In:  780 [P.O.:780]  Out: 2450 [Urine:2450]  No intake/output data recorded.    Physical Examination:    General Appearance:  Alert and cooperative.  Chronically ill-appearing.  Frail.   Head:  Atraumatic and normocephalic.   Eyes: Conjunctivae and sclerae normal, no icterus. No pallor.   Throat: No oral lesions, no thrush, oral mucosa moist.   Neck: Supple, trachea midline, no thyromegaly.   Lungs:   Breath sounds heard bilaterally equally.  Expiratory wheezing heard. Decreased air movement bilaterally.  Nontachypneic on supplemental oxygen.    Heart:  Normal S1 and S2, no murmur, no gallop, no rub. No JVD.   Abdomen:   Normal bowel sounds, no masses, no organomegaly. Soft, nontender, nondistended, no rebound tenderness.   Extremities: Supple, no edema, no cyanosis, no clubbing.   Skin: No bleeding or rash.   Neurologic: Alert and oriented x 3. No facial asymmetry. Moves all four limbs. No tremors.  Generalized weakness noted.     Laboratory results:    Results from last 7 days   Lab Units 05/24/23  0611 05/23/23  0609   SODIUM mmol/L 139 138   POTASSIUM mmol/L 4.5 4.6   CHLORIDE mmol/L 102 100   CO2 mmol/L 31.8* 30.3*   BUN mg/dL 18 14   CREATININE mg/dL 0.26* 0.27*   CALCIUM mg/dL 9.3 8.9   GLUCOSE mg/dL 155* 138*     Results from last 7 days   Lab Units 05/24/23  0611 05/23/23  0609   WBC 10*3/mm3 9.60 9.97   HEMOGLOBIN g/dL 10.3* 10.5*   HEMATOCRIT % 33.1* 33.3*   PLATELETS 10*3/mm3 212 215                 Recent Labs     05/21/23  0714 05/22/23  1307 05/24/23  0719   PHART 7.394 7.447 7.394   YIL2VDH 70.2* 53.3* 59.3*   PO2ART 95.2 50.2* 82.1   [id number] 42.8* 36.8* 36.2*   BASEEXCESS 15.1* 11.1* 9.6*      I have reviewed the patient's laboratory results.    Radiology results:    No radiology results from the last 24 hrs  I have reviewed the patient's radiology reports.    Medication Review:     I have reviewed the patient's active and prn medications.     Problem List:      Acute on chronic respiratory failure with  hypoxia and hypercapnia      Assessment:    Acute on chronic respiratory failure with hypoxia and hypercapnia, likely secondary to #2 and 3, POA  Acute COPD exacerbation, POA  Parainfluenza infection, POA  Elevated troponin, likely secondary to demand ischemia, POA  Hypertension  Chronic tobacco abuse  Pulmonary hypertension  Arthritis  Deconditioning    Plan:    Respiratory failure with hypoxia/hypercapnia, COPD exacerbation, parainfluenza  -Continue supplemental oxygen to keep saturation above 90%, patient on 5 to 6 L at baseline, continue to titrate down as able to.  Continue BiPAP therapy.  -Has NIV at home. Currently on 6 liters which is near her baseline.  -Recent parainfluenza virus infection met SIRS due to this  -Tapered down to po prednisone, will taper slowly.  -Bronchodilators, Mucinex MetaNeb and supportive care.  -Dr. Soriano following, appreciate his recommendations  -s/p lasix  -Xanax prn  -Sputum growing Staph aureus and haemophilus, finished vancomycin and azithromycin.  - flonase, zyrtec, and atarax for upper airway congestion  -Continue PT/OT.  -I discussed with pulmonology, patient likely at her baseline currently.  -Patient will benefit from close pulmonology follow-up and outpatient PFTs.       Hyperglycemia  -sliding scale insulin   -Hemoglobin A1c 6.7     Further orders as indicated per clinical course.     PT/ OT consulted, case management consulted, appreciate their help.  Will consult with palliative care, appreciate their assistance.     Pending placement, case management following.     DVT Prophylaxis: Lovenox prophylaxis (benefit> risk)  Code Status: Full  Diet: Cardiac  Discharge Plan: Patient should be ready for LTAC versus rehab, seems to have agreed to go to [place] and was accepted there but was declined by her insurance.  Case management following.  Patient prefers [facility].     Lolis Vital MD  05/28/2023  08:45 EDT    Dictated utilizing Dragon dictation.

## 2023-05-29 NOTE — PLAN OF CARE
Goal Outcome Evaluation:  Plan of Care Reviewed With: patient           Outcome Evaluation: Patient had no acute events today. Titrated to 4L of oxygen without issue. PICC line removed without complication, site clean, dry, and intact.

## 2023-05-29 NOTE — PROGRESS NOTES
[facility]    Name:  Carolin Toscano   Age:  77 y.o.  Sex:  female  :  1945  MRN:  [id number]   Visit Number:  [id number]  Admission Date:  2023  Date Of Service:  23  Primary Care Physician:  Jason Nicholson MD     LOS: 25 days :    Chief Complaint:      Shortness of breath    Subjective:    Patient was seen and examined today.  Patient sitting up in bed and appears comfortable with no distress.  Patient unlabored and nontachypneic on 6 L of oxygen nasal cannula.  Patient appears at baseline currently and denies any acute complaints or pain.  Patient pending placement. Otherwise hemodynamically stable.    Hospital Course:    Patient is a 77 years old female with a past medical history of COPD, chronic respiratory failure on 5 to 6 L per her previous discharge, on NIV machine at home and trelegy, hypertension, and ongoing tobacco use who presented to the ER from home by EMS with a chief complaint of shortness of breath.  Patient reporting that she started having shortness of breath associated with productive cough since last night and has persisted and became worse that promoted her to call EMS.  EMS has found the patient to be at 65% saturation on her normal 6 L of oxygen. Patient reports compliance with her NIV machine at home.      On ER evaluation, Patient was found to be tachycardic with a heart rate of 130s, temperature of 100.2 and respirations of 30, /72.  Patient was placed on BiPAP with FiO2 of 40%.  Her ABG came back and showed pH of 7.296/PCO2 87/PO2 94/HCO3 42/saturation 97% on 5 L nasal cannula.  HS Troponin 44, proBNP WNL, glucose 192, CO2 of 40, otherwise CBC and CMP within acceptable range.  Lactate and Pro-Adi WNL.  Respiratory panel positive for parainfluenza virus.  Chest x-ray Mild interstitial disease  bilaterally is similar to prior. No area of consolidation is seen. Patient received Solu-Medrol and Aztreonam while in the  ER.  Hospitalist consulted for admission.     Patient was admitted and treated with steroids.  Antibiotics were not indicated.  Pulmonology consulted and was moved to ICU on 5/9 due to worsening respiratory failure and tachypnea.  She initially had to be on Precedex drip which was discontinued and switched to Xanax due to anxiety. Patient was placed on AVAPS.  Patient continued on steroids and received Lasix.  Dr. Soriano considered bronchoscopy however patient has declined that option.  Patient sputum was growing Staph aureus and haemophilus for which she finished vancomycin and azithromycin. Patient continued to improve slowly on her respiratory status and was able to be dilated down to 6 L which is her baseline O2.    Patient currently pending placement. Considered LTAC in [place] versus rehab in [place].  She had initially been accepted at the LTAC however her insurance declined, an appeal was filed with insurance.  Case management following.      Review of Systems:     All systems were reviewed and negative except as mentioned in subjective, assessment and plan.    Vital Signs:    Temp:  [97.5 °F (36.4 °C)-98.9 °F (37.2 °C)] 97.8 °F (36.6 °C)  Heart Rate:  [60-84] 77  Resp:  [18-25] 20  BP: (115-134)/(45-64) 120/53    Intake and output:    I/O last 3 completed shifts:  In: 780 [P.O.:780]  Out: 2450 [Urine:2450]  No intake/output data recorded.    Physical Examination:    General Appearance:  Alert and cooperative.  Chronically ill-appearing.  Frail.   Head:  Atraumatic and normocephalic.   Eyes: Conjunctivae and sclerae normal, no icterus. No pallor.   Throat: No oral lesions, no thrush, oral mucosa moist.   Neck: Supple, trachea midline, no thyromegaly.   Lungs:   Breath sounds heard bilaterally equally.  Mild expiratory wheezing improving. Decreased air movement bilaterally.  Nontachypneic on supplemental oxygen.    Heart:  Normal S1 and S2, no murmur, no gallop, no rub. No JVD.   Abdomen:   Normal bowel sounds,  no masses, no organomegaly. Soft, nontender, nondistended, no rebound tenderness.   Extremities: Supple, no edema, no cyanosis, no clubbing.   Skin: No bleeding or rash.   Neurologic: Alert and oriented x 3. No facial asymmetry. Moves all four limbs. No tremors.  Generalized weakness noted.     Laboratory results:    Results from last 7 days   Lab Units 05/24/23  0611 05/23/23  0609   SODIUM mmol/L 139 138   POTASSIUM mmol/L 4.5 4.6   CHLORIDE mmol/L 102 100   CO2 mmol/L 31.8* 30.3*   BUN mg/dL 18 14   CREATININE mg/dL 0.26* 0.27*   CALCIUM mg/dL 9.3 8.9   GLUCOSE mg/dL 155* 138*     Results from last 7 days   Lab Units 05/24/23  0611 05/23/23  0609   WBC 10*3/mm3 9.60 9.97   HEMOGLOBIN g/dL 10.3* 10.5*   HEMATOCRIT % 33.1* 33.3*   PLATELETS 10*3/mm3 212 215                 Recent Labs     05/21/23  0714 05/22/23  1307 05/24/23  0719   PHART 7.394 7.447 7.394   IGQ0YTU 70.2* 53.3* 59.3*   PO2ART 95.2 50.2* 82.1   [id number] 42.8* 36.8* 36.2*   BASEEXCESS 15.1* 11.1* 9.6*      I have reviewed the patient's laboratory results.    Radiology results:    No radiology results from the last 24 hrs  I have reviewed the patient's radiology reports.    Medication Review:     I have reviewed the patient's active and prn medications.     Problem List:      Acute on chronic respiratory failure with hypoxia and hypercapnia      Assessment:    Acute on chronic respiratory failure with hypoxia and hypercapnia, likely secondary to #2 and 3, POA  Acute COPD exacerbation, POA  Parainfluenza infection, POA  Elevated troponin, likely secondary to demand ischemia, POA  Hypertension  Chronic tobacco abuse  Pulmonary hypertension  Arthritis  Deconditioning    Plan:    Respiratory failure with hypoxia/hypercapnia, COPD exacerbation, parainfluenza  -Continue supplemental oxygen to keep saturation above 90%, patient on 5 to 6 L at baseline, continue to titrate down as able to.  Continue BiPAP therapy.  -Has NIV at home. Currently on 6 liters  which is near her baseline.  -Recent parainfluenza virus infection met SIRS due to this  -Tapered down to po prednisone, will taper slowly.  -Bronchodilators, Mucinex MetaNeb and supportive care.  -Dr. Soriano following, appreciate his recommendations  -s/p lasix  -Xanax prn  -Sputum growing Staph aureus and haemophilus, finished vancomycin and azithromycin.  - flonase, zyrtec, and atarax for upper airway congestion  -Continue PT/OT.  -I discussed with pulmonology, patient likely at her baseline currently.  -Patient will benefit from close pulmonology follow-up and outpatient PFTs.       Hyperglycemia  -sliding scale insulin   -Hemoglobin A1c 6.7     Further orders as indicated per clinical course.     PT/ OT consulted, case management consulted, appreciate their help.  Will consult with palliative care, appreciate their assistance.    Pending placement, case management following.    Difficult peripheral IV access, patient has right cubital PICC line, will discontinue PICC line once nursing obtain peripheral IV access.     DVT Prophylaxis: Lovenox prophylaxis (benefit> risk)  Code Status: Full  Diet: Cardiac  Discharge Plan: Patient should be ready for LTAC versus rehab, seems to have agreed to go to [place] and was accepted there but was declined by her insurance.  An appeal letter was sent again to the insurance.  Case management following.    Lolis Vital MD  05/29/23  08:48 EDT    Dictated utilizing Dragon dictation.

## 2023-05-29 NOTE — PLAN OF CARE
Goal Outcome Evaluation:  Plan of Care Reviewed With: patient        Progress: improving  Outcome Evaluation: Pt agreeable to physical therapy. Performed supine to sit with CGA, sitting EOB x5 mins, sit <-> stand with min/CGA and VC for hand placement with RW, amb with RW 8 feet with min A and encouragement to increase activity. Performed B LE ex in sitting AP, LAQ, marching 1x10 reps. Con't with PT POC and progress as tolerated

## 2023-05-30 LAB
GLUCOSE BLDC GLUCOMTR-MCNC: 101 MG/DL (ref 70–130)
GLUCOSE BLDC GLUCOMTR-MCNC: 160 MG/DL (ref 70–130)
GLUCOSE BLDC GLUCOMTR-MCNC: 251 MG/DL (ref 70–130)

## 2023-05-30 PROCEDURE — 82948 REAGENT STRIP/BLOOD GLUCOSE: CPT

## 2023-05-30 PROCEDURE — 94799 UNLISTED PULMONARY SVC/PX: CPT

## 2023-05-30 PROCEDURE — 63710000001 INSULIN ASPART PER 5 UNITS: Performed by: FAMILY MEDICINE

## 2023-05-30 PROCEDURE — 97530 THERAPEUTIC ACTIVITIES: CPT

## 2023-05-30 PROCEDURE — 94664 DEMO&/EVAL PT USE INHALER: CPT

## 2023-05-30 PROCEDURE — 94660 CPAP INITIATION&MGMT: CPT

## 2023-05-30 PROCEDURE — 25010000002 ENOXAPARIN PER 10 MG: Performed by: FAMILY MEDICINE

## 2023-05-30 PROCEDURE — 97535 SELF CARE MNGMENT TRAINING: CPT

## 2023-05-30 PROCEDURE — 94761 N-INVAS EAR/PLS OXIMETRY MLT: CPT

## 2023-05-30 PROCEDURE — 99232 SBSQ HOSP IP/OBS MODERATE 35: CPT | Performed by: INTERNAL MEDICINE

## 2023-05-30 PROCEDURE — 63710000001 PREDNISONE PER 5 MG: Performed by: FAMILY MEDICINE

## 2023-05-30 RX ADMIN — BUDESONIDE AND FORMOTEROL FUMARATE DIHYDRATE 2 PUFF: 160; 4.5 AEROSOL RESPIRATORY (INHALATION) at 06:54

## 2023-05-30 RX ADMIN — HYDROXYZINE HYDROCHLORIDE 25 MG: 25 TABLET ORAL at 08:49

## 2023-05-30 RX ADMIN — CETIRIZINE HYDROCHLORIDE 10 MG: 10 TABLET, FILM COATED ORAL at 08:49

## 2023-05-30 RX ADMIN — ENOXAPARIN SODIUM 40 MG: 100 INJECTION SUBCUTANEOUS at 20:36

## 2023-05-30 RX ADMIN — Medication 10 ML: at 08:51

## 2023-05-30 RX ADMIN — IPRATROPIUM BROMIDE AND ALBUTEROL SULFATE 3 ML: 2.5; .5 SOLUTION RESPIRATORY (INHALATION) at 19:21

## 2023-05-30 RX ADMIN — IPRATROPIUM BROMIDE AND ALBUTEROL SULFATE 3 ML: 2.5; .5 SOLUTION RESPIRATORY (INHALATION) at 06:54

## 2023-05-30 RX ADMIN — Medication 3 ML: at 08:51

## 2023-05-30 RX ADMIN — FLUTICASONE PROPIONATE 2 SPRAY: 50 SPRAY, METERED NASAL at 20:37

## 2023-05-30 RX ADMIN — HYDROXYZINE HYDROCHLORIDE 25 MG: 25 TABLET ORAL at 20:36

## 2023-05-30 RX ADMIN — INSULIN ASPART 6 UNITS: 100 INJECTION, SOLUTION INTRAVENOUS; SUBCUTANEOUS at 17:56

## 2023-05-30 RX ADMIN — ATENOLOL 25 MG: 25 TABLET ORAL at 08:49

## 2023-05-30 RX ADMIN — INSULIN ASPART 2 UNITS: 100 INJECTION, SOLUTION INTRAVENOUS; SUBCUTANEOUS at 12:23

## 2023-05-30 RX ADMIN — ALPRAZOLAM 0.5 MG: 0.5 TABLET ORAL at 08:48

## 2023-05-30 RX ADMIN — Medication 10 ML: at 20:36

## 2023-05-30 RX ADMIN — MUPIROCIN 1 APPLICATION: 2 CREAM TOPICAL at 20:37

## 2023-05-30 RX ADMIN — FLUTICASONE PROPIONATE 2 SPRAY: 50 SPRAY, METERED NASAL at 08:52

## 2023-05-30 RX ADMIN — MUPIROCIN 1 APPLICATION: 2 CREAM TOPICAL at 10:21

## 2023-05-30 RX ADMIN — TIOTROPIUM BROMIDE INHALATION SPRAY 2 PUFF: 3.12 SPRAY, METERED RESPIRATORY (INHALATION) at 06:54

## 2023-05-30 RX ADMIN — BUDESONIDE AND FORMOTEROL FUMARATE DIHYDRATE 2 PUFF: 160; 4.5 AEROSOL RESPIRATORY (INHALATION) at 19:21

## 2023-05-30 RX ADMIN — Medication 10 ML: at 08:48

## 2023-05-30 RX ADMIN — ACETAMINOPHEN 650 MG: 325 TABLET, FILM COATED ORAL at 06:15

## 2023-05-30 RX ADMIN — PREDNISONE 10 MG: 10 TABLET ORAL at 08:49

## 2023-05-30 RX ADMIN — HYDROXYZINE HYDROCHLORIDE 25 MG: 25 TABLET ORAL at 17:56

## 2023-05-30 RX ADMIN — IPRATROPIUM BROMIDE AND ALBUTEROL SULFATE 3 ML: 2.5; .5 SOLUTION RESPIRATORY (INHALATION) at 00:32

## 2023-05-30 RX ADMIN — AMLODIPINE BESYLATE 10 MG: 5 TABLET ORAL at 08:49

## 2023-05-30 RX ADMIN — IPRATROPIUM BROMIDE AND ALBUTEROL SULFATE 3 ML: 2.5; .5 SOLUTION RESPIRATORY (INHALATION) at 12:38

## 2023-05-30 RX ADMIN — MULTIPLE VITAMINS W/ MINERALS TAB 1 TABLET: TAB at 08:49

## 2023-05-30 NOTE — PLAN OF CARE
Goal Outcome Evaluation:  Plan of Care Reviewed With: patient        Progress: improving  Outcome Evaluation: OT tx completed. Patient supine in bed, no complaints. Patient moved to EOB with SBA. Sitting EOB performed UBB with min A, UBD with SBA, LBD with min A, grooming tasks with SBA. Patient performed sit to stand min A, transfer from bed to chair with CGA using RW. O2 on 4L ranged 90, 89, 93 before, during, after activity. Continue OT POC

## 2023-05-30 NOTE — THERAPY TREATMENT NOTE
"Patient Name: Carolin Toscano  : 1945    MRN: [id number]                              Today's Date: 2023       Admit Date: 2023    Visit Dx:     ICD-10-CM ICD-9-CM   1. Acute on chronic respiratory failure with hypoxia and hypercapnia  J96.21 518.84    J96.22 786.09     799.02   2. COPD exacerbation  J44.1 491.21   3. Parainfluenza virus infection  B34.8 078.89   4. Simple chronic bronchitis  J41.0 491.0     Patient Active Problem List   Diagnosis   • CAP (community acquired pneumonia)   • Cigarette nicotine dependence with nicotine-induced disorder   • Essential hypertension   • Dyslipidemia   • Blood glucose elevated   • Muscle ache   • COPD (chronic obstructive pulmonary disease)   • Nuclear sclerotic cataract of right eye   • Acute respiratory failure   • COPD exacerbation   • Acute on chronic respiratory failure with hypoxia and hypercapnia     Past Medical History:   Diagnosis Date   • Arthritis    • COPD (chronic obstructive pulmonary disease)    • Gall stones    • Goiter     \"inward\"   • Hypertension    • Hypertension    • Kidney infection    • Kidney stone    • Kidney stones    • Migraine headache    • Scarlet fever      Past Surgical History:   Procedure Laterality Date   • BLADDER SURGERY     • CATARACT EXTRACTION W/ INTRAOCULAR LENS IMPLANT Left 2022    Procedure: CATARACT PHACO EXTRACTION WITH INTRAOCULAR LENS IMPLANT LEFT;  Surgeon: Sathish Lopez MD;  Location: [facility];  Service: Ophthalmology;  Laterality: Left;   • CATARACT EXTRACTION W/ INTRAOCULAR LENS IMPLANT Right 2022    Procedure: CATARACT PHACO EXTRACTION WITH INTRAOCULAR LENS IMPLANT RIGHT;  Surgeon: Sathish Lopez MD;  Location: [facility];  Service: Ophthalmology;  Laterality: Right;   • CHOLECYSTECTOMY     • HYSTERECTOMY     • TONSILLECTOMY        General Information     Row Name 23 1848          Physical Therapy Time and Intention    Document Type therapy note (daily note)  -CC     Mode of " Treatment physical therapy  -     Row Name 05/30/23 1848          General Information    Patient Profile Reviewed yes  -     Existing Precautions/Restrictions fall;oxygen therapy device and L/min  -     Row Name 05/30/23 1848          Safety Issues, Functional Mobility    Safety Issues Affecting Function (Mobility) awareness of need for assistance;insight into deficits/self-awareness;positioning of assistive device;safety precautions follow-through/compliance  -     Impairments Affecting Function (Mobility) strength;balance;endurance/activity tolerance;shortness of breath  -           User Key  (r) = Recorded By, (t) = Taken By, (c) = Cosigned By    Initials Name Provider Type     Nasra Quiñones, ELVIRA Physical Therapist Assistant               Mobility     Row Name 05/30/23 1849          Sit-Stand Transfer    Sit-Stand [place] (Transfers) minimum assist (75% patient effort);1 person assist;verbal cues;contact guard  -     Assistive Device (Sit-Stand Transfers) walker, front-wheeled  -[facility] Name 05/30/23 1849          Gait/Stairs (Locomotion)    [place] Level (Gait) minimum assist (75% patient effort)  -     Assistive Device (Gait) walker, front-wheeled  -     Distance in Feet (Gait) 11  -CC     Deviations/Abnormal Patterns (Gait) weight shifting decreased;gilberto decreased;base of support, narrow;stride length decreased  -     Bilateral Gait Deviations forward flexed posture;heel strike decreased  -           User Key  (r) = Recorded By, (t) = Taken By, (c) = Cosigned By    Initials Name Provider Type     Nasra Quiñones PTA Physical Therapist Assistant               Obj/Interventions    No documentation.                Goals/Plan    No documentation.                Clinical Impression     [facility] Name 05/30/23 1850          Pain    Pretreatment Pain Rating 0/10 - no pain  -     Posttreatment Pain Rating 0/10 - no pain  -     Additional Documentation Pain Scale: Numbers  Pre/Post-Treatment (Group)  -     Row Name 05/30/23 1850          Plan of Care Review    Plan of Care Reviewed With patient  -CC     Progress improving  -CC     Outcome Evaluation Pt agreeable to physical therapy. Performed sit <->stand with min/CGA and VC for hand placement, amb 11 feet with RW with min A and occ VC for sequencing. Pt requires increased time to perfom tasksCon't with PT POC and progress as tolerated.  -     Row Name 05/30/23 1850          Positioning and Restraints    Pre-Treatment Position sitting in chair/recliner  -CC     Post Treatment Position chair  -CC     In Chair reclined;call light within reach;encouraged to call for assist;exit alarm on  -CC           User Key  (r) = Recorded By, (t) = Taken By, (c) = Cosigned By    Initials Name Provider Type    Nasra Calixto PTA Physical Therapist Assistant               Outcome Measures     Row Name 05/30/23 1853          How much help from another person do you currently need...    Turning from your back to your side while in flat bed without using bedrails? 3  -CC     Moving from lying on back to sitting on the side of a flat bed without bedrails? 3  -CC     Moving to and from a bed to a chair (including a wheelchair)? 3  -CC     Standing up from a chair using your arms (e.g., wheelchair, bedside chair)? 3  -CC     Climbing 3-5 steps with a railing? 2  -CC     To walk in hospital room? 3  -CC     AM-PAC 6 Clicks Score (PT) 17  -CC     Highest level of mobility 5 --> Static standing  -     Row Name 05/30/23 1853 05/30/23 1614       Functional Assessment    Outcome Measure Options AM-PAC 6 Clicks Basic Mobility (PT)  -CC AM-PAC 6 Clicks Daily Activity (OT)  -SD          User Key  (r) = Recorded By, (t) = Taken By, (c) = Cosigned By    Initials Name Provider Type    Nasra Calixto PTA Physical Therapist Assistant    Cecelia Johnson OT Occupational Therapist                             Physical Therapy Education     Title: PT  OT SLP Therapies (In Progress)     Topic: Physical Therapy (Done)     Point: Mobility training (Done)     Learning Progress Summary           Patient Acceptance, E,TB, VU by CC at 5/27/2023 1730    Comment: Importance of increasing mobility    Acceptance, E,TB, VU by JR at 5/24/2023 1749    Comment: Advancement of POC    Acceptance, E,TB,D, VU,NR by RM at 5/23/2023 1316    Comment: proper breathing techniques with mobility    Acceptance, E,TB,D, VU,NR by RM at 5/22/2023 1358    Comment: Exercise tech and importance of dily activity    Acceptance, E, VU by MS at 5/16/2023 1403    Comment: importance of mobility    Acceptance, E, VU by AM at 5/15/2023 0701    Acceptance, E, VU by MS at 5/12/2023 1458    Comment: importance of mobility    Acceptance, E,TB,D, VU,NR by RM at 5/8/2023 1250    Comment: Importance of activity and exercise techniques    Acceptance, E, VU by MS at 5/5/2023 1136    Comment: importance of mobility                   Point: Home exercise program (Done)     Learning Progress Summary           Patient Acceptance, E,TB, VU by CC at 5/29/2023 1256    Acceptance, E,TB, VU by JR at 5/25/2023 1515    Comment: Importance of BLE exercises to improve mobility and confidence with mobility    Acceptance, E,TB,D, VU,NR by RM at 5/22/2023 1358    Comment: Exercise tech and importance of dily activity    Acceptance, E,D, VU,DU by TW at 5/21/2023 1205    Comment: Pt education for improving BLE ther ex technique    Acceptance, E, VU by MS at 5/16/2023 1403    Comment: importance of mobility    Acceptance, E, VU by AM at 5/15/2023 0701    Acceptance, E,TB, VU,NR by CC at 5/14/2023 1310    Comment: Importance of increasing movement of B LE    Acceptance, E,TB,D, VU,NR by RM at 5/8/2023 1250    Comment: Importance of activity and exercise techniques    Acceptance, E, VU by MS at 5/5/2023 1136    Comment: importance of mobility                   Point: Body mechanics (Done)     Learning Progress Summary            Patient Acceptance, E,TB, VU by  at 5/30/2023 1854    Acceptance, E,TB, VU,NR by  at 5/28/2023 1707    Comment: Upright posture in standing    Acceptance, E,TB, VU by  at 5/17/2023 1846    Comment: upright posture                   Point: Precautions (Done)     Learning Progress Summary           Patient Acceptance, E,TB, VU by  at 5/24/2023 1749    Comment: Importance of breathing coordination and pacing to recovery                               User Key     Initials Effective Dates Name Provider Type Discipline     03/23/22 -  Kavitha Tavarez, PT Physical Therapist PT    CC 06/16/21 -  Nasra Quiñones, PTA Physical Therapist Assistant PT    RM 06/16/21 -  Ronak Cruz, PTA Physical Therapist Assistant PT    TW 06/16/21 -  Deysi Goodwin, PT Physical Therapist PT    MS 07/01/22 -  Martín Ortiz, PT Physical Therapist PT    AM 02/22/23 -  Rob Lu, RN Registered Nurse Nurse              PT Recommendation and Plan     Plan of Care Reviewed With: patient  Progress: improving  Outcome Evaluation: Pt agreeable to physical therapy. Performed sit <->stand with min/CGA and VC for hand placement, amb 11 feet with RW with min A and occ VC for sequencing. Pt requires increased time to perfom tasksCon't with PT POC and progress as tolerated.     Time Calculation:    PT Charges     Row Name 05/30/23 1855             Time Calculation    PT Received On 05/30/23  -      PT Goal Re-Cert Due Date 06/03/23  -CC         Time Calculation- PT    Total Timed Code Minutes- PT 33 minute(s)  -CC         Timed Charges    94035 - PT Therapeutic Activity Minutes 33  -CC         Total Minutes    Timed Charges Total Minutes 33  -CC       Total Minutes 33  -CC            User Key  (r) = Recorded By, (t) = Taken By, (c) = Cosigned By    Initials Name Provider Type    CC Nasra Quiñones, PTA Physical Therapist Assistant              Therapy Charges for Today     Code Description Service Date Service Provider Modifiers Qty     92940339384 HC PT THER PROC EA 15 MIN 5/29/2023 Nasra Quiñones, PTA GP 1    16586116873 HC GAIT TRAINING EA 15 MIN 5/29/2023 Nasra Quiñones, PTA GP 1    71674138558 HC PT THERAPEUTIC ACT EA 15 MIN 5/29/2023 Nasra Quiñones, PTA GP 1    36640750386 HC PT THERAPEUTIC ACT EA 15 MIN 5/30/2023 Nasra Quiñones, PTA GP 2          PT G-Codes  Outcome Measure Options: AM-PAC 6 Clicks Basic Mobility (PT)  AM-PAC 6 Clicks Score (PT): 17  AM-PAC 6 Clicks Score (OT): 16       Nasra Quiñones PTA  5/30/2023

## 2023-05-30 NOTE — PLAN OF CARE
Goal Outcome Evaluation:  Plan of Care Reviewed With: (P) patient        Progress: (P) improving  Outcome Evaluation: (P) VSS, maintaining sat on 4L, awaitng insurance approval for LTAC vs rehab

## 2023-05-30 NOTE — CASE MANAGEMENT/SOCIAL WORK
Case Management/Social Work    Patient Name:  Carolin Toscano  YOB: 1945  MRN: [id number]  Admit Date:  5/4/2023        TIFFANIE received notice that pts inusrance denied appeal for LTACH. TIFFANIE has sent referral to [facility]. TIFFANIE reached out to [employer] [facility] who has been following, waiting to hear back. Marcelle Melgar was also following, will reach back out. Pt is currently on 4L O2 and maintaining well. TIFFANIE following and will update when able.       Electronically signed by:  NAOMI Fairbanks  05/30/23 08:58 EDT

## 2023-05-30 NOTE — PROGRESS NOTES
"  CC: Acute Respiratory Failure.     S: Continues to require high flow nasal cannula, currently at 4-6 L/min.  Wants to know when she can go home.    ROS: Complains of cough, shortness of breath and mild weakness.  Also complains of mild anxiety.    O:Vital signs reviewed.   /56 (BP Location: Left arm, Patient Position: Lying)   Pulse 74   Temp 98 °F (36.7 °C) (Axillary)   Resp 16   Ht 160 cm (63\")   Wt 69.8 kg (153 lb 14.1 oz)   SpO2 98%   BMI 27.26 kg/m²     Temp (24hrs), Av.3 °F (36.8 °C), Min:97.8 °F (36.6 °C), Max:98.6 °F (37 °C)      I & Os reviewed.   Intake/Output       23 0700 - 23 0659 23 0700 - 23 0659    Intake (ml) -- 240    Output (ml) 950 --    Net (ml) -950 240    Last Weight 69.8 kg (153 lb 14.1 oz) --          Net IO Since Admission: -10,181.33 mL [23 0949]    General/Constitutional: Off NIV therapy.  Does not appear to be in any significant distress at this time.  Eyes: PERRL. EOMI  Neck: Supple without JVD. No obvious masses noted.   Cardiovascular: S1 + S2.  Regular at this time.  Lungs/Respiratory: Minimal bibasilar crackles noted.  No significant respiratory distress noted.  GI/Abdomen: Soft. Bowel sounds positive.  No obvious organomegaly  Musculoskeletal/Extremities: No edema noted. Gait could not be assessed at this time, as the patient was laying in bed.   Neurologic: Appropriate in her responses.  Psych: AAO x 3. Was able to follow simple commands.    Skin: Appeared somewhat dry.      Labs: Reviewed.   Results from last 7 days   Lab Units 23  0611   WBC 10*3/mm3 9.60   HEMOGLOBIN g/dL 10.3*   HEMATOCRIT % 33.1*   PLATELETS 10*3/mm3 212       Lab Results   Component Value Date    PROCALCITO 0.17 2023    PROCALCITO 0.11 2023    PROCALCITO 0.08 2023       No results found for: CRP    No results found for: SEDRATE    Lab Results   Component Value Date    PROBNP 572.7 2023    PROBNP 479.4 05/10/2023    PROBNP 234.4 " 05/04/2023       Results from last 7 days   Lab Units 05/24/23  0611   SODIUM mmol/L 139   POTASSIUM mmol/L 4.5   CHLORIDE mmol/L 102   CO2 mmol/L 31.8*   BUN mg/dL 18   CREATININE mg/dL 0.26*   CALCIUM mg/dL 9.3   ANION GAP mmol/L 5.2   GLUCOSE mg/dL 155*                   No results found for: CKTOTAL    No components found for: HSTROPT    Lab Results   Component Value Date    TROPONINT 22 (H) 05/05/2023    TROPONINT 25 (H) 05/05/2023    TROPONINT 44 (H) 05/04/2023       No results found for: DDIMER    Brief Urine Lab Results  (Last result in the past 365 days)      Color   Clarity   Blood   Leuk Est   Nitrite   Protein   CREAT   Urine HCG        05/12/23 1738 Yellow   Cloudy   Negative   Trace   Negative   Trace                 Lab Results   Component Value Date    TSH 0.304 05/09/2023       No results found for: FREET4      Micro: As of May 30, 2023   Lab Results   Component Value Date    RESPCX Moderate growth (3+) Haemophilus influenzae (A) 05/18/2023    RESPCX Light growth (2+) Staphylococcus aureus (A) 05/18/2023    RESPCX No Normal Respiratory Ana (A) 05/18/2023     No results found for: BCIDPCR  Lab Results   Component Value Date    BLOODCX No growth at 5 days 05/18/2023    BLOODCX No growth at 5 days 05/18/2023    BLOODCX No growth at 5 days 05/04/2023    BLOODCX No growth at 5 days 05/04/2023     Lab Results   Component Value Date    URINECX Final report 02/07/2019    URINECX 20,000-30,000 CFU/mL Escherichia coli (A) 10/05/2018     No results found for: MRSACX  Lab Results   Component Value Date    MRSAPCR No MRSA Detected 04/15/2023     No results found for: URCX  No components found for: LOWRESPCF  No results found for: THROATCX  No results found for: CULTURES  No components found for: STREPBCX  No results found for: STREPPNEUAG  No results found for: LEGIONELLA  No results found for: MYCOPLASCX  No results found for: GCCX  No results found for: WOUNDCX  No results found for: BODYFLDCX    No results  found for: FLU    Lab Results   Component Value Date    ADENOVIRUS Not Detected 05/04/2023     Lab Results   Component Value Date    ZI785V Not Detected 05/04/2023     Lab Results   Component Value Date    CVHKU1 Not Detected 05/04/2023     Lab Results   Component Value Date    CVNL63 Not Detected 05/04/2023     Lab Results   Component Value Date    CVOC43 Not Detected 05/04/2023     Lab Results   Component Value Date    HUMETPNEVS Not Detected 05/04/2023     Lab Results   Component Value Date    HURVEV Not Detected 05/04/2023     Lab Results   Component Value Date    FLUBPCR Not Detected 05/04/2023     Lab Results   Component Value Date    PARAINFLUE Not Detected 05/04/2023     Lab Results   Component Value Date    PARAFLUV2 Not Detected 05/04/2023     Lab Results   Component Value Date    PARAFLUV3 Detected (A) 05/04/2023     Lab Results   Component Value Date    PARAFLUV4 Not Detected 05/04/2023     Lab Results   Component Value Date    BPERTPCR Not Detected 05/04/2023     No results found for: [id number]  Lab Results   Component Value Date    CPNEUPCR Not Detected 05/04/2023     Lab Results   Component Value Date    MPNEUMO Not Detected 05/04/2023     Lab Results   Component Value Date    FLUAPCR Not Detected 05/04/2023     No results found for: FLUAH3  No results found for: FLUAH1  Lab Results   Component Value Date    RSV Not Detected 05/04/2023     Lab Results   Component Value Date    BPARAPCR Not Detected 05/04/2023       COVID 19:  Lab Results   Component Value Date    COVID19 Not Detected 05/04/2023         ABG: Reviewed   Recent Labs     05/21/23  0714 05/22/23  1307 05/24/23  0719   PHART 7.394 7.447 7.394   CCW5TKD 70.2* 53.3* 59.3*   PO2ART 95.2 50.2* 82.1   OCQ3TWQ 42.8* 36.8* 36.2*   BASEEXCESS 15.1* 11.1* 9.6*       Lab Results   Component Value Date    LACTATE 1.3 05/04/2023    LACTATE 1.2 04/13/2023    LACTATE 2.7 (C) 04/13/2023         Echo: Results for orders placed during the hospital  encounter of 04/13/23    Adult Transthoracic Echo Complete W/ Cont if Necessary Per Protocol    Interpretation Summary  •  Left ventricular diastolic function is consistent with (grade I) impaired relaxation.  •  Mild aortic valve stenosis is present.  •  Estimated right ventricular systolic pressure from tricuspid regurgitation is moderately elevated (45-55 mmHg).  •  Mild to moderate pulmonary hypertension is present.      Results for orders placed during the hospital encounter of 10/01/17    Adult Transthoracic Echo Complete W/ Cont if Necessary Per Protocol    Interpretation Summary  TEchnically difficult study  1) Borderline LVH with normal LV systolic function ( EF is over 55%)  2) Normal left atrial size with mild elevation in LVedp  3) Trace MR  4) Mild TR with normal PA pressures  5) Mild AI  6) Small RV with normal RV function        Pharmacy to Dose enoxaparin (LOVENOX),           CXRay: Latest imaging study was reviewed personally.   Imaging Results (Last 48 Hours)     ** No results found for the last 48 hours. **            Assessment & Recommendations/Plan:   1.  Acute respiratory failure  Latest chest x-ray suggested slight improvement in findings consistent with mildly improved atelectasis.  We will repeat chest x-ray, clinically indicated  Continue humidified AVAPS. We will change to as needed use.    Latest ABG showed compensated chronic respiratory acidosis.  Will repeat ABG, as clinically indicated.    2.  COPD with acute exacerbation  Likely triggered by parainfluenza bronchitis  Latest cultures positive for haemophilus influenza and MSSA.  3+ WBCs noted in the sputum cultures.  Given significant antibiotic allergies, for now would suggest continuation of vancomycin for total of 3-5 days.  She has been on azithromycin which was started on the 18th, and I would suggest 3 days of therapy  Patient was given Depo-Medrol on 5/22/2023.  Currently on oral prednisone 40 mg.  Continue Spiriva and  Symbicort  Will benefit from outpatient PFTs    3.  Abnormal CT of the chest/atelectasis  Patient has refused bronchoscopy.  Continue MetaNeb treatments.  We will discontinue hypertonic saline, since she has been on it for more than 10 days.  Continue Mucinex DM.  I have spoken to patient's daughter, Nehal at [phone], on 5/16/2023 and also on 5/23/2023.      4.  Chronic respiratory acidosis  Appears to be compliant with NIV device at home.    5.  Pulmonary hypertension  May need outpatient work-up.  Likely secondary to underlying likely severe COPD    6.  Anxiety  Continue other anxiolytics.  May need adjustment, as indicated.    7.  Smoking  Was advised to quit smoking    8.  Deconditioning  PT/OT.    9. Discharge disposition/recommendations  Follow-up with [facility] in 14-21 days.  Already ordered  Follow-up with Dr. Soriano or CLAUDIA Neil in 4 months.  Already ordered  Prednisone 40 mg for 5 days.  Will need to be ordered by discharging provider.  Maintenance inhalers. Already ordered.   PFTs on the day of follow-up in clinic.  Already ordered      We have reviewed patient's current orders and changes, if any, have been suggested to primary care team. Plan was also discussed with nursing staff, as necessary.     This document was electronically signed by Arnaldo Soriano MD on 05/30/23 at 09:49 EDT      Dictated utilizing Dragon dictation.

## 2023-05-30 NOTE — PROGRESS NOTES
[facility]    Name:  Carolin Toscano   Age:  77 y.o.  Sex:  female  :  1945  MRN:  [id number]   Visit Number:  [id number]  Admission Date:  2023  Date Of Service:  23  Primary Care Physician:  Jason Nicholson MD     LOS: 26 days :    Chief Complaint:      Dyspnea and weakness    Subjective:     doing well sitting up in bed. Placement pending. No new c/o.     Hospital Course:  - was managed by Dr. Vital has had a gradual improvement in respiratory status.  : doing well. Oxygen requirements decreasing. Stable for dc out of ICU. Spoke with patient and family at bedside. D/w CM [facility] bed pending.  : c/o garrison suspect is due to mucous. Agreeable to flonase, zyrtec and atarax. Labs reviewed.  May 20, 2023 discussed with patient as well as family at the bedside.  Also discussed with them regarding placing access which she was able to get a PICC line.  Also discussed with them the staph growing in her sputum and that I was going to add vancomycin for this.  : d/w patient and family updated them on [facility] and pending appeal which will likely be submitted Monday. Still having dyspnea but mucous is breaking loose today.  Still debating about bronchoscopy.  Interested in long-term acute care possibly in [place] versus [facility].    : Daughter Regla is coming today.  Otherwise not much change still having dyspnea and hypoxemia.  Case management note reviewed.    2023 discussed with case management still working on [facility].  I am planning to do a peer to peer with them tomorrow.  Otherwise has persistent hypoxia.  Dr. Soriano's note reviewed considering bronchoscopy.    23: d/w nsg intermittently on avaps and hi flow. Patient awake in bed. CXR from 5/15 reviewed. Medications reviewed.  Discussed with case management they are looking [facility].    Patient was admitted and treated with  steroids.  Antibiotics were not indicated.  Pulmonology consulted and was moved to ICU on 5/9 due to worsening respiratory failure and tachypnea.  She initially had to be on Precedex drip which was discontinued and switched to Xanax due to anxiety. Patient was placed on AVAPS.  Patient continued on steroids and received Lasix.           History of presenting illness:  Patient is a 77 years old female with a past medical history of COPD, chronic respiratory failure on 5 to 6 L per her previous discharge, on NIV machine at home and trelegy, hypertension, and ongoing tobacco use who presented to the ER from home by EMS with a chief complaint of shortness of breath.  Patient reporting that she started having shortness of breath associated with productive cough since last night and has persisted and became worse that promoted her to call EMS.  EMS has found the patient to be at 65% saturation on her normal 6 L of oxygen. Patient reports compliance with her NIV machine at home.      On ER evaluation, Patient was found to be tachycardic with a heart rate of 130s, temperature of 100.2 and respirations of 30, /72.  Patient was placed on BiPAP with FiO2 of 40%.  Her ABG came back and showed pH of 7.296/PCO2 87/PO2 94/HCO3 42/saturation 97% on 5 L nasal cannula.  HS Troponin 44, proBNP WNL, glucose 192, CO2 of 40, otherwise CBC and CMP within acceptable range.  Lactate and Pro-Adi WNL.  Respiratory panel positive for parainfluenza virus.  Chest x-ray Mild interstitial disease  bilaterally is similar to prior. No area of consolidation is seen. Patient received Solu-Medrol and Aztreonam while in the ER.  Hospitalist consulted for admission.       Edited by: Primitivo Nuñez DO at 5/30/2023 0857     Review of Systems:     All systems were reviewed and negative except as mentioned in subjective, assessment and plan.    Vital Signs:    Temp:  [97.8 °F (36.6 °C)-98.6 °F (37 °C)] 98.4 °F (36.9 °C)  Heart Rate:  [66-88]  66  Resp:  [16-23] 18  BP: (107-122)/(43-64) 110/54    Intake and output:    I/O last 3 completed shifts:  In: -   Out: 1925 [Urine:1925]  I/O this shift:  In: 240 [P.O.:240]  Out: -     Physical Examination:    General Appearance:  Alert and cooperative.  Chronically ill-appearing. Sitting up in bed.   Head:  Atraumatic and normocephalic.   Eyes: Conjunctivae and sclerae normal, no icterus. No pallor.   Throat: No oral lesions, no thrush, oral mucosa moist.   Neck: Supple, trachea midline, no thyromegaly.   Lungs:   improved breath sounds with minimal end expiratory wheeze.  On nasal cannula.   Heart:  Normal S1 and S2, no murmur, no gallop, no rub. No JVD.   Abdomen:   Normal bowel sounds, no masses, no organomegaly. Soft, nontender, nondistended, no rebound tenderness.   Extremities: Supple, no edema, no cyanosis, no clubbing.   Skin: No bleeding or rash.   Neurologic: Alert and oriented x 3. No facial asymmetry. Moves all four limbs. No tremors.  Generalized weakness noted.     Edited by: Primitivo Nuñez DO at 5/30/2023 1314     Laboratory results:    Results from last 7 days   Lab Units 05/24/23  0611   SODIUM mmol/L 139   POTASSIUM mmol/L 4.5   CHLORIDE mmol/L 102   CO2 mmol/L 31.8*   BUN mg/dL 18   CREATININE mg/dL 0.26*   CALCIUM mg/dL 9.3   GLUCOSE mg/dL 155*     Results from last 7 days   Lab Units 05/24/23  0611   WBC 10*3/mm3 9.60   HEMOGLOBIN g/dL 10.3*   HEMATOCRIT % 33.1*   PLATELETS 10*3/mm3 212                 Recent Labs     05/21/23  0714 05/22/23  1307 05/24/23  0719   PHART 7.394 7.447 7.394   EUM2DLM 70.2* 53.3* 59.3*   PO2ART 95.2 50.2* 82.1   ZLP9ZPI 42.8* 36.8* 36.2*   BASEEXCESS 15.1* 11.1* 9.6*      I have reviewed the patient's laboratory results.    Radiology results:    No radiology results from the last 24 hrs  I have reviewed the patient's radiology reports.    Medication Review:     I have reviewed the patient's active and prn medications.     Problem List:      Acute on chronic  respiratory failure with hypoxia and hypercapnia      Assessment/Plan:    Respiratory failure with hypoxia/hypercapnia, COPD exacerbation, parainfluenza  -patient on 5 to 6 L oxygen at baseline,  Continue BiPAP therapy.  Has NIV at home. Currently on 4 liters  -Recent parainfluenza virus infection met SIRS due to this  - Solu-Medrol previously now prednisone taper  -Bronchodilators, Mucinex MetaNeb and supportive care.  -Dr. Soriano note reviewed and appreciated  -s/p lasix  -Xanax as needed  -Sputum grew Staph aureus and haemophilus s/p vancomycin and azithromycin  - flonase, zyrtec, and atarax for upper airway congestion     Elevated troponin  -Likely secondary to demand ischemia in settings of respiratory failure  -Patient denies any chest pain, low suspicion for ACS  -Troponin trended down and plateaued.     Hyperglycemia  -Will cover with sliding scale insulin while on steroids  -Hemoglobin A1c 6.7     Disposition: STR pending in 1 day    Prophylaxis: Lovenox    Edited by: Primitivo Nuñez, DO at 5/30/2023 1314     DVT Prophylaxis: Lovenox  Code Status:   Code Status and Medical Interventions:   Ordered at: 05/04/23 1634     Code Status (Patient has no pulse and is not breathing):    CPR (Attempt to Resuscitate)     Medical Interventions (Patient has pulse or is breathing):    Full Support      Diet:   Dietary Orders (From admission, onward)     Start     Ordered    05/18/23 1800  Dietary Nutrition Supplements Magic Cup  2 Times Daily      Question:  [facility] Supplement:  Answer:  Magic Cup    05/18/23 1517    05/17/23 1038  Diet: Cardiac Diets; Healthy Heart (2-3 Na+); Texture: Regular Texture (IDDSI 7); Fluid Consistency: Thin (IDDSI 0)  Diet Effective Now        Comments: Pt prefers cottage cheese, bananas, oranges, peaches, watermelon.   References:    Diet Order [place]   Question Answer Comment   Diets: Cardiac Diets    Cardiac Diet: Healthy Heart (2-3 Na+)    Texture: Regular Texture (IDDSI 7)    Fluid  Consistency: Thin (IDDSI 0)        05/17/23 1039    05/15/23 1800  Dietary Nutrition Supplements Xu  2 Times Daily      Question:  [facility] Supplement:  Answer:  Xu    05/15/23 1445    05/15/23 1800  Dietary Nutrition Supplements Boost VHC  2 Times Daily      Question:  [facility] Supplement:  Answer:  Boost VHC    05/15/23 1446               Discharge Plan: Short-term rehab in 1 day    Primitivo Nuñez DO  05/30/23  13:14 EDT    Dictated utilizing Dragon dictation.

## 2023-05-30 NOTE — PLAN OF CARE
Goal Outcome Evaluation:      Informed per Rachel MORENO/[facility] that insurance had denied LTAC admission.   Current DCP is referral to [facility] or [facility].    PC will continue to follow as needed

## 2023-05-30 NOTE — PLAN OF CARE
Goal Outcome Evaluation:  Plan of Care Reviewed With: patient        Progress: improving  Outcome Evaluation: Pt agreeable to physical therapy. Performed sit <->stand with min/CGA and VC for hand placement, amb 11 feet with RW with min A and occ VC for sequencing. Pt requires increased time to perfom tasksCon't with PT POC and progress as tolerated.

## 2023-05-30 NOTE — THERAPY TREATMENT NOTE
"Patient Name: Carolin Toscano  : 1945    MRN: [id number]                              Today's Date: 2023       Admit Date: 2023    Visit Dx:     ICD-10-CM ICD-9-CM   1. Acute on chronic respiratory failure with hypoxia and hypercapnia  J96.21 518.84    J96.22 786.09     799.02   2. COPD exacerbation  J44.1 491.21   3. Parainfluenza virus infection  B34.8 078.89   4. Simple chronic bronchitis  J41.0 491.0     Patient Active Problem List   Diagnosis   • CAP (community acquired pneumonia)   • Cigarette nicotine dependence with nicotine-induced disorder   • Essential hypertension   • Dyslipidemia   • Blood glucose elevated   • Muscle ache   • COPD (chronic obstructive pulmonary disease)   • Nuclear sclerotic cataract of right eye   • Acute respiratory failure   • COPD exacerbation   • Acute on chronic respiratory failure with hypoxia and hypercapnia     Past Medical History:   Diagnosis Date   • Arthritis    • COPD (chronic obstructive pulmonary disease)    • Gall stones    • Goiter     \"inward\"   • Hypertension    • Hypertension    • Kidney infection    • Kidney stone    • Kidney stones    • Migraine headache    • Scarlet fever      Past Surgical History:   Procedure Laterality Date   • BLADDER SURGERY     • CATARACT EXTRACTION W/ INTRAOCULAR LENS IMPLANT Left 2022    Procedure: CATARACT PHACO EXTRACTION WITH INTRAOCULAR LENS IMPLANT LEFT;  Surgeon: Sathish Lopez MD;  Location: [facility];  Service: Ophthalmology;  Laterality: Left;   • CATARACT EXTRACTION W/ INTRAOCULAR LENS IMPLANT Right 2022    Procedure: CATARACT PHACO EXTRACTION WITH INTRAOCULAR LENS IMPLANT RIGHT;  Surgeon: Sathish Lopez MD;  Location: [facility];  Service: Ophthalmology;  Laterality: Right;   • CHOLECYSTECTOMY     • HYSTERECTOMY     • TONSILLECTOMY        General Information     Row Name 23 1611          OT Time and Intention    Document Type therapy note (daily note)  -SD     Mode of Treatment " occupational therapy  -SD     Row Name 05/30/23 1611          General Information    Patient Profile Reviewed yes  -SD           User Key  (r) = Recorded By, (t) = Taken By, (c) = Cosigned By    Initials Name Provider Type    SD Cecelia Knight OT Occupational Therapist                 Mobility/ADL's     Row Name 05/30/23 1611          Bed Mobility    Bed Mobility supine-sit  -SD     Supine-Sit Giles (Bed Mobility) standby assist  -SD     Assistive Device (Bed Mobility) bed rails;head of bed elevated  -SD     Row Name 05/30/23 1611          Transfers    Transfers sit-stand transfer;bed-chair transfer  -SD     Row Name 05/30/23 1611          Bed-Chair Transfer    Bed-Chair Giles (Transfers) contact guard  -SD     Assistive Device (Bed-Chair Transfers) walker, front-wheeled  -SD     [facility] Name 05/30/23 1611          Sit-Stand Transfer    Sit-Stand Giles (Transfers) minimum assist (75% patient effort)  -SD     Assistive Device (Sit-Stand Transfers) walker, front-wheeled  -SD     Row Name 05/30/23 1611          Bathing Assessment/Intervention    Giles Level (Bathing) upper body;upper extremities;minimum assist (75% patient effort)  -SD     Position (Bathing) edge of bed sitting  -SD     Row Name 05/30/23 1611          Upper Body Dressing Assessment/Training    Giles Level (Upper Body Dressing) don;standby assist  -SD     Position (Upper Body Dressing) edge of bed sitting  -SD     [facility] Name 05/30/23 1611          Lower Body Dressing Assessment/Training    Giles Level (Lower Body Dressing) don;pants/bottoms;minimum assist (75% patient effort)  -SD     Position (Lower Body Dressing) edge of bed sitting;supported standing  -SD     Row Name 05/30/23 1611          Grooming Assessment/Training    Giles Level (Grooming) hair care, combing/brushing;wash face, hands;oral care regimen;set up  -SD     Position (Grooming) edge of bed sitting  -SD           User Key  (r) = Recorded By,  (t) = Taken By, (c) = Cosigned By    Initials Name Provider Type    Cecelia Johnson OT Occupational Therapist               Obj/Interventions    No documentation.                Goals/Plan    No documentation.                Clinical Impression     Row Name 05/30/23 1612          Pain Assessment    Pretreatment Pain Rating 0/10 - no pain  -SD     Posttreatment Pain Rating 0/10 - no pain  -SD     Row Name 05/30/23 1612          Plan of Care Review    Plan of Care Reviewed With patient  -SD     Progress improving  -SD     Outcome Evaluation OT tx completed. Patient supine in bed, no complaints. Patient moved to EOB with SBA. Sitting EOB performed UBB with min A, UBD with SBA, LBD with min A, grooming tasks with SBA. Patient performed sit to stand min A, transfer from bed to chair with CGA using RW. O2 on 4L ranged 90, 89, 93 before, during, after activity. Continue OT POC  -SD     [facility] Name 05/30/23 1612          Vital Signs    Pre SpO2 (%) 90  -SD     O2 Delivery Pre Treatment hi-flow  -SD     Intra SpO2 (%) 89  -SD     O2 Delivery Intra Treatment hi-flow  -SD     Post SpO2 (%) 93  -SD     O2 Delivery Post Treatment hi-flow  -SD     [facility] Name 05/30/23 1612          Positioning and Restraints    Pre-Treatment Position in bed  -SD     Post Treatment Position chair  -SD     In Chair sitting;call light within reach;encouraged to call for assist  -SD           User Key  (r) = Recorded By, (t) = Taken By, (c) = Cosigned By    Initials Name Provider Type    Cecelia Johnson OT Occupational Therapist               Outcome Measures     Row Name 05/30/23 1614          How much help from another is currently needed...    Putting on and taking off regular lower body clothing? 3  -SD     Bathing (including washing, rinsing, and drying) 2  -SD     Toileting (which includes using toilet bed pan or urinal) 2  -SD     Putting on and taking off regular upper body clothing 3  -SD     Taking care of personal grooming (such as  brushing teeth) 3  -SD     Eating meals 3  -SD     AM-PAC 6 Clicks Score (OT) 16  -SD     Row Name 05/30/23 1614          Functional Assessment    Outcome Measure Options AM-PAC 6 Clicks Daily Activity (OT)  -SD           User Key  (r) = Recorded By, (t) = Taken By, (c) = Cosigned By    Initials Name Provider Type    Cecelia Johnson OT Occupational Therapist                Occupational Therapy Education     Title: PT OT SLP Therapies (In Progress)     Topic: Occupational Therapy (In Progress)     Point: ADL training (Done)     Description:   Instruct learner(s) on proper safety adaptation and remediation techniques during self care or transfers.   Instruct in proper use of assistive devices.              Learning Progress Summary           Patient Acceptance, E,TB, VU by SD at 5/30/2023 1614    Comment: Benefit of OOB    Acceptance, E,TB, VU by SD at 5/24/2023 1637    Comment: EC during ADLs.    Acceptance, E,TB, VU by SD at 5/23/2023 1309    Comment: Safety and sequencing during toileting task.    Acceptance, E, VU by AM at 5/15/2023 0701    Acceptance, E,TB, VU by  at 5/12/2023 1507    Comment: purpose of re-evaluation    Acceptance, E,TB, VU by SD at 5/8/2023 1222    Comment: Safety during tf    Acceptance, E, VU by SP at 5/5/2023 1146    Comment: OT edcuated pt on purpose of IE and POC. Pt is agreeable.                   Point: Home exercise program (Done)     Description:   Instruct learner(s) on appropriate technique for monitoring, assisting and/or progressing therapeutic exercises/activities.              Learning Progress Summary           Patient Acceptance, E,TB, VU by SD at 5/25/2023 1249    Comment: Activity pacing during ther ex    Acceptance, E,TB, VU by  at 5/22/2023 1549    Comment: benefit of do UB ex to improve functional strength    Acceptance, E,TB, VU by  at 5/19/2023 1339    Comment: importance of activity    Acceptance, E,TB, VU by SD at 5/16/2023 1246    Comment: Importance of  progressing activity.   Family Acceptance, E,TB, VU by  at 5/22/2023 1549    Comment: benefit of do UB ex to improve functional strength    Acceptance, E,TB, VU by  at 5/19/2023 1339    Comment: importance of activity                   Point: Precautions (Not Started)     Description:   Instruct learner(s) on prescribed precautions during self-care and functional transfers.              Learner Progress:  Not documented in this visit.          Point: Body mechanics (Not Started)     Description:   Instruct learner(s) on proper positioning and spine alignment during self-care, functional mobility activities and/or exercises.              Learner Progress:  Not documented in this visit.                      User Key     Initials Effective Dates Name Provider Type Discipline     06/16/21 -  Roselyn Donaldson Occupational Therapist OT    SD 06/16/21 -  Cecelia Knight OT Occupational Therapist OT    SP 09/08/22 -  January Chapa OT Occupational Therapist OT     02/22/23 -  Rob Lu, RN Registered Nurse Nurse              OT Recommendation and Plan     Plan of Care Review  Plan of Care Reviewed With: patient  Progress: improving  Outcome Evaluation: OT tx completed. Patient supine in bed, no complaints. Patient moved to EOB with SBA. Sitting EOB performed UBB with min A, UBD with SBA, LBD with min A, grooming tasks with SBA. Patient performed sit to stand min A, transfer from bed to chair with CGA using RW. O2 on 4L ranged 90, 89, 93 before, during, after activity. Continue OT POC     Time Calculation:    Time Calculation- OT     Row Name 05/30/23 1615             Time Calculation- OT    OT Start Time 1453  -SD      OT Stop Time 1516  -SD      OT Time Calculation (min) 23 min  -SD      OT Received On 05/30/23  -SD      OT Goal Re-Cert Due Date 05/31/23  -SD         Timed Charges    93944 - OT Therapeutic Activity Minutes 10  -SD      55215 - OT Self Care/Mgmt Minutes 13  -SD         Total Minutes    Timed Charges  Total Minutes 23  -SD       Total Minutes 23  -SD            User Key  (r) = Recorded By, (t) = Taken By, (c) = Cosigned By    Initials Name Provider Type    Cecelia Johnson OT Occupational Therapist              Therapy Charges for Today     Code Description Service Date Service Provider Modifiers Qty    83067796474  OT THERAPEUTIC ACT EA 15 MIN 5/30/2023 Cecelia Knight OT GO 1    [id number]  OT SELF CARE/MGMT/TRAIN EA 15 MIN 5/30/2023 Cecelia Knight OT GO 1               Cecelia Knight OT  5/30/2023

## 2023-05-30 NOTE — DISCHARGE PLACEMENT REQUEST
"STR Referral     Carolin Toscano (77 y.o. Female)     Date of Birth   1945    Social Security Number       Address   [address] [facility] [address]    Home Phone   [phone]    MRN   [id number]       Shinto   Nazarene    Marital Status                               Admission Date   5/4/23    Admission Type   Emergency    Admitting Provider       Attending Provider   Primitivo Nuñez DO    Department, Room/Bed   [facility] TELEMETRY 3, 308/1       Discharge Date       Discharge Disposition       Discharge Destination                               Attending Provider: Primitivo Nuñez DO    Allergies: Contrast Dye (Echo Or Unknown Ct/mr), Ciprofloxacin, Keflex [Cephalexin], Penicillins, Sulfa Antibiotics, Terramycin [Oxytetracycline], Prednisone, Latex, Percocet [Oxycodone-acetaminophen], Xyzal [Levocetirizine]    Isolation: None   Infection: None   Code Status: CPR    Ht: 160 cm (63\")   Wt: 69.8 kg (153 lb 14.1 oz)    Admission Cmt: None   Principal Problem: Acute on chronic respiratory failure with hypoxia and hypercapnia [J96.21,J96.22]                 Active Insurance as of 5/4/2023     Primary Coverage     Payor Plan Insurance Group Employer/Plan Group    HUMANA MEDICARE REPLACEMENT HUMANA MEDICARE REPLACEMENT [id number]     Payor Plan Address Payor Plan Phone Number Payor Plan Fax Number Effective Dates    [address] [address] [phone]  1/1/2018 - None Entered    [facility] [id number]       Subscriber Name Subscriber Birth Date Member ID       CAROLIN TOSCANO 1945 [id number]           Secondary Coverage     Payor Plan Insurance Group Employer/Plan Group    [place]      Payor Plan Address Payor Plan Phone Number Payor Plan Fax Number Effective Dates    [address] [phone]  10/11/2021 - None Entered    [facility] [address]       Subscriber Name Subscriber Birth Date Member ID       CAROLIN TOSCANO 1945 [id number]           " "      Emergency Contacts      (Rel.) Home Phone Work Phone Mobile Phone    Nehal Hoffmann (Daughter) [phone] -- --    Carmen Deleon \"Regla\" (Daughter) [phone] -- [phone]               History & Physical      Lolis Vtial MD at 23 1550            [facility]   HISTORY AND PHYSICAL      Name:  Carolin Toscano   Age:  77 y.o.  Sex:  female  :  1945  MRN:  [id number]   Visit Number:  [id number]  Admission Date:  2023  Date Of Service:  23  Primary Care Physician:  Jason Nicholson MD    Chief Complaint:     Shortness of breath    History Of Presenting Illness:      Patient is a 77 years old female with a past medical history of COPD, chronic respiratory failure on 5 to 6 L per her previous discharge, on NIV machine at home and trelegy, hypertension, and ongoing tobacco use who presented to the ER from home by EMS with a chief complaint of shortness of breath.  Patient reporting that she started having shortness of breath associated with productive cough since last night and has persisted and became worse that promoted her to call EMS.  EMS has found the patient to be at 65% saturation on her normal 6 L of oxygen.  She received a breathing treatment in route.  Patient was recently admitted to the hospital on  and discharged on 2023 with similar clinical picture of Respiratory failure, COPD exacerbation and pneumonia.  Patient was discharged on 6 L previously.  Patient reports compliance with her NIV machine at home.  Denies any sick contacts that she is aware of.  Patient denies any chest pain or abdominal pain.  Denies any other complaints.    On ER evaluation, Patient was found to be tachycardic with a heart rate of 130s, temperature of 100.2 and respirations of 30, /72.  Patient was placed on BiPAP with FiO2 of 40%.  Her ABG came back and showed pH of 7.296/PCO2 87/PO2 94/HCO3 42/saturation 97% on 5 L nasal cannula.  HS " Troponin 44, proBNP WNL, glucose 192, CO2 of 40, otherwise CBC and CMP within acceptable range.  Lactate and Pro-Adi WNL.  Respiratory panel positive for parainfluenza virus.  Chest x-ray Mild interstitial disease  bilaterally is similar to prior. No area of consolidation is seen.  Urinalysis negative for nitrates, leukocytes, WBC or bacteria with squamous epithelial cells.  Patient received Solu-Medrol, normal saline bolus of 2124 mL, magnesium and Aztreonam while in the ER.  Hospitalist consulted for admission, further evaluation and treatment.    Review Of Systems:    All systems were reviewed and negative except as mentioned in history of presenting illness, assessment and plan.    Past Medical History: Patient  has a past medical history of Arthritis, COPD (chronic obstructive pulmonary disease), Gall stones, Goiter, Hypertension, Hypertension, Kidney infection, Kidney stone, Kidney stones, Migraine headache, and Scarlet fever.    Past Surgical History: Patient  has a past surgical history that includes Cholecystectomy; Bladder surgery; Tonsillectomy; Hysterectomy; Cataract extraction w/ intraocular lens implant (Left, 8/11/2022); and Cataract extraction w/ intraocular lens implant (Right, 8/25/2022).    Social History: Patient  reports that she has been smoking cigarettes. She started smoking about 63 years ago. She has been smoking an average of 1 pack per day. She quit smokeless tobacco use about 5 years ago. She reports that she does not drink alcohol and does not use drugs.    Family History:  Patient's family history has been reviewed and found to be noncontributory.     Allergies:      Contrast dye (echo or unknown ct/mr), Ciprofloxacin, Keflex [cephalexin], Penicillins, Sulfa antibiotics, Terramycin [oxytetracycline], Prednisone, Latex, Percocet [oxycodone-acetaminophen], and Xyzal [levocetirizine]    Home Medications:    Prior to Admission Medications     Prescriptions Last Dose Informant Patient  "Reported? Taking?    albuterol (PROVENTIL) (2.5 MG/3ML) 0.083% nebulizer solution   No No    Inhale contents of 1 vial (3 mL) by nebulization Every 6 (Six) Hours As Needed for Wheezing for up to 30 days.    amLODIPine (NORVASC) 10 MG tablet   No No    Take 1 tablet by mouth Daily.    atenolol (TENORMIN) 25 MG tablet   No No    Take 1 tablet by mouth Daily.    Fluticasone-Umeclidin-Vilant (TRELEGY) 100-62.5-25 MCG/ACT inhaler   No No    Inhale 1 puff  by mouth Daily    Patient not taking:  Reported on 5/1/2023    multivitamins-minerals (PRESERVISION AREDS 2) capsule capsule  Self Yes No    Take 1 capsule by mouth Daily.    tiotropium (Spiriva HandiHaler) 18 MCG per inhalation capsule   No No    Place 1 capsule into inhaler and inhale Daily.        ED Medications:    Medications   sodium chloride 0.9 % flush 10 mL (has no administration in time range)   sodium chloride 0.9 % bolus 2,124 mL (2,124 mL Intravenous New Bag 5/4/23 1517)   methylPREDNISolone sodium succinate (SOLU-Medrol) injection 125 mg (125 mg Intravenous Given 5/4/23 1516)   magnesium sulfate 2g/50 mL (PREMIX) infusion (0 g Intravenous Stopped 5/4/23 1543)   aztreonam (AZACTAM) 2 g/100 mL 0.9% NS (mbp) (2 g Intravenous New Bag 5/4/23 1516)     Vital Signs:  Temp:  [100.2 °F (37.9 °C)] 100.2 °F (37.9 °C)  Heart Rate:  [133] 133  Resp:  [29-30] 29  BP: (185)/(72) 185/72        05/04/23  1444   Weight: 70.8 kg (156 lb)     Body mass index is 27.63 kg/m².    Physical Exam:     Most recent vital Signs: BP (!) 185/72 (Patient Position: Sitting)   Pulse (!) 133   Temp 100.2 °F (37.9 °C) (Oral)   Resp (!) 29   Ht 160 cm (63\")   Wt 70.8 kg (156 lb)   SpO2 96%   BMI 27.63 kg/m²     Physical Exam  Vitals and nursing note reviewed.   Constitutional:       General: She is in acute distress.      Appearance: She is ill-appearing.      Comments: Chronically ill-appearing.   HENT:      Head: Normocephalic and atraumatic.      Right Ear: External ear normal.      " Left Ear: External ear normal.      Nose: Nose normal.      Mouth/Throat:      Mouth: Mucous membranes are dry.   Eyes:      Extraocular Movements: Extraocular movements intact.      Conjunctiva/sclera: Conjunctivae normal.      Pupils: Pupils are equal, round, and reactive to light.   Cardiovascular:      Rate and Rhythm: Regular rhythm. Tachycardia present.      Pulses: Normal pulses.      Heart sounds: Normal heart sounds.   Pulmonary:      Effort: Tachypnea, accessory muscle usage, prolonged expiration and respiratory distress present.      Breath sounds: Decreased air movement present. Wheezing present. No rhonchi.      Comments: Diffuse bilateral expiratory and inspiratory wheezing heard.  Abdominal:      General: Bowel sounds are normal. There is no distension.      Palpations: Abdomen is soft.      Tenderness: There is no abdominal tenderness.   Musculoskeletal:         General: No tenderness. Normal range of motion.      Cervical back: Normal range of motion and neck supple.      Right lower leg: No edema.      Left lower leg: No edema.   Skin:     General: Skin is warm and dry.      Findings: No rash.   Neurological:      General: No focal deficit present.      Mental Status: She is alert and oriented to person, place, and time. Mental status is at baseline.      Motor: No weakness.   Psychiatric:         Mood and Affect: Mood normal.         Behavior: Behavior normal.         Thought Content: Thought content normal.         Laboratory data:    I have reviewed the labs done in the emergency room.    Results from last 7 days   Lab Units 05/04/23  1447   SODIUM mmol/L 140   POTASSIUM mmol/L 4.4   CHLORIDE mmol/L 95*   CO2 mmol/L 40.2*   BUN mg/dL 10   CREATININE mg/dL 0.38*   CALCIUM mg/dL 9.1   BILIRUBIN mg/dL 0.4   ALK PHOS U/L 87   ALT (SGPT) U/L 30   AST (SGOT) U/L 23   GLUCOSE mg/dL 192*     Results from last 7 days   Lab Units 05/04/23  1447   WBC 10*3/mm3 8.04   HEMOGLOBIN g/dL 13.3   HEMATOCRIT %  44.5   PLATELETS 10*3/mm3 149         Results from last 7 days   Lab Units 05/04/23  1447   HSTROP T ng/L 44*     Results from last 7 days   Lab Units 05/04/23  1447   PROBNP pg/mL 234.4             Results from last 7 days   Lab Units 05/04/23  1448   PH, ARTERIAL pH units 7.296*   PO2 ART mm Hg 94.2   PCO2, ARTERIAL mm Hg 87.4*   HCO3 ART mmol/L 42.6*           Invalid input(s): USDES,  BLOODU, NITRITITE, BACT, EP    Pain Management Panel          View : No data to display.                      EKG:      Sinus tachycardia, heart rate 135, nonspecific ST/T wave changes.    Radiology:    XR Chest 1 View    Result Date: 5/4/2023  PROCEDURE: XR CHEST 1 VW-  HISTORY: SOA Triage Protocol  COMPARISON: 04/18/2023.  FINDINGS: The heart is normal in size. Mild interstitial disease bilaterally is similar to prior. No area of consolidation is seen. Aortic mural calcifications noted.. The mediastinum is unremarkable. There is no pneumothorax.  There are no acute osseous abnormalities.      Stable chest..    This report was signed and finalized on 5/4/2023 3:31 PM by Shraddha Wharton MD.      Assessment:    Acute on chronic respiratory failure with hypoxia and hypercapnia, likely secondary to #2 and 3, POA  Acute COPD exacerbation, POA  Parainfluenza infection, POA  Elevated troponin, likely secondary to demand ischemia, POA  Hypertension  Chronic tobacco abuse  Pulmonary hypertension  Arthritis    Plan:    Patient is admitted to telemetry for further evaluation and treatment.    Respiratory failure with hypoxia/hypercapnia, COPD exacerbation, parainfluenza  -Continue supplemental oxygen to keep saturation above 90%, patient on 5 to 6 L at baseline, continue to titrate down as able to.  Continue BiPAP therapy.  -Patient met SIRS criteria on arrival, likely secondary to influenza infection and respiratory failure. Received Aztreonam and sepsis protocol IV fluids boluses.  -Procalcitonin WNL, WBC WNL, bacterial infection less likely,  will hold off on antibiotics for now.  -On Trelegy at home, Spiriva and budesonide while here.  -We will continue patient on Solu-Medrol 60 mg every 8 hours  -Bronchodilators, Mucinex and supportive care.  -Will continue Macias catheter for now while on BiPAP  -Patient was seen by pulmonology on previous admission, will need close follow-up with pulmonology on discharge.    Elevated troponin  -Likely secondary to demand ischemia in settings of respiratory failure  -Patient denies any chest pain, low suspicion for ACS  -Trending troponins, recheck in a.m.    Hyperglycemia  -Will cover with sliding scale insulin while on steroids  -Will check hemoglobin A1c    Further orders as indicated per clinical course.    Risk Assessment: High  DVT Prophylaxis: Lovenox prophylaxis (benefit> risk)  Code Status: Full  Diet: Cardiac    Advance Care Planning   ACP discussion was held with the patient during this visit. Patient does not have an advance directive, information provided.      Lolis Vital MD  05/04/23  15:50 EDT    Dictated utilizing Dragon dictation.    Electronically signed by Lolis Vital MD at 05/04/23 1656         Current Facility-Administered Medications   Medication Dose Route Frequency Provider Last Rate Last Admin   • acetaminophen (TYLENOL) tablet 650 mg  650 mg Oral Q4H PRN Lolis Vital MD   650 mg at 05/30/23 0615    Or   • acetaminophen (TYLENOL) 160 MG/5ML solution 650 mg  650 mg Oral Q4H PRN Lolis Vital MD        Or   • acetaminophen (TYLENOL) suppository 650 mg  650 mg Rectal Q4H PRN Lolis Vital MD   650 mg at 05/04/23 2005   • ALPRAZolam (XANAX) tablet 0.5 mg  0.5 mg Oral BID PRN Lolis Vital MD   0.5 mg at 05/23/23 2101   • amLODIPine (NORVASC) tablet 10 mg  10 mg Oral Daily Lolis Vital MD   10 mg at 05/29/23 0936   • atenolol (TENORMIN) tablet 25 mg  25 mg Oral Daily Lolis Vital MD   25 mg at 05/29/23 0937   • sennosides-docusate (PERICOLACE) 8.6-50 MG per tablet 2  tablet  2 tablet Oral BID Lolis Vital MD   2 tablet at 05/28/23 0920    And   • polyethylene glycol (MIRALAX) packet 17 g  17 g Oral Daily PRN Lolis Vital MD        And   • bisacodyl (DULCOLAX) EC tablet 5 mg  5 mg Oral Daily PRN Lolis Vital MD   5 mg at 05/24/23 1717    And   • bisacodyl (DULCOLAX) suppository 10 mg  10 mg Rectal Daily PRN Lolis Vital MD       • budesonide-formoterol (SYMBICORT) 160-4.5 MCG/ACT inhaler 2 puff  2 puff Inhalation BID - RT Lolis Vital MD   2 puff at 05/30/23 0654    And   • tiotropium (SPIRIVA RESPIMAT) 2.5 mcg/act aerosol solution inhaler  2 puff Inhalation Daily - RT Llois Vital MD   2 puff at 05/30/23 0654   • carboxymethylcellulose (REFRESH PLUS) 0.5 % ophthalmic solution 2 drop  2 drop Both Eyes TID PRN Rajni Roberts DO   2 drop at 05/29/23 1549   • cetirizine (zyrTEC) tablet 10 mg  10 mg Oral Daily Primitivo Nuñez DO   10 mg at 05/29/23 0936   • dextrose (D50W) (25 g/50 mL) IV injection 25 g  25 g Intravenous Q15 Min PRN Lolis Vital MD       • dextrose (GLUTOSE) oral gel 15 g  15 g Oral Q15 Min PRN Lolis Vital MD       • Diclofenac Sodium (VOLTAREN) 1 % gel 2 g  2 g Topical TID PRN Lolis Vital MD   2 g at 05/28/23 1606   • Enoxaparin Sodium (LOVENOX) syringe 40 mg  40 mg Subcutaneous Q24H Lolis Vital MD   40 mg at 05/29/23 2103   • fluticasone (FLONASE) 50 MCG/ACT nasal spray 2 spray  2 spray Each Nare BID Primitivo Nuñez DO   2 spray at 05/28/23 0921   • glucagon (GLUCAGEN) injection 1 mg  1 mg Intramuscular Q15 Min PRN Lolis Vital MD       • guaiFENesin-dextromethorphan (ROBITUSSIN DM) 100-10 MG/5ML syrup 10 mL  10 mL Oral Q4H PRN Lolis Vital MD   10 mL at 05/21/23 2029   • hydrOXYzine (ATARAX) tablet 25 mg  25 mg Oral TID Primitivo Nuñez DO   25 mg at 05/29/23 2102   • Insulin Aspart (novoLOG) injection 0-9 Units  0-9 Units Subcutaneous TID AC Lolis Vital MD   6 Units at 05/29/23 1645    • ipratropium-albuterol (DUO-NEB) nebulizer solution 3 mL  3 mL Nebulization Q6H - RT Arnaldo Soriano MD   3 mL at 23 0654   • melatonin tablet 5 mg  5 mg Oral Nightly PRN Lolis Vital MD   5 mg at 23   • multivitamin with minerals 1 tablet  1 tablet Oral Daily Lolis Vital MD   1 tablet at 23 0936   • mupirocin (BACTROBAN) 2 % cream 1 application  1 application Topical Q12H Lolis Vital MD   1 application at 23   • ondansetron (ZOFRAN) injection 4 mg  4 mg Intravenous Q6H PRN Lolis Vital MD   4 mg at 23   • Pharmacy to Dose enoxaparin (LOVENOX)   Does not apply Continuous PRN Lolis Vital MD       • predniSONE (DELTASONE) tablet 10 mg  10 mg Oral Daily Lolis Vital MD       • sodium chloride 0.9 % flush 10 mL  10 mL Intravenous Q12H SavannahPrimitivo Michael, DO   10 mL at 23   • sodium chloride 0.9 % flush 10 mL  10 mL Intravenous Q12H Primitivo Nuñez, DO   10 mL at 23   • sodium chloride 0.9 % flush 10 mL  10 mL Intravenous Q12H Primitivo Nuñez Michael, DO   10 mL at 23   • sodium chloride 0.9 % flush 10 mL  10 mL Intravenous PRN Primitivo Nuñez, DO       • sodium chloride 0.9 % flush 20 mL  20 mL Intravenous PRN Primitivo Nuñez, DO       • sodium chloride 0.9 % flush 3 mL  3 mL Intravenous Q12H Lolis Vital MD   3 mL at 23   • sodium chloride 0.9 % flush 3-10 mL  3-10 mL Intravenous PRN Lolis Vital MD   10 mL at 23 0040   • sodium chloride 0.9 % infusion 40 mL  40 mL Intravenous PRN Lolis Vital MD            Physical Therapy Notes (most recent note)      Nasra Quiñones, PTA at 23 1308  Version 1 of 1         Patient Name: Carolin Toscano  : 1945    MRN: [id number]                              Today's Date: 2023       Admit Date: 2023    Visit Dx:     ICD-10-CM ICD-9-CM   1. Acute on chronic respiratory failure with hypoxia and hypercapnia  J96.21  "518.84    J96.22 786.09     799.02   2. COPD exacerbation  J44.1 491.21   3. Parainfluenza virus infection  B34.8 078.89   4. Simple chronic bronchitis  J41.0 491.0     Patient Active Problem List   Diagnosis   • CAP (community acquired pneumonia)   • Cigarette nicotine dependence with nicotine-induced disorder   • Essential hypertension   • Dyslipidemia   • Blood glucose elevated   • Muscle ache   • COPD (chronic obstructive pulmonary disease)   • Nuclear sclerotic cataract of right eye   • Acute respiratory failure   • COPD exacerbation   • Acute on chronic respiratory failure with hypoxia and hypercapnia     Past Medical History:   Diagnosis Date   • Arthritis    • COPD (chronic obstructive pulmonary disease)    • Gall stones    • Goiter     \"inward\"   • Hypertension    • Hypertension    • Kidney infection    • Kidney stone    • Kidney stones    • Migraine headache    • Scarlet fever      Past Surgical History:   Procedure Laterality Date   • BLADDER SURGERY     • CATARACT EXTRACTION W/ INTRAOCULAR LENS IMPLANT Left 8/11/2022    Procedure: CATARACT PHACO EXTRACTION WITH INTRAOCULAR LENS IMPLANT LEFT;  Surgeon: Sathish Lopez MD;  Location: [facility];  Service: Ophthalmology;  Laterality: Left;   • CATARACT EXTRACTION W/ INTRAOCULAR LENS IMPLANT Right 8/25/2022    Procedure: CATARACT PHACO EXTRACTION WITH INTRAOCULAR LENS IMPLANT RIGHT;  Surgeon: Sathish Lopez MD;  Location: [facility];  Service: Ophthalmology;  Laterality: Right;   • CHOLECYSTECTOMY     • HYSTERECTOMY     • TONSILLECTOMY        General Information     Row Name 05/29/23 1231          Physical Therapy Time and Intention    Document Type therapy note (daily note)  -CC     Mode of Treatment physical therapy  -CC     Row Name 05/29/23 1231          General Information    Patient Profile Reviewed yes  -CC     Existing Precautions/Restrictions fall;oxygen therapy device and L/min  -CC     Row Name 05/29/23 1231          Safety Issues, " Functional Mobility    Safety Issues Affecting Function (Mobility) awareness of need for assistance;insight into deficits/self-awareness;positioning of assistive device;safety precautions follow-through/compliance  -     Impairments Affecting Function (Mobility) strength;balance;endurance/activity tolerance;shortness of breath  -           User Key  (r) = Recorded By, (t) = Taken By, (c) = Cosigned By    Initials Name Provider Type     Nasra Quiñones, ELVIRA Physical Therapist Assistant               Mobility     Row Name 05/29/23 1233          Bed Mobility    Bed Mobility supine-sit;scooting/bridging  -     Scooting/Bridging [place] (Bed Mobility) contact guard;verbal cues  -     Supine-Sit [place] (Bed Mobility) contact guard;verbal cues  -     Assistive Device (Bed Mobility) bed rails;head of bed elevated  -     Row Name 05/29/23 1233          Sit-Stand Transfer    Sit-Stand [place] (Transfers) minimum assist (75% patient effort);1 person assist;verbal cues;contact guard  -     Assistive Device (Sit-Stand Transfers) walker, front-wheeled  -     Row Name 05/29/23 1233          Gait/Stairs (Locomotion)    [place] Level (Gait) minimum assist (75% patient effort)  -     Assistive Device (Gait) walker, front-wheeled  -     Distance in Feet (Gait) 8  -CC     Deviations/Abnormal Patterns (Gait) weight shifting decreased;gilberto decreased;base of support, narrow;stride length decreased  -     Bilateral Gait Deviations forward flexed posture;heel strike decreased  -           User Key  (r) = Recorded By, (t) = Taken By, (c) = Cosigned By    Initials Name Provider Type     Nasra Quiñones, ELVIRA Physical Therapist Assistant               Obj/Interventions    No documentation.                Goals/Plan    No documentation.                Clinical Impression     Row Name 05/29/23 1238          Pain    Pretreatment Pain Rating 2/10  -CC     Posttreatment Pain Rating 2/10  -CC      Additional Documentation Pain Scale: Numbers Pre/Post-Treatment (Group)  -CC     Row Name 05/29/23 1238          Plan of Care Review    Plan of Care Reviewed With patient  -CC     Progress improving  -CC     Outcome Evaluation Pt agreeable to physical therapy. Performed supine to sit with CGA, sitting EOB x5 mins, sit <-> stand with min/CGA and VC for hand placement with RW, amb with RW 8 feet with min A and encouragement to increase activity. Performed B LE ex in sitting AP, LAQ, marching 1x10 reps. Con't with PT POC and progress as tolerated  -CC     Row Name 05/29/23 1238          Positioning and Restraints    Pre-Treatment Position in bed  -CC     Post Treatment Position chair  -CC     In Chair reclined;call light within reach;encouraged to call for assist;exit alarm on;notified nsg  -CC           User Key  (r) = Recorded By, (t) = Taken By, (c) = Cosigned By    Initials Name Provider Type    CC Nasra Quiñones, PTA Physical Therapist Assistant               Outcome Measures     Row Name 05/29/23 1255 05/29/23 0855       How much help from another person do you currently need...    Turning from your back to your side while in flat bed without using bedrails? 3  -CC 3  -HN    Moving from lying on back to sitting on the side of a flat bed without bedrails? 3  -CC 3  -HN    Moving to and from a bed to a chair (including a wheelchair)? 3  -CC 3  -HN    Standing up from a chair using your arms (e.g., wheelchair, bedside chair)? 3  -CC 3  -HN    Climbing 3-5 steps with a railing? 2  -CC 2  -HN    To walk in hospital room? 3  -CC 2  -HN    AM-PAC 6 Clicks Score (PT) 17  -CC 16  -HN    Highest level of mobility 5 --> Static standing  -CC 5 --> Static standing  -HN    Row Name 05/29/23 1255          Functional Assessment    Outcome Measure Options AM-PAC 6 Clicks Basic Mobility (PT)  -CC           User Key  (r) = Recorded By, (t) = Taken By, (c) = Cosigned By    Initials Name Provider Type    CC Nasra Quiñones,  PTA Physical Therapist Assistant    Radha Mccabe RN Registered Nurse                             Physical Therapy Education     Title: PT OT SLP Therapies (In Progress)     Topic: Physical Therapy (Done)     Point: Mobility training (Done)     Learning Progress Summary           Patient Acceptance, E,TB, VU by CC at 5/27/2023 1730    Comment: Importance of increasing mobility    Acceptance, E,TB, VU by JR at 5/24/2023 1749    Comment: Advancement of POC    Acceptance, E,TB,D, VU,NR by RM at 5/23/2023 1316    Comment: proper breathing techniques with mobility    Acceptance, E,TB,D, VU,NR by RM at 5/22/2023 1358    Comment: Exercise tech and importance of dily activity    Acceptance, E, VU by MS at 5/16/2023 1403    Comment: importance of mobility    Acceptance, E, VU by AM at 5/15/2023 0701    Acceptance, E, VU by MS at 5/12/2023 1458    Comment: importance of mobility    Acceptance, E,TB,D, VU,NR by RM at 5/8/2023 1250    Comment: Importance of activity and exercise techniques    Acceptance, E, VU by MS at 5/5/2023 1136    Comment: importance of mobility                   Point: Home exercise program (Done)     Learning Progress Summary           Patient Acceptance, E,TB, VU by CC at 5/29/2023 1256    Acceptance, E,TB, VU by JR at 5/25/2023 1515    Comment: Importance of BLE exercises to improve mobility and confidence with mobility    Acceptance, E,TB,D, VU,NR by RM at 5/22/2023 1358    Comment: Exercise tech and importance of dily activity    Acceptance, E,D, VU,DU by TW at 5/21/2023 1205    Comment: Pt education for improving BLE ther ex technique    Acceptance, E, VU by MS at 5/16/2023 1403    Comment: importance of mobility    Acceptance, E, VU by AM at 5/15/2023 0701    Acceptance, E,TB, VU,NR by CC at 5/14/2023 1310    Comment: Importance of increasing movement of B LE    Acceptance, E,TB,D, VU,NR by RM at 5/8/2023 1250    Comment: Importance of activity and exercise techniques    Acceptance, E, VU by MS  at 5/5/2023 1136    Comment: importance of mobility                   Point: Body mechanics (Done)     Learning Progress Summary           Patient Acceptance, E,TB, VU,NR by CC at 5/28/2023 1707    Comment: Upright posture in standing    Acceptance, E,TB, VU by  at 5/17/2023 1846    Comment: upright posture                   Point: Precautions (Done)     Learning Progress Summary           Patient Acceptance, E,TB, VU by  at 5/24/2023 1749    Comment: Importance of breathing coordination and pacing to recovery                               User Key     Initials Effective Dates Name Provider Type Discipline     03/23/22 -  Kavitha Tavarez, PT Physical Therapist PT    CC 06/16/21 -  Nasra Quiñones, PTA Physical Therapist Assistant PT    RM 06/16/21 -  Ronak Cruz, PTA Physical Therapist Assistant PT    TW 06/16/21 -  Deysi Goodwin, PT Physical Therapist PT    MS 07/01/22 -  Martín Ortiz, PT Physical Therapist PT    AM 02/22/23 -  Rob Lu, RN Registered Nurse Nurse              PT Recommendation and Plan     Plan of Care Reviewed With: patient  Progress: improving  Outcome Evaluation: Pt agreeable to physical therapy. Performed supine to sit with CGA, sitting EOB x5 mins, sit <-> stand with min/CGA and VC for hand placement with RW, amb with RW 8 feet with min A and encouragement to increase activity. Performed B LE ex in sitting AP, LAQ, marching 1x10 reps. Con't with PT POC and progress as tolerated     Time Calculation:    PT Charges     Row Name 05/29/23 1306             Time Calculation    PT Received On 05/29/23  -CC      PT Goal Re-Cert Due Date 06/03/23  -CC         Time Calculation- PT    Total Timed Code Minutes- PT 39 minute(s)  -CC         Timed Charges    82557 - PT Therapeutic Exercise Minutes 10  -CC      74861 - Gait Training Minutes  10  -CC      65172 - PT Therapeutic Activity Minutes 19  -CC         Total Minutes    Timed Charges Total Minutes 39  -CC       Total Minutes 39  -CC             User Key  (r) = Recorded By, (t) = Taken By, (c) = Cosigned By    Initials Name Provider Type    CC Nasra Quiñones PTA Physical Therapist Assistant              Therapy Charges for Today     Code Description Service Date Service Provider Modifiers Qty    10118557631 HC PT NEUROMUSC RE EDUCATION EA 15 MIN 2023 Nasra Quiñones, ELVIRA GP 1    74240737291 HC GAIT TRAINING EA 15 MIN 2023 Nasra Quiñones, PTA GP 1    35762764262 HC PT THERAPEUTIC ACT EA 15 MIN 2023 Nasra Quiñones, PTA GP 1    13900539164 HC PT THER PROC EA 15 MIN 2023 Nasra Quiñones, ELVIRA GP 1    66602499013 HC GAIT TRAINING EA 15 MIN 2023 Nasra Quiñones, ELVIRA GP 1    41621287418 HC PT THERAPEUTIC ACT EA 15 MIN 2023 Nasra Quiñones, ELVIRA GP 1          PT G-Codes  Outcome Measure Options: AM-PAC 6 Clicks Basic Mobility (PT)  AM-PAC 6 Clicks Score (PT): 17  AM-PAC 6 Clicks Score (OT): 15       Nasra Quiñones PTA  2023      Electronically signed by Nasra Quiñones PTA at 23 1308          Occupational Therapy Notes (most recent note)      eCcelia Knight, OT at 23 1252          Patient Name: Carolin Toscano  : 1945    MRN: [id number]                              Today's Date: 2023       Admit Date: 2023    Visit Dx:     ICD-10-CM ICD-9-CM   1. Acute on chronic respiratory failure with hypoxia and hypercapnia  J96.21 518.84    J96.22 786.09     799.02   2. COPD exacerbation  J44.1 491.21   3. Parainfluenza virus infection  B34.8 078.89     Patient Active Problem List   Diagnosis   • CAP (community acquired pneumonia)   • Cigarette nicotine dependence with nicotine-induced disorder   • Essential hypertension   • Dyslipidemia   • Blood glucose elevated   • Muscle ache   • COPD (chronic obstructive pulmonary disease)   • Nuclear sclerotic cataract of right eye   • Acute respiratory failure   • COPD exacerbation   • Acute on chronic respiratory failure with hypoxia and  "hypercapnia     Past Medical History:   Diagnosis Date   • Arthritis    • COPD (chronic obstructive pulmonary disease)    • Gall stones    • Goiter     \"inward\"   • Hypertension    • Hypertension    • Kidney infection    • Kidney stone    • Kidney stones    • Migraine headache    • Scarlet fever      Past Surgical History:   Procedure Laterality Date   • BLADDER SURGERY     • CATARACT EXTRACTION W/ INTRAOCULAR LENS IMPLANT Left 8/11/2022    Procedure: CATARACT PHACO EXTRACTION WITH INTRAOCULAR LENS IMPLANT LEFT;  Surgeon: Sathish Lopez MD;  Location: [facility];  Service: Ophthalmology;  Laterality: Left;   • CATARACT EXTRACTION W/ INTRAOCULAR LENS IMPLANT Right 8/25/2022    Procedure: CATARACT PHACO EXTRACTION WITH INTRAOCULAR LENS IMPLANT RIGHT;  Surgeon: Sathish Lopez MD;  Location: [facility] OR;  Service: Ophthalmology;  Laterality: Right;   • CHOLECYSTECTOMY     • HYSTERECTOMY     • TONSILLECTOMY        General Information     Row Name 05/25/23 1242          OT Time and Intention    Document Type therapy note (daily note)  -SD     Mode of Treatment occupational therapy  -SD     Row Name 05/25/23 1242          General Information    Patient Profile Reviewed yes  -SD           User Key  (r) = Recorded By, (t) = Taken By, (c) = Cosigned By    Initials Name Provider Type    SD Cecelia Kinght OT Occupational Therapist                 Mobility/ADL's     Row Name 05/25/23 1242          Bed Mobility    Bed Mobility supine-sit;sit-supine;rolling left;rolling right  -SD     Rolling Left [place] (Bed Mobility) contact guard  -SD     Rolling Right [place] (Bed Mobility) contact guard  -SD     Supine-Sit [place] (Bed Mobility) contact guard  -SD     Sit-Supine [place] (Bed Mobility) contact guard;minimum assist (75% patient effort)  -SD     Assistive Device (Bed Mobility) bed rails;head of bed elevated  -SD     Row Name 05/25/23 1242          Upper Body Dressing Assessment/Training    " [place] Level (Upper Body Dressing) don;contact guard assist  -SD     Position (Upper Body Dressing) edge of bed sitting  -SD     Row Name 05/25/23 1242          Grooming Assessment/Training    [place] Level (Grooming) hair care, combing/brushing;wash face, hands;standby assist  -SD     Position (Grooming) edge of bed sitting  -SD     Row Name 05/25/23 1242          Toileting Assessment/Training    [place] Level (Toileting) perform perineal hygiene;maximum assist (25% patient effort)  -SD     Assistive Devices (Toileting) bedpan  -SD           User Key  (r) = Recorded By, (t) = Taken By, (c) = Cosigned By    Initials Name Provider Type    Cecelia Johnson OT Occupational Therapist               Obj/Interventions     Row Name 05/25/23 1244          Shoulder (Therapeutic Exercise)    Shoulder Strengthening (Therapeutic Exercise) bilateral;flexion;extension;10 repetitions  -SD     Row Name 05/25/23 1244          Elbow/Forearm (Therapeutic Exercise)    Elbow/Forearm AROM (Therapeutic Exercise) bilateral;flexion;10 repetitions;extension  -SD     Row Name 05/25/23 1244          Motor Skills    Therapeutic Exercise shoulder;elbow/forearm  -SD           User Key  (r) = Recorded By, (t) = Taken By, (c) = Cosigned By    Initials Name Provider Type    Cecelia Johnson OT Occupational Therapist               Goals/Plan    No documentation.                Clinical Impression     Row Name 05/25/23 1246          Pain Assessment    Pretreatment Pain Rating 0/10 - no pain  -SD     Posttreatment Pain Rating 0/10 - no pain  -SD     Row Name 05/25/23 1246          Plan of Care Review    Plan of Care Reviewed With patient  -SD     Progress improving  -SD     Outcome Evaluation OT tx completed. Patient on 6L HFNC satting 91%. Patient requesting bedpan d/t unable to quickly access BSC. Patient performed rolling right with CGA. Required max A for perineal hygiene following toileting. Patient moved to EOB CGA. Sitting  EOB performed UBD with CGA, grooming tasks with SBA and UB ther ex using yellow theraband x 10 reps. Lowest O2 reading 87%. Patient assisted back to supine with CGA-Min A and placed in chair position. Continue OT POC  -SD     Row Name 05/25/23 1246          Vital Signs    Pre SpO2 (%) 90  -SD     O2 Delivery Pre Treatment hi-flow  -SD     Intra SpO2 (%) 87  -SD     O2 Delivery Intra Treatment hi-flow  -SD     Post SpO2 (%) 91  -SD     O2 Delivery Post Treatment hi-flow  -SD     Row Name 05/25/23 1246          Positioning and Restraints    Pre-Treatment Position in bed  -SD     Post Treatment Position bed  -SD     In Bed fowlers;call light within reach;encouraged to call for assist  -SD           User Key  (r) = Recorded By, (t) = Taken By, (c) = Cosigned By    Initials Name Provider Type    Cecelia Johnson OT Occupational Therapist               Outcome Measures     Row Name 05/25/23 1249          How much help from another is currently needed...    Putting on and taking off regular lower body clothing? 2  -SD     Bathing (including washing, rinsing, and drying) 2  -SD     Toileting (which includes using toilet bed pan or urinal) 2  -SD     Putting on and taking off regular upper body clothing 3  -SD     Taking care of personal grooming (such as brushing teeth) 3  -SD     Eating meals 3  -SD     AM-PAC 6 Clicks Score (OT) 15  -SD     Row Name 05/25/23 0948          How much help from another person do you currently need...    Turning from your back to your side while in flat bed without using bedrails? 3  -ENRIQUETA     Moving from lying on back to sitting on the side of a flat bed without bedrails? 3  -ENRIQUETA     Moving to and from a bed to a chair (including a wheelchair)? 2  -ENRIQUETA     Standing up from a chair using your arms (e.g., wheelchair, bedside chair)? 2  -ENRIQUETA     Climbing 3-5 steps with a railing? 2  -ENRIQUETA     To walk in hospital room? 2  -ENRIQUETA     AM-PAC 6 Clicks Score (PT) 14  -ENRIQUETA     Highest level of mobility 4 -->  Transferred to chair/commode  -ENRIQUETA Anderson Name 05/25/23 1249          Functional Assessment    Outcome Measure Options AM-PAC 6 Clicks Daily Activity (OT)  -SD           User Key  (r) = Recorded By, (t) = Taken By, (c) = Cosigned By    Initials Name Provider Type    Cecelia Johnson OT Occupational Therapist    Renzo Sesay, RN Registered Nurse                Occupational Therapy Education     Title: PT OT SLP Therapies (In Progress)     Topic: Occupational Therapy (In Progress)     Point: ADL training (Done)     Description:   Instruct learner(s) on proper safety adaptation and remediation techniques during self care or transfers.   Instruct in proper use of assistive devices.              Learning Progress Summary           Patient Acceptance, E,TB, VU by SD at 5/24/2023 1637    Comment: EC during ADLs.    Acceptance, E,TB, VU by SD at 5/23/2023 1309    Comment: Safety and sequencing during toileting task.    Acceptance, E, VU by AM at 5/15/2023 0701    Acceptance, E,TB, VU by  at 5/12/2023 1507    Comment: purpose of re-evaluation    Acceptance, E,TB, VU by SD at 5/8/2023 1222    Comment: Safety during tf    Acceptance, E, VU by SP at 5/5/2023 1146    Comment: OT edcuated pt on purpose of IE and POC. Pt is agreeable.                   Point: Home exercise program (Done)     Description:   Instruct learner(s) on appropriate technique for monitoring, assisting and/or progressing therapeutic exercises/activities.              Learning Progress Summary           Patient Acceptance, E,TB, VU by SD at 5/25/2023 1249    Comment: Activity pacing during ther ex    Acceptance, E,TB, VU by  at 5/22/2023 1549    Comment: benefit of do UB ex to improve functional strength    Acceptance, E,TB, VU by  at 5/19/2023 1339    Comment: importance of activity    Acceptance, E,TB, VU by SD at 5/16/2023 1246    Comment: Importance of progressing activity.   Family Acceptance, E,TB, VU by  at 5/22/2023 1549    Comment:  benefit of do UB ex to improve functional strength    Acceptance, E,TB, VU by  at 5/19/2023 3783    Comment: importance of activity                   Point: Precautions (Not Started)     Description:   Instruct learner(s) on prescribed precautions during self-care and functional transfers.              Learner Progress:  Not documented in this visit.          Point: Body mechanics (Not Started)     Description:   Instruct learner(s) on proper positioning and spine alignment during self-care, functional mobility activities and/or exercises.              Learner Progress:  Not documented in this visit.                      User Key     Initials Effective Dates Name Provider Type Discipline     06/16/21 -  Roselyn Donaldson Occupational Therapist OT    SD 06/16/21 -  Cecelia Knight OT Occupational Therapist OT    SP 09/08/22 -  January Chapa OT Occupational Therapist OT    AM 02/22/23 -  Rob Lu, RN Registered Nurse Nurse              OT Recommendation and Plan     Plan of Care Review  Plan of Care Reviewed With: patient  Progress: improving  Outcome Evaluation: OT tx completed. Patient on 6L HFNC satting 91%. Patient requesting bedpan d/t unable to quickly access BSC. Patient performed rolling right with CGA. Required max A for perineal hygiene following toileting. Patient moved to EOB CGA. Sitting EOB performed UBD with CGA, grooming tasks with SBA and UB ther ex using yellow theraband x 10 reps. Lowest O2 reading 87%. Patient assisted back to supine with CGA-Min A and placed in chair position. Continue OT POC     Time Calculation:    Time Calculation- OT     Row Name 05/25/23 1250             Time Calculation- OT    OT Start Time 1054  -SD      OT Stop Time 1130  -SD      OT Time Calculation (min) 36 min  -SD      OT Received On 05/25/23  -SD      OT Goal Re-Cert Due Date 05/26/23  -SD         Timed Charges    60017 - OT Therapeutic Exercise Minutes 9  -SD      85018 - OT Therapeutic Activity Minutes 15  -SD       76062 - OT Self Care/Mgmt Minutes 12  -SD         Total Minutes    Timed Charges Total Minutes 36  -SD       Total Minutes 36  -SD            User Key  (r) = Recorded By, (t) = Taken By, (c) = Cosigned By    Initials Name Provider Type    Cecelia Johnson OT Occupational Therapist              Therapy Charges for Today     Code Description Service Date Service Provider Modifiers Qty    23909547252  OT THERAPEUTIC ACT EA 15 MIN 5/24/2023 Cecelia Knight OT GO 1    [id number]  OT SELF CARE/MGMT/TRAIN EA 15 MIN 5/24/2023 Cecelia Knight OT GO 1    [id number]  OT THERAPEUTIC ACT EA 15 MIN 5/25/2023 Cecelia Knight OT GO 1    [id number]  OT SELF CARE/MGMT/TRAIN EA 15 MIN 5/25/2023 Cecelia Knight OT GO 1               Cecelia Knight OT  5/25/2023    Electronically signed by Cecelia Knight OT at 05/25/23 1252

## 2023-05-31 LAB
ANION GAP SERPL CALCULATED.3IONS-SCNC: 9.6 MMOL/L (ref 5–15)
BUN SERPL-MCNC: 20 MG/DL (ref 8–23)
BUN/CREAT SERPL: 83.3 (ref 7–25)
CALCIUM SPEC-SCNC: 9.1 MG/DL (ref 8.6–10.5)
CHLORIDE SERPL-SCNC: 99 MMOL/L (ref 98–107)
CO2 SERPL-SCNC: 28.4 MMOL/L (ref 22–29)
CREAT SERPL-MCNC: 0.24 MG/DL (ref 0.57–1)
DEPRECATED RDW RBC AUTO: 64.4 FL (ref 37–54)
EGFRCR SERPLBLD CKD-EPI 2021: 116.2 ML/MIN/1.73
ERYTHROCYTE [DISTWIDTH] IN BLOOD BY AUTOMATED COUNT: 18.6 % (ref 12.3–15.4)
GLUCOSE BLDC GLUCOMTR-MCNC: 118 MG/DL (ref 70–130)
GLUCOSE BLDC GLUCOMTR-MCNC: 169 MG/DL (ref 70–130)
GLUCOSE BLDC GLUCOMTR-MCNC: 321 MG/DL (ref 70–130)
GLUCOSE SERPL-MCNC: 114 MG/DL (ref 65–99)
HCT VFR BLD AUTO: 34.7 % (ref 34–46.6)
HGB BLD-MCNC: 10.8 G/DL (ref 12–15.9)
MCH RBC QN AUTO: 30 PG (ref 26.6–33)
MCHC RBC AUTO-ENTMCNC: 31.1 G/DL (ref 31.5–35.7)
MCV RBC AUTO: 96.4 FL (ref 79–97)
PLATELET # BLD AUTO: 155 10*3/MM3 (ref 140–450)
PMV BLD AUTO: 10 FL (ref 6–12)
POTASSIUM SERPL-SCNC: 4.3 MMOL/L (ref 3.5–5.2)
RBC # BLD AUTO: 3.6 10*6/MM3 (ref 3.77–5.28)
SODIUM SERPL-SCNC: 137 MMOL/L (ref 136–145)
WBC NRBC COR # BLD: 6.44 10*3/MM3 (ref 3.4–10.8)

## 2023-05-31 PROCEDURE — 94799 UNLISTED PULMONARY SVC/PX: CPT

## 2023-05-31 PROCEDURE — 85027 COMPLETE CBC AUTOMATED: CPT | Performed by: INTERNAL MEDICINE

## 2023-05-31 PROCEDURE — 97112 NEUROMUSCULAR REEDUCATION: CPT

## 2023-05-31 PROCEDURE — 99232 SBSQ HOSP IP/OBS MODERATE 35: CPT | Performed by: INTERNAL MEDICINE

## 2023-05-31 PROCEDURE — 94761 N-INVAS EAR/PLS OXIMETRY MLT: CPT

## 2023-05-31 PROCEDURE — 97110 THERAPEUTIC EXERCISES: CPT

## 2023-05-31 PROCEDURE — 80048 BASIC METABOLIC PNL TOTAL CA: CPT | Performed by: INTERNAL MEDICINE

## 2023-05-31 PROCEDURE — 63710000001 INSULIN ASPART PER 5 UNITS: Performed by: FAMILY MEDICINE

## 2023-05-31 PROCEDURE — 97116 GAIT TRAINING THERAPY: CPT

## 2023-05-31 PROCEDURE — 94664 DEMO&/EVAL PT USE INHALER: CPT

## 2023-05-31 PROCEDURE — 97168 OT RE-EVAL EST PLAN CARE: CPT

## 2023-05-31 PROCEDURE — 25010000002 ENOXAPARIN PER 10 MG: Performed by: FAMILY MEDICINE

## 2023-05-31 PROCEDURE — 94660 CPAP INITIATION&MGMT: CPT

## 2023-05-31 PROCEDURE — 97530 THERAPEUTIC ACTIVITIES: CPT

## 2023-05-31 PROCEDURE — 63710000001 PREDNISONE PER 5 MG: Performed by: FAMILY MEDICINE

## 2023-05-31 PROCEDURE — 82948 REAGENT STRIP/BLOOD GLUCOSE: CPT

## 2023-05-31 RX ADMIN — Medication 3 ML: at 08:48

## 2023-05-31 RX ADMIN — HYDROXYZINE HYDROCHLORIDE 25 MG: 25 TABLET ORAL at 15:13

## 2023-05-31 RX ADMIN — AMLODIPINE BESYLATE 10 MG: 5 TABLET ORAL at 08:53

## 2023-05-31 RX ADMIN — IPRATROPIUM BROMIDE AND ALBUTEROL SULFATE 3 ML: 2.5; .5 SOLUTION RESPIRATORY (INHALATION) at 01:53

## 2023-05-31 RX ADMIN — IPRATROPIUM BROMIDE AND ALBUTEROL SULFATE 3 ML: 2.5; .5 SOLUTION RESPIRATORY (INHALATION) at 19:19

## 2023-05-31 RX ADMIN — PREDNISONE 10 MG: 10 TABLET ORAL at 08:53

## 2023-05-31 RX ADMIN — FLUTICASONE PROPIONATE 2 SPRAY: 50 SPRAY, METERED NASAL at 08:44

## 2023-05-31 RX ADMIN — ATENOLOL 25 MG: 25 TABLET ORAL at 08:53

## 2023-05-31 RX ADMIN — TIOTROPIUM BROMIDE INHALATION SPRAY 2 PUFF: 3.12 SPRAY, METERED RESPIRATORY (INHALATION) at 07:17

## 2023-05-31 RX ADMIN — INSULIN ASPART 7 UNITS: 100 INJECTION, SOLUTION INTRAVENOUS; SUBCUTANEOUS at 08:45

## 2023-05-31 RX ADMIN — Medication 10 ML: at 08:48

## 2023-05-31 RX ADMIN — MUPIROCIN 1 APPLICATION: 2 CREAM TOPICAL at 08:46

## 2023-05-31 RX ADMIN — ACETAMINOPHEN 650 MG: 325 TABLET, FILM COATED ORAL at 14:14

## 2023-05-31 RX ADMIN — IPRATROPIUM BROMIDE AND ALBUTEROL SULFATE 3 ML: 2.5; .5 SOLUTION RESPIRATORY (INHALATION) at 12:45

## 2023-05-31 RX ADMIN — HYDROXYZINE HYDROCHLORIDE 25 MG: 25 TABLET ORAL at 08:53

## 2023-05-31 RX ADMIN — CETIRIZINE HYDROCHLORIDE 10 MG: 10 TABLET, FILM COATED ORAL at 08:53

## 2023-05-31 RX ADMIN — IPRATROPIUM BROMIDE AND ALBUTEROL SULFATE 3 ML: 2.5; .5 SOLUTION RESPIRATORY (INHALATION) at 07:16

## 2023-05-31 RX ADMIN — ACETAMINOPHEN 650 MG: 325 TABLET, FILM COATED ORAL at 08:53

## 2023-05-31 RX ADMIN — BUDESONIDE AND FORMOTEROL FUMARATE DIHYDRATE 2 PUFF: 160; 4.5 AEROSOL RESPIRATORY (INHALATION) at 19:19

## 2023-05-31 RX ADMIN — ACETAMINOPHEN 650 MG: 325 TABLET, FILM COATED ORAL at 21:02

## 2023-05-31 RX ADMIN — Medication 10 ML: at 21:02

## 2023-05-31 RX ADMIN — Medication 10 ML: at 08:47

## 2023-05-31 RX ADMIN — BUDESONIDE AND FORMOTEROL FUMARATE DIHYDRATE 2 PUFF: 160; 4.5 AEROSOL RESPIRATORY (INHALATION) at 07:17

## 2023-05-31 RX ADMIN — HYDROXYZINE HYDROCHLORIDE 25 MG: 25 TABLET ORAL at 21:02

## 2023-05-31 RX ADMIN — ACETAMINOPHEN 650 MG: 325 TABLET, FILM COATED ORAL at 04:31

## 2023-05-31 RX ADMIN — INSULIN ASPART 2 UNITS: 100 INJECTION, SOLUTION INTRAVENOUS; SUBCUTANEOUS at 16:52

## 2023-05-31 RX ADMIN — MUPIROCIN 1 APPLICATION: 2 CREAM TOPICAL at 21:06

## 2023-05-31 RX ADMIN — ENOXAPARIN SODIUM 40 MG: 100 INJECTION SUBCUTANEOUS at 21:02

## 2023-05-31 RX ADMIN — MULTIPLE VITAMINS W/ MINERALS TAB 1 TABLET: TAB at 08:53

## 2023-05-31 NOTE — THERAPY TREATMENT NOTE
"Patient Name: Carolin Toscano  : 1945    MRN: [id number]                              Today's Date: 2023       Admit Date: 2023    Visit Dx:     ICD-10-CM ICD-9-CM   1. Acute on chronic respiratory failure with hypoxia and hypercapnia  J96.21 518.84    J96.22 786.09     799.02   2. COPD exacerbation  J44.1 491.21   3. Parainfluenza virus infection  B34.8 078.89   4. Simple chronic bronchitis  J41.0 491.0     Patient Active Problem List   Diagnosis   • CAP (community acquired pneumonia)   • Cigarette nicotine dependence with nicotine-induced disorder   • Essential hypertension   • Dyslipidemia   • Blood glucose elevated   • Muscle ache   • COPD (chronic obstructive pulmonary disease)   • Nuclear sclerotic cataract of right eye   • Acute respiratory failure   • COPD exacerbation   • Acute on chronic respiratory failure with hypoxia and hypercapnia     Past Medical History:   Diagnosis Date   • Arthritis    • COPD (chronic obstructive pulmonary disease)    • Gall stones    • Goiter     \"inward\"   • Hypertension    • Hypertension    • Kidney infection    • Kidney stone    • Kidney stones    • Migraine headache    • Scarlet fever      Past Surgical History:   Procedure Laterality Date   • BLADDER SURGERY     • CATARACT EXTRACTION W/ INTRAOCULAR LENS IMPLANT Left 2022    Procedure: CATARACT PHACO EXTRACTION WITH INTRAOCULAR LENS IMPLANT LEFT;  Surgeon: Sathish Lopez MD;  Location: [facility];  Service: Ophthalmology;  Laterality: Left;   • CATARACT EXTRACTION W/ INTRAOCULAR LENS IMPLANT Right 2022    Procedure: CATARACT PHACO EXTRACTION WITH INTRAOCULAR LENS IMPLANT RIGHT;  Surgeon: Sathish Lopez MD;  Location: [facility];  Service: Ophthalmology;  Laterality: Right;   • CHOLECYSTECTOMY     • HYSTERECTOMY     • TONSILLECTOMY        General Information     Row Name 23 1328          Physical Therapy Time and Intention    Document Type therapy note (daily note)  -JR     Mode of " Treatment physical therapy  -JR     Row Name 05/31/23 1328          General Information    Patient Profile Reviewed yes  -JR           User Key  (r) = Recorded By, (t) = Taken By, (c) = Cosigned By    Initials Name Provider Type    Kavitha Michael PT Physical Therapist               Mobility     Row Name 05/31/23 1611          Bed Mobility    Supine-Sit [place] (Bed Mobility) modified independence  -JR     Assistive Device (Bed Mobility) head of bed elevated;bed rails  -JR     Row Name 05/31/23 1611          Sit-Stand Transfer    Sit-Stand [place] (Transfers) contact guard  -JR     Assistive Device (Sit-Stand Transfers) walker, front-wheeled  -JR     Comment, (Sit-Stand Transfer) stood x 3 minutes holding onto RW  -JR     Row Name 05/31/23 1611          Gait/Stairs (Locomotion)    [place] Level (Gait) contact guard  -JR     Assistive Device (Gait) walker, front-wheeled  -JR     Distance in Feet (Gait) 5 x 2  -JR     Deviations/Abnormal Patterns (Gait) base of support, narrow;festinating/shuffling;stride length decreased  -JR     Bilateral Gait Deviations forward flexed posture;heel strike decreased  -JR     Comment, (Gait/Stairs) Patient is very fearful of falling and needs encouragement that she is strong enough and she isn't goining to fall  -JR           User Key  (r) = Recorded By, (t) = Taken By, (c) = Cosigned By    Initials Name Provider Type    Kavitha Michael PT Physical Therapist               Obj/Interventions    No documentation.                Goals/Plan    No documentation.                Clinical Impression     Row Name 05/31/23 1611          Pain    Pretreatment Pain Rating 0/10 - no pain  -JR     Posttreatment Pain Rating 0/10 - no pain  -JR     Row Name 05/31/23 1611          Plan of Care Review    Plan of Care Reviewed With patient;daughter  -JR     Progress improving  -     Outcome Evaluation Patient performs bed mobility independently with the head of the bed raised and using  the bedrail.  She requires CGA for transfers and gait x 5 feet x 2.  She continues to have great fear of her legs buckling and causing her to fall.  We discuss at length her progress and lack of evidence that she is at risk of falling.  She is expected to benefit from additional PT services per POC.  -     Row Name 05/31/23 1611          Positioning and Restraints    Pre-Treatment Position in bed  -JR     Post Treatment Position bed  -JR     In Bed sitting EOB;call light within reach;encouraged to call for assist;with family/caregiver;notified nsg  -JR           User Key  (r) = Recorded By, (t) = Taken By, (c) = Cosigned By    Initials Name Provider Type    Kavitha Michael, PT Physical Therapist               Outcome Measures     Row Name 05/31/23 1611          How much help from another person do you currently need...    Turning from your back to your side while in flat bed without using bedrails? 3  -JR     Moving from lying on back to sitting on the side of a flat bed without bedrails? 3  -JR     Moving to and from a bed to a chair (including a wheelchair)? 3  -JR     Standing up from a chair using your arms (e.g., wheelchair, bedside chair)? 3  -JR     Climbing 3-5 steps with a railing? 2  -JR     To walk in hospital room? 3  -JR     AM-PAC 6 Clicks Score (PT) 17  -JR     Highest level of mobility 5 --> Static standing  -JR     Row Name 05/31/23 1611 05/31/23 1300       Functional Assessment    Outcome Measure Options AM-PAC 6 Clicks Basic Mobility (PT)  -JR AM-PAC 6 Clicks Daily Activity (OT)  -SD          User Key  (r) = Recorded By, (t) = Taken By, (c) = Cosigned By    Initials Name Provider Type    Kavitha Michael, PT Physical Therapist    Cceelia Johnson OT Occupational Therapist                             Physical Therapy Education     Title: PT OT SLP Therapies (In Progress)     Topic: Physical Therapy (Done)     Point: Mobility training (Done)     Learning Progress Summary           Patient  Acceptance, E,TB, VU by JR at 5/31/2023 1845    Comment: Importance of giving herself credit for improvements in mobility    Acceptance, E,TB, VU by NM at 5/31/2023 0517    Acceptance, E,TB, VU by CC at 5/27/2023 1730    Comment: Importance of increasing mobility    Acceptance, E,TB, VU by JR at 5/24/2023 1749    Comment: Advancement of POC    Acceptance, E,TB,D, VU,NR by RM at 5/23/2023 1316    Comment: proper breathing techniques with mobility    Acceptance, E,TB,D, VU,NR by RM at 5/22/2023 1358    Comment: Exercise tech and importance of dily activity    Acceptance, E, VU by MS at 5/16/2023 1403    Comment: importance of mobility    Acceptance, E, VU by AM at 5/15/2023 0701    Acceptance, E, VU by MS at 5/12/2023 1458    Comment: importance of mobility    Acceptance, E,TB,D, VU,NR by RM at 5/8/2023 1250    Comment: Importance of activity and exercise techniques    Acceptance, E, VU by MS at 5/5/2023 1136    Comment: importance of mobility                   Point: Home exercise program (Done)     Learning Progress Summary           Patient Acceptance, E,TB, VU by NM at 5/31/2023 0517    Acceptance, E,TB, VU by CC at 5/29/2023 1256    Acceptance, E,TB, VU by JR at 5/25/2023 1515    Comment: Importance of BLE exercises to improve mobility and confidence with mobility    Acceptance, E,TB,D, VU,NR by RM at 5/22/2023 1358    Comment: Exercise tech and importance of dily activity    Acceptance, E,D, VU,DU by TW at 5/21/2023 1205    Comment: Pt education for improving BLE ther ex technique    Acceptance, E, VU by MS at 5/16/2023 1403    Comment: importance of mobility    Acceptance, E, VU by AM at 5/15/2023 0701    Acceptance, E,TB, VU,NR by CC at 5/14/2023 1310    Comment: Importance of increasing movement of B LE    Acceptance, E,TB,D, VU,NR by RM at 5/8/2023 1250    Comment: Importance of activity and exercise techniques    Acceptance, E, VU by MS at 5/5/2023 5572    Comment: importance of mobility                    Point: Body mechanics (Done)     Learning Progress Summary           Patient Acceptance, E,TB, VU by NM at 5/31/2023 0517    Acceptance, E,TB, VU by CC at 5/30/2023 1854    Acceptance, E,TB, VU,NR by CC at 5/28/2023 1707    Comment: Upright posture in standing    Acceptance, E,TB, VU by JR at 5/17/2023 1846    Comment: upright posture                   Point: Precautions (Done)     Learning Progress Summary           Patient Acceptance, E,TB, VU by NM at 5/31/2023 0517    Acceptance, E,TB, VU by  at 5/24/2023 1749    Comment: Importance of breathing coordination and pacing to recovery                               User Key     Initials Effective Dates Name Provider Type Discipline    JR 03/23/22 -  Kavitha Tavarez, PT Physical Therapist PT    CC 06/16/21 -  Nasra Quiñones, PTA Physical Therapist Assistant PT    RM 06/16/21 -  Ronak Cruz, PTA Physical Therapist Assistant PT    TW 06/16/21 -  Deysi Goodwin, PT Physical Therapist PT    MS 07/01/22 -  Martín Ortiz, PT Physical Therapist PT    AM 02/22/23 -  Rob Lu, RN Registered Nurse Nurse    NM 05/02/23 -  Sushma Tuttle, Nursing Student Nursing Student Nurse              PT Recommendation and Plan  Planned Therapy Interventions (PT): strengthening, balance training, bed mobility training, transfer training, gait training, home exercise program, patient/family education, other (see comments) (breathing coordination and pacing)  Plan of Care Reviewed With: patient, daughter  Progress: improving  Outcome Evaluation: Patient performs bed mobility independently with the head of the bed raised and using the bedrail.  She requires CGA for transfers and gait x 5 feet x 2.  She continues to have great fear of her legs buckling and causing her to fall.  We discuss at length her progress and lack of evidence that she is at risk of falling.  She is expected to benefit from additional PT services per POC.     Time Calculation:    PT Charges     Row Name 05/31/23  1846             Time Calculation    Start Time 1611  -JR      Stop Time 1707  -JR      Time Calculation (min) 56 min  -JR      PT Received On 05/31/23  -JR      PT Goal Re-Cert Due Date 06/03/23  -JR         Time Calculation- PT    Total Timed Code Minutes- PT 56 minute(s)  -JR         Timed Charges    58681 - PT Therapeutic Exercise Minutes 10  -JR      13289 -  PT Neuromuscular Reeducation Minutes 10  -JR      13328 - Gait Training Minutes  16  -JR      28987 - PT Therapeutic Activity Minutes 20  -JR         Total Minutes    Timed Charges Total Minutes 56  -JR       Total Minutes 56  -JR            User Key  (r) = Recorded By, (t) = Taken By, (c) = Cosigned By    Initials Name Provider Type     Kavitha Tavarez, PT Physical Therapist              Therapy Charges for Today     Code Description Service Date Service Provider Modifiers Qty    19249120488 HC PT NEUROMUSC RE EDUCATION EA 15 MIN 5/31/2023 Kavitha Tavarez, PT GP 1    93585530904 HC PT THER PROC EA 15 MIN 5/31/2023 Kavitha Tavarez, PT GP 1    32089264256 HC GAIT TRAINING EA 15 MIN 5/31/2023 Kavitha Tavarez, PT GP 1    14426378837 HC PT THERAPEUTIC ACT EA 15 MIN 5/31/2023 Kavitha Tavarez, PT GP 1          PT G-Codes  Outcome Measure Options: AM-PAC 6 Clicks Basic Mobility (PT)  AM-PAC 6 Clicks Score (PT): 17  AM-PAC 6 Clicks Score (OT): 18  PT Discharge Summary  Anticipated Discharge Disposition (PT): inpatient rehabilitation facility, LTCH (long-term care hospital), skilled nursing facility    Kavitha Tavarez, PT  5/31/2023

## 2023-05-31 NOTE — PROGRESS NOTES
"  CC: Acute Respiratory Failure.     S: Continues to require oxygen supplementation via nasal cannula, currently at 3-7 L/min.  Says that the cough seems to be \"slowing down\".  Is complaining of a scab under the nose from the nasal cannula.    ROS: Complains of cough, shortness of breath and mild weakness.  Also complains of mild anxiety.    O:Vital signs reviewed.   /57 (BP Location: Left arm, Patient Position: Lying)   Pulse 79   Temp 98.3 °F (36.8 °C) (Oral)   Resp 18   Ht 160 cm (63\")   Wt 68.1 kg (150 lb 2.1 oz)   SpO2 93%   BMI 26.59 kg/m²     Temp (24hrs), Av.2 °F (36.8 °C), Min:98 °F (36.7 °C), Max:98.4 °F (36.9 °C)      I & Os reviewed.   Intake/Output       23 0700 - 23 0659 23 0700 - 23 0659    Intake (ml) 360 120    Output (ml) 300 --    Net (ml) 60 120    Last Weight 68.1 kg (150 lb 2.1 oz) --          Net IO Since Admission: -10,241.33 mL [23 0934]    General/Constitutional: Does not appear to be in any significant distress at this time.  Eyes: PERRL. EOMI  Neck: Supple without JVD. No obvious masses noted.   Cardiovascular: S1 + S2.  Regular at this time.  Lungs/Respiratory: Minimal bibasilar crackles noted.  No significant respiratory distress noted.  GI/Abdomen: Soft. Bowel sounds positive.  No obvious organomegaly  Musculoskeletal/Extremities: No edema noted. Gait could not be assessed at this time, as the patient was laying in bed.   Neurologic: Appropriate in her responses.  Psych: AAO x 3. Was able to follow simple commands.    Skin: Appeared somewhat dry.      Labs: Reviewed.   Results from last 7 days   Lab Units 23  0639   WBC 10*3/mm3 6.44   HEMOGLOBIN g/dL 10.8*   HEMATOCRIT % 34.7   PLATELETS 10*3/mm3 155       Lab Results   Component Value Date    PROCALCITO 0.17 2023    PROCALCITO 0.11 2023    PROCALCITO 0.08 2023       No results found for: CRP    No results found for: SEDRATE    Lab Results   Component Value Date    " PROBNP 572.7 05/14/2023    PROBNP 479.4 05/10/2023    PROBNP 234.4 05/04/2023       Results from last 7 days   Lab Units 05/31/23  0639   SODIUM mmol/L 137   POTASSIUM mmol/L 4.3   CHLORIDE mmol/L 99   CO2 mmol/L 28.4   BUN mg/dL 20   CREATININE mg/dL 0.24*   CALCIUM mg/dL 9.1   ANION GAP mmol/L 9.6   GLUCOSE mg/dL 114*                   No results found for: CKTOTAL    No components found for: HSTROPT    Lab Results   Component Value Date    TROPONINT 22 (H) 05/05/2023    TROPONINT 25 (H) 05/05/2023    TROPONINT 44 (H) 05/04/2023       No results found for: DDIMER    Brief Urine Lab Results  (Last result in the past 365 days)      Color   Clarity   Blood   Leuk Est   Nitrite   Protein   CREAT   Urine HCG        05/12/23 1738 Yellow   Cloudy   Negative   Trace   Negative   Trace                 Lab Results   Component Value Date    TSH 0.304 05/09/2023       No results found for: FREET4      Micro: As of May 31, 2023   Lab Results   Component Value Date    RESPCX Moderate growth (3+) Haemophilus influenzae (A) 05/18/2023    RESPCX Light growth (2+) Staphylococcus aureus (A) 05/18/2023    RESPCX No Normal Respiratory Ana (A) 05/18/2023     No results found for: BCIDPCR  Lab Results   Component Value Date    BLOODCX No growth at 5 days 05/18/2023    BLOODCX No growth at 5 days 05/18/2023    BLOODCX No growth at 5 days 05/04/2023    BLOODCX No growth at 5 days 05/04/2023     Lab Results   Component Value Date    URINECX Final report 02/07/2019    URINECX 20,000-30,000 CFU/mL Escherichia coli (A) 10/05/2018     No results found for: MRSACX  Lab Results   Component Value Date    MRSAPCR No MRSA Detected 04/15/2023     No results found for: URCX  No components found for: LOWRESPCF  No results found for: THROATCX  No results found for: CULTURES  No components found for: STREPBCX  No results found for: STREPPNEUAG  No results found for: LEGIONELLA  No results found for: MYCOPLASCX  No results found for: GCCX  No results  found for: WOUNDCX  No results found for: BODYFLDCX    No results found for: FLU    Lab Results   Component Value Date    ADENOVIRUS Not Detected 05/04/2023     Lab Results   Component Value Date    OC555M Not Detected 05/04/2023     Lab Results   Component Value Date    CVHKU1 Not Detected 05/04/2023     Lab Results   Component Value Date    CVNL63 Not Detected 05/04/2023     Lab Results   Component Value Date    CVOC43 Not Detected 05/04/2023     Lab Results   Component Value Date    HUMETPNEVS Not Detected 05/04/2023     Lab Results   Component Value Date    HURVEV Not Detected 05/04/2023     Lab Results   Component Value Date    FLUBPCR Not Detected 05/04/2023     Lab Results   Component Value Date    PARAINFLUE Not Detected 05/04/2023     Lab Results   Component Value Date    PARAFLUV2 Not Detected 05/04/2023     Lab Results   Component Value Date    PARAFLUV3 Detected (A) 05/04/2023     Lab Results   Component Value Date    PARAFLUV4 Not Detected 05/04/2023     Lab Results   Component Value Date    BPERTPCR Not Detected 05/04/2023     No results found for: [id number]  Lab Results   Component Value Date    CPNEUPCR Not Detected 05/04/2023     Lab Results   Component Value Date    MPNEUMO Not Detected 05/04/2023     Lab Results   Component Value Date    FLUAPCR Not Detected 05/04/2023     No results found for: FLUAH3  No results found for: FLUAH1  Lab Results   Component Value Date    RSV Not Detected 05/04/2023     Lab Results   Component Value Date    BPARAPCR Not Detected 05/04/2023       COVID 19:  Lab Results   Component Value Date    COVID19 Not Detected 05/04/2023         ABG: Reviewed   Recent Labs     05/21/23  0714 05/22/23  1307 05/24/23  0719   PHART 7.394 7.447 7.394   CMB3KCF 70.2* 53.3* 59.3*   PO2ART 95.2 50.2* 82.1   WXW1XQN 42.8* 36.8* 36.2*   BASEEXCESS 15.1* 11.1* 9.6*       Lab Results   Component Value Date    LACTATE 1.3 05/04/2023    LACTATE 1.2 04/13/2023    LACTATE 2.7 (C) 04/13/2023          Echo: Results for orders placed during the hospital encounter of 04/13/23    Adult Transthoracic Echo Complete W/ Cont if Necessary Per Protocol    Interpretation Summary  •  Left ventricular diastolic function is consistent with (grade I) impaired relaxation.  •  Mild aortic valve stenosis is present.  •  Estimated right ventricular systolic pressure from tricuspid regurgitation is moderately elevated (45-55 mmHg).  •  Mild to moderate pulmonary hypertension is present.      Results for orders placed during the hospital encounter of 10/01/17    Adult Transthoracic Echo Complete W/ Cont if Necessary Per Protocol    Interpretation Summary  TEchnically difficult study  1) Borderline LVH with normal LV systolic function ( EF is over 55%)  2) Normal left atrial size with mild elevation in LVedp  3) Trace MR  4) Mild TR with normal PA pressures  5) Mild AI  6) Small RV with normal RV function        Pharmacy to Dose enoxaparin (LOVENOX),           CXRay: Latest imaging study was reviewed personally.   Imaging Results (Last 7 Days)     Procedure Component Value Units Date/Time    XR Chest 1 View [[id number]] Collected: 05/26/23 0741     Updated: 05/26/23 0845    Narrative:      CLINICAL INDICATION:    Respiratory Failure.; J96.21-Acute and chronic respiratory failure with  hypoxia; J96.22-Acute and chronic respiratory failure with hypercapnia;  J44.1-Chronic obstructive pulmonary disease with (acute) exacerbation;  B34.8-Other viral infections of unspecified site     EXAMINATION TECHNIQUE:   XR CHEST 1 VW-     COMPARISON:  Radiographs 05/23/2023.     FINDINGS:  Right upper extremity PICC line in place. Stable cardiomediastinal  silhouette. Mildly decreased bibasilar airspace disease, left greater  than right. No pneumothorax. Mildly improved aeration. Probable trace  bilateral pleural effusions.       Impression:      Mild interval improvement.     Images personally reviewed, interpreted and dictated by Rafa  SHIRA Talley.                This report was signed and finalized on 5/26/2023 8:43 AM by SHIRA Villa.            Assessment & Recommendations/Plan:   1.  Acute respiratory failure  Latest chest x-ray suggested slight improvement in findings consistent with mildly improved atelectasis.  We will repeat chest x-ray, clinically indicated  Continue humidified AVAPS, as needed  Latest ABG showed compensated chronic respiratory acidosis.  Will repeat ABG, in the a.m.    2.  COPD with acute exacerbation  Likely triggered by parainfluenza bronchitis  Latest cultures positive for haemophilus influenza and MSSA.  3+ WBCs noted in the sputum cultures.  Given significant antibiotic allergies, for now would suggest continuation of vancomycin for total of 3-5 days.  She has been on azithromycin which was started on the 18th, and I would suggest 3 days of therapy  Patient was given Depo-Medrol on 5/22/2023.  Currently on oral prednisone 40 mg.  Continue Spiriva and Symbicort  Will benefit from outpatient PFTs    3.  Abnormal CT of the chest/atelectasis  Discontinued hypertonic saline, since she has been on it for more than 10 days.  We will discontinue MetaNeb treatments.  We will continue vest percussion therapy.  Continue Mucinex DM.  I have spoken to patient's daughter, Nehal at [phone], on 5/16/2023 and also on 5/23/2023.      4.  Chronic respiratory acidosis  Appears to be compliant with NIV device at home.    5.  Pulmonary hypertension  May need outpatient work-up.  Likely secondary to underlying likely severe COPD    6.  Anxiety  Continue other anxiolytics.  May need adjustment, as indicated.    7.  Smoking  Was advised to quit smoking    8.  Deconditioning  PT/OT.    9. Discharge disposition/recommendations  Follow-up with [facility] in 14-21 days.  Already ordered  Follow-up with Dr. Soriano or CLAUDIA Neil in 4 months.  Already ordered  Prednisone 40 mg for 5 days.  Will not be needed, as the  patient has completed a long course of steroids.  Maintenance inhalers. Already ordered.   PFTs on the day of follow-up in clinic.  Already ordered    We have reviewed patient's current orders and changes, if any, have been suggested to primary care team. Plan was also discussed with nursing staff, as necessary.     This document was electronically signed by Arnaldo Soriano MD on 05/31/23 at 09:34 EDT      Dictated utilizing Dragon dictation.

## 2023-05-31 NOTE — PLAN OF CARE
Problem: Skin Injury Risk Increased  Goal: Skin Health and Integrity  Outcome: Ongoing, Progressing  Intervention: Optimize Skin Protection  Recent Flowsheet Documentation  Taken 5/31/2023 1415 by Oralia Reese RN  Head of Bed ([facility]) Positioning: HOB at 30-45 degrees  Taken 5/31/2023 1245 by Oralia Reese RN  Head of Bed ([facility]) Positioning: HOB at 30-45 degrees  Taken 5/31/2023 1003 by Oralia Reese RN  Head of Bed ([facility]) Positioning: HOB at 30 degrees  Taken 5/31/2023 0855 by Oralia Reese RN  Head of Bed ([facility]) Positioning: HOB at 30 degrees  Pressure Reduction Devices: pressure-redistributing mattress utilized  Skin Protection:   adhesive use limited   incontinence pads utilized   tubing/devices free from skin contact     Problem: Fall Injury Risk  Goal: Absence of Fall and Fall-Related Injury  Outcome: Ongoing, Progressing  Intervention: Identify and Manage Contributors  Recent Flowsheet Documentation  Taken 5/31/2023 1415 by Oralia Reese RN  Medication Review/Management: medications reviewed  Taken 5/31/2023 1245 by Oralia Reese RN  Medication Review/Management: medications reviewed  Taken 5/31/2023 1003 by Oralia Reese RN  Medication Review/Management: medications reviewed  Taken 5/31/2023 0855 by Oralia Reese RN  Medication Review/Management: medications reviewed  Intervention: Promote Injury-Free Environment  Recent Flowsheet Documentation  Taken 5/31/2023 1415 by Oralia Reese RN  Safety Promotion/Fall Prevention:   activity supervised   assistive device/personal items within reach   clutter free environment maintained   fall prevention program maintained   nonskid shoes/slippers when out of bed   safety round/check completed   room organization consistent  Taken 5/31/2023 1245 by Oralia Reese RN  Safety Promotion/Fall Prevention:   activity supervised   assistive device/personal items within reach   clutter free environment maintained   fall prevention program maintained   nonskid  shoes/slippers when out of bed   safety round/check completed   room organization consistent  Taken 5/31/2023 1003 by Oralia Reese RN  Safety Promotion/Fall Prevention:   activity supervised   assistive device/personal items within reach   clutter free environment maintained   fall prevention program maintained   nonskid shoes/slippers when out of bed   safety round/check completed   room organization consistent  Taken 5/31/2023 0855 by Oralia Reese RN  Safety Promotion/Fall Prevention:   activity supervised   assistive device/personal items within reach   clutter free environment maintained   fall prevention program maintained   lighting adjusted   nonskid shoes/slippers when out of bed   muscle strengthening facilitated   room organization consistent   safety round/check completed     Problem: Adult Inpatient Plan of Care  Goal: Plan of Care Review  Outcome: Ongoing, Progressing  Goal: Patient-Specific Goal (Individualized)  Outcome: Ongoing, Progressing  Goal: Absence of Hospital-Acquired Illness or Injury  Outcome: Ongoing, Progressing  Intervention: Identify and Manage Fall Risk  Recent Flowsheet Documentation  Taken 5/31/2023 1415 by Oralia Reese RN  Safety Promotion/Fall Prevention:   activity supervised   assistive device/personal items within reach   clutter free environment maintained   fall prevention program maintained   nonskid shoes/slippers when out of bed   safety round/check completed   room organization consistent  Taken 5/31/2023 1245 by Oralia Reese RN  Safety Promotion/Fall Prevention:   activity supervised   assistive device/personal items within reach   clutter free environment maintained   fall prevention program maintained   nonskid shoes/slippers when out of bed   safety round/check completed   room organization consistent  Taken 5/31/2023 1003 by Oralia Reese RN  Safety Promotion/Fall Prevention:   activity supervised   assistive device/personal items within reach   clutter free  environment maintained   fall prevention program maintained   nonskid shoes/slippers when out of bed   safety round/check completed   room organization consistent  Taken 5/31/2023 0855 by Oralia Reese RN  Safety Promotion/Fall Prevention:   activity supervised   assistive device/personal items within reach   clutter free environment maintained   fall prevention program maintained   lighting adjusted   nonskid shoes/slippers when out of bed   muscle strengthening facilitated   room organization consistent   safety round/check completed  Intervention: Prevent Skin Injury  Recent Flowsheet Documentation  Taken 5/31/2023 1415 by Oralia Reese RN  Body Position: sitting up in bed  Taken 5/31/2023 1245 by Oralia Reese RN  Body Position:   turned   side-lying  Taken 5/31/2023 1003 by Oralia Reese RN  Body Position: sitting up in bed  Taken 5/31/2023 0855 by Oralia Reese RN  Body Position: sitting up in bed  Skin Protection:   adhesive use limited   incontinence pads utilized   tubing/devices free from skin contact  Intervention: Prevent and Manage VTE (Venous Thromboembolism) Risk  Recent Flowsheet Documentation  Taken 5/31/2023 1415 by Oralia Reese RN  Activity Management: back to bed  Taken 5/31/2023 1245 by Oralia Reese RN  Activity Management: up in chair  Taken 5/31/2023 1003 by Oralia Reese RN  Activity Management: activity encouraged  Taken 5/31/2023 0855 by Oralia Reese RN  Activity Management: activity encouraged  VTE Prevention/Management: (see mar) other (see comments)  Intervention: Prevent Infection  Recent Flowsheet Documentation  Taken 5/31/2023 1415 by Oralia Reese RN  Infection Prevention:   rest/sleep promoted   single patient room provided  Taken 5/31/2023 1245 by Oralia Reese RN  Infection Prevention:   rest/sleep promoted   single patient room provided  Taken 5/31/2023 1003 by Oralia Reese RN  Infection Prevention:   single patient room provided   rest/sleep promoted  Taken  5/31/2023 0855 by Oralia Reese RN  Infection Prevention:   single patient room provided   rest/sleep promoted  Goal: Optimal Comfort and Wellbeing  Outcome: Ongoing, Progressing  Intervention: Provide Person-Centered Care  Recent Flowsheet Documentation  Taken 5/31/2023 0855 by Oralia Reese RN  Trust Relationship/Rapport:   care explained   choices provided   emotional support provided   questions answered   thoughts/feelings acknowledged   reassurance provided   questions encouraged   empathic listening provided  Goal: Readiness for Transition of Care  Outcome: Ongoing, Progressing   Goal Outcome Evaluation:         Pt on 3LNC 90%. Up with assistance tolerating well, see PT note. NSR on telemetry

## 2023-05-31 NOTE — PLAN OF CARE
Goal Outcome Evaluation:  Plan of Care Reviewed With: patient, daughter        Progress: improving  Outcome Evaluation: Patient performs bed mobility independently with the head of the bed raised and using the bedrail.  She requires CGA for transfers and gait x 5 feet x 2.  She continues to have great fear of her legs buckling and causing her to fall.  We discuss at length her progress and lack of evidence that she is at risk of falling.  She is expected to benefit from additional PT services per POC.

## 2023-05-31 NOTE — CASE MANAGEMENT/SOCIAL WORK
Case Management/Social Work    Patient Name:  Carolin Toscano  YOB: 1945  MRN: [id number]  Admit Date:  5/4/2023      Pt's family called. States they do not want pt to go to [facility] at discharge.  Family is requesting a swing bed facility. Melia contacted Pooja at [facility]. Pt has been clinically approved and precert has been started. Anticipate dc tomorrow.  Melia spoke to pt at bedside. Pt agreeable.        Electronically signed by:  Merle Torres  05/31/23 15:35 EDT

## 2023-05-31 NOTE — PLAN OF CARE
Goal Outcome Evaluation:  Plan of Care Reviewed With: patient        Progress: improving  Outcome Evaluation: OT re-eval completed. Patient supine in bed on 3.5L O2 satting 92%. Patient moved to EOB with modified ind. Patient performed LBD dressing min A. Performed sit to stand and transfer to BSC with CGA using RW. Patient required mod A for toileting task. Walked 3' from BSC to chair, maintaining O2 90%. Patient performed BUE ther ex using yellow theraband. Patient is expected to benefit from continued OT services prior to DC. Plan is to DC to [facility].

## 2023-05-31 NOTE — PROGRESS NOTES
[facility]    Name:  Carolin Toscano   Age:  77 y.o.  Sex:  female  :  1945  MRN:  [id number]   Visit Number:  [id number]  Admission Date:  2023  Date Of Service:  23  Primary Care Physician:  Jason Nicholson MD     LOS: 27 days :    Chief Complaint:      Shortness of air    Subjective:    2023 spoke with case management still working on rehab placement.  Anticipate [facility] tomorrow.  Pulmonology note reviewed.  Labs reviewed.  Discussed with patient.  No new complaints.    Hospital Course:   doing well sitting up in bed. Placement pending. No new c/o.     - was managed by Dr. Vital has had a gradual improvement in respiratory status.    : doing well. Oxygen requirements decreasing. Stable for dc out of ICU. Spoke with patient and family at bedside. D/w [facility] bed pending.    : c/o garrison suspect is due to mucous. Agreeable to flonase, zyrtec and atarax. Labs reviewed.    May 20, 2023 discussed with patient as well as family at the bedside.  Also discussed with them regarding placing access which she was able to get a PICC line.  Also discussed with them the staph growing in her sputum and that I was going to add vancomycin for this.    : d/w patient and family updated them on [facility] and pending appeal which will likely be submitted Monday. Still having dyspnea but mucous is breaking loose today.  Still debating about bronchoscopy.  Interested in long-term acute care possibly in [place] versus [place].    : Daughter Regla is coming today.  Otherwise not much change still having dyspnea and hypoxemia.  Case management note reviewed.    2023 discussed with case management still working on [facility].  I am planning to do a peer to peer with them tomorrow.  Otherwise has persistent hypoxia.  Dr. Soriano's note reviewed considering bronchoscopy.    23: d/w nsg intermittently on avaps  and hi flow. Patient awake in bed. CXR from 5/15 reviewed. Medications reviewed.  Discussed with case management they are looking [facility] a [facility].    Patient was admitted and treated with steroids.  Antibiotics were not indicated.  Pulmonology consulted and was moved to ICU on 5/9 due to worsening respiratory failure and tachypnea.  She initially had to be on Precedex drip which was discontinued and switched to Xanax due to anxiety. Patient was placed on AVAPS.  Patient continued on steroids and received Lasix.     History of presenting illness:  Patient is a 77 years old female with a past medical history of COPD, chronic respiratory failure on 5 to 6 L per her previous discharge, on NIV machine at home and trelegy, hypertension, and ongoing tobacco use who presented to the ER from home by EMS with a chief complaint of shortness of breath.  Patient reporting that she started having shortness of breath associated with productive cough since last night and has persisted and became worse that promoted her to call EMS.  EMS has found the patient to be at 65% saturation on her normal 6 L of oxygen. Patient reports compliance with her NIV machine at home.      On ER evaluation, Patient was found to be tachycardic with a heart rate of 130s, temperature of 100.2 and respirations of 30, /72.  Patient was placed on BiPAP with FiO2 of 40%.  Her ABG came back and showed pH of 7.296/PCO2 87/PO2 94/HCO3 42/saturation 97% on 5 L nasal cannula.  HS Troponin 44, proBNP WNL, glucose 192, CO2 of 40, otherwise CBC and CMP within acceptable range.  Lactate and Pro-Adi WNL.  Respiratory panel positive for parainfluenza virus.  Chest x-ray Mild interstitial disease  bilaterally is similar to prior. No area of consolidation is seen. Patient received Solu-Medrol and Aztreonam while in the ER.  Hospitalist consulted for admission.       Edited by: Primitivo Nuñez DO at 5/31/2023 1631     Review of Systems:     All  systems were reviewed and negative except as mentioned in subjective, assessment and plan.    Vital Signs:    Temp:  [98 °F (36.7 °C)-99.4 °F (37.4 °C)] 99.4 °F (37.4 °C)  Heart Rate:  [68-89] 81  Resp:  [18] 18  BP: (115-122)/(49-58) 120/55    Intake and output:    I/O last 3 completed shifts:  In: 360 [P.O.:360]  Out: 950 [Urine:950]  I/O this shift:  In: 600 [P.O.:600]  Out: -     Physical Examination:    General Appearance:  Alert and cooperative.  Chronically ill-appearing. Sitting up in bed.   Head:  Atraumatic and normocephalic.   Eyes: Conjunctivae and sclerae normal, no icterus. No pallor.   Throat: No oral lesions, no thrush, oral mucosa moist.   Neck: Supple, trachea midline, no thyromegaly.   Lungs:   clear to auscultation bilaterally on nasal cannula.   Heart:  Normal S1 and S2, no murmur, no gallop, no rub. No JVD.   Abdomen:   Normal bowel sounds, no masses, no organomegaly. Soft, nontender, nondistended, no rebound tenderness.   Extremities: Supple, no edema, no cyanosis, no clubbing.   Skin: No bleeding or rash.   Neurologic: Alert and oriented x 3. No facial asymmetry. Moves all four limbs. No tremors.  Generalized weakness noted.     Edited by: Primitivo Nuñez DO at 5/31/2023 1395     Laboratory results:    Results from last 7 days   Lab Units 05/31/23  0639   SODIUM mmol/L 137   POTASSIUM mmol/L 4.3   CHLORIDE mmol/L 99   CO2 mmol/L 28.4   BUN mg/dL 20   CREATININE mg/dL 0.24*   CALCIUM mg/dL 9.1   GLUCOSE mg/dL 114*     Results from last 7 days   Lab Units 05/31/23  0639   WBC 10*3/mm3 6.44   HEMOGLOBIN g/dL 10.8*   HEMATOCRIT % 34.7   PLATELETS 10*3/mm3 155                 Recent Labs     05/21/23  0714 05/22/23  1307 05/24/23  0719   PHART 7.394 7.447 7.394   GAJ4OGN 70.2* 53.3* 59.3*   PO2ART 95.2 50.2* 82.1   GZG1SBV 42.8* 36.8* 36.2*   BASEEXCESS 15.1* 11.1* 9.6*      I have reviewed the patient's laboratory results.    Radiology results:    No radiology results from the last 24 hrs  I  have reviewed the patient's radiology reports.    Medication Review:     I have reviewed the patient's active and prn medications.     Problem List:      Acute on chronic respiratory failure with hypoxia and hypercapnia      Assessment/Plan:    Respiratory failure with hypoxia/hypercapnia, COPD exacerbation, parainfluenza  -patient on 5 to 6 L oxygen at baseline,  Continue BiPAP therapy.  Has NIV at home. Currently on 3 liters  -Recent parainfluenza virus infection met SIRS due to this  - Solu-Medrol previously now prednisone taper  -Bronchodilators, Mucinex status post MetaNeb and supportive care.  -Dr. Soriano note reviewed and appreciated  -s/p lasix  -Xanax as needed  -Sputum grew Staph aureus and haemophilus s/p vancomycin and azithromycin  - flonase, zyrtec, and atarax for upper airway congestion     Elevated troponin  -Likely secondary to demand ischemia in settings of respiratory failure  -Patient denies any chest pain, low suspicion for ACS  -Troponin trended down and plateaued.     Hyperglycemia  -Will cover with sliding scale insulin while on steroids  -Hemoglobin A1c 6.7     Disposition: [facility] tomorrow    Prophylaxis: Lovenox    Edited by: Primitivo Nuñez DO at 5/31/2023 1636     DVT Prophylaxis: Lovenox  Code Status:   Code Status and Medical Interventions:   Ordered at: 05/04/23 1634     Code Status (Patient has no pulse and is not breathing):    CPR (Attempt to Resuscitate)     Medical Interventions (Patient has pulse or is breathing):    Full Support      Diet:   Dietary Orders (From admission, onward)     Start     Ordered    05/18/23 1800  Dietary Nutrition Supplements Magic Cup  2 Times Daily      Question:  [facility] Supplement:  Answer:  Magic Cup    05/18/23 1517    05/17/23 1038  Diet: Cardiac Diets; Healthy Heart (2-3 Na+); Texture: Regular Texture (IDDSI 7); Fluid Consistency: Thin (IDDSI 0)  Diet Effective Now        Comments: Pt prefers cottage cheese, bananas, oranges,  peaches, watermelon.   References:    Diet Order [place]   Question Answer Comment   Diets: Cardiac Diets    Cardiac Diet: Healthy Heart (2-3 Na+)    Texture: Regular Texture (IDDSI 7)    Fluid Consistency: Thin (IDDSI 0)        05/17/23 1039    05/15/23 1800  Dietary Nutrition Supplements Xu  2 Times Daily      Question:  [facility] Supplement:  Answer:  Xu    05/15/23 1445    05/15/23 1800  Dietary Nutrition Supplements Boost VHC  2 Times Daily      Question:  [facility] Supplement:  Answer:  Boost VHC    05/15/23 1446               Discharge Plan:  [facility] tomorrow      Primitivo Nuñez DO  05/31/23  16:37 EDT    Dictated utilizing Dragon dictation.

## 2023-05-31 NOTE — PLAN OF CARE
Goal Outcome Evaluation:  Plan of Care Reviewed With: (P) patient        Progress: (P) improving  Outcome Evaluation: (P) VSS, patient ambulated to chair and bedside commode with assist, continue PT as tolerated

## 2023-05-31 NOTE — THERAPY RE-EVALUATION
"Patient Name: Carolin Toscano  : 1945    MRN: [id number]                              Today's Date: 2023       Admit Date: 2023    Visit Dx:     ICD-10-CM ICD-9-CM   1. Acute on chronic respiratory failure with hypoxia and hypercapnia  J96.21 518.84    J96.22 786.09     799.02   2. COPD exacerbation  J44.1 491.21   3. Parainfluenza virus infection  B34.8 078.89   4. Simple chronic bronchitis  J41.0 491.0     Patient Active Problem List   Diagnosis   • CAP (community acquired pneumonia)   • Cigarette nicotine dependence with nicotine-induced disorder   • Essential hypertension   • Dyslipidemia   • Blood glucose elevated   • Muscle ache   • COPD (chronic obstructive pulmonary disease)   • Nuclear sclerotic cataract of right eye   • Acute respiratory failure   • COPD exacerbation   • Acute on chronic respiratory failure with hypoxia and hypercapnia     Past Medical History:   Diagnosis Date   • Arthritis    • COPD (chronic obstructive pulmonary disease)    • Gall stones    • Goiter     \"inward\"   • Hypertension    • Hypertension    • Kidney infection    • Kidney stone    • Kidney stones    • Migraine headache    • Scarlet fever      Past Surgical History:   Procedure Laterality Date   • BLADDER SURGERY     • CATARACT EXTRACTION W/ INTRAOCULAR LENS IMPLANT Left 2022    Procedure: CATARACT PHACO EXTRACTION WITH INTRAOCULAR LENS IMPLANT LEFT;  Surgeon: Sathish Lopez MD;  Location: [facility];  Service: Ophthalmology;  Laterality: Left;   • CATARACT EXTRACTION W/ INTRAOCULAR LENS IMPLANT Right 2022    Procedure: CATARACT PHACO EXTRACTION WITH INTRAOCULAR LENS IMPLANT RIGHT;  Surgeon: Sathish Lopez MD;  Location: [facility];  Service: Ophthalmology;  Laterality: Right;   • CHOLECYSTECTOMY     • HYSTERECTOMY     • TONSILLECTOMY        General Information     Row Name 23 1248          OT Time and Intention    Document Type re-evaluation  -SD     Mode of Treatment occupational " therapy  -SD     Row Name 05/31/23 1248          General Information    Patient Profile Reviewed yes  -SD     Row Name 05/31/23 1248          Cognition    Orientation Status (Cognition) oriented x 4  -SD           User Key  (r) = Recorded By, (t) = Taken By, (c) = Cosigned By    Initials Name Provider Type    Cecelia Johnson OT Occupational Therapist                 Mobility/ADL's     Row Name 05/31/23 1248          Bed Mobility    Bed Mobility supine-sit  -SD     Supine-Sit Helen (Bed Mobility) modified independence  -SD     Assistive Device (Bed Mobility) bed rails;head of bed elevated  -SD     Row Name 05/31/23 1248          Transfers    Transfers sit-stand transfer;toilet transfer;bed-chair transfer  -SD     Row Name 05/31/23 1248          Bed-Chair Transfer    Bed-Chair Helen (Transfers) contact guard  -SD     Assistive Device (Bed-Chair Transfers) walker, front-wheeled  -SD     Row Name 05/31/23 1248          Sit-Stand Transfer    Sit-Stand Helen (Transfers) contact guard  -SD     Assistive Device (Sit-Stand Transfers) walker, front-wheeled  -SD     Row Name 05/31/23 1248          Toilet Transfer    Type (Toilet Transfer) sit-stand;stand-sit  -SD     Helen Level (Toilet Transfer) contact guard  -SD     Assistive Device (Toilet Transfer) commode, bedside without drop arms  -SD     Row Name 05/31/23 1248          Functional Mobility    Functional Mobility- Ind. Level contact guard assist  -SD     Functional Mobility- Device walker, front-wheeled  -SD     Functional Mobility-Distance (Feet) 6  -SD     Functional Mobility- Safety Issues supplemental O2  -SD     Row Name 05/31/23 1248          Bathing Assessment/Intervention    Helen Level (Bathing) upper extremities;minimum assist (75% patient effort);lower body;moderate assist (50% patient effort)  -SD     Row Name 05/31/23 1248          Upper Body Dressing Assessment/Training    Helen Level (Upper Body Dressing) set  up;standby assist  -SD     Row Name 05/31/23 1248          Lower Body Dressing Assessment/Training    [place] Level (Lower Body Dressing) minimum assist (75% patient effort)  -SD     Row Name 05/31/23 1248          Grooming Assessment/Training    [place] Level (Grooming) set up  -SD     Row Name 05/31/23 1248          Self-Feeding Assessment/Training    [place] Level (Feeding) set up  -SD     Row Name 05/31/23 1248          Toileting Assessment/Training    [place] Level (Toileting) moderate assist (50% patient effort);perform perineal hygiene  -SD     Assistive Devices (Toileting) commode, bedside without drop arms  -SD           User Key  (r) = Recorded By, (t) = Taken By, (c) = Cosigned By    Initials Name Provider Type    Cecelia Johnson OT Occupational Therapist               Obj/Interventions     [facility] Name 05/31/23 1250          Strength Comprehensive (MMT)    Comment, General Manual Muscle Testing (MMT) Assessment UB 4-/5  -SD     Row Name 05/31/23 1250          Shoulder (Therapeutic Exercise)    Shoulder Strengthening (Therapeutic Exercise) bilateral;yellow;resistance band;horizontal aBduction/aDduction  -SD     Row Name 05/31/23 1250          Elbow/Forearm (Therapeutic Exercise)    Elbow/Forearm Strengthening (Therapeutic Exercise) bilateral;flexion;extension;resistance band;yellow;15 repititions  -SD           User Key  (r) = Recorded By, (t) = Taken By, (c) = Cosigned By    Initials Name Provider Type    Cecelia Johnson OT Occupational Therapist               Goals/Plan     Row Name 05/31/23 1256          Bed Mobility Goal 1 (OT)    Progress/Outcomes (Bed Mobility Goal 1, OT) goal met  -SD     Row Name 05/31/23 1256          Transfer Goal 1 (OT)    Activity/Assistive Device (Transfer Goal 1, OT) sit-to-stand/stand-to-sit;walker, rolling  -SD     [place] Level/Cues Needed (Transfer Goal 1, OT) standby assist  -SD     Time Frame (Transfer Goal 1, OT) long term goal (LTG)   -SD     Progress/Outcome (Transfer Goal 1, OT) goal revised this date  -SD     Row Name 05/31/23 1256          Bathing Goal 1 (OT)    Activity/Device (Bathing Goal 1, OT) bathing skills, all  -SD     [place] Level/Cues Needed (Bathing Goal 1, OT) set-up required  -SD     Time Frame (Bathing Goal 1, OT) by discharge  -SD     Progress/Outcomes (Bathing Goal 1, OT) goal ongoing;continuing progress toward goal  -SD     Row Name 05/31/23 1256          Dressing Goal 1 (OT)    Activity/Device (Dressing Goal 1, OT) dressing skills, all  -SD     [place]/Cues Needed (Dressing Goal 1, OT) contact guard required  -SD     Time Frame (Dressing Goal 1, OT) long term goal (LTG)  -SD     Progress/Outcome (Dressing Goal 1, OT) goal ongoing;continuing progress toward goal  -SD     Row Name 05/31/23 1256          Toileting Goal 1 (OT)    Activity/Device (Toileting Goal 1, OT) toileting skills, all  -SD     [place] Level/Cues Needed (Toileting Goal 1, OT) contact guard required  -SD     Time Frame (Toileting Goal 1, OT) 2 weeks  -SD     Progress/Outcome (Toileting Goal 1, OT) goal ongoing;continuing progress toward goal  -SD           User Key  (r) = Recorded By, (t) = Taken By, (c) = Cosigned By    Initials Name Provider Type    Cecelia Johnson OT Occupational Therapist               Clinical Impression     Row Name 05/31/23 1251          Pain Assessment    Pretreatment Pain Rating 3/10  -SD     Posttreatment Pain Rating 3/10  -SD     Pain Location - head  -SD     Pain Intervention(s) Repositioned;Ambulation/increased activity  -SD     Row Name 05/31/23 1251          Plan of Care Review    Plan of Care Reviewed With patient  -SD     Progress improving  -SD     Outcome Evaluation OT re-eval completed. Patient supine in bed on 3.5L O2 satting 92%. Patient moved to EOB with modified ind. Patient performed LBD dressing min A. Performed sit to stand and transfer to [facility] with CGA using RW. Patient required mod A for  toileting task. Walked 3' from BSC to chair, maintaining O2 90%. Patient performed BUE ther ex using yellow theraband. Patient is expected to benefit from continued OT services prior to DC. Plan is to DC to [facility].  -SD     Row Name 05/31/23 1251          Therapy Assessment/Plan (OT)    Rehab Potential (OT) good, to achieve stated therapy goals  -SD     Criteria for Skilled Therapeutic Interventions Met (OT) skilled treatment is necessary  -SD     Therapy Frequency (OT) 3 times/wk  5 times if indicated  -SD     Row Name 05/31/23 1251          Therapy Plan Review/Discharge Plan (OT)    Anticipated Discharge Disposition (OT) inpatient rehabilitation facility  -SD     Row Name 05/31/23 1251          Vital Signs    Pre SpO2 (%) 92  -SD     O2 Delivery Pre Treatment nasal cannula  -SD     Intra SpO2 (%) 90  -SD     O2 Delivery Intra Treatment nasal cannula  -SD     Post SpO2 (%) 92  -SD     O2 Delivery Post Treatment nasal cannula  -SD     Row Name 05/31/23 1251          Positioning and Restraints    Pre-Treatment Position in bed  -SD     Post Treatment Position chair  -SD     In Chair reclined;call light within reach;encouraged to call for assist;exit alarm on  -SD           User Key  (r) = Recorded By, (t) = Taken By, (c) = Cosigned By    Initials Name Provider Type    Cecelia Johnson OT Occupational Therapist               Outcome Measures     Row Name 05/31/23 1300          How much help from another is currently needed...    Putting on and taking off regular lower body clothing? 3  -SD     Bathing (including washing, rinsing, and drying) 2  -SD     Toileting (which includes using toilet bed pan or urinal) 2  -SD     Putting on and taking off regular upper body clothing 3  -SD     Taking care of personal grooming (such as brushing teeth) 4  -SD     Eating meals 4  -SD     AM-PAC 6 Clicks Score (OT) 18  -SD     Row Name 05/31/23 1300          Functional Assessment    Outcome Measure Options AM-PAC 6 Clicks Daily  Activity (OT)  -SD           User Key  (r) = Recorded By, (t) = Taken By, (c) = Cosigned By    Initials Name Provider Type    Cecelia Johnson OT Occupational Therapist                Occupational Therapy Education     Title: PT OT SLP Therapies (In Progress)     Topic: Occupational Therapy (In Progress)     Point: ADL training (Done)     Description:   Instruct learner(s) on proper safety adaptation and remediation techniques during self care or transfers.   Instruct in proper use of assistive devices.              Learning Progress Summary           Patient Acceptance, E,TB, VU by SD at 5/31/2023 1301    Comment: Continued need for OT services    Acceptance, E,TB, VU by NM at 5/31/2023 0517    Acceptance, E,TB, VU by SD at 5/30/2023 1614    Comment: Benefit of OOB    Acceptance, E,TB, VU by SD at 5/24/2023 1637    Comment: EC during ADLs.    Acceptance, E,TB, VU by SD at 5/23/2023 1309    Comment: Safety and sequencing during toileting task.    Acceptance, E, VU by AM at 5/15/2023 0701    Acceptance, E,TB, VU by  at 5/12/2023 1507    Comment: purpose of re-evaluation    Acceptance, E,TB, VU by SD at 5/8/2023 1222    Comment: Safety during tf    Acceptance, E, VU by SP at 5/5/2023 1146    Comment: OT edcuated pt on purpose of IE and POC. Pt is agreeable.                   Point: Home exercise program (Done)     Description:   Instruct learner(s) on appropriate technique for monitoring, assisting and/or progressing therapeutic exercises/activities.              Learning Progress Summary           Patient Acceptance, E,TB, VU by NM at 5/31/2023 0517    Acceptance, E,TB, VU by SD at 5/25/2023 1249    Comment: Activity pacing during ther ex    Acceptance, E,TB, VU by  at 5/22/2023 1549    Comment: benefit of do UB ex to improve functional strength    Acceptance, E,TB, VU by  at 5/19/2023 1339    Comment: importance of activity    Acceptance, E,TB, VU by SD at 5/16/2023 1246    Comment: Importance of  progressing activity.   Family Acceptance, E,TB, VU by  at 5/22/2023 1549    Comment: benefit of do UB ex to improve functional strength    Acceptance, E,TB, VU by  at 5/19/2023 7559    Comment: importance of activity                   Point: Precautions (Not Started)     Description:   Instruct learner(s) on prescribed precautions during self-care and functional transfers.              Learner Progress:  Not documented in this visit.          Point: Body mechanics (Not Started)     Description:   Instruct learner(s) on proper positioning and spine alignment during self-care, functional mobility activities and/or exercises.              Learner Progress:  Not documented in this visit.                      User Key     Initials Effective Dates Name Provider Type Discipline     06/16/21 -  Roselyn Donaldson Occupational Therapist OT    SD 06/16/21 -  Cecelia Knight OT Occupational Therapist OT    SP 09/08/22 -  January Chapa OT Occupational Therapist OT    AM 02/22/23 -  Rob Lu, RN Registered Nurse Nurse    NM 05/02/23 -  Sushma Tuttle, Nursing Student Nursing Student Nurse              OT Recommendation and Plan  Therapy Frequency (OT): 3 times/wk (5 times if indicated)  Plan of Care Review  Plan of Care Reviewed With: patient  Progress: improving  Outcome Evaluation: OT re-eval completed. Patient supine in bed on 3.5L O2 satting 92%. Patient moved to EOB with modified ind. Patient performed LBD dressing min A. Performed sit to stand and transfer to BSC with CGA using RW. Patient required mod A for toileting task. Walked 3' from BSC to chair, maintaining O2 90%. Patient performed BUE ther ex using yellow theraband. Patient is expected to benefit from continued OT services prior to DC. Plan is to DC to [facility].     Time Calculation:    Time Calculation- OT     Row Name 05/31/23 1301             Time Calculation- OT    OT Start Time 1057  -SD      OT Received On 05/31/23  -SD      OT Goal Re-Cert Due Date 06/12/23   -SD         Timed Charges    69663 - OT Therapeutic Exercise Minutes 15  -SD         Untimed Charges    OT Eval/Re-eval Minutes 30  -SD         Total Minutes    Timed Charges Total Minutes 15  -SD      Untimed Charges Total Minutes 30  -SD       Total Minutes 45  -SD            User Key  (r) = Recorded By, (t) = Taken By, (c) = Cosigned By    Initials Name Provider Type    SD Cecelia Knight OT Occupational Therapist              Therapy Charges for Today     Code Description Service Date Service Provider Modifiers Qty    [id number]  OT THERAPEUTIC ACT EA 15 MIN 5/30/2023 Cecelia Knight OT GO 1    [id number]  OT SELF CARE/MGMT/TRAIN EA 15 MIN 5/30/2023 Cecelia Knight OT GO 1    [id number]  OT RE-EVAL 2 5/31/2023 Cecelia Knight OT GO 1    [id number]  OT THER PROC EA 15 MIN 5/31/2023 Cecelia Knight OT GO 1               Cecelia Knight OT  5/31/2023

## 2023-06-01 ENCOUNTER — HOSPITAL ENCOUNTER (INPATIENT)
Facility: HOSPITAL | Age: 78
LOS: 16 days | Discharge: ANOTHER HOSPITAL | DRG: 189 | End: 2023-06-17
Attending: INTERNAL MEDICINE | Admitting: INTERNAL MEDICINE
Payer: MEDICARE

## 2023-06-01 VITALS
HEART RATE: 77 BPM | OXYGEN SATURATION: 93 % | BODY MASS INDEX: 25.66 KG/M2 | HEIGHT: 63 IN | TEMPERATURE: 98.2 F | RESPIRATION RATE: 18 BRPM | SYSTOLIC BLOOD PRESSURE: 121 MMHG | DIASTOLIC BLOOD PRESSURE: 54 MMHG | WEIGHT: 144.84 LBS

## 2023-06-01 DIAGNOSIS — J44.9 CHRONIC OBSTRUCTIVE PULMONARY DISEASE, UNSPECIFIED COPD TYPE (HCC): Primary | ICD-10-CM

## 2023-06-01 PROBLEM — R53.81 DECLINING FUNCTIONAL STATUS: Status: ACTIVE | Noted: 2023-06-01

## 2023-06-01 LAB
A-A DO2: 120.1 MMHG
ANION GAP SERPL CALCULATED.3IONS-SCNC: 7.2 MMOL/L (ref 5–15)
ARTERIAL PATENCY WRIST A: ABNORMAL
ATMOSPHERIC PRESS: 735 MMHG
BASE EXCESS BLDA CALC-SCNC: 8.5 MMOL/L (ref 0–2)
BDY SITE: ABNORMAL
BUN SERPL-MCNC: 19 MG/DL (ref 8–23)
BUN/CREAT SERPL: 76 (ref 7–25)
CALCIUM SPEC-SCNC: 9 MG/DL (ref 8.6–10.5)
CHLORIDE SERPL-SCNC: 103 MMOL/L (ref 98–107)
CO2 SERPL-SCNC: 29.8 MMOL/L (ref 22–29)
COHGB MFR BLD: 1.5 % (ref 0–2)
CREAT SERPL-MCNC: 0.25 MG/DL (ref 0.57–1)
DEPRECATED RDW RBC AUTO: 64.9 FL (ref 37–54)
EGFRCR SERPLBLD CKD-EPI 2021: 115 ML/MIN/1.73
ERYTHROCYTE [DISTWIDTH] IN BLOOD BY AUTOMATED COUNT: 18.9 % (ref 12.3–15.4)
GAS FLOW AIRWAY: 3.5 LPM
GLUCOSE BLDC GLUCOMTR-MCNC: 127 MG/DL (ref 70–130)
GLUCOSE BLDC GLUCOMTR-MCNC: 150 MG/DL (ref 70–130)
GLUCOSE SERPL-MCNC: 113 MG/DL (ref 65–99)
HCO3 BLDA-SCNC: 33.5 MMOL/L (ref 22–28)
HCT VFR BLD AUTO: 34.3 % (ref 34–46.6)
HCT VFR BLD CALC: 33.5 %
HGB BLD-MCNC: 10.8 G/DL (ref 12–15.9)
INHALED O2 CONCENTRATION: 34 %
Lab: ABNORMAL
MCH RBC QN AUTO: 30.3 PG (ref 26.6–33)
MCHC RBC AUTO-ENTMCNC: 31.5 G/DL (ref 31.5–35.7)
MCV RBC AUTO: 96.3 FL (ref 79–97)
METHGB BLD QL: 0.3 % (ref 0–1.5)
MODALITY: ABNORMAL
NOTE: ABNORMAL
OXYHGB MFR BLDV: 90.9 % (ref 94–99)
PCO2 BLDA: 47.7 MM HG (ref 35–45)
PCO2 TEMP ADJ BLD: ABNORMAL MM[HG]
PH BLDA: 7.46 PH UNITS (ref 7.35–7.45)
PH, TEMP CORRECTED: ABNORMAL
PLATELET # BLD AUTO: 145 10*3/MM3 (ref 140–450)
PMV BLD AUTO: 10.5 FL (ref 6–12)
PO2 BLDA: 60.8 MM HG (ref 75–100)
PO2 TEMP ADJ BLD: ABNORMAL MM[HG]
POTASSIUM SERPL-SCNC: 4.4 MMOL/L (ref 3.5–5.2)
RBC # BLD AUTO: 3.56 10*6/MM3 (ref 3.77–5.28)
SAO2 % BLDCOA: 92.6 % (ref 94–100)
SODIUM SERPL-SCNC: 140 MMOL/L (ref 136–145)
VENTILATOR MODE: ABNORMAL
WBC NRBC COR # BLD: 5.32 10*3/MM3 (ref 3.4–10.8)

## 2023-06-01 PROCEDURE — 63710000001 INSULIN ASPART PER 5 UNITS: Performed by: FAMILY MEDICINE

## 2023-06-01 PROCEDURE — 36600 WITHDRAWAL OF ARTERIAL BLOOD: CPT

## 2023-06-01 PROCEDURE — 94761 N-INVAS EAR/PLS OXIMETRY MLT: CPT

## 2023-06-01 PROCEDURE — 94799 UNLISTED PULMONARY SVC/PX: CPT

## 2023-06-01 PROCEDURE — 6370000000 HC RX 637 (ALT 250 FOR IP): Performed by: PHYSICIAN ASSISTANT

## 2023-06-01 PROCEDURE — 82805 BLOOD GASES W/O2 SATURATION: CPT

## 2023-06-01 PROCEDURE — 82375 ASSAY CARBOXYHB QUANT: CPT

## 2023-06-01 PROCEDURE — 6360000002 HC RX W HCPCS: Performed by: PHYSICIAN ASSISTANT

## 2023-06-01 PROCEDURE — 94664 DEMO&/EVAL PT USE INHALER: CPT

## 2023-06-01 PROCEDURE — 63710000001 PREDNISONE PER 5 MG: Performed by: FAMILY MEDICINE

## 2023-06-01 PROCEDURE — 82948 REAGENT STRIP/BLOOD GLUCOSE: CPT

## 2023-06-01 PROCEDURE — 94660 CPAP INITIATION&MGMT: CPT

## 2023-06-01 PROCEDURE — 1200000002 HC SEMI PRIVATE SWING BED

## 2023-06-01 PROCEDURE — 80048 BASIC METABOLIC PNL TOTAL CA: CPT | Performed by: INTERNAL MEDICINE

## 2023-06-01 PROCEDURE — 83050 HGB METHEMOGLOBIN QUAN: CPT

## 2023-06-01 PROCEDURE — 85027 COMPLETE CBC AUTOMATED: CPT | Performed by: INTERNAL MEDICINE

## 2023-06-01 PROCEDURE — 99239 HOSP IP/OBS DSCHRG MGMT >30: CPT | Performed by: INTERNAL MEDICINE

## 2023-06-01 RX ORDER — GUAIFENESIN/DEXTROMETHORPHAN 100-10MG/5
5 SYRUP ORAL EVERY 4 HOURS PRN
Status: DISCONTINUED | OUTPATIENT
Start: 2023-06-01 | End: 2023-06-17 | Stop reason: HOSPADM

## 2023-06-01 RX ORDER — M-VIT,TX,IRON,MINS/CALC/FOLIC 27MG-0.4MG
1 TABLET ORAL DAILY
Status: DISCONTINUED | OUTPATIENT
Start: 2023-06-01 | End: 2023-06-17 | Stop reason: HOSPADM

## 2023-06-01 RX ORDER — IPRATROPIUM BROMIDE AND ALBUTEROL SULFATE 2.5; .5 MG/3ML; MG/3ML
3 SOLUTION RESPIRATORY (INHALATION)
Qty: 360 ML
Start: 2023-06-01

## 2023-06-01 RX ORDER — FLUTICASONE PROPIONATE 50 MCG
2 SPRAY, SUSPENSION (ML) NASAL DAILY
Status: ON HOLD | COMMUNITY

## 2023-06-01 RX ORDER — HYDROXYZINE HYDROCHLORIDE 25 MG/1
25 TABLET, FILM COATED ORAL 3 TIMES DAILY
Start: 2023-06-01

## 2023-06-01 RX ORDER — PREDNISONE 10 MG/1
10 TABLET ORAL DAILY
Status: DISCONTINUED | OUTPATIENT
Start: 2023-06-01 | End: 2023-06-05

## 2023-06-01 RX ORDER — CHOLECALCIFEROL (VITAMIN D3) 125 MCG
5 CAPSULE ORAL NIGHTLY
Start: 2023-06-01

## 2023-06-01 RX ORDER — AMLODIPINE BESYLATE 10 MG/1
10 TABLET ORAL DAILY
Status: ON HOLD | COMMUNITY

## 2023-06-01 RX ORDER — ALBUTEROL SULFATE 2.5 MG/3ML
2.5 SOLUTION RESPIRATORY (INHALATION) EVERY 6 HOURS PRN
Status: DISCONTINUED | OUTPATIENT
Start: 2023-06-01 | End: 2023-06-01

## 2023-06-01 RX ORDER — BUDESONIDE AND FORMOTEROL FUMARATE DIHYDRATE 160; 4.5 UG/1; UG/1
2 AEROSOL RESPIRATORY (INHALATION)
Qty: 10.2 G | Refills: 12 | Status: SHIPPED | OUTPATIENT
Start: 2023-06-01

## 2023-06-01 RX ORDER — ALBUTEROL SULFATE 2.5 MG/3ML
2.5 SOLUTION RESPIRATORY (INHALATION) EVERY 6 HOURS PRN
Status: ON HOLD | COMMUNITY
End: 2023-06-16 | Stop reason: HOSPADM

## 2023-06-01 RX ORDER — MUPIROCIN CALCIUM 20 MG/G
1 CREAM TOPICAL EVERY 12 HOURS SCHEDULED
Qty: 10 G | Refills: 0
Start: 2023-06-01 | End: 2023-06-06

## 2023-06-01 RX ORDER — IPRATROPIUM BROMIDE AND ALBUTEROL SULFATE 2.5; .5 MG/3ML; MG/3ML
1 SOLUTION RESPIRATORY (INHALATION) EVERY 6 HOURS PRN
Status: DISCONTINUED | OUTPATIENT
Start: 2023-06-01 | End: 2023-06-01

## 2023-06-01 RX ORDER — HYDROXYZINE HYDROCHLORIDE 25 MG/1
25 TABLET, FILM COATED ORAL 3 TIMES DAILY PRN
Status: DISCONTINUED | OUTPATIENT
Start: 2023-06-01 | End: 2023-06-17 | Stop reason: HOSPADM

## 2023-06-01 RX ORDER — ENOXAPARIN SODIUM 100 MG/ML
40 INJECTION SUBCUTANEOUS NIGHTLY
Status: DISCONTINUED | OUTPATIENT
Start: 2023-06-01 | End: 2023-06-17 | Stop reason: HOSPADM

## 2023-06-01 RX ORDER — ATENOLOL 25 MG/1
25 TABLET ORAL DAILY
Status: ON HOLD | COMMUNITY

## 2023-06-01 RX ORDER — IPRATROPIUM BROMIDE AND ALBUTEROL SULFATE 2.5; .5 MG/3ML; MG/3ML
1 SOLUTION RESPIRATORY (INHALATION) EVERY 6 HOURS PRN
Status: DISCONTINUED | OUTPATIENT
Start: 2023-06-01 | End: 2023-06-02

## 2023-06-01 RX ORDER — PREDNISONE 10 MG/1
10 TABLET ORAL DAILY
Status: ON HOLD | COMMUNITY

## 2023-06-01 RX ORDER — SACCHAROMYCES BOULARDII 250 MG
250 CAPSULE ORAL EVERY OTHER DAY
Status: ON HOLD | COMMUNITY

## 2023-06-01 RX ORDER — GUAIFENESIN/DEXTROMETHORPHAN 100-10MG/5
10 SYRUP ORAL EVERY 4 HOURS PRN
Start: 2023-06-01

## 2023-06-01 RX ORDER — HYDROXYZINE HYDROCHLORIDE 25 MG/1
25 TABLET, FILM COATED ORAL 3 TIMES DAILY PRN
Status: ON HOLD | COMMUNITY

## 2023-06-01 RX ORDER — CETIRIZINE HYDROCHLORIDE 10 MG/1
10 TABLET ORAL DAILY
Start: 2023-06-01

## 2023-06-01 RX ORDER — CETIRIZINE HYDROCHLORIDE 10 MG/1
10 TABLET ORAL DAILY
Status: ON HOLD | COMMUNITY

## 2023-06-01 RX ORDER — LANOLIN ALCOHOL/MO/W.PET/CERES
3 CREAM (GRAM) TOPICAL DAILY
Status: DISCONTINUED | OUTPATIENT
Start: 2023-06-01 | End: 2023-06-02

## 2023-06-01 RX ORDER — FLUTICASONE PROPIONATE 50 MCG
1 SPRAY, SUSPENSION (ML) NASAL DAILY
Status: DISCONTINUED | OUTPATIENT
Start: 2023-06-01 | End: 2023-06-17 | Stop reason: HOSPADM

## 2023-06-01 RX ORDER — IPRATROPIUM BROMIDE AND ALBUTEROL SULFATE 2.5; .5 MG/3ML; MG/3ML
1 SOLUTION RESPIRATORY (INHALATION) EVERY 6 HOURS
Status: ON HOLD | COMMUNITY
End: 2023-06-16 | Stop reason: SDUPTHER

## 2023-06-01 RX ORDER — FLUTICASONE PROPIONATE 50 MCG
2 SPRAY, SUSPENSION (ML) NASAL 2 TIMES DAILY
Start: 2023-06-01

## 2023-06-01 RX ORDER — ATENOLOL 25 MG/1
25 TABLET ORAL DAILY
Status: DISCONTINUED | OUTPATIENT
Start: 2023-06-01 | End: 2023-06-17 | Stop reason: HOSPADM

## 2023-06-01 RX ORDER — FLUTICASONE FUROATE, UMECLIDINIUM BROMIDE AND VILANTEROL TRIFENATATE 100; 62.5; 25 UG/1; UG/1; UG/1
1 POWDER RESPIRATORY (INHALATION) DAILY
Status: ON HOLD | COMMUNITY
End: 2023-06-16 | Stop reason: HOSPADM

## 2023-06-01 RX ORDER — MECOBALAMIN 5000 MCG
5 TABLET,DISINTEGRATING ORAL NIGHTLY
Status: ON HOLD | COMMUNITY

## 2023-06-01 RX ORDER — CETIRIZINE HYDROCHLORIDE 10 MG/1
10 TABLET ORAL DAILY
Status: DISCONTINUED | OUTPATIENT
Start: 2023-06-01 | End: 2023-06-17 | Stop reason: HOSPADM

## 2023-06-01 RX ORDER — PREDNISONE 10 MG/1
10 TABLET ORAL DAILY
Qty: 1 TABLET | Refills: 0 | Status: SHIPPED | OUTPATIENT
Start: 2023-06-01 | End: 2023-06-02

## 2023-06-01 RX ORDER — ACETAMINOPHEN 325 MG/1
650 TABLET ORAL EVERY 6 HOURS PRN
Status: ON HOLD | COMMUNITY

## 2023-06-01 RX ORDER — LACTOBACILLUS RHAMNOSUS GG 10B CELL
1 CAPSULE ORAL EVERY OTHER DAY
Status: DISCONTINUED | OUTPATIENT
Start: 2023-06-01 | End: 2023-06-17 | Stop reason: HOSPADM

## 2023-06-01 RX ORDER — ACETAMINOPHEN 325 MG/1
650 TABLET ORAL EVERY 4 HOURS PRN
Status: DISCONTINUED | OUTPATIENT
Start: 2023-06-01 | End: 2023-06-01 | Stop reason: SDUPTHER

## 2023-06-01 RX ORDER — AMLODIPINE BESYLATE 5 MG/1
10 TABLET ORAL DAILY
Status: DISCONTINUED | OUTPATIENT
Start: 2023-06-01 | End: 2023-06-17 | Stop reason: HOSPADM

## 2023-06-01 RX ORDER — ACETAMINOPHEN 325 MG/1
650 TABLET ORAL EVERY 6 HOURS PRN
Status: DISCONTINUED | OUTPATIENT
Start: 2023-06-01 | End: 2023-06-17 | Stop reason: HOSPADM

## 2023-06-01 RX ORDER — ALBUTEROL SULFATE 2.5 MG/3ML
SOLUTION RESPIRATORY (INHALATION) EVERY 6 HOURS PRN
Qty: 270 ML | Refills: 0 | Status: SHIPPED | OUTPATIENT
Start: 2023-06-01 | End: 2023-07-01

## 2023-06-01 RX ORDER — POLYETHYLENE GLYCOL 3350 17 G/17G
17 POWDER, FOR SOLUTION ORAL DAILY PRN
Status: DISCONTINUED | OUTPATIENT
Start: 2023-06-01 | End: 2023-06-17 | Stop reason: HOSPADM

## 2023-06-01 RX ADMIN — ACETAMINOPHEN 650 MG: 325 TABLET, FILM COATED ORAL at 18:02

## 2023-06-01 RX ADMIN — MUPIROCIN: 20 OINTMENT TOPICAL at 20:52

## 2023-06-01 RX ADMIN — Medication 10 ML: at 09:02

## 2023-06-01 RX ADMIN — Medication 10 ML: at 09:01

## 2023-06-01 RX ADMIN — Medication 3 ML: at 09:02

## 2023-06-01 RX ADMIN — TIOTROPIUM BROMIDE INHALATION SPRAY 2 PUFF: 3.12 SPRAY, METERED RESPIRATORY (INHALATION) at 07:01

## 2023-06-01 RX ADMIN — ATENOLOL 25 MG: 25 TABLET ORAL at 09:01

## 2023-06-01 RX ADMIN — MULTIPLE VITAMINS W/ MINERALS TAB 1 TABLET: TAB at 09:01

## 2023-06-01 RX ADMIN — IPRATROPIUM BROMIDE AND ALBUTEROL SULFATE 3 ML: 2.5; .5 SOLUTION RESPIRATORY (INHALATION) at 06:59

## 2023-06-01 RX ADMIN — MUPIROCIN: 20 OINTMENT TOPICAL at 20:36

## 2023-06-01 RX ADMIN — IPRATROPIUM BROMIDE AND ALBUTEROL SULFATE 3 ML: 2.5; .5 SOLUTION RESPIRATORY (INHALATION) at 12:54

## 2023-06-01 RX ADMIN — MUPIROCIN 1 APPLICATION: 2 CREAM TOPICAL at 09:01

## 2023-06-01 RX ADMIN — ACETAMINOPHEN 650 MG: 325 TABLET, FILM COATED ORAL at 01:57

## 2023-06-01 RX ADMIN — ENOXAPARIN SODIUM 40 MG: 100 INJECTION SUBCUTANEOUS at 20:32

## 2023-06-01 RX ADMIN — FLUTICASONE PROPIONATE 2 SPRAY: 50 SPRAY, METERED NASAL at 09:01

## 2023-06-01 RX ADMIN — HYDROXYZINE HYDROCHLORIDE 25 MG: 25 TABLET, FILM COATED ORAL at 20:44

## 2023-06-01 RX ADMIN — PREDNISONE 10 MG: 10 TABLET ORAL at 09:01

## 2023-06-01 RX ADMIN — ACETAMINOPHEN 650 MG: 325 TABLET, FILM COATED ORAL at 09:01

## 2023-06-01 RX ADMIN — Medication 3 MG: at 20:34

## 2023-06-01 RX ADMIN — CETIRIZINE HYDROCHLORIDE 10 MG: 10 TABLET, FILM COATED ORAL at 09:02

## 2023-06-01 RX ADMIN — BUDESONIDE AND FORMOTEROL FUMARATE DIHYDRATE 2 PUFF: 160; 4.5 AEROSOL RESPIRATORY (INHALATION) at 07:01

## 2023-06-01 RX ADMIN — AMLODIPINE BESYLATE 10 MG: 5 TABLET ORAL at 09:01

## 2023-06-01 RX ADMIN — INSULIN ASPART 2 UNITS: 100 INJECTION, SOLUTION INTRAVENOUS; SUBCUTANEOUS at 12:09

## 2023-06-01 RX ADMIN — HYDROXYZINE HYDROCHLORIDE 25 MG: 25 TABLET ORAL at 09:01

## 2023-06-01 ASSESSMENT — PAIN SCALES - GENERAL: PAINLEVEL_OUTOF10: 5

## 2023-06-01 ASSESSMENT — LIFESTYLE VARIABLES: HOW OFTEN DO YOU HAVE A DRINK CONTAINING ALCOHOL: NEVER

## 2023-06-01 NOTE — DISCHARGE SUMMARY
[facility]      Name:  Carolin Toscano   Age:  77 y.o.  Sex:  female  :  1945  MRN:  [id number]   Visit Number:  [id number]    Admission Date:  2023  Date of Discharge:  2023  Primary Care Physician:  Jason Nicholson MD    Important issues to note:    Start: prednisone, zyrtec,flonase  Stop: spiriva as is duplicate therapy with trelegy  Follow up: PCP and pulmonology  Brief Summary: Presented with respiratory failure due to aecopd, parainfluenza and mucous plugging. Initially had prolonged recovery which improved to her baseline breathing by the time of discharge. Unfortunately due to weakness she needs physical therapy rehab at [place].    Discharge Diagnoses:       Acute on chronic respiratory failure with hypoxia and hypercapnia        Problem List:     Active Hospital Problems    Diagnosis  POA   • **Acute on chronic respiratory failure with hypoxia and hypercapnia [J96.21, J96.22]  Yes      Resolved Hospital Problems   No resolved problems to display.     Presenting Problem:    Chief Complaint   Patient presents with   • Respiratory Distress      Consults:     Consulting Physician(s)     Provider Relationship Specialty    Arnaldo Soriano MD Consulting Physician Pulmonary Disease        Procedures Performed:        History of presenting illness:  Patient is a 77 years old female with a past medical history of COPD, chronic respiratory failure on 5 to 6 L per her previous discharge, on NIV machine at home and trelegy, hypertension, and ongoing tobacco use who presented to the ER from home by EMS with a chief complaint of shortness of breath.  Patient reporting that she started having shortness of breath associated with productive cough since last night and has persisted and became worse that promoted her to call EMS.  EMS has found the patient to be at 65% saturation on her normal 6 L of oxygen. Patient reports compliance with her NIV machine at  home.      On ER evaluation, Patient was found to be tachycardic with a heart rate of 130s, temperature of 100.2 and respirations of 30, /72.  Patient was placed on BiPAP with FiO2 of 40%.  Her ABG came back and showed pH of 7.296/PCO2 87/PO2 94/HCO3 42/saturation 97% on 5 L nasal cannula.  HS Troponin 44, proBNP WNL, glucose 192, CO2 of 40, otherwise CBC and CMP within acceptable range.  Lactate and Pro-Adi WNL.  Respiratory panel positive for parainfluenza virus.  Chest x-ray Mild interstitial disease  bilaterally is similar to prior. No area of consolidation is seen. Patient received Solu-Medrol and Aztreonam while in the ER.  Hospitalist consulted for admission.        Hospital Course:  6/1/23 stable for dc to [facility]    5/31/2023 spoke with case management still working on rehab placement.  Anticipate [facility] tomorrow.  Pulmonology note reviewed.  Labs reviewed.  Discussed with patient.  No new complaints.    5/30 doing well sitting up in bed. Placement pending. No new c/o.     5/23-5/29 was managed by Dr. Vital has had a gradual improvement in respiratory status.    5/22: doing well. Oxygen requirements decreasing. Stable for dc out of ICU. Spoke with patient and family at bedside. D/w CM [facility] bed pending.    5/21: c/o [facility] suspect is due to mucous. Agreeable to flonase, zyrtec and atarax. Labs reviewed.    May 20, 2023 discussed with patient as well as family at the bedside.  Also discussed with them regarding placing access which she was able to get a PICC line.  Also discussed with them the staph growing in her sputum and that I was going to add vancomycin for this.    5/19: d/w patient and family updated them on [facility] and pending appeal which will likely be submitted Monday. Still having dyspnea but mucous is breaking loose today.  Still debating about bronchoscopy.  Interested in long-term acute care possibly in [place] versus [place].    5/18: Daughter Regla is  coming today.  Otherwise not much change still having dyspnea and hypoxemia.  Case management note reviewed.    5/17/2023 discussed with case management still working on [facility].  I am planning to do a peer to peer with them tomorrow.  Otherwise has persistent hypoxia.  Dr. Soriano's note reviewed considering bronchoscopy.    5/16/23: d/w nsg intermittently on avaps and hi flow. Patient awake in bed. CXR from 5/15 reviewed. Medications reviewed.  Discussed with case management they are [facility].    Patient was admitted and treated with steroids.  Antibiotics were not indicated.  Pulmonology consulted and was moved to ICU on 5/9 due to worsening respiratory failure and tachypnea.  She initially had to be on Precedex drip which was discontinued and switched to Xanax due to anxiety. Patient was placed on AVAPS.  Patient continued on steroids and received Lasix.            Edited by: Primitivo Nuñez,  at 6/1/2023 0810  Respiratory failure with hypoxia/hypercapnia, COPD exacerbation, parainfluenza  -patient on 5 to 6 L oxygen at baseline,  Continue BiPAP therapy.  Has NIV at home. Currently on 3 liters  -Recent parainfluenza virus infection met SIRS due to this  - Solu-Medrol previously now prednisone taper  -Bronchodilators, Mucinex status post MetaNeb and supportive care.  -Dr. Soriano note reviewed and appreciated  -s/p lasix  -Xanax as needed  -Sputum grew Staph aureus and haemophilus s/p vancomycin and azithromycin  - flonase, zyrtec, and atarax for upper airway congestion  -recommend continuing trelegy after dc     Elevated troponin  -Likely secondary to demand ischemia in settings of respiratory failure  -Patient denies any chest pain, low suspicion for ACS  -Troponin trended down and plateaued.     Hyperglycemia  -Will cover with sliding scale insulin while on steroids, bg mostly controlled while inpatient. Should not need insulin at skilled rehab recommend checking bg  intermittently just to be sure.  -Hemoglobin A1c 6.7     Edited by: Primitivo Nuñez DO at 6/1/2023 0810      Vital Signs:    Temp:  [97.5 °F (36.4 °C)-99.4 °F (37.4 °C)] 98 °F (36.7 °C)  Heart Rate:  [68-83] 78  Resp:  [18-20] 20  BP: (112-126)/(54-62) 121/54    Physical Exam:    General Appearance:  Alert and cooperative.  Chronically ill-appearing. Sitting up in bed, conversant   Head:  Atraumatic and normocephalic.   Eyes: Conjunctivae and sclerae normal, no icterus. No pallor.   Throat: No oral lesions, no thrush, oral mucosa moist.   Neck: Supple, trachea midline, no thyromegaly.   Lungs:   clear to auscultation bilaterally on nasal cannula.   Heart:  Normal S1 and S2, no murmur, no gallop, no rub. No JVD.   Abdomen:   Normal bowel sounds, no masses, no organomegaly. Soft, nontender, nondistended, no rebound tenderness.   Extremities: Supple, no edema, no cyanosis, no clubbing.   Skin: No bleeding or rash.   Neurologic: Alert and oriented x 3. No facial asymmetry. Moves all four limbs. No tremors.  Generalized weakness noted.     Edited by: Primitivo Nuñez DO at 6/1/2023 0810    Pertinent Lab Results:     Results from last 7 days   Lab Units 06/01/23  0604 05/31/23  0639   SODIUM mmol/L 140 137   POTASSIUM mmol/L 4.4 4.3   CHLORIDE mmol/L 103 99   CO2 mmol/L 29.8* 28.4   BUN mg/dL 19 20   CREATININE mg/dL 0.25* 0.24*   CALCIUM mg/dL 9.0 9.1   GLUCOSE mg/dL 113* 114*     Results from last 7 days   Lab Units 06/01/23  0604 05/31/23  0639   WBC 10*3/mm3 5.32 6.44   HEMOGLOBIN g/dL 10.8* 10.8*   HEMATOCRIT % 34.3 34.7   PLATELETS 10*3/mm3 145 155                         Results from last 7 days   Lab Units 06/01/23  0733   PH, ARTERIAL pH units 7.455*   PO2 ART mm Hg 60.8*   PCO2, ARTERIAL mm Hg 47.7*   HCO3 ART mmol/L 33.5*           Pertinent Radiology Results:    Imaging Results (All)     Procedure Component Value Units Date/Time    XR Chest 1 View [[id number]] Collected: 05/26/23 0741     Updated:  05/26/23 0845    Narrative:      CLINICAL INDICATION:    Respiratory Failure.; J96.21-Acute and chronic respiratory failure with  hypoxia; J96.22-Acute and chronic respiratory failure with hypercapnia;  J44.1-Chronic obstructive pulmonary disease with (acute) exacerbation;  B34.8-Other viral infections of unspecified site     EXAMINATION TECHNIQUE:   XR CHEST 1 VW-     COMPARISON:  Radiographs 05/23/2023.     FINDINGS:  Right upper extremity PICC line in place. Stable cardiomediastinal  silhouette. Mildly decreased bibasilar airspace disease, left greater  than right. No pneumothorax. Mildly improved aeration. Probable trace  bilateral pleural effusions.       Impression:      Mild interval improvement.     Images personally reviewed, interpreted and dictated by SHIRA Villa.                This report was signed and finalized on 5/26/2023 8:43 AM by SHIRA Villa.    XR Chest 1 View [[id number]] Collected: 05/23/23 1146     Updated: 05/23/23 1207    Narrative:      PROCEDURE: XR CHEST 1 VW-        HISTORY: Resp Failure.; J96.21-Acute and chronic respiratory failure  with hypoxia; J96.22-Acute and chronic respiratory failure with  hypercapnia; J44.1-Chronic obstructive pulmonary disease with (acute)  exacerbation; B34.8-Other viral infections of unspecified site     COMPARISON: May 21, 2023.     FINDINGS: The heart is normal in size. The mediastinum is unremarkable.  There is mild interval worsening of left basilar opacity. A right  basilar opacity is grossly unchanged. There are trace bilateral pleural  effusions. There is emphysema. There is chronic prominence of the  interstitium. There is no pneumothorax. There are no acute osseous  abnormalities. Right-sided PICC line is in place.       Impression:      Mild bibasilar airspace disease/pneumonia, left greater than  right..        Images were reviewed, interpreted, and dictated by SHIRA Larkin  Transcribed by Ade Solis  STEVIE.     This report was signed and finalized on 5/23/2023 12:05 PM by SHIRA Villa.    XR Chest 1 View [[id number]] Collected: 05/21/23 0720     Updated: 05/21/23 0724    Narrative:      PORTABLE CHEST    5/21/2023 6:19 AM      HISTORY: Respiratory failure.     COMPARISON: May 18, 2023.     FINDINGS: Consolidation at the left lung base has slightly worsened.  Background of bronchitis is noted. No other consolidation. No effusion  or pneumothorax. New right upper shimmy PICC, tip in SVC.       Impression:      Left base pneumonia, slightly worsened from previous.     This report was signed and finalized on 5/21/2023 7:22 AM by Dr Danyel Mckinnon DO.    XR Chest 1 View [[id number]] Collected: 05/18/23 0751     Updated: 05/18/23 0755    Narrative:      PROCEDURE: XR CHEST 1 VW-     HISTORY: Resp Failure.; J96.21-Acute and chronic respiratory failure  with hypoxia; J96.22-Acute and chronic respiratory failure with  hypercapnia; J44.1-Chronic obstructive pulmonary disease with (acute)  exacerbation; B34.8-Other viral infections of unspecified site     COMPARISON: 05/15/2023.     FINDINGS: The heart is upper limits of normal.. Again noted is bilateral  interstitial disease and bronchial wall thickening. More prominent  markings noted in the lung bases, left greater than right. This is shown  to represent bronchiectasis with mucus plugging on recent CT. Findings  are stable from prior. Aortic mural calcifications noted.. The  mediastinum is unremarkable. There is no pneumothorax.  There are no  acute osseous abnormalities.       Impression:      Stable chest..           This report was signed and finalized on 5/18/2023 7:53 AM by Shraddha Wharton MD.    CT Chest Without Contrast Diagnostic [[id number]] Collected: 05/16/23 1336     Updated: 05/16/23 1340    Narrative:      PROCEDURE: CT CHEST WO CONTRAST DIAGNOSTIC-     HISTORY: Dyspnea, chronic, unclear etiology; J96.21-Acute and chronic  respiratory failure with  hypoxia; J96.22-Acute and chronic respiratory  failure with hypercapnia; J44.1-Chronic obstructive pulmonary disease  with (acute) exacerbation; B34.8-Other viral infections of unspecified  site     COMPARISON: April 13, 2023.     PROCEDURE:  Multiple axial CT images were obtained from the thoracic  inlet through the upper abdomen without the use of contrast.      FINDINGS:   Soft tissue windows reveal no suspicious axillary, hilar or mediastinal  adenopathy. Heart size is normal. The aorta is normal in caliber. There  are vascular calcifications. There are no pleural or pericardial  effusions. There is bronchiectasis in the bilateral lower lobes. There  is new mucous plugging and airspace disease in the bilateral lower lobes  consistent with pneumonia. There are tree-in-bud opacities diffusely  which have worsened since the prior exam consistent with bronchiolitis.  There is evidence of old calcified granulomatous disease. The visualized  upper abdomen shows the gallbladder to be surgically absent. Bone  windows reveal no bony destructive lesions.       Impression:      Mucous plugging, bilateral lower lobe pneumonia, and  bronchiolitis. 8-12 week follow-up exam is recommended to document  stability.     193.74 mGy.cm        This study was performed with techniques to keep radiation doses as low  as reasonably achievable (ALARA). Individualized dose reduction  techniques using automated exposure control or adjustment of mA and/or  kV according to the patient size were employed.               Images were reviewed, interpreted, and dictated by Dr. Shraddha Wharton MD  Transcribed by Ade Solis PA-C.     This report was signed and finalized on 5/16/2023 1:38 PM by Shraddha Wharton MD.    XR Chest 1 View [[id number]] Collected: 05/15/23 0837     Updated: 05/15/23 0839    Narrative:      PROCEDURE: XR CHEST 1 VW-     HISTORY: follow up, increased WBC & CO2; J96.21-Acute and chronic  respiratory failure with hypoxia;  J96.22-Acute and chronic respiratory  failure with hypercapnia; J44.1-Chronic obstructive pulmonary disease  with (acute) exacerbation; B34.8-Other viral infections of unspecified  site     COMPARISON: 05/13/2023.     FINDINGS: The heart is normal in size. Bilateral interstitial disease  most prominent left lung base is stable from prior. Bronchial wall  thickening noted.. The mediastinum is unremarkable. There is no  pneumothorax.  There are no acute osseous abnormalities.       Impression:      Stable chest..           This report was signed and finalized on 5/15/2023 8:37 AM by Shraddha Wharton MD.    XR Chest 1 View [[id number]] Collected: 05/13/23 1656     Updated: 05/13/23 1659    Narrative:      PROCEDURE: XR CHEST 1 VW-     HISTORY: Respiratory failure follow-up; J96.21-Acute and chronic  respiratory failure with hypoxia; J96.22-Acute and chronic respiratory  failure with hypercapnia; J44.1-Chronic obstructive pulmonary disease  with (acute) exacerbation; B34.8-Other viral infections of unspecified  site     COMPARISON: May 11, 2023.     FINDINGS: The heart is normal in size. The mediastinum is unremarkable.  Increased interstitial markings appear not significantly changed. There  is no pneumothorax.  There are no acute osseous abnormalities.       Impression:      Increased interstitial markings appear not significantly  changed.     Continued followup is recommended.     This report was signed and finalized on 5/13/2023 4:57 PM by Rodrigo Goodman DO.    XR Chest 1 View [[id number]] Collected: 05/11/23 1504     Updated: 05/11/23 1511    Narrative:      PROCEDURE: XR CHEST 1 VW-        HISTORY: Resp Failure.; J96.21-Acute and chronic respiratory failure  with hypoxia; J96.22-Acute and chronic respiratory failure with  hypercapnia; J44.1-Chronic obstructive pulmonary disease with (acute)  exacerbation; B34.8-Other viral infections of unspecified site     COMPARISON: One day prior.     FINDINGS: The heart is  borderline enlarged. The mediastinum is  unremarkable. There is bronchial wall thickening. There is interstitial  disease most prominent in the left lung base. There is mild interval  improvement in the right lung base. There is no pneumothorax. There are  no acute osseous abnormalities.       Impression:      Mild interval improvement in aeration of the right lung  base. Otherwise, stable exam.                       Images were reviewed, interpreted, and dictated by Dr. Shraddha Wharton MD  Transcribed by Ade Solis PA-C.     This report was signed and finalized on 5/11/2023 3:09 PM by Shraddha Wharton MD.    XR Chest 1 View [[id number]] Collected: 05/10/23 1043     Updated: 05/10/23 1046    Narrative:      PROCEDURE: XR CHEST 1 VW-     HISTORY: resp failure follow up; J96.21-Acute and chronic respiratory  failure with hypoxia; J96.22-Acute and chronic respiratory failure with  hypercapnia; J44.1-Chronic obstructive pulmonary disease with (acute)  exacerbation; B34.8-Other viral infections of unspecified site     COMPARISON: 2 days prior.     FINDINGS: The heart is upper limits of normal in size, stable..  Interstitial disease again noted bilaterally, worsening on the left.  There is also worsening peribronchial thickening, most prominent in the  left lung base. Consider infection versus edema. Recommend follow-up..  The mediastinum is unremarkable. There is no pneumothorax.  There are no  acute osseous abnormalities.       Impression:      Worsening interstitial disease and bronchial wall thickening  most prominent left lung base, consider infection versus edema and  recommend follow-up..           This report was signed and finalized on 5/10/2023 10:44 AM by Shraddha Wharton MD.    XR Chest 1 View [[id number]] Collected: 05/08/23 1023     Updated: 05/08/23 1026    Narrative:      PROCEDURE: XR CHEST 1 VW-        HISTORY: Respiratory failure follow-up; J96.21-Acute and chronic  respiratory failure with hypoxia;  J96.22-Acute and chronic respiratory  failure with hypercapnia; J44.1-Chronic obstructive pulmonary disease  with (acute) exacerbation; B34.8-Other viral infections of unspecified  site     COMPARISON: May 6, 2023.     FINDINGS: Apical lordotic view. The heart is at the upper limits of  normal in size. There is mild interstitial disease bilaterally. There is  bronchial wall thickening, similar to the prior exam. There is no  pneumothorax. There are no acute osseous abnormalities.       Impression:      No significant interval change.                       Images were reviewed, interpreted, and dictated by Dr. Shraddha Wharton MD  Transcribed by Ade Solis PA-C.     This report was signed and finalized on 5/8/2023 10:24 AM by Shraddha Wharton MD.    XR Chest 1 View [[id number]] Collected: 05/06/23 0922     Updated: 05/06/23 0925    Narrative:      PROCEDURE: XR CHEST 1 VW-     HISTORY: Respiratory failure follow-up; J96.21-Acute and chronic  respiratory failure with hypoxia; J96.22-Acute and chronic respiratory  failure with hypercapnia; J44.1-Chronic obstructive pulmonary disease  with (acute) exacerbation; B34.8-Other viral infections of unspecified  site     COMPARISON: May 4, 2023.     FINDINGS: The heart is upper limits of normal in size. Aortic mural  calcifications noted.. There is mild bilateral interstitial disease,  stable from prior exam. Slight blunting of the left costophrenic angle  is stable.. The mediastinum is unremarkable. There is no pneumothorax.   There are no acute osseous abnormalities.       Impression:      Stable chest..           This report was signed and finalized on 5/6/2023 9:23 AM by Shraddha Wharton MD.    XR Chest 1 View [[id number]] Collected: 05/04/23 1528     Updated: 05/04/23 1534    Narrative:      PROCEDURE: XR CHEST 1 VW-     HISTORY: [facility]A Triage Protocol     COMPARISON: 04/18/2023.     FINDINGS: The heart is normal in size. Mild interstitial disease  bilaterally is similar to prior.  No area of consolidation is seen.  Aortic mural calcifications noted.. The mediastinum is unremarkable.  There is no pneumothorax.  There are no acute osseous abnormalities.       Impression:      Stable chest..           This report was signed and finalized on 5/4/2023 3:31 PM by Shraddha Wharton MD.          Echo:    Results for orders placed during the hospital encounter of 04/13/23    Adult Transthoracic Echo Complete W/ Cont if Necessary Per Protocol    Interpretation Summary  •  Left ventricular diastolic function is consistent with (grade I) impaired relaxation.  •  Mild aortic valve stenosis is present.  •  Estimated right ventricular systolic pressure from tricuspid regurgitation is moderately elevated (45-55 mmHg).  •  Mild to moderate pulmonary hypertension is present.    Condition on Discharge:      Stable.    Code status during the hospital stay:    Code Status and Medical Interventions:   Ordered at: 05/04/23 1634     Code Status (Patient has no pulse and is not breathing):    CPR (Attempt to Resuscitate)     Medical Interventions (Patient has pulse or is breathing):    Full Support     Discharge Disposition:    Short Term Hospital (DC - External)    Discharge Medications:       Discharge Medications      New Medications      Instructions Start Date   cetirizine 10 MG tablet  Commonly known as: zyrTEC   10 mg, Oral, Daily      Diclofenac Sodium 1 % gel gel  Commonly known as: VOLTAREN   2 g, Topical, 3 Times Daily PRN      guaiFENesin-dextromethorphan 100-10 MG/5ML syrup  Commonly known as: ROBITUSSIN DM   10 mL, Oral, Every 4 Hours PRN      hydrOXYzine 25 MG tablet  Commonly known as: ATARAX   25 mg, Oral, 3 Times Daily      ipratropium-albuterol 0.5-2.5 mg/3 ml nebulizer  Commonly known as: DUO-NEB   3 mL, Nebulization, Every 6 Hours - RT      melatonin 5 MG tablet tablet   5 mg, Oral, Nightly      mupirocin 2 % cream  Commonly known as: BACTROBAN   1 application, Topical, Every 12 Hours Scheduled       predniSONE 10 MG tablet  Commonly known as: DELTASONE   10 mg, Oral, Daily         Changes to Medications      Instructions Start Date   multivitamins-minerals capsule capsule  What changed: Another medication with the same name was removed. Continue taking this medication, and follow the directions you see here.   2 capsules, Oral, Daily         Continue These Medications      Instructions Start Date   acetaminophen 325 MG tablet  Commonly known as: TYLENOL   650 mg, Oral, Every 6 Hours PRN      albuterol (2.5 MG/3ML) 0.083% nebulizer solution  Commonly known as: PROVENTIL   Inhale contents of 1 vial (3 mL) by nebulization Every 6 (Six) Hours As Needed for Wheezing for up to 30 days.      amLODIPine 10 MG tablet  Commonly known as: NORVASC   10 mg, Oral, Daily      atenolol 25 MG tablet  Commonly known as: TENORMIN   25 mg, Oral, Daily      saccharomyces boulardii 250 MG capsule  Commonly known as: FLORASTOR   250 mg, Oral, Every Other Day      Trelegy Ellipta 100-62.5-25 MCG/ACT inhaler  Generic drug: Fluticasone-Umeclidin-Vilant   Inhale 1 puff  by mouth Daily         Stop These Medications    tiotropium 18 MCG per inhalation capsule  Commonly known as: Spiriva HandiHaler        ASK your doctor about these medications      Instructions Start Date   fluticasone 50 MCG/ACT nasal spray  Commonly known as: Flonase  Ask about: Which instructions should I use?   2 sprays, Nasal, Daily, Administer 2 sprays in each nostril for each dose.      fluticasone 50 MCG/ACT nasal spray  Commonly known as: FLONASE  Ask about: Which instructions should I use?   2 sprays, Each Nare, 2 Times Daily           Discharge Diet:     Diet Instructions     Advance Diet As Tolerated -Target Diet: cardiac      Target Diet: cardiac        Activity at Discharge:       Follow-up Appointments:    Additional Instructions for the Follow-ups that You Need to Schedule     Ambulatory Referral to Disease State Management   As directed      Follow-up  in 14-21 days after discharge    Order Comments: Follow-up in 14-21 days after discharge     To dept:  [facility] [[id number]]    What program(s) are you referring for?: COPD    Follow-up needed: No         Discharge Follow-up with PCP   As directed       Currently Documented PCP:    Jason Nicholson MD    PCP Phone Number:    [phone]     Follow Up Details: 1 week         Discharge Follow-up with Specified Provider: Dr. Soriano OR Diamond Kaplan; 4 Months   As directed      To: Dr. Soriano OR Diamond Kaplan    Follow Up: 4 Months    Follow Up Details: If being discharged on a weekend, please call our office on the next working day to schedule this appointment.            Follow-up Information     Jason Nicholson MD .    Specialty: Internal Medicine  Why: 1 week  Contact information:  [address]  [facility] [id number] [phone]                       Future Appointments   Date Time Provider Department Center   7/27/2023  3:15 PM Jason Nicholson MD [facility]     Test Results Pending at Discharge:           Primitivo Nuñez DO  06/01/23  08:10 EDT    Time: I spent 45 minutes on this discharge activity which included: face-to-face encounter with the patient, reviewing the data in the system, coordination of the care with the nursing staff as well as consultants, documentation, and entering orders.     Dictated utilizing Dragon dictation.

## 2023-06-01 NOTE — CASE MANAGEMENT/SOCIAL WORK
Case Management Discharge Note                Selected Continued Care - Admitted Since 5/4/2023     Destination Coordination complete.    Service Provider Selected Services Address Phone Fax Patient Preferred    [facility] Skilled Nursing [address] [address] [phone] --          Durable Medical Equipment    No services have been selected for the patient.              Dialysis/Infusion    No services have been selected for the patient.              Home Medical Care    No services have been selected for the patient.              Therapy    No services have been selected for the patient.              Community Resources    No services have been selected for the patient.              Community & DME    No services have been selected for the patient.                Selected Continued Care - Prior Encounters Includes continued care and service providers with selected services from prior encounters from 2/3/2023 to 6/1/2023    Discharged on 4/21/2023 Admission date: 4/13/2023 - Discharge disposition: Home or Self Care    Durable Medical Equipment     Service Provider Selected Services Address Phone Fax Patient Preferred    [facility] Durable Medical Equipment [facility] [address] [address] [phone] --       Internal Comment last updated by Merle Torres 4/21/2023 1530    [employer]2               [facility] Medical [facility] -- [phone] --       Internal Comment last updated by Merle Torres 4/21/2023 1530    [employer]                     Home Medical Care     Service Provider Selected Services Address Phone Fax Patient Preferred    [facility] [address], [address], [facility] [address] [phone] --                    Transportation Services  Ambulance: [facility]    Final Discharge Disposition Code: 30 - still a  patient

## 2023-06-01 NOTE — PLAN OF CARE
Goal Outcome Evaluation:  Plan of Care Reviewed With: (P) patient        Progress: (P) improving  Outcome Evaluation: (P) VSS, no acute events overnight, patient awaiting possible DC to rehab

## 2023-06-01 NOTE — PLAN OF CARE
Visited pt's room; she was resting in bed watching TV.  She appeared to be more alert and less SOB than my last visit.  She reported feeling better but c/o being weak.  She confirmed that she would be discharged today to [facility].  She expressed wanting to feel better and get back home.     Home palliative services discussed in detail to include a nurse visiting 1x/month as support and resource assistance.  She reported that she lives with her brother and that he is very suspicious of people.  She shared that he had lots of cameras and guns so she would need to check with him.  She then reported that her daughter wants her to move to [place] to live with her but she stated that may be too hot for her.  Benefits of starting referrals from hospital discussed and she was informed that if she did not feel she need the help that she could cancel.  She was agreeable for a referral to be initiated but requested that her daughter (Nehal) be the .  She was informed that they would not plan to start services until she returned home from swing bed.    Called [facility]; spoke to Aleida.  Home PC referral initiated.  She will pull needed info form [employer] and contact home PC nurse.    Called [facility] home PC nurse.  Update regarding referral given.  She will plan to contact pt's daughter in approx. 2 weeks.

## 2023-06-01 NOTE — PAYOR COMM NOTE
"  D/c summary  Ur manager; china peres [phone] and fax [phone]      Carolin Toscano (77 y.o. Female)     Date of Birth   1945    Social Security Number       Address   [address] [facility] [address]    Home Phone   [phone]    MRN   [id number]       Holiness   Nazarene    Marital Status                               Admission Date   5/4/23    Admission Type   Emergency    Admitting Provider       Attending Provider   Primitivo Nuñez DO    [facility]       Discharge Date       Discharge Disposition   Short Term Hospital (DC - External)    Discharge Destination                               Attending Provider: Primitivo Nuñez DO    Allergies: Contrast Dye (Echo Or Unknown Ct/mr), Ciprofloxacin, Keflex [Cephalexin], Penicillins, Sulfa Antibiotics, Terramycin [Oxytetracycline], Prednisone, Latex, Percocet [Oxycodone-acetaminophen], Xyzal [Levocetirizine]    Isolation: None   Infection: None   Code Status: CPR    Ht: 160 cm (63\")   Wt: 65.7 kg (144 lb 13.5 oz)    Admission Cmt: None   Principal Problem: Acute on chronic respiratory failure with hypoxia and hypercapnia [J96.21,J96.22]                 Active Insurance as of 5/4/2023     Primary Coverage     Payor Plan Insurance Group Employer/Plan Group    HUMANA MEDICARE REPLACEMENT HUMANA MEDICARE REPLACEMENT [id number]     Payor Plan Address Payor Plan Phone Number Payor Plan Fax Number Effective Dates    [address] [address] [phone]  1/1/2018 - None Entered    [facility] [id number]       Subscriber Name Subscriber Birth Date Member ID       CAROLIN TOSCANO 1945 [id number]           Secondary Coverage     Payor Plan Insurance Group Employer/Plan Group    [place]      Payor Plan Address Payor Plan Phone Number Payor Plan Fax Number Effective Dates    [address] [phone]  10/11/2021 - None Entered    [facility] [id number]       Subscriber " "Name Subscriber Birth Date Member ID       CAROLIN DYE 1945 [id number]                 Emergency Contacts      (Rel.) Home Phone Work Phone Mobile Phone    Nehal Hoffmann (Daughter) [phone] -- --    Carmen Deleon \"Regla\" (Daughter) [phone] -- [phone]               Physician Progress Notes (last 24 hours)      Primitivo Nuñez, DO at 23 1637              [facility]    Name:  Carolin Dye   Age:  77 y.o.  Sex:  female  :  1945  MRN:  [id number]   Visit Number:  [id number]  Admission Date:  2023  Date Of Service:  23  Primary Care Physician:  Jason Nicholson MD     LOS: 27 days :    Chief Complaint:      Shortness of air    Subjective:    2023 spoke with case management still working on rehab placement.  Anticipate Ebony and Eric tomorrow.  Pulmonology note reviewed.  Labs reviewed.  Discussed with patient.  No new complaints.    Hospital Course:   doing well sitting up in bed. Placement pending. No new c/o.     - was managed by Dr. Vital has had a gradual improvement in respiratory status.    : doing well. Oxygen requirements decreasing. Stable for dc out of ICU. Spoke with patient and family at bedside. D/w CM [place] [facility] bed pending.    : c/o garrison suspect is due to mucous. Agreeable to flonase, zyrtec and atarax. Labs reviewed.    May 20, 2023 discussed with patient as well as family at the bedside.  Also discussed with them regarding placing access which she was able to get a PICC line.  Also discussed with them the staph growing in her sputum and that I was going to add vancomycin for this.    : d/w patient and family updated them on [facility] and pending appeal which will likely be submitted Monday. Still having dyspnea but mucous is breaking loose today.  Still debating about bronchoscopy.  Interested in long-term acute care possibly in [place] versus [place].    : " Daughter Regla is coming today.  Otherwise not much change still having dyspnea and hypoxemia.  Case management note reviewed.    5/17/2023 discussed with case management still working [facility].  I am planning to do a peer to peer with them tomorrow.  Otherwise has persistent hypoxia.  Dr. Soriano's note reviewed considering bronchoscopy.    5/16/23: d/w nsg intermittently on avaps and hi flow. Patient awake in bed. CXR from 5/15 reviewed. Medications reviewed.  Discussed with case management [facility]    Patient was admitted and treated with steroids.  Antibiotics were not indicated.  Pulmonology consulted and was moved to ICU on 5/9 due to worsening respiratory failure and tachypnea.  She initially had to be on Precedex drip which was discontinued and switched to Xanax due to anxiety. Patient was placed on AVAPS.  Patient continued on steroids and received Lasix.     History of presenting illness:  Patient is a 77 years old female with a past medical history of COPD, chronic respiratory failure on 5 to 6 L per her previous discharge, on NIV machine at home and trelegy, hypertension, and ongoing tobacco use who presented to the ER from home by EMS with a chief complaint of shortness of breath.  Patient reporting that she started having shortness of breath associated with productive cough since last night and has persisted and became worse that promoted her to call EMS.  EMS has found the patient to be at 65% saturation on her normal 6 L of oxygen. Patient reports compliance with her NIV machine at home.      On ER evaluation, Patient was found to be tachycardic with a heart rate of 130s, temperature of 100.2 and respirations of 30, /72.  Patient was placed on BiPAP with FiO2 of 40%.  Her ABG came back and showed pH of 7.296/PCO2 87/PO2 94/HCO3 42/saturation 97% on 5 L nasal cannula.  HS Troponin 44, proBNP WNL, glucose 192, CO2 of 40, otherwise CBC and CMP  within acceptable range.  Lactate and Pro-Adi WNL.  Respiratory panel positive for parainfluenza virus.  Chest x-ray Mild interstitial disease  bilaterally is similar to prior. No area of consolidation is seen. Patient received Solu-Medrol and Aztreonam while in the ER.  Hospitalist consulted for admission.       Edited by: Primitivo Nuñez DO at 5/31/2023 5918     Review of Systems:     All systems were reviewed and negative except as mentioned in subjective, assessment and plan.    Vital Signs:    Temp:  [98 °F (36.7 °C)-99.4 °F (37.4 °C)] 99.4 °F (37.4 °C)  Heart Rate:  [68-89] 81  Resp:  [18] 18  BP: (115-122)/(49-58) 120/55    Intake and output:    I/O last 3 completed shifts:  In: 360 [P.O.:360]  Out: 950 [Urine:950]  I/O this shift:  In: 600 [P.O.:600]  Out: -     Physical Examination:    General Appearance:  Alert and cooperative.  Chronically ill-appearing. Sitting up in bed.   Head:  Atraumatic and normocephalic.   Eyes: Conjunctivae and sclerae normal, no icterus. No pallor.   Throat: No oral lesions, no thrush, oral mucosa moist.   Neck: Supple, trachea midline, no thyromegaly.   Lungs:   clear to auscultation bilaterally on nasal cannula.   Heart:  Normal S1 and S2, no murmur, no gallop, no rub. No JVD.   Abdomen:   Normal bowel sounds, no masses, no organomegaly. Soft, nontender, nondistended, no rebound tenderness.   Extremities: Supple, no edema, no cyanosis, no clubbing.   Skin: No bleeding or rash.   Neurologic: Alert and oriented x 3. No facial asymmetry. Moves all four limbs. No tremors.  Generalized weakness noted.     Edited by: Primitivo Nuñez DO at 5/31/2023 9061     Laboratory results:    Results from last 7 days   Lab Units 05/31/23  0639   SODIUM mmol/L 137   POTASSIUM mmol/L 4.3   CHLORIDE mmol/L 99   CO2 mmol/L 28.4   BUN mg/dL 20   CREATININE mg/dL 0.24*   CALCIUM mg/dL 9.1   GLUCOSE mg/dL 114*     Results from last 7 days   Lab Units 05/31/23  0639   WBC 10*3/mm3 6.44    HEMOGLOBIN g/dL 10.8*   HEMATOCRIT % 34.7   PLATELETS 10*3/mm3 155                 Recent Labs     05/21/23  0714 05/22/23  1307 05/24/23  0719   PHART 7.394 7.447 7.394   HIZ4JMP 70.2* 53.3* 59.3*   PO2ART 95.2 50.2* 82.1   OAZ5DLH 42.8* 36.8* 36.2*   BASEEXCESS 15.1* 11.1* 9.6*      I have reviewed the patient's laboratory results.    Radiology results:    No radiology results from the last 24 hrs  I have reviewed the patient's radiology reports.    Medication Review:     I have reviewed the patient's active and prn medications.     Problem List:      Acute on chronic respiratory failure with hypoxia and hypercapnia      Assessment/Plan:    Respiratory failure with hypoxia/hypercapnia, COPD exacerbation, parainfluenza  -patient on 5 to 6 L oxygen at baseline,  Continue BiPAP therapy.  Has NIV at home. Currently on 3 liters  -Recent parainfluenza virus infection met SIRS due to this  - Solu-Medrol previously now prednisone taper  -Bronchodilators, Mucinex status post MetaNeb and supportive care.  -Dr. Soriano note reviewed and appreciated  -s/p lasix  -Xanax as needed  -Sputum grew Staph aureus and haemophilus s/p vancomycin and azithromycin  - flonase, zyrtec, and atarax for upper airway congestion     Elevated troponin  -Likely secondary to demand ischemia in settings of respiratory failure  -Patient denies any chest pain, low suspicion for ACS  -Troponin trended down and plateaued.     Hyperglycemia  -Will cover with sliding scale insulin while on steroids  -Hemoglobin A1c 6.7     Disposition: [facility] tomorrow    Prophylaxis: Lovenox    Edited by: Primitivo Nuñez, DO at 5/31/2023 1636     DVT Prophylaxis: Lovenox  Code Status:   Code Status and Medical Interventions:   Ordered at: 05/04/23 1634     Code Status (Patient has no pulse and is not breathing):    CPR (Attempt to Resuscitate)     Medical Interventions (Patient has pulse or is breathing):    Full Support      Diet:   Dietary Orders (From  admission, onward)     Start     Ordered    23 1800  Dietary Nutrition Supplements Magic Cup  2 Times Daily      Question:  [facility] Supplement:  Answer:  Magic Cup    23 1517    23 1038  Diet: Cardiac Diets; Healthy Heart (2-3 Na+); Texture: Regular Texture (IDDSI 7); Fluid Consistency: Thin (IDDSI 0)  Diet Effective Now        Comments: Pt prefers cottage cheese, bananas, oranges, peaches, watermelon.   References:    Diet Order [place]   Question Answer Comment   Diets: Cardiac Diets    Cardiac Diet: Healthy Heart (2-3 Na+)    Texture: Regular Texture (IDDSI 7)    Fluid Consistency: Thin (IDDSI 0)        23 1039    05/15/23 1800  Dietary Nutrition Supplements Xu  2 Times Daily      Question:  [facility] Supplement:  Answer:  Xu    05/15/23 1445    05/15/23 1800  Dietary Nutrition Supplements Boost VHC  2 Times Daily      Question:  [facility] Supplement:  Answer:  Boost VHC    05/15/23 1446               Discharge Plan:  Ebony and Eric tomorrow      Primitivo Nuñez DO  23  16:37 EDT    Dictated utilizing Dragon dictation.        Electronically signed by Primitivo Nuñez DO at 23 1637          Discharge Summary      Primitivo Nuñez DO at 23 0810              [facility]   DISCHARGE SUMMARY      Name:  Carolin Toscano   Age:  77 y.o.  Sex:  female  :  1945  MRN:  [id number]   Visit Number:  [id number]    Admission Date:  2023  Date of Discharge:  2023  Primary Care Physician:  Jason Nicholson MD    Important issues to note:    Start: prednisone, zyrtec,flonase  Stop: spiriva as is duplicate therapy with trelegy  Follow up: PCP and pulmonology  Brief Summary: Presented with respiratory failure due to aecopd, parainfluenza and mucous plugging. Initially had prolonged recovery which improved to her baseline breathing by the time of discharge. Unfortunately due to weakness she needs physical therapy rehab at [facility].    Discharge  Diagnoses:       Acute on chronic respiratory failure with hypoxia and hypercapnia        Problem List:     Active Hospital Problems    Diagnosis  POA   • **Acute on chronic respiratory failure with hypoxia and hypercapnia [J96.21, J96.22]  Yes      Resolved Hospital Problems   No resolved problems to display.     Presenting Problem:    Chief Complaint   Patient presents with   • Respiratory Distress      Consults:     Consulting Physician(s)     Provider Relationship Specialty    Arnaldo Soriano MD Consulting Physician Pulmonary Disease        Procedures Performed:        History of presenting illness:  Patient is a 77 years old female with a past medical history of COPD, chronic respiratory failure on 5 to 6 L per her previous discharge, on NIV machine at home and trelegy, hypertension, and ongoing tobacco use who presented to the ER from home by EMS with a chief complaint of shortness of breath.  Patient reporting that she started having shortness of breath associated with productive cough since last night and has persisted and became worse that promoted her to call EMS.  EMS has found the patient to be at 65% saturation on her normal 6 L of oxygen. Patient reports compliance with her NIV machine at home.      On ER evaluation, Patient was found to be tachycardic with a heart rate of 130s, temperature of 100.2 and respirations of 30, /72.  Patient was placed on BiPAP with FiO2 of 40%.  Her ABG came back and showed pH of 7.296/PCO2 87/PO2 94/HCO3 42/saturation 97% on 5 L nasal cannula.  HS Troponin 44, proBNP WNL, glucose 192, CO2 of 40, otherwise CBC and CMP within acceptable range.  Lactate and Pro-Adi WNL.  Respiratory panel positive for parainfluenza virus.  Chest x-ray Mild interstitial disease  bilaterally is similar to prior. No area of consolidation is seen. Patient received Solu-Medrol and Aztreonam while in the ER.  Hospitalist consulted for admission.        Hospital Course:  6/1/23 stable for  dc to [facility]    5/31/2023 spoke with case management still working on rehab placement.  Anticipate Ebony and Eric tomorrow.  Pulmonology note reviewed.  Labs reviewed.  Discussed with patient.  No new complaints.    5/30 doing well sitting up in bed. Placement pending. No new c/o.     5/23-5/29 was managed by Dr. Vital has had a gradual improvement in respiratory status.    5/22: doing well. Oxygen requirements decreasing. Stable for dc out of ICU. Spoke with patient and family at bedside. D/w CM [place] [facility] bed pending.    5/21: c/o garrison suspect is due to mucous. Agreeable to flonase, zyrtec and atarax. Labs reviewed.    May 20, 2023 discussed with patient as well as family at the bedside.  Also discussed with them regarding placing access which she was able to get a PICC line.  Also discussed with them the staph growing in her sputum and that I was going to add vancomycin for this.    5/19: d/w patient and family updated them on [facility] and pending appeal which will likely be submitted Monday. Still having dyspnea but mucous is breaking loose today.  Still debating about bronchoscopy.  Interested in long-term acute care possibly in [place] versus [place].    5/18: Daughter Regla is coming today.  Otherwise not much change still having dyspnea and hypoxemia.  Case management note reviewed.    5/17/2023 discussed with case management still working [facility].  I am planning to do a peer to peer with them tomorrow.  Otherwise has persistent hypoxia.  Dr. Soriano's note reviewed considering bronchoscopy.    5/16/23: d/w nsg intermittently on avaps and hi flow. Patient awake in bed. CXR from 5/15 reviewed. Medications reviewed.  Discussed with case management [facility]    Patient was admitted and treated with steroids.  Antibiotics were not indicated.  Pulmonology consulted and was moved to ICU on 5/9 due to worsening respiratory failure and tachypnea.   She initially had to be on Precedex drip which was discontinued and switched to Xanax due to anxiety. Patient was placed on AVAPS.  Patient continued on steroids and received Lasix.            Edited by: Primitivo Nuñez DO at 6/1/2023 0810  Respiratory failure with hypoxia/hypercapnia, COPD exacerbation, parainfluenza  -patient on 5 to 6 L oxygen at baseline,  Continue BiPAP therapy.  Has NIV at home. Currently on 3 liters  -Recent parainfluenza virus infection met SIRS due to this  - Solu-Medrol previously now prednisone taper  -Bronchodilators, Mucinex status post MetaNeb and supportive care.  -Dr. Soriano note reviewed and appreciated  -s/p lasix  -Xanax as needed  -Sputum grew Staph aureus and haemophilus s/p vancomycin and azithromycin  - flonase, zyrtec, and atarax for upper airway congestion  -recommend continuing trelegy after dc     Elevated troponin  -Likely secondary to demand ischemia in settings of respiratory failure  -Patient denies any chest pain, low suspicion for ACS  -Troponin trended down and plateaued.     Hyperglycemia  -Will cover with sliding scale insulin while on steroids, bg mostly controlled while inpatient. Should not need insulin at skilled rehab recommend checking bg intermittently just to be sure.  -Hemoglobin A1c 6.7     Edited by: Primitivo Nuñez DO at 6/1/2023 0810      Vital Signs:    Temp:  [97.5 °F (36.4 °C)-99.4 °F (37.4 °C)] 98 °F (36.7 °C)  Heart Rate:  [68-83] 78  Resp:  [18-20] 20  BP: (112-126)/(54-62) 121/54    Physical Exam:    General Appearance:  Alert and cooperative.  Chronically ill-appearing. Sitting up in bed, conversant   Head:  Atraumatic and normocephalic.   Eyes: Conjunctivae and sclerae normal, no icterus. No pallor.   Throat: No oral lesions, no thrush, oral mucosa moist.   Neck: Supple, trachea midline, no thyromegaly.   Lungs:   clear to auscultation bilaterally on nasal cannula.   Heart:  Normal S1 and S2, no murmur, no gallop, no rub. No JVD.    Abdomen:   Normal bowel sounds, no masses, no organomegaly. Soft, nontender, nondistended, no rebound tenderness.   Extremities: Supple, no edema, no cyanosis, no clubbing.   Skin: No bleeding or rash.   Neurologic: Alert and oriented x 3. No facial asymmetry. Moves all four limbs. No tremors.  Generalized weakness noted.     Edited by: Primitivo Nuñez DO at 6/1/2023 0810    Pertinent Lab Results:     Results from last 7 days   Lab Units 06/01/23  0604 05/31/23  0639   SODIUM mmol/L 140 137   POTASSIUM mmol/L 4.4 4.3   CHLORIDE mmol/L 103 99   CO2 mmol/L 29.8* 28.4   BUN mg/dL 19 20   CREATININE mg/dL 0.25* 0.24*   CALCIUM mg/dL 9.0 9.1   GLUCOSE mg/dL 113* 114*     Results from last 7 days   Lab Units 06/01/23  0604 05/31/23  0639   WBC 10*3/mm3 5.32 6.44   HEMOGLOBIN g/dL 10.8* 10.8*   HEMATOCRIT % 34.3 34.7   PLATELETS 10*3/mm3 145 155                         Results from last 7 days   Lab Units 06/01/23  0733   PH, ARTERIAL pH units 7.455*   PO2 ART mm Hg 60.8*   PCO2, ARTERIAL mm Hg 47.7*   HCO3 ART mmol/L 33.5*           Pertinent Radiology Results:    Imaging Results (All)     Procedure Component Value Units Date/Time    XR Chest 1 View [[id number]] Collected: 05/26/23 0741     Updated: 05/26/23 0845    Narrative:      CLINICAL INDICATION:    Respiratory Failure.; J96.21-Acute and chronic respiratory failure with  hypoxia; J96.22-Acute and chronic respiratory failure with hypercapnia;  J44.1-Chronic obstructive pulmonary disease with (acute) exacerbation;  B34.8-Other viral infections of unspecified site     EXAMINATION TECHNIQUE:   XR CHEST 1 VW-     COMPARISON:  Radiographs 05/23/2023.     FINDINGS:  Right upper extremity PICC line in place. Stable cardiomediastinal  silhouette. Mildly decreased bibasilar airspace disease, left greater  than right. No pneumothorax. Mildly improved aeration. Probable trace  bilateral pleural effusions.       Impression:      Mild interval improvement.     Images  personally reviewed, interpreted and dictated by SHIRA Villa.                This report was signed and finalized on 5/26/2023 8:43 AM by SHIRA Villa.    XR Chest 1 View [[id number]] Collected: 05/23/23 1146     Updated: 05/23/23 1207    Narrative:      PROCEDURE: XR CHEST 1 VW-        HISTORY: Resp Failure.; J96.21-Acute and chronic respiratory failure  with hypoxia; J96.22-Acute and chronic respiratory failure with  hypercapnia; J44.1-Chronic obstructive pulmonary disease with (acute)  exacerbation; B34.8-Other viral infections of unspecified site     COMPARISON: May 21, 2023.     FINDINGS: The heart is normal in size. The mediastinum is unremarkable.  There is mild interval worsening of left basilar opacity. A right  basilar opacity is grossly unchanged. There are trace bilateral pleural  effusions. There is emphysema. There is chronic prominence of the  interstitium. There is no pneumothorax. There are no acute osseous  abnormalities. Right-sided PICC line is in place.       Impression:      Mild bibasilar airspace disease/pneumonia, left greater than  right..        Images were reviewed, interpreted, and dictated by SHIRA Larkin  Transcribed by Ade Solis PA-C.     This report was signed and finalized on 5/23/2023 12:05 PM by SHIRA Villa.    XR Chest 1 View [[id number]] Collected: 05/21/23 0720     Updated: 05/21/23 0724    Narrative:      PORTABLE CHEST    5/21/2023 6:19 AM      HISTORY: Respiratory failure.     COMPARISON: May 18, 2023.     FINDINGS: Consolidation at the left lung base has slightly worsened.  Background of bronchitis is noted. No other consolidation. No effusion  or pneumothorax. New right upper shimmy PICC, tip in SVC.       Impression:      Left base pneumonia, slightly worsened from previous.     This report was signed and finalized on 5/21/2023 7:22 AM by Dr Danyel Mckinnon DO.    XR Chest 1 View [[id number]] Collected: 05/18/23 0751      Updated: 05/18/23 0755    Narrative:      PROCEDURE: XR CHEST 1 VW-     HISTORY: Resp Failure.; J96.21-Acute and chronic respiratory failure  with hypoxia; J96.22-Acute and chronic respiratory failure with  hypercapnia; J44.1-Chronic obstructive pulmonary disease with (acute)  exacerbation; B34.8-Other viral infections of unspecified site     COMPARISON: 05/15/2023.     FINDINGS: The heart is upper limits of normal.. Again noted is bilateral  interstitial disease and bronchial wall thickening. More prominent  markings noted in the lung bases, left greater than right. This is shown  to represent bronchiectasis with mucus plugging on recent CT. Findings  are stable from prior. Aortic mural calcifications noted.. The  mediastinum is unremarkable. There is no pneumothorax.  There are no  acute osseous abnormalities.       Impression:      Stable chest..           This report was signed and finalized on 5/18/2023 7:53 AM by Shraddha Wharton MD.    CT Chest Without Contrast Diagnostic [[id number]] Collected: 05/16/23 1336     Updated: 05/16/23 1340    Narrative:      PROCEDURE: CT CHEST WO CONTRAST DIAGNOSTIC-     HISTORY: Dyspnea, chronic, unclear etiology; J96.21-Acute and chronic  respiratory failure with hypoxia; J96.22-Acute and chronic respiratory  failure with hypercapnia; J44.1-Chronic obstructive pulmonary disease  with (acute) exacerbation; B34.8-Other viral infections of unspecified  site     COMPARISON: April 13, 2023.     PROCEDURE:  Multiple axial CT images were obtained from the thoracic  inlet through the upper abdomen without the use of contrast.      FINDINGS:   Soft tissue windows reveal no suspicious axillary, hilar or mediastinal  adenopathy. Heart size is normal. The aorta is normal in caliber. There  are vascular calcifications. There are no pleural or pericardial  effusions. There is bronchiectasis in the bilateral lower lobes. There  is new mucous plugging and airspace disease in the bilateral lower  lobes  consistent with pneumonia. There are tree-in-bud opacities diffusely  which have worsened since the prior exam consistent with bronchiolitis.  There is evidence of old calcified granulomatous disease. The visualized  upper abdomen shows the gallbladder to be surgically absent. Bone  windows reveal no bony destructive lesions.       Impression:      Mucous plugging, bilateral lower lobe pneumonia, and  bronchiolitis. 8-12 week follow-up exam is recommended to document  stability.     193.74 mGy.cm        This study was performed with techniques to keep radiation doses as low  as reasonably achievable (ALARA). Individualized dose reduction  techniques using automated exposure control or adjustment of mA and/or  kV according to the patient size were employed.               Images were reviewed, interpreted, and dictated by Dr. Shraddha Wharton MD  Transcribed by Ade Solis PA-C.     This report was signed and finalized on 5/16/2023 1:38 PM by Shraddha Wharton MD.    XR Chest 1 View [[id number]] Collected: 05/15/23 0837     Updated: 05/15/23 0839    Narrative:      PROCEDURE: XR CHEST 1 VW-     HISTORY: follow up, increased WBC & CO2; J96.21-Acute and chronic  respiratory failure with hypoxia; J96.22-Acute and chronic respiratory  failure with hypercapnia; J44.1-Chronic obstructive pulmonary disease  with (acute) exacerbation; B34.8-Other viral infections of unspecified  site     COMPARISON: 05/13/2023.     FINDINGS: The heart is normal in size. Bilateral interstitial disease  most prominent left lung base is stable from prior. Bronchial wall  thickening noted.. The mediastinum is unremarkable. There is no  pneumothorax.  There are no acute osseous abnormalities.       Impression:      Stable chest..           This report was signed and finalized on 5/15/2023 8:37 AM by Shraddha Wharton MD.    XR Chest 1 View [[id number]] Collected: 05/13/23 1656     Updated: 05/13/23 1659    Narrative:      PROCEDURE: XR CHEST 1 VW-      HISTORY: Respiratory failure follow-up; J96.21-Acute and chronic  respiratory failure with hypoxia; J96.22-Acute and chronic respiratory  failure with hypercapnia; J44.1-Chronic obstructive pulmonary disease  with (acute) exacerbation; B34.8-Other viral infections of unspecified  site     COMPARISON: May 11, 2023.     FINDINGS: The heart is normal in size. The mediastinum is unremarkable.  Increased interstitial markings appear not significantly changed. There  is no pneumothorax.  There are no acute osseous abnormalities.       Impression:      Increased interstitial markings appear not significantly  changed.     Continued followup is recommended.     This report was signed and finalized on 5/13/2023 4:57 PM by Rodrigo Goodman DO.    XR Chest 1 View [[id number]] Collected: 05/11/23 1504     Updated: 05/11/23 1511    Narrative:      PROCEDURE: XR CHEST 1 VW-        HISTORY: Resp Failure.; J96.21-Acute and chronic respiratory failure  with hypoxia; J96.22-Acute and chronic respiratory failure with  hypercapnia; J44.1-Chronic obstructive pulmonary disease with (acute)  exacerbation; B34.8-Other viral infections of unspecified site     COMPARISON: One day prior.     FINDINGS: The heart is borderline enlarged. The mediastinum is  unremarkable. There is bronchial wall thickening. There is interstitial  disease most prominent in the left lung base. There is mild interval  improvement in the right lung base. There is no pneumothorax. There are  no acute osseous abnormalities.       Impression:      Mild interval improvement in aeration of the right lung  base. Otherwise, stable exam.                       Images were reviewed, interpreted, and dictated by Dr. Shraddha Wharton MD  Transcribed by Ade Solis PA-C.     This report was signed and finalized on 5/11/2023 3:09 PM by Shraddha Wharton MD.    XR Chest 1 View [[id number]] Collected: 05/10/23 1043     Updated: 05/10/23 1046    Narrative:      PROCEDURE: XR CHEST 1 VW-      HISTORY: resp failure follow up; J96.21-Acute and chronic respiratory  failure with hypoxia; J96.22-Acute and chronic respiratory failure with  hypercapnia; J44.1-Chronic obstructive pulmonary disease with (acute)  exacerbation; B34.8-Other viral infections of unspecified site     COMPARISON: 2 days prior.     FINDINGS: The heart is upper limits of normal in size, stable..  Interstitial disease again noted bilaterally, worsening on the left.  There is also worsening peribronchial thickening, most prominent in the  left lung base. Consider infection versus edema. Recommend follow-up..  The mediastinum is unremarkable. There is no pneumothorax.  There are no  acute osseous abnormalities.       Impression:      Worsening interstitial disease and bronchial wall thickening  most prominent left lung base, consider infection versus edema and  recommend follow-up..           This report was signed and finalized on 5/10/2023 10:44 AM by Shraddha Wharton MD.    XR Chest 1 View [[id number]] Collected: 05/08/23 1023     Updated: 05/08/23 1026    Narrative:      PROCEDURE: XR CHEST 1 VW-        HISTORY: Respiratory failure follow-up; J96.21-Acute and chronic  respiratory failure with hypoxia; J96.22-Acute and chronic respiratory  failure with hypercapnia; J44.1-Chronic obstructive pulmonary disease  with (acute) exacerbation; B34.8-Other viral infections of unspecified  site     COMPARISON: May 6, 2023.     FINDINGS: Apical lordotic view. The heart is at the upper limits of  normal in size. There is mild interstitial disease bilaterally. There is  bronchial wall thickening, similar to the prior exam. There is no  pneumothorax. There are no acute osseous abnormalities.       Impression:      No significant interval change.                       Images were reviewed, interpreted, and dictated by Dr. Shraddha Wharton MD  Transcribed by Ade Solis PA-C.     This report was signed and finalized on 5/8/2023 10:24 AM by Shraddha Wharton  MD.    XR Chest 1 View [[id number]] Collected: 05/06/23 0922     Updated: 05/06/23 0925    Narrative:      PROCEDURE: XR CHEST 1 VW-     HISTORY: Respiratory failure follow-up; J96.21-Acute and chronic  respiratory failure with hypoxia; J96.22-Acute and chronic respiratory  failure with hypercapnia; J44.1-Chronic obstructive pulmonary disease  with (acute) exacerbation; B34.8-Other viral infections of unspecified  site     COMPARISON: May 4, 2023.     FINDINGS: The heart is upper limits of normal in size. Aortic mural  calcifications noted.. There is mild bilateral interstitial disease,  stable from prior exam. Slight blunting of the left costophrenic angle  is stable.. The mediastinum is unremarkable. There is no pneumothorax.   There are no acute osseous abnormalities.       Impression:      Stable chest..           This report was signed and finalized on 5/6/2023 9:23 AM by Shraddha Wharton MD.    XR Chest 1 View [[id number]] Collected: 05/04/23 1528     Updated: 05/04/23 1534    Narrative:      PROCEDURE: XR CHEST 1 VW-     HISTORY: SOA Triage Protocol     COMPARISON: 04/18/2023.     FINDINGS: The heart is normal in size. Mild interstitial disease  bilaterally is similar to prior. No area of consolidation is seen.  Aortic mural calcifications noted.. The mediastinum is unremarkable.  There is no pneumothorax.  There are no acute osseous abnormalities.       Impression:      Stable chest..           This report was signed and finalized on 5/4/2023 3:31 PM by Shraddha Wharton MD.          Echo:    Results for orders placed during the hospital encounter of 04/13/23    Adult Transthoracic Echo Complete W/ Cont if Necessary Per Protocol    Interpretation Summary  •  Left ventricular diastolic function is consistent with (grade I) impaired relaxation.  •  Mild aortic valve stenosis is present.  •  Estimated right ventricular systolic pressure from tricuspid regurgitation is moderately elevated (45-55 mmHg).  •  Mild to  moderate pulmonary hypertension is present.    Condition on Discharge:      Stable.    Code status during the hospital stay:    Code Status and Medical Interventions:   Ordered at: 05/04/23 7969     Code Status (Patient has no pulse and is not breathing):    CPR (Attempt to Resuscitate)     Medical Interventions (Patient has pulse or is breathing):    Full Support     Discharge Disposition:    Short Term Hospital (DC - External)    Discharge Medications:       Discharge Medications      New Medications      Instructions Start Date   cetirizine 10 MG tablet  Commonly known as: zyrTEC   10 mg, Oral, Daily      Diclofenac Sodium 1 % gel gel  Commonly known as: VOLTAREN   2 g, Topical, 3 Times Daily PRN      guaiFENesin-dextromethorphan 100-10 MG/5ML syrup  Commonly known as: ROBITUSSIN DM   10 mL, Oral, Every 4 Hours PRN      hydrOXYzine 25 MG tablet  Commonly known as: ATARAX   25 mg, Oral, 3 Times Daily      ipratropium-albuterol 0.5-2.5 mg/3 ml nebulizer  Commonly known as: DUO-NEB   3 mL, Nebulization, Every 6 Hours - RT      melatonin 5 MG tablet tablet   5 mg, Oral, Nightly      mupirocin 2 % cream  Commonly known as: BACTROBAN   1 application, Topical, Every 12 Hours Scheduled      predniSONE 10 MG tablet  Commonly known as: DELTASONE   10 mg, Oral, Daily         Changes to Medications      Instructions Start Date   multivitamins-minerals capsule capsule  What changed: Another medication with the same name was removed. Continue taking this medication, and follow the directions you see here.   2 capsules, Oral, Daily         Continue These Medications      Instructions Start Date   acetaminophen 325 MG tablet  Commonly known as: TYLENOL   650 mg, Oral, Every 6 Hours PRN      albuterol (2.5 MG/3ML) 0.083% nebulizer solution  Commonly known as: PROVENTIL   Inhale contents of 1 vial (3 mL) by nebulization Every 6 (Six) Hours As Needed for Wheezing for up to 30 days.      amLODIPine 10 MG tablet  Commonly known as:  NORVASC   10 mg, Oral, Daily      atenolol 25 MG tablet  Commonly known as: TENORMIN   25 mg, Oral, Daily      saccharomyces boulardii 250 MG capsule  Commonly known as: FLORASTOR   250 mg, Oral, Every Other Day      Trelegy Ellipta 100-62.5-25 MCG/ACT inhaler  Generic drug: Fluticasone-Umeclidin-Vilant   Inhale 1 puff  by mouth Daily         Stop These Medications    tiotropium 18 MCG per inhalation capsule  Commonly known as: Spiriva HandiHaler        ASK your doctor about these medications      Instructions Start Date   fluticasone 50 MCG/ACT nasal spray  Commonly known as: Flonase  Ask about: Which instructions should I use?   2 sprays, Nasal, Daily, Administer 2 sprays in each nostril for each dose.      fluticasone 50 MCG/ACT nasal spray  Commonly known as: FLONASE  Ask about: Which instructions should I use?   2 sprays, Each Nare, 2 Times Daily           Discharge Diet:     Diet Instructions     Advance Diet As Tolerated -Target Diet: cardiac      Target Diet: cardiac        Activity at Discharge:       Follow-up Appointments:    Additional Instructions for the Follow-ups that You Need to Schedule     Ambulatory Referral to Disease State Management   As directed      Follow-up in 14-21 days after discharge    Order Comments: Follow-up in 14-21 days after discharge     To dept: [facility] [[id number]]    What program(s) are you referring for?: COPD    Follow-up needed: No         Discharge Follow-up with PCP   As directed       Currently Documented PCP:    Jason Nicholson MD    PCP Phone Number:    [phone]     Follow Up Details: 1 week         Discharge Follow-up with Specified Provider: Dr. Soriano OR Diamond Kaplan; 4 Months   As directed      To: Dr. Soriano OR Diamond Kaplan    Follow Up: 4 Months    Follow Up Details: If being discharged on a weekend, please call our office on the next working day to schedule this appointment.            Follow-up Information     Jason Nicholson MD .     Specialty: Internal Medicine  Why: 1 week  Contact information:  [address] [facility] [address]  [facility] [address]  [phone]                       Future Appointments   Date Time Provider Department Center   7/27/2023  3:15 PM Jason Nicholson MD [facility]     Test Results Pending at Discharge:           Primitivo Nuñez DO  06/01/23  08:10 EDT    Time: I spent 45 minutes on this discharge activity which included: face-to-face encounter with the patient, reviewing the data in the system, coordination of the care with the nursing staff as well as consultants, documentation, and entering orders.     Dictated utilizing Dragon dictation.        Electronically signed by Primitivo Nuñez DO at 06/01/23 0813

## 2023-06-01 NOTE — NURSING NOTE
Attempted to call both daughters with no answer. Will try again later   The lyme titer ordered by Dr Evalee Gowers is positive, he needs to take doxycycline twice a day for 14 days, I sent this into [facility]

## 2023-06-01 NOTE — PLAN OF CARE
Goal Outcome Evaluation: Patient being discharged to [facility] bed via EMS

## 2023-06-02 PROBLEM — J96.11 CHRONIC RESPIRATORY FAILURE WITH HYPOXIA AND HYPERCAPNIA (HCC): Status: ACTIVE | Noted: 2023-06-02

## 2023-06-02 PROBLEM — J44.9 COPD (CHRONIC OBSTRUCTIVE PULMONARY DISEASE) (HCC): Status: ACTIVE | Noted: 2023-06-02

## 2023-06-02 PROBLEM — Z87.891 PERSONAL HISTORY OF TOBACCO USE: Status: ACTIVE | Noted: 2023-06-02

## 2023-06-02 PROBLEM — R77.8 ELEVATED TROPONIN: Status: ACTIVE | Noted: 2023-06-02

## 2023-06-02 PROBLEM — I10 HTN (HYPERTENSION): Status: ACTIVE | Noted: 2023-06-02

## 2023-06-02 PROBLEM — R73.9 HYPERGLYCEMIA: Status: ACTIVE | Noted: 2023-06-02

## 2023-06-02 PROBLEM — J96.12 CHRONIC RESPIRATORY FAILURE WITH HYPOXIA AND HYPERCAPNIA (HCC): Status: ACTIVE | Noted: 2023-06-02

## 2023-06-02 LAB
ALBUMIN SERPL-MCNC: 3.1 G/DL (ref 3.4–4.8)
ALBUMIN/GLOB SERPL: 1.6 {RATIO} (ref 0.8–2)
ALP SERPL-CCNC: 68 U/L (ref 25–100)
ALT SERPL-CCNC: 36 U/L (ref 4–36)
ANION GAP SERPL CALCULATED.3IONS-SCNC: 4 MMOL/L (ref 3–16)
AST SERPL-CCNC: 15 U/L (ref 8–33)
BILIRUB SERPL-MCNC: 0.3 MG/DL (ref 0.3–1.2)
BUN SERPL-MCNC: 17 MG/DL (ref 6–20)
CALCIUM SERPL-MCNC: 8.8 MG/DL (ref 8.5–10.5)
CHLORIDE SERPL-SCNC: 103 MMOL/L (ref 98–107)
CO2 SERPL-SCNC: 34 MMOL/L (ref 20–30)
CREAT SERPL-MCNC: <0.5 MG/DL (ref 0.4–1.2)
ERYTHROCYTE [DISTWIDTH] IN BLOOD BY AUTOMATED COUNT: 19 % (ref 11–16)
GFR SERPLBLD CREATININE-BSD FMLA CKD-EPI: >60 ML/MIN/{1.73_M2}
GLOBULIN SER CALC-MCNC: 2 G/DL
GLUCOSE SERPL-MCNC: 110 MG/DL (ref 74–106)
HCT VFR BLD AUTO: 33.9 % (ref 37–47)
HGB BLD-MCNC: 10.9 G/DL (ref 11.5–16.5)
MCH RBC QN AUTO: 30.5 PG (ref 27–32)
MCHC RBC AUTO-ENTMCNC: 32.2 G/DL (ref 31–35)
MCV RBC AUTO: 95 FL (ref 80–100)
PLATELET # BLD AUTO: 134 K/UL (ref 150–400)
PMV BLD AUTO: 10.3 FL (ref 6–10)
POTASSIUM SERPL-SCNC: 4.6 MMOL/L (ref 3.4–5.1)
PROT SERPL-MCNC: 5.1 G/DL (ref 6.4–8.3)
RBC # BLD AUTO: 3.57 M/UL (ref 3.8–5.8)
SODIUM SERPL-SCNC: 141 MMOL/L (ref 136–145)
WBC # BLD AUTO: 5.5 K/UL (ref 4–11)

## 2023-06-02 PROCEDURE — 97530 THERAPEUTIC ACTIVITIES: CPT

## 2023-06-02 PROCEDURE — 86480 TB TEST CELL IMMUN MEASURE: CPT

## 2023-06-02 PROCEDURE — 6360000002 HC RX W HCPCS: Performed by: INTERNAL MEDICINE

## 2023-06-02 PROCEDURE — 94660 CPAP INITIATION&MGMT: CPT

## 2023-06-02 PROCEDURE — 94640 AIRWAY INHALATION TREATMENT: CPT

## 2023-06-02 PROCEDURE — 97161 PT EVAL LOW COMPLEX 20 MIN: CPT

## 2023-06-02 PROCEDURE — 97802 MEDICAL NUTRITION INDIV IN: CPT

## 2023-06-02 PROCEDURE — 6360000002 HC RX W HCPCS: Performed by: PHYSICIAN ASSISTANT

## 2023-06-02 PROCEDURE — 97165 OT EVAL LOW COMPLEX 30 MIN: CPT

## 2023-06-02 PROCEDURE — 99306 1ST NF CARE HIGH MDM 50: CPT | Performed by: PHYSICIAN ASSISTANT

## 2023-06-02 PROCEDURE — 80053 COMPREHEN METABOLIC PANEL: CPT

## 2023-06-02 PROCEDURE — 6370000000 HC RX 637 (ALT 250 FOR IP): Performed by: INTERNAL MEDICINE

## 2023-06-02 PROCEDURE — 6370000000 HC RX 637 (ALT 250 FOR IP): Performed by: PHYSICIAN ASSISTANT

## 2023-06-02 PROCEDURE — 2700000000 HC OXYGEN THERAPY PER DAY

## 2023-06-02 PROCEDURE — 94761 N-INVAS EAR/PLS OXIMETRY MLT: CPT

## 2023-06-02 PROCEDURE — 94664 DEMO&/EVAL PT USE INHALER: CPT

## 2023-06-02 PROCEDURE — 85027 COMPLETE CBC AUTOMATED: CPT

## 2023-06-02 PROCEDURE — 1200000002 HC SEMI PRIVATE SWING BED

## 2023-06-02 PROCEDURE — 36415 COLL VENOUS BLD VENIPUNCTURE: CPT

## 2023-06-02 RX ORDER — LANOLIN ALCOHOL/MO/W.PET/CERES
3 CREAM (GRAM) TOPICAL NIGHTLY
Status: DISCONTINUED | OUTPATIENT
Start: 2023-06-02 | End: 2023-06-02 | Stop reason: ALTCHOICE

## 2023-06-02 RX ORDER — MECOBALAMIN 5000 MCG
5 TABLET,DISINTEGRATING ORAL NIGHTLY
Status: DISCONTINUED | OUTPATIENT
Start: 2023-06-02 | End: 2023-06-17 | Stop reason: HOSPADM

## 2023-06-02 RX ORDER — MUPIROCIN CALCIUM 20 MG/G
1 CREAM TOPICAL 2 TIMES DAILY
Status: ON HOLD | COMMUNITY

## 2023-06-02 RX ORDER — BUDESONIDE 0.5 MG/2ML
0.5 INHALANT ORAL 2 TIMES DAILY
Status: DISCONTINUED | OUTPATIENT
Start: 2023-06-02 | End: 2023-06-17 | Stop reason: HOSPADM

## 2023-06-02 RX ORDER — IPRATROPIUM BROMIDE AND ALBUTEROL SULFATE 2.5; .5 MG/3ML; MG/3ML
1 SOLUTION RESPIRATORY (INHALATION) EVERY 6 HOURS
Status: DISCONTINUED | OUTPATIENT
Start: 2023-06-02 | End: 2023-06-03

## 2023-06-02 RX ADMIN — CETIRIZINE HYDROCHLORIDE 10 MG: 10 TABLET, FILM COATED ORAL at 09:14

## 2023-06-02 RX ADMIN — HYDROXYZINE HYDROCHLORIDE 25 MG: 25 TABLET, FILM COATED ORAL at 20:14

## 2023-06-02 RX ADMIN — PREDNISONE 10 MG: 10 TABLET ORAL at 09:14

## 2023-06-02 RX ADMIN — IPRATROPIUM BROMIDE AND ALBUTEROL SULFATE 1 DOSE: 2.5; .5 SOLUTION RESPIRATORY (INHALATION) at 20:18

## 2023-06-02 RX ADMIN — AMLODIPINE BESYLATE 10 MG: 5 TABLET ORAL at 09:14

## 2023-06-02 RX ADMIN — ENOXAPARIN SODIUM 40 MG: 100 INJECTION SUBCUTANEOUS at 20:14

## 2023-06-02 RX ADMIN — MUPIROCIN: 20 OINTMENT TOPICAL at 20:14

## 2023-06-02 RX ADMIN — ACETAMINOPHEN 650 MG: 325 TABLET, FILM COATED ORAL at 14:48

## 2023-06-02 RX ADMIN — BUDESONIDE 500 MCG: 0.5 INHALANT RESPIRATORY (INHALATION) at 20:18

## 2023-06-02 RX ADMIN — DICLOFENAC SODIUM 2 G: 10 GEL TOPICAL at 14:51

## 2023-06-02 RX ADMIN — ATENOLOL 25 MG: 25 TABLET ORAL at 09:13

## 2023-06-02 RX ADMIN — MUPIROCIN: 20 OINTMENT TOPICAL at 09:22

## 2023-06-02 RX ADMIN — ACETAMINOPHEN 650 MG: 325 TABLET, FILM COATED ORAL at 06:02

## 2023-06-02 RX ADMIN — Medication 5 MG: at 20:14

## 2023-06-02 RX ADMIN — FLUTICASONE PROPIONATE 1 SPRAY: 50 SPRAY, METERED NASAL at 09:21

## 2023-06-02 RX ADMIN — MULTIPLE VITAMINS W/ MINERALS TAB 1 TABLET: TAB at 09:14

## 2023-06-02 ASSESSMENT — PAIN DESCRIPTION - LOCATION
LOCATION: BACK
LOCATION: BACK

## 2023-06-02 ASSESSMENT — PAIN SCALES - GENERAL
PAINLEVEL_OUTOF10: 10
PAINLEVEL_OUTOF10: 8

## 2023-06-02 ASSESSMENT — PAIN DESCRIPTION - DESCRIPTORS: DESCRIPTORS: ACHING

## 2023-06-03 PROCEDURE — 94660 CPAP INITIATION&MGMT: CPT

## 2023-06-03 PROCEDURE — 1200000002 HC SEMI PRIVATE SWING BED

## 2023-06-03 PROCEDURE — 6360000002 HC RX W HCPCS: Performed by: PHYSICIAN ASSISTANT

## 2023-06-03 PROCEDURE — 6360000002 HC RX W HCPCS: Performed by: INTERNAL MEDICINE

## 2023-06-03 PROCEDURE — 6370000000 HC RX 637 (ALT 250 FOR IP): Performed by: PHYSICIAN ASSISTANT

## 2023-06-03 PROCEDURE — 2700000000 HC OXYGEN THERAPY PER DAY

## 2023-06-03 PROCEDURE — 94640 AIRWAY INHALATION TREATMENT: CPT

## 2023-06-03 PROCEDURE — 94761 N-INVAS EAR/PLS OXIMETRY MLT: CPT

## 2023-06-03 PROCEDURE — 6370000000 HC RX 637 (ALT 250 FOR IP): Performed by: INTERNAL MEDICINE

## 2023-06-03 RX ORDER — ONDANSETRON 4 MG/1
4 TABLET, FILM COATED ORAL EVERY 8 HOURS PRN
Status: DISCONTINUED | OUTPATIENT
Start: 2023-06-03 | End: 2023-06-17 | Stop reason: HOSPADM

## 2023-06-03 RX ORDER — IPRATROPIUM BROMIDE AND ALBUTEROL SULFATE 2.5; .5 MG/3ML; MG/3ML
1 SOLUTION RESPIRATORY (INHALATION)
Status: DISCONTINUED | OUTPATIENT
Start: 2023-06-03 | End: 2023-06-03

## 2023-06-03 RX ORDER — IPRATROPIUM BROMIDE AND ALBUTEROL SULFATE 2.5; .5 MG/3ML; MG/3ML
1 SOLUTION RESPIRATORY (INHALATION) 2 TIMES DAILY
Status: DISCONTINUED | OUTPATIENT
Start: 2023-06-03 | End: 2023-06-17 | Stop reason: HOSPADM

## 2023-06-03 RX ORDER — ONDANSETRON 2 MG/ML
4 INJECTION INTRAMUSCULAR; INTRAVENOUS EVERY 8 HOURS PRN
Status: DISCONTINUED | OUTPATIENT
Start: 2023-06-03 | End: 2023-06-17 | Stop reason: HOSPADM

## 2023-06-03 RX ADMIN — IPRATROPIUM BROMIDE AND ALBUTEROL SULFATE 1 DOSE: 2.5; .5 SOLUTION RESPIRATORY (INHALATION) at 05:08

## 2023-06-03 RX ADMIN — ACETAMINOPHEN 650 MG: 325 TABLET, FILM COATED ORAL at 14:45

## 2023-06-03 RX ADMIN — FLUTICASONE PROPIONATE 1 SPRAY: 50 SPRAY, METERED NASAL at 08:05

## 2023-06-03 RX ADMIN — HYDROXYZINE HYDROCHLORIDE 25 MG: 25 TABLET, FILM COATED ORAL at 21:29

## 2023-06-03 RX ADMIN — MUPIROCIN: 20 OINTMENT TOPICAL at 08:06

## 2023-06-03 RX ADMIN — AMLODIPINE BESYLATE 10 MG: 5 TABLET ORAL at 08:06

## 2023-06-03 RX ADMIN — MUPIROCIN: 20 OINTMENT TOPICAL at 21:30

## 2023-06-03 RX ADMIN — ONDANSETRON HYDROCHLORIDE 4 MG: 4 TABLET, FILM COATED ORAL at 21:29

## 2023-06-03 RX ADMIN — ENOXAPARIN SODIUM 40 MG: 100 INJECTION SUBCUTANEOUS at 21:29

## 2023-06-03 RX ADMIN — ACETAMINOPHEN 650 MG: 325 TABLET, FILM COATED ORAL at 02:21

## 2023-06-03 RX ADMIN — ATENOLOL 25 MG: 25 TABLET ORAL at 08:06

## 2023-06-03 RX ADMIN — Medication 5 MG: at 21:29

## 2023-06-03 RX ADMIN — PREDNISONE 10 MG: 10 TABLET ORAL at 08:06

## 2023-06-03 RX ADMIN — Medication 1 CAPSULE: at 08:06

## 2023-06-03 RX ADMIN — BUDESONIDE 500 MCG: 0.5 INHALANT RESPIRATORY (INHALATION) at 05:08

## 2023-06-03 RX ADMIN — MULTIPLE VITAMINS W/ MINERALS TAB 1 TABLET: TAB at 08:07

## 2023-06-03 RX ADMIN — DICLOFENAC SODIUM 2 G: 10 GEL TOPICAL at 08:05

## 2023-06-03 RX ADMIN — CETIRIZINE HYDROCHLORIDE 10 MG: 10 TABLET, FILM COATED ORAL at 08:07

## 2023-06-03 ASSESSMENT — PAIN SCALES - GENERAL
PAINLEVEL_OUTOF10: 8
PAINLEVEL_OUTOF10: 2

## 2023-06-03 ASSESSMENT — PAIN DESCRIPTION - LOCATION
LOCATION: HEAD
LOCATION: NOSE

## 2023-06-04 PROCEDURE — 6360000002 HC RX W HCPCS: Performed by: PHYSICIAN ASSISTANT

## 2023-06-04 PROCEDURE — 94660 CPAP INITIATION&MGMT: CPT

## 2023-06-04 PROCEDURE — 94761 N-INVAS EAR/PLS OXIMETRY MLT: CPT

## 2023-06-04 PROCEDURE — 6370000000 HC RX 637 (ALT 250 FOR IP): Performed by: PHYSICIAN ASSISTANT

## 2023-06-04 PROCEDURE — 1200000002 HC SEMI PRIVATE SWING BED

## 2023-06-04 PROCEDURE — 6370000000 HC RX 637 (ALT 250 FOR IP): Performed by: INTERNAL MEDICINE

## 2023-06-04 PROCEDURE — 2700000000 HC OXYGEN THERAPY PER DAY

## 2023-06-04 RX ADMIN — HYDROXYZINE HYDROCHLORIDE 25 MG: 25 TABLET, FILM COATED ORAL at 22:06

## 2023-06-04 RX ADMIN — FLUTICASONE PROPIONATE 1 SPRAY: 50 SPRAY, METERED NASAL at 07:59

## 2023-06-04 RX ADMIN — MUPIROCIN: 20 OINTMENT TOPICAL at 07:59

## 2023-06-04 RX ADMIN — PREDNISONE 10 MG: 10 TABLET ORAL at 08:00

## 2023-06-04 RX ADMIN — MULTIPLE VITAMINS W/ MINERALS TAB 1 TABLET: TAB at 07:59

## 2023-06-04 RX ADMIN — ACETAMINOPHEN 650 MG: 325 TABLET, FILM COATED ORAL at 13:02

## 2023-06-04 RX ADMIN — Medication 5 MG: at 22:05

## 2023-06-04 RX ADMIN — ENOXAPARIN SODIUM 40 MG: 100 INJECTION SUBCUTANEOUS at 22:05

## 2023-06-04 RX ADMIN — AMLODIPINE BESYLATE 10 MG: 5 TABLET ORAL at 08:00

## 2023-06-04 RX ADMIN — ACETAMINOPHEN 650 MG: 325 TABLET, FILM COATED ORAL at 22:09

## 2023-06-04 RX ADMIN — DICLOFENAC SODIUM 2 G: 10 GEL TOPICAL at 08:03

## 2023-06-04 RX ADMIN — CETIRIZINE HYDROCHLORIDE 10 MG: 10 TABLET, FILM COATED ORAL at 08:00

## 2023-06-04 RX ADMIN — MUPIROCIN: 20 OINTMENT TOPICAL at 22:05

## 2023-06-04 RX ADMIN — ACETAMINOPHEN 650 MG: 325 TABLET, FILM COATED ORAL at 03:18

## 2023-06-04 RX ADMIN — ATENOLOL 25 MG: 25 TABLET ORAL at 08:00

## 2023-06-04 ASSESSMENT — PAIN DESCRIPTION - LOCATION
LOCATION: HEAD;NECK
LOCATION: HEAD

## 2023-06-04 ASSESSMENT — PAIN SCALES - GENERAL
PAINLEVEL_OUTOF10: 6
PAINLEVEL_OUTOF10: 9
PAINLEVEL_OUTOF10: 8

## 2023-06-04 ASSESSMENT — PAIN DESCRIPTION - DESCRIPTORS: DESCRIPTORS: ACHING

## 2023-06-05 LAB
GAMMA INTERFERON BACKGROUND BLD IA-ACNC: 0.09 IU/ML
MITOGEN IGNF BCKGRD COR BLD-ACNC: >10 IU/ML
QUANTI TB GOLD PLUS: NEGATIVE
QUANTI TB1 MINUS NIL: 0 IU/ML (ref 0–0.34)
QUANTI TB2 MINUS NIL: 0.03 IU/ML (ref 0–0.34)

## 2023-06-05 PROCEDURE — 94660 CPAP INITIATION&MGMT: CPT

## 2023-06-05 PROCEDURE — 2700000000 HC OXYGEN THERAPY PER DAY

## 2023-06-05 PROCEDURE — 6360000002 HC RX W HCPCS: Performed by: PHYSICIAN ASSISTANT

## 2023-06-05 PROCEDURE — 94761 N-INVAS EAR/PLS OXIMETRY MLT: CPT

## 2023-06-05 PROCEDURE — 6370000000 HC RX 637 (ALT 250 FOR IP): Performed by: PHYSICIAN ASSISTANT

## 2023-06-05 PROCEDURE — 6370000000 HC RX 637 (ALT 250 FOR IP): Performed by: INTERNAL MEDICINE

## 2023-06-05 PROCEDURE — 1200000002 HC SEMI PRIVATE SWING BED

## 2023-06-05 PROCEDURE — 97530 THERAPEUTIC ACTIVITIES: CPT

## 2023-06-05 PROCEDURE — 6360000002 HC RX W HCPCS: Performed by: INTERNAL MEDICINE

## 2023-06-05 PROCEDURE — 94640 AIRWAY INHALATION TREATMENT: CPT

## 2023-06-05 PROCEDURE — 97535 SELF CARE MNGMENT TRAINING: CPT

## 2023-06-05 PROCEDURE — 97116 GAIT TRAINING THERAPY: CPT

## 2023-06-05 PROCEDURE — 99308 SBSQ NF CARE LOW MDM 20: CPT | Performed by: PHYSICIAN ASSISTANT

## 2023-06-05 RX ADMIN — AMLODIPINE BESYLATE 10 MG: 5 TABLET ORAL at 08:51

## 2023-06-05 RX ADMIN — BUDESONIDE 500 MCG: 0.5 INHALANT RESPIRATORY (INHALATION) at 17:28

## 2023-06-05 RX ADMIN — Medication 1 CAPSULE: at 08:51

## 2023-06-05 RX ADMIN — ATENOLOL 25 MG: 25 TABLET ORAL at 08:51

## 2023-06-05 RX ADMIN — IPRATROPIUM BROMIDE AND ALBUTEROL SULFATE 1 DOSE: 2.5; .5 SOLUTION RESPIRATORY (INHALATION) at 17:28

## 2023-06-05 RX ADMIN — ACETAMINOPHEN 650 MG: 325 TABLET, FILM COATED ORAL at 05:45

## 2023-06-05 RX ADMIN — MUPIROCIN: 20 OINTMENT TOPICAL at 20:32

## 2023-06-05 RX ADMIN — FLUTICASONE PROPIONATE 1 SPRAY: 50 SPRAY, METERED NASAL at 08:51

## 2023-06-05 RX ADMIN — BUDESONIDE 500 MCG: 0.5 INHALANT RESPIRATORY (INHALATION) at 05:02

## 2023-06-05 RX ADMIN — HYDROXYZINE HYDROCHLORIDE 25 MG: 25 TABLET, FILM COATED ORAL at 20:32

## 2023-06-05 RX ADMIN — ACETAMINOPHEN 650 MG: 325 TABLET, FILM COATED ORAL at 20:31

## 2023-06-05 RX ADMIN — PREDNISONE 10 MG: 10 TABLET ORAL at 08:51

## 2023-06-05 RX ADMIN — MULTIPLE VITAMINS W/ MINERALS TAB 1 TABLET: TAB at 08:51

## 2023-06-05 RX ADMIN — IPRATROPIUM BROMIDE AND ALBUTEROL SULFATE 1 DOSE: 2.5; .5 SOLUTION RESPIRATORY (INHALATION) at 05:02

## 2023-06-05 RX ADMIN — CETIRIZINE HYDROCHLORIDE 10 MG: 10 TABLET, FILM COATED ORAL at 08:51

## 2023-06-05 RX ADMIN — MUPIROCIN: 20 OINTMENT TOPICAL at 08:52

## 2023-06-05 RX ADMIN — Medication 5 MG: at 20:32

## 2023-06-05 RX ADMIN — ENOXAPARIN SODIUM 40 MG: 100 INJECTION SUBCUTANEOUS at 20:31

## 2023-06-05 ASSESSMENT — PAIN DESCRIPTION - LOCATION: LOCATION: NECK

## 2023-06-05 ASSESSMENT — PAIN SCALES - GENERAL
PAINLEVEL_OUTOF10: 9
PAINLEVEL_OUTOF10: 7

## 2023-06-06 PROBLEM — E55.9 VITAMIN D DEFICIENCY: Status: ACTIVE | Noted: 2023-06-06

## 2023-06-06 LAB
25(OH)D3 SERPL-MCNC: 12.3 NG/ML (ref 32–100)
ALBUMIN SERPL-MCNC: 3 G/DL (ref 3.4–4.8)
ALBUMIN/GLOB SERPL: 1.3 {RATIO} (ref 0.8–2)
ALP SERPL-CCNC: 72 U/L (ref 25–100)
ALT SERPL-CCNC: 31 U/L (ref 4–36)
ANION GAP SERPL CALCULATED.3IONS-SCNC: 8 MMOL/L (ref 3–16)
AST SERPL-CCNC: 11 U/L (ref 8–33)
BASOPHILS # BLD: 0 K/UL (ref 0–0.1)
BASOPHILS NFR BLD: 0.2 %
BILIRUB SERPL-MCNC: 0.3 MG/DL (ref 0.3–1.2)
BUN SERPL-MCNC: 16 MG/DL (ref 6–20)
CALCIUM SERPL-MCNC: 8.6 MG/DL (ref 8.5–10.5)
CHLORIDE SERPL-SCNC: 103 MMOL/L (ref 98–107)
CO2 SERPL-SCNC: 30 MMOL/L (ref 20–30)
CREAT SERPL-MCNC: <0.5 MG/DL (ref 0.4–1.2)
EOSINOPHIL # BLD: 0 K/UL (ref 0–0.4)
EOSINOPHIL NFR BLD: 0.5 %
ERYTHROCYTE [DISTWIDTH] IN BLOOD BY AUTOMATED COUNT: 19.5 % (ref 11–16)
FOLATE SERPL-MCNC: 15.2 NG/ML
GFR SERPLBLD CREATININE-BSD FMLA CKD-EPI: >60 ML/MIN/{1.73_M2}
GLOBULIN SER CALC-MCNC: 2.3 G/DL
GLUCOSE SERPL-MCNC: 93 MG/DL (ref 74–106)
HCT VFR BLD AUTO: 34.9 % (ref 37–47)
HGB BLD-MCNC: 10.9 G/DL (ref 11.5–16.5)
IMM GRANULOCYTES # BLD: 0 K/UL
IMM GRANULOCYTES NFR BLD: 0.7 % (ref 0–5)
LYMPHOCYTES # BLD: 2.3 K/UL (ref 1.5–4)
LYMPHOCYTES NFR BLD: 42 %
MAGNESIUM SERPL-MCNC: 1.9 MG/DL (ref 1.7–2.4)
MCH RBC QN AUTO: 30.5 PG (ref 27–32)
MCHC RBC AUTO-ENTMCNC: 31.2 G/DL (ref 31–35)
MCV RBC AUTO: 97.8 FL (ref 80–100)
MONOCYTES # BLD: 0.4 K/UL (ref 0.2–0.8)
MONOCYTES NFR BLD: 7.2 %
NEUTROPHILS # BLD: 2.8 K/UL (ref 2–7.5)
NEUTS SEG NFR BLD: 49.4 %
PLATELET # BLD AUTO: 155 K/UL (ref 150–400)
PMV BLD AUTO: 10.3 FL (ref 6–10)
POTASSIUM SERPL-SCNC: 4.1 MMOL/L (ref 3.4–5.1)
PROT SERPL-MCNC: 5.3 G/DL (ref 6.4–8.3)
RBC # BLD AUTO: 3.57 M/UL (ref 3.8–5.8)
SODIUM SERPL-SCNC: 141 MMOL/L (ref 136–145)
VIT B12 SERPL-MCNC: 553 PG/ML (ref 211–911)
WBC # BLD AUTO: 5.6 K/UL (ref 4–11)

## 2023-06-06 PROCEDURE — 6370000000 HC RX 637 (ALT 250 FOR IP): Performed by: INTERNAL MEDICINE

## 2023-06-06 PROCEDURE — 94640 AIRWAY INHALATION TREATMENT: CPT

## 2023-06-06 PROCEDURE — 6370000000 HC RX 637 (ALT 250 FOR IP): Performed by: PHYSICIAN ASSISTANT

## 2023-06-06 PROCEDURE — 97110 THERAPEUTIC EXERCISES: CPT

## 2023-06-06 PROCEDURE — 97116 GAIT TRAINING THERAPY: CPT

## 2023-06-06 PROCEDURE — 82306 VITAMIN D 25 HYDROXY: CPT

## 2023-06-06 PROCEDURE — 6360000002 HC RX W HCPCS: Performed by: PHYSICIAN ASSISTANT

## 2023-06-06 PROCEDURE — 6360000002 HC RX W HCPCS: Performed by: INTERNAL MEDICINE

## 2023-06-06 PROCEDURE — 94660 CPAP INITIATION&MGMT: CPT

## 2023-06-06 PROCEDURE — 82746 ASSAY OF FOLIC ACID SERUM: CPT

## 2023-06-06 PROCEDURE — 94761 N-INVAS EAR/PLS OXIMETRY MLT: CPT

## 2023-06-06 PROCEDURE — 85025 COMPLETE CBC W/AUTO DIFF WBC: CPT

## 2023-06-06 PROCEDURE — 2700000000 HC OXYGEN THERAPY PER DAY

## 2023-06-06 PROCEDURE — 36415 COLL VENOUS BLD VENIPUNCTURE: CPT

## 2023-06-06 PROCEDURE — 83735 ASSAY OF MAGNESIUM: CPT

## 2023-06-06 PROCEDURE — 82607 VITAMIN B-12: CPT

## 2023-06-06 PROCEDURE — 1200000002 HC SEMI PRIVATE SWING BED

## 2023-06-06 PROCEDURE — 80053 COMPREHEN METABOLIC PANEL: CPT

## 2023-06-06 PROCEDURE — 97530 THERAPEUTIC ACTIVITIES: CPT

## 2023-06-06 RX ORDER — ERGOCALCIFEROL 1.25 MG/1
50000 CAPSULE ORAL DAILY
Status: COMPLETED | OUTPATIENT
Start: 2023-06-06 | End: 2023-06-08

## 2023-06-06 RX ORDER — DOCUSATE SODIUM 100 MG/1
100 CAPSULE, LIQUID FILLED ORAL 2 TIMES DAILY
Status: DISCONTINUED | OUTPATIENT
Start: 2023-06-06 | End: 2023-06-17 | Stop reason: HOSPADM

## 2023-06-06 RX ORDER — ERGOCALCIFEROL 1.25 MG/1
50000 CAPSULE ORAL WEEKLY
Status: DISCONTINUED | OUTPATIENT
Start: 2023-06-09 | End: 2023-06-17 | Stop reason: HOSPADM

## 2023-06-06 RX ORDER — SIMETHICONE 80 MG
80 TABLET,CHEWABLE ORAL EVERY 6 HOURS PRN
Status: DISCONTINUED | OUTPATIENT
Start: 2023-06-06 | End: 2023-06-17 | Stop reason: HOSPADM

## 2023-06-06 RX ADMIN — BUDESONIDE 500 MCG: 0.5 INHALANT RESPIRATORY (INHALATION) at 16:22

## 2023-06-06 RX ADMIN — ERGOCALCIFEROL 50000 UNITS: 1.25 CAPSULE ORAL at 09:50

## 2023-06-06 RX ADMIN — ATENOLOL 25 MG: 25 TABLET ORAL at 09:06

## 2023-06-06 RX ADMIN — BUDESONIDE 500 MCG: 0.5 INHALANT RESPIRATORY (INHALATION) at 04:04

## 2023-06-06 RX ADMIN — MUPIROCIN: 20 OINTMENT TOPICAL at 21:07

## 2023-06-06 RX ADMIN — IPRATROPIUM BROMIDE AND ALBUTEROL SULFATE 1 DOSE: 2.5; .5 SOLUTION RESPIRATORY (INHALATION) at 16:22

## 2023-06-06 RX ADMIN — HYDROXYZINE HYDROCHLORIDE 25 MG: 25 TABLET, FILM COATED ORAL at 21:06

## 2023-06-06 RX ADMIN — ENOXAPARIN SODIUM 40 MG: 100 INJECTION SUBCUTANEOUS at 21:07

## 2023-06-06 RX ADMIN — POLYETHYLENE GLYCOL 3350 17 G: 17 POWDER, FOR SOLUTION ORAL at 09:07

## 2023-06-06 RX ADMIN — Medication 5 MG: at 21:06

## 2023-06-06 RX ADMIN — DOCUSATE SODIUM 100 MG: 100 CAPSULE, LIQUID FILLED ORAL at 21:06

## 2023-06-06 RX ADMIN — MULTIPLE VITAMINS W/ MINERALS TAB 1 TABLET: TAB at 09:06

## 2023-06-06 RX ADMIN — IPRATROPIUM BROMIDE AND ALBUTEROL SULFATE 1 DOSE: 2.5; .5 SOLUTION RESPIRATORY (INHALATION) at 04:04

## 2023-06-06 RX ADMIN — ACETAMINOPHEN 650 MG: 325 TABLET, FILM COATED ORAL at 09:05

## 2023-06-06 RX ADMIN — FLUTICASONE PROPIONATE 1 SPRAY: 50 SPRAY, METERED NASAL at 09:06

## 2023-06-06 RX ADMIN — MUPIROCIN: 20 OINTMENT TOPICAL at 09:06

## 2023-06-06 RX ADMIN — AMLODIPINE BESYLATE 10 MG: 5 TABLET ORAL at 09:06

## 2023-06-06 RX ADMIN — CETIRIZINE HYDROCHLORIDE 10 MG: 10 TABLET, FILM COATED ORAL at 09:06

## 2023-06-06 SDOH — ECONOMIC STABILITY: INCOME INSECURITY: IN THE LAST 12 MONTHS, WAS THERE A TIME WHEN YOU WERE NOT ABLE TO PAY THE MORTGAGE OR RENT ON TIME?: NO

## 2023-06-06 SDOH — ECONOMIC STABILITY: TRANSPORTATION INSECURITY
IN THE PAST 12 MONTHS, HAS LACK OF TRANSPORTATION KEPT YOU FROM MEETINGS, WORK, OR FROM GETTING THINGS NEEDED FOR DAILY LIVING?: NO

## 2023-06-06 SDOH — ECONOMIC STABILITY: FOOD INSECURITY: WITHIN THE PAST 12 MONTHS, THE FOOD YOU BOUGHT JUST DIDN'T LAST AND YOU DIDN'T HAVE MONEY TO GET MORE.: NEVER TRUE

## 2023-06-06 SDOH — HEALTH STABILITY: PHYSICAL HEALTH: ON AVERAGE, HOW MANY DAYS PER WEEK DO YOU ENGAGE IN MODERATE TO STRENUOUS EXERCISE (LIKE A BRISK WALK)?: 0 DAYS

## 2023-06-06 SDOH — ECONOMIC STABILITY: FOOD INSECURITY: WITHIN THE PAST 12 MONTHS, YOU WORRIED THAT YOUR FOOD WOULD RUN OUT BEFORE YOU GOT MONEY TO BUY MORE.: NEVER TRUE

## 2023-06-06 SDOH — HEALTH STABILITY: PHYSICAL HEALTH: ON AVERAGE, HOW MANY MINUTES DO YOU ENGAGE IN EXERCISE AT THIS LEVEL?: 0 MIN

## 2023-06-06 SDOH — ECONOMIC STABILITY: HOUSING INSECURITY
IN THE LAST 12 MONTHS, WAS THERE A TIME WHEN YOU DID NOT HAVE A STEADY PLACE TO SLEEP OR SLEPT IN A SHELTER (INCLUDING NOW)?: NO

## 2023-06-06 SDOH — ECONOMIC STABILITY: HOUSING INSECURITY: IN THE LAST 12 MONTHS, HOW MANY PLACES HAVE YOU LIVED?: 1

## 2023-06-06 SDOH — ECONOMIC STABILITY: TRANSPORTATION INSECURITY
IN THE PAST 12 MONTHS, HAS THE LACK OF TRANSPORTATION KEPT YOU FROM MEDICAL APPOINTMENTS OR FROM GETTING MEDICATIONS?: NO

## 2023-06-06 ASSESSMENT — SOCIAL DETERMINANTS OF HEALTH (SDOH)
DO YOU BELONG TO ANY CLUBS OR ORGANIZATIONS SUCH AS CHURCH GROUPS UNIONS, FRATERNAL OR ATHLETIC GROUPS, OR SCHOOL GROUPS?: NO
HOW HARD IS IT FOR YOU TO PAY FOR THE VERY BASICS LIKE FOOD, HOUSING, MEDICAL CARE, AND HEATING?: NOT HARD AT ALL
HOW OFTEN DO YOU GET TOGETHER WITH FRIENDS OR RELATIVES?: TWICE A WEEK
HOW OFTEN DO YOU ATTEND CHURCH OR RELIGIOUS SERVICES?: NEVER
HOW OFTEN DO YOU ATTENT MEETINGS OF THE CLUB OR ORGANIZATION YOU BELONG TO?: NEVER
WITHIN THE LAST YEAR, HAVE YOU BEEN AFRAID OF YOUR PARTNER OR EX-PARTNER?: NO
WITHIN THE LAST YEAR, HAVE YOU BEEN HUMILIATED OR EMOTIONALLY ABUSED IN OTHER WAYS BY YOUR PARTNER OR EX-PARTNER?: NO
WITHIN THE LAST YEAR, HAVE TO BEEN RAPED OR FORCED TO HAVE ANY KIND OF SEXUAL ACTIVITY BY YOUR PARTNER OR EX-PARTNER?: NO
WITHIN THE LAST YEAR, HAVE YOU BEEN KICKED, HIT, SLAPPED, OR OTHERWISE PHYSICALLY HURT BY YOUR PARTNER OR EX-PARTNER?: NO
IN A TYPICAL WEEK, HOW MANY TIMES DO YOU TALK ON THE PHONE WITH FAMILY, FRIENDS, OR NEIGHBORS?: TWICE A WEEK
HOW HARD IS IT FOR YOU TO PAY FOR THE VERY BASICS LIKE FOOD, HOUSING, MEDICAL CARE, AND HEATING?: PATIENT DECLINED

## 2023-06-06 ASSESSMENT — PATIENT HEALTH QUESTIONNAIRE - PHQ9
SUM OF ALL RESPONSES TO PHQ9 QUESTIONS 1 & 2: 0
1. LITTLE INTEREST OR PLEASURE IN DOING THINGS: NOT AT ALL
2. FEELING DOWN, DEPRESSED OR HOPELESS: NOT AT ALL

## 2023-06-06 ASSESSMENT — PAIN DESCRIPTION - LOCATION: LOCATION: HEAD

## 2023-06-07 PROCEDURE — 6370000000 HC RX 637 (ALT 250 FOR IP): Performed by: PHYSICIAN ASSISTANT

## 2023-06-07 PROCEDURE — 1200000002 HC SEMI PRIVATE SWING BED

## 2023-06-07 PROCEDURE — 97803 MED NUTRITION INDIV SUBSEQ: CPT

## 2023-06-07 PROCEDURE — 97116 GAIT TRAINING THERAPY: CPT

## 2023-06-07 PROCEDURE — 6370000000 HC RX 637 (ALT 250 FOR IP): Performed by: INTERNAL MEDICINE

## 2023-06-07 PROCEDURE — 6360000002 HC RX W HCPCS: Performed by: INTERNAL MEDICINE

## 2023-06-07 PROCEDURE — 97530 THERAPEUTIC ACTIVITIES: CPT

## 2023-06-07 PROCEDURE — 2700000000 HC OXYGEN THERAPY PER DAY

## 2023-06-07 PROCEDURE — 94640 AIRWAY INHALATION TREATMENT: CPT

## 2023-06-07 PROCEDURE — 97535 SELF CARE MNGMENT TRAINING: CPT

## 2023-06-07 PROCEDURE — 94761 N-INVAS EAR/PLS OXIMETRY MLT: CPT

## 2023-06-07 PROCEDURE — 6360000002 HC RX W HCPCS: Performed by: PHYSICIAN ASSISTANT

## 2023-06-07 PROCEDURE — 94660 CPAP INITIATION&MGMT: CPT

## 2023-06-07 PROCEDURE — 97110 THERAPEUTIC EXERCISES: CPT

## 2023-06-07 RX ADMIN — ENOXAPARIN SODIUM 40 MG: 100 INJECTION SUBCUTANEOUS at 20:13

## 2023-06-07 RX ADMIN — MUPIROCIN: 20 OINTMENT TOPICAL at 08:42

## 2023-06-07 RX ADMIN — ATENOLOL 25 MG: 25 TABLET ORAL at 08:40

## 2023-06-07 RX ADMIN — ERGOCALCIFEROL 50000 UNITS: 1.25 CAPSULE ORAL at 08:42

## 2023-06-07 RX ADMIN — DICLOFENAC SODIUM 2 G: 10 GEL TOPICAL at 08:47

## 2023-06-07 RX ADMIN — ACETAMINOPHEN 650 MG: 325 TABLET, FILM COATED ORAL at 03:39

## 2023-06-07 RX ADMIN — HYDROXYZINE HYDROCHLORIDE 25 MG: 25 TABLET, FILM COATED ORAL at 20:22

## 2023-06-07 RX ADMIN — DOCUSATE SODIUM 100 MG: 100 CAPSULE, LIQUID FILLED ORAL at 08:38

## 2023-06-07 RX ADMIN — Medication 1 CAPSULE: at 08:37

## 2023-06-07 RX ADMIN — MUPIROCIN: 20 OINTMENT TOPICAL at 20:15

## 2023-06-07 RX ADMIN — BUDESONIDE 500 MCG: 0.5 INHALANT RESPIRATORY (INHALATION) at 04:57

## 2023-06-07 RX ADMIN — BUDESONIDE 500 MCG: 0.5 INHALANT RESPIRATORY (INHALATION) at 16:11

## 2023-06-07 RX ADMIN — FLUTICASONE PROPIONATE 1 SPRAY: 50 SPRAY, METERED NASAL at 08:37

## 2023-06-07 RX ADMIN — ACETAMINOPHEN 650 MG: 325 TABLET, FILM COATED ORAL at 20:22

## 2023-06-07 RX ADMIN — Medication 5 MG: at 20:14

## 2023-06-07 RX ADMIN — CETIRIZINE HYDROCHLORIDE 10 MG: 10 TABLET, FILM COATED ORAL at 08:38

## 2023-06-07 RX ADMIN — ACETAMINOPHEN 650 MG: 325 TABLET, FILM COATED ORAL at 08:46

## 2023-06-07 RX ADMIN — IPRATROPIUM BROMIDE AND ALBUTEROL SULFATE 1 DOSE: 2.5; .5 SOLUTION RESPIRATORY (INHALATION) at 16:11

## 2023-06-07 RX ADMIN — SIMETHICONE 80 MG: 80 TABLET, CHEWABLE ORAL at 08:37

## 2023-06-07 RX ADMIN — MULTIPLE VITAMINS W/ MINERALS TAB 1 TABLET: TAB at 08:37

## 2023-06-07 RX ADMIN — IPRATROPIUM BROMIDE AND ALBUTEROL SULFATE 1 DOSE: 2.5; .5 SOLUTION RESPIRATORY (INHALATION) at 04:57

## 2023-06-07 RX ADMIN — DOCUSATE SODIUM 100 MG: 100 CAPSULE, LIQUID FILLED ORAL at 20:14

## 2023-06-07 RX ADMIN — AMLODIPINE BESYLATE 10 MG: 5 TABLET ORAL at 08:38

## 2023-06-07 ASSESSMENT — PAIN SCALES - GENERAL
PAINLEVEL_OUTOF10: 7
PAINLEVEL_OUTOF10: 7
PAINLEVEL_OUTOF10: 10

## 2023-06-07 ASSESSMENT — PAIN DESCRIPTION - LOCATION
LOCATION: NECK;HEAD
LOCATION: NECK;HEAD

## 2023-06-07 ASSESSMENT — PAIN DESCRIPTION - DESCRIPTORS: DESCRIPTORS: ACHING

## 2023-06-08 PROCEDURE — 1200000002 HC SEMI PRIVATE SWING BED

## 2023-06-08 PROCEDURE — 6370000000 HC RX 637 (ALT 250 FOR IP): Performed by: INTERNAL MEDICINE

## 2023-06-08 PROCEDURE — 6360000002 HC RX W HCPCS: Performed by: PHYSICIAN ASSISTANT

## 2023-06-08 PROCEDURE — 94761 N-INVAS EAR/PLS OXIMETRY MLT: CPT

## 2023-06-08 PROCEDURE — 6370000000 HC RX 637 (ALT 250 FOR IP): Performed by: PHYSICIAN ASSISTANT

## 2023-06-08 PROCEDURE — 6360000002 HC RX W HCPCS: Performed by: INTERNAL MEDICINE

## 2023-06-08 PROCEDURE — 94640 AIRWAY INHALATION TREATMENT: CPT

## 2023-06-08 PROCEDURE — 97110 THERAPEUTIC EXERCISES: CPT

## 2023-06-08 PROCEDURE — 2700000000 HC OXYGEN THERAPY PER DAY

## 2023-06-08 PROCEDURE — 97116 GAIT TRAINING THERAPY: CPT

## 2023-06-08 PROCEDURE — 92610 EVALUATE SWALLOWING FUNCTION: CPT

## 2023-06-08 PROCEDURE — 92523 SPEECH SOUND LANG COMPREHEN: CPT

## 2023-06-08 PROCEDURE — 97535 SELF CARE MNGMENT TRAINING: CPT

## 2023-06-08 PROCEDURE — 94660 CPAP INITIATION&MGMT: CPT

## 2023-06-08 RX ADMIN — MUPIROCIN: 20 OINTMENT TOPICAL at 08:32

## 2023-06-08 RX ADMIN — DOCUSATE SODIUM 100 MG: 100 CAPSULE, LIQUID FILLED ORAL at 08:33

## 2023-06-08 RX ADMIN — BUDESONIDE 500 MCG: 0.5 INHALANT RESPIRATORY (INHALATION) at 16:40

## 2023-06-08 RX ADMIN — IPRATROPIUM BROMIDE AND ALBUTEROL SULFATE 1 DOSE: 2.5; .5 SOLUTION RESPIRATORY (INHALATION) at 05:10

## 2023-06-08 RX ADMIN — DOCUSATE SODIUM 100 MG: 100 CAPSULE, LIQUID FILLED ORAL at 20:18

## 2023-06-08 RX ADMIN — MULTIPLE VITAMINS W/ MINERALS TAB 1 TABLET: TAB at 08:32

## 2023-06-08 RX ADMIN — IPRATROPIUM BROMIDE AND ALBUTEROL SULFATE 1 DOSE: 2.5; .5 SOLUTION RESPIRATORY (INHALATION) at 16:40

## 2023-06-08 RX ADMIN — ACETAMINOPHEN 650 MG: 325 TABLET, FILM COATED ORAL at 20:22

## 2023-06-08 RX ADMIN — ENOXAPARIN SODIUM 40 MG: 100 INJECTION SUBCUTANEOUS at 20:19

## 2023-06-08 RX ADMIN — BUDESONIDE 500 MCG: 0.5 INHALANT RESPIRATORY (INHALATION) at 05:10

## 2023-06-08 RX ADMIN — Medication 5 MG: at 20:18

## 2023-06-08 RX ADMIN — ACETAMINOPHEN 650 MG: 325 TABLET, FILM COATED ORAL at 07:36

## 2023-06-08 RX ADMIN — HYDROXYZINE HYDROCHLORIDE 25 MG: 25 TABLET, FILM COATED ORAL at 20:18

## 2023-06-08 RX ADMIN — ERGOCALCIFEROL 50000 UNITS: 1.25 CAPSULE ORAL at 08:33

## 2023-06-08 RX ADMIN — ATENOLOL 25 MG: 25 TABLET ORAL at 08:33

## 2023-06-08 RX ADMIN — MUPIROCIN: 20 OINTMENT TOPICAL at 20:19

## 2023-06-08 RX ADMIN — AMLODIPINE BESYLATE 10 MG: 5 TABLET ORAL at 08:46

## 2023-06-08 RX ADMIN — CETIRIZINE HYDROCHLORIDE 10 MG: 10 TABLET, FILM COATED ORAL at 08:32

## 2023-06-08 RX ADMIN — FLUTICASONE PROPIONATE 1 SPRAY: 50 SPRAY, METERED NASAL at 08:32

## 2023-06-08 ASSESSMENT — PAIN SCALES - GENERAL: PAINLEVEL_OUTOF10: 5

## 2023-06-08 ASSESSMENT — PAIN DESCRIPTION - LOCATION: LOCATION: HEAD

## 2023-06-09 PROCEDURE — 97530 THERAPEUTIC ACTIVITIES: CPT

## 2023-06-09 PROCEDURE — 6370000000 HC RX 637 (ALT 250 FOR IP): Performed by: INTERNAL MEDICINE

## 2023-06-09 PROCEDURE — 6370000000 HC RX 637 (ALT 250 FOR IP): Performed by: PHYSICIAN ASSISTANT

## 2023-06-09 PROCEDURE — 6360000002 HC RX W HCPCS: Performed by: INTERNAL MEDICINE

## 2023-06-09 PROCEDURE — 6360000002 HC RX W HCPCS: Performed by: PHYSICIAN ASSISTANT

## 2023-06-09 PROCEDURE — 94761 N-INVAS EAR/PLS OXIMETRY MLT: CPT

## 2023-06-09 PROCEDURE — 1200000002 HC SEMI PRIVATE SWING BED

## 2023-06-09 PROCEDURE — 94660 CPAP INITIATION&MGMT: CPT

## 2023-06-09 PROCEDURE — 94640 AIRWAY INHALATION TREATMENT: CPT

## 2023-06-09 PROCEDURE — 2700000000 HC OXYGEN THERAPY PER DAY

## 2023-06-09 PROCEDURE — 97535 SELF CARE MNGMENT TRAINING: CPT

## 2023-06-09 RX ADMIN — BUDESONIDE 500 MCG: 0.5 INHALANT RESPIRATORY (INHALATION) at 05:15

## 2023-06-09 RX ADMIN — ENOXAPARIN SODIUM 40 MG: 100 INJECTION SUBCUTANEOUS at 20:41

## 2023-06-09 RX ADMIN — MUPIROCIN: 20 OINTMENT TOPICAL at 08:48

## 2023-06-09 RX ADMIN — ERGOCALCIFEROL 50000 UNITS: 1.25 CAPSULE ORAL at 08:46

## 2023-06-09 RX ADMIN — CETIRIZINE HYDROCHLORIDE 10 MG: 10 TABLET, FILM COATED ORAL at 08:46

## 2023-06-09 RX ADMIN — IPRATROPIUM BROMIDE AND ALBUTEROL SULFATE 1 DOSE: 2.5; .5 SOLUTION RESPIRATORY (INHALATION) at 05:15

## 2023-06-09 RX ADMIN — MULTIPLE VITAMINS W/ MINERALS TAB 1 TABLET: TAB at 08:46

## 2023-06-09 RX ADMIN — DOCUSATE SODIUM 100 MG: 100 CAPSULE, LIQUID FILLED ORAL at 08:46

## 2023-06-09 RX ADMIN — AMLODIPINE BESYLATE 10 MG: 5 TABLET ORAL at 09:35

## 2023-06-09 RX ADMIN — MUPIROCIN: 20 OINTMENT TOPICAL at 20:40

## 2023-06-09 RX ADMIN — HYDROXYZINE HYDROCHLORIDE 25 MG: 25 TABLET, FILM COATED ORAL at 20:40

## 2023-06-09 RX ADMIN — FLUTICASONE PROPIONATE 1 SPRAY: 50 SPRAY, METERED NASAL at 08:48

## 2023-06-09 RX ADMIN — Medication 5 MG: at 20:40

## 2023-06-09 RX ADMIN — ACETAMINOPHEN 650 MG: 325 TABLET, FILM COATED ORAL at 20:40

## 2023-06-09 RX ADMIN — ACETAMINOPHEN 650 MG: 325 TABLET, FILM COATED ORAL at 09:34

## 2023-06-09 RX ADMIN — Medication 1 CAPSULE: at 08:46

## 2023-06-09 RX ADMIN — ATENOLOL 25 MG: 25 TABLET ORAL at 08:46

## 2023-06-09 ASSESSMENT — PAIN SCALES - GENERAL
PAINLEVEL_OUTOF10: 8
PAINLEVEL_OUTOF10: 7

## 2023-06-09 ASSESSMENT — PAIN DESCRIPTION - LOCATION: LOCATION: NECK;HEAD

## 2023-06-10 PROCEDURE — 94761 N-INVAS EAR/PLS OXIMETRY MLT: CPT

## 2023-06-10 PROCEDURE — 94660 CPAP INITIATION&MGMT: CPT

## 2023-06-10 PROCEDURE — 94640 AIRWAY INHALATION TREATMENT: CPT

## 2023-06-10 PROCEDURE — 6360000002 HC RX W HCPCS: Performed by: INTERNAL MEDICINE

## 2023-06-10 PROCEDURE — 2700000000 HC OXYGEN THERAPY PER DAY

## 2023-06-10 PROCEDURE — 1200000002 HC SEMI PRIVATE SWING BED

## 2023-06-10 PROCEDURE — 6370000000 HC RX 637 (ALT 250 FOR IP): Performed by: INTERNAL MEDICINE

## 2023-06-10 PROCEDURE — 6370000000 HC RX 637 (ALT 250 FOR IP): Performed by: PHYSICIAN ASSISTANT

## 2023-06-10 PROCEDURE — 6360000002 HC RX W HCPCS: Performed by: PHYSICIAN ASSISTANT

## 2023-06-10 RX ADMIN — ATENOLOL 25 MG: 25 TABLET ORAL at 08:35

## 2023-06-10 RX ADMIN — BUDESONIDE 500 MCG: 0.5 INHALANT RESPIRATORY (INHALATION) at 04:46

## 2023-06-10 RX ADMIN — IPRATROPIUM BROMIDE AND ALBUTEROL SULFATE 1 DOSE: 2.5; .5 SOLUTION RESPIRATORY (INHALATION) at 04:47

## 2023-06-10 RX ADMIN — MUPIROCIN: 20 OINTMENT TOPICAL at 20:34

## 2023-06-10 RX ADMIN — CETIRIZINE HYDROCHLORIDE 10 MG: 10 TABLET, FILM COATED ORAL at 08:35

## 2023-06-10 RX ADMIN — ACETAMINOPHEN 650 MG: 325 TABLET, FILM COATED ORAL at 20:33

## 2023-06-10 RX ADMIN — MULTIPLE VITAMINS W/ MINERALS TAB 1 TABLET: TAB at 08:36

## 2023-06-10 RX ADMIN — HYDROXYZINE HYDROCHLORIDE 25 MG: 25 TABLET, FILM COATED ORAL at 20:33

## 2023-06-10 RX ADMIN — ENOXAPARIN SODIUM 40 MG: 100 INJECTION SUBCUTANEOUS at 20:33

## 2023-06-10 RX ADMIN — ACETAMINOPHEN 650 MG: 325 TABLET, FILM COATED ORAL at 08:42

## 2023-06-10 RX ADMIN — Medication 5 MG: at 20:33

## 2023-06-10 RX ADMIN — DOCUSATE SODIUM 100 MG: 100 CAPSULE, LIQUID FILLED ORAL at 20:33

## 2023-06-10 RX ADMIN — AMLODIPINE BESYLATE 10 MG: 5 TABLET ORAL at 08:35

## 2023-06-10 RX ADMIN — FLUTICASONE PROPIONATE 1 SPRAY: 50 SPRAY, METERED NASAL at 08:36

## 2023-06-10 RX ADMIN — MUPIROCIN: 20 OINTMENT TOPICAL at 08:36

## 2023-06-10 ASSESSMENT — PAIN DESCRIPTION - LOCATION: LOCATION: HEAD

## 2023-06-10 ASSESSMENT — PAIN SCALES - GENERAL: PAINLEVEL_OUTOF10: 5

## 2023-06-11 PROCEDURE — 6370000000 HC RX 637 (ALT 250 FOR IP): Performed by: INTERNAL MEDICINE

## 2023-06-11 PROCEDURE — 94660 CPAP INITIATION&MGMT: CPT

## 2023-06-11 PROCEDURE — 94761 N-INVAS EAR/PLS OXIMETRY MLT: CPT

## 2023-06-11 PROCEDURE — 6360000002 HC RX W HCPCS: Performed by: PHYSICIAN ASSISTANT

## 2023-06-11 PROCEDURE — 6370000000 HC RX 637 (ALT 250 FOR IP): Performed by: PHYSICIAN ASSISTANT

## 2023-06-11 PROCEDURE — 94640 AIRWAY INHALATION TREATMENT: CPT

## 2023-06-11 PROCEDURE — 6360000002 HC RX W HCPCS: Performed by: INTERNAL MEDICINE

## 2023-06-11 PROCEDURE — 1200000002 HC SEMI PRIVATE SWING BED

## 2023-06-11 RX ADMIN — ENOXAPARIN SODIUM 40 MG: 100 INJECTION SUBCUTANEOUS at 20:15

## 2023-06-11 RX ADMIN — ACETAMINOPHEN 650 MG: 325 TABLET, FILM COATED ORAL at 08:06

## 2023-06-11 RX ADMIN — HYDROXYZINE HYDROCHLORIDE 25 MG: 25 TABLET, FILM COATED ORAL at 20:17

## 2023-06-11 RX ADMIN — IPRATROPIUM BROMIDE AND ALBUTEROL SULFATE 1 DOSE: 2.5; .5 SOLUTION RESPIRATORY (INHALATION) at 16:45

## 2023-06-11 RX ADMIN — BUDESONIDE 500 MCG: 0.5 INHALANT RESPIRATORY (INHALATION) at 05:03

## 2023-06-11 RX ADMIN — Medication 5 MG: at 20:15

## 2023-06-11 RX ADMIN — Medication 1 CAPSULE: at 07:59

## 2023-06-11 RX ADMIN — CETIRIZINE HYDROCHLORIDE 10 MG: 10 TABLET, FILM COATED ORAL at 07:59

## 2023-06-11 RX ADMIN — AMLODIPINE BESYLATE 10 MG: 5 TABLET ORAL at 07:59

## 2023-06-11 RX ADMIN — BUDESONIDE 500 MCG: 0.5 INHALANT RESPIRATORY (INHALATION) at 16:45

## 2023-06-11 RX ADMIN — ACETAMINOPHEN 650 MG: 325 TABLET, FILM COATED ORAL at 20:15

## 2023-06-11 RX ADMIN — FLUTICASONE PROPIONATE 1 SPRAY: 50 SPRAY, METERED NASAL at 07:59

## 2023-06-11 RX ADMIN — MULTIPLE VITAMINS W/ MINERALS TAB 1 TABLET: TAB at 07:58

## 2023-06-11 RX ADMIN — IPRATROPIUM BROMIDE AND ALBUTEROL SULFATE 1 DOSE: 2.5; .5 SOLUTION RESPIRATORY (INHALATION) at 05:03

## 2023-06-11 RX ADMIN — MUPIROCIN: 20 OINTMENT TOPICAL at 07:59

## 2023-06-11 RX ADMIN — ATENOLOL 25 MG: 25 TABLET ORAL at 07:58

## 2023-06-11 RX ADMIN — MUPIROCIN: 20 OINTMENT TOPICAL at 20:15

## 2023-06-11 ASSESSMENT — PAIN SCALES - GENERAL: PAINLEVEL_OUTOF10: 2

## 2023-06-11 ASSESSMENT — PAIN DESCRIPTION - LOCATION: LOCATION: HEAD

## 2023-06-12 LAB
ALBUMIN SERPL-MCNC: 3 G/DL (ref 3.4–4.8)
ALBUMIN/GLOB SERPL: 1.2 {RATIO} (ref 0.8–2)
ALP SERPL-CCNC: 78 U/L (ref 25–100)
ALT SERPL-CCNC: 20 U/L (ref 4–36)
ANION GAP SERPL CALCULATED.3IONS-SCNC: 9 MMOL/L (ref 3–16)
AST SERPL-CCNC: 11 U/L (ref 8–33)
BASOPHILS # BLD: 0 K/UL (ref 0–0.1)
BASOPHILS NFR BLD: 0.4 %
BILIRUB SERPL-MCNC: 0.3 MG/DL (ref 0.3–1.2)
BUN SERPL-MCNC: 10 MG/DL (ref 6–20)
CALCIUM SERPL-MCNC: 8.7 MG/DL (ref 8.5–10.5)
CHLORIDE SERPL-SCNC: 103 MMOL/L (ref 98–107)
CO2 SERPL-SCNC: 29 MMOL/L (ref 20–30)
CREAT SERPL-MCNC: <0.5 MG/DL (ref 0.4–1.2)
EOSINOPHIL # BLD: 0 K/UL (ref 0–0.4)
EOSINOPHIL NFR BLD: 0.4 %
ERYTHROCYTE [DISTWIDTH] IN BLOOD BY AUTOMATED COUNT: 18.7 % (ref 11–16)
GFR SERPLBLD CREATININE-BSD FMLA CKD-EPI: >60 ML/MIN/{1.73_M2}
GLOBULIN SER CALC-MCNC: 2.5 G/DL
GLUCOSE SERPL-MCNC: 92 MG/DL (ref 74–106)
HCT VFR BLD AUTO: 36.3 % (ref 37–47)
HGB BLD-MCNC: 11.2 G/DL (ref 11.5–16.5)
IMM GRANULOCYTES # BLD: 0 K/UL
IMM GRANULOCYTES NFR BLD: 0.3 % (ref 0–5)
LYMPHOCYTES # BLD: 3.1 K/UL (ref 1.5–4)
LYMPHOCYTES NFR BLD: 42.9 %
MCH RBC QN AUTO: 30.4 PG (ref 27–32)
MCHC RBC AUTO-ENTMCNC: 30.9 G/DL (ref 31–35)
MCV RBC AUTO: 98.6 FL (ref 80–100)
MONOCYTES # BLD: 0.6 K/UL (ref 0.2–0.8)
MONOCYTES NFR BLD: 8.4 %
NEUTROPHILS # BLD: 3.5 K/UL (ref 2–7.5)
NEUTS SEG NFR BLD: 47.6 %
PLATELET # BLD AUTO: 212 K/UL (ref 150–400)
PMV BLD AUTO: 9.6 FL (ref 6–10)
POTASSIUM SERPL-SCNC: 3.8 MMOL/L (ref 3.4–5.1)
PROT SERPL-MCNC: 5.5 G/DL (ref 6.4–8.3)
RBC # BLD AUTO: 3.68 M/UL (ref 3.8–5.8)
SODIUM SERPL-SCNC: 141 MMOL/L (ref 136–145)
WBC # BLD AUTO: 7.3 K/UL (ref 4–11)

## 2023-06-12 PROCEDURE — 6360000002 HC RX W HCPCS: Performed by: PHYSICIAN ASSISTANT

## 2023-06-12 PROCEDURE — 36415 COLL VENOUS BLD VENIPUNCTURE: CPT

## 2023-06-12 PROCEDURE — 97530 THERAPEUTIC ACTIVITIES: CPT

## 2023-06-12 PROCEDURE — 6370000000 HC RX 637 (ALT 250 FOR IP): Performed by: INTERNAL MEDICINE

## 2023-06-12 PROCEDURE — 1200000002 HC SEMI PRIVATE SWING BED

## 2023-06-12 PROCEDURE — 94640 AIRWAY INHALATION TREATMENT: CPT

## 2023-06-12 PROCEDURE — 97116 GAIT TRAINING THERAPY: CPT

## 2023-06-12 PROCEDURE — 97110 THERAPEUTIC EXERCISES: CPT

## 2023-06-12 PROCEDURE — 6360000002 HC RX W HCPCS: Performed by: INTERNAL MEDICINE

## 2023-06-12 PROCEDURE — 94761 N-INVAS EAR/PLS OXIMETRY MLT: CPT

## 2023-06-12 PROCEDURE — 85025 COMPLETE CBC W/AUTO DIFF WBC: CPT

## 2023-06-12 PROCEDURE — 94660 CPAP INITIATION&MGMT: CPT

## 2023-06-12 PROCEDURE — 97535 SELF CARE MNGMENT TRAINING: CPT

## 2023-06-12 PROCEDURE — 80053 COMPREHEN METABOLIC PANEL: CPT

## 2023-06-12 PROCEDURE — 6370000000 HC RX 637 (ALT 250 FOR IP): Performed by: PHYSICIAN ASSISTANT

## 2023-06-12 PROCEDURE — 2700000000 HC OXYGEN THERAPY PER DAY

## 2023-06-12 RX ORDER — BUTALBITAL, ACETAMINOPHEN AND CAFFEINE 50; 325; 40 MG/1; MG/1; MG/1
1 TABLET ORAL EVERY 8 HOURS PRN
Status: DISCONTINUED | OUTPATIENT
Start: 2023-06-12 | End: 2023-06-17 | Stop reason: HOSPADM

## 2023-06-12 RX ADMIN — ATENOLOL 25 MG: 25 TABLET ORAL at 08:03

## 2023-06-12 RX ADMIN — IPRATROPIUM BROMIDE AND ALBUTEROL SULFATE 1 DOSE: 2.5; .5 SOLUTION RESPIRATORY (INHALATION) at 04:34

## 2023-06-12 RX ADMIN — CETIRIZINE HYDROCHLORIDE 10 MG: 10 TABLET, FILM COATED ORAL at 08:03

## 2023-06-12 RX ADMIN — AMLODIPINE BESYLATE 10 MG: 5 TABLET ORAL at 08:03

## 2023-06-12 RX ADMIN — ENOXAPARIN SODIUM 40 MG: 100 INJECTION SUBCUTANEOUS at 21:04

## 2023-06-12 RX ADMIN — FLUTICASONE PROPIONATE 1 SPRAY: 50 SPRAY, METERED NASAL at 08:07

## 2023-06-12 RX ADMIN — MULTIPLE VITAMINS W/ MINERALS TAB 1 TABLET: TAB at 08:03

## 2023-06-12 RX ADMIN — MUPIROCIN: 20 OINTMENT TOPICAL at 08:07

## 2023-06-12 RX ADMIN — BUTALBITAL, ACETAMINOPHEN, AND CAFFEINE 1 TABLET: 50; 325; 40 TABLET ORAL at 12:20

## 2023-06-12 RX ADMIN — BUDESONIDE 500 MCG: 0.5 INHALANT RESPIRATORY (INHALATION) at 04:34

## 2023-06-12 RX ADMIN — Medication 5 MG: at 21:07

## 2023-06-12 RX ADMIN — ACETAMINOPHEN 650 MG: 325 TABLET, FILM COATED ORAL at 21:07

## 2023-06-12 RX ADMIN — MUPIROCIN: 20 OINTMENT TOPICAL at 21:08

## 2023-06-12 RX ADMIN — BUDESONIDE 500 MCG: 0.5 INHALANT RESPIRATORY (INHALATION) at 17:15

## 2023-06-12 RX ADMIN — IPRATROPIUM BROMIDE AND ALBUTEROL SULFATE 1 DOSE: 2.5; .5 SOLUTION RESPIRATORY (INHALATION) at 17:15

## 2023-06-12 RX ADMIN — ACETAMINOPHEN 650 MG: 325 TABLET, FILM COATED ORAL at 07:40

## 2023-06-12 ASSESSMENT — PAIN DESCRIPTION - DESCRIPTORS
DESCRIPTORS: ACHING
DESCRIPTORS: ACHING

## 2023-06-12 ASSESSMENT — PAIN SCALES - GENERAL
PAINLEVEL_OUTOF10: 8
PAINLEVEL_OUTOF10: 7
PAINLEVEL_OUTOF10: 7

## 2023-06-12 ASSESSMENT — PAIN DESCRIPTION - ORIENTATION: ORIENTATION: ANTERIOR;POSTERIOR

## 2023-06-12 ASSESSMENT — PAIN DESCRIPTION - LOCATION
LOCATION: HEAD
LOCATION: HEAD

## 2023-06-12 NOTE — PLAN OF CARE
Problem: Pain  Goal: Verbalizes/displays adequate comfort level or baseline comfort level  Outcome: Progressing     Problem: Skin/Tissue Integrity  Goal: Absence of new skin breakdown  Description: 1. Monitor for areas of redness and/or skin breakdown  2. Assess vascular access sites hourly  3. Every 4-6 hours minimum:  Change oxygen saturation probe site  4. Every 4-6 hours:  If on nasal continuous positive airway pressure, respiratory therapy assess nares and determine need for appliance change or resting period.   6/12/2023 0952 by Ciro Jarquin RN  Outcome: Progressing  6/11/2023 2142 by Deb Johnson LPN  Outcome: Progressing

## 2023-06-13 PROCEDURE — 6370000000 HC RX 637 (ALT 250 FOR IP): Performed by: PHYSICIAN ASSISTANT

## 2023-06-13 PROCEDURE — 94660 CPAP INITIATION&MGMT: CPT

## 2023-06-13 PROCEDURE — 6360000002 HC RX W HCPCS: Performed by: INTERNAL MEDICINE

## 2023-06-13 PROCEDURE — 6370000000 HC RX 637 (ALT 250 FOR IP): Performed by: INTERNAL MEDICINE

## 2023-06-13 PROCEDURE — 1200000002 HC SEMI PRIVATE SWING BED

## 2023-06-13 PROCEDURE — 97535 SELF CARE MNGMENT TRAINING: CPT

## 2023-06-13 PROCEDURE — 6360000002 HC RX W HCPCS: Performed by: PHYSICIAN ASSISTANT

## 2023-06-13 PROCEDURE — 97530 THERAPEUTIC ACTIVITIES: CPT

## 2023-06-13 PROCEDURE — 97116 GAIT TRAINING THERAPY: CPT

## 2023-06-13 PROCEDURE — 2700000000 HC OXYGEN THERAPY PER DAY

## 2023-06-13 PROCEDURE — 97110 THERAPEUTIC EXERCISES: CPT

## 2023-06-13 PROCEDURE — 97803 MED NUTRITION INDIV SUBSEQ: CPT

## 2023-06-13 PROCEDURE — 94761 N-INVAS EAR/PLS OXIMETRY MLT: CPT

## 2023-06-13 PROCEDURE — 94640 AIRWAY INHALATION TREATMENT: CPT

## 2023-06-13 RX ADMIN — ENOXAPARIN SODIUM 40 MG: 100 INJECTION SUBCUTANEOUS at 20:05

## 2023-06-13 RX ADMIN — MUPIROCIN: 20 OINTMENT TOPICAL at 20:06

## 2023-06-13 RX ADMIN — IPRATROPIUM BROMIDE AND ALBUTEROL SULFATE 1 DOSE: 2.5; .5 SOLUTION RESPIRATORY (INHALATION) at 05:14

## 2023-06-13 RX ADMIN — BUDESONIDE 500 MCG: 0.5 INHALANT RESPIRATORY (INHALATION) at 16:58

## 2023-06-13 RX ADMIN — Medication 1 CAPSULE: at 07:57

## 2023-06-13 RX ADMIN — DICLOFENAC SODIUM 2 G: 10 GEL TOPICAL at 08:06

## 2023-06-13 RX ADMIN — AMLODIPINE BESYLATE 10 MG: 5 TABLET ORAL at 07:56

## 2023-06-13 RX ADMIN — ATENOLOL 25 MG: 25 TABLET ORAL at 07:57

## 2023-06-13 RX ADMIN — ACETAMINOPHEN 650 MG: 325 TABLET, FILM COATED ORAL at 08:03

## 2023-06-13 RX ADMIN — MUPIROCIN: 20 OINTMENT TOPICAL at 07:56

## 2023-06-13 RX ADMIN — CETIRIZINE HYDROCHLORIDE 10 MG: 10 TABLET, FILM COATED ORAL at 07:57

## 2023-06-13 RX ADMIN — Medication 5 MG: at 20:05

## 2023-06-13 RX ADMIN — FLUTICASONE PROPIONATE 1 SPRAY: 50 SPRAY, METERED NASAL at 07:56

## 2023-06-13 RX ADMIN — BUDESONIDE 500 MCG: 0.5 INHALANT RESPIRATORY (INHALATION) at 05:14

## 2023-06-13 RX ADMIN — ACETAMINOPHEN 650 MG: 325 TABLET, FILM COATED ORAL at 18:03

## 2023-06-13 RX ADMIN — IPRATROPIUM BROMIDE AND ALBUTEROL SULFATE 1 DOSE: 2.5; .5 SOLUTION RESPIRATORY (INHALATION) at 16:58

## 2023-06-13 RX ADMIN — HYDROXYZINE HYDROCHLORIDE 25 MG: 25 TABLET, FILM COATED ORAL at 20:05

## 2023-06-13 RX ADMIN — MULTIPLE VITAMINS W/ MINERALS TAB 1 TABLET: TAB at 07:57

## 2023-06-13 ASSESSMENT — PAIN DESCRIPTION - DESCRIPTORS: DESCRIPTORS: ACHING

## 2023-06-13 ASSESSMENT — PAIN SCALES - GENERAL
PAINLEVEL_OUTOF10: 8
PAINLEVEL_OUTOF10: 8

## 2023-06-13 ASSESSMENT — PAIN DESCRIPTION - LOCATION
LOCATION: HEAD
LOCATION: NECK;HEAD

## 2023-06-14 PROCEDURE — 6360000002 HC RX W HCPCS: Performed by: INTERNAL MEDICINE

## 2023-06-14 PROCEDURE — 6370000000 HC RX 637 (ALT 250 FOR IP): Performed by: INTERNAL MEDICINE

## 2023-06-14 PROCEDURE — 94660 CPAP INITIATION&MGMT: CPT

## 2023-06-14 PROCEDURE — 97530 THERAPEUTIC ACTIVITIES: CPT

## 2023-06-14 PROCEDURE — 6360000002 HC RX W HCPCS: Performed by: PHYSICIAN ASSISTANT

## 2023-06-14 PROCEDURE — 2700000000 HC OXYGEN THERAPY PER DAY

## 2023-06-14 PROCEDURE — 97110 THERAPEUTIC EXERCISES: CPT

## 2023-06-14 PROCEDURE — 1200000002 HC SEMI PRIVATE SWING BED

## 2023-06-14 PROCEDURE — 6370000000 HC RX 637 (ALT 250 FOR IP): Performed by: PHYSICIAN ASSISTANT

## 2023-06-14 PROCEDURE — 97116 GAIT TRAINING THERAPY: CPT

## 2023-06-14 PROCEDURE — 94761 N-INVAS EAR/PLS OXIMETRY MLT: CPT

## 2023-06-14 PROCEDURE — 94640 AIRWAY INHALATION TREATMENT: CPT

## 2023-06-14 PROCEDURE — 97535 SELF CARE MNGMENT TRAINING: CPT

## 2023-06-14 RX ADMIN — BUDESONIDE 500 MCG: 0.5 INHALANT RESPIRATORY (INHALATION) at 04:22

## 2023-06-14 RX ADMIN — MUPIROCIN: 20 OINTMENT TOPICAL at 08:21

## 2023-06-14 RX ADMIN — AMLODIPINE BESYLATE 10 MG: 5 TABLET ORAL at 08:17

## 2023-06-14 RX ADMIN — BUDESONIDE 500 MCG: 0.5 INHALANT RESPIRATORY (INHALATION) at 17:29

## 2023-06-14 RX ADMIN — Medication 5 MG: at 20:18

## 2023-06-14 RX ADMIN — CETIRIZINE HYDROCHLORIDE 10 MG: 10 TABLET, FILM COATED ORAL at 08:17

## 2023-06-14 RX ADMIN — ATENOLOL 25 MG: 25 TABLET ORAL at 08:17

## 2023-06-14 RX ADMIN — ENOXAPARIN SODIUM 40 MG: 100 INJECTION SUBCUTANEOUS at 20:16

## 2023-06-14 RX ADMIN — ACETAMINOPHEN 650 MG: 325 TABLET, FILM COATED ORAL at 08:47

## 2023-06-14 RX ADMIN — ACETAMINOPHEN 650 MG: 325 TABLET, FILM COATED ORAL at 16:04

## 2023-06-14 RX ADMIN — IPRATROPIUM BROMIDE AND ALBUTEROL SULFATE 1 DOSE: 2.5; .5 SOLUTION RESPIRATORY (INHALATION) at 04:22

## 2023-06-14 RX ADMIN — MUPIROCIN: 20 OINTMENT TOPICAL at 20:18

## 2023-06-14 RX ADMIN — IPRATROPIUM BROMIDE AND ALBUTEROL SULFATE 1 DOSE: 2.5; .5 SOLUTION RESPIRATORY (INHALATION) at 17:30

## 2023-06-14 RX ADMIN — MULTIPLE VITAMINS W/ MINERALS TAB 1 TABLET: TAB at 08:17

## 2023-06-14 ASSESSMENT — PAIN DESCRIPTION - LOCATION
LOCATION: HEAD

## 2023-06-14 ASSESSMENT — PAIN SCALES - GENERAL
PAINLEVEL_OUTOF10: 8

## 2023-06-14 NOTE — PROGRESS NOTES
"Enter Query Response Below      Query Response: Sepsis causing respiratory failure with hypoxia due to (please specify underlying infectious etiology)____________ parainfluenza virus POA  Electronically signed by Primitivo Nuñez DO, 23, 1:16 AM EDT.               If applicable, please update the problem list.   Patient: Carolin Toscano        : 1945  Account: [id number]           Admit Date: 2023        How to Respond to this query:       a. Click New Note     b. Answer query within the yellow box.                c. Update the Problem List, if applicable.      If you have any questions about this query contact me at: [email or web address]    Dr. Nuñez,      76 yo female admitted for \"Respiratory failure with hypoxia/hypercapnia, COPD exacerbation\" per History and Physical.  History and Physical and progress notes -23 includes, \"Patient met SIRS criteria on arrival, likely secondary to parainfluenza infection and respiratory failure. Received Aztreonam and sepsis protocol IV fluids boluses.\"    Discharge summary 23 \"Recent parainfluenza virus infection met SIRS. Patient was found to be tachycardic with a heart rate of 130s, temperature of 100.2 and respirations of 30, /72.  Patient was placed on BiPAP with FiO2 of 40%.  Her ABG came back and showed pH of 7.296/PCO2 87/PO2 94/HCO3 42/saturation 97% on 5 L nasal cannula.  HS Troponin 44, proBNP WNL, glucose 192, CO2 of 40, otherwise CBC and CMP within acceptable range.  Lactate and Pro-Adi WNL.  Respiratory panel positive for parainfluenza virus. Sputum grew Staph aureus and haemophilus s/p vancomycin and azithromycin.\"    After study, can you please clarify patient's condition as     Sepsis causing respiratory failure with hypoxia due to (please specify underlying infectious etiology)____________  SIRS causing respiratory failure with hypoxia due to (please specify non-infectious etiology)___________  Other (please " specify)_____________  Unable to clarify    By submitting this query, we are merely seeking further clarification of documentation to accurately reflect all conditions that you are monitoring, evaluating, treating or that extend the hospitalization or utilize additional resources of care. Please utilize your independent clinical judgment when addressing the question(s) above.     This query and your response, once completed, will be entered into the legal medical record.    Sincerely,  Shraddha MCGOVERN RN CCDS  System Director Clinical Documentation Integrity Program

## 2023-06-14 NOTE — PLAN OF CARE

## 2023-06-14 NOTE — PLAN OF CARE
Problem: Discharge Planning  Goal: Discharge to home or other facility with appropriate resources  Outcome: Progressing     Problem: Skin/Tissue Integrity  Goal: Absence of new skin breakdown  Description: 1. Monitor for areas of redness and/or skin breakdown  2. Assess vascular access sites hourly  3. Every 4-6 hours minimum:  Change oxygen saturation probe site  4. Every 4-6 hours:  If on nasal continuous positive airway pressure, respiratory therapy assess nares and determine need for appliance change or resting period.   Outcome: Progressing     Problem: Pain  Goal: Verbalizes/displays adequate comfort level or baseline comfort level  6/13/2023 2259 by Armando Stone RN  Outcome: Progressing  6/13/2023 0922 by Priscila Landaverde RN  Outcome: Progressing     Problem: Safety - Adult  Goal: Free from fall injury  6/13/2023 2259 by Armando Stone RN  Outcome: Progressing  6/13/2023 0922 by Priscila Landaverde RN  Outcome: Progressing     Problem: Nutrition Deficit:  Goal: Optimize nutritional status  Outcome: Progressing  Flowsheets (Taken 6/13/2023 1447 by Ana Cristina Coats RD)  Nutrient intake appropriate for improving, restoring, or maintaining nutritional needs: Monitor oral intake, labs, and treatment plans

## 2023-06-15 PROCEDURE — 6370000000 HC RX 637 (ALT 250 FOR IP): Performed by: PHYSICIAN ASSISTANT

## 2023-06-15 PROCEDURE — 1200000002 HC SEMI PRIVATE SWING BED

## 2023-06-15 PROCEDURE — 6360000002 HC RX W HCPCS: Performed by: PHYSICIAN ASSISTANT

## 2023-06-15 PROCEDURE — 97530 THERAPEUTIC ACTIVITIES: CPT

## 2023-06-15 PROCEDURE — 94640 AIRWAY INHALATION TREATMENT: CPT

## 2023-06-15 PROCEDURE — 94660 CPAP INITIATION&MGMT: CPT

## 2023-06-15 PROCEDURE — 6370000000 HC RX 637 (ALT 250 FOR IP): Performed by: INTERNAL MEDICINE

## 2023-06-15 PROCEDURE — 94761 N-INVAS EAR/PLS OXIMETRY MLT: CPT

## 2023-06-15 PROCEDURE — 97116 GAIT TRAINING THERAPY: CPT

## 2023-06-15 PROCEDURE — 6360000002 HC RX W HCPCS: Performed by: INTERNAL MEDICINE

## 2023-06-15 PROCEDURE — 97535 SELF CARE MNGMENT TRAINING: CPT

## 2023-06-15 PROCEDURE — 97110 THERAPEUTIC EXERCISES: CPT

## 2023-06-15 RX ADMIN — FLUTICASONE PROPIONATE 1 SPRAY: 50 SPRAY, METERED NASAL at 08:04

## 2023-06-15 RX ADMIN — ATENOLOL 25 MG: 25 TABLET ORAL at 08:04

## 2023-06-15 RX ADMIN — Medication 5 MG: at 22:09

## 2023-06-15 RX ADMIN — MUPIROCIN: 20 OINTMENT TOPICAL at 08:04

## 2023-06-15 RX ADMIN — BUDESONIDE 500 MCG: 0.5 INHALANT RESPIRATORY (INHALATION) at 16:21

## 2023-06-15 RX ADMIN — MULTIPLE VITAMINS W/ MINERALS TAB 1 TABLET: TAB at 08:05

## 2023-06-15 RX ADMIN — BUTALBITAL, ACETAMINOPHEN, AND CAFFEINE 1 TABLET: 50; 325; 40 TABLET ORAL at 15:37

## 2023-06-15 RX ADMIN — BUTALBITAL, ACETAMINOPHEN, AND CAFFEINE 1 TABLET: 50; 325; 40 TABLET ORAL at 08:04

## 2023-06-15 RX ADMIN — Medication 1 CAPSULE: at 08:05

## 2023-06-15 RX ADMIN — MUPIROCIN: 20 OINTMENT TOPICAL at 22:09

## 2023-06-15 RX ADMIN — CETIRIZINE HYDROCHLORIDE 10 MG: 10 TABLET, FILM COATED ORAL at 08:04

## 2023-06-15 RX ADMIN — BUDESONIDE 500 MCG: 0.5 INHALANT RESPIRATORY (INHALATION) at 04:27

## 2023-06-15 RX ADMIN — ACETAMINOPHEN 650 MG: 325 TABLET, FILM COATED ORAL at 05:04

## 2023-06-15 RX ADMIN — IPRATROPIUM BROMIDE AND ALBUTEROL SULFATE 1 DOSE: 2.5; .5 SOLUTION RESPIRATORY (INHALATION) at 04:27

## 2023-06-15 RX ADMIN — IPRATROPIUM BROMIDE AND ALBUTEROL SULFATE 1 DOSE: 2.5; .5 SOLUTION RESPIRATORY (INHALATION) at 16:21

## 2023-06-15 RX ADMIN — AMLODIPINE BESYLATE 10 MG: 5 TABLET ORAL at 08:04

## 2023-06-15 RX ADMIN — ENOXAPARIN SODIUM 40 MG: 100 INJECTION SUBCUTANEOUS at 22:08

## 2023-06-15 ASSESSMENT — PAIN DESCRIPTION - LOCATION
LOCATION: HEAD

## 2023-06-15 ASSESSMENT — PAIN SCALES - GENERAL
PAINLEVEL_OUTOF10: 3
PAINLEVEL_OUTOF10: 7
PAINLEVEL_OUTOF10: 7
PAINLEVEL_OUTOF10: 8

## 2023-06-15 ASSESSMENT — PAIN DESCRIPTION - DESCRIPTORS: DESCRIPTORS: ACHING

## 2023-06-15 NOTE — CARE COORDINATION
CARRIEO agrees with DC on 6/17/2023. CM to notify pt and daughter (per pt's request)       06/14/23 0924   Documentation for Discharge Appeal   Discharge Appealed by Family  (daughter, Wilman Memory)   Date notifed by OLGA of appeal request: 06/13/23   Time notified by Leyla Plascencia of appeal request: [id number]   Detailed Notice of Discharge given to: Patient   Date Notice of Discharge given: 06/13/23   Time Notice of Discharge given: 7800   Date records sent to Leyla Plascencia 06/14/23   Time records sent to [facility] Shelli Hernandez   Date Notified of Outcome 06/14/23   Time Notified of Outcome 1801  (by fax.   Insurance updated on 06/15/2023 @ 3433)   Outcome of appeal Agree with planned discharge

## 2023-06-15 NOTE — CARE COORDINATION
Interdisciplinary rounding completed. Natalie (provider rep), Lana Jensen (), Nolberto Lagunas (nursing), Susan (PT), Jada (OT), Pavel Ashraf (pharmacy), and Chhaya (dietitian) all involved. Activities reviewed with OT. Lives with brother. Has O2 (4-6LNC), walker. I at baseline. DC needs and DME per therapy recs. Patient completed bed mobility tasks with Mod I. Stood from bedside with SBA and ambulated 75 feet with RW, 3LO2, and SBA. SpO2 92%. Patient ambulated 90 feet with RW, 3LO2, and SBA and transferred to the recliner upon return to the room. SpO2 88% but quickly rebounded. Engaged patient in B LE therex in sitting. Bed Mobility Training  Bed Mobility Training: No  Overall Level of Assistance: Modified independent  Balance  Sitting: Intact  Standing: With support  Transfer Training  Transfer Training: Yes  Overall Level of Assistance: Stand-by assistance  Sit to Stand: Stand-by assistance  Stand to Sit: Stand-by assistance  Gait Training  Gait Training: Yes  Gait  Overall Level of Assistance: Stand-by assistance  Distance (ft): 75 Feet (and 90 feet)  Assistive Device: Gait belt;Walker, rolling (3LO2)  PT Exercises  Exercise Treatment: B LE therex in sitting  Safety Devices  Type of Devices: Left in chair;Call light within reach     154 lb. Easy to chew diet. Charlie ordered BID. Avg PO intake 70-94% x 8 meals. No edema noted at this time. Abrasion nose/upper lip. Vitamin D, colace, melatonin, MVI, lactobacillus ordered. No current antibiotics, lovenox 40mg subq nightly for dvt prophylaxis. Appeal was upheld by PAMELA. DC on Saturday. Family is agreeable. Plan to DC with out pt therapy - Jada with therapy notified. Family has washable chux ordered, but needs chux sent with her to home until they come in.

## 2023-06-16 LAB
BILIRUB UR QL STRIP.AUTO: NEGATIVE
CLARITY UR: CLEAR
COLOR UR: YELLOW
FLUAV AG NPH QL: NEGATIVE
FLUBV AG NPH QL: NEGATIVE
GLUCOSE UR STRIP.AUTO-MCNC: NEGATIVE MG/DL
HGB UR QL STRIP.AUTO: NEGATIVE
KETONES UR STRIP.AUTO-MCNC: NEGATIVE MG/DL
LEUKOCYTE ESTERASE UR QL STRIP.AUTO: NEGATIVE
NITRITE UR QL STRIP.AUTO: NEGATIVE
PH UR STRIP.AUTO: 7 [PH] (ref 5–8)
PROT UR STRIP.AUTO-MCNC: NEGATIVE MG/DL
SARS-COV-2 RDRP RESP QL NAA+PROBE: NOT DETECTED
SP GR UR STRIP.AUTO: 1.02 (ref 1–1.03)
UA DIPSTICK W REFLEX MICRO PNL UR: NORMAL
URN SPEC COLLECT METH UR: NORMAL
UROBILINOGEN UR STRIP-ACNC: 0.2 E.U./DL

## 2023-06-16 PROCEDURE — 87635 SARS-COV-2 COVID-19 AMP PRB: CPT

## 2023-06-16 PROCEDURE — 94640 AIRWAY INHALATION TREATMENT: CPT

## 2023-06-16 PROCEDURE — 6370000000 HC RX 637 (ALT 250 FOR IP): Performed by: INTERNAL MEDICINE

## 2023-06-16 PROCEDURE — 6360000002 HC RX W HCPCS: Performed by: INTERNAL MEDICINE

## 2023-06-16 PROCEDURE — 94660 CPAP INITIATION&MGMT: CPT

## 2023-06-16 PROCEDURE — 87040 BLOOD CULTURE FOR BACTERIA: CPT

## 2023-06-16 PROCEDURE — 97110 THERAPEUTIC EXERCISES: CPT

## 2023-06-16 PROCEDURE — 97530 THERAPEUTIC ACTIVITIES: CPT

## 2023-06-16 PROCEDURE — 2700000000 HC OXYGEN THERAPY PER DAY

## 2023-06-16 PROCEDURE — 97535 SELF CARE MNGMENT TRAINING: CPT

## 2023-06-16 PROCEDURE — 94761 N-INVAS EAR/PLS OXIMETRY MLT: CPT

## 2023-06-16 PROCEDURE — 6370000000 HC RX 637 (ALT 250 FOR IP): Performed by: PHYSICIAN ASSISTANT

## 2023-06-16 PROCEDURE — 1200000002 HC SEMI PRIVATE SWING BED

## 2023-06-16 PROCEDURE — 6360000002 HC RX W HCPCS: Performed by: PHYSICIAN ASSISTANT

## 2023-06-16 PROCEDURE — 87804 INFLUENZA ASSAY W/OPTIC: CPT

## 2023-06-16 PROCEDURE — 81003 URINALYSIS AUTO W/O SCOPE: CPT

## 2023-06-16 PROCEDURE — 99315 NF DSCHRG MGMT 30 MIN/LESS: CPT | Performed by: PHYSICIAN ASSISTANT

## 2023-06-16 PROCEDURE — 36415 COLL VENOUS BLD VENIPUNCTURE: CPT

## 2023-06-16 PROCEDURE — 97116 GAIT TRAINING THERAPY: CPT

## 2023-06-16 RX ORDER — TIOTROPIUM BROMIDE 18 UG/1
18 CAPSULE ORAL; RESPIRATORY (INHALATION) DAILY
Qty: 30 CAPSULE | Refills: 0 | Status: ON HOLD | OUTPATIENT
Start: 2023-06-16

## 2023-06-16 RX ORDER — PSEUDOEPHEDRINE HCL 30 MG
100 TABLET ORAL 2 TIMES DAILY
Qty: 60 CAPSULE | Refills: 0 | Status: ON HOLD | OUTPATIENT
Start: 2023-06-16 | End: 2023-07-16

## 2023-06-16 RX ORDER — BUDESONIDE AND FORMOTEROL FUMARATE DIHYDRATE 160; 4.5 UG/1; UG/1
2 AEROSOL RESPIRATORY (INHALATION) 2 TIMES DAILY
Qty: 10.2 G | Refills: 0 | Status: ON HOLD | OUTPATIENT
Start: 2023-06-16

## 2023-06-16 RX ORDER — IPRATROPIUM BROMIDE AND ALBUTEROL SULFATE 2.5; .5 MG/3ML; MG/3ML
1 SOLUTION RESPIRATORY (INHALATION) EVERY 6 HOURS PRN
Qty: 360 ML | Refills: 0 | Status: ON HOLD | OUTPATIENT
Start: 2023-06-16

## 2023-06-16 RX ORDER — ERGOCALCIFEROL 1.25 MG/1
50000 CAPSULE ORAL WEEKLY
Qty: 5 CAPSULE | Refills: 0 | Status: ON HOLD | OUTPATIENT
Start: 2023-06-23

## 2023-06-16 RX ADMIN — Medication 5 MG: at 21:13

## 2023-06-16 RX ADMIN — ACETAMINOPHEN 650 MG: 325 TABLET, FILM COATED ORAL at 16:41

## 2023-06-16 RX ADMIN — CETIRIZINE HYDROCHLORIDE 10 MG: 10 TABLET, FILM COATED ORAL at 08:25

## 2023-06-16 RX ADMIN — BUDESONIDE 500 MCG: 0.5 INHALANT RESPIRATORY (INHALATION) at 05:16

## 2023-06-16 RX ADMIN — ENOXAPARIN SODIUM 40 MG: 100 INJECTION SUBCUTANEOUS at 21:13

## 2023-06-16 RX ADMIN — IPRATROPIUM BROMIDE AND ALBUTEROL SULFATE 1 DOSE: 2.5; .5 SOLUTION RESPIRATORY (INHALATION) at 05:16

## 2023-06-16 RX ADMIN — BUTALBITAL, ACETAMINOPHEN, AND CAFFEINE 1 TABLET: 50; 325; 40 TABLET ORAL at 06:26

## 2023-06-16 RX ADMIN — AMLODIPINE BESYLATE 10 MG: 5 TABLET ORAL at 08:24

## 2023-06-16 RX ADMIN — ERGOCALCIFEROL 50000 UNITS: 1.25 CAPSULE ORAL at 08:24

## 2023-06-16 RX ADMIN — MUPIROCIN: 20 OINTMENT TOPICAL at 08:26

## 2023-06-16 RX ADMIN — FLUTICASONE PROPIONATE 1 SPRAY: 50 SPRAY, METERED NASAL at 08:25

## 2023-06-16 RX ADMIN — MULTIPLE VITAMINS W/ MINERALS TAB 1 TABLET: TAB at 08:24

## 2023-06-16 RX ADMIN — IPRATROPIUM BROMIDE AND ALBUTEROL SULFATE 1 DOSE: 2.5; .5 SOLUTION RESPIRATORY (INHALATION) at 21:23

## 2023-06-16 RX ADMIN — ATENOLOL 25 MG: 25 TABLET ORAL at 08:25

## 2023-06-16 RX ADMIN — MUPIROCIN: 20 OINTMENT TOPICAL at 21:14

## 2023-06-16 ASSESSMENT — PAIN DESCRIPTION - DESCRIPTORS: DESCRIPTORS: ACHING

## 2023-06-16 ASSESSMENT — PAIN DESCRIPTION - LOCATION: LOCATION: HEAD

## 2023-06-16 ASSESSMENT — PAIN SCALES - GENERAL: PAINLEVEL_OUTOF10: 9

## 2023-06-16 NOTE — DISCHARGE SUMMARY
and behavior appropriate       Activity: activity as tolerated  Diet:  regular, easy to chew  Follow Up: Primary Care Physician in 1 week and with Pulmonology in 2 weeks    Labs: For convenience and continuity at follow-up the following most recent labs are provided:    CBC:   Lab Results   Component Value Date/Time    WBC 7.3 06/12/2023 05:10 AM    HGB 11.2 06/12/2023 05:10 AM    HCT 36.3 06/12/2023 05:10 AM     06/12/2023 05:10 AM       RENAL:   Lab Results   Component Value Date/Time     06/12/2023 05:10 AM    K 3.8 06/12/2023 05:10 AM    K 4.6 06/02/2023 04:52 AM     06/12/2023 05:10 AM    CO2 29 06/12/2023 05:10 AM    BUN 10 06/12/2023 05:10 AM    CREATININE <0.5 06/12/2023 05:10 AM         Discharge Medications:     Current Discharge Medication List             Details   docusate sodium (COLACE, DULCOLAX) 100 MG CAPS Take 100 mg by mouth 2 times daily  Qty: 60 capsule, Refills: 0      Ergocalciferol (VITAMIN D) 66264 units CAPS Take 50,000 Units by mouth once a week  Qty: 5 capsule, Refills: 0      budesonide-formoterol (SYMBICORT) 160-4.5 MCG/ACT AERO Inhale 2 puffs into the lungs 2 times daily  Qty: 10.2 g, Refills: 0      tiotropium (SPIRIVA HANDIHALER) 18 MCG inhalation capsule Inhale 1 capsule into the lungs daily  Qty: 30 capsule, Refills: 0                Details   ipratropium 0.5 mg-albuterol 2.5 mg (DUONEB) 0.5-2.5 (3) MG/3ML SOLN nebulizer solution Inhale 3 mLs into the lungs every 6 hours as needed for Shortness of Breath  Qty: 360 mL, Refills: 0                Details   mupirocin (BACTROBAN) 2 % cream Apply 1 application topically 2 times daily Apply topically 3 times daily.       cetirizine (ZYRTEC) 10 MG tablet Take 1 tablet by mouth daily      diclofenac sodium (VOLTAREN) 1 % GEL Apply 2 g topically 3 times daily as needed for Pain      Dextromethorphan-guaiFENesin (GUAIFENESIN-DEXTROMETHORPHAN 100-10 MG/5 ML ORAL SYRP CMPD) Take 10 mLs by mouth every 4 hours as needed

## 2023-06-16 NOTE — PLAN OF CARE
Problem: Discharge Planning  Goal: Discharge to home or other facility with appropriate resources  Outcome: Progressing  Flowsheets  Taken 6/16/2023 0800 by Vivienne Mora RN  Discharge to home or other facility with appropriate resources: Identify barriers to discharge with patient and caregiver  Taken 6/15/2023 2347 by Everette Lee RN  Discharge to home or other facility with appropriate resources: Identify barriers to discharge with patient and caregiver     Problem: Skin/Tissue Integrity  Goal: Absence of new skin breakdown  Description: 1. Monitor for areas of redness and/or skin breakdown  2. Assess vascular access sites hourly  3. Every 4-6 hours minimum:  Change oxygen saturation probe site  4. Every 4-6 hours:  If on nasal continuous positive airway pressure, respiratory therapy assess nares and determine need for appliance change or resting period.   Outcome: Progressing     Problem: Pain  Goal: Verbalizes/displays adequate comfort level or baseline comfort level  Outcome: Progressing     Problem: Safety - Adult  Goal: Free from fall injury  Outcome: Progressing     Problem: Nutrition Deficit:  Goal: Optimize nutritional status  Outcome: Progressing

## 2023-06-17 ENCOUNTER — APPOINTMENT (OUTPATIENT)
Dept: CT IMAGING | Facility: HOSPITAL | Age: 78
DRG: 189 | End: 2023-06-17
Attending: INTERNAL MEDICINE
Payer: MEDICARE

## 2023-06-17 ENCOUNTER — APPOINTMENT (OUTPATIENT)
Dept: GENERAL RADIOLOGY | Facility: HOSPITAL | Age: 78
DRG: 189 | End: 2023-06-17
Attending: INTERNAL MEDICINE
Payer: MEDICARE

## 2023-06-17 ENCOUNTER — HOSPITAL ENCOUNTER (INPATIENT)
Facility: HOSPITAL | Age: 78
LOS: 4 days | Discharge: NURSING, REHAB OR LONG-TERM CARE | DRG: 871 | End: 2023-06-21
Attending: INTERNAL MEDICINE | Admitting: INTERNAL MEDICINE
Payer: MEDICARE

## 2023-06-17 VITALS
WEIGHT: 154.25 LBS | TEMPERATURE: 98.2 F | HEART RATE: 91 BPM | DIASTOLIC BLOOD PRESSURE: 59 MMHG | OXYGEN SATURATION: 90 % | HEIGHT: 63 IN | BODY MASS INDEX: 27.33 KG/M2 | RESPIRATION RATE: 20 BRPM | SYSTOLIC BLOOD PRESSURE: 125 MMHG

## 2023-06-17 PROBLEM — J18.9 MULTIFOCAL PNEUMONIA: Status: ACTIVE | Noted: 2023-06-17

## 2023-06-17 PROCEDURE — 71045 X-RAY EXAM CHEST 1 VIEW: CPT

## 2023-06-17 PROCEDURE — 5A09457 ASSISTANCE WITH RESPIRATORY VENTILATION, 24-96 CONSECUTIVE HOURS, CONTINUOUS POSITIVE AIRWAY PRESSURE: ICD-10-PCS | Performed by: INTERNAL MEDICINE

## 2023-06-17 PROCEDURE — 2580000003 HC RX 258: Performed by: PHYSICIAN ASSISTANT

## 2023-06-17 PROCEDURE — 1200000000 HC SEMI PRIVATE

## 2023-06-17 PROCEDURE — 94640 AIRWAY INHALATION TREATMENT: CPT

## 2023-06-17 PROCEDURE — 6370000000 HC RX 637 (ALT 250 FOR IP): Performed by: PHYSICIAN ASSISTANT

## 2023-06-17 PROCEDURE — 71250 CT THORAX DX C-: CPT

## 2023-06-17 PROCEDURE — 6360000002 HC RX W HCPCS: Performed by: PHYSICIAN ASSISTANT

## 2023-06-17 PROCEDURE — 2700000000 HC OXYGEN THERAPY PER DAY

## 2023-06-17 PROCEDURE — 94660 CPAP INITIATION&MGMT: CPT

## 2023-06-17 PROCEDURE — 6370000000 HC RX 637 (ALT 250 FOR IP): Performed by: INTERNAL MEDICINE

## 2023-06-17 PROCEDURE — 6360000002 HC RX W HCPCS: Performed by: INTERNAL MEDICINE

## 2023-06-17 PROCEDURE — 94761 N-INVAS EAR/PLS OXIMETRY MLT: CPT

## 2023-06-17 RX ORDER — CETIRIZINE HYDROCHLORIDE 10 MG/1
10 TABLET ORAL DAILY
Status: DISCONTINUED | OUTPATIENT
Start: 2023-06-17 | End: 2023-06-21 | Stop reason: HOSPADM

## 2023-06-17 RX ORDER — DOCUSATE SODIUM 100 MG/1
100 CAPSULE, LIQUID FILLED ORAL 2 TIMES DAILY
Status: DISCONTINUED | OUTPATIENT
Start: 2023-06-17 | End: 2023-06-21 | Stop reason: HOSPADM

## 2023-06-17 RX ORDER — ACETAMINOPHEN 650 MG/1
650 SUPPOSITORY RECTAL EVERY 6 HOURS PRN
Status: DISCONTINUED | OUTPATIENT
Start: 2023-06-17 | End: 2023-06-21 | Stop reason: HOSPADM

## 2023-06-17 RX ORDER — IPRATROPIUM BROMIDE AND ALBUTEROL SULFATE 2.5; .5 MG/3ML; MG/3ML
1 SOLUTION RESPIRATORY (INHALATION)
Status: DISCONTINUED | OUTPATIENT
Start: 2023-06-17 | End: 2023-06-21 | Stop reason: HOSPADM

## 2023-06-17 RX ORDER — MECOBALAMIN 5000 MCG
5 TABLET,DISINTEGRATING ORAL NIGHTLY
Status: DISCONTINUED | OUTPATIENT
Start: 2023-06-17 | End: 2023-06-21 | Stop reason: HOSPADM

## 2023-06-17 RX ORDER — ONDANSETRON 2 MG/ML
4 INJECTION INTRAMUSCULAR; INTRAVENOUS EVERY 6 HOURS PRN
Status: DISCONTINUED | OUTPATIENT
Start: 2023-06-17 | End: 2023-06-21 | Stop reason: HOSPADM

## 2023-06-17 RX ORDER — GUAIFENESIN/DEXTROMETHORPHAN 100-10MG/5
5 SYRUP ORAL EVERY 4 HOURS PRN
Status: DISCONTINUED | OUTPATIENT
Start: 2023-06-17 | End: 2023-06-21 | Stop reason: HOSPADM

## 2023-06-17 RX ORDER — GUAIFENESIN 600 MG/1
600 TABLET, EXTENDED RELEASE ORAL 2 TIMES DAILY
Status: DISCONTINUED | OUTPATIENT
Start: 2023-06-17 | End: 2023-06-21 | Stop reason: HOSPADM

## 2023-06-17 RX ORDER — POLYETHYLENE GLYCOL 3350 17 G/17G
17 POWDER, FOR SOLUTION ORAL DAILY PRN
Status: DISCONTINUED | OUTPATIENT
Start: 2023-06-17 | End: 2023-06-21 | Stop reason: HOSPADM

## 2023-06-17 RX ORDER — HYDROXYZINE HYDROCHLORIDE 25 MG/1
25 TABLET, FILM COATED ORAL 3 TIMES DAILY PRN
Status: DISCONTINUED | OUTPATIENT
Start: 2023-06-17 | End: 2023-06-21 | Stop reason: HOSPADM

## 2023-06-17 RX ORDER — ATENOLOL 25 MG/1
25 TABLET ORAL DAILY
Status: DISCONTINUED | OUTPATIENT
Start: 2023-06-17 | End: 2023-06-21 | Stop reason: HOSPADM

## 2023-06-17 RX ORDER — ERGOCALCIFEROL 1.25 MG/1
50000 CAPSULE ORAL WEEKLY
Status: DISCONTINUED | OUTPATIENT
Start: 2023-06-17 | End: 2023-06-21 | Stop reason: HOSPADM

## 2023-06-17 RX ORDER — ENOXAPARIN SODIUM 100 MG/ML
40 INJECTION SUBCUTANEOUS DAILY
Status: DISCONTINUED | OUTPATIENT
Start: 2023-06-17 | End: 2023-06-21 | Stop reason: HOSPADM

## 2023-06-17 RX ORDER — ONDANSETRON 4 MG/1
4 TABLET, ORALLY DISINTEGRATING ORAL EVERY 8 HOURS PRN
Status: DISCONTINUED | OUTPATIENT
Start: 2023-06-17 | End: 2023-06-21 | Stop reason: HOSPADM

## 2023-06-17 RX ORDER — LINEZOLID 600 MG/1
600 TABLET, FILM COATED ORAL EVERY 12 HOURS SCHEDULED
Status: DISCONTINUED | OUTPATIENT
Start: 2023-06-17 | End: 2023-06-17 | Stop reason: SDUPTHER

## 2023-06-17 RX ORDER — LINEZOLID 600 MG/1
600 TABLET, FILM COATED ORAL EVERY 12 HOURS SCHEDULED
Status: DISCONTINUED | OUTPATIENT
Start: 2023-06-17 | End: 2023-06-20

## 2023-06-17 RX ORDER — FLUTICASONE PROPIONATE 50 MCG
2 SPRAY, SUSPENSION (ML) NASAL DAILY
Status: DISCONTINUED | OUTPATIENT
Start: 2023-06-17 | End: 2023-06-21 | Stop reason: HOSPADM

## 2023-06-17 RX ORDER — LINEZOLID 600 MG/1
600 TABLET, FILM COATED ORAL EVERY 12 HOURS SCHEDULED
Status: DISCONTINUED | OUTPATIENT
Start: 2023-06-17 | End: 2023-06-17

## 2023-06-17 RX ORDER — PREDNISONE 10 MG/1
10 TABLET ORAL DAILY
Status: DISCONTINUED | OUTPATIENT
Start: 2023-06-17 | End: 2023-06-21 | Stop reason: HOSPADM

## 2023-06-17 RX ORDER — BUDESONIDE 0.5 MG/2ML
0.5 INHALANT ORAL 2 TIMES DAILY
Status: DISCONTINUED | OUTPATIENT
Start: 2023-06-17 | End: 2023-06-21 | Stop reason: HOSPADM

## 2023-06-17 RX ORDER — MUPIROCIN CALCIUM 20 MG/G
1 CREAM TOPICAL 2 TIMES DAILY
Status: DISCONTINUED | OUTPATIENT
Start: 2023-06-17 | End: 2023-06-21 | Stop reason: HOSPADM

## 2023-06-17 RX ORDER — M-VIT,TX,IRON,MINS/CALC/FOLIC 27MG-0.4MG
1 TABLET ORAL DAILY
Status: DISCONTINUED | OUTPATIENT
Start: 2023-06-17 | End: 2023-06-21 | Stop reason: HOSPADM

## 2023-06-17 RX ORDER — LACTOBACILLUS RHAMNOSUS GG 10B CELL
1 CAPSULE ORAL EVERY OTHER DAY
Status: DISCONTINUED | OUTPATIENT
Start: 2023-06-17 | End: 2023-06-21 | Stop reason: HOSPADM

## 2023-06-17 RX ORDER — ACETAMINOPHEN 325 MG/1
650 TABLET ORAL EVERY 6 HOURS PRN
Status: DISCONTINUED | OUTPATIENT
Start: 2023-06-17 | End: 2023-06-21 | Stop reason: HOSPADM

## 2023-06-17 RX ORDER — AMLODIPINE BESYLATE 5 MG/1
10 TABLET ORAL DAILY
Status: DISCONTINUED | OUTPATIENT
Start: 2023-06-17 | End: 2023-06-21 | Stop reason: HOSPADM

## 2023-06-17 RX ADMIN — FLUTICASONE PROPIONATE 1 SPRAY: 50 SPRAY, METERED NASAL at 07:52

## 2023-06-17 RX ADMIN — DOCUSATE SODIUM 100 MG: 100 CAPSULE, LIQUID FILLED ORAL at 20:46

## 2023-06-17 RX ADMIN — IPRATROPIUM BROMIDE AND ALBUTEROL SULFATE 1 DOSE: .5; 3 SOLUTION RESPIRATORY (INHALATION) at 17:05

## 2023-06-17 RX ADMIN — LINEZOLID 600 MG: 600 TABLET, FILM COATED ORAL at 14:31

## 2023-06-17 RX ADMIN — BUDESONIDE 500 MCG: 0.5 INHALANT RESPIRATORY (INHALATION) at 05:11

## 2023-06-17 RX ADMIN — IPRATROPIUM BROMIDE AND ALBUTEROL SULFATE 1 DOSE: 2.5; .5 SOLUTION RESPIRATORY (INHALATION) at 05:11

## 2023-06-17 RX ADMIN — BUDESONIDE 500 MCG: 0.5 INHALANT RESPIRATORY (INHALATION) at 17:05

## 2023-06-17 RX ADMIN — BUTALBITAL, ACETAMINOPHEN, AND CAFFEINE 1 TABLET: 50; 325; 40 TABLET ORAL at 07:51

## 2023-06-17 RX ADMIN — ACETAMINOPHEN 650 MG: 325 TABLET, FILM COATED ORAL at 23:12

## 2023-06-17 RX ADMIN — MULTIPLE VITAMINS W/ MINERALS TAB 1 TABLET: TAB at 07:51

## 2023-06-17 RX ADMIN — MUPIROCIN: 20 OINTMENT TOPICAL at 07:52

## 2023-06-17 RX ADMIN — AMLODIPINE BESYLATE 10 MG: 5 TABLET ORAL at 07:51

## 2023-06-17 RX ADMIN — ATENOLOL 25 MG: 25 TABLET ORAL at 07:51

## 2023-06-17 RX ADMIN — ACETAMINOPHEN 650 MG: 325 TABLET, FILM COATED ORAL at 17:08

## 2023-06-17 RX ADMIN — Medication 5 MG: at 20:46

## 2023-06-17 RX ADMIN — MEROPENEM 1000 MG: 1 INJECTION, POWDER, FOR SOLUTION INTRAVENOUS at 23:04

## 2023-06-17 RX ADMIN — ENOXAPARIN SODIUM 40 MG: 100 INJECTION SUBCUTANEOUS at 14:31

## 2023-06-17 RX ADMIN — Medication 1 CAPSULE: at 07:51

## 2023-06-17 RX ADMIN — MEROPENEM 1000 MG: 1 INJECTION, POWDER, FOR SOLUTION INTRAVENOUS at 15:22

## 2023-06-17 RX ADMIN — CETIRIZINE HYDROCHLORIDE 10 MG: 10 TABLET, FILM COATED ORAL at 07:51

## 2023-06-17 RX ADMIN — GUAIFENESIN 600 MG: 600 TABLET, EXTENDED RELEASE ORAL at 20:46

## 2023-06-17 RX ADMIN — MUPIROCIN 1 APPLICATION: 2 CREAM TOPICAL at 20:48

## 2023-06-17 ASSESSMENT — PAIN SCALES - GENERAL: PAINLEVEL_OUTOF10: 0

## 2023-06-17 NOTE — FLOWSHEET NOTE
06/11/23 2030   Assessment   Charting Type Shift assessment   Psychosocial   Psychosocial (WDL) WDL   Neurological   Neuro (WDL) WDL   Level of Consciousness 0   [place] Coma Scale   Eye Opening 4   Best Verbal Response 5   Best Motor Response 6   [place] Coma Scale Score 15   HEENT (Head, Ears, Eyes, Nose, & Throat)   HEENT (WDL) X   Right Eye Impaired vision   Left Eye Blind   Teeth Dentures upper;Dentures lower;Edentulous   Respiratory   Respiratory (WDL) X   Respiratory Pattern Regular   Respiratory Depth Normal   Respiratory Quality/Effort Unlabored;Dyspnea with exertion   Chest Assessment Chest expansion symmetrical   L Breath Sounds Clear;Diminished   R Breath Sounds Clear;Diminished   Breath Sounds   Right Upper Lobe Clear   Right Middle Lobe Clear   Right Lower Lobe Diminished   Left Upper Lobe Clear   Left Lower Lobe Diminished   Cardiac   Cardiac (WDL) WDL   Gastrointestinal   Abdominal (WDL) WDL   Genitourinary   Genitourinary (WDL) WDL   Peripheral Vascular   Peripheral Vascular (WDL) WDL   Edema None   Skin Integumentary    Skin Integumentary (WDL) X   Skin Color Pink   Skin Condition/Temp Dry; Warm   Skin Integrity Redness   Location Buttocks   Skin Integrity Site 2   Skin Integrity Location 2 Ecchymosis   Location 2 Lower Abdomen   Skin Integrity Site 3   Skin Integrity Location 3 Abrasion    Location 3 Nose/Upper Lip   Musculoskeletal   Musculoskeletal (WDL) X   RL Extremity Weakness   LL Extremity Weakness
06/12/23 0710   Vital Signs   Temp 98.6 °F (37 °C)   Temp Source Oral   Pulse 81   Heart Rate Source Monitor   Respirations 16   /60   MAP (Calculated) 83   MAP (mmHg) 82   Oxygen Therapy   SpO2 92 %   O2 Device Nasal cannula   O2 Flow Rate (L/min) 2 L/min
06/12/23 0710   Vital Signs   Temp 98.6 °F (37 °C)   Temp Source Oral   Pulse 81   Heart Rate Source Monitor   Respirations 16   /60   MAP (Calculated) 83   MAP (mmHg) 82   Oxygen Therapy   SpO2 92 %   O2 Device Nasal cannula   O2 Flow Rate (L/min) 2 L/min
06/12/23 0805   Assessment   Charting Type Shift assessment   Psychosocial   Psychosocial (WDL) WDL   Neurological   Neuro (WDL) WDL   Level of Consciousness 0   Swallow Screening   Is the patient unable to remain alert for testing? No   Is the patient on a modified diet (thickened liquids) due to pre-existing dysphagia? No   Is there presence of existing enteral tube feeding via the stomach or nose? No   Is there presence of head-of-bed restrictions (less than 30 degrees)? No   Is there presence of tracheotomy tube? No   Is the patient ordered nothing-by-mouth status? No   3 oz Water Swallow Screen Pass   [place] Coma Scale   Eye Opening 4   Best Verbal Response 5   Best Motor Response 6   [place] Coma Scale Score 15   NIHSS Stroke Scale   NIHSS Stroke Scale Assessed No   HEENT (Head, Ears, Eyes, Nose, & Throat)   HEENT (WDL) X   Right Eye Impaired vision   Left Eye Blind   Teeth Dentures upper;Dentures lower;Edentulous   Respiratory   Respiratory (WDL) X   Respiratory Pattern Regular   Respiratory Depth Normal   Respiratory Quality/Effort Unlabored;Dyspnea with exertion   Chest Assessment Chest expansion symmetrical   L Breath Sounds Clear;Diminished   R Breath Sounds Clear;Diminished   Breath Sounds   Right Upper Lobe Clear   Right Middle Lobe Diminished   Right Lower Lobe Diminished   Left Upper Lobe Clear   Left Lower Lobe Diminished   Cardiac   Cardiac (WDL) WDL   Gastrointestinal   Abdominal (WDL) WDL   Abdomen Inspection Rounded; Soft   RUQ Bowel Sounds Active   LUQ Bowel Sounds Active   RLQ Bowel Sounds Active   LLQ Bowel Sounds Active   Genitourinary   Genitourinary (WDL) WDL   Peripheral Vascular   Peripheral Vascular (WDL) WDL   Edema None   Skin Integumentary    Skin Integumentary (WDL) X   Skin Color Pink   Skin Condition/Temp Dry; Warm   Skin Integrity Redness  (blanchable)   Location buttocks   Multiple Skin Integrity Sites Yes   Skin Integrity Site 2   Skin Integrity Location 2 Ecchymosis
06/12/23 2120   Assessment   Charting Type Shift assessment   Psychosocial   Psychosocial (WDL) WDL   Neurological   Neuro (WDL) WDL   Level of Consciousness 0   [place] Coma Scale   Eye Opening 4   Best Verbal Response 5   Best Motor Response 6   [place] Coma Scale Score 15   HEENT (Head, Ears, Eyes, Nose, & Throat)   HEENT (WDL) X   Right Eye Impaired vision   Left Eye Blind   Teeth Dentures upper;Dentures lower;Edentulous   Respiratory   Respiratory (WDL) X   Respiratory Pattern Regular   Respiratory Depth Normal   Respiratory Quality/Effort Unlabored;Dyspnea with exertion   Chest Assessment Chest expansion symmetrical   L Breath Sounds Clear;Diminished   R Breath Sounds Clear;Diminished   Breath Sounds   Right Upper Lobe Clear   Right Middle Lobe Diminished   Right Lower Lobe Diminished   Left Upper Lobe Clear   Left Lower Lobe Diminished   Cardiac   Cardiac (WDL) WDL   Gastrointestinal   Abdominal (WDL) WDL   Abdomen Inspection Rounded; Soft   RUQ Bowel Sounds Active   LUQ Bowel Sounds Active   RLQ Bowel Sounds Active   LLQ Bowel Sounds Active   Genitourinary   Genitourinary (WDL) WDL   Peripheral Vascular   Peripheral Vascular (WDL) WDL   Edema None   Skin Integumentary    Skin Integumentary (WDL) X   Skin Color Pink   Skin Condition/Temp Dry; Warm   Skin Integrity Redness   Location Buttocks   Skin Integrity Site 2   Skin Integrity Location 2 Ecchymosis   Location 2 Lower Abdomen   Skin Integrity Site 3   Skin Integrity Location 3 Abrasion    Location 3 Nose/Upper Lip   Musculoskeletal   Musculoskeletal (WDL) X   RL Extremity Weakness   LL Extremity Weakness
06/13/23 0708   Vital Signs   Temp 97.7 °F (36.5 °C)   Temp Source Oral   Pulse 64   Heart Rate Source Monitor   Respirations 18   BP (!) 133/58   MAP (Calculated) 83   MAP (mmHg) 80   Oxygen Therapy   SpO2 93 %   O2 Device PAP (positive airway pressure)
06/13/23 0708   Vital Signs   Temp 97.7 °F (36.5 °C)   Temp Source Oral   Pulse 64   Heart Rate Source Monitor   Respirations 18   BP (!) 133/58   MAP (Calculated) 83   MAP (mmHg) 80   Oxygen Therapy   SpO2 93 %   O2 Device PAP (positive airway pressure)
06/15/23 9643   Assessment   Charting Type Shift assessment   Psychosocial   Psychosocial (WDL) WDL   Neurological   Neuro (WDL) WDL   Level of Consciousness 0   [place] Coma Scale   Eye Opening 4   Best Verbal Response 5   Best Motor Response 6   [place] Coma Scale Score 15   NIHSS Stroke Scale   NIHSS Stroke Scale Assessed No   HEENT (Head, Ears, Eyes, Nose, & Throat)   HEENT (WDL) X   Right Eye Impaired vision   Left Eye Blind   Teeth Dentures upper;Dentures lower;Edentulous   Respiratory   Respiratory (WDL) X   Respiratory Pattern Regular   Respiratory Depth Normal   Respiratory Quality/Effort Unlabored;Dyspnea with exertion   Chest Assessment Chest expansion symmetrical   L Breath Sounds Clear;Diminished   R Breath Sounds Clear;Diminished   Level of Activity/Mobility 1   Breath Sounds   Right Upper Lobe Clear   Right Middle Lobe Diminished   Right Lower Lobe Diminished   Left Upper Lobe Clear   Left Lower Lobe Diminished   Cough/Sputum   Sputum How Obtained None   Cough Non-productive   Frequency Occasional   Cardiac   Cardiac (WDL) WDL   Gastrointestinal   Abdominal (WDL) WDL   Abdomen Inspection Rounded; Soft   RUQ Bowel Sounds Active   LUQ Bowel Sounds Active   RLQ Bowel Sounds Active   LLQ Bowel Sounds Active   Genitourinary   Genitourinary (WDL) WDL   Urine Assessment   Urinary Status Voiding   Peripheral Vascular   Peripheral Vascular (WDL) WDL   Edema None   Skin Integumentary    Skin Integumentary (WDL) X   Skin Color Pale   Skin Condition/Temp Warm;Dry   Skin Integrity Abrasion   Location nose and upper lip   Multiple Skin Integrity Sites Yes   Skin Integrity Site 2   Skin Integrity Location 2 Ecchymosis   Location 2 scattered   Preventative Dressing No   Skin Integrity Site 3   Skin Integrity Location 3 Abrasion    Location 3 nose   Preventative Dressing No   Assessed this shift Yes   Skin Integrity Site 4   Skin Integrity Location 4 Abrasion   Location 4 upper lip   Preventative Dressing No
06/16/23 0800   Assessment   Charting Type Shift assessment   Psychosocial   Psychosocial (WDL) WDL   Neurological   Neuro (WDL) WDL   Level of Consciousness 0   [place] Coma Scale   Eye Opening 4   Best Verbal Response 5   Best Motor Response 6   [place] Coma Scale Score 15   HEENT (Head, Ears, Eyes, Nose, & Throat)   HEENT (WDL) X   Right Eye Impaired vision   Left Eye Blind   Teeth Dentures upper;Dentures lower;Edentulous   Respiratory   Respiratory (WDL) X   Respiratory Interventions H.O.B. elevated;Cough & deep breathe   Respiratory Pattern Regular   Respiratory Depth Normal   Respiratory Quality/Effort Unlabored;Dyspnea with exertion   Chest Assessment Chest expansion symmetrical   L Breath Sounds Clear;Diminished   R Breath Sounds Clear;Diminished   Breath Sounds   Right Upper Lobe Clear   Right Middle Lobe Diminished   Right Lower Lobe Diminished   Left Upper Lobe Clear   Left Lower Lobe Diminished   Cough/Sputum   Sputum How Obtained None   Cough Non-productive   Frequency Occasional   Cardiac   Cardiac (WDL) WDL   Gastrointestinal   Abdominal (WDL) WDL   Abdomen Inspection Rounded; Soft   RUQ Bowel Sounds Active   LUQ Bowel Sounds Active   RLQ Bowel Sounds Active   LLQ Bowel Sounds Active   Genitourinary   Genitourinary (WDL) WDL   Peripheral Vascular   Peripheral Vascular (WDL) WDL   Edema None   Skin Integumentary    Skin Integumentary (WDL) X   Skin Color Pale   Skin Condition/Temp Warm;Dry   Skin Integrity Abrasion   Location nose and upper lip   Care Plan - Skin/Tissue Integrity Goals   Skin Integrity Remains Intact Monitor for areas of redness and/or skin breakdown   Musculoskeletal   Musculoskeletal (WDL) X   RL Extremity Weakness; Unsteady   LL Extremity Weakness; Unsteady   Care Plan - Discharge Planning Goals   Discharge to home or other facility with appropriate resources Identify barriers to discharge with patient and caregiver
06/16/23 2115   Assessment   Charting Type Shift assessment   Psychosocial   Psychosocial (WDL) WDL   Neurological   Neuro (WDL) WDL   Level of Consciousness 0   [place] Coma Scale   Eye Opening 4   Best Verbal Response 5   Best Motor Response 6   [place] Coma Scale Score 15   HEENT (Head, Ears, Eyes, Nose, & Throat)   HEENT (WDL) X   Right Eye Impaired vision   Left Eye Blind   Teeth Dentures upper;Dentures lower;Edentulous   Respiratory   Respiratory (WDL) X   Respiratory Pattern Regular   Respiratory Depth Normal   Respiratory Quality/Effort Unlabored;Dyspnea with exertion   Chest Assessment Chest expansion symmetrical   L Breath Sounds Clear;Diminished   R Breath Sounds Clear;Diminished   Breath Sounds   Right Upper Lobe Clear   Right Middle Lobe Diminished   Right Lower Lobe Diminished   Left Upper Lobe Clear   Left Lower Lobe Diminished   Cough/Sputum   Sputum How Obtained None   Cough Non-productive   Cardiac   Cardiac (WDL) WDL   Cardiac Monitor   Telemetry Box Number    Telemetry Monitor Alarm Parameters    Gastrointestinal   Abdominal (WDL) WDL   Abdomen Inspection Rounded; Soft   RUQ Bowel Sounds Active   LUQ Bowel Sounds Active   RLQ Bowel Sounds Active   LLQ Bowel Sounds Active   Genitourinary   Genitourinary (WDL) WDL   Peripheral Vascular   Peripheral Vascular (WDL) WDL   Edema None   Skin Integumentary    Skin Integumentary (WDL) X   Skin Color Pale   Skin Condition/Temp Warm;Dry   Skin Integrity Abrasion   Location nose and upper lip   Skin Integrity Site 2   Skin Integrity Location 2 Ecchymosis   Location 2 scattered   Preventative Dressing No   Skin Integrity Site 3   Skin Integrity Location 3 Abrasion    Location 3 nose   Preventative Dressing No   Skin Integrity Site 4   Skin Integrity Location 4 Abrasion   Location 4 upper lip   Preventative Dressing No   Musculoskeletal   Musculoskeletal (WDL) X   RL Extremity Weakness; Unsteady   LL Extremity Weakness; Unsteady
06/17/23 0800   Assessment   Charting Type Shift assessment   Psychosocial   Psychosocial (WDL) WDL   Neurological   Neuro (WDL) WDL   Level of Consciousness 0   [place] Coma Scale   Eye Opening 4   Best Verbal Response 5   Best Motor Response 6   [place] Coma Scale Score 15   HEENT (Head, Ears, Eyes, Nose, & Throat)   HEENT (WDL) X   Right Eye Impaired vision   Left Eye Blind   Teeth Dentures upper;Dentures lower;Edentulous   Respiratory   Respiratory (WDL) X   Respiratory Interventions H.O.B. elevated;Cough & deep breathe;Incentive spirometry   Respiratory Pattern Regular   Respiratory Depth Normal   Respiratory Quality/Effort Unlabored;Dyspnea with exertion   Chest Assessment Chest expansion symmetrical   L Breath Sounds Clear;Diminished   R Breath Sounds Clear;Diminished   Breath Sounds   Right Upper Lobe Clear   Right Middle Lobe Diminished   Right Lower Lobe Diminished   Left Upper Lobe Clear   Left Lower Lobe Diminished   Cough/Sputum   Sputum How Obtained None   Cough Non-productive   Frequency Occasional   Cardiac   Cardiac (WDL) WDL   Cardiac Monitor   Telemetry Box Number    Telemetry Monitor Alarm Parameters    Gastrointestinal   Abdominal (WDL) WDL   Abdomen Inspection Rounded; Soft   RUQ Bowel Sounds Active   LUQ Bowel Sounds Active   RLQ Bowel Sounds Active   LLQ Bowel Sounds Active   Genitourinary   Genitourinary (WDL) WDL   Urine Assessment   Urinary Status Voiding   Peripheral Vascular   Peripheral Vascular (WDL) WDL   Edema None   Skin Integumentary    Skin Integumentary (WDL) X   Skin Color Pale   Skin Condition/Temp Warm;Dry   Skin Integrity Abrasion   Location nose upper lip   Skin Integrity Site 2   Skin Integrity Location 2 Ecchymosis   Location 2 scattered   Care Plan - Skin/Tissue Integrity Goals   Skin Integrity Remains Intact Monitor for areas of redness and/or skin breakdown;Assess vascular access sites hourly   Musculoskeletal   Musculoskeletal (WDL) X   RL Extremity
Location 2 lower abdomen   Assessed this shift Yes   Skin Integrity Site 3   Skin Integrity Location 3 Abrasion    Location 3 upper lip nose   Musculoskeletal   Musculoskeletal (WDL) X   RL Extremity Weakness   LL Extremity Weakness     Pt alert times 4 this am -Am assessment completed- Breathing equal and unlabored- Tylenol given po for head ache-  -call bell within reach-Pt told to call out with any needs-Will monitor

## 2023-06-17 NOTE — PROGRESS NOTES
Being changed from swing bed to acute admit.
CLINICAL PHARMACY NOTE: MEDS TO BEDS    Total # of Prescriptions Filled: 5   The following medications were delivered to the patient:  Current Discharge Medication List        START taking these medications    Details   docusate sodium (COLACE, DULCOLAX) 100 MG CAPS Take 100 mg by mouth 2 times daily  Qty: 60 capsule, Refills: 0      Ergocalciferol (VITAMIN D) 46553 units CAPS Take 50,000 Units by mouth once a week  Qty: 5 capsule, Refills: 0      budesonide-formoterol (SYMBICORT) 160-4.5 MCG/ACT AERO Inhale 2 puffs into the lungs 2 times daily  Qty: 10.2 g, Refills: 0      tiotropium (SPIRIVA HANDIHALER) 18 MCG inhalation capsule Inhale 1 capsule into the lungs daily  Qty: 30 capsule, Refills: 0         Ipratropium-albuterol 0.5-2.5mg/3ml neb sol  Inhale 3ml into the lungs every 6 hours as needed for shortness of breath Qty: 360 ml Refill: 0      Additional Documentation:  Patient qualified for Rx assistance copay $27.27. Medications were delivered to patient's bedside and signed for by patient.
Comprehensive Nutrition Assessment    Type and Reason for Visit:   (Follow-up)    Nutrition Recommendations/Plan:   Continue current diet as medically appropriate and tolerated. Continue to encourage PO intake as appropriate. Pt intakes have been good with avg of % x 9 meals. Continue Charlie BID to promote wound healing and help meet estimated nutrient needs. RD to follow pt and available PRN. Malnutrition Assessment:  Malnutrition Status:  No malnutrition (06/02/23 6117)    Context:  Chronic Illness     Findings of the 6 clinical characteristics of malnutrition:  Energy Intake:  Unable to assess  Weight Loss:  No significant weight loss     Body Fat Loss:  No significant body fat loss     Muscle Mass Loss:  Unable to assess    Fluid Accumulation:  No significant fluid accumulation     Strength:       Nutrition Assessment:    Pt continues to be at moderate risk for ongoing nutritional compromise r/t wounds and good PO intakes. Nutrition Related Findings:    No edema at this time. No new weights. Medications: vitamin D, colace, MVI, melatonin, lactobacillus. Labs: alb 3.0. Wound Type:  (Abrasion on nose from BiPap, abrasion upper lip)       Current Nutrition Intake & Therapies:    Average Meal Intake: % (x 9 meals)  Average Supplements Intake: 51-75% (Pt reports that she has been drinking Charlie)  ADULT ORAL NUTRITION SUPPLEMENT; Lunch, Dinner; Wound Healing Oral Supplement  ADULT DIET; Easy to Chew    Anthropometric Measures:  Height: 5' 3\" (160 cm)  Ideal Body Weight (IBW): 115 lbs (52 kg)       Current Body Weight: 154 lb 5.2 oz (70 kg), 134.2 % IBW. Weight Source: Bed Scale (Bedside scale)  Current BMI (kg/m2): 27.3  Usual Body Weight: 156 lb (70.8 kg)  % Weight Change (Calculated): -1.3  Weight Adjustment For: No Adjustment                 BMI Categories: Overweight (BMI 25.0-29. 9)    Estimated Daily Nutrient Needs:  Energy Requirements Based On: Formula  Weight Used for Energy
Occupational Therapy  Facility/Department: [facility]  Daily Treatment Note  NAME: Florin Kunz  : 1945  MRN: [id number]    Date of Service: 2023    Discharge Recommendations:  Continue to assess pending progress         Patient Diagnosis(es): There were no encounter diagnoses. Assessment    Assessment: Pt agreeable to OT services. Pt received seated upright in recliner upon entry. Pt agreeable to attempt to ambulate to bathroom. Pt come to stand with CGA and ambulated to bathroom transferring to toilet with CGA. Pt completed toileting tasks with CGA with cues for pacing and PLB as needed. Pt required min verbal cuing and use of grab bars to come to stand from standard toilet. Pt ambulated to sink and washed hands with SBA. Pt tolerated well no LOB. Pt ambulated in room 40 feet with RW and returned to seated position transferring to recliner with CGA. Pt had seated rest break. Pt participated in seated ther ex using orange theraband x10 reps. Pt completed chair push ups with min verbal cuing for posture and compensatory strategy to improve independence with transfers. Pt continues to benefit from skilled OT services. Spoke with daughter during therapy session about pt progress and case management informed daughter of pt next review date. Pt left seated upright in recliner with call light in reach upon exit.   Activity Tolerance: Patient tolerated treatment well      Plan         Restrictions       Subjective   Subjective  Subjective: I dont think I am ready to go home yet           Objective    Vitals     Balance  Sitting: Intact  Standing: With support  Gait  Overall Level of Assistance: Contact-guard assistance  Distance (ft): 20 Feet (40)  Assistive Device: Gait belt;Walker, rolling (2L)     ADL  Grooming: Stand by assistance  Grooming Skilled Clinical Factors: standing at sink to complete hand washing  Toileting: Contact guard assistance  OT Exercises  A/AROM Exercises: Shoulder
Occupational Therapy  Facility/Department: [facility] MED SUR[facility]  Daily Treatment Note  NAME: Deepti Rees  : 1945  MRN: [id number]    Date of Service: 2023    Discharge Recommendations:  Continue to assess pending progress         Patient Diagnosis(es): The encounter diagnosis was Chronic obstructive pulmonary disease, unspecified COPD type ([facility] [address] Assessment    Assessment: Pt received seated upright in recliner. Pt come to stand with SBA with 2 attempts to stand and ambulated to bathroom SBA. Pt toileted with SBA and donned brief with SBA. Pt cued for rest break and come to stand using grab bar. Pt educated on placing BSC over toilet upon DC home to assist with transfers. Pt ambulated back to recliner and had brief rest break with cues for PLB. Pt 02 sats 89% upon returning to recliner. Pt recovered >90%. Pt then participated in seated BUE ther ex tolerating well Pt provided with handout for HEP to continue ther ex. .  Activity Tolerance: Patient tolerated treatment well     Subjective   Subjective  Subjective: Pt agreeable to OT services. Objective    Vitals     Bed Mobility Training  Bed Mobility Training: No  Balance  Sitting: Intact  Standing: With support  Transfer Training  Transfer Training: Yes  Overall Level of Assistance: Stand-by assistance;Supervision  Sit to Stand: Stand-by assistance;Supervision  Stand to Sit: Stand-by assistance;Supervision  Stand Pivot Transfers: Stand-by assistance  Gait Training  Gait Training: Yes  Gait  Overall Level of Assistance: Stand-by assistance  Distance (ft): 100 Feet (x2)  Assistive Device: Gait belt;Walker, rolling (3LO2)     ADL  LE Dressing: Stand by assistance  Toileting: Stand by assistance  OT Exercises  A/AROM Exercises: Shoulder flexion/extension, retraction, horizontal ABD/ADD, ADD/ABD.  elbow flexion/extension x10     Safety Devices  Type of Devices: Left in chair;Call light within reach          Goals  Short Term Goals  Time Frame for Short Term
Occupational Therapy  Facility/Department: [facility] SUR[facility]  Daily Treatment Note  NAME: Naida Lyons  : 1945  MRN: [id number]    Date of Service: 6/15/2023    Discharge Recommendations:  Continue to assess pending progress         Patient Diagnosis(es): There were no encounter diagnoses. Assessment    Assessment: Pt received in recliner upon entry. Pt is on 3L 02. Pt come to stand with SBA with 2 attempts to come to stand. Pt ambulated to bathroom with RW SBA. Pt transferred to toilet with SBA and completed toileting tasks with SBA. Pt completed sponge bathing seated on toiletin with MAC for UB. Pt applied deoderant with MAC. Pt washed LB with SBA requiring assist only to wash feet. Pt donned brief with SBA with increased time and cues for pacing activity and PLB. Pt come to stand from toilet using grab bar with cues for transfer techniques and come to stand with SBA. Pt ambulated back to recliner with call light in reach and positioned for comfort in chair. Pt 02 sats 87% ambulating back to chair. Pt cued for PLB during ambulation. Pt continued to use PLB and recovered >90% in recliner in seated position. Activity Tolerance: Patient tolerated treatment well       Subjective   Subjective  Subjective: Pt agreeable to OT services.            Objective    Vitals     Bed Mobility Training  Bed Mobility Training: No  Overall Level of Assistance: Modified independent  Balance  Sitting: Intact  Standing: With support  Transfer Training  Transfer Training: Yes  Overall Level of Assistance: Stand-by assistance  Sit to Stand: Stand-by assistance  Stand to Sit: Stand-by assistance  Gait Training  Gait Training: Yes  Gait  Overall Level of Assistance: Stand-by assistance  Distance (ft): 75 Feet (and 90 feet)  Assistive Device: Gait belt;Walker, rolling (3LO2)     ADL  Grooming: Stand by assistance  UE Bathing: Setup  LE Bathing: Stand by assistance  UE Dressing: Stand by assistance  LE Dressing: Stand by
Occupational Therapy  Facility/Department: [facility]  Daily Treatment Note  NAME: Omar Echavarria  : 1945  MRN: [id number]    Date of Service: 2023    Discharge Recommendations:  Continue to assess pending progress         Patient Diagnosis(es): There were no encounter diagnoses. Assessment    Assessment: Pt received seated upright in recliner upon entry. Pt is on 3L 02. Pt agreeable to OT services. Pt transferred to standing with CGA with 2 attempts to complete task. Pt ambulated to bathroom with RW with SBA. Pt transferred to toilet and completed toileting tasks with CGA. Pt participated in sponge bathing seated on toilet. Attempted to complete shower however, pt continues to be fearful of showers and declines attempts. Pt able to wash UB with MAC and LB with SBA. Pt required seated rest breaks with cues for PLB. Pt presents with SOA during task. Pt donned socks with SBA with no AD and required CGA for donning brief. Pt with SOA and required rest break. Pt come to stand from toilet using grab bar with min verbal cuing and CGA. pt ambulated ~15 feet back to EOB and transferred to supine with SBA. Pt with SOA and o2 sats 87% pt required increased timing to recover with PLB. Pt positioned for comfort and left with call light in reach. Activity Tolerance: Patient tolerated treatment well       Subjective   Subjective  Subjective: Pt agreeable to OT services.      Objective    Vitals     Bed Mobility Training  Bed Mobility Training: No  Overall Level of Assistance: Modified independent  Balance  Sitting: Intact  Standing: With support  Transfer Training  Transfer Training: Yes  Overall Level of Assistance: Stand-by assistance  Sit to Stand: Stand-by assistance  Stand to Sit: Stand-by assistance  Bed to Chair: Contact-guard assistance;Stand-by assistance  Toilet Transfer: Contact-guard assistance;Stand-by assistance  Gait Training  Gait Training: Yes  Gait  Overall Level of Assistance: Contact-guard
Offered spiritual care to patient. Told pt to reach out if they needed anything further.
Pharmacy Adjustment per [facility] protocol    Florin Kunz is a 69 y.o. female. Pharmacy has adjusted medications per [facility] protocol. No results for input(s): BUN in the last 72 hours. No results for input(s): CREATININE in the last 72 hours. Estimated Creatinine Clearance: 90 mL/min (based on SCr of 0.5 mg/dL).     Height:   Ht Readings from Last 1 Encounters:   06/13/23 5' 3\" (1.6 m)     Weight:  Wt Readings from Last 1 Encounters:   06/01/23 154 lb 4 oz (70 kg)         Plan: Adjust the following medications based on [facility] protocol:           Meropenem to 2000 mg IV once over 30 minutes followed by 1000 mg IV every 3 hours extended infusion over 180 minutes     Electronically signed by Salma Doherty RPH on 6/17/2023 at 11:58 AM
Physical Therapy  Facility/Department: [facility] MED SUR[facility]  Daily Treatment Note  NAME: Lexi Barnhart  : 1945  MRN: [id number]    Date of Service: 2023    Discharge Recommendations:  Continue to assess pending progress, 24 hour supervision or assist      Patient Diagnosis(es): There were no encounter diagnoses. Assessment   Assessment: Patient stood from the recliner with CGA/SBA and ambulated 75 feet with RW, 2LO2, and CGA. SpO2 88% after ambulation but quickly rebounded above 90% with rest and cues for PLB. After a brief seated rest, patient ambulated 75 feet back to the room. SpO2 again dropped but quickly rebounded. Supplemental O2 need is down to 2L from 3L last week. Engaged patient in B LE therex in sitting to improve strength and endurance. Activity Tolerance: Patient tolerated treatment well     Plan    Physcial Therapy Plan  General Plan: 3-5 times per week  Current Treatment Recommendations: Strengthening;ROM;Balance training;Functional mobility training;Transfer training; Endurance training;Gait training;Home exercise program;Safety education & training;Patient/Caregiver education & training;Equipment evaluation, education, & procurement; Therapeutic activities     Restrictions  Restrictions/Precautions  Restrictions/Precautions: Fall Risk, General Precautions  Required Braces or Orthoses?: No     Subjective    Subjective  Subjective: Patient presents up in the recliner on 2LO2, NAD     Objective   Bed Mobility Training  Bed Mobility Training: No  Balance  Sitting: Intact  Standing: With support  Transfer Training  Transfer Training: Yes  Overall Level of Assistance: Contact-guard assistance;Stand-by assistance  Sit to Stand: Contact-guard assistance;Stand-by assistance  Gait Training  Gait Training: Yes  Gait  Overall Level of Assistance: Contact-guard assistance  Distance (ft): 75 Feet (x2)  Assistive Device: Gait belt;Walker, rolling (2LO2)     PT Exercises  Exercise Treatment: B LE therex in
Physical Therapy  Facility/Department: [facility] MED SUR[facility]  Daily Treatment Note  NAME: Lexi Barnhart  : 1945  MRN: [id number]    Date of Service: 2023    Discharge Recommendations:  Continue to assess pending progress, 24 hour supervision or assist      Patient Diagnosis(es): There were no encounter diagnoses. Assessment   Assessment: Patient completed bed mobility tasks with Mod I. Stood from bedside with SBA and ambulated 75 feet with RW, 3LO2, and CGA/SBA. SpO2 85% but rebounded with rest and cues for PLB. Engaged patient in B LE therex in sitting. Patient stood from the chair and ambulated 100 feet with RW, 3LO2, and CGA/SBA and then transferred back to bed. SpO2 88% after ambulation but again quickly rebounded. Activity Tolerance: Patient tolerated treatment well     Plan    Physical Therapy Plan  General Plan: 3-5 times per week  Current Treatment Recommendations: Strengthening;ROM;Balance training;Functional mobility training;Transfer training; Endurance training;Gait training;Home exercise program;Safety education & training;Patient/Caregiver education & training;Equipment evaluation, education, & procurement; Therapeutic activities     Restrictions  Restrictions/Precautions  Restrictions/Precautions: Fall Risk, General Precautions  Required Braces or Orthoses?: No     Subjective    Subjective  Subjective: Patient presents awake in bed on 3LO2, NAD     Objective   Bed Mobility Training  Overall Level of Assistance: Modified independent  Balance  Sitting: Intact  Standing: With support  Transfer Training  Transfer Training: Yes  Sit to Stand: Stand-by assistance  Stand to Sit: Stand-by assistance  Gait Training  Gait Training: Yes  Gait  Overall Level of Assistance: Contact-guard assistance;Stand-by assistance  Distance (ft): 75 Feet (and 100 feet)  Assistive Device: Gait belt;Walker, rolling (3LO2)     Safety Devices  Type of Devices: Left in bed;Call light within reach     Goals  Short Term
Physical Therapy  Facility/Department: [facility] MED SUR[facility]  Daily Treatment Note  NAME: Marian Kumari  : 1945  MRN: [id number]    Date of Service: 2023    Discharge Recommendations:  Continue to assess pending progress, 24 hour supervision or assist      Patient Diagnosis(es): There were no encounter diagnoses. Assessment   Assessment: Patient completed bed mobility tasks with Mod I. Stood from bedside with SBA and ambulated ~20 feet to/from the bathroom and toileted with SBA. Ambulated in the hallway 75 feet x 2 with RW, 3LO2, and CGA/SBA. Patient transferred to the recliner with SBA and performed B LE therex in sitting. SpO2 ranged 87-92% on 3L. Activity Tolerance: Patient tolerated treatment well     Plan    Physical Therapy Plan  General Plan: 3-5 times per week  Current Treatment Recommendations: Strengthening;ROM;Balance training;Functional mobility training;Transfer training; Endurance training;Gait training;Home exercise program;Safety education & training;Patient/Caregiver education & training;Equipment evaluation, education, & procurement; Therapeutic activities     Restrictions  Restrictions/Precautions  Restrictions/Precautions: Fall Risk, General Precautions  Required Braces or Orthoses?: No     Subjective    Subjective  Subjective: Patient presents awake in bed on 3LO2, NAD     Objective   Bed Mobility Training  Bed Mobility Training: No  Overall Level of Assistance: Modified independent  Balance  Sitting: Intact  Standing: With support  Transfer Training  Transfer Training: Yes  Overall Level of Assistance: Stand-by assistance  Sit to Stand: Stand-by assistance  Stand to Sit: Stand-by assistance  Bed to Chair: Contact-guard assistance;Stand-by assistance  Toilet Transfer: Contact-guard assistance;Stand-by assistance  Gait Training  Gait Training: Yes  Gait  Overall Level of Assistance: Contact-guard assistance;Stand-by assistance  Distance (ft):  (x2)  Assistive Device: Gait belt;Walker,
Physical Therapy  Facility/Department: [facility]  Daily Treatment Note  NAME: Naida Lyons  : 1945  MRN: [id number]    Date of Service: 6/15/2023    Discharge Recommendations:  Continue to assess pending progress, 24 hour supervision or assist      Patient Diagnosis(es): There were no encounter diagnoses. Assessment   Assessment: Patient completed bed mobility tasks with Mod I. Stood from bedside with SBA and ambulated 75 feet with RW, 3LO2, and SBA. SpO2 92%. Patient ambulated 90 feet with RW, 3LO2, and SBA and transferred to the recliner upon return to the room. SpO2 88% but quickly rebounded. Engaged patient in B LE therex in sitting. Activity Tolerance: Patient tolerated treatment well     Plan    Physical Therapy Plan  General Plan: 3-5 times per week  Current Treatment Recommendations: Strengthening;ROM;Balance training;Functional mobility training;Transfer training; Endurance training;Gait training;Home exercise program;Safety education & training;Patient/Caregiver education & training;Equipment evaluation, education, & procurement; Therapeutic activities     Restrictions  Restrictions/Precautions  Restrictions/Precautions: Fall Risk, General Precautions  Required Braces or Orthoses?: No     Subjective    Subjective  Subjective: Patient presents awake in bed on 3LO2, NAD     Objective   Bed Mobility Training  Bed Mobility Training: No  Overall Level of Assistance: Modified independent  Balance  Sitting: Intact  Standing: With support  Transfer Training  Transfer Training: Yes  Overall Level of Assistance: Stand-by assistance  Sit to Stand: Stand-by assistance  Stand to Sit: Stand-by assistance  Gait Training  Gait Training: Yes  Gait  Overall Level of Assistance: Stand-by assistance  Distance (ft): 75 Feet (and 90 feet)  Assistive Device: Gait belt;Walker, rolling (3LO2)     PT Exercises  Exercise Treatment: B LE therex in sitting     Safety Devices  Type of Devices: Left in chair;Call light
Physical Therapy  Facility/Department: [facility] MED SUR[facility]  Daily Treatment Note  NAME: Abby Weiner  : 1945  MRN: [id number]    Date of Service: 2023    Discharge Recommendations:  Continue to assess pending progress, 24 hour supervision or assist      Patient Diagnosis(es): The encounter diagnosis was Chronic obstructive pulmonary disease, unspecified COPD type ([facility] [address] Assessment   Assessment: Patient stood from recliner with SBA/supervision and ambulated 100 feet x 2 with RW, 3LO2, and SBA. Engaged patient in B LE therex in sitting to improve strength and endurance. Provided patient with pulse oximeter for home use. Educated patient on proper usage and placed it in her coat pocket per her request.  Activity Tolerance: Patient tolerated treatment well     Plan    Physical Therapy Plan  General Plan: 3-5 times per week  Current Treatment Recommendations: Strengthening;ROM;Balance training;Functional mobility training;Transfer training; Endurance training;Gait training;Home exercise program;Safety education & training;Patient/Caregiver education & training;Equipment evaluation, education, & procurement; Therapeutic activities     Restrictions  Restrictions/Precautions  Restrictions/Precautions: Fall Risk, General Precautions  Required Braces or Orthoses?: No     Subjective    Subjective  Subjective: Patient presents up in recliner on 3LO2, NAD     Objective   Bed Mobility Training  Bed Mobility Training: No  Balance  Sitting: Intact  Standing: With support  Transfer Training  Transfer Training: Yes  Overall Level of Assistance: Stand-by assistance;Supervision  Sit to Stand: Stand-by assistance;Supervision  Stand to Sit: Stand-by assistance;Supervision  Stand Pivot Transfers: Stand-by assistance  Gait Training  Gait Training: Yes  Gait  Overall Level of Assistance: Stand-by assistance  Distance (ft): 100 Feet (x2)  Assistive Device: Gait belt;Walker, rolling (3LO2)     PT Exercises  Exercise Treatment: B LE
Pt given prn fioricet po for headache will monitor
Tylenol given for headache rated at 8 will monitor
Therapy Time   Individual Concurrent Group Co-treatment   Time In 0911         Time Out 1005         Minutes 54          This note serves as a DC summary in the event of pt discharge.       Jada Paul, OTR/L

## 2023-06-17 NOTE — PLAN OF CARE
Problem: Discharge Planning  Goal: Discharge to home or other facility with appropriate resources  6/17/2023 0944 by Lisa Hopson RN  Outcome: Progressing  Flowsheets (Taken 6/17/2023 0800)  Discharge to home or other facility with appropriate resources: Identify barriers to discharge with patient and caregiver  6/17/2023 0126 by Eric Lin RN  Outcome: Progressing     Problem: Skin/Tissue Integrity  Goal: Absence of new skin breakdown  Description: 1. Monitor for areas of redness and/or skin breakdown  2. Assess vascular access sites hourly  3. Every 4-6 hours minimum:  Change oxygen saturation probe site  4. Every 4-6 hours:  If on nasal continuous positive airway pressure, respiratory therapy assess nares and determine need for appliance change or resting period.   6/17/2023 0944 by Lisa Hopson RN  Outcome: Progressing  6/17/2023 0126 by Eric Lin RN  Outcome: Progressing     Problem: Pain  Goal: Verbalizes/displays adequate comfort level or baseline comfort level  6/17/2023 0944 by Lisa Hopson RN  Outcome: Progressing  6/17/2023 0126 by Eric Lin RN  Outcome: Progressing     Problem: Safety - Adult  Goal: Free from fall injury  6/17/2023 0944 by Lisa Hopson RN  Outcome: Progressing  6/17/2023 0126 by Eric Lin RN  Outcome: Progressing     Problem: Nutrition Deficit:  Goal: Optimize nutritional status  Outcome: Progressing

## 2023-06-18 PROBLEM — A41.9 SEPSIS (HCC): Status: ACTIVE | Noted: 2023-06-18

## 2023-06-18 PROBLEM — J96.10 CHRONIC RESPIRATORY FAILURE (HCC): Status: ACTIVE | Noted: 2023-06-02

## 2023-06-18 LAB
ALBUMIN SERPL-MCNC: 2.6 G/DL (ref 3.4–4.8)
ALBUMIN/GLOB SERPL: 0.9 {RATIO} (ref 0.8–2)
ALP SERPL-CCNC: 73 U/L (ref 25–100)
ALT SERPL-CCNC: 12 U/L (ref 4–36)
ANION GAP SERPL CALCULATED.3IONS-SCNC: 7 MMOL/L (ref 3–16)
AST SERPL-CCNC: 9 U/L (ref 8–33)
BILIRUB SERPL-MCNC: <0.2 MG/DL (ref 0.3–1.2)
BUN SERPL-MCNC: 11 MG/DL (ref 6–20)
CALCIUM SERPL-MCNC: 8.7 MG/DL (ref 8.5–10.5)
CHLORIDE SERPL-SCNC: 104 MMOL/L (ref 98–107)
CO2 SERPL-SCNC: 31 MMOL/L (ref 20–30)
CREAT SERPL-MCNC: <0.5 MG/DL (ref 0.4–1.2)
ERYTHROCYTE [DISTWIDTH] IN BLOOD BY AUTOMATED COUNT: 17 % (ref 11–16)
GFR SERPLBLD CREATININE-BSD FMLA CKD-EPI: >60 ML/MIN/{1.73_M2}
GLOBULIN SER CALC-MCNC: 2.9 G/DL
GLUCOSE SERPL-MCNC: 87 MG/DL (ref 74–106)
HCT VFR BLD AUTO: 35.3 % (ref 37–47)
HGB BLD-MCNC: 11 G/DL (ref 11.5–16.5)
MCH RBC QN AUTO: 30.9 PG (ref 27–32)
MCHC RBC AUTO-ENTMCNC: 31.2 G/DL (ref 31–35)
MCV RBC AUTO: 99.2 FL (ref 80–100)
PLATELET # BLD AUTO: 239 K/UL (ref 150–400)
PMV BLD AUTO: 10.1 FL (ref 6–10)
POTASSIUM SERPL-SCNC: 4.3 MMOL/L (ref 3.4–5.1)
PROT SERPL-MCNC: 5.5 G/DL (ref 6.4–8.3)
RBC # BLD AUTO: 3.56 M/UL (ref 3.8–5.8)
SODIUM SERPL-SCNC: 142 MMOL/L (ref 136–145)
WBC # BLD AUTO: 7.1 K/UL (ref 4–11)

## 2023-06-18 PROCEDURE — 94640 AIRWAY INHALATION TREATMENT: CPT

## 2023-06-18 PROCEDURE — 6360000002 HC RX W HCPCS: Performed by: PHYSICIAN ASSISTANT

## 2023-06-18 PROCEDURE — 85027 COMPLETE CBC AUTOMATED: CPT

## 2023-06-18 PROCEDURE — 1200000000 HC SEMI PRIVATE

## 2023-06-18 PROCEDURE — 2580000003 HC RX 258: Performed by: PHYSICIAN ASSISTANT

## 2023-06-18 PROCEDURE — 94660 CPAP INITIATION&MGMT: CPT

## 2023-06-18 PROCEDURE — 99222 1ST HOSP IP/OBS MODERATE 55: CPT | Performed by: INTERNAL MEDICINE

## 2023-06-18 PROCEDURE — 36415 COLL VENOUS BLD VENIPUNCTURE: CPT

## 2023-06-18 PROCEDURE — 80053 COMPREHEN METABOLIC PANEL: CPT

## 2023-06-18 PROCEDURE — 6370000000 HC RX 637 (ALT 250 FOR IP): Performed by: PHYSICIAN ASSISTANT

## 2023-06-18 PROCEDURE — 94761 N-INVAS EAR/PLS OXIMETRY MLT: CPT

## 2023-06-18 PROCEDURE — 2700000000 HC OXYGEN THERAPY PER DAY

## 2023-06-18 RX ADMIN — ATENOLOL 25 MG: 25 TABLET ORAL at 08:09

## 2023-06-18 RX ADMIN — DOCUSATE SODIUM 100 MG: 100 CAPSULE, LIQUID FILLED ORAL at 08:10

## 2023-06-18 RX ADMIN — MULTIPLE VITAMINS W/ MINERALS TAB 1 TABLET: TAB at 08:10

## 2023-06-18 RX ADMIN — MEROPENEM 1000 MG: 1 INJECTION, POWDER, FOR SOLUTION INTRAVENOUS at 14:39

## 2023-06-18 RX ADMIN — PREDNISONE 10 MG: 10 TABLET ORAL at 08:10

## 2023-06-18 RX ADMIN — MEROPENEM 1000 MG: 1 INJECTION, POWDER, FOR SOLUTION INTRAVENOUS at 05:19

## 2023-06-18 RX ADMIN — GUAIFENESIN 600 MG: 600 TABLET, EXTENDED RELEASE ORAL at 08:09

## 2023-06-18 RX ADMIN — MUPIROCIN 1 APPLICATION: 2 CREAM TOPICAL at 12:42

## 2023-06-18 RX ADMIN — LINEZOLID 600 MG: 600 TABLET, FILM COATED ORAL at 21:45

## 2023-06-18 RX ADMIN — IPRATROPIUM BROMIDE AND ALBUTEROL SULFATE 1 DOSE: .5; 3 SOLUTION RESPIRATORY (INHALATION) at 17:21

## 2023-06-18 RX ADMIN — IPRATROPIUM BROMIDE AND ALBUTEROL SULFATE 1 DOSE: .5; 3 SOLUTION RESPIRATORY (INHALATION) at 05:43

## 2023-06-18 RX ADMIN — ACETAMINOPHEN 650 MG: 325 TABLET, FILM COATED ORAL at 06:38

## 2023-06-18 RX ADMIN — Medication 5 MG: at 21:45

## 2023-06-18 RX ADMIN — GUAIFENESIN 600 MG: 600 TABLET, EXTENDED RELEASE ORAL at 21:45

## 2023-06-18 RX ADMIN — BUDESONIDE 500 MCG: 0.5 INHALANT RESPIRATORY (INHALATION) at 05:43

## 2023-06-18 RX ADMIN — BUDESONIDE 500 MCG: 0.5 INHALANT RESPIRATORY (INHALATION) at 17:21

## 2023-06-18 RX ADMIN — MUPIROCIN 1 APPLICATION: 2 CREAM TOPICAL at 21:46

## 2023-06-18 RX ADMIN — ACETAMINOPHEN 650 MG: 325 TABLET, FILM COATED ORAL at 22:00

## 2023-06-18 RX ADMIN — AMLODIPINE BESYLATE 10 MG: 5 TABLET ORAL at 08:09

## 2023-06-18 RX ADMIN — MEROPENEM 1000 MG: 1 INJECTION, POWDER, FOR SOLUTION INTRAVENOUS at 21:47

## 2023-06-18 RX ADMIN — FLUTICASONE PROPIONATE 2 SPRAY: 50 SPRAY, METERED NASAL at 08:11

## 2023-06-18 RX ADMIN — LINEZOLID 600 MG: 600 TABLET, FILM COATED ORAL at 08:10

## 2023-06-18 RX ADMIN — CETIRIZINE HYDROCHLORIDE 10 MG: 10 TABLET, FILM COATED ORAL at 08:10

## 2023-06-18 RX ADMIN — ENOXAPARIN SODIUM 40 MG: 100 INJECTION SUBCUTANEOUS at 12:37

## 2023-06-18 RX ADMIN — IPRATROPIUM BROMIDE AND ALBUTEROL SULFATE 1 DOSE: .5; 3 SOLUTION RESPIRATORY (INHALATION) at 12:28

## 2023-06-18 ASSESSMENT — PAIN DESCRIPTION - LOCATION
LOCATION: HEAD;NECK
LOCATION: BACK;HEAD

## 2023-06-19 LAB
ALBUMIN SERPL-MCNC: 2.6 G/DL (ref 3.4–4.8)
ALBUMIN/GLOB SERPL: 0.9 {RATIO} (ref 0.8–2)
ALP SERPL-CCNC: 67 U/L (ref 25–100)
ALT SERPL-CCNC: 12 U/L (ref 4–36)
ANION GAP SERPL CALCULATED.3IONS-SCNC: 7 MMOL/L (ref 3–16)
AST SERPL-CCNC: 9 U/L (ref 8–33)
BILIRUB SERPL-MCNC: <0.2 MG/DL (ref 0.3–1.2)
BUN SERPL-MCNC: 10 MG/DL (ref 6–20)
CALCIUM SERPL-MCNC: 8.5 MG/DL (ref 8.5–10.5)
CHLORIDE SERPL-SCNC: 104 MMOL/L (ref 98–107)
CO2 SERPL-SCNC: 30 MMOL/L (ref 20–30)
CREAT SERPL-MCNC: <0.5 MG/DL (ref 0.4–1.2)
ERYTHROCYTE [DISTWIDTH] IN BLOOD BY AUTOMATED COUNT: 16.7 % (ref 11–16)
GFR SERPLBLD CREATININE-BSD FMLA CKD-EPI: >60 ML/MIN/{1.73_M2}
GLOBULIN SER CALC-MCNC: 2.8 G/DL
GLUCOSE SERPL-MCNC: 82 MG/DL (ref 74–106)
HCT VFR BLD AUTO: 34.3 % (ref 37–47)
HGB BLD-MCNC: 10.5 G/DL (ref 11.5–16.5)
MCH RBC QN AUTO: 30.3 PG (ref 27–32)
MCHC RBC AUTO-ENTMCNC: 30.6 G/DL (ref 31–35)
MCV RBC AUTO: 99.1 FL (ref 80–100)
PLATELET # BLD AUTO: 245 K/UL (ref 150–400)
PMV BLD AUTO: 9.9 FL (ref 6–10)
POTASSIUM SERPL-SCNC: 4 MMOL/L (ref 3.4–5.1)
PROT SERPL-MCNC: 5.4 G/DL (ref 6.4–8.3)
RBC # BLD AUTO: 3.46 M/UL (ref 3.8–5.8)
SODIUM SERPL-SCNC: 141 MMOL/L (ref 136–145)
WBC # BLD AUTO: 6.4 K/UL (ref 4–11)

## 2023-06-19 PROCEDURE — 6370000000 HC RX 637 (ALT 250 FOR IP): Performed by: PHYSICIAN ASSISTANT

## 2023-06-19 PROCEDURE — 97116 GAIT TRAINING THERAPY: CPT

## 2023-06-19 PROCEDURE — 36415 COLL VENOUS BLD VENIPUNCTURE: CPT

## 2023-06-19 PROCEDURE — 1200000000 HC SEMI PRIVATE

## 2023-06-19 PROCEDURE — 2580000003 HC RX 258: Performed by: PHYSICIAN ASSISTANT

## 2023-06-19 PROCEDURE — 94640 AIRWAY INHALATION TREATMENT: CPT

## 2023-06-19 PROCEDURE — 6360000002 HC RX W HCPCS: Performed by: PHYSICIAN ASSISTANT

## 2023-06-19 PROCEDURE — 94761 N-INVAS EAR/PLS OXIMETRY MLT: CPT

## 2023-06-19 PROCEDURE — 85027 COMPLETE CBC AUTOMATED: CPT

## 2023-06-19 PROCEDURE — 80053 COMPREHEN METABOLIC PANEL: CPT

## 2023-06-19 PROCEDURE — 99231 SBSQ HOSP IP/OBS SF/LOW 25: CPT | Performed by: INTERNAL MEDICINE

## 2023-06-19 PROCEDURE — 97535 SELF CARE MNGMENT TRAINING: CPT

## 2023-06-19 PROCEDURE — 97802 MEDICAL NUTRITION INDIV IN: CPT

## 2023-06-19 PROCEDURE — 97165 OT EVAL LOW COMPLEX 30 MIN: CPT

## 2023-06-19 PROCEDURE — 97162 PT EVAL MOD COMPLEX 30 MIN: CPT

## 2023-06-19 PROCEDURE — 87449 NOS EACH ORGANISM AG IA: CPT

## 2023-06-19 PROCEDURE — 94660 CPAP INITIATION&MGMT: CPT

## 2023-06-19 PROCEDURE — 2700000000 HC OXYGEN THERAPY PER DAY

## 2023-06-19 PROCEDURE — 97530 THERAPEUTIC ACTIVITIES: CPT

## 2023-06-19 RX ADMIN — ENOXAPARIN SODIUM 40 MG: 100 INJECTION SUBCUTANEOUS at 11:27

## 2023-06-19 RX ADMIN — Medication 1 CAPSULE: at 08:13

## 2023-06-19 RX ADMIN — ATENOLOL 25 MG: 25 TABLET ORAL at 08:12

## 2023-06-19 RX ADMIN — GUAIFENESIN 600 MG: 600 TABLET, EXTENDED RELEASE ORAL at 08:13

## 2023-06-19 RX ADMIN — IPRATROPIUM BROMIDE AND ALBUTEROL SULFATE 1 DOSE: .5; 3 SOLUTION RESPIRATORY (INHALATION) at 17:23

## 2023-06-19 RX ADMIN — PREDNISONE 10 MG: 10 TABLET ORAL at 08:13

## 2023-06-19 RX ADMIN — AMLODIPINE BESYLATE 10 MG: 5 TABLET ORAL at 08:13

## 2023-06-19 RX ADMIN — FLUTICASONE PROPIONATE 2 SPRAY: 50 SPRAY, METERED NASAL at 08:12

## 2023-06-19 RX ADMIN — MUPIROCIN 1 APPLICATION: 2 CREAM TOPICAL at 21:08

## 2023-06-19 RX ADMIN — BUDESONIDE 500 MCG: 0.5 INHALANT RESPIRATORY (INHALATION) at 05:40

## 2023-06-19 RX ADMIN — IPRATROPIUM BROMIDE AND ALBUTEROL SULFATE 1 DOSE: .5; 3 SOLUTION RESPIRATORY (INHALATION) at 09:30

## 2023-06-19 RX ADMIN — MUPIROCIN 1 APPLICATION: 2 CREAM TOPICAL at 08:14

## 2023-06-19 RX ADMIN — IPRATROPIUM BROMIDE AND ALBUTEROL SULFATE 1 DOSE: .5; 3 SOLUTION RESPIRATORY (INHALATION) at 13:49

## 2023-06-19 RX ADMIN — CETIRIZINE HYDROCHLORIDE 10 MG: 10 TABLET, FILM COATED ORAL at 08:13

## 2023-06-19 RX ADMIN — LINEZOLID 600 MG: 600 TABLET, FILM COATED ORAL at 08:13

## 2023-06-19 RX ADMIN — GUAIFENESIN 600 MG: 600 TABLET, EXTENDED RELEASE ORAL at 21:07

## 2023-06-19 RX ADMIN — MEROPENEM 1000 MG: 1 INJECTION, POWDER, FOR SOLUTION INTRAVENOUS at 14:48

## 2023-06-19 RX ADMIN — MULTIPLE VITAMINS W/ MINERALS TAB 1 TABLET: TAB at 08:13

## 2023-06-19 RX ADMIN — IPRATROPIUM BROMIDE AND ALBUTEROL SULFATE 1 DOSE: .5; 3 SOLUTION RESPIRATORY (INHALATION) at 05:40

## 2023-06-19 RX ADMIN — LINEZOLID 600 MG: 600 TABLET, FILM COATED ORAL at 21:06

## 2023-06-19 RX ADMIN — MEROPENEM 1000 MG: 1 INJECTION, POWDER, FOR SOLUTION INTRAVENOUS at 21:21

## 2023-06-19 RX ADMIN — ACETAMINOPHEN 650 MG: 325 TABLET, FILM COATED ORAL at 17:24

## 2023-06-19 RX ADMIN — HYDROXYZINE HYDROCHLORIDE 25 MG: 25 TABLET, FILM COATED ORAL at 21:06

## 2023-06-19 RX ADMIN — Medication 5 MG: at 21:06

## 2023-06-19 RX ADMIN — MEROPENEM 1000 MG: 1 INJECTION, POWDER, FOR SOLUTION INTRAVENOUS at 05:44

## 2023-06-19 RX ADMIN — BUDESONIDE 500 MCG: 0.5 INHALANT RESPIRATORY (INHALATION) at 17:24

## 2023-06-19 RX ADMIN — ACETAMINOPHEN 650 MG: 325 TABLET, FILM COATED ORAL at 05:46

## 2023-06-19 ASSESSMENT — PAIN DESCRIPTION - LOCATION
LOCATION: HEAD;NECK
LOCATION: HEAD

## 2023-06-19 ASSESSMENT — PAIN SCALES - GENERAL: PAINLEVEL_OUTOF10: 3

## 2023-06-19 NOTE — ACP (ADVANCE CARE PLANNING)
Advance Care Planning     General Advance Care Planning (ACP) Conversation    Date of Conversation: 6/19/2023  Conducted with: Patient with Decision Making Capacity    Healthcare Decision Maker:    Primary Decision Maker: Ashanti Swann Child - [phone]    Primary Decision Maker: Gabriele Raymundo - Child - [phone]  Click here to complete Healthcare Decision Makers including selection of the Healthcare Decision Maker Relationship (ie \"Primary\"). Today we documented Decision Maker(s) consistent with Legal Next of Kin hierarchy.     Content/Action Overview:  Has NO ACP documents/care preferences - information provided, considering goals and options  Reviewed DNR/DNI and patient elects Full Code (Attempt Resuscitation)  artificial nutrition, ventilation preferences, and resuscitation preferences      Length of Voluntary ACP Conversation in minutes:  <16 minutes (Non-Billable)    Ivar Aleksandr

## 2023-06-19 NOTE — CONSULTS
Comprehensive Nutrition Assessment    Type and Reason for Visit:  Initial, Consult    Nutrition Recommendations/Plan:   Will start ONS BID. Will monitor intakes of meals and ONS and adjust meals and supplements as appropriate and necessary. Malnutrition Assessment:  Malnutrition Status:  No malnutrition (06/19/23 1120)    Context:  Chronic Illness     Findings of the 6 clinical characteristics of malnutrition:  Energy Intake:  No significant decrease in energy intake  Weight Loss:  No significant weight loss     Body Fat Loss:  No significant body fat loss     Muscle Mass Loss:  No significant muscle mass loss    Fluid Accumulation:  No significant fluid accumulation     Strength:       Nutrition Assessment:    Pt appears to be at moderate risk for ongoing nutritional compromise r/t fair intakes and skin integrity. Nutrition Related Findings:    Patient presents overweight, with no edema,  weight remaining stable. PMH: chronic respiratory failure and still smokes, hypertension, vit D deficiency; COPD and has pneumonia. Pertinent Med: stool softner, lactobacillus, Antibiotic, prednisone, MVI and vit D. Wound Type:  (abrasion on upper lip and nose.)       Current Nutrition Intake & Therapies:    Average Meal Intake: 51-75%, % (57-81% x past 4 meals.)  Average Supplements Intake: None Ordered  ADULT DIET; Regular    Anthropometric Measures:  Height: 5' 3\" (160 cm)  Ideal Body Weight (IBW): 115 lbs (52 kg)       Current Body Weight: 154 lb (69.9 kg), 133.9 % IBW. Weight Source: Bed Scale  Current BMI (kg/m2): 27.3        Weight Adjustment For: No Adjustment                 BMI Categories: Overweight (BMI 25.0-29. 9)    Estimated Daily Nutrient Needs:  Energy Requirements Based On: Formula  Weight Used for Energy Requirements: Current  Energy (kcal/day): 9710-3197 (Physical activity factor 1.2-1.3)  Weight Used for Protein Requirements: Ideal  Protein (g/day): 68-78 (1.3-1.5 gm/kg IBW)  Method Used for

## 2023-06-19 NOTE — PLAN OF CARE
Problem: Discharge Planning  Goal: Discharge to home or other facility with appropriate resources  Outcome: Progressing  Flowsheets  Taken 6/19/2023 0815 by Carolyn Hernandez LPN  Discharge to home or other facility with appropriate resources: Identify barriers to discharge with patient and caregiver  Taken 6/18/2023 2194 by Jamar Chua RN  Discharge to home or other facility with appropriate resources:   Identify barriers to discharge with patient and caregiver   Refer to discharge planning if patient needs post-hospital services based on physician order or complex needs related to functional status, cognitive ability or social support system     Problem: Safety - Adult  Goal: Free from fall injury  Outcome: Progressing     Problem: Skin/Tissue Integrity  Goal: Absence of new skin breakdown  Description: 1. Monitor for areas of redness and/or skin breakdown  2. Assess vascular access sites hourly  3. Every 4-6 hours minimum:  Change oxygen saturation probe site  4. Every 4-6 hours:  If on nasal continuous positive airway pressure, respiratory therapy assess nares and determine need for appliance change or resting period.   Outcome: Progressing

## 2023-06-20 ENCOUNTER — APPOINTMENT (OUTPATIENT)
Dept: GENERAL RADIOLOGY | Facility: HOSPITAL | Age: 78
DRG: 871 | End: 2023-06-20
Attending: INTERNAL MEDICINE
Payer: MEDICARE

## 2023-06-20 LAB — S PNEUM AG UR QL: ABNORMAL

## 2023-06-20 PROCEDURE — 6370000000 HC RX 637 (ALT 250 FOR IP): Performed by: PHYSICIAN ASSISTANT

## 2023-06-20 PROCEDURE — 2580000003 HC RX 258: Performed by: PHYSICIAN ASSISTANT

## 2023-06-20 PROCEDURE — 97110 THERAPEUTIC EXERCISES: CPT

## 2023-06-20 PROCEDURE — 6360000002 HC RX W HCPCS: Performed by: PHYSICIAN ASSISTANT

## 2023-06-20 PROCEDURE — 2700000000 HC OXYGEN THERAPY PER DAY

## 2023-06-20 PROCEDURE — 97535 SELF CARE MNGMENT TRAINING: CPT

## 2023-06-20 PROCEDURE — 71045 X-RAY EXAM CHEST 1 VIEW: CPT

## 2023-06-20 PROCEDURE — 94660 CPAP INITIATION&MGMT: CPT

## 2023-06-20 PROCEDURE — 97116 GAIT TRAINING THERAPY: CPT

## 2023-06-20 PROCEDURE — 94761 N-INVAS EAR/PLS OXIMETRY MLT: CPT

## 2023-06-20 PROCEDURE — 94640 AIRWAY INHALATION TREATMENT: CPT

## 2023-06-20 PROCEDURE — 99231 SBSQ HOSP IP/OBS SF/LOW 25: CPT | Performed by: INTERNAL MEDICINE

## 2023-06-20 PROCEDURE — 1200000000 HC SEMI PRIVATE

## 2023-06-20 RX ORDER — FUROSEMIDE 10 MG/ML
20 INJECTION INTRAMUSCULAR; INTRAVENOUS ONCE
Status: COMPLETED | OUTPATIENT
Start: 2023-06-20 | End: 2023-06-20

## 2023-06-20 RX ADMIN — IPRATROPIUM BROMIDE AND ALBUTEROL SULFATE 1 DOSE: .5; 3 SOLUTION RESPIRATORY (INHALATION) at 09:28

## 2023-06-20 RX ADMIN — ACETAMINOPHEN 650 MG: 325 TABLET, FILM COATED ORAL at 04:25

## 2023-06-20 RX ADMIN — LINEZOLID 600 MG: 600 TABLET, FILM COATED ORAL at 09:01

## 2023-06-20 RX ADMIN — IPRATROPIUM BROMIDE AND ALBUTEROL SULFATE 1 DOSE: .5; 3 SOLUTION RESPIRATORY (INHALATION) at 17:02

## 2023-06-20 RX ADMIN — ACETAMINOPHEN 650 MG: 325 TABLET, FILM COATED ORAL at 21:01

## 2023-06-20 RX ADMIN — FLUTICASONE PROPIONATE 2 SPRAY: 50 SPRAY, METERED NASAL at 09:02

## 2023-06-20 RX ADMIN — ENOXAPARIN SODIUM 40 MG: 100 INJECTION SUBCUTANEOUS at 12:46

## 2023-06-20 RX ADMIN — ATENOLOL 25 MG: 25 TABLET ORAL at 09:02

## 2023-06-20 RX ADMIN — HYDROXYZINE HYDROCHLORIDE 25 MG: 25 TABLET, FILM COATED ORAL at 21:02

## 2023-06-20 RX ADMIN — BUDESONIDE 500 MCG: 0.5 INHALANT RESPIRATORY (INHALATION) at 17:02

## 2023-06-20 RX ADMIN — FUROSEMIDE 20 MG: 10 INJECTION, SOLUTION INTRAMUSCULAR; INTRAVENOUS at 14:59

## 2023-06-20 RX ADMIN — MULTIPLE VITAMINS W/ MINERALS TAB 1 TABLET: TAB at 09:01

## 2023-06-20 RX ADMIN — CETIRIZINE HYDROCHLORIDE 10 MG: 10 TABLET, FILM COATED ORAL at 09:01

## 2023-06-20 RX ADMIN — GUAIFENESIN 600 MG: 600 TABLET, EXTENDED RELEASE ORAL at 21:01

## 2023-06-20 RX ADMIN — IPRATROPIUM BROMIDE AND ALBUTEROL SULFATE 1 DOSE: .5; 3 SOLUTION RESPIRATORY (INHALATION) at 12:52

## 2023-06-20 RX ADMIN — MUPIROCIN 1 APPLICATION: 2 CREAM TOPICAL at 21:08

## 2023-06-20 RX ADMIN — MEROPENEM 1000 MG: 1 INJECTION, POWDER, FOR SOLUTION INTRAVENOUS at 15:01

## 2023-06-20 RX ADMIN — MEROPENEM 1000 MG: 1 INJECTION, POWDER, FOR SOLUTION INTRAVENOUS at 05:10

## 2023-06-20 RX ADMIN — MEROPENEM 1000 MG: 1 INJECTION, POWDER, FOR SOLUTION INTRAVENOUS at 21:05

## 2023-06-20 RX ADMIN — BUDESONIDE 500 MCG: 0.5 INHALANT RESPIRATORY (INHALATION) at 04:22

## 2023-06-20 RX ADMIN — IPRATROPIUM BROMIDE AND ALBUTEROL SULFATE 1 DOSE: .5; 3 SOLUTION RESPIRATORY (INHALATION) at 04:22

## 2023-06-20 RX ADMIN — AMLODIPINE BESYLATE 10 MG: 5 TABLET ORAL at 09:01

## 2023-06-20 RX ADMIN — GUAIFENESIN 600 MG: 600 TABLET, EXTENDED RELEASE ORAL at 09:01

## 2023-06-20 RX ADMIN — Medication 5 MG: at 21:01

## 2023-06-20 RX ADMIN — PREDNISONE 10 MG: 10 TABLET ORAL at 09:01

## 2023-06-20 ASSESSMENT — PAIN SCALES - GENERAL
PAINLEVEL_OUTOF10: 8
PAINLEVEL_OUTOF10: 6

## 2023-06-20 ASSESSMENT — PAIN DESCRIPTION - DESCRIPTORS: DESCRIPTORS: ACHING

## 2023-06-20 ASSESSMENT — PAIN DESCRIPTION - LOCATION: LOCATION: HEAD

## 2023-06-20 NOTE — PLAN OF CARE
Problem: Discharge Planning  Goal: Discharge to home or other facility with appropriate resources  6/19/2023 2232 by Tabby Santizo RN  Outcome: Progressing  6/19/2023 1031 by Werner Wells LPN  Outcome: Progressing  Flowsheets  Taken 6/19/2023 0815 by Werner Wells LPN  Discharge to home or other facility with appropriate resources: Identify barriers to discharge with patient and caregiver  Taken 6/18/2023 2254 by Citlaly Morales RN  Discharge to home or other facility with appropriate resources:   Identify barriers to discharge with patient and caregiver   Refer to discharge planning if patient needs post-hospital services based on physician order or complex needs related to functional status, cognitive ability or social support system     Problem: Safety - Adult  Goal: Free from fall injury  6/19/2023 2232 by Tabby Santizo RN  Outcome: Progressing  6/19/2023 1031 by Werner Wells LPN  Outcome: Progressing     Problem: Skin/Tissue Integrity  Goal: Absence of new skin breakdown  Description: 1. Monitor for areas of redness and/or skin breakdown  2. Assess vascular access sites hourly  3. Every 4-6 hours minimum:  Change oxygen saturation probe site  4. Every 4-6 hours:  If on nasal continuous positive airway pressure, respiratory therapy assess nares and determine need for appliance change or resting period.   6/19/2023 2232 by Tabby Santizo RN  Outcome: Progressing  6/19/2023 1031 by Werner Wells LPN  Outcome: Progressing

## 2023-06-20 NOTE — PLAN OF CARE
Problem: Discharge Planning  Goal: Discharge to home or other facility with appropriate resources  6/20/2023 1112 by Shireen Albright RN  Outcome: Progressing  6/19/2023 2232 by Negrita Mandel RN  Outcome: Progressing     Problem: Safety - Adult  Goal: Free from fall injury  6/20/2023 1112 by Shireen Albright RN  Outcome: Progressing  6/19/2023 2232 by Negrita Mandel RN  Outcome: Progressing     Problem: Skin/Tissue Integrity  Goal: Absence of new skin breakdown  Description: 1. Monitor for areas of redness and/or skin breakdown  2. Assess vascular access sites hourly  3. Every 4-6 hours minimum:  Change oxygen saturation probe site  4. Every 4-6 hours:  If on nasal continuous positive airway pressure, respiratory therapy assess nares and determine need for appliance change or resting period.   6/20/2023 1112 by Shireen Albright RN  Outcome: Progressing  6/19/2023 2232 by Negrita Mandel RN  Outcome: Progressing

## 2023-06-21 ENCOUNTER — HOSPITAL ENCOUNTER (INPATIENT)
Facility: HOSPITAL | Age: 78
LOS: 5 days | Discharge: HOME | DRG: 194 | End: 2023-06-26
Attending: INTERNAL MEDICINE | Admitting: INTERNAL MEDICINE
Payer: MEDICARE

## 2023-06-21 VITALS
HEIGHT: 63 IN | WEIGHT: 154 LBS | SYSTOLIC BLOOD PRESSURE: 128 MMHG | RESPIRATION RATE: 18 BRPM | OXYGEN SATURATION: 95 % | HEART RATE: 74 BPM | TEMPERATURE: 97.9 F | DIASTOLIC BLOOD PRESSURE: 58 MMHG | BODY MASS INDEX: 27.29 KG/M2

## 2023-06-21 DIAGNOSIS — M79.652 LEFT THIGH PAIN: Primary | ICD-10-CM

## 2023-06-21 DIAGNOSIS — J44.9 CHRONIC OBSTRUCTIVE PULMONARY DISEASE, UNSPECIFIED COPD TYPE (HCC): ICD-10-CM

## 2023-06-21 LAB
ALBUMIN SERPL-MCNC: 2.7 G/DL (ref 3.4–4.8)
ALBUMIN/GLOB SERPL: 1 {RATIO} (ref 0.8–2)
ALP SERPL-CCNC: 73 U/L (ref 25–100)
ALT SERPL-CCNC: 15 U/L (ref 4–36)
ANION GAP SERPL CALCULATED.3IONS-SCNC: 7 MMOL/L (ref 3–16)
AST SERPL-CCNC: 12 U/L (ref 8–33)
BACTERIA BLD CULT ORG #2: NORMAL
BACTERIA BLD CULT ORG #2: NORMAL
BACTERIA BLD CULT: NORMAL
BACTERIA BLD CULT: NORMAL
BASOPHILS # BLD: 0 K/UL (ref 0–0.1)
BASOPHILS NFR BLD: 0.5 %
BILIRUB SERPL-MCNC: <0.2 MG/DL (ref 0.3–1.2)
BUN SERPL-MCNC: 13 MG/DL (ref 6–20)
CALCIUM SERPL-MCNC: 8.9 MG/DL (ref 8.5–10.5)
CHLORIDE SERPL-SCNC: 100 MMOL/L (ref 98–107)
CO2 SERPL-SCNC: 33 MMOL/L (ref 20–30)
CREAT SERPL-MCNC: <0.5 MG/DL (ref 0.4–1.2)
EOSINOPHIL # BLD: 0 K/UL (ref 0–0.4)
EOSINOPHIL NFR BLD: 0.2 %
ERYTHROCYTE [DISTWIDTH] IN BLOOD BY AUTOMATED COUNT: 16.6 % (ref 11–16)
GFR SERPLBLD CREATININE-BSD FMLA CKD-EPI: >60 ML/MIN/{1.73_M2}
GLOBULIN SER CALC-MCNC: 2.8 G/DL
GLUCOSE SERPL-MCNC: 85 MG/DL (ref 74–106)
HCT VFR BLD AUTO: 35.8 % (ref 37–47)
HGB BLD-MCNC: 11 G/DL (ref 11.5–16.5)
IMM GRANULOCYTES # BLD: 0 K/UL
IMM GRANULOCYTES NFR BLD: 0.3 % (ref 0–5)
LYMPHOCYTES # BLD: 2.2 K/UL (ref 1.5–4)
LYMPHOCYTES NFR BLD: 36.4 %
MAGNESIUM SERPL-MCNC: 1.9 MG/DL (ref 1.7–2.4)
MCH RBC QN AUTO: 30.3 PG (ref 27–32)
MCHC RBC AUTO-ENTMCNC: 30.7 G/DL (ref 31–35)
MCV RBC AUTO: 98.6 FL (ref 80–100)
MONOCYTES # BLD: 0.5 K/UL (ref 0.2–0.8)
MONOCYTES NFR BLD: 9 %
NEUTROPHILS # BLD: 3.2 K/UL (ref 2–7.5)
NEUTS SEG NFR BLD: 53.6 %
PLATELET # BLD AUTO: 270 K/UL (ref 150–400)
PMV BLD AUTO: 9.9 FL (ref 6–10)
POTASSIUM SERPL-SCNC: 3.9 MMOL/L (ref 3.4–5.1)
PROT SERPL-MCNC: 5.5 G/DL (ref 6.4–8.3)
RBC # BLD AUTO: 3.63 M/UL (ref 3.8–5.8)
SODIUM SERPL-SCNC: 140 MMOL/L (ref 136–145)
WBC # BLD AUTO: 6 K/UL (ref 4–11)

## 2023-06-21 PROCEDURE — 2580000003 HC RX 258: Performed by: PHYSICIAN ASSISTANT

## 2023-06-21 PROCEDURE — 85025 COMPLETE CBC W/AUTO DIFF WBC: CPT

## 2023-06-21 PROCEDURE — 97116 GAIT TRAINING THERAPY: CPT

## 2023-06-21 PROCEDURE — 97530 THERAPEUTIC ACTIVITIES: CPT

## 2023-06-21 PROCEDURE — 6370000000 HC RX 637 (ALT 250 FOR IP): Performed by: PHYSICIAN ASSISTANT

## 2023-06-21 PROCEDURE — 80053 COMPREHEN METABOLIC PANEL: CPT

## 2023-06-21 PROCEDURE — 94640 AIRWAY INHALATION TREATMENT: CPT

## 2023-06-21 PROCEDURE — 36415 COLL VENOUS BLD VENIPUNCTURE: CPT

## 2023-06-21 PROCEDURE — 83735 ASSAY OF MAGNESIUM: CPT

## 2023-06-21 PROCEDURE — 1200000002 HC SEMI PRIVATE SWING BED

## 2023-06-21 PROCEDURE — 97110 THERAPEUTIC EXERCISES: CPT

## 2023-06-21 PROCEDURE — 94761 N-INVAS EAR/PLS OXIMETRY MLT: CPT

## 2023-06-21 PROCEDURE — 6360000002 HC RX W HCPCS: Performed by: PHYSICIAN ASSISTANT

## 2023-06-21 PROCEDURE — 97802 MEDICAL NUTRITION INDIV IN: CPT

## 2023-06-21 PROCEDURE — 97161 PT EVAL LOW COMPLEX 20 MIN: CPT

## 2023-06-21 PROCEDURE — 99238 HOSP IP/OBS DSCHRG MGMT 30/<: CPT | Performed by: INTERNAL MEDICINE

## 2023-06-21 PROCEDURE — 94660 CPAP INITIATION&MGMT: CPT

## 2023-06-21 PROCEDURE — 2700000000 HC OXYGEN THERAPY PER DAY

## 2023-06-21 RX ORDER — FLUTICASONE PROPIONATE 50 MCG
2 SPRAY, SUSPENSION (ML) NASAL DAILY
Status: CANCELLED | OUTPATIENT
Start: 2023-06-22

## 2023-06-21 RX ORDER — IPRATROPIUM BROMIDE AND ALBUTEROL SULFATE 2.5; .5 MG/3ML; MG/3ML
1 SOLUTION RESPIRATORY (INHALATION)
Status: CANCELLED | OUTPATIENT
Start: 2023-06-21

## 2023-06-21 RX ORDER — IPRATROPIUM BROMIDE AND ALBUTEROL SULFATE 2.5; .5 MG/3ML; MG/3ML
1 SOLUTION RESPIRATORY (INHALATION)
Status: DISCONTINUED | OUTPATIENT
Start: 2023-06-21 | End: 2023-06-23

## 2023-06-21 RX ORDER — GUAIFENESIN 600 MG/1
600 TABLET, EXTENDED RELEASE ORAL 2 TIMES DAILY
Status: CANCELLED | OUTPATIENT
Start: 2023-06-21

## 2023-06-21 RX ORDER — POLYETHYLENE GLYCOL 3350 17 G/17G
17 POWDER, FOR SOLUTION ORAL DAILY PRN
Status: CANCELLED | OUTPATIENT
Start: 2023-06-21

## 2023-06-21 RX ORDER — ACETAMINOPHEN 650 MG/1
650 SUPPOSITORY RECTAL EVERY 6 HOURS PRN
Status: CANCELLED | OUTPATIENT
Start: 2023-06-21

## 2023-06-21 RX ORDER — ERGOCALCIFEROL 1.25 MG/1
50000 CAPSULE ORAL WEEKLY
Status: CANCELLED | OUTPATIENT
Start: 2023-06-24

## 2023-06-21 RX ORDER — FUROSEMIDE 10 MG/ML
20 INJECTION INTRAMUSCULAR; INTRAVENOUS ONCE
Status: DISCONTINUED | OUTPATIENT
Start: 2023-06-21 | End: 2023-06-21

## 2023-06-21 RX ORDER — GUAIFENESIN 600 MG/1
600 TABLET, EXTENDED RELEASE ORAL 2 TIMES DAILY
Status: DISCONTINUED | OUTPATIENT
Start: 2023-06-21 | End: 2023-06-26 | Stop reason: HOSPADM

## 2023-06-21 RX ORDER — ATENOLOL 25 MG/1
25 TABLET ORAL DAILY
Status: DISCONTINUED | OUTPATIENT
Start: 2023-06-22 | End: 2023-06-26 | Stop reason: HOSPADM

## 2023-06-21 RX ORDER — GABAPENTIN 100 MG/1
100 CAPSULE ORAL 2 TIMES DAILY
Status: CANCELLED | OUTPATIENT
Start: 2023-06-21

## 2023-06-21 RX ORDER — ACETAMINOPHEN 325 MG/1
650 TABLET ORAL EVERY 6 HOURS PRN
Status: DISCONTINUED | OUTPATIENT
Start: 2023-06-21 | End: 2023-06-26 | Stop reason: HOSPADM

## 2023-06-21 RX ORDER — ONDANSETRON 2 MG/ML
4 INJECTION INTRAMUSCULAR; INTRAVENOUS EVERY 6 HOURS PRN
Status: DISCONTINUED | OUTPATIENT
Start: 2023-06-21 | End: 2023-06-26 | Stop reason: HOSPADM

## 2023-06-21 RX ORDER — ONDANSETRON 4 MG/1
4 TABLET, ORALLY DISINTEGRATING ORAL EVERY 8 HOURS PRN
Status: DISCONTINUED | OUTPATIENT
Start: 2023-06-21 | End: 2023-06-26 | Stop reason: HOSPADM

## 2023-06-21 RX ORDER — ENOXAPARIN SODIUM 100 MG/ML
40 INJECTION SUBCUTANEOUS DAILY
Status: DISCONTINUED | OUTPATIENT
Start: 2023-06-22 | End: 2023-06-26 | Stop reason: HOSPADM

## 2023-06-21 RX ORDER — M-VIT,TX,IRON,MINS/CALC/FOLIC 27MG-0.4MG
1 TABLET ORAL DAILY
Status: CANCELLED | OUTPATIENT
Start: 2023-06-22

## 2023-06-21 RX ORDER — ENOXAPARIN SODIUM 100 MG/ML
40 INJECTION SUBCUTANEOUS DAILY
Status: CANCELLED | OUTPATIENT
Start: 2023-06-21

## 2023-06-21 RX ORDER — FLUTICASONE PROPIONATE 50 MCG
2 SPRAY, SUSPENSION (ML) NASAL DAILY
Status: DISCONTINUED | OUTPATIENT
Start: 2023-06-22 | End: 2023-06-26 | Stop reason: HOSPADM

## 2023-06-21 RX ORDER — DOCUSATE SODIUM 100 MG/1
100 CAPSULE, LIQUID FILLED ORAL 2 TIMES DAILY
Status: CANCELLED | OUTPATIENT
Start: 2023-06-21

## 2023-06-21 RX ORDER — FUROSEMIDE 10 MG/ML
20 INJECTION INTRAMUSCULAR; INTRAVENOUS ONCE
Status: CANCELLED | OUTPATIENT
Start: 2023-06-21

## 2023-06-21 RX ORDER — ACETAMINOPHEN 325 MG/1
650 TABLET ORAL EVERY 6 HOURS PRN
Status: CANCELLED | OUTPATIENT
Start: 2023-06-21

## 2023-06-21 RX ORDER — HYDROXYZINE HYDROCHLORIDE 25 MG/1
25 TABLET, FILM COATED ORAL 3 TIMES DAILY PRN
Status: CANCELLED | OUTPATIENT
Start: 2023-06-21

## 2023-06-21 RX ORDER — GUAIFENESIN/DEXTROMETHORPHAN 100-10MG/5
5 SYRUP ORAL EVERY 4 HOURS PRN
Status: CANCELLED | OUTPATIENT
Start: 2023-06-21

## 2023-06-21 RX ORDER — M-VIT,TX,IRON,MINS/CALC/FOLIC 27MG-0.4MG
1 TABLET ORAL DAILY
Status: DISCONTINUED | OUTPATIENT
Start: 2023-06-22 | End: 2023-06-26 | Stop reason: HOSPADM

## 2023-06-21 RX ORDER — LACTOBACILLUS RHAMNOSUS GG 10B CELL
1 CAPSULE ORAL EVERY OTHER DAY
Status: DISCONTINUED | OUTPATIENT
Start: 2023-06-23 | End: 2023-06-25

## 2023-06-21 RX ORDER — BISACODYL 10 MG
10 SUPPOSITORY, RECTAL RECTAL DAILY PRN
Status: CANCELLED | OUTPATIENT
Start: 2023-06-21

## 2023-06-21 RX ORDER — DOCUSATE SODIUM 100 MG/1
100 CAPSULE, LIQUID FILLED ORAL 2 TIMES DAILY
Status: DISCONTINUED | OUTPATIENT
Start: 2023-06-21 | End: 2023-06-25

## 2023-06-21 RX ORDER — BISACODYL 10 MG
10 SUPPOSITORY, RECTAL RECTAL DAILY PRN
Status: DISCONTINUED | OUTPATIENT
Start: 2023-06-21 | End: 2023-06-26 | Stop reason: HOSPADM

## 2023-06-21 RX ORDER — ONDANSETRON 2 MG/ML
4 INJECTION INTRAMUSCULAR; INTRAVENOUS EVERY 6 HOURS PRN
Status: CANCELLED | OUTPATIENT
Start: 2023-06-21

## 2023-06-21 RX ORDER — GABAPENTIN 100 MG/1
100 CAPSULE ORAL 2 TIMES DAILY
Status: DISCONTINUED | OUTPATIENT
Start: 2023-06-21 | End: 2023-06-26 | Stop reason: HOSPADM

## 2023-06-21 RX ORDER — POLYETHYLENE GLYCOL 3350 17 G/17G
17 POWDER, FOR SOLUTION ORAL DAILY PRN
Status: DISCONTINUED | OUTPATIENT
Start: 2023-06-21 | End: 2023-06-26 | Stop reason: HOSPADM

## 2023-06-21 RX ORDER — FUROSEMIDE 10 MG/ML
20 INJECTION INTRAMUSCULAR; INTRAVENOUS ONCE
Status: DISCONTINUED | OUTPATIENT
Start: 2023-06-21 | End: 2023-06-21 | Stop reason: SDUPTHER

## 2023-06-21 RX ORDER — AMLODIPINE BESYLATE 5 MG/1
10 TABLET ORAL DAILY
Status: DISCONTINUED | OUTPATIENT
Start: 2023-06-22 | End: 2023-06-26 | Stop reason: HOSPADM

## 2023-06-21 RX ORDER — CETIRIZINE HYDROCHLORIDE 10 MG/1
10 TABLET ORAL DAILY
Status: DISCONTINUED | OUTPATIENT
Start: 2023-06-22 | End: 2023-06-26 | Stop reason: HOSPADM

## 2023-06-21 RX ORDER — MECOBALAMIN 5000 MCG
5 TABLET,DISINTEGRATING ORAL NIGHTLY
Status: CANCELLED | OUTPATIENT
Start: 2023-06-21

## 2023-06-21 RX ORDER — GABAPENTIN 100 MG/1
100 CAPSULE ORAL 2 TIMES DAILY
Status: DISCONTINUED | OUTPATIENT
Start: 2023-06-21 | End: 2023-06-21 | Stop reason: HOSPADM

## 2023-06-21 RX ORDER — ERGOCALCIFEROL 1.25 MG/1
50000 CAPSULE ORAL WEEKLY
Status: DISCONTINUED | OUTPATIENT
Start: 2023-06-24 | End: 2023-06-26 | Stop reason: HOSPADM

## 2023-06-21 RX ORDER — ONDANSETRON 4 MG/1
4 TABLET, ORALLY DISINTEGRATING ORAL EVERY 8 HOURS PRN
Status: CANCELLED | OUTPATIENT
Start: 2023-06-21

## 2023-06-21 RX ORDER — BUDESONIDE 0.5 MG/2ML
0.5 INHALANT ORAL 2 TIMES DAILY
Status: CANCELLED | OUTPATIENT
Start: 2023-06-21

## 2023-06-21 RX ORDER — AMLODIPINE BESYLATE 5 MG/1
10 TABLET ORAL DAILY
Status: CANCELLED | OUTPATIENT
Start: 2023-06-22

## 2023-06-21 RX ORDER — LACTOBACILLUS RHAMNOSUS GG 10B CELL
1 CAPSULE ORAL EVERY OTHER DAY
Status: CANCELLED | OUTPATIENT
Start: 2023-06-23

## 2023-06-21 RX ORDER — CETIRIZINE HYDROCHLORIDE 10 MG/1
10 TABLET ORAL DAILY
Status: CANCELLED | OUTPATIENT
Start: 2023-06-22

## 2023-06-21 RX ORDER — GUAIFENESIN/DEXTROMETHORPHAN 100-10MG/5
5 SYRUP ORAL EVERY 4 HOURS PRN
Status: DISCONTINUED | OUTPATIENT
Start: 2023-06-21 | End: 2023-06-26 | Stop reason: HOSPADM

## 2023-06-21 RX ORDER — BUDESONIDE 0.5 MG/2ML
0.5 INHALANT ORAL 2 TIMES DAILY
Status: DISCONTINUED | OUTPATIENT
Start: 2023-06-21 | End: 2023-06-26 | Stop reason: HOSPADM

## 2023-06-21 RX ORDER — HYDROXYZINE HYDROCHLORIDE 25 MG/1
25 TABLET, FILM COATED ORAL 3 TIMES DAILY PRN
Status: DISCONTINUED | OUTPATIENT
Start: 2023-06-21 | End: 2023-06-26 | Stop reason: HOSPADM

## 2023-06-21 RX ORDER — ATENOLOL 25 MG/1
25 TABLET ORAL DAILY
Status: CANCELLED | OUTPATIENT
Start: 2023-06-22

## 2023-06-21 RX ORDER — ACETAMINOPHEN 650 MG/1
650 SUPPOSITORY RECTAL EVERY 6 HOURS PRN
Status: DISCONTINUED | OUTPATIENT
Start: 2023-06-21 | End: 2023-06-26 | Stop reason: HOSPADM

## 2023-06-21 RX ORDER — MECOBALAMIN 5000 MCG
5 TABLET,DISINTEGRATING ORAL NIGHTLY
Status: DISCONTINUED | OUTPATIENT
Start: 2023-06-21 | End: 2023-06-26 | Stop reason: HOSPADM

## 2023-06-21 RX ADMIN — GUAIFENESIN 600 MG: 600 TABLET, EXTENDED RELEASE ORAL at 08:31

## 2023-06-21 RX ADMIN — IPRATROPIUM BROMIDE AND ALBUTEROL SULFATE 1 DOSE: .5; 3 SOLUTION RESPIRATORY (INHALATION) at 05:06

## 2023-06-21 RX ADMIN — DICLOFENAC SODIUM 2 G: 10 GEL TOPICAL at 20:47

## 2023-06-21 RX ADMIN — BUDESONIDE 500 MCG: 0.5 INHALANT RESPIRATORY (INHALATION) at 05:06

## 2023-06-21 RX ADMIN — ATENOLOL 25 MG: 25 TABLET ORAL at 08:30

## 2023-06-21 RX ADMIN — MULTIPLE VITAMINS W/ MINERALS TAB 1 TABLET: TAB at 08:30

## 2023-06-21 RX ADMIN — FLUTICASONE PROPIONATE 2 SPRAY: 50 SPRAY, METERED NASAL at 08:29

## 2023-06-21 RX ADMIN — ACETAMINOPHEN 650 MG: 325 TABLET, FILM COATED ORAL at 17:39

## 2023-06-21 RX ADMIN — MEROPENEM 1000 MG: 1 INJECTION, POWDER, FOR SOLUTION INTRAVENOUS at 14:22

## 2023-06-21 RX ADMIN — BUDESONIDE 500 MCG: 0.5 INHALANT RESPIRATORY (INHALATION) at 16:46

## 2023-06-21 RX ADMIN — GABAPENTIN 100 MG: 100 CAPSULE ORAL at 09:37

## 2023-06-21 RX ADMIN — GUAIFENESIN 600 MG: 600 TABLET, EXTENDED RELEASE ORAL at 20:47

## 2023-06-21 RX ADMIN — AMLODIPINE BESYLATE 10 MG: 5 TABLET ORAL at 09:05

## 2023-06-21 RX ADMIN — IPRATROPIUM BROMIDE AND ALBUTEROL SULFATE 1 DOSE: .5; 3 SOLUTION RESPIRATORY (INHALATION) at 13:25

## 2023-06-21 RX ADMIN — ACETAMINOPHEN 650 MG: 325 TABLET, FILM COATED ORAL at 23:44

## 2023-06-21 RX ADMIN — ACETAMINOPHEN 650 MG: 325 TABLET, FILM COATED ORAL at 06:04

## 2023-06-21 RX ADMIN — IPRATROPIUM BROMIDE AND ALBUTEROL SULFATE 1 DOSE: .5; 3 SOLUTION RESPIRATORY (INHALATION) at 16:46

## 2023-06-21 RX ADMIN — MEROPENEM 1000 MG: 1 INJECTION, POWDER, FOR SOLUTION INTRAVENOUS at 05:10

## 2023-06-21 RX ADMIN — Medication 1 CAPSULE: at 08:29

## 2023-06-21 RX ADMIN — GABAPENTIN 100 MG: 100 CAPSULE ORAL at 20:47

## 2023-06-21 RX ADMIN — MEROPENEM 1000 MG: 1 INJECTION, POWDER, FOR SOLUTION INTRAVENOUS at 20:51

## 2023-06-21 RX ADMIN — CETIRIZINE HYDROCHLORIDE 10 MG: 10 TABLET, FILM COATED ORAL at 08:29

## 2023-06-21 RX ADMIN — Medication 5 MG: at 20:47

## 2023-06-21 RX ADMIN — FUROSEMIDE 20 MG: 10 INJECTION, SOLUTION INTRAMUSCULAR; INTRAVENOUS at 12:09

## 2023-06-21 RX ADMIN — PREDNISONE 10 MG: 10 TABLET ORAL at 08:30

## 2023-06-21 RX ADMIN — IPRATROPIUM BROMIDE AND ALBUTEROL SULFATE 1 DOSE: .5; 3 SOLUTION RESPIRATORY (INHALATION) at 09:19

## 2023-06-21 RX ADMIN — ENOXAPARIN SODIUM 40 MG: 100 INJECTION SUBCUTANEOUS at 12:08

## 2023-06-21 ASSESSMENT — PAIN SCALES - GENERAL
PAINLEVEL_OUTOF10: 8
PAINLEVEL_OUTOF10: 7
PAINLEVEL_OUTOF10: 3

## 2023-06-21 ASSESSMENT — PAIN DESCRIPTION - LOCATION: LOCATION: NECK;HEAD

## 2023-06-21 NOTE — PLAN OF CARE
Problem: Discharge Planning  Goal: Discharge to home or other facility with appropriate resources  Outcome: Progressing  Flowsheets (Taken 6/21/2023 0830)  Discharge to home or other facility with appropriate resources:   Identify barriers to discharge with patient and caregiver   Identify discharge learning needs (meds, wound care, etc)     Problem: Safety - Adult  Goal: Free from fall injury  6/21/2023 1107 by Cesar Rodriguez RN  Outcome: Progressing  6/20/2023 2140 by Samara Burrows LPN  Outcome: Progressing     Problem: Skin/Tissue Integrity  Goal: Absence of new skin breakdown  Description: 1. Monitor for areas of redness and/or skin breakdown  2. Assess vascular access sites hourly  3. Every 4-6 hours minimum:  Change oxygen saturation probe site  4. Every 4-6 hours:  If on nasal continuous positive airway pressure, respiratory therapy assess nares and determine need for appliance change or resting period.   Outcome: Progressing

## 2023-06-21 NOTE — FLOWSHEET NOTE
06/18/23 2258   Assessment   Charting Type Shift assessment   Psychosocial   Psychosocial (WDL) WDL   Neurological   Neuro (WDL) WDL   Level of Consciousness 0   [place] Coma Scale   Eye Opening 4   Best Verbal Response 5   Best Motor Response 6   [place] Coma Scale Score 15   NIHSS Stroke Scale   NIHSS Stroke Scale Assessed No   HEENT (Head, Ears, Eyes, Nose, & Throat)   HEENT (WDL) X   Right Eye Impaired vision   Left Eye Blind   Teeth Dentures upper;Dentures lower;Edentulous   Respiratory   Respiratory (WDL) X   Respiratory Interventions H.O.B. elevated; Incentive spirometry   Respiratory Pattern Regular   Respiratory Depth Normal   Respiratory Quality/Effort Unlabored;Dyspnea with exertion   Chest Assessment Chest expansion symmetrical   L Breath Sounds Clear;Diminished   R Breath Sounds Clear;Diminished   Breath Sounds   Right Upper Lobe Clear   Right Middle Lobe Diminished; Expiratory Wheezes   Right Lower Lobe Diminished; Expiratory Wheezes   Left Upper Lobe Clear   Left Lower Lobe Diminished; Expiratory Wheezes   Cough/Sputum   Sputum How Obtained None   Cough Non-productive   Rhythm Interpretation   Pulse 70   Cardiac Monitor   Telemetry Box Number    Telemetry Monitor Alarm Parameters    Gastrointestinal   Abdominal (WDL) WDL   RUQ Bowel Sounds Active   LUQ Bowel Sounds Active   RLQ Bowel Sounds Active   LLQ Bowel Sounds Active   Abdomen Inspection Rounded; Soft   Genitourinary   Genitourinary (WDL) WDL   Urine Assessment   Urinary Status Voiding   Peripheral Vascular   Peripheral Vascular (WDL) WDL   Edema None   Skin Integumentary    Skin Integumentary (WDL) X   Skin Color Pale   Skin Condition/Temp Warm;Dry   Skin Integrity Abrasion  (healing)   Location nose upper lip   Care Plan - Skin/Tissue Integrity Goals   Skin Integrity Remains Intact Monitor for areas of redness and/or skin breakdown   Care Plan - Discharge Planning Goals   Discharge to home or other facility with appropriate resources
06/19/23 0815   Assessment   Charting Type Shift assessment   Psychosocial   Psychosocial (WDL) WDL   Neurological   Neuro (WDL) WDL   Level of Consciousness 0   [place] Coma Scale   Eye Opening 4   Best Verbal Response 5   Best Motor Response 6   [place] Coma Scale Score 15   HEENT (Head, Ears, Eyes, Nose, & Throat)   HEENT (WDL) X   Right Eye Impaired vision   Left Eye Blind   Teeth Dentures upper;Dentures lower;Edentulous   Incentive Spirometry Tx   Treatment Effort Assisted by nursing   Achieved Volume (mL) 500 mL   Respiratory   Respiratory (WDL) X   Respiratory Pattern Regular   Respiratory Depth Normal   Respiratory Quality/Effort Unlabored;Dyspnea with exertion   Chest Assessment Chest expansion symmetrical   L Breath Sounds Clear;Diminished   R Breath Sounds Clear;Diminished   Breath Sounds   Right Upper Lobe Clear   Right Middle Lobe Diminished   Right Lower Lobe Diminished   Left Upper Lobe Clear   Left Lower Lobe Diminished   Cardiac   Cardiac (WDL) WDL   Cardiac Monitor   Telemetry Box Number    Telemetry Monitor Alarm Parameters    Gastrointestinal   Abdominal (WDL) WDL   RUQ Bowel Sounds Active   LUQ Bowel Sounds Active   RLQ Bowel Sounds Active   LLQ Bowel Sounds Active   Abdomen Inspection Rounded; Soft   Genitourinary   Genitourinary (WDL) WDL   Peripheral Vascular   Peripheral Vascular (WDL) WDL   Edema None   Skin Integumentary    Skin Integumentary (WDL) X   Skin Color Pale   Skin Condition/Temp Warm;Dry   Skin Integrity Abrasion  (healing)   Location Nose and upper lip   Multiple Skin Integrity Sites Yes   Skin Integrity Site 2   Skin Integrity Location 2 Ecchymosis   Location 2 scattered   Preventative Dressing No   Assessed this shift Yes   Skin Integrity Site 3   Skin Integrity Location 3 Abrasion    Location 3 nose   Preventative Dressing No   Assessed this shift Yes   Skin Integrity Site 4   Skin Integrity Location 4 Abrasion   Location 4 upper lip   Preventative Dressing No
06/20/23 2045   Assessment   Charting Type Shift assessment   Psychosocial   Psychosocial (WDL) WDL   Neurological   Neuro (WDL) WDL   Level of Consciousness 0   [place] Coma Scale   Eye Opening 4   Best Verbal Response 5   Best Motor Response 6   [place] Coma Scale Score 15   HEENT (Head, Ears, Eyes, Nose, & Throat)   HEENT (WDL) X   Right Eye Impaired vision   Left Eye Blind   Teeth Dentures upper;Dentures lower;Edentulous   Respiratory   Respiratory (WDL) X   Respiratory Pattern Regular   Respiratory Depth Normal   Respiratory Quality/Effort Unlabored;Dyspnea with exertion   Chest Assessment Chest expansion symmetrical   L Breath Sounds Clear;Diminished   R Breath Sounds Clear;Diminished   Breath Sounds   Right Upper Lobe Diminished   Right Middle Lobe Diminished   Right Lower Lobe Diminished   Left Upper Lobe Diminished   Left Lower Lobe Diminished   Cough/Sputum   Cough Non-productive   Cardiac   Cardiac (WDL) WDL   Cardiac Monitor   Telemetry Box Number    Telemetry Monitor Alarm Parameters    Gastrointestinal   Abdominal (WDL) WDL   RUQ Bowel Sounds Active   LUQ Bowel Sounds Active   RLQ Bowel Sounds Active   LLQ Bowel Sounds Active   Abdomen Inspection Rounded; Soft   Genitourinary   Genitourinary (WDL) WDL   Peripheral Vascular   Peripheral Vascular (WDL) WDL   Edema None   Skin Integumentary    Skin Integumentary (WDL) X   Skin Color Pale   Skin Condition/Temp Dry; Warm   Skin Integrity Abrasion  (Healing)   Location Nose/Upper Lip   Skin Integrity Site 2   Skin Integrity Location 2 Ecchymosis   Location 2 Scattered   Musculoskeletal   Musculoskeletal (WDL) X   RL Extremity Weakness; Unsteady   LL Extremity Weakness; Unsteady
- Discharge Planning Goals   Discharge to home or other facility with appropriate resources Identify barriers to discharge with patient and caregiver
Goals   Skin Integrity Remains Intact Monitor for areas of redness and/or skin breakdown   Musculoskeletal   Musculoskeletal (WDL) X   RL Extremity Weakness; Unsteady   LL Extremity Weakness; Unsteady   Care Plan - Discharge Planning Goals   Discharge to home or other facility with appropriate resources Identify barriers to discharge with patient and caregiver; Identify discharge learning needs (meds, wound care, etc)   Pt is alert and oriented. Pt is resting in bed and appears to have no acute distress. Pt currently on 4 L NC. Pt currently on telemetry monitoring. Pt's lung sounds are clear. Pt encouraged to cough and deep breathe. Pt call bell and bedside table within reach.

## 2023-06-21 NOTE — DISCHARGE SUMMARY
predniSONE (DELTASONE) 10 MG tablet Take 1 tablet by mouth daily      Multiple Vitamins-Minerals (THERAPEUTIC MULTIVIT/MINERAL PO) 2 Capsules oral daily      acetaminophen (TYLENOL) 325 MG tablet Take 2 tablets by mouth every 6 hours as needed for Pain      amLODIPine (NORVASC) 10 MG tablet Take 1 tablet by mouth daily      atenolol (TENORMIN) 25 MG tablet Take 1 tablet by mouth daily      saccharomyces boulardii (FLORASTOR) 250 MG capsule Take 1 capsule by mouth every other day      fluticasone (FLONASE) 50 MCG/ACT nasal spray 2 sprays by Each Nostril route daily             14 minutes spent discharging this patient. After reviewing patient data and diagnostic testing the plan of care was established in conjunction with Dr. Curly Vega. Patient was seen and examined with him. Signed:  Electronically signed by MARBELLA Dumont on 6/21/2023 at 11:58 AM       Thank you Melanie Calhoun for the opportunity to be involved in this patient's care. If you have any questions or concerns please feel free to contact me at [phone].

## 2023-06-21 NOTE — PROGRESS NOTES
Occupational Therapy  Facility/Department: [facility]  Occupational Therapy Initial Assessment    Name: Omar Echavarria  : 1945  MRN: [id number]  Date of Service: 2023    Discharge Recommendations:  Continue to assess pending progress     Patient Diagnosis(es): There were no encounter diagnoses. Past Medical History:  has a past medical history of Arthritis, COPD (chronic obstructive pulmonary disease) ([address] Gall stones, Goiter, Hypertension, Kidney infection, Kidney stone, Migraine, and Scarlet fever. Past Surgical History:  has a past surgical history that includes Bladder surgery; Cataract extraction (Bilateral); Cholecystectomy; Hysterectomy; and Tonsillectomy. Assessment   Performance deficits / Impairments: Decreased endurance;Decreased high-level IADLs;Decreased balance;Decreased ADL status; Decreased functional mobility   Assessment: This 69 uear old female was referred to OT services upon multifocal pneumonia. Pt was scheduled for DC home on  however, pt began running a fever and became increasingly SOA. Pt presents on 4L 02 upon entry. Pt is alert and oriented x3. Pt transferred to sitting at EOB from supine with MOD I. Pt sat unsupported at EOB. Pt come to stand with SBA and ambulated to bathroom with RW SBA. Pt 02 sats decreased to 86% and required to 02 increased to 6L 02. Pt slowly recovered requiring seated rest break. Pt completed toileting tasks with SBA. Pt come to stand with SOA and required seated rest break. Pt recovered after brief rest break with PLB. Pt ambulated in hallway with RW SBA. Pt 02 sats maintained >90%. Pt decreased to 4L 02 upon coming to stand and ambulating 50 feet with RW 02 sats decreased 87% upon returning to room. Pt continued to decrease to 84% and was increased to 5L 02 with cues for PLB. Pt recovered and able to maintain 02 sats >90%. Pt left seated upright in recliner. Nurse notified that pt was left on 5L 02.  Pt will benefit from skilled OT
Occupational Therapy  Facility/Department: [facility]  Daily Treatment Note  NAME: Caroline Salazar  : 1945  MRN: [id number]    Date of Service: 2023    Discharge Recommendations:  Continue to assess pending progress     Patient Diagnosis(es): There were no encounter diagnoses. Assessment    Assessment: Pt agreeable to OT services. Pt transferred to sitting at EOB. Pt on 4L 02. Pt does present with SOA during session with cues needed for PLB. Pt agreeable to sponge bathing seated at EOB. Pt washed face with MAC and completed grooming/hygiene tasks with MAC. Pt washed LB with SBA. Pt donned gown with SBA and LB clothing with SBA with verbal cuing (min) for pacing and PLB. Pt 02 sats 87-94% throughout session. Pt transferred to supine with MOD I for x ray. After this was completed pt transferred to sitting at EOB with MOD I and ambulated in room with RW to window ~15 feet and then returned to recliner ~10 feet with CGA. Pt tolerated well. Pt positioned in chair for comfort and provided with call light. Activity Tolerance: Patient tolerated treatment well;Patient limited by endurance       Subjective   Subjective  Subjective: Pt agreeable to OT services. Pt reports she feels about the same     Objective    Vitals     Transfer Training  Overall Level of Assistance: Stand-by assistance  Sit to Stand: Stand-by assistance  Stand Pivot Transfers: Stand-by assistance     ADL  Grooming: Setup  UE Bathing: Setup  LE Bathing: Stand by assistance  UE Dressing: Stand by assistance  LE Dressing: Stand by assistance       Goals  Short Term Goals  Time Frame for Short Term Goals: 1 week  Short Term Goal 1: Pt to complete dressing with MOD I using AE as needed. Short Term Goal 2: Pt to complete toileting with MOD I. Short Term Goal 3: Pt to complete sponge bathing with MOD I.   Short Term Goal 4: Pt to tolerate x15 minutes of activity maintaining 02 sats >90%  Short Term Goal 5: Pt to complete hygiene/grooming
Occupational Therapy  Facility/Department: [facility]  Daily Treatment Note  NAME: Ling Shah  : 1945  MRN: [id number]    Date of Service: 2023    Discharge Recommendations:  Continue to assess pending progress         Patient Diagnosis(es): There were no encounter diagnoses. Assessment    Assessment: Pt received seated in recliner. Pt agreeable to OT services. Pt participated in seated ther ex using orange theraband x10 reps. Pt tolerated well with no SOA noted. Pt completed x10 chair push ups with fatigue and min cues for PLB. Pt desat to 87 after sitting down requiring cues for PLB. Pt returned >90%. Call light in reach and pt positioned in chair. Activity Tolerance: Patient limited by endurance      Plan         Restrictions  Restrictions/Precautions  Restrictions/Precautions: Fall Risk;General Precautions  Required Braces or Orthoses?: No    Subjective   Subjective  Subjective: Pt agreeable to OT services. Pain: No c/o. Objective    Vitals     OT Exercises  A/AROM Exercises: Shoulder flexion/extension, retraction, horizontal ABD/ADD, ADD/ABD. elbow flexion/extension x10  Pressure Relief Exercises: chair push ups x10     Safety Devices  Type of Devices: Left in chair;Call light within reach; Bed alarm in place     Goals  Short Term Goals  Time Frame for Short Term Goals: 1 week  Short Term Goal 1: Pt to complete dressing with MOD I using AE as needed. Short Term Goal 2: Pt to complete toileting with MOD I. Short Term Goal 3: Pt to complete sponge bathing with MOD I. Short Term Goal 4: Pt to tolerate x15 minutes of activity maintaining 02 sats >90%  Short Term Goal 5: Pt to complete hygiene/grooming standing at sink with no LOB MOD I. Therapy Time   Individual Concurrent Group Co-treatment   Time In 1106         Time Out 1123         Minutes 17         This note serves as a DC summary in the event of pt discharge.     Jada Paul OTR/GENESIS
Offered spiritual care to patient. Told pt to reach out if they needed anything further.
Physical Therapy  Facility/Department: [facility]  Physical Therapy Daily Treatment Note    Name: Florin Kunz  : 1945  MRN: [id number]  Date of Service: 2023    Discharge Recommendations:  Continue to assess pending progress, 24 hour supervision or assist          Patient Diagnosis(es): There were no encounter diagnoses. Past Medical History:  has a past medical history of Arthritis, COPD (chronic obstructive pulmonary disease) ([address] Gall stones, Goiter, Hypertension, Kidney infection, Kidney stone, Migraine, and Scarlet fever. Past Surgical History:  has a past surgical history that includes Bladder surgery; Cataract extraction (Bilateral); Cholecystectomy; Hysterectomy; and Tonsillectomy. Assessment   Body Structures, Functions, Activity Limitations Requiring Skilled Therapeutic Intervention: Decreased functional mobility ; Decreased tolerance to work activity; Decreased endurance;Decreased safe awareness;Decreased strength  Assessment: PT tx completed. Patient received supine in bed on 3 LPM O2 at rest. NAD. Pleasant and agreeable to work with PT. Patient able to perform bed mobility with mod I. Sit to stand, transfers and able to ambulate 75'x2 with RW and SBA-CGA. O2 sats noted to drop to 84% on 3 LPM O2 with ambulation so patient was increased to 4 LPM. O2 sats increased to 92% at rest but again dropped to 86% on 4 LPM with ambulation. Patient performs B LE ther ex in sitting on 4 LPM O2 and was able to maintain O2 sats >90%. Patient left sitting up in bedside chair with alarm. Cont to outlined goals.   Requires PT Follow-Up: Yes  Activity Tolerance  Activity Tolerance: Patient limited by endurance     Plan   Physcial Therapy Plan  General Plan: 3-5 times per week  Days Per Week: 5 Days  Current Treatment Recommendations: Strengthening, ROM, Balance training, Functional mobility training, Transfer training, Endurance training, Gait training, Home exercise program, Safety education &
Physical Therapy  Facility/Department: [facility]  Daily Treatment Note  NAME: Apolinar Velázquez  : 1945  MRN: [id number]    Date of Service: 2023    Discharge Recommendations:  Continue to assess pending progress, 24 hour supervision or assist      Patient Diagnosis(es): There were no encounter diagnoses. Assessment   Assessment: Patient was seen for BID PT session for gait per patient request. Patient stood from the recliner with SBA and ambulated 75 feet with RW, 4LO2, and SBA. SpO2 briefly dropped to 87% but rebounded above 90% with cues for PLB. Ambulated 75 feet back to the room with RW, 4LO2, and SBA. SpO2 remained above 90% after second walk. Activity Tolerance: Patient tolerated treatment well;Patient limited by endurance     Plan    Physical Therapy Plan  General Plan: 3-5 times per week  Days Per Week: 5 Days  Current Treatment Recommendations: Strengthening;ROM;Balance training;Functional mobility training;Transfer training; Endurance training;Gait training;Home exercise program;Safety education & training;Patient/Caregiver education & training;Equipment evaluation, education, & procurement; Therapeutic activities  PT Plan of Care: 5 times/week     Restrictions  Restrictions/Precautions  Restrictions/Precautions: Fall Risk, General Precautions  Required Braces or Orthoses?: No     Subjective    Subjective  Subjective: Patient presents up in recliner on 4LO2, NAD  Pain: No c/o.   Orientation  Overall Orientation Status: Within Functional Limits  Cognition  Overall Cognitive Status: WFL     Objective   Bed Mobility Training  Bed Mobility Training: No  Overall Level of Assistance: Modified independent  Balance  Sitting: Intact  Standing: With support  Transfer Training  Transfer Training: Yes  Overall Level of Assistance: Stand-by assistance  Sit to Stand: Stand-by assistance  Stand to Sit: Stand-by assistance  Stand Pivot Transfers: Stand-by assistance  Bed to Chair: Stand-by
Physical Therapy  Facility/Department: [facility]  Daily Treatment Note  NAME: Mitul Calabrese  : 1945  MRN: [id number]    Date of Service: 2023    Discharge Recommendations:  Continue to assess pending progress, 24 hour supervision or assist      Patient Diagnosis(es): There were no encounter diagnoses. Assessment   Assessment: Patient stood from the recliner with SBA and ambulated 75 feet with RW, 4LO2, and SBA. SpO2 91%. Ambulated 75 feet back to the room with RW, 4LO2, and SBA. SpO2 90%. Engaged patient in B LE therex to increase strength and endurance needed for improved functional mobility. Able to maintain sats above 90% on 4L today. Activity Tolerance: Patient tolerated treatment well     Plan    Physical Therapy Plan  General Plan: 3-5 times per week  Days Per Week: 5 Days  Current Treatment Recommendations: Strengthening;ROM;Balance training;Functional mobility training;Transfer training; Endurance training;Gait training;Home exercise program;Safety education & training;Patient/Caregiver education & training;Equipment evaluation, education, & procurement; Therapeutic activities     Restrictions  Restrictions/Precautions  Restrictions/Precautions: Fall Risk, General Precautions  Required Braces or Orthoses?: No     Subjective    Subjective  Subjective: Patient presents up in recliner on 4LO2, NAD     Objective   Bed Mobility Training  Bed Mobility Training: No  Balance  Sitting: Intact  Standing: With support  Transfer Training  Transfer Training: Yes  Overall Level of Assistance: Stand-by assistance  Sit to Stand: Stand-by assistance  Stand to Sit: Stand-by assistance  Stand Pivot Transfers: Stand-by assistance  Gait Training  Gait Training: Yes  Gait  Overall Level of Assistance: Stand-by assistance  Distance (ft): 75 Feet (x2)  Assistive Device: Gait belt;Walker, rolling (4LO2)     PT Exercises  Exercise Treatment: B LE therex in sitting     Safety Devices  Type of Devices: Left in
Progress Note      Subjective:   Chief complaint: shortness of breaht     Interval History:   Sitting up in bed today. No 5L O2. Desatted to 84 on 4L. Repeat CXR without improvement. Strep pneumo antigen positive. Dc'd zyvox and plan to continue merrem. Trial of iv lasix today. Patient states she think she's feeling better everyday. She asks when she can go home. States she lives with her brother and she will have 24 hour care at home. Review of systems:    Constitutional:  Denies fever or chills. Positive for declining functional status. Eyes:  Denies eye pain or redness. Left eye blindness. HENT:  Denies nasal congestion or sore throat. Edentulous. Wears dentures. Respiratory:  Denies shortness of breath. Positive for productive cough, [place]. Cardiovascular:  Denies chest pain or edema   GI:  Denies abdominal pain, nausea, vomiting, bloody stools or diarrhea   :  Denies dysuria or frequency  Musculoskeletal:  Denies acute neck pain or body aches. Positive for muscle pain in left thigh. Integument:  Denies rash or itching  Neurologic:  Denies headache, dizziness, numbness, tingling or unilateral weakness  Psychiatric:  Denies acute depression or acute anxiety    Past medical history, surgical history, family history and social history reviewed and unchanged compared to H&P earlier this admission.     Medications:   Scheduled Meds:   furosemide  20 mg IntraVENous Once    enoxaparin  40 mg SubCUTAneous Daily    guaiFENesin  600 mg Oral BID    ipratropium 0.5 mg-albuterol 2.5 mg  1 Dose Inhalation Q4H WA    budesonide  0.5 mg Nebulization BID    amLODIPine  10 mg Oral Daily    atenolol  25 mg Oral Daily    cetirizine  10 mg Oral Daily    docusate sodium  100 mg Oral BID    vitamin D  50,000 Units Oral Weekly    fluticasone  2 spray Each Nostril Daily    melatonin  5 mg Oral Nightly    therapeutic multivitamin-minerals  1 tablet Oral Daily    predniSONE  10 mg Oral Daily    mupirocin  1 application Topical
Units Oral Weekly    fluticasone  2 spray Each Nostril Daily    melatonin  5 mg Oral Nightly    therapeutic multivitamin-minerals  1 tablet Oral Daily    predniSONE  10 mg Oral Daily    mupirocin  1 application Topical BID    lactobacillus  1 capsule Oral Every Other Day    meropenem  1,000 mg IntraVENous Q8H    linezolid  600 mg Oral 2 times per day     Continuous Infusions:    Objective:     Vital Signs  Temp: 97.7 °F (36.5 °C)  Pulse: 76  Respirations: 18  BP: (!) 128/48  SpO2: 92 %  O2 Device: Nasal cannula  O2 Flow Rate (L/min): 4 L/min    Vital signs reviewed in electronic charts. Physical exam  Constitutional:  Well developed, chronically ill-appearing, thin female sitting up in no acute distress  Eyes: no scleral icterus, conjunctiva normal, left eye blindness. HENT:  Atraumatic, external ears normal, nose normal, oropharynx moist, no pharyngeal exudates. Neck- supple, no JVD, no lymphadenopathy. Edentulous. Wears dentures. Respiratory:  No respiratory distress on 4L O2, no wheezing, no rales. Decreased breath sounds throughout. Cardiovascular:  Normal rate, normal rhythm, no murmurs, no gallops, no rubs, trace ble edema   GI:  Soft, nondistended, normal bowel sounds, nontender, no voluntary guarding  Musculoskeletal:  No cyanosis or obvious acute deformity. Moving all extremities   Integument:  Warm and dry. Appears pale.   Neurologic:  Alert & oriented x 3, no apparent focal deficits noted   Psychiatric:  Speech and behavior appropriate     Results:     Lab Results   Component Value Date    WBC 6.4 06/19/2023    HGB 10.5 (L) 06/19/2023    HCT 34.3 (L) 06/19/2023    MCV 99.1 06/19/2023     06/19/2023       Lab Results   Component Value Date/Time     06/19/2023 05:05 AM    K 4.0 06/19/2023 05:05 AM     06/19/2023 05:05 AM    CO2 30 06/19/2023 05:05 AM    BUN 10 06/19/2023 05:05 AM    CREATININE <0.5 06/19/2023 05:05 AM    GLUCOSE 82 06/19/2023 05:05 AM    CALCIUM 8.5 06/19/2023
reading  Hearing  Hearing: Exceptions to [facility]  Hearing Exceptions: Hard of hearing/hearing concerns; No hearing aid    Cognition   Orientation  Overall Orientation Status: Within Functional Limits  Cognition  Overall Cognitive Status: WFL     Objective   Pulse: 76  Heart Rate Source: Monitor  BP: (!) 128/48  MAP (Calculated): 75  Respirations: 18  SpO2: 92 %  O2 Device: Nasal cannula  Comment: 4 LPM O2 at rest     Observation/Palpation  Observation: Patient received supine in bed. Breathing treatment running. NAD. Pleasant and cooperative. Gross Assessment  AROM: Within functional limits  PROM: Within functional limits  Strength: Generally decreased, functional  Coordination: Within functional limits                 Bed Mobility Training  Bed Mobility Training: No  Overall Level of Assistance: Modified independent  Balance  Sitting: Intact  Standing: High guard  Transfer Training  Transfer Training: Yes  Overall Level of Assistance: Stand-by assistance  Sit to Stand: Stand-by assistance  Stand to Sit: Stand-by assistance  Stand Pivot Transfers: Stand-by assistance  Bed to Chair: Stand-by assistance  Toilet Transfer: Stand-by assistance;Contact-guard assistance  Gait  Overall Level of Assistance: Stand-by assistance  Distance (ft): 50 Feet (50)  Assistive Device: Gait belt;Walker, rolling (4-6 L 02)  Bed mobility  Rolling to Left: Modified independent  Supine to Sit: Modified independent  Scooting: Modified independent  Transfers  Sit to Stand: Contact guard assistance  Stand to Sit: Contact guard assistance  Bed to Chair: Contact guard assistance  Ambulation  Surface: Level tile  Device: Rolling Walker  Other Apparatus: O2  Assistance: Contact guard assistance  Gait Deviations: Slow Natali;Decreased step length;Decreased step height  Distance: 10'1 and 70'x2 with 2 sitting rest breaks  Comments: O2 sat noted to drop and was increased from 4 to 6 LPM with mobility.      Balance  Posture: Fair  Sitting - Static:

## 2023-06-22 ENCOUNTER — APPOINTMENT (OUTPATIENT)
Dept: GENERAL RADIOLOGY | Facility: HOSPITAL | Age: 78
DRG: 194 | End: 2023-06-22
Attending: INTERNAL MEDICINE
Payer: MEDICARE

## 2023-06-22 PROBLEM — I27.20 PULMONARY HTN (HCC): Status: ACTIVE | Noted: 2023-06-22

## 2023-06-22 PROBLEM — I50.32 CHRONIC DIASTOLIC HEART FAILURE (HCC): Status: ACTIVE | Noted: 2023-06-22

## 2023-06-22 PROBLEM — M79.652 LEFT THIGH PAIN: Status: ACTIVE | Noted: 2023-06-22

## 2023-06-22 LAB
ALBUMIN SERPL-MCNC: 2.9 G/DL (ref 3.4–4.8)
ALBUMIN/GLOB SERPL: 1 {RATIO} (ref 0.8–2)
ALP SERPL-CCNC: 77 U/L (ref 25–100)
ALT SERPL-CCNC: 18 U/L (ref 4–36)
ANION GAP SERPL CALCULATED.3IONS-SCNC: 7 MMOL/L (ref 3–16)
AST SERPL-CCNC: 16 U/L (ref 8–33)
BASOPHILS # BLD: 0 K/UL (ref 0–0.1)
BASOPHILS NFR BLD: 0.4 %
BILIRUB SERPL-MCNC: <0.2 MG/DL (ref 0.3–1.2)
BUN SERPL-MCNC: 20 MG/DL (ref 6–20)
CALCIUM SERPL-MCNC: 9.4 MG/DL (ref 8.5–10.5)
CHLORIDE SERPL-SCNC: 102 MMOL/L (ref 98–107)
CK SERPL-CCNC: 20 U/L (ref 26–174)
CO2 SERPL-SCNC: 34 MMOL/L (ref 20–30)
CREAT SERPL-MCNC: <0.5 MG/DL (ref 0.4–1.2)
EOSINOPHIL # BLD: 0 K/UL (ref 0–0.4)
EOSINOPHIL NFR BLD: 0.5 %
ERYTHROCYTE [DISTWIDTH] IN BLOOD BY AUTOMATED COUNT: 16.4 % (ref 11–16)
GFR SERPLBLD CREATININE-BSD FMLA CKD-EPI: >60 ML/MIN/{1.73_M2}
GLOBULIN SER CALC-MCNC: 2.8 G/DL
GLUCOSE SERPL-MCNC: 92 MG/DL (ref 74–106)
HCT VFR BLD AUTO: 37 % (ref 37–47)
HGB BLD-MCNC: 11.2 G/DL (ref 11.5–16.5)
IMM GRANULOCYTES # BLD: 0 K/UL
IMM GRANULOCYTES NFR BLD: 0.4 % (ref 0–5)
LYMPHOCYTES # BLD: 2.1 K/UL (ref 1.5–4)
LYMPHOCYTES NFR BLD: 28.3 %
MCH RBC QN AUTO: 30 PG (ref 27–32)
MCHC RBC AUTO-ENTMCNC: 30.3 G/DL (ref 31–35)
MCV RBC AUTO: 99.2 FL (ref 80–100)
MONOCYTES # BLD: 0.8 K/UL (ref 0.2–0.8)
MONOCYTES NFR BLD: 10.2 %
NEUTROPHILS # BLD: 4.4 K/UL (ref 2–7.5)
NEUTS SEG NFR BLD: 60.2 %
PLATELET # BLD AUTO: 295 K/UL (ref 150–400)
PMV BLD AUTO: 10.1 FL (ref 6–10)
POTASSIUM SERPL-SCNC: 4.3 MMOL/L (ref 3.4–5.1)
PROT SERPL-MCNC: 5.7 G/DL (ref 6.4–8.3)
RBC # BLD AUTO: 3.73 M/UL (ref 3.8–5.8)
SODIUM SERPL-SCNC: 143 MMOL/L (ref 136–145)
WBC # BLD AUTO: 7.3 K/UL (ref 4–11)

## 2023-06-22 PROCEDURE — 82550 ASSAY OF CK (CPK): CPT

## 2023-06-22 PROCEDURE — 87070 CULTURE OTHR SPECIMN AEROBIC: CPT

## 2023-06-22 PROCEDURE — 6370000000 HC RX 637 (ALT 250 FOR IP): Performed by: PHYSICIAN ASSISTANT

## 2023-06-22 PROCEDURE — 92610 EVALUATE SWALLOWING FUNCTION: CPT

## 2023-06-22 PROCEDURE — 97535 SELF CARE MNGMENT TRAINING: CPT

## 2023-06-22 PROCEDURE — 99305 1ST NF CARE MODERATE MDM 35: CPT | Performed by: INTERNAL MEDICINE

## 2023-06-22 PROCEDURE — 94640 AIRWAY INHALATION TREATMENT: CPT

## 2023-06-22 PROCEDURE — 97530 THERAPEUTIC ACTIVITIES: CPT

## 2023-06-22 PROCEDURE — 71045 X-RAY EXAM CHEST 1 VIEW: CPT

## 2023-06-22 PROCEDURE — 97116 GAIT TRAINING THERAPY: CPT

## 2023-06-22 PROCEDURE — 97110 THERAPEUTIC EXERCISES: CPT

## 2023-06-22 PROCEDURE — 2700000000 HC OXYGEN THERAPY PER DAY

## 2023-06-22 PROCEDURE — 87205 SMEAR GRAM STAIN: CPT

## 2023-06-22 PROCEDURE — 36415 COLL VENOUS BLD VENIPUNCTURE: CPT

## 2023-06-22 PROCEDURE — 6360000002 HC RX W HCPCS: Performed by: PHYSICIAN ASSISTANT

## 2023-06-22 PROCEDURE — 1200000002 HC SEMI PRIVATE SWING BED

## 2023-06-22 PROCEDURE — 94761 N-INVAS EAR/PLS OXIMETRY MLT: CPT

## 2023-06-22 PROCEDURE — 80053 COMPREHEN METABOLIC PANEL: CPT

## 2023-06-22 PROCEDURE — 85025 COMPLETE CBC W/AUTO DIFF WBC: CPT

## 2023-06-22 PROCEDURE — 94660 CPAP INITIATION&MGMT: CPT

## 2023-06-22 PROCEDURE — 2580000003 HC RX 258: Performed by: PHYSICIAN ASSISTANT

## 2023-06-22 PROCEDURE — 97165 OT EVAL LOW COMPLEX 30 MIN: CPT

## 2023-06-22 PROCEDURE — 73552 X-RAY EXAM OF FEMUR 2/>: CPT

## 2023-06-22 RX ORDER — FUROSEMIDE 20 MG/1
20 TABLET ORAL
Status: DISCONTINUED | OUTPATIENT
Start: 2023-06-23 | End: 2023-06-22

## 2023-06-22 RX ORDER — FUROSEMIDE 20 MG/1
20 TABLET ORAL DAILY
Status: DISCONTINUED | OUTPATIENT
Start: 2023-06-23 | End: 2023-06-26 | Stop reason: HOSPADM

## 2023-06-22 RX ORDER — LIDOCAINE 4 G/G
1 PATCH TOPICAL DAILY
Status: DISCONTINUED | OUTPATIENT
Start: 2023-06-22 | End: 2023-06-26 | Stop reason: HOSPADM

## 2023-06-22 RX ADMIN — AMLODIPINE BESYLATE 10 MG: 5 TABLET ORAL at 08:09

## 2023-06-22 RX ADMIN — ACETAMINOPHEN 650 MG: 325 TABLET, FILM COATED ORAL at 15:35

## 2023-06-22 RX ADMIN — IPRATROPIUM BROMIDE AND ALBUTEROL SULFATE 1 DOSE: .5; 3 SOLUTION RESPIRATORY (INHALATION) at 13:32

## 2023-06-22 RX ADMIN — BUDESONIDE 500 MCG: 0.5 INHALANT RESPIRATORY (INHALATION) at 16:41

## 2023-06-22 RX ADMIN — MEROPENEM 1000 MG: 1 INJECTION, POWDER, FOR SOLUTION INTRAVENOUS at 05:35

## 2023-06-22 RX ADMIN — GUAIFENESIN 600 MG: 600 TABLET, EXTENDED RELEASE ORAL at 20:16

## 2023-06-22 RX ADMIN — ATENOLOL 25 MG: 25 TABLET ORAL at 08:09

## 2023-06-22 RX ADMIN — IPRATROPIUM BROMIDE AND ALBUTEROL SULFATE 1 DOSE: .5; 3 SOLUTION RESPIRATORY (INHALATION) at 08:27

## 2023-06-22 RX ADMIN — IPRATROPIUM BROMIDE AND ALBUTEROL SULFATE 1 DOSE: .5; 3 SOLUTION RESPIRATORY (INHALATION) at 16:41

## 2023-06-22 RX ADMIN — GABAPENTIN 100 MG: 100 CAPSULE ORAL at 08:09

## 2023-06-22 RX ADMIN — GUAIFENESIN 600 MG: 600 TABLET, EXTENDED RELEASE ORAL at 08:08

## 2023-06-22 RX ADMIN — GABAPENTIN 100 MG: 100 CAPSULE ORAL at 20:16

## 2023-06-22 RX ADMIN — IPRATROPIUM BROMIDE AND ALBUTEROL SULFATE 1 DOSE: .5; 3 SOLUTION RESPIRATORY (INHALATION) at 05:06

## 2023-06-22 RX ADMIN — MEROPENEM 1000 MG: 1 INJECTION, POWDER, FOR SOLUTION INTRAVENOUS at 21:33

## 2023-06-22 RX ADMIN — CETIRIZINE HYDROCHLORIDE 10 MG: 10 TABLET, FILM COATED ORAL at 08:09

## 2023-06-22 RX ADMIN — MEROPENEM 1000 MG: 1 INJECTION, POWDER, FOR SOLUTION INTRAVENOUS at 14:13

## 2023-06-22 RX ADMIN — FLUTICASONE PROPIONATE 2 SPRAY: 50 SPRAY, METERED NASAL at 08:08

## 2023-06-22 RX ADMIN — ENOXAPARIN SODIUM 40 MG: 100 INJECTION SUBCUTANEOUS at 14:09

## 2023-06-22 RX ADMIN — BUDESONIDE 500 MCG: 0.5 INHALANT RESPIRATORY (INHALATION) at 05:06

## 2023-06-22 RX ADMIN — Medication 5 MG: at 20:16

## 2023-06-22 RX ADMIN — MULTIPLE VITAMINS W/ MINERALS TAB 1 TABLET: TAB at 08:09

## 2023-06-22 RX ADMIN — ACETAMINOPHEN 650 MG: 325 TABLET, FILM COATED ORAL at 05:37

## 2023-06-22 RX ADMIN — ACETAMINOPHEN 650 MG: 325 TABLET, FILM COATED ORAL at 21:27

## 2023-06-22 ASSESSMENT — PAIN SCALES - GENERAL
PAINLEVEL_OUTOF10: 4
PAINLEVEL_OUTOF10: 3
PAINLEVEL_OUTOF10: 7

## 2023-06-22 ASSESSMENT — PAIN DESCRIPTION - LOCATION: LOCATION: HEAD;NECK

## 2023-06-23 PROCEDURE — 97110 THERAPEUTIC EXERCISES: CPT

## 2023-06-23 PROCEDURE — 6370000000 HC RX 637 (ALT 250 FOR IP): Performed by: PHYSICIAN ASSISTANT

## 2023-06-23 PROCEDURE — 6360000002 HC RX W HCPCS: Performed by: PHYSICIAN ASSISTANT

## 2023-06-23 PROCEDURE — 94761 N-INVAS EAR/PLS OXIMETRY MLT: CPT

## 2023-06-23 PROCEDURE — 94640 AIRWAY INHALATION TREATMENT: CPT

## 2023-06-23 PROCEDURE — 1200000002 HC SEMI PRIVATE SWING BED

## 2023-06-23 PROCEDURE — 2700000000 HC OXYGEN THERAPY PER DAY

## 2023-06-23 PROCEDURE — 94660 CPAP INITIATION&MGMT: CPT

## 2023-06-23 PROCEDURE — 2580000003 HC RX 258: Performed by: PHYSICIAN ASSISTANT

## 2023-06-23 PROCEDURE — 97535 SELF CARE MNGMENT TRAINING: CPT

## 2023-06-23 PROCEDURE — 97116 GAIT TRAINING THERAPY: CPT

## 2023-06-23 RX ORDER — IPRATROPIUM BROMIDE AND ALBUTEROL SULFATE 2.5; .5 MG/3ML; MG/3ML
1 SOLUTION RESPIRATORY (INHALATION) 2 TIMES DAILY
Status: DISCONTINUED | OUTPATIENT
Start: 2023-06-23 | End: 2023-06-26 | Stop reason: HOSPADM

## 2023-06-23 RX ADMIN — Medication 1 CAPSULE: at 09:04

## 2023-06-23 RX ADMIN — GABAPENTIN 100 MG: 100 CAPSULE ORAL at 21:05

## 2023-06-23 RX ADMIN — MEROPENEM 1000 MG: 1 INJECTION, POWDER, FOR SOLUTION INTRAVENOUS at 14:12

## 2023-06-23 RX ADMIN — GUAIFENESIN 600 MG: 600 TABLET, EXTENDED RELEASE ORAL at 21:05

## 2023-06-23 RX ADMIN — CETIRIZINE HYDROCHLORIDE 10 MG: 10 TABLET, FILM COATED ORAL at 09:05

## 2023-06-23 RX ADMIN — FUROSEMIDE 20 MG: 20 TABLET ORAL at 09:05

## 2023-06-23 RX ADMIN — Medication 5 MG: at 21:05

## 2023-06-23 RX ADMIN — ACETAMINOPHEN 650 MG: 325 TABLET, FILM COATED ORAL at 09:12

## 2023-06-23 RX ADMIN — AMLODIPINE BESYLATE 10 MG: 5 TABLET ORAL at 09:05

## 2023-06-23 RX ADMIN — ACETAMINOPHEN 650 MG: 325 TABLET, FILM COATED ORAL at 18:32

## 2023-06-23 RX ADMIN — MULTIPLE VITAMINS W/ MINERALS TAB 1 TABLET: TAB at 09:04

## 2023-06-23 RX ADMIN — GUAIFENESIN 600 MG: 600 TABLET, EXTENDED RELEASE ORAL at 09:05

## 2023-06-23 RX ADMIN — MEROPENEM 1000 MG: 1 INJECTION, POWDER, FOR SOLUTION INTRAVENOUS at 21:07

## 2023-06-23 RX ADMIN — GABAPENTIN 100 MG: 100 CAPSULE ORAL at 09:05

## 2023-06-23 RX ADMIN — BUDESONIDE 500 MCG: 0.5 INHALANT RESPIRATORY (INHALATION) at 04:26

## 2023-06-23 RX ADMIN — FLUTICASONE PROPIONATE 2 SPRAY: 50 SPRAY, METERED NASAL at 09:05

## 2023-06-23 RX ADMIN — DOCUSATE SODIUM 100 MG: 100 CAPSULE, LIQUID FILLED ORAL at 21:05

## 2023-06-23 RX ADMIN — IPRATROPIUM BROMIDE AND ALBUTEROL SULFATE 1 DOSE: .5; 3 SOLUTION RESPIRATORY (INHALATION) at 04:26

## 2023-06-23 RX ADMIN — ENOXAPARIN SODIUM 40 MG: 100 INJECTION SUBCUTANEOUS at 13:02

## 2023-06-23 RX ADMIN — ATENOLOL 25 MG: 25 TABLET ORAL at 09:04

## 2023-06-23 RX ADMIN — MEROPENEM 1000 MG: 1 INJECTION, POWDER, FOR SOLUTION INTRAVENOUS at 05:09

## 2023-06-23 ASSESSMENT — PAIN SCALES - GENERAL
PAINLEVEL_OUTOF10: 8
PAINLEVEL_OUTOF10: 8

## 2023-06-23 ASSESSMENT — PAIN DESCRIPTION - LOCATION
LOCATION: HEAD
LOCATION: HEAD;NECK

## 2023-06-23 ASSESSMENT — PAIN DESCRIPTION - DESCRIPTORS: DESCRIPTORS: THROBBING

## 2023-06-24 LAB
BACTERIA SPEC RESP CULT: NORMAL
GRAM STN SPEC: NORMAL

## 2023-06-24 PROCEDURE — 6360000002 HC RX W HCPCS: Performed by: PHYSICIAN ASSISTANT

## 2023-06-24 PROCEDURE — 6370000000 HC RX 637 (ALT 250 FOR IP): Performed by: PHYSICIAN ASSISTANT

## 2023-06-24 PROCEDURE — 94761 N-INVAS EAR/PLS OXIMETRY MLT: CPT

## 2023-06-24 PROCEDURE — 94640 AIRWAY INHALATION TREATMENT: CPT

## 2023-06-24 PROCEDURE — 94660 CPAP INITIATION&MGMT: CPT

## 2023-06-24 PROCEDURE — 2580000003 HC RX 258: Performed by: PHYSICIAN ASSISTANT

## 2023-06-24 PROCEDURE — 1200000002 HC SEMI PRIVATE SWING BED

## 2023-06-24 PROCEDURE — 2700000000 HC OXYGEN THERAPY PER DAY

## 2023-06-24 PROCEDURE — 6370000000 HC RX 637 (ALT 250 FOR IP): Performed by: INTERNAL MEDICINE

## 2023-06-24 RX ADMIN — IPRATROPIUM BROMIDE AND ALBUTEROL SULFATE 1 DOSE: .5; 3 SOLUTION RESPIRATORY (INHALATION) at 04:22

## 2023-06-24 RX ADMIN — ENOXAPARIN SODIUM 40 MG: 100 INJECTION SUBCUTANEOUS at 14:12

## 2023-06-24 RX ADMIN — MEROPENEM 1000 MG: 1 INJECTION, POWDER, FOR SOLUTION INTRAVENOUS at 05:48

## 2023-06-24 RX ADMIN — GUAIFENESIN 600 MG: 600 TABLET, EXTENDED RELEASE ORAL at 08:59

## 2023-06-24 RX ADMIN — FLUTICASONE PROPIONATE 2 SPRAY: 50 SPRAY, METERED NASAL at 08:59

## 2023-06-24 RX ADMIN — BUDESONIDE 500 MCG: 0.5 INHALANT RESPIRATORY (INHALATION) at 04:22

## 2023-06-24 RX ADMIN — FUROSEMIDE 20 MG: 20 TABLET ORAL at 08:59

## 2023-06-24 RX ADMIN — CETIRIZINE HYDROCHLORIDE 10 MG: 10 TABLET, FILM COATED ORAL at 08:59

## 2023-06-24 RX ADMIN — MEROPENEM 1000 MG: 1 INJECTION, POWDER, FOR SOLUTION INTRAVENOUS at 14:12

## 2023-06-24 RX ADMIN — ERGOCALCIFEROL 50000 UNITS: 1.25 CAPSULE ORAL at 08:58

## 2023-06-24 RX ADMIN — GABAPENTIN 100 MG: 100 CAPSULE ORAL at 08:59

## 2023-06-24 RX ADMIN — AMLODIPINE BESYLATE 10 MG: 5 TABLET ORAL at 08:58

## 2023-06-24 RX ADMIN — ACETAMINOPHEN 650 MG: 325 TABLET, FILM COATED ORAL at 18:11

## 2023-06-24 RX ADMIN — Medication 5 MG: at 21:03

## 2023-06-24 RX ADMIN — GUAIFENESIN 600 MG: 600 TABLET, EXTENDED RELEASE ORAL at 21:03

## 2023-06-24 RX ADMIN — ATENOLOL 25 MG: 25 TABLET ORAL at 08:59

## 2023-06-24 RX ADMIN — ACETAMINOPHEN 650 MG: 325 TABLET, FILM COATED ORAL at 05:47

## 2023-06-24 RX ADMIN — MULTIPLE VITAMINS W/ MINERALS TAB 1 TABLET: TAB at 08:58

## 2023-06-24 RX ADMIN — GABAPENTIN 100 MG: 100 CAPSULE ORAL at 21:03

## 2023-06-24 ASSESSMENT — PAIN SCALES - GENERAL
PAINLEVEL_OUTOF10: 8
PAINLEVEL_OUTOF10: 5

## 2023-06-24 ASSESSMENT — PAIN DESCRIPTION - LOCATION: LOCATION: HEAD;NECK

## 2023-06-25 LAB
ALBUMIN SERPL-MCNC: 2.8 G/DL (ref 3.4–4.8)
ALBUMIN/GLOB SERPL: 0.9 {RATIO} (ref 0.8–2)
ALP SERPL-CCNC: 75 U/L (ref 25–100)
ALT SERPL-CCNC: 17 U/L (ref 4–36)
ANION GAP SERPL CALCULATED.3IONS-SCNC: 7 MMOL/L (ref 3–16)
AST SERPL-CCNC: 12 U/L (ref 8–33)
BASOPHILS # BLD: 0 K/UL (ref 0–0.1)
BASOPHILS NFR BLD: 0.5 %
BILIRUB SERPL-MCNC: <0.2 MG/DL (ref 0.3–1.2)
BUN SERPL-MCNC: 16 MG/DL (ref 6–20)
CALCIUM SERPL-MCNC: 9 MG/DL (ref 8.5–10.5)
CHLORIDE SERPL-SCNC: 100 MMOL/L (ref 98–107)
CO2 SERPL-SCNC: 35 MMOL/L (ref 20–30)
CREAT SERPL-MCNC: <0.5 MG/DL (ref 0.4–1.2)
EOSINOPHIL # BLD: 0.1 K/UL (ref 0–0.4)
EOSINOPHIL NFR BLD: 1.6 %
ERYTHROCYTE [DISTWIDTH] IN BLOOD BY AUTOMATED COUNT: 15.9 % (ref 11–16)
GFR SERPLBLD CREATININE-BSD FMLA CKD-EPI: >60 ML/MIN/{1.73_M2}
GLOBULIN SER CALC-MCNC: 3 G/DL
GLUCOSE SERPL-MCNC: 97 MG/DL (ref 74–106)
HCT VFR BLD AUTO: 38.2 % (ref 37–47)
HGB BLD-MCNC: 11.8 G/DL (ref 11.5–16.5)
IMM GRANULOCYTES # BLD: 0 K/UL
IMM GRANULOCYTES NFR BLD: 0.3 % (ref 0–5)
LYMPHOCYTES # BLD: 1.8 K/UL (ref 1.5–4)
LYMPHOCYTES NFR BLD: 21 %
MAGNESIUM SERPL-MCNC: 1.9 MG/DL (ref 1.7–2.4)
MCH RBC QN AUTO: 30.4 PG (ref 27–32)
MCHC RBC AUTO-ENTMCNC: 30.9 G/DL (ref 31–35)
MCV RBC AUTO: 98.5 FL (ref 80–100)
MONOCYTES # BLD: 0.8 K/UL (ref 0.2–0.8)
MONOCYTES NFR BLD: 9.8 %
NEUTROPHILS # BLD: 5.8 K/UL (ref 2–7.5)
NEUTS SEG NFR BLD: 66.8 %
PLATELET # BLD AUTO: 272 K/UL (ref 150–400)
PMV BLD AUTO: 9.4 FL (ref 6–10)
POTASSIUM SERPL-SCNC: 3.5 MMOL/L (ref 3.4–5.1)
PROT SERPL-MCNC: 5.8 G/DL (ref 6.4–8.3)
RBC # BLD AUTO: 3.88 M/UL (ref 3.8–5.8)
SODIUM SERPL-SCNC: 142 MMOL/L (ref 136–145)
WBC # BLD AUTO: 8.6 K/UL (ref 4–11)

## 2023-06-25 PROCEDURE — 94664 DEMO&/EVAL PT USE INHALER: CPT

## 2023-06-25 PROCEDURE — 6370000000 HC RX 637 (ALT 250 FOR IP): Performed by: INTERNAL MEDICINE

## 2023-06-25 PROCEDURE — 6370000000 HC RX 637 (ALT 250 FOR IP): Performed by: PHYSICIAN ASSISTANT

## 2023-06-25 PROCEDURE — 36415 COLL VENOUS BLD VENIPUNCTURE: CPT

## 2023-06-25 PROCEDURE — 94640 AIRWAY INHALATION TREATMENT: CPT

## 2023-06-25 PROCEDURE — 1200000002 HC SEMI PRIVATE SWING BED

## 2023-06-25 PROCEDURE — 85025 COMPLETE CBC W/AUTO DIFF WBC: CPT

## 2023-06-25 PROCEDURE — 83735 ASSAY OF MAGNESIUM: CPT

## 2023-06-25 PROCEDURE — 94761 N-INVAS EAR/PLS OXIMETRY MLT: CPT

## 2023-06-25 PROCEDURE — 6360000002 HC RX W HCPCS: Performed by: PHYSICIAN ASSISTANT

## 2023-06-25 PROCEDURE — 98960 EDU&TRN PT SELF-MGMT NQHP 1: CPT

## 2023-06-25 PROCEDURE — 94660 CPAP INITIATION&MGMT: CPT

## 2023-06-25 PROCEDURE — 80053 COMPREHEN METABOLIC PANEL: CPT

## 2023-06-25 RX ORDER — LACTOBACILLUS RHAMNOSUS GG 10B CELL
1 CAPSULE ORAL
Status: DISCONTINUED | OUTPATIENT
Start: 2023-06-25 | End: 2023-06-26 | Stop reason: HOSPADM

## 2023-06-25 RX ADMIN — CETIRIZINE HYDROCHLORIDE 10 MG: 10 TABLET, FILM COATED ORAL at 08:57

## 2023-06-25 RX ADMIN — HYDROXYZINE HYDROCHLORIDE 25 MG: 25 TABLET, FILM COATED ORAL at 21:07

## 2023-06-25 RX ADMIN — GUAIFENESIN SYRUP AND DEXTROMETHORPHAN 5 ML: 100; 10 SYRUP ORAL at 05:45

## 2023-06-25 RX ADMIN — ACETAMINOPHEN 650 MG: 325 TABLET, FILM COATED ORAL at 16:24

## 2023-06-25 RX ADMIN — IPRATROPIUM BROMIDE AND ALBUTEROL SULFATE 1 DOSE: .5; 3 SOLUTION RESPIRATORY (INHALATION) at 15:45

## 2023-06-25 RX ADMIN — DICLOFENAC SODIUM 2 G: 10 GEL TOPICAL at 08:59

## 2023-06-25 RX ADMIN — Medication 5 MG: at 21:06

## 2023-06-25 RX ADMIN — GUAIFENESIN 600 MG: 600 TABLET, EXTENDED RELEASE ORAL at 08:58

## 2023-06-25 RX ADMIN — ACETAMINOPHEN 650 MG: 325 TABLET, FILM COATED ORAL at 21:07

## 2023-06-25 RX ADMIN — HYDROXYZINE HYDROCHLORIDE 25 MG: 25 TABLET, FILM COATED ORAL at 16:02

## 2023-06-25 RX ADMIN — GABAPENTIN 100 MG: 100 CAPSULE ORAL at 08:58

## 2023-06-25 RX ADMIN — Medication 1 CAPSULE: at 09:34

## 2023-06-25 RX ADMIN — ENOXAPARIN SODIUM 40 MG: 100 INJECTION SUBCUTANEOUS at 13:27

## 2023-06-25 RX ADMIN — FUROSEMIDE 20 MG: 20 TABLET ORAL at 08:57

## 2023-06-25 RX ADMIN — GUAIFENESIN 600 MG: 600 TABLET, EXTENDED RELEASE ORAL at 21:07

## 2023-06-25 RX ADMIN — ACETAMINOPHEN 650 MG: 325 TABLET, FILM COATED ORAL at 05:45

## 2023-06-25 RX ADMIN — FLUTICASONE PROPIONATE 2 SPRAY: 50 SPRAY, METERED NASAL at 08:58

## 2023-06-25 RX ADMIN — MULTIPLE VITAMINS W/ MINERALS TAB 1 TABLET: TAB at 08:58

## 2023-06-25 RX ADMIN — BUDESONIDE 500 MCG: 0.5 INHALANT RESPIRATORY (INHALATION) at 15:47

## 2023-06-25 RX ADMIN — IPRATROPIUM BROMIDE AND ALBUTEROL SULFATE 1 DOSE: .5; 3 SOLUTION RESPIRATORY (INHALATION) at 19:40

## 2023-06-25 RX ADMIN — GABAPENTIN 100 MG: 100 CAPSULE ORAL at 21:07

## 2023-06-25 RX ADMIN — ATENOLOL 25 MG: 25 TABLET ORAL at 08:58

## 2023-06-25 RX ADMIN — AMLODIPINE BESYLATE 10 MG: 5 TABLET ORAL at 08:57

## 2023-06-25 ASSESSMENT — PAIN DESCRIPTION - LOCATION
LOCATION: HEAD;NECK
LOCATION: HEAD;NECK

## 2023-06-25 ASSESSMENT — PAIN SCALES - GENERAL: PAINLEVEL_OUTOF10: 9

## 2023-06-26 ENCOUNTER — APPOINTMENT (OUTPATIENT)
Dept: GENERAL RADIOLOGY | Facility: HOSPITAL | Age: 78
DRG: 194 | End: 2023-06-26
Attending: INTERNAL MEDICINE
Payer: MEDICARE

## 2023-06-26 VITALS
SYSTOLIC BLOOD PRESSURE: 136 MMHG | OXYGEN SATURATION: 90 % | BODY MASS INDEX: 27.28 KG/M2 | HEIGHT: 63 IN | RESPIRATION RATE: 18 BRPM | DIASTOLIC BLOOD PRESSURE: 60 MMHG | HEART RATE: 85 BPM | TEMPERATURE: 97.9 F

## 2023-06-26 PROCEDURE — 6360000002 HC RX W HCPCS: Performed by: PHYSICIAN ASSISTANT

## 2023-06-26 PROCEDURE — 97530 THERAPEUTIC ACTIVITIES: CPT

## 2023-06-26 PROCEDURE — 94640 AIRWAY INHALATION TREATMENT: CPT

## 2023-06-26 PROCEDURE — 72082 X-RAY EXAM ENTIRE SPI 2/3 VW: CPT

## 2023-06-26 PROCEDURE — 6370000000 HC RX 637 (ALT 250 FOR IP): Performed by: INTERNAL MEDICINE

## 2023-06-26 PROCEDURE — 94761 N-INVAS EAR/PLS OXIMETRY MLT: CPT

## 2023-06-26 PROCEDURE — 71046 X-RAY EXAM CHEST 2 VIEWS: CPT

## 2023-06-26 PROCEDURE — 97116 GAIT TRAINING THERAPY: CPT

## 2023-06-26 PROCEDURE — 72040 X-RAY EXAM NECK SPINE 2-3 VW: CPT

## 2023-06-26 PROCEDURE — 2700000000 HC OXYGEN THERAPY PER DAY

## 2023-06-26 PROCEDURE — 99315 NF DSCHRG MGMT 30 MIN/LESS: CPT | Performed by: INTERNAL MEDICINE

## 2023-06-26 PROCEDURE — 94660 CPAP INITIATION&MGMT: CPT

## 2023-06-26 PROCEDURE — 6370000000 HC RX 637 (ALT 250 FOR IP): Performed by: PHYSICIAN ASSISTANT

## 2023-06-26 RX ORDER — LIDOCAINE 4 G/G
1 PATCH TOPICAL DAILY
Qty: 30 EACH | Refills: 0 | Status: SHIPPED | OUTPATIENT
Start: 2023-06-27 | End: 2023-07-27

## 2023-06-26 RX ORDER — HYDROXYZINE HYDROCHLORIDE 25 MG/1
25 TABLET, FILM COATED ORAL 3 TIMES DAILY PRN
Qty: 90 TABLET | Refills: 0 | Status: SHIPPED | OUTPATIENT
Start: 2023-06-26 | End: 2023-07-26

## 2023-06-26 RX ORDER — GUAIFENESIN 600 MG/1
600 TABLET, EXTENDED RELEASE ORAL 2 TIMES DAILY
Qty: 14 TABLET | Refills: 0 | Status: SHIPPED | OUTPATIENT
Start: 2023-06-26 | End: 2023-07-03

## 2023-06-26 RX ORDER — FUROSEMIDE 20 MG/1
20 TABLET ORAL DAILY
Qty: 60 TABLET | Refills: 3 | Status: SHIPPED | OUTPATIENT
Start: 2023-06-27

## 2023-06-26 RX ORDER — GABAPENTIN 100 MG/1
100 CAPSULE ORAL 2 TIMES DAILY
Qty: 28 CAPSULE | Refills: 0 | Status: SHIPPED | OUTPATIENT
Start: 2023-06-26 | End: 2023-07-10

## 2023-06-26 RX ORDER — GUAIFENESIN/DEXTROMETHORPHAN 100-10MG/5
5 SYRUP ORAL EVERY 4 HOURS PRN
Qty: 120 ML | Refills: 0 | Status: SHIPPED | OUTPATIENT
Start: 2023-06-26 | End: 2023-07-06

## 2023-06-26 RX ADMIN — ATENOLOL 25 MG: 25 TABLET ORAL at 09:02

## 2023-06-26 RX ADMIN — Medication 1 CAPSULE: at 09:02

## 2023-06-26 RX ADMIN — DICLOFENAC SODIUM 2 G: 10 GEL TOPICAL at 09:03

## 2023-06-26 RX ADMIN — ACETAMINOPHEN 650 MG: 325 TABLET, FILM COATED ORAL at 09:19

## 2023-06-26 RX ADMIN — GABAPENTIN 100 MG: 100 CAPSULE ORAL at 09:02

## 2023-06-26 RX ADMIN — MULTIPLE VITAMINS W/ MINERALS TAB 1 TABLET: TAB at 09:01

## 2023-06-26 RX ADMIN — FUROSEMIDE 20 MG: 20 TABLET ORAL at 09:01

## 2023-06-26 RX ADMIN — BUDESONIDE 500 MCG: 0.5 INHALANT RESPIRATORY (INHALATION) at 04:30

## 2023-06-26 RX ADMIN — ACETAMINOPHEN 650 MG: 325 TABLET, FILM COATED ORAL at 15:42

## 2023-06-26 RX ADMIN — AMLODIPINE BESYLATE 10 MG: 5 TABLET ORAL at 09:01

## 2023-06-26 RX ADMIN — CETIRIZINE HYDROCHLORIDE 10 MG: 10 TABLET, FILM COATED ORAL at 09:02

## 2023-06-26 RX ADMIN — FLUTICASONE PROPIONATE 2 SPRAY: 50 SPRAY, METERED NASAL at 09:03

## 2023-06-26 RX ADMIN — ENOXAPARIN SODIUM 40 MG: 100 INJECTION SUBCUTANEOUS at 12:28

## 2023-06-26 RX ADMIN — HYDROXYZINE HYDROCHLORIDE 25 MG: 25 TABLET, FILM COATED ORAL at 09:08

## 2023-06-26 RX ADMIN — GUAIFENESIN 600 MG: 600 TABLET, EXTENDED RELEASE ORAL at 09:02

## 2023-06-26 RX ADMIN — IPRATROPIUM BROMIDE AND ALBUTEROL SULFATE 1 DOSE: .5; 3 SOLUTION RESPIRATORY (INHALATION) at 04:30

## 2023-06-26 ASSESSMENT — PAIN SCALES - GENERAL
PAINLEVEL_OUTOF10: 7
PAINLEVEL_OUTOF10: 8

## 2023-06-26 ASSESSMENT — PAIN DESCRIPTION - LOCATION: LOCATION: BACK;LEG;NECK

## 2023-07-02 PROBLEM — R77.8 ELEVATED TROPONIN: Status: RESOLVED | Noted: 2023-06-02 | Resolved: 2023-07-02

## 2023-07-10 ENCOUNTER — HOSPITAL ENCOUNTER (OUTPATIENT)
Dept: PHYSICAL THERAPY | Facility: HOSPITAL | Age: 78
Discharge: HOME | End: 2023-07-10

## 2023-07-27 ENCOUNTER — OFFICE VISIT (OUTPATIENT)
Dept: INTERNAL MEDICINE | Facility: CLINIC | Age: 78
End: 2023-07-27
Payer: MEDICARE

## 2023-07-27 VITALS
HEIGHT: 63 IN | WEIGHT: 143.8 LBS | HEART RATE: 75 BPM | OXYGEN SATURATION: 96 % | BODY MASS INDEX: 25.48 KG/M2 | SYSTOLIC BLOOD PRESSURE: 134 MMHG | DIASTOLIC BLOOD PRESSURE: 65 MMHG | TEMPERATURE: 98 F

## 2023-07-27 DIAGNOSIS — J41.0 SIMPLE CHRONIC BRONCHITIS: Primary | ICD-10-CM

## 2023-07-27 DIAGNOSIS — I10 ESSENTIAL HYPERTENSION: ICD-10-CM

## 2023-07-27 PROBLEM — H25.11 NUCLEAR SCLEROTIC CATARACT OF RIGHT EYE: Status: RESOLVED | Noted: 2021-07-23 | Resolved: 2023-07-27

## 2023-07-27 PROBLEM — M79.10 MUSCLE ACHE: Status: RESOLVED | Noted: 2017-10-11 | Resolved: 2023-07-27

## 2023-07-27 PROBLEM — F17.219 CIGARETTE NICOTINE DEPENDENCE WITH NICOTINE-INDUCED DISORDER: Status: RESOLVED | Noted: 2017-10-01 | Resolved: 2023-07-27

## 2023-07-27 PROBLEM — J18.9 CAP (COMMUNITY ACQUIRED PNEUMONIA): Status: RESOLVED | Noted: 2017-10-01 | Resolved: 2023-07-27

## 2023-07-27 PROBLEM — J96.00 ACUTE RESPIRATORY FAILURE: Status: RESOLVED | Noted: 2023-04-13 | Resolved: 2023-07-27

## 2023-07-27 PROBLEM — J44.1 COPD EXACERBATION: Status: RESOLVED | Noted: 2023-04-13 | Resolved: 2023-07-27

## 2023-07-27 PROBLEM — J96.22 ACUTE ON CHRONIC RESPIRATORY FAILURE WITH HYPOXIA AND HYPERCAPNIA: Status: RESOLVED | Noted: 2023-05-04 | Resolved: 2023-07-27

## 2023-07-27 PROBLEM — J96.21 ACUTE ON CHRONIC RESPIRATORY FAILURE WITH HYPOXIA AND HYPERCAPNIA: Status: RESOLVED | Noted: 2023-05-04 | Resolved: 2023-07-27

## 2023-07-27 PROBLEM — R73.9 BLOOD GLUCOSE ELEVATED: Status: RESOLVED | Noted: 2017-10-11 | Resolved: 2023-07-27

## 2023-07-27 NOTE — PROGRESS NOTES
The ABCs of the Annual Wellness Visit  Subsequent Medicare Wellness Visit    Subjective      Carolin Toscano is a 77 y.o. female who presents for a Subsequent Medicare Wellness Visit.    Review of Systems   Constitutional:  Positive for fatigue.   Respiratory:  Positive for cough and shortness of breath.    Musculoskeletal:  Positive for arthralgias.      The following portions of the patient's history were reviewed and   updated as appropriate: allergies, current medications, past family history, past medical history, past social history, past surgical history, and problem list.    Compared to one year ago, the patient feels her physical   health is worse.    Compared to one year ago, the patient feels her mental   health is worse.    Recent Hospitalizations:  This patient has had a [facility] admission record on file within the last 365 days.    Current Medical Providers:  Patient Care Team:  Jason Nicholson MD as PCP - General (Internal Medicine)    Outpatient Medications Prior to Visit   Medication Sig Dispense Refill    acetaminophen (TYLENOL) 325 MG tablet Take 2 tablets by mouth Every 6 (Six) Hours As Needed for Mild Pain.      amLODIPine (NORVASC) 10 MG tablet Take 1 tablet by mouth Daily. 90 tablet 3    atenolol (TENORMIN) 25 MG tablet Take 1 tablet by mouth Daily. 90 tablet 3    budesonide-formoterol (SYMBICORT) 160-4.5 MCG/ACT inhaler Inhale 2 puffs 2 (Two) Times a Day. 10.2 g 12    Diclofenac Sodium (VOLTAREN) 1 % gel gel Apply 2 g topically to the appropriate area as directed 3 (Three) Times a Day As Needed (pain).      fluticasone (FLONASE) 50 MCG/ACT nasal spray 2 sprays by Each Nare route 2 (Two) Times a Day.      ipratropium-albuterol (DUO-NEB) 0.5-2.5 mg/3 ml nebulizer Take 3 mL by nebulization Every 6 (Six) Hours. 360 mL     melatonin 5 MG tablet tablet Take 1 tablet by mouth Every Night.      multivitamins-minerals (PRESERVISION AREDS 2) capsule capsule Take 2 capsules by mouth Daily.       "saccharomyces boulardii (FLORASTOR) 250 MG capsule Take 1 capsule by mouth Every Other Day.      tiotropium (Spiriva HandiHaler) 18 MCG per inhalation capsule Place 1 capsule into inhaler and inhale Daily. 30 capsule 11    guaiFENesin-dextromethorphan (ROBITUSSIN DM) 100-10 MG/5ML syrup Take 10 mL by mouth Every 4 (Four) Hours As Needed for Cough.      hydrOXYzine (ATARAX) 25 MG tablet Take 1 tablet by mouth 3 (Three) Times a Day.      cetirizine (zyrTEC) 10 MG tablet Take 1 tablet by mouth Daily.       No facility-administered medications prior to visit.       No opioid medication identified on active medication list. I have reviewed chart for other potential  high risk medication/s and harmful drug interactions in the elderly.        Aspirin is not on active medication list.  Aspirin use is not indicated based on review of current medical condition/s. Risk of harm outweighs potential benefits.  .    Patient Active Problem List   Diagnosis    Essential hypertension    Dyslipidemia    COPD (chronic obstructive pulmonary disease)     Advance Care Planning  Advance Directive is not on file.  ACP discussion was held with the patient during this visit. Patient does not have an advance directive, declines further assistance.     Objective    Vitals:    07/27/23 1521   BP: 134/65   BP Location: Left arm   Patient Position: Sitting   Cuff Size: Adult   Pulse: 75   Temp: 98 °F (36.7 °C)   SpO2: 96%   Weight: 65.2 kg (143 lb 12.8 oz)   Height: 160 cm (62.99\")     Estimated body mass index is 25.48 kg/m² as calculated from the following:    Height as of this encounter: 160 cm (62.99\").    Weight as of this encounter: 65.2 kg (143 lb 12.8 oz).    Physical Exam  Constitutional:       General: She is not in acute distress.     Appearance: She is well-developed.   HENT:      Nose: Nose normal.   Eyes:      General: No scleral icterus.     Conjunctiva/sclera: Conjunctivae normal.   Neck:      Thyroid: No thyromegaly.      Trachea: " No tracheal deviation.   Cardiovascular:      Rate and Rhythm: Normal rate and regular rhythm.      Heart sounds: No murmur heard.    No friction rub.   Pulmonary:      Effort: No respiratory distress.      Breath sounds: Rhonchi present. No wheezing or rales.      Comments: O2 via NC at 3L/min  Abdominal:      General: There is no distension.      Palpations: Abdomen is soft. There is no mass.      Tenderness: There is no abdominal tenderness. There is no guarding.   Musculoskeletal:         General: No deformity. Normal range of motion.   Lymphadenopathy:      Cervical: No cervical adenopathy.   Skin:     General: Skin is warm and dry.      Findings: No erythema or rash.   Neurological:      Mental Status: She is alert and oriented to person, place, and time.      Cranial Nerves: No cranial nerve deficit.      Coordination: Coordination normal.      Deep Tendon Reflexes: Reflexes are normal and symmetric.   Psychiatric:         Behavior: Behavior normal.         Thought Content: Thought content normal.         Judgment: Judgment normal.       BMI is >= 25 and <30. (Overweight) The following options were offered after discussion;: nutrition counseling/recommendations      Does the patient have evidence of cognitive impairment?   No    Lab Results   Component Value Date    HGBA1C 6.70 (H) 2023          HEALTH RISK ASSESSMENT    Smoking Status:  Social History     Tobacco Use   Smoking Status Former    Packs/day: 1.00    Years: 40.00    Pack years: 40.00    Types: Cigarettes    Start date:     Quit date: 2023    Years since quittin.2   Smokeless Tobacco Former    Quit date: 2017     Alcohol Consumption:  Social History     Substance and Sexual Activity   Alcohol Use No     Fall Risk Screen:    STEADI Fall Risk Assessment was completed, and patient is at LOW risk for falls.Assessment completed on:2023    Depression Screenin/27/2023     3:31 PM   PHQ-2/PHQ-9 Depression Screening    Little Interest or Pleasure in Doing Things 0-->not at all   Feeling Down, Depressed or Hopeless 0-->not at all   PHQ-9: Brief Depression Severity Measure Score 0       Health Habits and Functional and Cognitive Screenin/27/2023     3:28 PM   Functional & Cognitive Status   Do you have difficulty preparing food and eating? Yes   Do you have difficulty bathing yourself, getting dressed or grooming yourself? No   Do you have difficulty using the toilet? No   Do you have difficulty moving around from place to place? No   Do you have trouble with steps or getting out of a bed or a chair? No   Current Diet Frequent Junk Food   Dental Exam Up to date   Eye Exam Up to date   Exercise (times per week) 0 times per week   Current Exercises Include No Regular Exercise   Do you need help using the phone?  No   Are you deaf or do you have serious difficulty hearing?  Yes   Do you need help to go to places out of walking distance? No   Do you need help shopping? Yes   Do you need help preparing meals?  Yes   Do you need help with housework?  No   Do you need help with laundry? No   Do you need help taking your medications? No   Do you need help managing money? No   Do you ever drive or ride in a car without wearing a seat belt? No   Have you felt unusual stress, anger or loneliness in the last month? No   Who do you live with? Sibling   If you need help, do you have trouble finding someone available to you? No   Have you been bothered in the last four weeks by sexual problems? No   Do you have difficulty concentrating, remembering or making decisions? No       Age-appropriate Screening Schedule:  Refer to the list below for future screening recommendations based on patient's age, sex and/or medical conditions. Orders for these recommended tests are listed in the plan section. The patient has been provided with a written plan.    Health Maintenance   Topic Date Due    DXA SCAN  Never done    COVID-19 Vaccine (1) Never  done    Pneumococcal Vaccine 65+ (1 - PCV) Never done    TDAP/TD VACCINES (1 - Tdap) Never done    ANNUAL WELLNESS VISIT  10/11/2022    ZOSTER VACCINE (1 of 2) 08/26/2029 (Originally 11/11/1996)    INFLUENZA VACCINE  10/01/2023    HEPATITIS C SCREENING  Completed    COLORECTAL CANCER SCREENING  Discontinued                CMS Preventative Services Quick Reference  Risk Factors Identified During Encounter:    Chronic Pain: OTC analgesics as needed. Proper dosing schedule discussed.   Polypharmacy: Medication List reviewed and Medications are appropriate for patient  Tobacco Use/Dependance Risk (use dotphrase .tobaccocessation for documentation)    The above risks/problems have been discussed with the patient.  Pertinent information has been shared with the patient in the After Visit Summary.    Diagnoses and all orders for this visit:    1. Simple chronic bronchitis (Primary) recent prolonged hospitalization now is of tobacco.  Continue with Symbicort uses DuoNeb continue with pulmonary toilet  -     Miscellaneous DME    2. Essential hypertension stable with current meds and low-salt diet        Follow Up:   Next Medicare Wellness visit to be scheduled in 1 year.      An After Visit Summary and PPPS were made available to the patient.

## 2023-09-05 ENCOUNTER — TELEPHONE (OUTPATIENT)
Dept: INTERNAL MEDICINE | Facility: CLINIC | Age: 78
End: 2023-09-05
Payer: MEDICARE

## 2023-09-05 NOTE — TELEPHONE ENCOUNTER
Caller: Sherine Hoffmann    Relationship: Emergency Contact    Best call back number: [phone]     What orders are you requesting (i.e. lab or imaging):  OXYGEN LEVEL 3 STATED ON PORTABLE OXYGEN ORDER    PHONE NUMBER:  [phone]    In what timeframe would the patient need to come in: NA    Where will you receive your lab/imaging services: NA    Additional notes: PATIENTS DAUGHTER (SHERINE) STATED THE OXYGEN PLACE IS NEEDING PATIENTS PORTABLE OXYGEN LEVEL UPDATED ON THE ORDER BEFORE THEY CAN MOVE FORWARD.    STATED IT IS NOW OXYGEN LEVEL 3.

## 2023-09-08 NOTE — TELEPHONE ENCOUNTER
S/w patient's daughter and stated it would be best to contact pulmonologist for updated O2 order.   Nehal verbalized understanding and was thankful for the call back.

## 2023-12-06 DIAGNOSIS — I10 ESSENTIAL HYPERTENSION: ICD-10-CM

## 2023-12-07 RX ORDER — AMLODIPINE BESYLATE 10 MG/1
10 TABLET ORAL DAILY
Qty: 90 TABLET | Refills: 3 | Status: SHIPPED | OUTPATIENT
Start: 2023-12-07

## 2023-12-07 RX ORDER — ATENOLOL 25 MG/1
25 TABLET ORAL DAILY
Qty: 90 TABLET | Refills: 3 | Status: SHIPPED | OUTPATIENT
Start: 2023-12-07

## 2023-12-27 RX ORDER — ALBUTEROL SULFATE 90 UG/1
2 AEROSOL, METERED RESPIRATORY (INHALATION) EVERY 4 HOURS PRN
Qty: 18 G | Refills: 0 | Status: SHIPPED | OUTPATIENT
Start: 2023-12-27

## 2024-02-09 ENCOUNTER — OFFICE VISIT (OUTPATIENT)
Dept: INTERNAL MEDICINE | Facility: CLINIC | Age: 79
End: 2024-02-09
Payer: MEDICARE

## 2024-02-09 VITALS
TEMPERATURE: 98.2 F | HEART RATE: 74 BPM | HEIGHT: 63 IN | SYSTOLIC BLOOD PRESSURE: 141 MMHG | WEIGHT: 185.4 LBS | OXYGEN SATURATION: 96 % | BODY MASS INDEX: 32.85 KG/M2 | DIASTOLIC BLOOD PRESSURE: 70 MMHG

## 2024-02-09 DIAGNOSIS — I10 ESSENTIAL HYPERTENSION: Primary | ICD-10-CM

## 2024-02-09 DIAGNOSIS — J41.8 MIXED SIMPLE AND MUCOPURULENT CHRONIC BRONCHITIS: ICD-10-CM

## 2024-02-09 DIAGNOSIS — R73.9 HYPERGLYCEMIA: ICD-10-CM

## 2024-02-09 RX ORDER — YOHIMBE BARK 500 MG
CAPSULE ORAL
COMMUNITY

## 2024-02-09 NOTE — PROGRESS NOTES
"Subjective  Carolin Toscano is a 78 y.o. female    HPI patient with a longstanding history of chronic bronchitis and hypertension coming in for follow-up significant weight gain noted has stopped smoking is on O2 via nasal cannula 2 to 3 L/min has had significant reduction in her ambulation because of her dyspnea on exertion    The following portions of the patient's history were reviewed and updated as appropriate: allergies, current medications, past family history, past medical history, past social history, past surgical history, and problem list.     Review of Systems   Constitutional: Negative.  Negative for activity change, appetite change, fatigue and fever.   HENT:  Negative for congestion, ear discharge, ear pain and trouble swallowing.    Eyes:  Negative for photophobia and visual disturbance.   Respiratory:  Positive for cough and shortness of breath.    Cardiovascular:  Negative for chest pain and palpitations.   Gastrointestinal:  Negative for abdominal distention, abdominal pain, constipation, diarrhea, nausea and vomiting.   Endocrine: Negative.    Genitourinary:  Negative for dysuria, hematuria and urgency.   Musculoskeletal:  Positive for arthralgias. Negative for back pain, joint swelling and myalgias.   Skin:  Negative for color change and rash.   Allergic/Immunologic: Negative.    Neurological:  Negative for dizziness, weakness, light-headedness and headaches.   Hematological:  Negative for adenopathy. Does not bruise/bleed easily.   Psychiatric/Behavioral:  Positive for sleep disturbance. Negative for agitation, confusion and dysphoric mood. The patient is not nervous/anxious.        Visit Vitals  /70   Pulse 74   Temp 98.2 °F (36.8 °C)   Ht 160 cm (62.99\")   Wt 84.1 kg (185 lb 6.4 oz)   SpO2 96%   BMI 32.85 kg/m²       Objective  Physical Exam  Constitutional:       General: She is not in acute distress.     Appearance: She is well-developed.   HENT:      Nose: Nose normal.   Eyes:      " General: No scleral icterus.     Conjunctiva/sclera: Conjunctivae normal.   Neck:      Thyroid: No thyromegaly.      Trachea: No tracheal deviation.   Cardiovascular:      Rate and Rhythm: Normal rate and regular rhythm.      Heart sounds: No murmur heard.     No friction rub.   Pulmonary:      Effort: No respiratory distress.      Breath sounds: No wheezing or rales.   Abdominal:      General: There is no distension.      Palpations: Abdomen is soft. There is no mass.      Tenderness: There is no abdominal tenderness. There is no guarding.   Musculoskeletal:         General: Deformity present. Normal range of motion.      Right lower leg: Edema present.      Left lower leg: Edema present.   Lymphadenopathy:      Cervical: No cervical adenopathy.   Skin:     General: Skin is warm and dry.      Findings: No erythema or rash.   Neurological:      Mental Status: She is alert and oriented to person, place, and time.      Cranial Nerves: No cranial nerve deficit.      Coordination: Coordination normal.      Deep Tendon Reflexes: Reflexes are normal and symmetric.   Psychiatric:         Behavior: Behavior normal.         Thought Content: Thought content normal.         Judgment: Judgment normal.       Diagnoses and all orders for this visit:    Essential hypertension stable on amlodipine and atenolol low-salt diet    Mixed simple and mucopurulent chronic bronchitis continue with Spiriva along with Symbicort Ventolin inhaler as needed    Hyperglycemia recent weight gain is concerning discussed dietary restrictions and weight loss check A1c    Other orders  -     Lactobacillus (Acidophilus) 100 MG capsule; Take  by mouth.  -     Fluzone High-Dose 65+yrs ([id number])

## 2024-02-10 LAB
ALBUMIN SERPL-MCNC: 4.3 G/DL (ref 3.5–5.2)
ALBUMIN/GLOB SERPL: 1.7 G/DL
ALP SERPL-CCNC: 92 U/L (ref 39–117)
ALT SERPL-CCNC: 25 U/L (ref 1–33)
AST SERPL-CCNC: 22 U/L (ref 1–32)
BILIRUB SERPL-MCNC: 0.3 MG/DL (ref 0–1.2)
BUN SERPL-MCNC: 14 MG/DL (ref 8–23)
BUN/CREAT SERPL: 19.4 (ref 7–25)
CALCIUM SERPL-MCNC: 9.8 MG/DL (ref 8.6–10.5)
CHLORIDE SERPL-SCNC: 104 MMOL/L (ref 98–107)
CO2 SERPL-SCNC: 30.4 MMOL/L (ref 22–29)
CREAT SERPL-MCNC: 0.72 MG/DL (ref 0.57–1)
EGFRCR SERPLBLD CKD-EPI 2021: 86.2 ML/MIN/1.73
ERYTHROCYTE [DISTWIDTH] IN BLOOD BY AUTOMATED COUNT: 13.3 % (ref 12.3–15.4)
GLOBULIN SER CALC-MCNC: 2.5 GM/DL
GLUCOSE SERPL-MCNC: 90 MG/DL (ref 65–99)
HBA1C MFR BLD: 6.2 % (ref 4.8–5.6)
HCT VFR BLD AUTO: 41.3 % (ref 34–46.6)
HGB BLD-MCNC: 13.7 G/DL (ref 12–15.9)
MCH RBC QN AUTO: 29.6 PG (ref 26.6–33)
MCHC RBC AUTO-ENTMCNC: 33.2 G/DL (ref 31.5–35.7)
MCV RBC AUTO: 89.2 FL (ref 79–97)
PLATELET # BLD AUTO: 218 10*3/MM3 (ref 140–450)
POTASSIUM SERPL-SCNC: 4.4 MMOL/L (ref 3.5–5.2)
PROT SERPL-MCNC: 6.8 G/DL (ref 6–8.5)
RBC # BLD AUTO: 4.63 10*6/MM3 (ref 3.77–5.28)
SODIUM SERPL-SCNC: 144 MMOL/L (ref 136–145)
WBC # BLD AUTO: 8.51 10*3/MM3 (ref 3.4–10.8)

## 2024-03-08 RX ORDER — ALBUTEROL SULFATE 90 UG/1
2 AEROSOL, METERED RESPIRATORY (INHALATION) EVERY 4 HOURS PRN
Qty: 18 G | Refills: 0 | Status: SHIPPED | OUTPATIENT
Start: 2024-03-08

## 2024-04-11 ENCOUNTER — PRIOR AUTHORIZATION (OUTPATIENT)
Dept: INTERNAL MEDICINE | Facility: CLINIC | Age: 79
End: 2024-04-11
Payer: MEDICARE

## 2024-04-11 ENCOUNTER — TELEPHONE (OUTPATIENT)
Dept: INTERNAL MEDICINE | Facility: CLINIC | Age: 79
End: 2024-04-11
Payer: MEDICARE

## 2024-04-11 NOTE — TELEPHONE ENCOUNTER
Caller: Nehal Hoffmann    Relationship: Emergency Contact    Best call back number: [phone]    What was the call regarding: PATIENT'S DAUGHTER CALLED ASKING FOR PRIOR AUTHORIZATION FOR budesonide-formoterol (SYMBICORT) 160-4.5 MCG/ACT inhaler  AND tiotropium (Spiriva HandiHaler) 18 MCG per inhalation capsule .      [facility] [address] [place] [address] [phone] [facility] [phone]

## 2024-04-15 ENCOUNTER — PRIOR AUTHORIZATION (OUTPATIENT)
Dept: INTERNAL MEDICINE | Facility: CLINIC | Age: 79
End: 2024-04-15
Payer: MEDICARE

## 2024-04-16 ENCOUNTER — TELEPHONE (OUTPATIENT)
Dept: INTERNAL MEDICINE | Facility: CLINIC | Age: 79
End: 2024-04-16
Payer: MEDICARE

## 2024-04-16 NOTE — TELEPHONE ENCOUNTER
Caller: [facility]    Relationship: Other    Best call back number: [phone]     What form or medical record are you requesting: OXYGEN RE CERTIFICATION FORMS    Who is requesting this form or medical record from you: [employer]    How would you like to receive the form or medical records (pick-up, mail, fax): FAX    If fax, what is the fax number: [phone]    Timeframe paperwork needed: ASAP    Additional notes: THIS COMPANY IS SENDING PAPERWORK TO BE COMPLETED BY PROVIDER FOR OXYGEN CERTIFICATION, PLEASE BE ON THE LOOK OUT FOR FAX.

## 2024-04-17 RX ORDER — FLUTICASONE FUROATE AND VILANTEROL 200; 25 UG/1; UG/1
1 POWDER RESPIRATORY (INHALATION)
Qty: 60 EACH | Refills: 5 | Status: SHIPPED | OUTPATIENT
Start: 2024-04-17

## 2024-04-30 ENCOUNTER — TELEPHONE (OUTPATIENT)
Dept: INTERNAL MEDICINE | Facility: CLINIC | Age: 79
End: 2024-04-30
Payer: MEDICARE

## 2024-04-30 NOTE — TELEPHONE ENCOUNTER
Caller: Nehal Hoffmann    Relationship: Emergency Contact    Best call back number: [phone]     Which medication are you concerned about: SYMBICORT AND BREO    Who prescribed you this medication:      When did you start taking this medication:      What are your concerns: THE PHARMACY SAID THE BREO DOESN'T GO WITH THE SYMBICORT, THE BREO WAS CALLED UNEXPECTEDLY.    DAMIAN DIDN'T KNOW SHE WAS TO TAKE THE BREO.

## 2024-05-03 ENCOUNTER — TELEPHONE (OUTPATIENT)
Dept: INTERNAL MEDICINE | Facility: CLINIC | Age: 79
End: 2024-05-03
Payer: MEDICARE

## 2024-05-03 RX ORDER — BUDESONIDE AND FORMOTEROL FUMARATE DIHYDRATE 80; 4.5 UG/1; UG/1
2 AEROSOL RESPIRATORY (INHALATION)
Qty: 10.2 G | Refills: 12 | Status: SHIPPED | OUTPATIENT
Start: 2024-05-03

## 2024-06-08 DIAGNOSIS — J44.1 COPD EXACERBATION: ICD-10-CM

## 2024-06-10 RX ORDER — TIOTROPIUM BROMIDE 18 UG/1
CAPSULE ORAL; RESPIRATORY (INHALATION) DAILY
Qty: 90 CAPSULE | Refills: 0 | Status: SHIPPED | OUTPATIENT
Start: 2024-06-10

## 2024-08-12 ENCOUNTER — OFFICE VISIT (OUTPATIENT)
Dept: INTERNAL MEDICINE | Facility: CLINIC | Age: 79
End: 2024-08-12
Payer: MEDICARE

## 2024-08-12 VITALS
OXYGEN SATURATION: 97 % | BODY MASS INDEX: 35.34 KG/M2 | DIASTOLIC BLOOD PRESSURE: 58 MMHG | HEIGHT: 64 IN | TEMPERATURE: 98 F | HEART RATE: 66 BPM | WEIGHT: 207 LBS | SYSTOLIC BLOOD PRESSURE: 155 MMHG | RESPIRATION RATE: 16 BRPM

## 2024-08-12 DIAGNOSIS — I10 ESSENTIAL HYPERTENSION: Primary | ICD-10-CM

## 2024-08-12 DIAGNOSIS — E78.5 DYSLIPIDEMIA: ICD-10-CM

## 2024-08-12 DIAGNOSIS — E11.65 TYPE 2 DIABETES MELLITUS WITH HYPERGLYCEMIA, WITHOUT LONG-TERM CURRENT USE OF INSULIN: ICD-10-CM

## 2024-08-12 LAB
EXPIRATION DATE: ABNORMAL
HBA1C MFR BLD: 5.8 % (ref 4.5–5.7)
Lab: ABNORMAL

## 2024-08-12 PROCEDURE — 3077F SYST BP >= 140 MM HG: CPT | Performed by: INTERNAL MEDICINE

## 2024-08-12 PROCEDURE — 1159F MED LIST DOCD IN RCRD: CPT | Performed by: INTERNAL MEDICINE

## 2024-08-12 PROCEDURE — 1160F RVW MEDS BY RX/DR IN RCRD: CPT | Performed by: INTERNAL MEDICINE

## 2024-08-12 PROCEDURE — 99213 OFFICE O/P EST LOW 20 MIN: CPT | Performed by: INTERNAL MEDICINE

## 2024-08-12 PROCEDURE — 3078F DIAST BP <80 MM HG: CPT | Performed by: INTERNAL MEDICINE

## 2024-08-12 PROCEDURE — 1126F AMNT PAIN NOTED NONE PRSNT: CPT | Performed by: INTERNAL MEDICINE

## 2024-08-12 PROCEDURE — G0439 PPPS, SUBSEQ VISIT: HCPCS | Performed by: INTERNAL MEDICINE

## 2024-08-12 PROCEDURE — 83036 HEMOGLOBIN GLYCOSYLATED A1C: CPT | Performed by: INTERNAL MEDICINE

## 2024-08-12 PROCEDURE — 3044F HG A1C LEVEL LT 7.0%: CPT | Performed by: INTERNAL MEDICINE

## 2024-08-12 PROCEDURE — 1170F FXNL STATUS ASSESSED: CPT | Performed by: INTERNAL MEDICINE

## 2024-08-12 NOTE — PROGRESS NOTES
Subjective   The ABCs of the Annual Wellness Visit  Medicare Wellness Visit      Carolin Toscano is a 78 y.o. patient who presents for a Medicare Wellness Visit.    The following portions of the patient's history were reviewed and   updated as appropriate: allergies, current medications, past family history, past medical history, past social history, past surgical history, and problem list.    Compared to one year ago, the patient's physical   health is worse.  Compared to one year ago, the patient's mental   health is the same.    Recent Hospitalizations:  She was not admitted to the hospital during the last year.     Current Medical Providers:  Patient Care Team:  Jason Nicholson MD as PCP - General (Internal Medicine)    Outpatient Medications Prior to Visit   Medication Sig Dispense Refill    acetaminophen (TYLENOL) 325 MG tablet Take 2 tablets by mouth Every 6 (Six) Hours As Needed for Mild Pain.      amLODIPine (NORVASC) 10 MG tablet Take 1 tablet by mouth once daily 90 tablet 3    atenolol (TENORMIN) 25 MG tablet Take 1 tablet by mouth once daily 90 tablet 3    budesonide-formoterol (Breyna) 80-4.5 MCG/ACT inhaler Inhale 2 puffs 2 (Two) Times a Day. 10.2 g 12    ipratropium-albuterol (DUO-NEB) 0.5-2.5 mg/3 ml nebulizer Take 3 mL by nebulization Every 6 (Six) Hours. 360 mL     Lactobacillus (Acidophilus) 100 MG capsule Take  by mouth.      multivitamins-minerals (PRESERVISION AREDS 2) capsule capsule Take 2 capsules by mouth Daily.      nystatin (MYCOSTATIN) 100,000 unit/mL suspension Swish and swallow 5 mL 4 (Four) Times a Day. 120 mL 1    Spiriva HandiHaler 18 MCG per inhalation capsule Inhale 1 puff by mouth once daily 90 capsule 0    Ventolin  (90 Base) MCG/ACT inhaler INHALE 2 PUFFS BY MOUTH EVERY 4 HOURS AS NEEDED FOR WHEEZING 18 g 0     No facility-administered medications prior to visit.     No opioid medication identified on active medication list. I have reviewed chart for other potential   "high risk medication/s and harmful drug interactions in the elderly.      Aspirin is not on active medication list.  Aspirin use is not indicated based on review of current medical condition/s. Risk of harm outweighs potential benefits.  .    Patient Active Problem List   Diagnosis    Essential hypertension    Dyslipidemia    COPD (chronic obstructive pulmonary disease)     Advance Care Planning Advance Directive is not on file.  ACP discussion was held with the patient during this visit. Patient does not have an advance directive, information provided.            Objective   Vitals:    24 1529   BP: 155/58   Pulse: 66   Resp: 16   Temp: 98 °F (36.7 °C)   SpO2: 97%  Comment: 3 Liters of o2   Weight: 93.9 kg (207 lb)   Height: 161.3 cm (63.5\")   PainSc: 0-No pain       Estimated body mass index is 36.09 kg/m² as calculated from the following:    Height as of this encounter: 161.3 cm (63.5\").    Weight as of this encounter: 93.9 kg (207 lb).    Class 2 Severe Obesity (BMI >=35 and <=39.9). Obesity-related health conditions include the following: hypertension. Obesity is worsening. BMI is is above average; BMI management plan is completed. We discussed low calorie, low carb based diet program, portion control, and increasing exercise.       Does the patient have evidence of cognitive impairment? No                                                                                                Health  Risk Assessment    Smoking Status:  Social History     Tobacco Use   Smoking Status Former    Current packs/day: 0.00    Average packs/day: 1 pack/day for 63.3 years (63.3 ttl pk-yrs)    Types: Cigarettes    Start date: 1960    Quit date: 2023    Years since quittin.2   Smokeless Tobacco Former    Quit date: 2017     Alcohol Consumption:  Social History     Substance and Sexual Activity   Alcohol Use No       Fall Risk Screen  STEADI Fall Risk Assessment was completed, and patient is at MODERATE risk " for falls. Assessment completed on:2024    Depression Screenin/12/2024     3:29 PM   PHQ-2/PHQ-9 Depression Screening   Little Interest or Pleasure in Doing Things 0-->not at all   Feeling Down, Depressed or Hopeless 0-->not at all   PHQ-9: Brief Depression Severity Measure Score 0     Health Habits and Functional and Cognitive Screenin/12/2024     3:26 PM   Functional & Cognitive Status   Do you have difficulty preparing food and eating? Yes   Do you have difficulty bathing yourself, getting dressed or grooming yourself? No   Do you have difficulty using the toilet? No   Do you have difficulty moving around from place to place? Yes   Do you have trouble with steps or getting out of a bed or a chair? No   Current Diet Unhealthy Diet   Dental Exam Up to date   Eye Exam Up to date   Exercise (times per week) 0 times per week   Current Exercises Include No Regular Exercise   Do you need help using the phone?  No   Are you deaf or do you have serious difficulty hearing?  Yes   Do you need help to go to places out of walking distance? Yes   Do you need help shopping? Yes   Do you need help preparing meals?  Yes   Do you need help with housework?  Yes   Do you need help with laundry? No   Do you need help taking your medications? No   Do you need help managing money? No   Do you ever drive or ride in a car without wearing a seat belt? Yes   Have you felt unusual stress, anger or loneliness in the last month? No   Who do you live with? Alone   If you need help, do you have trouble finding someone available to you? No   Have you been bothered in the last four weeks by sexual problems? No   Do you have difficulty concentrating, remembering or making decisions? No           Age-appropriate Screening Schedule:  Refer to the list below for future screening recommendations based on patient's age, sex and/or medical conditions. Orders for these recommended tests are listed in the plan section. The patient  has been provided with a written plan.    Health Maintenance List  Health Maintenance   Topic Date Due    DXA SCAN  Never done    TDAP/TD VACCINES (1 - Tdap) Never done    RSV Vaccine - Adults (1 - 1-dose 60+ series) Never done    LUNG CANCER SCREENING  06/17/2024    ANNUAL WELLNESS VISIT  07/27/2024    BMI FOLLOWUP  07/27/2024    INFLUENZA VACCINE  08/01/2024    COVID-19 Vaccine (1 - 2023-24 season) 11/01/2024 (Originally 9/1/2023)    ZOSTER VACCINE (1 of 2) 08/26/2029 (Originally 11/11/1996)    HEPATITIS C SCREENING  Completed    Pneumococcal Vaccine 65+  Completed    COLORECTAL CANCER SCREENING  Discontinued                                                                                                                                                CMS Preventative Services Quick Reference  Risk Factors Identified During Encounter  Chronic Pain: Natural history and expected course discussed. Questions answered.  Home exercise plan outlined.  Polypharmacy: Medication List reviewed and Medications are appropriate for patient    The above risks/problems have been discussed with the patient.  Pertinent information has been shared with the patient in the After Visit Summary.  An After Visit Summary and PPPS were made available to the patient.    Follow Up:   Next Medicare Wellness visit to be scheduled in 1 year.         Additional E&M Note during same encounter follows:  Patient has additional, significant, and separately identifiable condition(s)/problem(s) that require work above and beyond the Medicare Wellness Visit     Chief Complaint  Medicare Wellness-subsequent    Subjective   HPI  Carolin is also being seen today for additional medical problem/s.  Increasing wt gain with new diagnosis of diabetes    Review of Systems   Constitutional:  Negative for activity change, appetite change, fatigue and fever.   HENT:  Negative for congestion, ear discharge, ear pain and trouble swallowing.    Eyes:  Negative for  "photophobia and visual disturbance.   Respiratory:  Positive for cough and shortness of breath.    Cardiovascular:  Negative for chest pain and palpitations.   Gastrointestinal:  Negative for abdominal distention, constipation, diarrhea, nausea and vomiting.   Genitourinary:  Negative for dysuria, hematuria and urgency.   Musculoskeletal:  Positive for arthralgias. Negative for back pain, joint swelling and myalgias.   Skin:  Negative for color change and rash.   Neurological:  Negative for dizziness, weakness, light-headedness and confusion.   Hematological:  Negative for adenopathy. Does not bruise/bleed easily.   Psychiatric/Behavioral:  Positive for sleep disturbance. Negative for agitation and dysphoric mood. The patient is not nervous/anxious.               Objective   Vital Signs:  /58   Pulse 66   Temp 98 °F (36.7 °C)   Resp 16   Ht 161.3 cm (63.5\")   Wt 93.9 kg (207 lb)   SpO2 97% Comment: 3 Liters of o2  BMI 36.09 kg/m²   Physical Exam  Constitutional:       General: She is not in acute distress.     Appearance: She is well-developed.   HENT:      Nose: Nose normal.   Eyes:      General: No scleral icterus.     Conjunctiva/sclera: Conjunctivae normal.   Neck:      Thyroid: No thyromegaly.      Trachea: No tracheal deviation.   Cardiovascular:      Rate and Rhythm: Normal rate and regular rhythm.      Heart sounds: No murmur heard.     No friction rub.   Pulmonary:      Effort: No respiratory distress.      Breath sounds: No wheezing or rales.   Abdominal:      General: There is no distension.      Palpations: Abdomen is soft. There is no mass.      Tenderness: There is no abdominal tenderness. There is no guarding.   Musculoskeletal:         General: Deformity present. Normal range of motion.   Lymphadenopathy:      Cervical: No cervical adenopathy.   Skin:     General: Skin is warm and dry.      Findings: No erythema or rash.   Neurological:      Mental Status: She is alert and oriented to " person, place, and time.      Cranial Nerves: No cranial nerve deficit.      Coordination: Coordination normal.      Deep Tendon Reflexes: Reflexes are normal and symmetric.   Psychiatric:         Behavior: Behavior normal.         Thought Content: Thought content normal.         Judgment: Judgment normal.               Assessment and Plan               Essential hypertension  Continue with low-sodium diet on amlodipine and atenolol  Dyslipidemia  Continue with the dietary restrictions follow lipid profile  Type 2 diabetes mellitus with hyperglycemia, without long-term current use of insulin  Continues to be noncompliant with her diet counseled about avoiding sugar beverages.  Check A1c  No orders of the defined types were placed in this encounter.            Follow Up   No follow-ups on file.  Patient was given instructions and counseling regarding her condition or for health maintenance advice. Please see specific information pulled into the AVS if appropriate.

## 2024-09-03 RX ORDER — BUDESONIDE AND FORMOTEROL FUMARATE DIHYDRATE 160; 4.5 UG/1; UG/1
2 AEROSOL RESPIRATORY (INHALATION) 2 TIMES DAILY
Qty: 11 G | Refills: 0 | OUTPATIENT
Start: 2024-09-03

## 2024-09-09 DIAGNOSIS — J44.1 COPD EXACERBATION: ICD-10-CM

## 2024-09-09 RX ORDER — TIOTROPIUM BROMIDE 18 UG/1
CAPSULE ORAL; RESPIRATORY (INHALATION) DAILY
Qty: 90 CAPSULE | Refills: 2 | Status: SHIPPED | OUTPATIENT
Start: 2024-09-09

## 2024-11-25 DIAGNOSIS — I10 ESSENTIAL HYPERTENSION: ICD-10-CM

## 2024-11-25 RX ORDER — ALBUTEROL SULFATE 90 UG/1
2 AEROSOL, METERED RESPIRATORY (INHALATION) EVERY 4 HOURS PRN
Qty: 18 G | Refills: 0 | Status: SHIPPED | OUTPATIENT
Start: 2024-11-25

## 2024-11-25 RX ORDER — ATENOLOL 25 MG/1
25 TABLET ORAL DAILY
Qty: 90 TABLET | Refills: 0 | Status: SHIPPED | OUTPATIENT
Start: 2024-11-25

## 2024-11-25 RX ORDER — AMLODIPINE BESYLATE 10 MG/1
10 TABLET ORAL DAILY
Qty: 90 TABLET | Refills: 0 | Status: SHIPPED | OUTPATIENT
Start: 2024-11-25

## 2025-01-02 ENCOUNTER — TELEPHONE (OUTPATIENT)
Dept: INTERNAL MEDICINE | Facility: CLINIC | Age: 80
End: 2025-01-02
Payer: MEDICARE

## 2025-01-02 NOTE — TELEPHONE ENCOUNTER
Advised Nehal this hasn't been received yet that if it went to the main number it could take a day or two to make it to me and that I would give the number directly to my pod, [phone] and she will call the insurance and provide that for them to fax to me.

## 2025-01-02 NOTE — TELEPHONE ENCOUNTER
Caller: Nehal Hoffmann    Relationship: Emergency Contact    Best call back number: [phone]    What is the best time to reach you: ANYTIME     Who are you requesting to speak with (clinical staff, provider,  specific staff member): CLINICAL STAFF    PATIENT DAUGHTER STATES INSURANCE CALLED HER TO VERIFY IF OFFICE HAD RECEIVED PAPERWORK FOR PATIENTS SPIRIVA INHALER, BREYNA INHALER. PATIENT DAUGHTER IS JUST WANTING TO MAKE SURE IT WAS RECEIVED SO PATIENT CAN GET MEDICATION. PLEASE CALL TO DISCUSS.

## 2025-02-08 DIAGNOSIS — I10 ESSENTIAL HYPERTENSION: ICD-10-CM

## 2025-02-10 RX ORDER — ATENOLOL 25 MG/1
25 TABLET ORAL DAILY
Qty: 90 TABLET | Refills: 3 | Status: SHIPPED | OUTPATIENT
Start: 2025-02-10

## 2025-02-10 RX ORDER — AMLODIPINE BESYLATE 10 MG/1
10 TABLET ORAL DAILY
Qty: 90 TABLET | Refills: 3 | Status: SHIPPED | OUTPATIENT
Start: 2025-02-10

## 2025-02-26 ENCOUNTER — TELEPHONE (OUTPATIENT)
Dept: INTERNAL MEDICINE | Facility: CLINIC | Age: 80
End: 2025-02-26
Payer: MEDICARE

## 2025-02-26 NOTE — TELEPHONE ENCOUNTER
Caller: Nehal Hoffmann    Relationship: Emergency Contact    Best call back number: [phone]    What form or medical record are you requesting:     PRIOR AUTHORIZATION    Who is requesting this form or medical record from you: FAX IS COMING FROM [employer]    Additional notes:     budesonide-formoterol (Breyna) 80-4.5 MCG/ACT inhaler     PLEASE BE ON THE LOOKOUT FOR THIS FAX

## 2025-02-26 NOTE — TELEPHONE ENCOUNTER
Caller: Nehal Hoffmann    Relationship: Emergency Contact    Best call back number: [phone]       What was the call regarding: RAEGAN HAS PROVIDED A CASE NUMBER. IT IS [id number]

## 2025-02-28 ENCOUNTER — PRIOR AUTHORIZATION (OUTPATIENT)
Dept: INTERNAL MEDICINE | Facility: CLINIC | Age: 80
End: 2025-02-28
Payer: MEDICARE

## 2025-02-28 NOTE — TELEPHONE ENCOUNTER
DAMIAN DYE (Key: [id number])  Need Help? Call us at [phone]  Outcome  Additional Information Required  [facility] is not able to process this request through ePA, please contact the plan at [phone] or fax in request to [phone]. NOTICE FOR OPIOID REQUESTS ONLY: Effective Jan 1, 2019 Medicare Part D plans have implemented new opioid safety edits, including a 7 day supply limit for opioid naive patients.  Drug  Breyna 80-4.5MCG/ACT aerosol  ePA cloud logo  Form  [employer] Electronic PA Form (2017 NCPDP)

## 2025-02-28 NOTE — TELEPHONE ENCOUNTER
Patient has tried to do PA herself through the insurance a few times and the insurance has Denied  each time without the proper information. [place] pharmacist states since the patient started this Appeal it was Standard which is 7 days. I asked that they expedite the request. Should have determination by Monday 3/3/2025. LVM for patient.

## 2025-03-27 DIAGNOSIS — J44.1 COPD EXACERBATION: ICD-10-CM

## 2025-03-27 RX ORDER — TIOTROPIUM BROMIDE 18 UG/1
CAPSULE ORAL; RESPIRATORY (INHALATION) DAILY
Qty: 90 CAPSULE | Refills: 0 | Status: SHIPPED | OUTPATIENT
Start: 2025-03-27

## 2025-05-26 DIAGNOSIS — J44.1 COPD EXACERBATION: ICD-10-CM

## 2025-05-27 RX ORDER — BUDESONIDE AND FORMOTEROL FUMARATE DIHYDRATE 80; 4.5 UG/1; UG/1
2 AEROSOL RESPIRATORY (INHALATION)
Qty: 10.2 G | Refills: 12 | OUTPATIENT
Start: 2025-05-27

## 2025-05-27 RX ORDER — TIOTROPIUM BROMIDE 18 UG/1
CAPSULE ORAL; RESPIRATORY (INHALATION) DAILY
Qty: 90 CAPSULE | Refills: 0 | OUTPATIENT
Start: 2025-05-27

## 2025-05-27 RX ORDER — ALBUTEROL SULFATE 90 UG/1
2 AEROSOL, METERED RESPIRATORY (INHALATION) EVERY 4 HOURS PRN
Qty: 18 G | Refills: 0 | Status: SHIPPED | OUTPATIENT
Start: 2025-05-27

## 2025-05-27 RX ORDER — BUDESONIDE AND FORMOTEROL FUMARATE 80; 4.5 UG/1; UG/1
2 AEROSOL, METERED RESPIRATORY (INHALATION) 2 TIMES DAILY
Qty: 11 G | Refills: 0 | Status: SHIPPED | OUTPATIENT
Start: 2025-05-27

## 2025-05-27 RX ORDER — TIOTROPIUM BROMIDE 18 UG/1
CAPSULE ORAL; RESPIRATORY (INHALATION) DAILY
Qty: 90 CAPSULE | Refills: 0 | Status: SHIPPED | OUTPATIENT
Start: 2025-05-27

## 2025-06-30 RX ORDER — BUDESONIDE AND FORMOTEROL FUMARATE DIHYDRATE 80; 4.5 UG/1; UG/1
2 AEROSOL RESPIRATORY (INHALATION) 2 TIMES DAILY
Qty: 11 G | Refills: 2 | Status: SHIPPED | OUTPATIENT
Start: 2025-06-30

## 2025-07-20 DIAGNOSIS — J44.1 COPD EXACERBATION: ICD-10-CM

## 2025-07-21 RX ORDER — TIOTROPIUM BROMIDE 18 UG/1
CAPSULE ORAL; RESPIRATORY (INHALATION) DAILY
Qty: 90 CAPSULE | Refills: 3 | Status: SHIPPED | OUTPATIENT
Start: 2025-07-21

## 2025-08-07 RX ORDER — ALBUTEROL SULFATE 90 UG/1
2 AEROSOL, METERED RESPIRATORY (INHALATION) EVERY 4 HOURS PRN
Qty: 18 G | Refills: 0 | Status: SHIPPED | OUTPATIENT
Start: 2025-08-07

## (undated) DEVICE — GLV SURG NEOPRN SENSICARE SZ8

## (undated) DEVICE — CLEARCUT® HP2 SLIT KNIFE INTREPID MICRO-COAXIAL SYSTEM 2.4 DB: Brand: CLEARCUT®; INTREPID

## (undated) DEVICE — EYE CARE POST OP KIT: Brand: MEDLINE INDUSTRIES, INC.

## (undated) DEVICE — CANN IRR/INJ AIR ANT CHAMBER 6MM BEND 27G

## (undated) DEVICE — SYR LUERLOK 5CC

## (undated) DEVICE — 15 DEG. MICROKNIFE - 3MM: Brand: SHARPOINT

## (undated) DEVICE — Device

## (undated) DEVICE — SOL IRRIG H2O 1000ML STRL

## (undated) DEVICE — HP CONCL INTREPID COAX I/A CRV .3MM